# Patient Record
Sex: FEMALE | Race: BLACK OR AFRICAN AMERICAN | NOT HISPANIC OR LATINO | Employment: OTHER | RURAL
[De-identification: names, ages, dates, MRNs, and addresses within clinical notes are randomized per-mention and may not be internally consistent; named-entity substitution may affect disease eponyms.]

---

## 2020-04-24 ENCOUNTER — HISTORICAL (OUTPATIENT)
Dept: ADMINISTRATIVE | Facility: HOSPITAL | Age: 70
End: 2020-04-24

## 2020-04-24 LAB
ALBUMIN SERPL BCP-MCNC: 4 G/DL (ref 3.5–5)
ALBUMIN/GLOB SERPL: 1 {RATIO}
ALP SERPL-CCNC: 76 U/L (ref 55–142)
ALT SERPL W P-5'-P-CCNC: 18 U/L (ref 13–56)
AMYLASE SERPL-CCNC: 113 U/L (ref 25–115)
APTT PPP: 29.8 SECONDS (ref 25.2–37.3)
AST SERPL W P-5'-P-CCNC: 13 U/L (ref 15–37)
BASOPHILS # BLD AUTO: 0.03 X10E3/UL (ref 0–0.2)
BASOPHILS NFR BLD AUTO: 0.7 % (ref 0–1)
BILIRUB SERPL-MCNC: 0.6 MG/DL (ref 0–1.2)
BUN SERPL-MCNC: 19 MG/DL (ref 7–18)
BUN/CREAT SERPL: 14
CALCIUM SERPL-MCNC: 9.1 MG/DL (ref 8.5–10.1)
CHLORIDE SERPL-SCNC: 103 MMOL/L (ref 98–107)
CK MB SERPL-MCNC: <1 NG/ML (ref 1–3.6)
CK MB SERPL-MCNC: <1 NG/ML (ref 1–3.6)
CK SERPL-CCNC: 66 U/L (ref 26–192)
CK SERPL-CCNC: 82 U/L (ref 26–192)
CO2 SERPL-SCNC: 30 MMOL/L (ref 21–32)
CREAT SERPL-MCNC: 1.36 MG/DL (ref 0.55–1.02)
EOSINOPHIL # BLD AUTO: 0.05 X10E3/UL (ref 0–0.5)
EOSINOPHIL NFR BLD AUTO: 1.1 % (ref 1–4)
ERYTHROCYTE [DISTWIDTH] IN BLOOD BY AUTOMATED COUNT: 13.7 % (ref 11.5–14.5)
GLOBULIN SER-MCNC: 4 G/DL (ref 2–4)
GLUCOSE SERPL-MCNC: 89 MG/DL (ref 74–106)
HCT VFR BLD AUTO: 36.8 % (ref 38–47)
HGB BLD-MCNC: 11.4 G/DL (ref 12–16)
IMM GRANULOCYTES # BLD AUTO: 0.01 X10E3/UL (ref 0–0.04)
IMM GRANULOCYTES NFR BLD: 0.2 % (ref 0–0.4)
INR BLD: 0.91 (ref 0–3.3)
LIPASE SERPL-CCNC: 306 U/L (ref 73–393)
LYMPHOCYTES # BLD AUTO: 1.46 X10E3/UL (ref 1–4.8)
LYMPHOCYTES NFR BLD AUTO: 31.9 % (ref 27–41)
MCH RBC QN AUTO: 26.3 PG (ref 27–31)
MCHC RBC AUTO-ENTMCNC: 31 G/DL (ref 32–36)
MCV RBC AUTO: 84.8 FL (ref 80–96)
MONOCYTES # BLD AUTO: 0.52 X10E3/UL (ref 0–0.8)
MONOCYTES NFR BLD AUTO: 11.4 % (ref 2–6)
MPC BLD CALC-MCNC: 10.3 FL (ref 9.4–12.4)
MYOGLOBIN SERPL-MCNC: 44 NG/ML (ref 13–71)
NEUTROPHILS # BLD AUTO: 2.51 X10E3/UL (ref 1.8–7.7)
NEUTROPHILS NFR BLD AUTO: 54.7 % (ref 53–65)
NRBC # BLD AUTO: 0 X10E3/UL (ref 0–0)
NRBC, AUTO (.00): 0 /100 (ref 0–0)
PLATELET # BLD AUTO: 246 X10E3/UL (ref 150–400)
POTASSIUM SERPL-SCNC: 4 MMOL/L (ref 3.5–5.1)
PROT SERPL-MCNC: 8 G/DL (ref 6.4–8.2)
PROTHROMBIN TIME: 12.4 SECONDS (ref 11.7–14.7)
RBC # BLD AUTO: 4.34 X10E6/UL (ref 4.2–5.4)
SODIUM SERPL-SCNC: 139 MMOL/L (ref 136–145)
TROPONIN I SERPL-MCNC: <0.017 NG/ML (ref 0–0.06)
TROPONIN I SERPL-MCNC: <0.017 NG/ML (ref 0–0.06)
WBC # BLD AUTO: 4.58 X10E3/UL (ref 4.5–11)

## 2020-04-25 ENCOUNTER — HISTORICAL (OUTPATIENT)
Dept: ADMINISTRATIVE | Facility: HOSPITAL | Age: 70
End: 2020-04-25

## 2020-04-25 LAB
BASOPHILS # BLD AUTO: 0.03 X10E3/UL (ref 0–0.2)
BASOPHILS NFR BLD AUTO: 0.7 % (ref 0–1)
BUN SERPL-MCNC: 16 MG/DL (ref 7–18)
CALCIUM SERPL-MCNC: 8.6 MG/DL (ref 8.5–10.1)
CHLORIDE SERPL-SCNC: 104 MMOL/L (ref 98–107)
CK MB SERPL-MCNC: <1 NG/ML (ref 1–3.6)
CK SERPL-CCNC: 58 U/L (ref 26–192)
CO2 SERPL-SCNC: 26 MMOL/L (ref 21–32)
CREAT SERPL-MCNC: 1.08 MG/DL (ref 0.55–1.02)
EOSINOPHIL # BLD AUTO: 0.07 X10E3/UL (ref 0–0.5)
EOSINOPHIL NFR BLD AUTO: 1.6 % (ref 1–4)
ERYTHROCYTE [DISTWIDTH] IN BLOOD BY AUTOMATED COUNT: 13.8 % (ref 11.5–14.5)
GLUCOSE SERPL-MCNC: 84 MG/DL (ref 74–106)
HCT VFR BLD AUTO: 34.7 % (ref 38–47)
HGB BLD-MCNC: 10.8 G/DL (ref 12–16)
IMM GRANULOCYTES # BLD AUTO: 0.01 X10E3/UL (ref 0–0.04)
IMM GRANULOCYTES NFR BLD: 0.2 % (ref 0–0.4)
LYMPHOCYTES # BLD AUTO: 1.49 X10E3/UL (ref 1–4.8)
LYMPHOCYTES NFR BLD AUTO: 34.1 % (ref 27–41)
MCH RBC QN AUTO: 25.8 PG (ref 27–31)
MCHC RBC AUTO-ENTMCNC: 31.1 G/DL (ref 32–36)
MCV RBC AUTO: 82.8 FL (ref 80–96)
MONOCYTES # BLD AUTO: 0.53 X10E3/UL (ref 0–0.8)
MONOCYTES NFR BLD AUTO: 12.1 % (ref 2–6)
MPC BLD CALC-MCNC: 10.9 FL (ref 9.4–12.4)
NEUTROPHILS # BLD AUTO: 2.24 X10E3/UL (ref 1.8–7.7)
NEUTROPHILS NFR BLD AUTO: 51.3 % (ref 53–65)
NRBC # BLD AUTO: 0 X10E3/UL (ref 0–0)
NRBC, AUTO (.00): 0 /100 (ref 0–0)
PLATELET # BLD AUTO: 228 X10E3/UL (ref 150–400)
POTASSIUM SERPL-SCNC: 3.3 MMOL/L (ref 3.5–5.1)
RBC # BLD AUTO: 4.19 X10E6/UL (ref 4.2–5.4)
SODIUM SERPL-SCNC: 138 MMOL/L (ref 136–145)
TROPONIN I SERPL-MCNC: <0.017 NG/ML (ref 0–0.06)
WBC # BLD AUTO: 4.37 X10E3/UL (ref 4.5–11)

## 2020-04-26 ENCOUNTER — HISTORICAL (OUTPATIENT)
Dept: ADMINISTRATIVE | Facility: HOSPITAL | Age: 70
End: 2020-04-26

## 2020-04-26 LAB
BASOPHILS # BLD AUTO: 0.03 X10E3/UL (ref 0–0.2)
BASOPHILS NFR BLD AUTO: 0.6 % (ref 0–1)
BUN SERPL-MCNC: 22 MG/DL (ref 7–18)
CALCIUM SERPL-MCNC: 8.6 MG/DL (ref 8.5–10.1)
CHLORIDE SERPL-SCNC: 106 MMOL/L (ref 98–107)
CO2 SERPL-SCNC: 30 MMOL/L (ref 21–32)
CREAT SERPL-MCNC: 1.23 MG/DL (ref 0.55–1.02)
EOSINOPHIL # BLD AUTO: 0.08 X10E3/UL (ref 0–0.5)
EOSINOPHIL NFR BLD AUTO: 1.6 % (ref 1–4)
ERYTHROCYTE [DISTWIDTH] IN BLOOD BY AUTOMATED COUNT: 13.8 % (ref 11.5–14.5)
GLUCOSE SERPL-MCNC: 95 MG/DL (ref 74–106)
HCT VFR BLD AUTO: 36.1 % (ref 38–47)
HGB BLD-MCNC: 11.1 G/DL (ref 12–16)
IMM GRANULOCYTES # BLD AUTO: 0.01 X10E3/UL (ref 0–0.04)
IMM GRANULOCYTES NFR BLD: 0.2 % (ref 0–0.4)
LYMPHOCYTES # BLD AUTO: 0.98 X10E3/UL (ref 1–4.8)
LYMPHOCYTES NFR BLD AUTO: 19.2 % (ref 27–41)
MCH RBC QN AUTO: 26.1 PG (ref 27–31)
MCHC RBC AUTO-ENTMCNC: 30.7 G/DL (ref 32–36)
MCV RBC AUTO: 84.9 FL (ref 80–96)
MONOCYTES # BLD AUTO: 0.63 X10E3/UL (ref 0–0.8)
MONOCYTES NFR BLD AUTO: 12.3 % (ref 2–6)
MPC BLD CALC-MCNC: 10.7 FL (ref 9.4–12.4)
NEUTROPHILS # BLD AUTO: 3.38 X10E3/UL (ref 1.8–7.7)
NEUTROPHILS NFR BLD AUTO: 66.1 % (ref 53–65)
NRBC # BLD AUTO: 0 X10E3/UL (ref 0–0)
NRBC, AUTO (.00): 0 /100 (ref 0–0)
PLATELET # BLD AUTO: 221 X10E3/UL (ref 150–400)
POTASSIUM SERPL-SCNC: 4.2 MMOL/L (ref 3.5–5.1)
RBC # BLD AUTO: 4.25 X10E6/UL (ref 4.2–5.4)
SODIUM SERPL-SCNC: 141 MMOL/L (ref 136–145)
WBC # BLD AUTO: 5.11 X10E3/UL (ref 4.5–11)

## 2020-04-27 ENCOUNTER — HISTORICAL (OUTPATIENT)
Dept: ADMINISTRATIVE | Facility: HOSPITAL | Age: 70
End: 2020-04-27

## 2020-04-27 LAB
BASOPHILS # BLD AUTO: 0.03 X10E3/UL (ref 0–0.2)
BASOPHILS NFR BLD AUTO: 0.5 % (ref 0–1)
BUN SERPL-MCNC: 18 MG/DL (ref 7–18)
CALCIUM SERPL-MCNC: 8.5 MG/DL (ref 8.5–10.1)
CHLORIDE SERPL-SCNC: 103 MMOL/L (ref 98–107)
CO2 SERPL-SCNC: 28 MMOL/L (ref 21–32)
CREAT SERPL-MCNC: 1.08 MG/DL (ref 0.55–1.02)
EOSINOPHIL # BLD AUTO: 0.1 X10E3/UL (ref 0–0.5)
EOSINOPHIL NFR BLD AUTO: 1.7 % (ref 1–4)
ERYTHROCYTE [DISTWIDTH] IN BLOOD BY AUTOMATED COUNT: 13.6 % (ref 11.5–14.5)
GLUCOSE SERPL-MCNC: 94 MG/DL (ref 74–106)
HCT VFR BLD AUTO: 35.5 % (ref 38–47)
HGB BLD-MCNC: 11 G/DL (ref 12–16)
IMM GRANULOCYTES # BLD AUTO: 0.03 X10E3/UL (ref 0–0.04)
IMM GRANULOCYTES NFR BLD: 0.5 % (ref 0–0.4)
LYMPHOCYTES # BLD AUTO: 1.22 X10E3/UL (ref 1–4.8)
LYMPHOCYTES NFR BLD AUTO: 20.7 % (ref 27–41)
MCH RBC QN AUTO: 26 PG (ref 27–31)
MCHC RBC AUTO-ENTMCNC: 31 G/DL (ref 32–36)
MCV RBC AUTO: 83.9 FL (ref 80–96)
MONOCYTES # BLD AUTO: 0.66 X10E3/UL (ref 0–0.8)
MONOCYTES NFR BLD AUTO: 11.2 % (ref 2–6)
MPC BLD CALC-MCNC: 10.6 FL (ref 9.4–12.4)
NEUTROPHILS # BLD AUTO: 3.86 X10E3/UL (ref 1.8–7.7)
NEUTROPHILS NFR BLD AUTO: 65.4 % (ref 53–65)
NRBC # BLD AUTO: 0 X10E3/UL (ref 0–0)
NRBC, AUTO (.00): 0 /100 (ref 0–0)
PLATELET # BLD AUTO: 214 X10E3/UL (ref 150–400)
POTASSIUM SERPL-SCNC: 3.5 MMOL/L (ref 3.5–5.1)
RBC # BLD AUTO: 4.23 X10E6/UL (ref 4.2–5.4)
SODIUM SERPL-SCNC: 138 MMOL/L (ref 136–145)
WBC # BLD AUTO: 5.9 X10E3/UL (ref 4.5–11)

## 2020-04-28 ENCOUNTER — HISTORICAL (OUTPATIENT)
Dept: ADMINISTRATIVE | Facility: HOSPITAL | Age: 70
End: 2020-04-28

## 2020-04-28 LAB
BASOPHILS # BLD AUTO: 0.02 X10E3/UL (ref 0–0.2)
BASOPHILS NFR BLD AUTO: 0.4 % (ref 0–1)
BUN SERPL-MCNC: 20 MG/DL (ref 7–18)
CALCIUM SERPL-MCNC: 9.1 MG/DL (ref 8.5–10.1)
CHLORIDE SERPL-SCNC: 104 MMOL/L (ref 98–107)
CO2 SERPL-SCNC: 28 MMOL/L (ref 21–32)
CREAT SERPL-MCNC: 1.3 MG/DL (ref 0.5–1.02)
EOSINOPHIL # BLD AUTO: 0.14 X10E3/UL (ref 0–0.5)
EOSINOPHIL NFR BLD AUTO: 2.8 % (ref 1–4)
ERYTHROCYTE [DISTWIDTH] IN BLOOD BY AUTOMATED COUNT: 13.8 % (ref 11.5–14.5)
GLUCOSE SERPL-MCNC: 84 MG/DL (ref 74–106)
HCT VFR BLD AUTO: 36.4 % (ref 38–47)
HGB BLD-MCNC: 11.2 G/DL (ref 12–16)
IMM GRANULOCYTES # BLD AUTO: 0.02 X10E3/UL (ref 0–0.04)
IMM GRANULOCYTES NFR BLD: 0.4 % (ref 0–0.4)
LYMPHOCYTES # BLD AUTO: 1.54 X10E3/UL (ref 1–4.8)
LYMPHOCYTES NFR BLD AUTO: 30.9 % (ref 27–41)
MCH RBC QN AUTO: 26 PG (ref 27–31)
MCHC RBC AUTO-ENTMCNC: 30.8 G/DL (ref 32–36)
MCV RBC AUTO: 84.7 FL (ref 80–96)
MONOCYTES # BLD AUTO: 0.59 X10E3/UL (ref 0–0.8)
MONOCYTES NFR BLD AUTO: 11.8 % (ref 2–6)
MPC BLD CALC-MCNC: 10.9 FL (ref 9.4–12.4)
NEUTROPHILS # BLD AUTO: 2.67 X10E3/UL (ref 1.8–7.7)
NEUTROPHILS NFR BLD AUTO: 53.7 % (ref 53–65)
NRBC # BLD AUTO: 0 X10E3/UL (ref 0–0)
NRBC, AUTO (.00): 0 /100 (ref 0–0)
PLATELET # BLD AUTO: 224 X10E3/UL (ref 150–400)
POTASSIUM SERPL-SCNC: 3.8 MMOL/L (ref 3.5–5.1)
RBC # BLD AUTO: 4.3 X10E6/UL (ref 4.2–5.4)
SODIUM SERPL-SCNC: 138 MMOL/L (ref 136–145)
WBC # BLD AUTO: 4.98 X10E3/UL (ref 4.5–11)

## 2020-04-29 ENCOUNTER — HISTORICAL (OUTPATIENT)
Dept: ADMINISTRATIVE | Facility: HOSPITAL | Age: 70
End: 2020-04-29

## 2020-04-29 LAB
BASOPHILS # BLD AUTO: 0.03 X10E3/UL (ref 0–0.2)
BASOPHILS NFR BLD AUTO: 0.7 % (ref 0–1)
BUN SERPL-MCNC: 20 MG/DL (ref 7–18)
CALCIUM SERPL-MCNC: 8.8 MG/DL (ref 8.5–10.1)
CHLORIDE SERPL-SCNC: 101 MMOL/L (ref 98–107)
CO2 SERPL-SCNC: 30 MMOL/L (ref 21–32)
CREAT SERPL-MCNC: 1.2 MG/DL (ref 0.55–1.02)
EOSINOPHIL # BLD AUTO: 0.1 X10E3/UL (ref 0–0.5)
EOSINOPHIL NFR BLD AUTO: 2.3 % (ref 1–4)
ERYTHROCYTE [DISTWIDTH] IN BLOOD BY AUTOMATED COUNT: 13.6 % (ref 11.5–14.5)
GLUCOSE SERPL-MCNC: 91 MG/DL (ref 74–106)
HCT VFR BLD AUTO: 35.3 % (ref 38–47)
HGB BLD-MCNC: 11.1 G/DL (ref 12–16)
IMM GRANULOCYTES # BLD AUTO: 0.02 X10E3/UL (ref 0–0.04)
IMM GRANULOCYTES NFR BLD: 0.5 % (ref 0–0.4)
LYMPHOCYTES # BLD AUTO: 1.01 X10E3/UL (ref 1–4.8)
LYMPHOCYTES NFR BLD AUTO: 23 % (ref 27–41)
MCH RBC QN AUTO: 26.5 PG (ref 27–31)
MCHC RBC AUTO-ENTMCNC: 31.4 G/DL (ref 32–36)
MCV RBC AUTO: 84.2 FL (ref 80–96)
MONOCYTES # BLD AUTO: 0.5 X10E3/UL (ref 0–0.8)
MONOCYTES NFR BLD AUTO: 11.4 % (ref 2–6)
MPC BLD CALC-MCNC: 11 FL (ref 9.4–12.4)
NEUTROPHILS # BLD AUTO: 2.73 X10E3/UL (ref 1.8–7.7)
NEUTROPHILS NFR BLD AUTO: 62.1 % (ref 53–65)
NRBC # BLD AUTO: 0 X10E3/UL (ref 0–0)
NRBC, AUTO (.00): 0 /100 (ref 0–0)
PLATELET # BLD AUTO: 230 X10E3/UL (ref 150–400)
POTASSIUM SERPL-SCNC: 3.5 MMOL/L (ref 3.5–5.1)
RBC # BLD AUTO: 4.19 X10E6/UL (ref 4.2–5.4)
SODIUM SERPL-SCNC: 138 MMOL/L (ref 136–145)
WBC # BLD AUTO: 4.39 X10E3/UL (ref 4.5–11)

## 2020-04-30 ENCOUNTER — HISTORICAL (OUTPATIENT)
Dept: ADMINISTRATIVE | Facility: HOSPITAL | Age: 70
End: 2020-04-30

## 2020-04-30 LAB
BASOPHILS # BLD AUTO: 0.02 X10E3/UL (ref 0–0.2)
BASOPHILS NFR BLD AUTO: 0.4 % (ref 0–1)
BUN SERPL-MCNC: 21 MG/DL (ref 7–18)
CALCIUM SERPL-MCNC: 8.7 MG/DL (ref 8.5–10.1)
CHLORIDE SERPL-SCNC: 98 MMOL/L (ref 98–107)
CO2 SERPL-SCNC: 27 MMOL/L (ref 21–32)
CREAT SERPL-MCNC: 1.18 MG/DL (ref 0.55–1.02)
EOSINOPHIL # BLD AUTO: 0.06 X10E3/UL (ref 0–0.5)
EOSINOPHIL NFR BLD AUTO: 1.2 % (ref 1–4)
ERYTHROCYTE [DISTWIDTH] IN BLOOD BY AUTOMATED COUNT: 13.8 % (ref 11.5–14.5)
GLUCOSE SERPL-MCNC: 134 MG/DL (ref 74–106)
HCT VFR BLD AUTO: 37.7 % (ref 38–47)
HGB BLD-MCNC: 12 G/DL (ref 12–16)
IMM GRANULOCYTES # BLD AUTO: 0.02 X10E3/UL (ref 0–0.04)
IMM GRANULOCYTES NFR BLD: 0.4 % (ref 0–0.4)
LYMPHOCYTES # BLD AUTO: 0.83 X10E3/UL (ref 1–4.8)
LYMPHOCYTES NFR BLD AUTO: 16.7 % (ref 27–41)
MCH RBC QN AUTO: 26.4 PG (ref 27–31)
MCHC RBC AUTO-ENTMCNC: 31.8 G/DL (ref 32–36)
MCV RBC AUTO: 83 FL (ref 80–96)
MONOCYTES # BLD AUTO: 0.37 X10E3/UL (ref 0–0.8)
MONOCYTES NFR BLD AUTO: 7.4 % (ref 2–6)
MPC BLD CALC-MCNC: 10.9 FL (ref 9.4–12.4)
NEUTROPHILS # BLD AUTO: 3.68 X10E3/UL (ref 1.8–7.7)
NEUTROPHILS NFR BLD AUTO: 73.9 % (ref 53–65)
NRBC # BLD AUTO: 0 X10E3/UL (ref 0–0)
NRBC, AUTO (.00): 0 /100 (ref 0–0)
PLATELET # BLD AUTO: 223 X10E3/UL (ref 150–400)
POTASSIUM SERPL-SCNC: 3.5 MMOL/L (ref 3.5–5.1)
RBC # BLD AUTO: 4.54 X10E6/UL (ref 4.2–5.4)
SODIUM SERPL-SCNC: 132 MMOL/L (ref 136–145)
WBC # BLD AUTO: 4.98 X10E3/UL (ref 4.5–11)

## 2020-05-01 ENCOUNTER — HISTORICAL (OUTPATIENT)
Dept: ADMINISTRATIVE | Facility: HOSPITAL | Age: 70
End: 2020-05-01

## 2020-05-01 LAB
ALBUMIN SERPL BCP-MCNC: 4 G/DL (ref 3.5–5)
ALBUMIN/GLOB SERPL: 1 {RATIO}
ALP SERPL-CCNC: 73 U/L (ref 55–142)
ALT SERPL W P-5'-P-CCNC: 16 U/L (ref 13–56)
AMYLASE SERPL-CCNC: 100 U/L (ref 25–115)
AST SERPL W P-5'-P-CCNC: 17 U/L (ref 15–37)
BASOPHILS # BLD AUTO: 0.02 X10E3/UL (ref 0–0.2)
BASOPHILS NFR BLD AUTO: 0.3 % (ref 0–1)
BILIRUB SERPL-MCNC: 0.7 MG/DL (ref 0–1.2)
BILIRUB UR QL STRIP: NEGATIVE MG/DL
BUN SERPL-MCNC: 28 MG/DL (ref 7–18)
BUN/CREAT SERPL: 16
CALCIUM SERPL-MCNC: 9.1 MG/DL (ref 8.5–10.1)
CHLORIDE SERPL-SCNC: 94 MMOL/L (ref 98–107)
CK MB SERPL-MCNC: 1.2 NG/ML (ref 1–3.6)
CK MB SERPL-MCNC: 1.5 NG/ML (ref 1–3.6)
CK SERPL-CCNC: 80 U/L (ref 26–192)
CK SERPL-CCNC: 95 U/L (ref 26–192)
CLARITY UR: CLEAR
CO2 SERPL-SCNC: 27 MMOL/L (ref 21–32)
COLOR UR: ABNORMAL
CREAT SERPL-MCNC: 1.75 MG/DL (ref 0.55–1.02)
EOSINOPHIL # BLD AUTO: 0.07 X10E3/UL (ref 0–0.5)
EOSINOPHIL NFR BLD AUTO: 1.1 % (ref 1–4)
ERYTHROCYTE [DISTWIDTH] IN BLOOD BY AUTOMATED COUNT: 13.5 % (ref 11.5–14.5)
GLOBULIN SER-MCNC: 4.1 G/DL (ref 2–4)
GLUCOSE SERPL-MCNC: 99 MG/DL (ref 74–106)
GLUCOSE UR STRIP-MCNC: NEGATIVE MG/DL
HCT VFR BLD AUTO: 38.6 % (ref 38–47)
HGB BLD-MCNC: 12 G/DL (ref 12–16)
IMM GRANULOCYTES # BLD AUTO: 0.03 X10E3/UL (ref 0–0.04)
IMM GRANULOCYTES NFR BLD: 0.5 % (ref 0–0.4)
KETONES UR STRIP-SCNC: 15 MG/DL
LEUKOCYTE ESTERASE UR QL STRIP: NEGATIVE LEU/UL
LIPASE SERPL-CCNC: 297 U/L (ref 73–393)
LYMPHOCYTES # BLD AUTO: 1.35 X10E3/UL (ref 1–4.8)
LYMPHOCYTES NFR BLD AUTO: 21.2 % (ref 27–41)
MCH RBC QN AUTO: 26.1 PG (ref 27–31)
MCHC RBC AUTO-ENTMCNC: 31.1 G/DL (ref 32–36)
MCV RBC AUTO: 83.9 FL (ref 80–96)
MONOCYTES # BLD AUTO: 0.6 X10E3/UL (ref 0–0.8)
MONOCYTES NFR BLD AUTO: 9.4 % (ref 2–6)
MPC BLD CALC-MCNC: 10.7 FL (ref 9.4–12.4)
MYOGLOBIN SERPL-MCNC: 67 NG/ML (ref 13–71)
NEUTROPHILS # BLD AUTO: 4.29 X10E3/UL (ref 1.8–7.7)
NEUTROPHILS NFR BLD AUTO: 67.5 % (ref 53–65)
NITRITE UR QL STRIP: NEGATIVE
NRBC # BLD AUTO: 0 X10E3/UL (ref 0–0)
NRBC, AUTO (.00): 0 /100 (ref 0–0)
PH UR STRIP: 6 PH UNITS (ref 5–8)
PLATELET # BLD AUTO: 225 X10E3/UL (ref 150–400)
POTASSIUM SERPL-SCNC: 3.6 MMOL/L (ref 3.5–5.1)
PROT SERPL-MCNC: 8.1 G/DL (ref 6.4–8.2)
PROT UR QL STRIP: NEGATIVE MG/DL
RBC # BLD AUTO: 4.6 X10E6/UL (ref 4.2–5.4)
RBC # UR STRIP: NEGATIVE ERY/UL
SODIUM SERPL-SCNC: 131 MMOL/L (ref 136–145)
SP GR UR STRIP: 1.01 (ref 1–1.03)
TROPONIN I SERPL-MCNC: 0.03 NG/ML (ref 0–0.06)
TROPONIN I SERPL-MCNC: 0.03 NG/ML (ref 0–0.06)
UROBILINOGEN UR STRIP-ACNC: 0.2 EU/DL
WBC # BLD AUTO: 6.36 X10E3/UL (ref 4.5–11)

## 2020-05-02 ENCOUNTER — HISTORICAL (OUTPATIENT)
Dept: ADMINISTRATIVE | Facility: HOSPITAL | Age: 70
End: 2020-05-02

## 2020-05-02 LAB
BASOPHILS # BLD AUTO: 0.03 X10E3/UL (ref 0–0.2)
BASOPHILS NFR BLD AUTO: 0.6 % (ref 0–1)
BUN SERPL-MCNC: 30 MG/DL (ref 7–18)
CALCIUM SERPL-MCNC: 9.1 MG/DL (ref 8.5–10.1)
CHLORIDE SERPL-SCNC: 97 MMOL/L (ref 98–107)
CK MB SERPL-MCNC: 1.4 NG/ML (ref 1–3.6)
CK SERPL-CCNC: 80 U/L (ref 26–192)
CO2 SERPL-SCNC: 24 MMOL/L (ref 21–32)
CREAT SERPL-MCNC: 1.56 MG/DL (ref 0.55–1.02)
EOSINOPHIL # BLD AUTO: 0.1 X10E3/UL (ref 0–0.5)
EOSINOPHIL NFR BLD AUTO: 2.1 % (ref 1–4)
ERYTHROCYTE [DISTWIDTH] IN BLOOD BY AUTOMATED COUNT: 13.5 % (ref 11.5–14.5)
FERRITIN SERPL-MCNC: 248 NG/ML (ref 8–252)
GLUCOSE SERPL-MCNC: 80 MG/DL (ref 74–106)
HCT VFR BLD AUTO: 36.1 % (ref 38–47)
HGB BLD-MCNC: 11.4 G/DL (ref 12–16)
IMM GRANULOCYTES # BLD AUTO: 0.02 X10E3/UL (ref 0–0.04)
IMM GRANULOCYTES NFR BLD: 0.4 % (ref 0–0.4)
IRON SATN MFR SERPL: 16 % (ref 14–50)
IRON SERPL-MCNC: 31 UG/DL (ref 50–170)
LYMPHOCYTES # BLD AUTO: 0.98 X10E3/UL (ref 1–4.8)
LYMPHOCYTES NFR BLD AUTO: 20.9 % (ref 27–41)
MCH RBC QN AUTO: 26.3 PG (ref 27–31)
MCHC RBC AUTO-ENTMCNC: 31.6 G/DL (ref 32–36)
MCV RBC AUTO: 83.4 FL (ref 80–96)
MONOCYTES # BLD AUTO: 0.59 X10E3/UL (ref 0–0.8)
MONOCYTES NFR BLD AUTO: 12.6 % (ref 2–6)
MPC BLD CALC-MCNC: 11 FL (ref 9.4–12.4)
NEUTROPHILS # BLD AUTO: 2.96 X10E3/UL (ref 1.8–7.7)
NEUTROPHILS NFR BLD AUTO: 63.4 % (ref 53–65)
NRBC # BLD AUTO: 0 X10E3/UL (ref 0–0)
NRBC, AUTO (.00): 0 /100 (ref 0–0)
PLATELET # BLD AUTO: 187 X10E3/UL (ref 150–400)
POTASSIUM SERPL-SCNC: 3.7 MMOL/L (ref 3.5–5.1)
RBC # BLD AUTO: 4.33 X10E6/UL (ref 4.2–5.4)
SODIUM SERPL-SCNC: 134 MMOL/L (ref 136–145)
TIBC SERPL-MCNC: 199 UG/DL (ref 250–450)
TROPONIN I SERPL-MCNC: 0.04 NG/ML (ref 0–0.06)
WBC # BLD AUTO: 4.68 X10E3/UL (ref 4.5–11)

## 2020-05-03 ENCOUNTER — HISTORICAL (OUTPATIENT)
Dept: ADMINISTRATIVE | Facility: HOSPITAL | Age: 70
End: 2020-05-03

## 2020-05-03 LAB
BASOPHILS # BLD AUTO: 0.03 X10E3/UL (ref 0–0.2)
BASOPHILS NFR BLD AUTO: 0.7 % (ref 0–1)
BUN SERPL-MCNC: 32 MG/DL (ref 7–18)
CALCIUM SERPL-MCNC: 8.6 MG/DL (ref 8.5–10.1)
CHLORIDE SERPL-SCNC: 99 MMOL/L (ref 98–107)
CO2 SERPL-SCNC: 31 MMOL/L (ref 21–32)
CREAT SERPL-MCNC: 1.44 MG/DL (ref 0.55–1.02)
EOSINOPHIL # BLD AUTO: 0.14 X10E3/UL (ref 0–0.5)
EOSINOPHIL NFR BLD AUTO: 3.3 % (ref 1–4)
ERYTHROCYTE [DISTWIDTH] IN BLOOD BY AUTOMATED COUNT: 13.5 % (ref 11.5–14.5)
GLUCOSE SERPL-MCNC: 76 MG/DL (ref 74–106)
HCT VFR BLD AUTO: 33.4 % (ref 38–47)
HGB BLD-MCNC: 10.6 G/DL (ref 12–16)
IMM GRANULOCYTES # BLD AUTO: 0.02 X10E3/UL (ref 0–0.04)
IMM GRANULOCYTES NFR BLD: 0.5 % (ref 0–0.4)
LYMPHOCYTES # BLD AUTO: 1.12 X10E3/UL (ref 1–4.8)
LYMPHOCYTES NFR BLD AUTO: 26.6 % (ref 27–41)
MCH RBC QN AUTO: 26.4 PG (ref 27–31)
MCHC RBC AUTO-ENTMCNC: 31.7 G/DL (ref 32–36)
MCV RBC AUTO: 83.1 FL (ref 80–96)
MONOCYTES # BLD AUTO: 0.55 X10E3/UL (ref 0–0.8)
MONOCYTES NFR BLD AUTO: 13.1 % (ref 2–6)
MPC BLD CALC-MCNC: 10.8 FL (ref 9.4–12.4)
NEUTROPHILS # BLD AUTO: 2.35 X10E3/UL (ref 1.8–7.7)
NEUTROPHILS NFR BLD AUTO: 55.8 % (ref 53–65)
NRBC # BLD AUTO: 0 X10E3/UL (ref 0–0)
NRBC, AUTO (.00): 0 /100 (ref 0–0)
PLATELET # BLD AUTO: 191 X10E3/UL (ref 150–400)
POTASSIUM SERPL-SCNC: 3.9 MMOL/L (ref 3.5–5.1)
RBC # BLD AUTO: 4.02 X10E6/UL (ref 4.2–5.4)
SODIUM SERPL-SCNC: 135 MMOL/L (ref 136–145)
WBC # BLD AUTO: 4.21 X10E3/UL (ref 4.5–11)

## 2020-05-04 ENCOUNTER — HISTORICAL (OUTPATIENT)
Dept: ADMINISTRATIVE | Facility: HOSPITAL | Age: 70
End: 2020-05-04

## 2020-05-05 ENCOUNTER — HISTORICAL (OUTPATIENT)
Dept: ADMINISTRATIVE | Facility: HOSPITAL | Age: 70
End: 2020-05-05

## 2020-05-05 LAB
BASOPHILS # BLD AUTO: 0.03 X10E3/UL (ref 0–0.2)
BASOPHILS NFR BLD AUTO: 0.8 % (ref 0–1)
BUN SERPL-MCNC: 27 MG/DL (ref 7–18)
CALCIUM SERPL-MCNC: 8.6 MG/DL (ref 8.5–10.1)
CHLORIDE SERPL-SCNC: 100 MMOL/L (ref 98–107)
CO2 SERPL-SCNC: 31 MMOL/L (ref 21–32)
CREAT SERPL-MCNC: 1.25 MG/DL (ref 0.55–1.02)
EOSINOPHIL # BLD AUTO: 0.13 X10E3/UL (ref 0–0.5)
EOSINOPHIL NFR BLD AUTO: 3.7 % (ref 1–4)
ERYTHROCYTE [DISTWIDTH] IN BLOOD BY AUTOMATED COUNT: 13.2 % (ref 11.5–14.5)
GLUCOSE SERPL-MCNC: 88 MG/DL (ref 74–106)
HCT VFR BLD AUTO: 33.3 % (ref 38–47)
HGB BLD-MCNC: 10.3 G/DL (ref 12–16)
IMM GRANULOCYTES # BLD AUTO: 0.01 X10E3/UL (ref 0–0.04)
IMM GRANULOCYTES NFR BLD: 0.3 % (ref 0–0.4)
INSULIN SERPL-ACNC: NORMAL U[IU]/ML
LAB AP CLINICAL INFORMATION: NORMAL
LAB AP DIAGNOSIS - HISTORICAL: NORMAL
LAB AP GROSS PATHOLOGY - HISTORICAL: NORMAL
LAB AP SPECIMEN SUBMITTED - HISTORICAL: NORMAL
LYMPHOCYTES # BLD AUTO: 1.02 X10E3/UL (ref 1–4.8)
LYMPHOCYTES NFR BLD AUTO: 28.7 % (ref 27–41)
MCH RBC QN AUTO: 26.1 PG (ref 27–31)
MCHC RBC AUTO-ENTMCNC: 30.9 G/DL (ref 32–36)
MCV RBC AUTO: 84.5 FL (ref 80–96)
MONOCYTES # BLD AUTO: 0.51 X10E3/UL (ref 0–0.8)
MONOCYTES NFR BLD AUTO: 14.3 % (ref 2–6)
MPC BLD CALC-MCNC: 10.4 FL (ref 9.4–12.4)
NEUTROPHILS # BLD AUTO: 1.86 X10E3/UL (ref 1.8–7.7)
NEUTROPHILS NFR BLD AUTO: 52.2 % (ref 53–65)
NRBC # BLD AUTO: 0 X10E3/UL (ref 0–0)
NRBC, AUTO (.00): 0 /100 (ref 0–0)
PLATELET # BLD AUTO: 177 X10E3/UL (ref 150–400)
POTASSIUM SERPL-SCNC: 4 MMOL/L (ref 3.5–5.1)
RBC # BLD AUTO: 3.94 X10E6/UL (ref 4.2–5.4)
SODIUM SERPL-SCNC: 136 MMOL/L (ref 136–145)
WBC # BLD AUTO: 3.56 X10E3/UL (ref 4.5–11)

## 2020-05-06 ENCOUNTER — HISTORICAL (OUTPATIENT)
Dept: ADMINISTRATIVE | Facility: HOSPITAL | Age: 70
End: 2020-05-06

## 2020-05-06 LAB
BASOPHILS # BLD AUTO: 0.04 X10E3/UL (ref 0–0.2)
BASOPHILS NFR BLD AUTO: 1 % (ref 0–1)
BUN SERPL-MCNC: 24 MG/DL (ref 7–18)
CALCIUM SERPL-MCNC: 8.7 MG/DL (ref 8.5–10.1)
CHLORIDE SERPL-SCNC: 100 MMOL/L (ref 98–107)
CO2 SERPL-SCNC: 28 MMOL/L (ref 21–32)
CREAT SERPL-MCNC: 1.17 MG/DL (ref 0.55–1.02)
EOSINOPHIL # BLD AUTO: 0.13 X10E3/UL (ref 0–0.5)
EOSINOPHIL NFR BLD AUTO: 3.1 % (ref 1–4)
ERYTHROCYTE [DISTWIDTH] IN BLOOD BY AUTOMATED COUNT: 13.2 % (ref 11.5–14.5)
GLUCOSE SERPL-MCNC: 80 MG/DL (ref 74–106)
HCO3 UR-SCNC: 30 MMOL/L (ref 21–28)
HCT VFR BLD AUTO: 32 % (ref 38–47)
HGB BLD-MCNC: 10 G/DL (ref 12–16)
IMM GRANULOCYTES # BLD AUTO: 0.02 X10E3/UL (ref 0–0.04)
IMM GRANULOCYTES NFR BLD: 0.5 % (ref 0–0.4)
LYMPHOCYTES # BLD AUTO: 1.04 X10E3/UL (ref 1–4.8)
LYMPHOCYTES NFR BLD AUTO: 25 % (ref 27–41)
MCH RBC QN AUTO: 26.3 PG (ref 27–31)
MCHC RBC AUTO-ENTMCNC: 31.3 G/DL (ref 32–36)
MCV RBC AUTO: 84.2 FL (ref 80–96)
MONOCYTES # BLD AUTO: 0.53 X10E3/UL (ref 0–0.8)
MONOCYTES NFR BLD AUTO: 12.7 % (ref 2–6)
MPC BLD CALC-MCNC: 10.4 FL (ref 9.4–12.4)
NEUTROPHILS # BLD AUTO: 2.4 X10E3/UL (ref 1.8–7.7)
NEUTROPHILS NFR BLD AUTO: 57.7 % (ref 53–65)
NRBC # BLD AUTO: 0 X10E3/UL (ref 0–0)
NRBC, AUTO (.00): 0 /100 (ref 0–0)
PCO2 BLDA: 43 MM HG (ref 35–48)
PH SMN: 7.45 PH UNITS (ref 7.35–7.45)
PLATELET # BLD AUTO: 183 X10E3/UL (ref 150–400)
PO2 BLDA: 75 MM HG (ref 83–108)
POC A-ADO2: 21
POC BASE EXCESS ARTERIAL: 5.2 MMOL/L (ref -2–3)
POC O2 STATUS: 21
POC SATURATED O2: 96 % (ref 95–98)
POTASSIUM SERPL-SCNC: 3.9 MMOL/L (ref 3.5–5.1)
RBC # BLD AUTO: 3.8 X10E6/UL (ref 4.2–5.4)
SODIUM SERPL-SCNC: 135 MMOL/L (ref 136–145)
WBC # BLD AUTO: 4.16 X10E3/UL (ref 4.5–11)

## 2020-05-07 ENCOUNTER — HISTORICAL (OUTPATIENT)
Dept: ADMINISTRATIVE | Facility: HOSPITAL | Age: 70
End: 2020-05-07

## 2020-05-07 LAB
BASOPHILS # BLD AUTO: 0.03 X10E3/UL (ref 0–0.2)
BASOPHILS NFR BLD AUTO: 0.8 % (ref 0–1)
BUN SERPL-MCNC: 18 MG/DL (ref 7–18)
CALCIUM SERPL-MCNC: 8.8 MG/DL (ref 8.5–10.1)
CHLORIDE SERPL-SCNC: 101 MMOL/L (ref 98–107)
CO2 SERPL-SCNC: 30 MMOL/L (ref 21–32)
CREAT SERPL-MCNC: 1.12 MG/DL (ref 0.55–1.02)
EOSINOPHIL # BLD AUTO: 0.14 X10E3/UL (ref 0–0.5)
EOSINOPHIL NFR BLD AUTO: 3.8 % (ref 1–4)
ERYTHROCYTE [DISTWIDTH] IN BLOOD BY AUTOMATED COUNT: 13 % (ref 11.5–14.5)
GLUCOSE SERPL-MCNC: 84 MG/DL (ref 74–106)
HCT VFR BLD AUTO: 33.6 % (ref 38–47)
HGB BLD-MCNC: 10.4 G/DL (ref 12–16)
IMM GRANULOCYTES # BLD AUTO: 0.01 X10E3/UL (ref 0–0.04)
IMM GRANULOCYTES NFR BLD: 0.3 % (ref 0–0.4)
LYMPHOCYTES # BLD AUTO: 0.95 X10E3/UL (ref 1–4.8)
LYMPHOCYTES NFR BLD AUTO: 25.6 % (ref 27–41)
MCH RBC QN AUTO: 25.7 PG (ref 27–31)
MCHC RBC AUTO-ENTMCNC: 31 G/DL (ref 32–36)
MCV RBC AUTO: 83 FL (ref 80–96)
MONOCYTES # BLD AUTO: 0.48 X10E3/UL (ref 0–0.8)
MONOCYTES NFR BLD AUTO: 12.9 % (ref 2–6)
MPC BLD CALC-MCNC: 10.7 FL (ref 9.4–12.4)
NEUTROPHILS # BLD AUTO: 2.1 X10E3/UL (ref 1.8–7.7)
NEUTROPHILS NFR BLD AUTO: 56.6 % (ref 53–65)
NRBC # BLD AUTO: 0 X10E3/UL (ref 0–0)
NRBC, AUTO (.00): 0 /100 (ref 0–0)
PLATELET # BLD AUTO: 216 X10E3/UL (ref 150–400)
POTASSIUM SERPL-SCNC: 4.1 MMOL/L (ref 3.5–5.1)
RBC # BLD AUTO: 4.05 X10E6/UL (ref 4.2–5.4)
SODIUM SERPL-SCNC: 136 MMOL/L (ref 136–145)
WBC # BLD AUTO: 3.71 X10E3/UL (ref 4.5–11)

## 2020-08-06 ENCOUNTER — HISTORICAL (OUTPATIENT)
Dept: ADMINISTRATIVE | Facility: HOSPITAL | Age: 70
End: 2020-08-06

## 2020-08-06 LAB
BACTERIA #/AREA URNS HPF: ABNORMAL /HPF
BILIRUB UR QL STRIP: NEGATIVE MG/DL
CLARITY UR: CLEAR
CLARITY UR: CLEAR
COLOR UR: ABNORMAL
COLOR UR: ABNORMAL
GLUCOSE UR STRIP-MCNC: NEGATIVE MG/DL
KETONES UR STRIP-SCNC: NEGATIVE MG/DL
LEUKOCYTE ESTERASE UR QL STRIP: ABNORMAL LEU/UL
MUCOUS THREADS #/AREA URNS HPF: ABNORMAL /HPF
NITRITE UR QL STRIP: NEGATIVE
PH UR STRIP: 6 PH UNITS (ref 5–8)
PROT UR QL STRIP: NEGATIVE MG/DL
RBC # UR STRIP: NEGATIVE ERY/UL
RBC #/AREA URNS HPF: ABNORMAL /HPF (ref 0–3)
SP GR UR STRIP: 1.01 (ref 1–1.03)
SQUAMOUS #/AREA URNS LPF: ABNORMAL /LPF
UROBILINOGEN UR STRIP-ACNC: 0.2 EU/DL
WBC #/AREA URNS HPF: ABNORMAL /HPF (ref 0–5)

## 2020-08-07 ENCOUNTER — HISTORICAL (OUTPATIENT)
Dept: ADMINISTRATIVE | Facility: HOSPITAL | Age: 70
End: 2020-08-07

## 2020-09-22 ENCOUNTER — HISTORICAL (OUTPATIENT)
Dept: ADMINISTRATIVE | Facility: HOSPITAL | Age: 70
End: 2020-09-22

## 2020-09-22 LAB
AMPHET UR QL SCN: NEGATIVE NG/ML
ANION GAP SERPL CALCULATED.3IONS-SCNC: 15 MMOL/L
APTT PPP: 25.8 SECONDS (ref 25.2–37.3)
BARBITURATES UR QL SCN: NEGATIVE NG/ML
BASOPHILS # BLD AUTO: 0.03 X10E3/UL (ref 0–0.2)
BASOPHILS NFR BLD AUTO: 0.8 % (ref 0–1)
BENZODIAZ METAB UR QL SCN: NEGATIVE NG/ML
BILIRUB UR QL STRIP: NEGATIVE MG/DL
BUN SERPL-MCNC: 20 MG/DL (ref 7–18)
CALCIUM SERPL-MCNC: 9.6 MG/DL (ref 8.5–10.1)
CANNABINOIDS UR QL SCN: NEGATIVE NG/ML
CHLORIDE SERPL-SCNC: 101 MMOL/L (ref 98–107)
CK MB SERPL-MCNC: 3.2 NG/ML (ref 1–3.6)
CK SERPL-CCNC: 108 U/L (ref 26–192)
CLARITY UR: CLEAR
CO2 SERPL-SCNC: 26 MMOL/L (ref 21–32)
COCAINE UR QL SCN: NEGATIVE NG/ML
COLOR UR: ABNORMAL
CREAT SERPL-MCNC: 1.22 MG/DL (ref 0.55–1.02)
EOSINOPHIL # BLD AUTO: 0.03 X10E3/UL (ref 0–0.5)
EOSINOPHIL NFR BLD AUTO: 0.8 % (ref 1–4)
ERYTHROCYTE [DISTWIDTH] IN BLOOD BY AUTOMATED COUNT: 13.9 % (ref 11.5–14.5)
GLUCOSE SERPL-MCNC: 91 MG/DL (ref 74–106)
GLUCOSE UR STRIP-MCNC: NEGATIVE MG/DL
HCT VFR BLD AUTO: 41.8 % (ref 38–47)
HGB BLD-MCNC: 13.4 G/DL (ref 12–16)
IMM GRANULOCYTES # BLD AUTO: 0.01 X10E3/UL (ref 0–0.04)
IMM GRANULOCYTES NFR BLD: 0.3 % (ref 0–0.4)
INR BLD: 0.98 (ref 0–3.3)
KETONES UR STRIP-SCNC: 40 MG/DL
LEUKOCYTE ESTERASE UR QL STRIP: NEGATIVE LEU/UL
LYMPHOCYTES # BLD AUTO: 0.96 X10E3/UL (ref 1–4.8)
LYMPHOCYTES NFR BLD AUTO: 24.5 % (ref 27–41)
MAGNESIUM SERPL-MCNC: 1.8 MG/DL (ref 1.7–2.3)
MCH RBC QN AUTO: 26.4 PG (ref 27–31)
MCHC RBC AUTO-ENTMCNC: 32.1 G/DL (ref 32–36)
MCV RBC AUTO: 82.3 FL (ref 80–96)
MONOCYTES # BLD AUTO: 0.22 X10E3/UL (ref 0–0.8)
MONOCYTES NFR BLD AUTO: 5.6 % (ref 2–6)
MPC BLD CALC-MCNC: 10.3 FL (ref 9.4–12.4)
MYOGLOBIN SERPL-MCNC: 62 NG/ML (ref 13–71)
NEUTROPHILS # BLD AUTO: 2.67 X10E3/UL (ref 1.8–7.7)
NEUTROPHILS NFR BLD AUTO: 68 % (ref 53–65)
NITRITE UR QL STRIP: NEGATIVE
NRBC # BLD AUTO: 0 X10E3/UL (ref 0–0)
NRBC, AUTO (.00): 0 /100 (ref 0–0)
OPIATES UR QL SCN: NEGATIVE NG/ML
PCP UR QL SCN: NEGATIVE NG/ML
PH UR STRIP: 5.5 PH UNITS (ref 5–8)
PLATELET # BLD AUTO: 203 X10E3/UL (ref 150–400)
POTASSIUM SERPL-SCNC: 3.3 MMOL/L (ref 3.5–5.1)
PROT UR QL STRIP: NEGATIVE MG/DL
PROTHROMBIN TIME: 12.5 SECONDS (ref 11.7–14.7)
RBC # BLD AUTO: 5.08 X10E6/UL (ref 4.2–5.4)
RBC # UR STRIP: NEGATIVE ERY/UL
SODIUM SERPL-SCNC: 139 MMOL/L (ref 136–145)
SP GR UR STRIP: 1.01 (ref 1–1.03)
TROPONIN I SERPL-MCNC: 0.03 NG/ML (ref 0–0.06)
UROBILINOGEN UR STRIP-ACNC: 0.2 EU/DL
WBC # BLD AUTO: 3.92 X10E3/UL (ref 4.5–11)

## 2020-10-09 ENCOUNTER — HISTORICAL (OUTPATIENT)
Dept: ADMINISTRATIVE | Facility: HOSPITAL | Age: 70
End: 2020-10-09

## 2020-10-09 LAB
ALBUMIN SERPL BCP-MCNC: 3.8 G/DL (ref 3.5–5)
ALBUMIN/GLOB SERPL: 1.1 {RATIO}
ALP SERPL-CCNC: 54 U/L (ref 55–142)
ALT SERPL W P-5'-P-CCNC: 22 U/L (ref 13–56)
ANION GAP SERPL CALCULATED.3IONS-SCNC: 8.3 MMOL/L (ref 7–16)
AST SERPL W P-5'-P-CCNC: 16 U/L (ref 15–37)
BASOPHILS # BLD AUTO: 0.04 X10E3/UL (ref 0–0.2)
BASOPHILS NFR BLD AUTO: 1.1 % (ref 0–1)
BILIRUB SERPL-MCNC: 0.4 MG/DL (ref 0–1.2)
BUN SERPL-MCNC: 25 MG/DL (ref 7–18)
BUN/CREAT SERPL: 23
CALCIUM SERPL-MCNC: 8.8 MG/DL (ref 8.5–10.1)
CHLORIDE SERPL-SCNC: 107 MMOL/L (ref 98–107)
CHOLEST SERPL-MCNC: 163 MG/DL
CHOLEST/HDLC SERPL: 1.8 {RATIO}
CO2 SERPL-SCNC: 29 MMOL/L (ref 21–32)
CREAT SERPL-MCNC: 1.09 MG/DL (ref 0.5–1.02)
EOSINOPHIL # BLD AUTO: 0.1 X10E3/UL (ref 0–0.5)
EOSINOPHIL NFR BLD AUTO: 2.7 % (ref 1–4)
ERYTHROCYTE [DISTWIDTH] IN BLOOD BY AUTOMATED COUNT: 14.8 % (ref 11.5–14.5)
GLOBULIN SER-MCNC: 3.4 G/DL (ref 2–4)
GLUCOSE SERPL-MCNC: 85 MG/DL (ref 74–106)
HCT VFR BLD AUTO: 37.6 % (ref 38–47)
HDLC SERPL-MCNC: 90 MG/DL
HGB BLD-MCNC: 11.7 G/DL (ref 12–16)
IMM GRANULOCYTES # BLD AUTO: 0 X10E3/UL (ref 0–0.04)
IMM GRANULOCYTES NFR BLD: 0 % (ref 0–0.4)
LDLC SERPL CALC-MCNC: 67 MG/DL
LYMPHOCYTES # BLD AUTO: 1.34 X10E3/UL (ref 1–4.8)
LYMPHOCYTES NFR BLD AUTO: 35.5 % (ref 27–41)
MCH RBC QN AUTO: 25.8 PG (ref 27–31)
MCHC RBC AUTO-ENTMCNC: 31.1 G/DL (ref 32–36)
MCV RBC AUTO: 83 FL (ref 80–96)
MONOCYTES # BLD AUTO: 0.36 X10E3/UL (ref 0–0.8)
MONOCYTES NFR BLD AUTO: 9.5 % (ref 2–6)
MPC BLD CALC-MCNC: 10.6 FL (ref 9.4–12.4)
NEUTROPHILS # BLD AUTO: 1.93 X10E3/UL (ref 1.8–7.7)
NEUTROPHILS NFR BLD AUTO: 51.2 % (ref 53–65)
NRBC # BLD AUTO: 0 X10E3/UL (ref 0–0)
NRBC, AUTO (.00): 0 /100 (ref 0–0)
PLATELET # BLD AUTO: 267 X10E3/UL (ref 150–400)
POTASSIUM SERPL-SCNC: 4.3 MMOL/L (ref 3.5–5.1)
PROT SERPL-MCNC: 7.2 G/DL (ref 6.4–8.2)
RBC # BLD AUTO: 4.53 X10E6/UL (ref 4.2–5.4)
SODIUM SERPL-SCNC: 140 MMOL/L (ref 136–145)
TRIGL SERPL-MCNC: 31 MG/DL
WBC # BLD AUTO: 3.77 X10E3/UL (ref 4.5–11)

## 2020-10-19 ENCOUNTER — HISTORICAL (OUTPATIENT)
Dept: ADMINISTRATIVE | Facility: HOSPITAL | Age: 70
End: 2020-10-19

## 2020-10-19 LAB
BACTERIA #/AREA URNS HPF: ABNORMAL /HPF
BILIRUB UR QL STRIP: NEGATIVE MG/DL
CLARITY UR: ABNORMAL
COLOR UR: ABNORMAL
FERRITIN SERPL-MCNC: 185 NG/ML (ref 8–252)
GLUCOSE UR STRIP-MCNC: NEGATIVE MG/DL
IRON SATN MFR SERPL: 35 % (ref 14–50)
IRON SERPL-MCNC: 87 UG/DL (ref 50–170)
KETONES UR STRIP-SCNC: NEGATIVE MG/DL
LEUKOCYTE ESTERASE UR QL STRIP: ABNORMAL LEU/UL
NITRITE UR QL STRIP: NEGATIVE
PH UR STRIP: 6 PH UNITS (ref 5–8)
PROT UR QL STRIP: 30 MG/DL
RBC # UR STRIP: ABNORMAL ERY/UL
RBC #/AREA URNS HPF: ABNORMAL /HPF (ref 0–3)
SARS-COV+SARS-COV-2 AG RESP QL IA.RAPID: NEGATIVE
SP GR UR STRIP: 1.03 (ref 1–1.03)
SQUAMOUS #/AREA URNS LPF: ABNORMAL /LPF
TIBC SERPL-MCNC: 251 UG/DL (ref 250–450)
UROBILINOGEN UR STRIP-ACNC: 0.2 MG/DL
WBC #/AREA URNS HPF: ABNORMAL /HPF (ref 0–5)

## 2020-10-21 LAB
REPORT: NORMAL

## 2020-12-11 ENCOUNTER — HISTORICAL (OUTPATIENT)
Dept: ADMINISTRATIVE | Facility: HOSPITAL | Age: 70
End: 2020-12-11

## 2020-12-11 LAB
ALBUMIN SERPL BCP-MCNC: 3.9 G/DL (ref 3.5–5)
ALBUMIN/GLOB SERPL: 1 {RATIO}
ALP SERPL-CCNC: 63 U/L (ref 55–142)
ALT SERPL W P-5'-P-CCNC: 18 U/L (ref 13–56)
AMPHET UR QL SCN: NEGATIVE NG/ML
ANION GAP SERPL CALCULATED.3IONS-SCNC: 11 MMOL/L
APAP SERPL-MCNC: <2 UG/ML (ref 10–30)
AST SERPL W P-5'-P-CCNC: 15 U/L (ref 15–37)
BARBITURATES UR QL SCN: NEGATIVE NG/ML
BASOPHILS # BLD AUTO: 0.02 X10E3/UL (ref 0–0.2)
BASOPHILS NFR BLD AUTO: 0.4 % (ref 0–1)
BENZODIAZ METAB UR QL SCN: NEGATIVE NG/ML
BILIRUB SERPL-MCNC: 0.4 MG/DL (ref 0–1.2)
BILIRUB UR QL STRIP: ABNORMAL MG/DL
BUN SERPL-MCNC: 17 MG/DL (ref 7–18)
BUN/CREAT SERPL: 15.9
CALCIUM SERPL-MCNC: 9.6 MG/DL (ref 8.5–10.1)
CANNABINOIDS UR QL SCN: NEGATIVE NG/ML
CHLORIDE SERPL-SCNC: 105 MMOL/L (ref 98–107)
CLARITY UR: ABNORMAL
CO2 SERPL-SCNC: 29 MMOL/L (ref 21–32)
COCAINE UR QL SCN: NEGATIVE NG/ML
COLOR UR: YELLOW
CREAT SERPL-MCNC: 1.07 MG/DL (ref 0.55–1.02)
EOSINOPHIL # BLD AUTO: 0.06 X10E3/UL (ref 0–0.5)
EOSINOPHIL NFR BLD AUTO: 1.2 % (ref 1–4)
ERYTHROCYTE [DISTWIDTH] IN BLOOD BY AUTOMATED COUNT: 13.8 % (ref 11.5–14.5)
ETHANOL SERPL-MCNC: NORMAL MG/DL (ref 0–10)
GLOBULIN SER-MCNC: 3.9 G/DL (ref 2–4)
GLUCOSE SERPL-MCNC: 107 MG/DL (ref 74–106)
GLUCOSE UR STRIP-MCNC: NEGATIVE MG/DL
HCT VFR BLD AUTO: 39.5 % (ref 38–47)
HGB BLD-MCNC: 12.5 G/DL (ref 12–16)
IMM GRANULOCYTES # BLD AUTO: 0.01 X10E3/UL (ref 0–0.04)
IMM GRANULOCYTES NFR BLD: 0.2 % (ref 0–0.4)
KETONES UR STRIP-SCNC: ABNORMAL MG/DL
LEUKOCYTE ESTERASE UR QL STRIP: NEGATIVE LEU/UL
LYMPHOCYTES # BLD AUTO: 1.24 X10E3/UL (ref 1–4.8)
LYMPHOCYTES NFR BLD AUTO: 24.4 % (ref 27–41)
MCH RBC QN AUTO: 27.4 PG (ref 27–31)
MCHC RBC AUTO-ENTMCNC: 31.6 G/DL (ref 32–36)
MCV RBC AUTO: 86.6 FL (ref 80–96)
MONOCYTES # BLD AUTO: 0.39 X10E3/UL (ref 0–0.8)
MONOCYTES NFR BLD AUTO: 7.7 % (ref 2–6)
MPC BLD CALC-MCNC: 10.3 FL (ref 9.4–12.4)
NEUTROPHILS # BLD AUTO: 3.36 X10E3/UL (ref 1.8–7.7)
NEUTROPHILS NFR BLD AUTO: 66.1 % (ref 53–65)
NITRITE UR QL STRIP: NEGATIVE
NRBC # BLD AUTO: 0 X10E3/UL (ref 0–0)
NRBC, AUTO (.00): 0 /100 (ref 0–0)
OPIATES UR QL SCN: NEGATIVE NG/ML
PCP UR QL SCN: NEGATIVE NG/ML
PH UR STRIP: 6 PH UNITS (ref 5–8)
PLATELET # BLD AUTO: 252 X10E3/UL (ref 150–400)
POTASSIUM SERPL-SCNC: 3.8 MMOL/L (ref 3.5–5.1)
PROT SERPL-MCNC: 7.8 G/DL (ref 6.4–8.2)
PROT UR QL STRIP: ABNORMAL MG/DL
RBC # BLD AUTO: 4.56 X10E6/UL (ref 4.2–5.4)
RBC # UR STRIP: NEGATIVE ERY/UL
SALICYLATES SERPL-MCNC: 1.2 MG/DL (ref 3–30)
SODIUM SERPL-SCNC: 141 MMOL/L (ref 136–145)
SP GR UR STRIP: 1.02 (ref 1–1.03)
UROBILINOGEN UR STRIP-ACNC: 0.2 EU/DL
WBC # BLD AUTO: 5.08 X10E3/UL (ref 4.5–11)

## 2021-04-13 RX ORDER — METHOCARBAMOL 500 MG/1
250 TABLET, FILM COATED ORAL NIGHTLY
COMMUNITY
End: 2021-06-08

## 2021-04-13 RX ORDER — ISOSORBIDE MONONITRATE 30 MG/1
30 TABLET, EXTENDED RELEASE ORAL DAILY
COMMUNITY
End: 2021-05-27 | Stop reason: SDUPTHER

## 2021-04-13 RX ORDER — TIZANIDINE HYDROCHLORIDE 2 MG/1
2 CAPSULE, GELATIN COATED ORAL EVERY 6 HOURS PRN
COMMUNITY
End: 2021-05-27 | Stop reason: SDUPTHER

## 2021-04-13 RX ORDER — NITROGLYCERIN 0.3 MG/1
0.3 TABLET SUBLINGUAL EVERY 5 MIN PRN
COMMUNITY
End: 2021-05-27 | Stop reason: SDUPTHER

## 2021-04-13 RX ORDER — HYDRALAZINE HYDROCHLORIDE 50 MG/1
50 TABLET, FILM COATED ORAL 4 TIMES DAILY
COMMUNITY
End: 2021-05-27 | Stop reason: SDUPTHER

## 2021-04-13 RX ORDER — ESCITALOPRAM OXALATE 10 MG/1
10 TABLET ORAL NIGHTLY
COMMUNITY
End: 2021-05-27 | Stop reason: ALTCHOICE

## 2021-04-13 RX ORDER — ATORVASTATIN CALCIUM 40 MG/1
40 TABLET, FILM COATED ORAL DAILY
COMMUNITY
End: 2021-05-27 | Stop reason: SDUPTHER

## 2021-04-13 RX ORDER — FUROSEMIDE 20 MG/1
20 TABLET ORAL DAILY
COMMUNITY
End: 2021-05-11

## 2021-04-13 RX ORDER — AMLODIPINE BESYLATE 10 MG/1
10 TABLET ORAL DAILY
COMMUNITY
End: 2021-04-15

## 2021-04-13 RX ORDER — SERTRALINE HYDROCHLORIDE 50 MG/1
50 TABLET, FILM COATED ORAL DAILY
COMMUNITY
End: 2021-05-27 | Stop reason: SDUPTHER

## 2021-04-13 RX ORDER — OMEPRAZOLE 40 MG/1
40 CAPSULE, DELAYED RELEASE ORAL DAILY
COMMUNITY
End: 2021-05-27 | Stop reason: SDUPTHER

## 2021-05-12 ENCOUNTER — OFFICE VISIT (OUTPATIENT)
Dept: CARDIOLOGY | Facility: CLINIC | Age: 71
End: 2021-05-12
Payer: MEDICARE

## 2021-05-12 VITALS
HEIGHT: 64 IN | DIASTOLIC BLOOD PRESSURE: 90 MMHG | SYSTOLIC BLOOD PRESSURE: 160 MMHG | OXYGEN SATURATION: 99 % | HEART RATE: 68 BPM | WEIGHT: 223 LBS | BODY MASS INDEX: 38.07 KG/M2

## 2021-05-12 DIAGNOSIS — I44.1 SECOND DEGREE AV BLOCK: Primary | ICD-10-CM

## 2021-05-12 DIAGNOSIS — I50.22 CHRONIC SYSTOLIC HEART FAILURE: ICD-10-CM

## 2021-05-12 DIAGNOSIS — I49.5 SICK SINUS SYNDROME: Primary | ICD-10-CM

## 2021-05-12 DIAGNOSIS — I10 HYPERTENSION, UNSPECIFIED TYPE: ICD-10-CM

## 2021-05-12 DIAGNOSIS — E66.9 OBESITY, UNSPECIFIED CLASSIFICATION, UNSPECIFIED OBESITY TYPE, UNSPECIFIED WHETHER SERIOUS COMORBIDITY PRESENT: ICD-10-CM

## 2021-05-12 PROCEDURE — 93010 EKG 12-LEAD: ICD-10-PCS | Mod: S$PBB,,, | Performed by: INTERNAL MEDICINE

## 2021-05-12 PROCEDURE — 99213 OFFICE O/P EST LOW 20 MIN: CPT | Mod: S$PBB,,, | Performed by: INTERNAL MEDICINE

## 2021-05-12 PROCEDURE — 93005 ELECTROCARDIOGRAM TRACING: CPT | Mod: PBBFAC | Performed by: INTERNAL MEDICINE

## 2021-05-12 PROCEDURE — 93010 ELECTROCARDIOGRAM REPORT: CPT | Mod: S$PBB,,, | Performed by: INTERNAL MEDICINE

## 2021-05-12 PROCEDURE — 99213 PR OFFICE/OUTPT VISIT, EST, LEVL III, 20-29 MIN: ICD-10-PCS | Mod: S$PBB,,, | Performed by: INTERNAL MEDICINE

## 2021-05-12 PROCEDURE — 99214 OFFICE O/P EST MOD 30 MIN: CPT | Mod: PBBFAC | Performed by: INTERNAL MEDICINE

## 2021-05-27 ENCOUNTER — OFFICE VISIT (OUTPATIENT)
Dept: FAMILY MEDICINE | Facility: CLINIC | Age: 71
End: 2021-05-27
Payer: MEDICARE

## 2021-05-27 VITALS
HEIGHT: 64 IN | TEMPERATURE: 98 F | DIASTOLIC BLOOD PRESSURE: 82 MMHG | HEART RATE: 64 BPM | OXYGEN SATURATION: 98 % | RESPIRATION RATE: 18 BRPM | SYSTOLIC BLOOD PRESSURE: 130 MMHG | WEIGHT: 216 LBS | BODY MASS INDEX: 36.88 KG/M2

## 2021-05-27 DIAGNOSIS — F32.A DEPRESSION, UNSPECIFIED DEPRESSION TYPE: ICD-10-CM

## 2021-05-27 DIAGNOSIS — I10 HYPERTENSION, UNSPECIFIED TYPE: Primary | ICD-10-CM

## 2021-05-27 PROCEDURE — 99214 OFFICE O/P EST MOD 30 MIN: CPT | Mod: ,,, | Performed by: FAMILY MEDICINE

## 2021-05-27 PROCEDURE — 99214 PR OFFICE/OUTPT VISIT, EST, LEVL IV, 30-39 MIN: ICD-10-PCS | Mod: ,,, | Performed by: FAMILY MEDICINE

## 2021-05-27 RX ORDER — HYDRALAZINE HYDROCHLORIDE 50 MG/1
50 TABLET, FILM COATED ORAL EVERY 6 HOURS
Qty: 120 TABLET | Refills: 3 | Status: SHIPPED | OUTPATIENT
Start: 2021-05-27 | End: 2022-04-19

## 2021-05-27 RX ORDER — OMEPRAZOLE 40 MG/1
40 CAPSULE, DELAYED RELEASE ORAL DAILY
Qty: 30 CAPSULE | Refills: 3 | Status: SHIPPED | OUTPATIENT
Start: 2021-05-27 | End: 2022-01-28 | Stop reason: SDUPTHER

## 2021-05-27 RX ORDER — ESCITALOPRAM OXALATE 10 MG/1
10 TABLET ORAL NIGHTLY
Qty: 30 TABLET | Refills: 3 | Status: CANCELLED | OUTPATIENT
Start: 2021-05-27

## 2021-05-27 RX ORDER — OLANZAPINE 2.5 MG/1
2.5 TABLET ORAL 2 TIMES DAILY
COMMUNITY
End: 2021-05-27 | Stop reason: SDUPTHER

## 2021-05-27 RX ORDER — SERTRALINE HYDROCHLORIDE 50 MG/1
50 TABLET, FILM COATED ORAL DAILY
Qty: 30 TABLET | Refills: 3 | Status: SHIPPED | OUTPATIENT
Start: 2021-05-27 | End: 2021-09-30

## 2021-05-27 RX ORDER — AMLODIPINE BESYLATE 10 MG/1
10 TABLET ORAL DAILY
Qty: 30 TABLET | Refills: 3 | Status: SHIPPED | OUTPATIENT
Start: 2021-05-27 | End: 2021-06-14

## 2021-05-27 RX ORDER — NITROGLYCERIN 0.3 MG/1
0.3 TABLET SUBLINGUAL EVERY 5 MIN PRN
Qty: 100 TABLET | Refills: 1 | Status: SHIPPED | OUTPATIENT
Start: 2021-05-27 | End: 2021-05-28

## 2021-05-27 RX ORDER — ISOSORBIDE MONONITRATE 30 MG/1
30 TABLET, EXTENDED RELEASE ORAL DAILY
Qty: 30 TABLET | Refills: 3 | Status: SHIPPED | OUTPATIENT
Start: 2021-05-27 | End: 2021-06-08

## 2021-05-27 RX ORDER — ATORVASTATIN CALCIUM 40 MG/1
40 TABLET, FILM COATED ORAL DAILY
Qty: 30 TABLET | Refills: 3 | Status: SHIPPED | OUTPATIENT
Start: 2021-05-27 | End: 2021-07-13

## 2021-05-27 RX ORDER — TIZANIDINE HYDROCHLORIDE 2 MG/1
2 CAPSULE, GELATIN COATED ORAL EVERY 6 HOURS PRN
Qty: 30 CAPSULE | Refills: 1 | Status: SHIPPED | OUTPATIENT
Start: 2021-05-27 | End: 2022-04-19

## 2021-05-27 RX ORDER — MIRTAZAPINE 7.5 MG/1
7.5 TABLET, FILM COATED ORAL NIGHTLY
Qty: 90 TABLET | Refills: 1 | Status: SHIPPED | OUTPATIENT
Start: 2021-05-27 | End: 2022-01-28 | Stop reason: SDUPTHER

## 2021-05-27 RX ORDER — FUROSEMIDE 20 MG/1
20 TABLET ORAL DAILY
Qty: 30 TABLET | Refills: 3 | Status: SHIPPED | OUTPATIENT
Start: 2021-05-27 | End: 2021-08-10

## 2021-05-27 RX ORDER — OLANZAPINE 2.5 MG/1
2.5 TABLET ORAL 2 TIMES DAILY
Qty: 30 TABLET | Refills: 3 | Status: SHIPPED | OUTPATIENT
Start: 2021-05-27 | End: 2021-06-08

## 2021-05-28 RX ORDER — NITROGLYCERIN 0.4 MG/1
0.4 TABLET SUBLINGUAL EVERY 5 MIN PRN
COMMUNITY
End: 2023-03-28

## 2021-06-08 ENCOUNTER — OFFICE VISIT (OUTPATIENT)
Dept: FAMILY MEDICINE | Facility: CLINIC | Age: 71
End: 2021-06-08
Payer: MEDICARE

## 2021-06-08 VITALS
OXYGEN SATURATION: 98 % | SYSTOLIC BLOOD PRESSURE: 148 MMHG | RESPIRATION RATE: 18 BRPM | HEIGHT: 64 IN | WEIGHT: 213 LBS | HEART RATE: 68 BPM | DIASTOLIC BLOOD PRESSURE: 90 MMHG | BODY MASS INDEX: 36.37 KG/M2

## 2021-06-08 DIAGNOSIS — I10 HYPERTENSION, UNSPECIFIED TYPE: Primary | ICD-10-CM

## 2021-06-08 DIAGNOSIS — F32.A DEPRESSION, UNSPECIFIED DEPRESSION TYPE: ICD-10-CM

## 2021-06-08 PROCEDURE — 99212 PR OFFICE/OUTPT VISIT, EST, LEVL II, 10-19 MIN: ICD-10-PCS | Mod: ,,, | Performed by: FAMILY MEDICINE

## 2021-06-08 PROCEDURE — 99212 OFFICE O/P EST SF 10 MIN: CPT | Mod: ,,, | Performed by: FAMILY MEDICINE

## 2021-06-08 RX ORDER — OLANZAPINE 2.5 MG/1
2.5 TABLET ORAL 2 TIMES DAILY
Qty: 30 TABLET | Refills: 3 | Status: CANCELLED | OUTPATIENT
Start: 2021-06-08

## 2021-06-11 ENCOUNTER — HOSPITAL ENCOUNTER (OUTPATIENT)
Dept: CARDIOLOGY | Facility: HOSPITAL | Age: 71
Discharge: HOME | End: 2021-06-11
Attending: INTERNAL MEDICINE
Payer: MEDICARE

## 2021-06-11 DIAGNOSIS — I44.1 SECOND DEGREE AV BLOCK: ICD-10-CM

## 2021-06-11 PROCEDURE — 93280 PM DEVICE PROGR EVAL DUAL: CPT | Mod: 26,,, | Performed by: INTERNAL MEDICINE

## 2021-06-11 PROCEDURE — 93280 CARDIAC DEVICE CHECK - IN CLINIC & HOSPITAL: ICD-10-PCS | Mod: 26,,, | Performed by: INTERNAL MEDICINE

## 2021-06-11 PROCEDURE — 93280 PM DEVICE PROGR EVAL DUAL: CPT

## 2021-06-14 ENCOUNTER — OFFICE VISIT (OUTPATIENT)
Dept: CARDIOLOGY | Facility: CLINIC | Age: 71
End: 2021-06-14
Payer: MEDICARE

## 2021-06-14 VITALS
SYSTOLIC BLOOD PRESSURE: 152 MMHG | DIASTOLIC BLOOD PRESSURE: 78 MMHG | HEIGHT: 64 IN | WEIGHT: 212 LBS | HEART RATE: 63 BPM | OXYGEN SATURATION: 98 % | BODY MASS INDEX: 36.19 KG/M2

## 2021-06-14 DIAGNOSIS — I10 HYPERTENSION, UNSPECIFIED TYPE: ICD-10-CM

## 2021-06-14 DIAGNOSIS — I48.91 ATRIAL FIBRILLATION, UNSPECIFIED TYPE: Primary | ICD-10-CM

## 2021-06-14 DIAGNOSIS — E78.5 HYPERLIPIDEMIA, UNSPECIFIED HYPERLIPIDEMIA TYPE: ICD-10-CM

## 2021-06-14 PROCEDURE — 99214 OFFICE O/P EST MOD 30 MIN: CPT | Mod: S$PBB,,, | Performed by: INTERNAL MEDICINE

## 2021-06-14 PROCEDURE — 99214 PR OFFICE/OUTPT VISIT, EST, LEVL IV, 30-39 MIN: ICD-10-PCS | Mod: S$PBB,,, | Performed by: INTERNAL MEDICINE

## 2021-06-14 PROCEDURE — 99214 OFFICE O/P EST MOD 30 MIN: CPT | Mod: PBBFAC | Performed by: INTERNAL MEDICINE

## 2021-06-20 LAB
OHS CV DC PP MS1: 0.4 MS
OHS CV DC PP MS2: 0.4 MS
OHS CV DC PP V1: 1.5 V
OHS CV DC PP V2: 20 V

## 2021-07-02 PROBLEM — E78.5 HYPERLIPIDEMIA: Status: ACTIVE | Noted: 2021-07-02

## 2021-07-02 PROBLEM — I48.91 ATRIAL FIBRILLATION: Status: ACTIVE | Noted: 2021-07-02

## 2021-07-02 RX ORDER — METOPROLOL SUCCINATE 50 MG/1
50 TABLET, EXTENDED RELEASE ORAL DAILY
Qty: 30 TABLET | Refills: 11 | Status: SHIPPED | OUTPATIENT
Start: 2021-07-02 | End: 2021-09-23 | Stop reason: SDUPTHER

## 2021-08-13 RX ORDER — ATORVASTATIN CALCIUM 40 MG/1
TABLET, FILM COATED ORAL
Qty: 30 TABLET | Refills: 3 | Status: SHIPPED | OUTPATIENT
Start: 2021-08-13 | End: 2022-01-28 | Stop reason: SDUPTHER

## 2021-08-20 DIAGNOSIS — I10 HYPERTENSION, UNSPECIFIED TYPE: ICD-10-CM

## 2021-08-20 RX ORDER — AMLODIPINE BESYLATE 10 MG/1
10 TABLET ORAL DAILY
Qty: 90 TABLET | Refills: 0 | Status: SHIPPED | OUTPATIENT
Start: 2021-08-20 | End: 2021-09-13

## 2021-08-20 RX ORDER — AMLODIPINE BESYLATE 10 MG/1
10 TABLET ORAL DAILY PRN
COMMUNITY
Start: 2021-08-12 | End: 2021-08-20 | Stop reason: SDUPTHER

## 2021-09-16 DIAGNOSIS — Z95.0 PRESENCE OF CARDIAC PACEMAKER: Primary | ICD-10-CM

## 2021-09-22 ENCOUNTER — DOCUMENTATION ONLY (OUTPATIENT)
Dept: CARDIOLOGY | Facility: CLINIC | Age: 71
End: 2021-09-22

## 2021-09-23 DIAGNOSIS — I10 HYPERTENSION, UNSPECIFIED TYPE: ICD-10-CM

## 2021-09-23 RX ORDER — METOPROLOL SUCCINATE 50 MG/1
50 TABLET, EXTENDED RELEASE ORAL DAILY
Qty: 30 TABLET | Refills: 3 | Status: SHIPPED | OUTPATIENT
Start: 2021-09-23 | End: 2021-10-04 | Stop reason: SDUPTHER

## 2021-09-23 RX ORDER — AMLODIPINE BESYLATE 10 MG/1
10 TABLET ORAL DAILY PRN
Qty: 30 TABLET | Refills: 3 | Status: SHIPPED | OUTPATIENT
Start: 2021-09-23 | End: 2021-10-04 | Stop reason: SDUPTHER

## 2021-10-04 DIAGNOSIS — I10 HYPERTENSION, UNSPECIFIED TYPE: ICD-10-CM

## 2021-10-04 RX ORDER — AMLODIPINE BESYLATE 10 MG/1
10 TABLET ORAL DAILY PRN
Qty: 30 TABLET | Refills: 3 | Status: SHIPPED | OUTPATIENT
Start: 2021-10-04 | End: 2022-01-20

## 2021-10-04 RX ORDER — METOPROLOL SUCCINATE 50 MG/1
50 TABLET, EXTENDED RELEASE ORAL DAILY
Qty: 30 TABLET | Refills: 3 | Status: SHIPPED | OUTPATIENT
Start: 2021-10-04 | End: 2022-01-28 | Stop reason: SDUPTHER

## 2021-12-30 ENCOUNTER — HOSPITAL ENCOUNTER (OUTPATIENT)
Dept: CARDIOLOGY | Facility: HOSPITAL | Age: 71
Discharge: HOME | End: 2021-12-30
Attending: INTERNAL MEDICINE
Payer: MEDICARE

## 2021-12-30 DIAGNOSIS — Z95.0 PRESENCE OF CARDIAC PACEMAKER: Primary | ICD-10-CM

## 2021-12-30 DIAGNOSIS — Z95.0 PRESENCE OF CARDIAC PACEMAKER: ICD-10-CM

## 2021-12-30 LAB
BATTERY VOLTAGE (V): 3.02 V
OHS CV DC PP MS1: 0.4 MS
OHS CV DC PP MS2: 0.4 MS
OHS CV DC PP V1: 1.5 V
OHS CV DC PP V2: 2 V

## 2021-12-30 PROCEDURE — 93288 INTERROG EVL PM/LDLS PM IP: CPT | Mod: 26,,, | Performed by: INTERNAL MEDICINE

## 2021-12-30 PROCEDURE — 93288 INTERROG EVL PM/LDLS PM IP: CPT

## 2021-12-30 PROCEDURE — 93288 CARDIAC DEVICE CHECK - IN CLINIC & HOSPITAL: ICD-10-PCS | Mod: 26,,, | Performed by: INTERNAL MEDICINE

## 2022-01-28 ENCOUNTER — OFFICE VISIT (OUTPATIENT)
Dept: FAMILY MEDICINE | Facility: CLINIC | Age: 72
End: 2022-01-28
Payer: MEDICAID

## 2022-01-28 VITALS
SYSTOLIC BLOOD PRESSURE: 135 MMHG | OXYGEN SATURATION: 98 % | DIASTOLIC BLOOD PRESSURE: 82 MMHG | RESPIRATION RATE: 18 BRPM | HEART RATE: 78 BPM | BODY MASS INDEX: 43.19 KG/M2 | WEIGHT: 253 LBS | TEMPERATURE: 98 F | HEIGHT: 64 IN

## 2022-01-28 DIAGNOSIS — R77.1 ABNORMALITY OF GLOBULIN: ICD-10-CM

## 2022-01-28 DIAGNOSIS — F32.A DEPRESSION, UNSPECIFIED DEPRESSION TYPE: ICD-10-CM

## 2022-01-28 DIAGNOSIS — R61 DIAPHORESIS: ICD-10-CM

## 2022-01-28 DIAGNOSIS — Z12.39 ENCOUNTER FOR SCREENING FOR MALIGNANT NEOPLASM OF BREAST, UNSPECIFIED SCREENING MODALITY: ICD-10-CM

## 2022-01-28 DIAGNOSIS — G89.29 CHRONIC MIDLINE LOW BACK PAIN, UNSPECIFIED WHETHER SCIATICA PRESENT: ICD-10-CM

## 2022-01-28 DIAGNOSIS — M54.50 CHRONIC MIDLINE LOW BACK PAIN, UNSPECIFIED WHETHER SCIATICA PRESENT: ICD-10-CM

## 2022-01-28 DIAGNOSIS — G47.00 INSOMNIA, UNSPECIFIED TYPE: ICD-10-CM

## 2022-01-28 DIAGNOSIS — K21.9 GASTROESOPHAGEAL REFLUX DISEASE, UNSPECIFIED WHETHER ESOPHAGITIS PRESENT: ICD-10-CM

## 2022-01-28 DIAGNOSIS — E78.5 HYPERLIPIDEMIA, UNSPECIFIED HYPERLIPIDEMIA TYPE: ICD-10-CM

## 2022-01-28 DIAGNOSIS — Z79.899 HIGH RISK MEDICATION USE: ICD-10-CM

## 2022-01-28 DIAGNOSIS — Z12.11 COLON CANCER SCREENING: ICD-10-CM

## 2022-01-28 DIAGNOSIS — I10 HYPERTENSION, UNSPECIFIED TYPE: Primary | ICD-10-CM

## 2022-01-28 LAB
ALBUMIN SERPL BCP-MCNC: 3.8 G/DL (ref 3.5–5)
ALBUMIN/GLOB SERPL: 0.9 {RATIO}
ALP SERPL-CCNC: 107 U/L (ref 55–142)
ALT SERPL W P-5'-P-CCNC: 32 U/L (ref 13–56)
ANION GAP SERPL CALCULATED.3IONS-SCNC: 12 MMOL/L (ref 7–16)
AST SERPL W P-5'-P-CCNC: 21 U/L (ref 15–37)
BASOPHILS # BLD AUTO: 0.02 K/UL (ref 0–0.2)
BASOPHILS NFR BLD AUTO: 0.4 % (ref 0–1)
BILIRUB SERPL-MCNC: 0.4 MG/DL (ref 0–1.2)
BUN SERPL-MCNC: 28 MG/DL (ref 7–18)
BUN/CREAT SERPL: 17 (ref 6–20)
CALCIUM SERPL-MCNC: 9 MG/DL (ref 8.5–10.1)
CHLORIDE SERPL-SCNC: 108 MMOL/L (ref 98–107)
CO2 SERPL-SCNC: 24 MMOL/L (ref 21–32)
CREAT SERPL-MCNC: 1.66 MG/DL (ref 0.55–1.02)
DIFFERENTIAL METHOD BLD: ABNORMAL
EOSINOPHIL # BLD AUTO: 0.12 K/UL (ref 0–0.5)
EOSINOPHIL NFR BLD AUTO: 2.3 % (ref 1–4)
ERYTHROCYTE [DISTWIDTH] IN BLOOD BY AUTOMATED COUNT: 14.3 % (ref 11.5–14.5)
GLOBULIN SER-MCNC: 4.3 G/DL (ref 2–4)
GLUCOSE SERPL-MCNC: 95 MG/DL (ref 74–106)
HCT VFR BLD AUTO: 42.2 % (ref 38–47)
HGB BLD-MCNC: 12.8 G/DL (ref 12–16)
IMM GRANULOCYTES # BLD AUTO: 0.02 K/UL (ref 0–0.04)
IMM GRANULOCYTES NFR BLD: 0.4 % (ref 0–0.4)
LYMPHOCYTES # BLD AUTO: 1.3 K/UL (ref 1–4.8)
LYMPHOCYTES NFR BLD AUTO: 24.7 % (ref 27–41)
MCH RBC QN AUTO: 26.5 PG (ref 27–31)
MCHC RBC AUTO-ENTMCNC: 30.3 G/DL (ref 32–36)
MCV RBC AUTO: 87.4 FL (ref 80–96)
MONOCYTES # BLD AUTO: 0.4 K/UL (ref 0–0.8)
MONOCYTES NFR BLD AUTO: 7.6 % (ref 2–6)
MPC BLD CALC-MCNC: 10.6 FL (ref 9.4–12.4)
NEUTROPHILS # BLD AUTO: 3.41 K/UL (ref 1.8–7.7)
NEUTROPHILS NFR BLD AUTO: 64.6 % (ref 53–65)
NRBC # BLD AUTO: 0 X10E3/UL
NRBC, AUTO (.00): 0 %
PLATELET # BLD AUTO: 228 K/UL (ref 150–400)
POTASSIUM SERPL-SCNC: 4.5 MMOL/L (ref 3.5–5.1)
PROT SERPL-MCNC: 8.1 G/DL (ref 6.4–8.2)
RBC # BLD AUTO: 4.83 M/UL (ref 4.2–5.4)
SODIUM SERPL-SCNC: 139 MMOL/L (ref 136–145)
TSH SERPL DL<=0.005 MIU/L-ACNC: 4.67 UIU/ML (ref 0.36–3.74)
WBC # BLD AUTO: 5.27 K/UL (ref 4.5–11)

## 2022-01-28 PROCEDURE — 1159F PR MEDICATION LIST DOCUMENTED IN MEDICAL RECORD: ICD-10-PCS | Mod: ,,, | Performed by: FAMILY MEDICINE

## 2022-01-28 PROCEDURE — 1101F PT FALLS ASSESS-DOCD LE1/YR: CPT | Mod: ,,, | Performed by: FAMILY MEDICINE

## 2022-01-28 PROCEDURE — 1101F PR PT FALLS ASSESS DOC 0-1 FALLS W/OUT INJ PAST YR: ICD-10-PCS | Mod: ,,, | Performed by: FAMILY MEDICINE

## 2022-01-28 PROCEDURE — 99213 OFFICE O/P EST LOW 20 MIN: CPT | Mod: ,,, | Performed by: FAMILY MEDICINE

## 2022-01-28 PROCEDURE — 1160F PR REVIEW ALL MEDS BY PRESCRIBER/CLIN PHARMACIST DOCUMENTED: ICD-10-PCS | Mod: ,,, | Performed by: FAMILY MEDICINE

## 2022-01-28 PROCEDURE — 3079F DIAST BP 80-89 MM HG: CPT | Mod: ,,, | Performed by: FAMILY MEDICINE

## 2022-01-28 PROCEDURE — 85025 COMPLETE CBC W/AUTO DIFF WBC: CPT | Mod: ,,, | Performed by: CLINICAL MEDICAL LABORATORY

## 2022-01-28 PROCEDURE — 3008F PR BODY MASS INDEX (BMI) DOCUMENTED: ICD-10-PCS | Mod: ,,, | Performed by: FAMILY MEDICINE

## 2022-01-28 PROCEDURE — 1159F MED LIST DOCD IN RCRD: CPT | Mod: ,,, | Performed by: FAMILY MEDICINE

## 2022-01-28 PROCEDURE — 3079F PR MOST RECENT DIASTOLIC BLOOD PRESSURE 80-89 MM HG: ICD-10-PCS | Mod: ,,, | Performed by: FAMILY MEDICINE

## 2022-01-28 PROCEDURE — 99213 PR OFFICE/OUTPT VISIT, EST, LEVL III, 20-29 MIN: ICD-10-PCS | Mod: ,,, | Performed by: FAMILY MEDICINE

## 2022-01-28 PROCEDURE — 3288F PR FALLS RISK ASSESSMENT DOCUMENTED: ICD-10-PCS | Mod: ,,, | Performed by: FAMILY MEDICINE

## 2022-01-28 PROCEDURE — 3075F PR MOST RECENT SYSTOLIC BLOOD PRESS GE 130-139MM HG: ICD-10-PCS | Mod: ,,, | Performed by: FAMILY MEDICINE

## 2022-01-28 PROCEDURE — 84443 ASSAY THYROID STIM HORMONE: CPT | Mod: ,,, | Performed by: CLINICAL MEDICAL LABORATORY

## 2022-01-28 PROCEDURE — 3075F SYST BP GE 130 - 139MM HG: CPT | Mod: ,,, | Performed by: FAMILY MEDICINE

## 2022-01-28 PROCEDURE — 1126F AMNT PAIN NOTED NONE PRSNT: CPT | Mod: ,,, | Performed by: FAMILY MEDICINE

## 2022-01-28 PROCEDURE — 80053 COMPREHENSIVE METABOLIC PANEL: ICD-10-PCS | Mod: ,,, | Performed by: CLINICAL MEDICAL LABORATORY

## 2022-01-28 PROCEDURE — 1160F RVW MEDS BY RX/DR IN RCRD: CPT | Mod: ,,, | Performed by: FAMILY MEDICINE

## 2022-01-28 PROCEDURE — 3008F BODY MASS INDEX DOCD: CPT | Mod: ,,, | Performed by: FAMILY MEDICINE

## 2022-01-28 PROCEDURE — 85025 CBC WITH DIFFERENTIAL: ICD-10-PCS | Mod: ,,, | Performed by: CLINICAL MEDICAL LABORATORY

## 2022-01-28 PROCEDURE — 3288F FALL RISK ASSESSMENT DOCD: CPT | Mod: ,,, | Performed by: FAMILY MEDICINE

## 2022-01-28 PROCEDURE — 1126F PR PAIN SEVERITY QUANTIFIED, NO PAIN PRESENT: ICD-10-PCS | Mod: ,,, | Performed by: FAMILY MEDICINE

## 2022-01-28 PROCEDURE — 80053 COMPREHEN METABOLIC PANEL: CPT | Mod: ,,, | Performed by: CLINICAL MEDICAL LABORATORY

## 2022-01-28 PROCEDURE — 84443 TSH: ICD-10-PCS | Mod: ,,, | Performed by: CLINICAL MEDICAL LABORATORY

## 2022-01-28 RX ORDER — FUROSEMIDE 20 MG/1
20 TABLET ORAL DAILY
Qty: 90 TABLET | Refills: 2 | Status: SHIPPED | OUTPATIENT
Start: 2022-01-28 | End: 2022-04-28

## 2022-01-28 RX ORDER — ATORVASTATIN CALCIUM 40 MG/1
TABLET, FILM COATED ORAL
Qty: 90 TABLET | Refills: 1 | Status: SHIPPED | OUTPATIENT
Start: 2022-01-28 | End: 2022-09-06

## 2022-01-28 RX ORDER — OMEPRAZOLE 40 MG/1
40 CAPSULE, DELAYED RELEASE ORAL DAILY
Qty: 90 CAPSULE | Refills: 3 | Status: SHIPPED | OUTPATIENT
Start: 2022-01-28 | End: 2022-04-28

## 2022-01-28 RX ORDER — SERTRALINE HYDROCHLORIDE 50 MG/1
50 TABLET, FILM COATED ORAL DAILY
Qty: 30 TABLET | Refills: 3 | Status: SHIPPED | OUTPATIENT
Start: 2022-01-28 | End: 2022-04-28

## 2022-01-28 RX ORDER — METOPROLOL SUCCINATE 50 MG/1
50 TABLET, EXTENDED RELEASE ORAL DAILY
Qty: 90 TABLET | Refills: 3 | Status: SHIPPED | OUTPATIENT
Start: 2022-01-28 | End: 2022-09-06

## 2022-01-28 RX ORDER — MIRTAZAPINE 7.5 MG/1
7.5 TABLET, FILM COATED ORAL NIGHTLY
Qty: 90 TABLET | Refills: 1 | Status: SHIPPED | OUTPATIENT
Start: 2022-01-28 | End: 2022-04-28

## 2022-01-28 NOTE — PROGRESS NOTES
Renetta Stewart is a 71 y.o. female seen today for follow-up on her chronic depression, hyperlipidemia and hypertension.  Her lipids were excellent in June and she is not fasting today.  She denies any chest pain or shortness of breath but has had episodes of diaphoresis.  She recently had a negative cardiac workup to her cardiologist.  The symptoms are usually transient and related to exertion.  I have asked her and her family to check her blood sugar the next time she has an episode and consider following up with Cardiology.  We will check a TSH today as well.  Patient has not had a mammogram or colon cancer screening will be set up for today.  Patient also has chronic low back pain secondary to lumbar disc disease and is requesting a pain management evaluation.      Past Medical History:   Diagnosis Date    Anemia     Anxiety     Dementia     Depression     GERD (gastroesophageal reflux disease)     Hyperlipidemia     Hypertension      Family History   Problem Relation Age of Onset    Hypertension Father      Current Outpatient Medications on File Prior to Visit   Medication Sig Dispense Refill    amLODIPine (NORVASC) 10 MG tablet TAKE 1 TABLET BY MOUTH EVERY DAY FOR BLOOD PRESSURE 30 tablet 3    nitroGLYCERIN (NITROSTAT) 0.4 MG SL tablet Place 0.4 mg under the tongue every 5 (five) minutes as needed for Chest pain.      [DISCONTINUED] apixaban (ELIQUIS) 5 mg Tab Take 1 tablet (5 mg total) by mouth 2 (two) times daily. 60 tablet 3    [DISCONTINUED] atorvastatin (LIPITOR) 40 MG tablet TAKE 1 TABLET BY MOUTH IN THE EVENING ~~DO NOT EAT GRAPEFRUIT OR DRINK GRAPEFRUIT JUICE WHILE TAKING THIS MEDICATION~~ 30 tablet 3    [DISCONTINUED] furosemide (LASIX) 20 MG tablet TAKE 1 TABLET BY MOUTH DAILY 30 tablet 2    [DISCONTINUED] metoprolol succinate (TOPROL-XL) 50 MG 24 hr tablet Take 1 tablet (50 mg total) by mouth once daily. 30 tablet 3    [DISCONTINUED] mirtazapine (REMERON) 7.5 MG Tab Take 1 tablet (7.5 mg  total) by mouth every evening. 90 tablet 1    [DISCONTINUED] omeprazole (PRILOSEC) 40 MG capsule Take 1 capsule (40 mg total) by mouth once daily. 30 capsule 3    [DISCONTINUED] sertraline (ZOLOFT) 50 MG tablet TAKE 1 TABLET BY MOUTH EVERY DAY FOR DEPRESSION 30 tablet 3    hydrALAZINE (APRESOLINE) 50 MG tablet Take 1 tablet (50 mg total) by mouth every 6 (six) hours. (Patient not taking: Reported on 1/28/2022) 120 tablet 3    tiZANidine 2 mg Cap Take 1 capsule (2 mg total) by mouth every 6 (six) hours as needed (spasm). (Patient not taking: Reported on 1/28/2022) 30 capsule 1     No current facility-administered medications on file prior to visit.       There is no immunization history on file for this patient.    Review of Systems   Constitutional: Negative for fever, malaise/fatigue and weight loss.   Respiratory: Negative for shortness of breath.    Cardiovascular: Negative for chest pain and palpitations.   Gastrointestinal: Negative for nausea and vomiting.   Musculoskeletal: Positive for back pain and myalgias.   Psychiatric/Behavioral: Negative for depression.        Vitals:    01/28/22 1101   BP: 135/82   Pulse: 78   Resp: 18   Temp: 97.9 °F (36.6 °C)       Physical Exam  Vitals reviewed.   Constitutional:       Appearance: Normal appearance.   HENT:      Head: Normocephalic.   Eyes:      Extraocular Movements: Extraocular movements intact.      Conjunctiva/sclera: Conjunctivae normal.      Pupils: Pupils are equal, round, and reactive to light.   Neck:      Thyroid: No thyroid mass or thyromegaly.   Cardiovascular:      Rate and Rhythm: Normal rate and regular rhythm.      Heart sounds: Normal heart sounds. No murmur heard.  No gallop.    Pulmonary:      Effort: Pulmonary effort is normal. No respiratory distress.      Breath sounds: Normal breath sounds. No wheezing or rales.   Musculoskeletal:      Lumbar back: Spasms present. Decreased range of motion.   Skin:     General: Skin is warm and dry.       Coloration: Skin is not jaundiced or pale.   Neurological:      Mental Status: She is alert.   Psychiatric:         Mood and Affect: Mood normal.         Behavior: Behavior normal.         Thought Content: Thought content normal.         Judgment: Judgment normal.          Assessment and Plan  Hypertension, unspecified type  -     apixaban (ELIQUIS) 5 mg Tab; Take 1 tablet (5 mg total) by mouth 2 (two) times daily.  Dispense: 60 tablet; Refill: 3  -     furosemide (LASIX) 20 MG tablet; Take 1 tablet (20 mg total) by mouth once daily.  Dispense: 90 tablet; Refill: 2  -     metoprolol succinate (TOPROL-XL) 50 MG 24 hr tablet; Take 1 tablet (50 mg total) by mouth once daily.  Dispense: 90 tablet; Refill: 3    Insomnia, unspecified type  -     mirtazapine (REMERON) 7.5 MG Tab; Take 1 tablet (7.5 mg total) by mouth every evening.  Dispense: 90 tablet; Refill: 1    Gastroesophageal reflux disease, unspecified whether esophagitis present  -     omeprazole (PRILOSEC) 40 MG capsule; Take 1 capsule (40 mg total) by mouth once daily.  Dispense: 90 capsule; Refill: 3    Depression, unspecified depression type  -     sertraline (ZOLOFT) 50 MG tablet; Take 1 tablet (50 mg total) by mouth once daily.  Dispense: 30 tablet; Refill: 3    Hyperlipidemia, unspecified hyperlipidemia type  -     atorvastatin (LIPITOR) 40 MG tablet; TAKE 1 TABLET BY MOUTH IN THE EVENING ~~DO NOT EAT GRAPEFRUIT OR DRINK GRAPEFRUIT JUICE WHILE TAKING THIS MEDICATION~~  Dispense: 90 tablet; Refill: 1  -     Mammo Digital Screening Bilat; Future; Expected date: 01/28/2022    Encounter for screening for malignant neoplasm of breast, unspecified screening modality    Colon cancer screening  -     Ambulatory referral/consult to Gastroenterology; Future; Expected date: 02/04/2022    Chronic midline low back pain, unspecified whether sciatica present  -     Ambulatory referral/consult to Pain Clinic; Future; Expected date: 02/04/2022    Diaphoresis  -     TSH;  Future; Expected date: 01/28/2022            Return to clinic in 3 months or as needed once her blood work is in.  If her diaphoresis continues I recommend following up with Cardiology.    Health Maintenance Topics with due status: Not Due       Topic Last Completion Date    Colorectal Cancer Screening 06/18/2019    Lipid Panel 06/08/2021

## 2022-02-02 ENCOUNTER — TELEPHONE (OUTPATIENT)
Dept: FAMILY MEDICINE | Facility: CLINIC | Age: 72
End: 2022-02-02
Payer: MEDICARE

## 2022-02-02 DIAGNOSIS — R77.1 ABNORMALITY OF GLOBULIN: Primary | ICD-10-CM

## 2022-02-02 PROCEDURE — 84165 PROTEIN ELECTROPHORESIS, SERUM WITH REFLEX IFE: ICD-10-PCS | Mod: 26,,, | Performed by: PATHOLOGY

## 2022-02-02 PROCEDURE — 84165 PROTEIN ELECTROPHORESIS, SERUM WITH REFLEX IFE: ICD-10-PCS | Mod: ,,, | Performed by: CLINICAL MEDICAL LABORATORY

## 2022-02-02 PROCEDURE — 84165 PROTEIN E-PHORESIS SERUM: CPT | Mod: ,,, | Performed by: CLINICAL MEDICAL LABORATORY

## 2022-02-02 PROCEDURE — 84165 PROTEIN E-PHORESIS SERUM: CPT | Mod: 26,,, | Performed by: PATHOLOGY

## 2022-02-02 NOTE — TELEPHONE ENCOUNTER
----- Message from Eldon Govea MD sent at 1/31/2022  7:59 AM CST -----  Office visit for renal function and thyroid.  Please add SPEP.

## 2022-02-03 LAB
ALBUMIN PE, BLOOD: 4.97 G/DL (ref 3.5–5.2)
ALPHA1 GLOB SERPL ELPH-MCNC: 0.2 G/DL (ref 0.1–0.4)
ALPHA2 GLOB SERPL ELPH-MCNC: 0.8 G/DL (ref 0.4–1.3)
B-GLOBULIN SERPL ELPH-MCNC: 0.7 G/DL (ref 0.5–1.5)
GAMMA GLOB SERPL ELPH-MCNC: 1 G/DL (ref 0.5–1.8)
PATH INTERP BLD-IMP: NORMAL
PROT SERPL-MCNC: 7.7 G/DL (ref 6.4–8.2)

## 2022-02-15 ENCOUNTER — TELEPHONE (OUTPATIENT)
Dept: FAMILY MEDICINE | Facility: CLINIC | Age: 72
End: 2022-02-15
Payer: MEDICARE

## 2022-02-15 NOTE — TELEPHONE ENCOUNTER
----- Message from Eldon Govea MD sent at 2/15/2022  8:04 AM CST -----  Patient's lipids are to goal and her renal function has improved.

## 2022-03-08 NOTE — PROGRESS NOTES
Cardiology Clinic Note:    PCP: Eldon Govea MD      REFERRING PHYSICIAN:Eldon Govea MD      CHIEF COMPLAINT:     HISTORY OF PRESENT ILLNESS:  Renetta Stewart is a 72 y.o. female for evaluation of atrial fibrillation.     Patient states she is doing OK, sensation of heart racing and skipping has resolved.  Notes she is monitoring her blood pressure, reports is taking medications as directed.  She is able to perform all activities of daily living without provocation of chest pain, tightness or SOB.  Pt states she is walking daily and cleaning house.      Review of Systems   Constitutional: Negative for diaphoresis, malaise/fatigue, night sweats and weight gain.   HENT: Negative for congestion, ear pain, hearing loss, nosebleeds and sore throat.    Eyes: Negative for blurred vision, double vision, pain, photophobia and visual disturbance.   Cardiovascular: Negative for chest pain, claudication, dyspnea on exertion, irregular heartbeat, leg swelling, near-syncope, orthopnea, palpitations and syncope.   Respiratory: Negative for cough, shortness of breath, sleep disturbances due to breathing, snoring and wheezing.    Endocrine: Negative for cold intolerance, heat intolerance, polydipsia, polyphagia and polyuria.   Hematologic/Lymphatic: Negative for bleeding problem. Does not bruise/bleed easily.   Skin: Negative for dry skin, flushing, itching, rash and skin cancer.   Musculoskeletal: Negative for arthritis, back pain, falls, joint pain, muscle cramps, muscle weakness and myalgias.   Gastrointestinal: Negative for abdominal pain, change in bowel habit, constipation, diarrhea, dysphagia, heartburn, nausea and vomiting.   Genitourinary: Negative for bladder incontinence, dysuria, flank pain, frequency and nocturia.   Neurological: Negative for dizziness, focal weakness, headaches, light-headedness, loss of balance, numbness, paresthesias and seizures.   Psychiatric/Behavioral: Negative for depression, memory  loss and substance abuse. The patient is not nervous/anxious.    Allergic/Immunologic: Negative for environmental allergies.         PAST MEDICAL HISTORY:  Past Medical History:   Diagnosis Date    Anemia     Anxiety     Dementia     Depression     GERD (gastroesophageal reflux disease)     Hyperlipidemia     Hypertension        PAST SURGICAL HISTORY:  No past surgical history on file.    SOCIAL HISTORY:  Social History     Socioeconomic History    Marital status: Single   Tobacco Use    Smoking status: Never Smoker    Smokeless tobacco: Never Used   Substance and Sexual Activity    Alcohol use: Not Currently    Drug use: Never    Sexual activity: Not Currently       FAMILY HISTORY:  Family History   Problem Relation Age of Onset    Hypertension Father        ALLERGIES:  Review of patient's allergies indicates:  No Known Allergies    MEDICATIONS:    Current Outpatient Medications:     amLODIPine (NORVASC) 10 MG tablet, TAKE 1 TABLET BY MOUTH EVERY DAY FOR BLOOD PRESSURE, Disp: 30 tablet, Rfl: 3    apixaban (ELIQUIS) 5 mg Tab, Take 1 tablet (5 mg total) by mouth 2 (two) times daily., Disp: 60 tablet, Rfl: 3    atorvastatin (LIPITOR) 40 MG tablet, TAKE 1 TABLET BY MOUTH IN THE EVENING ~~DO NOT EAT GRAPEFRUIT OR DRINK GRAPEFRUIT JUICE WHILE TAKING THIS MEDICATION~~, Disp: 90 tablet, Rfl: 1    furosemide (LASIX) 20 MG tablet, Take 1 tablet (20 mg total) by mouth once daily., Disp: 90 tablet, Rfl: 2    hydrALAZINE (APRESOLINE) 50 MG tablet, Take 1 tablet (50 mg total) by mouth every 6 (six) hours. (Patient not taking: No sig reported), Disp: 120 tablet, Rfl: 3    metoprolol succinate (TOPROL-XL) 50 MG 24 hr tablet, Take 1 tablet (50 mg total) by mouth once daily., Disp: 90 tablet, Rfl: 3    mirtazapine (REMERON) 7.5 MG Tab, Take 1 tablet (7.5 mg total) by mouth every evening., Disp: 90 tablet, Rfl: 1    nitroGLYCERIN (NITROSTAT) 0.4 MG SL tablet, Place 0.4 mg under the tongue every 5 (five) minutes  "as needed for Chest pain., Disp: , Rfl:     omeprazole (PRILOSEC) 40 MG capsule, Take 1 capsule (40 mg total) by mouth once daily., Disp: 90 capsule, Rfl: 3    sertraline (ZOLOFT) 50 MG tablet, Take 1 tablet (50 mg total) by mouth once daily., Disp: 30 tablet, Rfl: 3    tiZANidine 2 mg Cap, Take 1 capsule (2 mg total) by mouth every 6 (six) hours as needed (spasm). (Patient not taking: No sig reported), Disp: 30 capsule, Rfl: 1    PHYSICAL EXAM:  BP (!) 148/72 (BP Location: Right arm)   Pulse 74   Ht 5' 4" (1.626 m)   Wt 117.9 kg (260 lb)   SpO2 99%   BMI 44.63 kg/m²   Wt Readings from Last 3 Encounters:   03/09/22 117.9 kg (260 lb)   01/28/22 114.8 kg (253 lb)   06/14/21 96.2 kg (212 lb)      Body mass index is 44.63 kg/m².    Physical Exam  Vitals and nursing note reviewed.   Constitutional:       Appearance: Normal appearance. She is obese.   HENT:      Head: Normocephalic and atraumatic.      Right Ear: External ear normal.      Left Ear: External ear normal.   Eyes:      General: No scleral icterus.        Right eye: No discharge.         Left eye: No discharge.      Extraocular Movements: Extraocular movements intact.      Conjunctiva/sclera: Conjunctivae normal.      Pupils: Pupils are equal, round, and reactive to light.   Cardiovascular:      Rate and Rhythm: Normal rate and regular rhythm.      Pulses: Normal pulses.      Heart sounds: Normal heart sounds. No murmur heard.    No friction rub. No gallop.   Pulmonary:      Effort: Pulmonary effort is normal.      Breath sounds: Normal breath sounds. No wheezing, rhonchi or rales.   Chest:      Chest wall: No tenderness.   Abdominal:      General: Abdomen is flat. Bowel sounds are normal. There is no distension.      Palpations: Abdomen is soft.      Tenderness: There is no abdominal tenderness. There is no guarding or rebound.   Musculoskeletal:         General: No swelling or tenderness. Normal range of motion.      Cervical back: Normal range of " motion and neck supple.   Skin:     General: Skin is warm and dry.      Findings: No erythema or rash.   Neurological:      General: No focal deficit present.      Mental Status: She is alert and oriented to person, place, and time.      Cranial Nerves: No cranial nerve deficit.      Motor: No weakness.      Gait: Gait normal.   Psychiatric:         Mood and Affect: Mood normal.         Behavior: Behavior normal.         Thought Content: Thought content normal.         Judgment: Judgment normal.         LABS REVIEWED:  Lab Results   Component Value Date    WBC 4.92 02/14/2022    RBC 4.91 02/14/2022    HGB 13.0 02/14/2022    HCT 42.6 02/14/2022    MCV 86.8 02/14/2022    MCH 26.5 (L) 02/14/2022    MCHC 30.5 (L) 02/14/2022    RDW 14.2 02/14/2022     02/14/2022    MPV 10.7 02/14/2022    NRBC 0.0 02/14/2022    INR 0.98 09/22/2020     Lab Results   Component Value Date     02/14/2022    K 4.3 02/14/2022     (H) 02/14/2022    CO2 31 02/14/2022    BUN 19 (H) 02/14/2022    MG 1.8 09/22/2020     Lab Results   Component Value Date     09/22/2020    AST 14 (L) 02/14/2022    ALT 23 02/14/2022     Lab Results   Component Value Date    GLU 94 02/14/2022     Lab Results   Component Value Date    CHOL 131 02/14/2022    HDL 78 (H) 02/14/2022    TRIG 78 02/14/2022    CHOLHDL 1.7 02/14/2022       CARDIAC STUDIES REVIEWED:  EKG  3/9/22 - NSR. LVH with QRS widening     OTHER IMAGING STUDIES REVIEWED:    ASSESSMENT/Plan:    1.   Paroxysmal atrial fibrillation.  In NSR, On Toprol XL 50 mg, qd, Eliquis 5 mg AM and PM,    2.   Abnormal EKG - will order lexiscan stress test

## 2022-03-09 ENCOUNTER — OFFICE VISIT (OUTPATIENT)
Dept: CARDIOLOGY | Facility: CLINIC | Age: 72
End: 2022-03-09
Payer: MEDICARE

## 2022-03-09 VITALS
DIASTOLIC BLOOD PRESSURE: 72 MMHG | OXYGEN SATURATION: 99 % | SYSTOLIC BLOOD PRESSURE: 148 MMHG | WEIGHT: 260 LBS | HEIGHT: 64 IN | BODY MASS INDEX: 44.39 KG/M2 | HEART RATE: 74 BPM

## 2022-03-09 DIAGNOSIS — I48.0 PAROXYSMAL ATRIAL FIBRILLATION: Primary | ICD-10-CM

## 2022-03-09 DIAGNOSIS — R94.31 ABNORMAL ELECTROCARDIOGRAM (ECG) (EKG): ICD-10-CM

## 2022-03-09 PROCEDURE — 93005 ELECTROCARDIOGRAM TRACING: CPT | Mod: PBBFAC | Performed by: INTERNAL MEDICINE

## 2022-03-09 PROCEDURE — 93010 ELECTROCARDIOGRAM REPORT: CPT | Mod: S$PBB,,, | Performed by: INTERNAL MEDICINE

## 2022-03-09 PROCEDURE — 1160F PR REVIEW ALL MEDS BY PRESCRIBER/CLIN PHARMACIST DOCUMENTED: ICD-10-PCS | Mod: CPTII,,, | Performed by: INTERNAL MEDICINE

## 2022-03-09 PROCEDURE — 1159F PR MEDICATION LIST DOCUMENTED IN MEDICAL RECORD: ICD-10-PCS | Mod: CPTII,,, | Performed by: INTERNAL MEDICINE

## 2022-03-09 PROCEDURE — 99214 PR OFFICE/OUTPT VISIT, EST, LEVL IV, 30-39 MIN: ICD-10-PCS | Mod: S$PBB,,, | Performed by: INTERNAL MEDICINE

## 2022-03-09 PROCEDURE — 3077F PR MOST RECENT SYSTOLIC BLOOD PRESSURE >= 140 MM HG: ICD-10-PCS | Mod: CPTII,,, | Performed by: INTERNAL MEDICINE

## 2022-03-09 PROCEDURE — 1160F RVW MEDS BY RX/DR IN RCRD: CPT | Mod: CPTII,,, | Performed by: INTERNAL MEDICINE

## 2022-03-09 PROCEDURE — 99214 OFFICE O/P EST MOD 30 MIN: CPT | Mod: S$PBB,,, | Performed by: INTERNAL MEDICINE

## 2022-03-09 PROCEDURE — 93010 EKG 12-LEAD: ICD-10-PCS | Mod: S$PBB,,, | Performed by: INTERNAL MEDICINE

## 2022-03-09 PROCEDURE — 1159F MED LIST DOCD IN RCRD: CPT | Mod: CPTII,,, | Performed by: INTERNAL MEDICINE

## 2022-03-09 PROCEDURE — 3077F SYST BP >= 140 MM HG: CPT | Mod: CPTII,,, | Performed by: INTERNAL MEDICINE

## 2022-03-09 PROCEDURE — 3078F DIAST BP <80 MM HG: CPT | Mod: CPTII,,, | Performed by: INTERNAL MEDICINE

## 2022-03-09 PROCEDURE — 3078F PR MOST RECENT DIASTOLIC BLOOD PRESSURE < 80 MM HG: ICD-10-PCS | Mod: CPTII,,, | Performed by: INTERNAL MEDICINE

## 2022-03-09 PROCEDURE — 3008F BODY MASS INDEX DOCD: CPT | Mod: CPTII,,, | Performed by: INTERNAL MEDICINE

## 2022-03-09 PROCEDURE — 99214 OFFICE O/P EST MOD 30 MIN: CPT | Mod: PBBFAC | Performed by: INTERNAL MEDICINE

## 2022-03-09 PROCEDURE — 3008F PR BODY MASS INDEX (BMI) DOCUMENTED: ICD-10-PCS | Mod: CPTII,,, | Performed by: INTERNAL MEDICINE

## 2022-03-17 PROBLEM — R94.31 ABNORMAL ELECTROCARDIOGRAM (ECG) (EKG): Status: ACTIVE | Noted: 2022-03-17

## 2022-03-18 ENCOUNTER — HOSPITAL ENCOUNTER (OUTPATIENT)
Dept: CARDIOLOGY | Facility: HOSPITAL | Age: 72
Discharge: HOME | End: 2022-03-18
Attending: INTERNAL MEDICINE
Payer: MEDICARE

## 2022-03-18 ENCOUNTER — HOSPITAL ENCOUNTER (OUTPATIENT)
Dept: RADIOLOGY | Facility: HOSPITAL | Age: 72
Discharge: HOME | End: 2022-03-18
Attending: INTERNAL MEDICINE
Payer: MEDICARE

## 2022-03-18 VITALS — WEIGHT: 260 LBS | BODY MASS INDEX: 44.39 KG/M2 | HEIGHT: 64 IN

## 2022-03-18 DIAGNOSIS — R94.31 ABNORMAL ELECTROCARDIOGRAM (ECG) (EKG): ICD-10-CM

## 2022-03-18 LAB
CV STRESS BASE HR: 60 BPM
DIASTOLIC BLOOD PRESSURE: 81 MMHG
OHS CV CPX 1 MINUTE RECOVERY HEART RATE: 74 BPM
OHS CV CPX 85 PERCENT MAX PREDICTED HEART RATE MALE: 121
OHS CV CPX ESTIMATED METS: 1
OHS CV CPX MAX PREDICTED HEART RATE: 143
OHS CV CPX PATIENT IS FEMALE: 1
OHS CV CPX PATIENT IS MALE: 0
OHS CV CPX PEAK DIASTOLIC BLOOD PRESSURE: 72 MMHG
OHS CV CPX PEAK HEAR RATE: 81 BPM
OHS CV CPX PEAK RATE PRESSURE PRODUCT: NORMAL
OHS CV CPX PEAK SYSTOLIC BLOOD PRESSURE: 162 MMHG
OHS CV CPX PERCENT MAX PREDICTED HEART RATE ACHIEVED: 57
OHS CV CPX RATE PRESSURE PRODUCT PRESENTING: 9000
STRESS ECHO POST EXERCISE DUR MIN: 1 MINUTES
STRESS ECHO POST EXERCISE DUR SEC: 36 SECONDS
SYSTOLIC BLOOD PRESSURE: 150 MMHG

## 2022-03-18 PROCEDURE — 93016 CV STRESS TEST SUPVJ ONLY: CPT | Mod: ,,, | Performed by: NURSE PRACTITIONER

## 2022-03-18 PROCEDURE — 78452 HT MUSCLE IMAGE SPECT MULT: CPT | Mod: 26,,, | Performed by: STUDENT IN AN ORGANIZED HEALTH CARE EDUCATION/TRAINING PROGRAM

## 2022-03-18 PROCEDURE — 78452 NM MYOCARDIAL PERFUSION SPECT MULTI PHARM: ICD-10-PCS | Mod: 26,,, | Performed by: STUDENT IN AN ORGANIZED HEALTH CARE EDUCATION/TRAINING PROGRAM

## 2022-03-18 PROCEDURE — 63600175 PHARM REV CODE 636 W HCPCS: Performed by: INTERNAL MEDICINE

## 2022-03-18 PROCEDURE — 93018 CV STRESS TEST I&R ONLY: CPT | Mod: ,,, | Performed by: INTERNAL MEDICINE

## 2022-03-18 PROCEDURE — 93017 CV STRESS TEST TRACING ONLY: CPT

## 2022-03-18 PROCEDURE — A9500 TC99M SESTAMIBI: HCPCS

## 2022-03-18 PROCEDURE — 93018 NUCLEAR STRESS TEST (CUPID ONLY): ICD-10-PCS | Mod: ,,, | Performed by: INTERNAL MEDICINE

## 2022-03-18 PROCEDURE — 93016 NUCLEAR STRESS TEST (CUPID ONLY): ICD-10-PCS | Mod: ,,, | Performed by: NURSE PRACTITIONER

## 2022-03-18 RX ORDER — REGADENOSON 0.08 MG/ML
0.4 INJECTION, SOLUTION INTRAVENOUS ONCE
Status: COMPLETED | OUTPATIENT
Start: 2022-03-18 | End: 2022-03-18

## 2022-03-18 RX ADMIN — REGADENOSON 0.4 MG: 0.08 INJECTION, SOLUTION INTRAVENOUS at 10:03

## 2022-04-11 ENCOUNTER — OFFICE VISIT (OUTPATIENT)
Dept: CARDIOLOGY | Facility: CLINIC | Age: 72
End: 2022-04-11
Payer: MEDICARE

## 2022-04-11 VITALS
OXYGEN SATURATION: 98 % | HEART RATE: 76 BPM | WEIGHT: 261 LBS | SYSTOLIC BLOOD PRESSURE: 162 MMHG | HEIGHT: 64 IN | DIASTOLIC BLOOD PRESSURE: 98 MMHG | BODY MASS INDEX: 44.56 KG/M2

## 2022-04-11 DIAGNOSIS — I48.91 ATRIAL FIBRILLATION, UNSPECIFIED TYPE: Primary | ICD-10-CM

## 2022-04-11 DIAGNOSIS — E66.01 MORBID OBESITY: ICD-10-CM

## 2022-04-11 DIAGNOSIS — I48.0 PAROXYSMAL ATRIAL FIBRILLATION: ICD-10-CM

## 2022-04-11 DIAGNOSIS — I10 ESSENTIAL HYPERTENSION: ICD-10-CM

## 2022-04-11 PROCEDURE — 93010 EKG 12-LEAD: ICD-10-PCS | Mod: S$PBB,,, | Performed by: INTERNAL MEDICINE

## 2022-04-11 PROCEDURE — 1159F MED LIST DOCD IN RCRD: CPT | Mod: CPTII,,, | Performed by: INTERNAL MEDICINE

## 2022-04-11 PROCEDURE — 3080F DIAST BP >= 90 MM HG: CPT | Mod: CPTII,,, | Performed by: INTERNAL MEDICINE

## 2022-04-11 PROCEDURE — 1159F PR MEDICATION LIST DOCUMENTED IN MEDICAL RECORD: ICD-10-PCS | Mod: CPTII,,, | Performed by: INTERNAL MEDICINE

## 2022-04-11 PROCEDURE — 99214 OFFICE O/P EST MOD 30 MIN: CPT | Mod: S$PBB,,, | Performed by: INTERNAL MEDICINE

## 2022-04-11 PROCEDURE — 99214 OFFICE O/P EST MOD 30 MIN: CPT | Mod: PBBFAC | Performed by: INTERNAL MEDICINE

## 2022-04-11 PROCEDURE — 3080F PR MOST RECENT DIASTOLIC BLOOD PRESSURE >= 90 MM HG: ICD-10-PCS | Mod: CPTII,,, | Performed by: INTERNAL MEDICINE

## 2022-04-11 PROCEDURE — 3077F SYST BP >= 140 MM HG: CPT | Mod: CPTII,,, | Performed by: INTERNAL MEDICINE

## 2022-04-11 PROCEDURE — 1160F RVW MEDS BY RX/DR IN RCRD: CPT | Mod: CPTII,,, | Performed by: INTERNAL MEDICINE

## 2022-04-11 PROCEDURE — 3077F PR MOST RECENT SYSTOLIC BLOOD PRESSURE >= 140 MM HG: ICD-10-PCS | Mod: CPTII,,, | Performed by: INTERNAL MEDICINE

## 2022-04-11 PROCEDURE — 93010 ELECTROCARDIOGRAM REPORT: CPT | Mod: S$PBB,,, | Performed by: INTERNAL MEDICINE

## 2022-04-11 PROCEDURE — 1160F PR REVIEW ALL MEDS BY PRESCRIBER/CLIN PHARMACIST DOCUMENTED: ICD-10-PCS | Mod: CPTII,,, | Performed by: INTERNAL MEDICINE

## 2022-04-11 PROCEDURE — 3008F PR BODY MASS INDEX (BMI) DOCUMENTED: ICD-10-PCS | Mod: CPTII,,, | Performed by: INTERNAL MEDICINE

## 2022-04-11 PROCEDURE — 93005 ELECTROCARDIOGRAM TRACING: CPT | Mod: PBBFAC | Performed by: INTERNAL MEDICINE

## 2022-04-11 PROCEDURE — 3008F BODY MASS INDEX DOCD: CPT | Mod: CPTII,,, | Performed by: INTERNAL MEDICINE

## 2022-04-11 PROCEDURE — 99214 PR OFFICE/OUTPT VISIT, EST, LEVL IV, 30-39 MIN: ICD-10-PCS | Mod: S$PBB,,, | Performed by: INTERNAL MEDICINE

## 2022-04-11 NOTE — PROGRESS NOTES
Cardiology Clinic Note:    PCP: Eldon Govea MD      REFERRING PHYSICIAN:Eldon Govea MD      CHIEF COMPLAINT:     HISTORY OF PRESENT ILLNESS:  Renetta Steawrt is a 72 y.o. female for evaluation of atrial fibrillation, results of Lexiscan.     Patient states she feels well.  Blood pressure elevated today. Daughter states she did not take the amlodipine today.  She is active cleaning house and yard without provocation of chest pain, pressure, tightness or shortness of breath          Review of Systems   Constitutional: Negative for diaphoresis, malaise/fatigue, night sweats and weight gain.   HENT: Negative for congestion, ear pain, hearing loss, nosebleeds and sore throat.    Eyes: Negative for blurred vision, double vision, pain, photophobia and visual disturbance.   Cardiovascular: Negative for chest pain, claudication, dyspnea on exertion, irregular heartbeat, leg swelling, near-syncope, orthopnea, palpitations and syncope.   Respiratory: Negative for cough, shortness of breath, sleep disturbances due to breathing, snoring and wheezing.    Endocrine: Negative for cold intolerance, heat intolerance, polydipsia, polyphagia and polyuria.   Hematologic/Lymphatic: Negative for bleeding problem. Does not bruise/bleed easily.   Skin: Negative for dry skin, flushing, itching, rash and skin cancer.   Musculoskeletal: Negative for arthritis, back pain, falls, joint pain, muscle cramps, muscle weakness and myalgias.   Gastrointestinal: Negative for abdominal pain, change in bowel habit, constipation, diarrhea, dysphagia, heartburn, nausea and vomiting.   Genitourinary: Negative for bladder incontinence, dysuria, flank pain, frequency and nocturia.   Neurological: Negative for dizziness, focal weakness, headaches, light-headedness, loss of balance, numbness, paresthesias and seizures.   Psychiatric/Behavioral: Negative for depression, memory loss and substance abuse. The patient is not nervous/anxious.     Allergic/Immunologic: Negative for environmental allergies.         PAST MEDICAL HISTORY:  Past Medical History:   Diagnosis Date    Anemia     Anxiety     Dementia     Depression     GERD (gastroesophageal reflux disease)     Hyperlipidemia     Hypertension        PAST SURGICAL HISTORY:  No past surgical history on file.    SOCIAL HISTORY:  Social History     Socioeconomic History    Marital status: Single   Tobacco Use    Smoking status: Never Smoker    Smokeless tobacco: Never Used   Substance and Sexual Activity    Alcohol use: Not Currently    Drug use: Never    Sexual activity: Not Currently       FAMILY HISTORY:  Family History   Problem Relation Age of Onset    Hypertension Father        ALLERGIES:  Review of patient's allergies indicates:  No Known Allergies    MEDICATIONS:    Current Outpatient Medications:     amLODIPine (NORVASC) 10 MG tablet, TAKE 1 TABLET BY MOUTH EVERY DAY FOR BLOOD PRESSURE, Disp: 30 tablet, Rfl: 3    apixaban (ELIQUIS) 5 mg Tab, Take 1 tablet (5 mg total) by mouth 2 (two) times daily., Disp: 60 tablet, Rfl: 3    atorvastatin (LIPITOR) 40 MG tablet, TAKE 1 TABLET BY MOUTH IN THE EVENING ~~DO NOT EAT GRAPEFRUIT OR DRINK GRAPEFRUIT JUICE WHILE TAKING THIS MEDICATION~~, Disp: 90 tablet, Rfl: 1    furosemide (LASIX) 20 MG tablet, Take 1 tablet (20 mg total) by mouth once daily., Disp: 90 tablet, Rfl: 2    mirtazapine (REMERON) 7.5 MG Tab, Take 1 tablet (7.5 mg total) by mouth every evening., Disp: 90 tablet, Rfl: 1    nitroGLYCERIN (NITROSTAT) 0.4 MG SL tablet, Place 0.4 mg under the tongue every 5 (five) minutes as needed for Chest pain., Disp: , Rfl:     omeprazole (PRILOSEC) 40 MG capsule, Take 1 capsule (40 mg total) by mouth once daily., Disp: 90 capsule, Rfl: 3    sertraline (ZOLOFT) 50 MG tablet, Take 1 tablet (50 mg total) by mouth once daily., Disp: 30 tablet, Rfl: 3    hydrALAZINE (APRESOLINE) 50 MG tablet, Take 1 tablet (50 mg total) by mouth every 6  "(six) hours., Disp: 120 tablet, Rfl: 3    metoprolol succinate (TOPROL-XL) 50 MG 24 hr tablet, Take 1 tablet (50 mg total) by mouth once daily., Disp: 90 tablet, Rfl: 3    tiZANidine 2 mg Cap, Take 1 capsule (2 mg total) by mouth every 6 (six) hours as needed (spasm)., Disp: 30 capsule, Rfl: 1    PHYSICAL EXAM:  BP (!) 162/98 (BP Location: Left arm, Patient Position: Sitting)   Pulse 76   Ht 5' 4" (1.626 m)   Wt 118.4 kg (261 lb)   SpO2 98%   BMI 44.80 kg/m²   Wt Readings from Last 3 Encounters:   04/11/22 118.4 kg (261 lb)   03/18/22 117.9 kg (260 lb)   03/09/22 117.9 kg (260 lb)      Body mass index is 44.8 kg/m².    Physical Exam  Vitals and nursing note reviewed.   Constitutional:       Appearance: Normal appearance. She is obese.   HENT:      Head: Normocephalic and atraumatic.      Right Ear: External ear normal.      Left Ear: External ear normal.   Eyes:      General: No scleral icterus.        Right eye: No discharge.         Left eye: No discharge.      Extraocular Movements: Extraocular movements intact.      Conjunctiva/sclera: Conjunctivae normal.      Pupils: Pupils are equal, round, and reactive to light.   Cardiovascular:      Rate and Rhythm: Normal rate and regular rhythm.      Pulses: Normal pulses.      Heart sounds: Normal heart sounds. No murmur heard.    No friction rub. No gallop.   Pulmonary:      Effort: Pulmonary effort is normal.      Breath sounds: Normal breath sounds. No wheezing, rhonchi or rales.   Chest:      Chest wall: No tenderness.   Abdominal:      General: Abdomen is flat. Bowel sounds are normal. There is no distension.      Palpations: Abdomen is soft.      Tenderness: There is no abdominal tenderness. There is no guarding or rebound.   Musculoskeletal:         General: No swelling or tenderness. Normal range of motion.      Cervical back: Normal range of motion and neck supple.   Skin:     General: Skin is warm and dry.      Findings: No erythema or rash. "   Neurological:      General: No focal deficit present.      Mental Status: She is alert and oriented to person, place, and time.      Cranial Nerves: No cranial nerve deficit.      Motor: No weakness.      Gait: Gait normal.   Psychiatric:         Mood and Affect: Mood normal.         Behavior: Behavior normal.         Thought Content: Thought content normal.         Judgment: Judgment normal.         LABS REVIEWED:  Lab Results   Component Value Date    WBC 4.92 02/14/2022    RBC 4.91 02/14/2022    HGB 13.0 02/14/2022    HCT 42.6 02/14/2022    MCV 86.8 02/14/2022    MCH 26.5 (L) 02/14/2022    MCHC 30.5 (L) 02/14/2022    RDW 14.2 02/14/2022     02/14/2022    MPV 10.7 02/14/2022    NRBC 0.0 02/14/2022    INR 0.98 09/22/2020     Lab Results   Component Value Date     02/14/2022    K 4.3 02/14/2022     (H) 02/14/2022    CO2 31 02/14/2022    BUN 19 (H) 02/14/2022    MG 1.8 09/22/2020     Lab Results   Component Value Date     09/22/2020    AST 14 (L) 02/14/2022    ALT 23 02/14/2022     Lab Results   Component Value Date    GLU 94 02/14/2022     Lab Results   Component Value Date    CHOL 131 02/14/2022    HDL 78 (H) 02/14/2022    TRIG 78 02/14/2022    CHOLHDL 1.7 02/14/2022       CARDIAC STUDIES REVIEWED:  EKG  3/9/22 - NSR. LVH with QRS widening   Lexiscan 3/18/22 -  negative for ischemia or infarct.    OTHER IMAGING STUDIES REVIEWED:    ASSESSMENT/Plan:    1.   Paroxysmal atrial fibrillation, palpitations have resolved,  In NSR, On Toprol XL 50 mg, qd, Eliquis 5 mg AM and PM,    2.   Hypertension - controlled, has white coat hypertension.  Advised to take all medication on regular basis.    3.   Morbid obesity - weight up 1 lbs, discussed lifestyle changes..     4.   Abnormal EKG, normal stress imaging, continue lifestyle modifications,

## 2022-04-19 PROBLEM — E66.01 MORBID OBESITY: Status: ACTIVE | Noted: 2022-04-19

## 2022-04-28 ENCOUNTER — OFFICE VISIT (OUTPATIENT)
Dept: FAMILY MEDICINE | Facility: CLINIC | Age: 72
End: 2022-04-28
Payer: MEDICARE

## 2022-04-28 VITALS
BODY MASS INDEX: 44.9 KG/M2 | HEIGHT: 64 IN | TEMPERATURE: 98 F | OXYGEN SATURATION: 99 % | SYSTOLIC BLOOD PRESSURE: 190 MMHG | HEART RATE: 71 BPM | DIASTOLIC BLOOD PRESSURE: 100 MMHG | WEIGHT: 263 LBS | RESPIRATION RATE: 20 BRPM

## 2022-04-28 DIAGNOSIS — I48.91 ATRIAL FIBRILLATION, UNSPECIFIED TYPE: ICD-10-CM

## 2022-04-28 DIAGNOSIS — E78.5 HYPERLIPIDEMIA, UNSPECIFIED HYPERLIPIDEMIA TYPE: ICD-10-CM

## 2022-04-28 DIAGNOSIS — I10 HYPERTENSION, UNSPECIFIED TYPE: Primary | ICD-10-CM

## 2022-04-28 PROCEDURE — 99212 OFFICE O/P EST SF 10 MIN: CPT | Mod: ,,, | Performed by: FAMILY MEDICINE

## 2022-04-28 PROCEDURE — 1159F PR MEDICATION LIST DOCUMENTED IN MEDICAL RECORD: ICD-10-PCS | Mod: ,,, | Performed by: FAMILY MEDICINE

## 2022-04-28 PROCEDURE — 1101F PT FALLS ASSESS-DOCD LE1/YR: CPT | Mod: ,,, | Performed by: FAMILY MEDICINE

## 2022-04-28 PROCEDURE — 1159F MED LIST DOCD IN RCRD: CPT | Mod: ,,, | Performed by: FAMILY MEDICINE

## 2022-04-28 PROCEDURE — 3288F PR FALLS RISK ASSESSMENT DOCUMENTED: ICD-10-PCS | Mod: ,,, | Performed by: FAMILY MEDICINE

## 2022-04-28 PROCEDURE — 3008F PR BODY MASS INDEX (BMI) DOCUMENTED: ICD-10-PCS | Mod: ,,, | Performed by: FAMILY MEDICINE

## 2022-04-28 PROCEDURE — 1160F RVW MEDS BY RX/DR IN RCRD: CPT | Mod: ,,, | Performed by: FAMILY MEDICINE

## 2022-04-28 PROCEDURE — 1126F PR PAIN SEVERITY QUANTIFIED, NO PAIN PRESENT: ICD-10-PCS | Mod: ,,, | Performed by: FAMILY MEDICINE

## 2022-04-28 PROCEDURE — 3080F PR MOST RECENT DIASTOLIC BLOOD PRESSURE >= 90 MM HG: ICD-10-PCS | Mod: ,,, | Performed by: FAMILY MEDICINE

## 2022-04-28 PROCEDURE — 3077F SYST BP >= 140 MM HG: CPT | Mod: ,,, | Performed by: FAMILY MEDICINE

## 2022-04-28 PROCEDURE — 1126F AMNT PAIN NOTED NONE PRSNT: CPT | Mod: ,,, | Performed by: FAMILY MEDICINE

## 2022-04-28 PROCEDURE — 3288F FALL RISK ASSESSMENT DOCD: CPT | Mod: ,,, | Performed by: FAMILY MEDICINE

## 2022-04-28 PROCEDURE — 1160F PR REVIEW ALL MEDS BY PRESCRIBER/CLIN PHARMACIST DOCUMENTED: ICD-10-PCS | Mod: ,,, | Performed by: FAMILY MEDICINE

## 2022-04-28 PROCEDURE — 3008F BODY MASS INDEX DOCD: CPT | Mod: ,,, | Performed by: FAMILY MEDICINE

## 2022-04-28 PROCEDURE — 3077F PR MOST RECENT SYSTOLIC BLOOD PRESSURE >= 140 MM HG: ICD-10-PCS | Mod: ,,, | Performed by: FAMILY MEDICINE

## 2022-04-28 PROCEDURE — 99212 PR OFFICE/OUTPT VISIT, EST, LEVL II, 10-19 MIN: ICD-10-PCS | Mod: ,,, | Performed by: FAMILY MEDICINE

## 2022-04-28 PROCEDURE — 3080F DIAST BP >= 90 MM HG: CPT | Mod: ,,, | Performed by: FAMILY MEDICINE

## 2022-04-28 PROCEDURE — 1101F PR PT FALLS ASSESS DOC 0-1 FALLS W/OUT INJ PAST YR: ICD-10-PCS | Mod: ,,, | Performed by: FAMILY MEDICINE

## 2022-04-28 NOTE — PROGRESS NOTES
Renetta Stewart is a 72 y.o. female seen today for follow-up on her hypertension and hyperlipidemia.  Patient also has a history of chronic AFib.  Patient is here after she has discontinued all of her medications.  We had a long discussion on her need to continue her blood pressure medication especially since her blood pressure is elevated today in the office.  She will also need to continue her cholesterol medication and her Eliquis due to her history of AFib.  She denies any reflux symptoms difficulty sleeping or depression and has discontinued her anti reflux medication her sleeping medication and her depression medication.  I told the patient I agree that it is good to keep her medications is minimal as possible but without her blood pressure medication she is at high risk for stroke or heart attack.     Past Medical History:   Diagnosis Date    Anemia     Anxiety     Dementia     Depression     GERD (gastroesophageal reflux disease)     Hyperlipidemia     Hypertension      Family History   Problem Relation Age of Onset    Hypertension Father      Current Outpatient Medications on File Prior to Visit   Medication Sig Dispense Refill    amLODIPine (NORVASC) 10 MG tablet TAKE 1 TABLET BY MOUTH EVERY DAY FOR BLOOD PRESSURE 30 tablet 3    apixaban (ELIQUIS) 5 mg Tab Take 1 tablet (5 mg total) by mouth 2 (two) times daily. 60 tablet 3    atorvastatin (LIPITOR) 40 MG tablet TAKE 1 TABLET BY MOUTH IN THE EVENING ~~DO NOT EAT GRAPEFRUIT OR DRINK GRAPEFRUIT JUICE WHILE TAKING THIS MEDICATION~~ 90 tablet 1    metoprolol succinate (TOPROL-XL) 50 MG 24 hr tablet Take 1 tablet (50 mg total) by mouth once daily. 90 tablet 3    nitroGLYCERIN (NITROSTAT) 0.4 MG SL tablet Place 0.4 mg under the tongue every 5 (five) minutes as needed for Chest pain.      [DISCONTINUED] furosemide (LASIX) 20 MG tablet Take 1 tablet (20 mg total) by mouth once daily. (Patient not taking: Reported on 4/28/2022) 90 tablet 2     [DISCONTINUED] mirtazapine (REMERON) 7.5 MG Tab Take 1 tablet (7.5 mg total) by mouth every evening. (Patient not taking: Reported on 4/28/2022) 90 tablet 1    [DISCONTINUED] omeprazole (PRILOSEC) 40 MG capsule Take 1 capsule (40 mg total) by mouth once daily. (Patient not taking: Reported on 4/28/2022) 90 capsule 3    [DISCONTINUED] sertraline (ZOLOFT) 50 MG tablet Take 1 tablet (50 mg total) by mouth once daily. (Patient not taking: Reported on 4/28/2022) 30 tablet 3     No current facility-administered medications on file prior to visit.       There is no immunization history on file for this patient.    Review of Systems   Constitutional: Negative for fever, malaise/fatigue and weight loss.   Respiratory: Negative for shortness of breath.    Cardiovascular: Negative for chest pain and palpitations.   Gastrointestinal: Negative for nausea and vomiting.   Psychiatric/Behavioral: Negative for depression.        Vitals:    04/28/22 1102   BP: (!) 190/100   Pulse:    Resp:    Temp:        Physical Exam  Vitals reviewed.   Constitutional:       Appearance: Normal appearance.   HENT:      Head: Normocephalic.   Eyes:      Extraocular Movements: Extraocular movements intact.      Conjunctiva/sclera: Conjunctivae normal.      Pupils: Pupils are equal, round, and reactive to light.   Neck:      Thyroid: No thyroid mass or thyromegaly.   Cardiovascular:      Rate and Rhythm: Normal rate and regular rhythm.      Heart sounds: Normal heart sounds. No murmur heard.    No gallop.   Pulmonary:      Effort: Pulmonary effort is normal. No respiratory distress.      Breath sounds: Normal breath sounds. No wheezing or rales.   Skin:     General: Skin is warm and dry.      Coloration: Skin is not jaundiced or pale.   Neurological:      Mental Status: She is alert.   Psychiatric:         Mood and Affect: Mood normal.         Behavior: Behavior normal.         Thought Content: Thought content normal.         Judgment: Judgment  normal.          Assessment and Plan  Hypertension, unspecified type    Hyperlipidemia, unspecified hyperlipidemia type    Atrial fibrillation, unspecified type            Return to clinic in 1 month.     Health Maintenance Topics with due status: Not Due       Topic Last Completion Date    Colorectal Cancer Screening 06/18/2019    Lipid Panel 02/14/2022

## 2022-05-10 ENCOUNTER — TELEPHONE (OUTPATIENT)
Dept: FAMILY MEDICINE | Facility: CLINIC | Age: 72
End: 2022-05-10
Payer: MEDICARE

## 2022-05-10 NOTE — TELEPHONE ENCOUNTER
[facility] nurse Lukasz called about pt bp. Stated that she had checked it and it was reading 204/130 She said pt had just taken her morning medication and she was going to recheck Bp before she left. Nurse called back about 20 mins later with her Bp reading of 200/100. Nurse was instructed to let pt know if she had any chest pains or sob to got to ER per Dr Govea.

## 2022-05-13 ENCOUNTER — TELEPHONE (OUTPATIENT)
Dept: FAMILY MEDICINE | Facility: CLINIC | Age: 72
End: 2022-05-13
Payer: MEDICARE

## 2022-05-17 ENCOUNTER — TELEPHONE (OUTPATIENT)
Dept: FAMILY MEDICINE | Facility: CLINIC | Age: 72
End: 2022-05-17
Payer: MEDICARE

## 2022-05-17 NOTE — TELEPHONE ENCOUNTER
H\H nurse contacted clinic in regards to pt's bp. Reports level of 200/120 in left arm and 200/100 in right arm at rest. Reports pt denies cp/ sob at this time. Reports the last 3 nursing visits bp has been elevated as recorded above. Reports pt verbalized she is taking medications as prescribed but nurse can not be certain due to no change in readings. Mica notified. Offered pt f\u office visit to discuss medications and blood pressure readings, advised ed visit if she begins to have cp/sob. Nurse verbalized understanding. Pt has f\u next week with Mica. Also notified nurse amlodipine will not work if pt is not taking the medication consistently as prescribed due to this is not a fast acting drug. Nurse verbalized understanding.

## 2022-05-20 ENCOUNTER — TELEPHONE (OUTPATIENT)
Dept: FAMILY MEDICINE | Facility: CLINIC | Age: 72
End: 2022-05-20
Payer: MEDICARE

## 2022-05-20 NOTE — TELEPHONE ENCOUNTER
"H\H nurse contacted clinic with reports of pt deferring follow up nurse visit for bp monitoring. Reports pt stated, "her blood pressure is okay and she feels fine." Mica notified. Pt to f\u in clinic next week.    "

## 2022-08-18 ENCOUNTER — HOSPITAL ENCOUNTER (EMERGENCY)
Facility: HOSPITAL | Age: 72
Discharge: PSYCHIATRIC OR SUBSTANCE USE FACILITY | End: 2022-08-18
Payer: MEDICARE

## 2022-08-18 VITALS
BODY MASS INDEX: 40.63 KG/M2 | RESPIRATION RATE: 16 BRPM | TEMPERATURE: 98 F | HEIGHT: 64 IN | WEIGHT: 238 LBS | OXYGEN SATURATION: 99 % | DIASTOLIC BLOOD PRESSURE: 72 MMHG | HEART RATE: 74 BPM | SYSTOLIC BLOOD PRESSURE: 161 MMHG

## 2022-08-18 DIAGNOSIS — R45.851 SUICIDAL IDEATION: Primary | ICD-10-CM

## 2022-08-18 LAB
AMPHET UR QL SCN: NEGATIVE
ANION GAP SERPL CALCULATED.3IONS-SCNC: 18 MMOL/L (ref 7–16)
APAP SERPL-MCNC: <2 ΜG/ML (ref 10–30)
BACTERIA #/AREA URNS HPF: ABNORMAL /HPF
BARBITURATES UR QL SCN: NEGATIVE
BASOPHILS # BLD AUTO: 0.01 K/UL (ref 0–0.2)
BASOPHILS NFR BLD AUTO: 0.2 % (ref 0–1)
BENZODIAZ METAB UR QL SCN: NEGATIVE
BILIRUB UR QL STRIP: NEGATIVE
BUN SERPL-MCNC: 21 MG/DL (ref 7–18)
BUN/CREAT SERPL: 15 (ref 6–20)
CALCIUM SERPL-MCNC: 9.3 MG/DL (ref 8.5–10.1)
CANNABINOIDS UR QL SCN: NEGATIVE
CHLORIDE SERPL-SCNC: 106 MMOL/L (ref 98–107)
CLARITY UR: CLEAR
CO2 SERPL-SCNC: 24 MMOL/L (ref 21–32)
COCAINE UR QL SCN: NEGATIVE
COLOR UR: ABNORMAL
CREAT SERPL-MCNC: 1.44 MG/DL (ref 0.55–1.02)
DIFFERENTIAL METHOD BLD: ABNORMAL
EGFR (NO RACE VARIABLE) (RUSH/TITUS): 39 ML/MIN/1.73M²
EOSINOPHIL # BLD AUTO: 0.06 K/UL (ref 0–0.5)
EOSINOPHIL NFR BLD AUTO: 1.3 % (ref 1–4)
ERYTHROCYTE [DISTWIDTH] IN BLOOD BY AUTOMATED COUNT: 14.1 % (ref 11.5–14.5)
ETHANOL, BLOOD (CATEGORY): NOT DETECTED
GLUCOSE SERPL-MCNC: 106 MG/DL (ref 74–106)
GLUCOSE UR STRIP-MCNC: 50 MG/DL
HCT VFR BLD AUTO: 40 % (ref 38–47)
HGB BLD-MCNC: 12.7 G/DL (ref 12–16)
IMM GRANULOCYTES # BLD AUTO: 0.02 K/UL (ref 0–0.04)
IMM GRANULOCYTES NFR BLD: 0.4 % (ref 0–0.4)
KETONES UR STRIP-SCNC: 10 MG/DL
LEUKOCYTE ESTERASE UR QL STRIP: NEGATIVE
LYMPHOCYTES # BLD AUTO: 1.12 K/UL (ref 1–4.8)
LYMPHOCYTES NFR BLD AUTO: 23.4 % (ref 27–41)
MCH RBC QN AUTO: 26.2 PG (ref 27–31)
MCHC RBC AUTO-ENTMCNC: 31.8 G/DL (ref 32–36)
MCV RBC AUTO: 82.5 FL (ref 80–96)
MONOCYTES # BLD AUTO: 0.39 K/UL (ref 0–0.8)
MONOCYTES NFR BLD AUTO: 8.1 % (ref 2–6)
MPC BLD CALC-MCNC: 10.9 FL (ref 9.4–12.4)
MUCOUS THREADS #/AREA URNS HPF: ABNORMAL /HPF
NEUTROPHILS # BLD AUTO: 3.19 K/UL (ref 1.8–7.7)
NEUTROPHILS NFR BLD AUTO: 66.6 % (ref 53–65)
NITRITE UR QL STRIP: NEGATIVE
NRBC # BLD AUTO: 0 X10E3/UL
NRBC, AUTO (.00): 0 %
OPIATES UR QL SCN: NEGATIVE
PCP UR QL SCN: NEGATIVE
PH UR STRIP: 5.5 PH UNITS
PLATELET # BLD AUTO: 240 K/UL (ref 150–400)
POTASSIUM SERPL-SCNC: 3.9 MMOL/L (ref 3.5–5.1)
PROT UR QL STRIP: 30
RBC # BLD AUTO: 4.85 M/UL (ref 4.2–5.4)
RBC # UR STRIP: NEGATIVE /UL
RBC #/AREA URNS HPF: ABNORMAL /HPF
SALICYLATES SERPL-MCNC: 0.2 MG/DL (ref 3–30)
SARS-COV-2 RDRP RESP QL NAA+PROBE: NEGATIVE
SODIUM SERPL-SCNC: 144 MMOL/L (ref 136–145)
SP GR UR STRIP: 1.02
SQUAMOUS #/AREA URNS LPF: ABNORMAL /LPF
UROBILINOGEN UR STRIP-ACNC: NORMAL MG/DL
WBC # BLD AUTO: 4.79 K/UL (ref 4.5–11)
WBC #/AREA URNS HPF: ABNORMAL /HPF

## 2022-08-18 PROCEDURE — 85025 COMPLETE CBC W/AUTO DIFF WBC: CPT | Performed by: NURSE PRACTITIONER

## 2022-08-18 PROCEDURE — 80307 DRUG TEST PRSMV CHEM ANLYZR: CPT | Performed by: NURSE PRACTITIONER

## 2022-08-18 PROCEDURE — 99283 EMERGENCY DEPT VISIT LOW MDM: CPT | Mod: ,,, | Performed by: NURSE PRACTITIONER

## 2022-08-18 PROCEDURE — 99283 PR EMERGENCY DEPT VISIT,LEVEL III: ICD-10-PCS | Mod: ,,, | Performed by: NURSE PRACTITIONER

## 2022-08-18 PROCEDURE — 80048 BASIC METABOLIC PNL TOTAL CA: CPT | Performed by: NURSE PRACTITIONER

## 2022-08-18 PROCEDURE — 87635 SARS-COV-2 COVID-19 AMP PRB: CPT | Performed by: NURSE PRACTITIONER

## 2022-08-18 PROCEDURE — 81001 URINALYSIS AUTO W/SCOPE: CPT | Performed by: NURSE PRACTITIONER

## 2022-08-18 PROCEDURE — 25000003 PHARM REV CODE 250: Performed by: NURSE PRACTITIONER

## 2022-08-18 PROCEDURE — 99285 EMERGENCY DEPT VISIT HI MDM: CPT

## 2022-08-18 PROCEDURE — 63600175 PHARM REV CODE 636 W HCPCS: Performed by: NURSE PRACTITIONER

## 2022-08-18 PROCEDURE — 96372 THER/PROPH/DIAG INJ SC/IM: CPT

## 2022-08-18 PROCEDURE — 36415 COLL VENOUS BLD VENIPUNCTURE: CPT | Performed by: NURSE PRACTITIONER

## 2022-08-18 PROCEDURE — 80143 DRUG ASSAY ACETAMINOPHEN: CPT | Performed by: NURSE PRACTITIONER

## 2022-08-18 PROCEDURE — 80179 DRUG ASSAY SALICYLATE: CPT | Performed by: NURSE PRACTITIONER

## 2022-08-18 PROCEDURE — 82077 ASSAY SPEC XCP UR&BREATH IA: CPT | Performed by: NURSE PRACTITIONER

## 2022-08-18 RX ORDER — HYDRALAZINE HYDROCHLORIDE 20 MG/ML
10 INJECTION INTRAMUSCULAR; INTRAVENOUS
Status: DISCONTINUED | OUTPATIENT
Start: 2022-08-18 | End: 2022-08-18

## 2022-08-18 RX ORDER — HYDRALAZINE HYDROCHLORIDE 20 MG/ML
10 INJECTION INTRAMUSCULAR; INTRAVENOUS ONCE
Status: COMPLETED | OUTPATIENT
Start: 2022-08-18 | End: 2022-08-18

## 2022-08-18 RX ORDER — ACETAMINOPHEN 500 MG
1000 TABLET ORAL
Status: COMPLETED | OUTPATIENT
Start: 2022-08-18 | End: 2022-08-18

## 2022-08-18 RX ORDER — HYDRALAZINE HYDROCHLORIDE 20 MG/ML
20 INJECTION INTRAMUSCULAR; INTRAVENOUS
Status: COMPLETED | OUTPATIENT
Start: 2022-08-18 | End: 2022-08-18

## 2022-08-18 RX ADMIN — HYDRALAZINE HYDROCHLORIDE 20 MG: 20 INJECTION INTRAMUSCULAR; INTRAVENOUS at 03:08

## 2022-08-18 RX ADMIN — HYDRALAZINE HYDROCHLORIDE 10 MG: 20 INJECTION INTRAMUSCULAR; INTRAVENOUS at 12:08

## 2022-08-18 RX ADMIN — ACETAMINOPHEN 1000 MG: 500 TABLET ORAL at 01:08

## 2022-08-18 NOTE — ED TRIAGE NOTES
PRESENTS TO ER WITH REPORT OF FOUND WALKING AROUND OUTSIDE. PATIENT REPORTS SHE IS HEARING VOICES TELLING HER TO GET UP AND GO. SHE DOES CLAIM TO HAVE SUICIDAL THOUGHTS WITHOUT A PLAN. FAMILY CALLED PRIMARY DR AND THEY TOLD HER TO COME TO ER TO BE EVALUATED. HAS BEEN ADMITTED TO [facility] IN THE PAST FOR SIMILAR SYMPTOMS. HAS A COUPLE DEPRESSION MEDICATIONS THAT FAMILY REPORTS SHE HASNT TAKEN. AGREES TO GET PSYCH PLACEMENT

## 2022-08-18 NOTE — ED PROVIDER NOTES
Encounter Date: 8/18/2022       History     Chief Complaint   Patient presents with    Suicidal     HPI  Review of patient's allergies indicates:  No Known Allergies  Past Medical History:   Diagnosis Date    Anemia     Anxiety     Dementia     Depression     GERD (gastroesophageal reflux disease)     Hyperlipidemia     Hypertension      No past surgical history on file.  Family History   Problem Relation Age of Onset    Hypertension Father      Social History     Tobacco Use    Smoking status: Never Smoker    Smokeless tobacco: Never Used   Substance Use Topics    Alcohol use: Not Currently    Drug use: Never     Review of Systems    Physical Exam     Initial Vitals [08/18/22 1009]   BP Pulse Resp Temp SpO2   (!) 216/98 78 18 98.4 °F (36.9 °C) 99 %      MAP       --         Physical Exam    Medical Screening Exam   See Full Note    ED Course   Procedures  Labs Reviewed   BASIC METABOLIC PANEL - Abnormal; Notable for the following components:       Result Value    Anion Gap 18 (*)     BUN 21 (*)     Creatinine 1.44 (*)     eGFR 39 (*)     All other components within normal limits   URINALYSIS - Abnormal; Notable for the following components:    Protein, UA 30  (*)     Glucose, UA 50  (*)     Ketones, UA 10  (*)     All other components within normal limits   ACETAMINOPHEN LEVEL - Abnormal; Notable for the following components:    Acetaminophen <2 (*)     All other components within normal limits   SALICYLATE LEVEL - Abnormal; Notable for the following components:    Salicylate 0.2 (*)     All other components within normal limits   CBC WITH DIFFERENTIAL - Abnormal; Notable for the following components:    MCH 26.2 (*)     MCHC 31.8 (*)     Neutrophils % 66.6 (*)     Lymphocytes % 23.4 (*)     Monocytes % 8.1 (*)     All other components within normal limits   URINALYSIS, MICROSCOPIC - Abnormal; Notable for the following components:    Bacteria, UA Few (*)     Squamous Epithelial Cells, UA Few (*)      Mucus, UA Few (*)     All other components within normal limits   DRUG SCREEN, URINE (BEAKER) - Normal    Narrative:     The results of screening tests should be considered presumptive. Confirmatory testing is available upon request.    Cutoff Points:  PCP:         25ng/mL  AMPH:        500ng/mL  JAHAIRA:        200ng/mL  CAIN:        200ng/mL  THC:         50ng/mL  ELEAZAR:         300ng/mL  OPI:         2000ng/mL   SARS-COV-2 RNA AMPLIFICATION, QUAL - Normal    Narrative:     Negative SARS-CoV results should not be used as the sole basis for treatment or patient management decisions; negative results should be considered in the context of a patient's recent exposures, history and the presene of clinical signs and symptoms consistent with COVID-19.  Negative results should be treated as presumptive and confirmed by molecular assay, if necessary for patient management.   CBC W/ AUTO DIFFERENTIAL    Narrative:     The following orders were created for panel order CBC auto differential.  Procedure                               Abnormality         Status                     ---------                               -----------         ------                     CBC with Differential[[id number]]        Abnormal            Final result                 Please view results for these tests on the individual orders.   ALCOHOL,MEDICAL (ETHANOL)   EXTRA TUBES    Narrative:     The following orders were created for panel order EXTRA TUBES.  Procedure                               Abnormality         Status                     ---------                               -----------         ------                     Light Blue Top Hold[[id number]]                              In process                 Light Green Top Hold[[id number]]                             In process                 Hanson Top Hold[[id number]]                                                                 Please view results for these tests on the individual orders.   LIGHT  BLUE TOP HOLD   LIGHT GREEN TOP HOLD          Imaging Results          X-Ray Lumbar Spine Ap And Lateral (Final result)  Result time 08/18/22 14:30:06    Final result by Swapnil Arango MD (08/18/22 14:30:06)                 Impression:      Mild degenerative changes.      Electronically signed by: Swapnil Arango  Date:    08/18/2022  Time:    14:30             Narrative:    EXAMINATION:  XR LUMBAR SPINE AP AND LATERAL    CLINICAL HISTORY:  low back pain;    TECHNIQUE:  AP, lateral and spot images were performed of the lumbar spine.    COMPARISON:  09/22/2020    FINDINGS:  The vertebral body heights are maintained.  Mild anterolisthesis L5 on S1 similar to prior.  Mild degenerative endplate and facet changes throughout most notably at L5-S1.                                 Medications   hydrALAZINE injection 10 mg (10 mg Intramuscular Given 8/18/22 1230)   acetaminophen tablet 1,000 mg (1,000 mg Oral Given 8/18/22 1338)   hydrALAZINE injection 20 mg (20 mg Intramuscular Given 8/18/22 1522)                       Clinical Impression:   Final diagnoses:  [R45.851] Suicidal ideation (Primary)          ED Disposition Condition    Transfer to Psych Facility         ED Prescriptions     None        Follow-up Information    None          SULEMA Reyes  08/18/22 4936

## 2022-08-18 NOTE — ED PROVIDER NOTES
"Encounter Date: 8/18/2022       History     Chief Complaint   Patient presents with    Suicidal     72 year old female presents to the emergency department with her daughter in law to be evaluated for suicidal thoughts and hearing voices. She began hearing voices telling her to get up and go last night. She left her home and walked about 3 miles until her family found her. She is tearful and said she cannot sleep and wants to kill herself. She does not have a plan. She had a similar situation about a year ago, was treated at [facility] and stared on zoloft. She has not taken her zoloft or blood pressure medication "in a while." Denies any recent fall, head injury, headache, chest pain, shortness of breath, fever, chills, cough, sore throat, vomiting, diarrhea, dysuria, known sick contacts. She has chronic low back pain and complains of low back pain. Denies any fall, injury, dysuria. Her daughter in law requests an xray.    The history is provided by the patient and a relative.   Mental Health Problem  The primary symptoms include depressed mood and suicidal ideas. The primary symptoms do not include aggression, agitation or homicidal ideas.   Additional symptoms of the illness include insomnia. Additional symptoms of the illness do not include agitation or headaches. She admits to suicidal ideas. She does not have a plan to attempt suicide. She contemplates harming herself. She has not already injured self. She does not contemplate injuring another person. She has not already  injured another person.     Review of patient's allergies indicates:  No Known Allergies  Past Medical History:   Diagnosis Date    Anemia     Anxiety     Dementia     Depression     GERD (gastroesophageal reflux disease)     Hyperlipidemia     Hypertension      No past surgical history on file.  Family History   Problem Relation Age of Onset    Hypertension Father      Social History     Tobacco Use    Smoking status: Never Smoker "    Smokeless tobacco: Never Used   Substance Use Topics    Alcohol use: Not Currently    Drug use: Never     Review of Systems   Neurological: Negative for headaches.   Psychiatric/Behavioral: Positive for suicidal ideas. Negative for agitation and homicidal ideas. The patient has insomnia.    All other systems reviewed and are negative.      Physical Exam     Initial Vitals [08/18/22 1009]   BP Pulse Resp Temp SpO2   (!) 216/98 78 18 98.4 °F (36.9 °C) 99 %      MAP       --         Physical Exam    Vitals reviewed.  Constitutional: She appears well-developed and well-nourished.   HENT:   Head: Normocephalic and atraumatic.   Eyes: EOM are normal. Pupils are equal, round, and reactive to light.   Neck: Neck supple.   Cardiovascular: Normal rate and regular rhythm.   Pulmonary/Chest: Breath sounds normal.   Abdominal: Abdomen is soft. Bowel sounds are normal. She exhibits no distension and no mass. There is no abdominal tenderness. There is no rebound and no guarding.   Musculoskeletal:         General: Normal range of motion.      Cervical back: Normal and neck supple.      Thoracic back: Normal.      Lumbar back: Normal.     Neurological: She is alert and oriented to person, place, and time. She has normal strength. She displays normal reflexes. No cranial nerve deficit or sensory deficit. GCS score is 15. GCS eye subscore is 4. GCS verbal subscore is 5. GCS motor subscore is 6.   Skin: Skin is warm and dry. Capillary refill takes less than 2 seconds.   Psychiatric:   tearful         Medical Screening Exam   See Full Note    ED Course   Procedures  Labs Reviewed   BASIC METABOLIC PANEL - Abnormal; Notable for the following components:       Result Value    Anion Gap 18 (*)     BUN 21 (*)     Creatinine 1.44 (*)     eGFR 39 (*)     All other components within normal limits   URINALYSIS - Abnormal; Notable for the following components:    Protein, UA 30  (*)     Glucose, UA 50  (*)     Ketones, UA 10  (*)     All  other components within normal limits   ACETAMINOPHEN LEVEL - Abnormal; Notable for the following components:    Acetaminophen <2 (*)     All other components within normal limits   SALICYLATE LEVEL - Abnormal; Notable for the following components:    Salicylate 0.2 (*)     All other components within normal limits   CBC WITH DIFFERENTIAL - Abnormal; Notable for the following components:    MCH 26.2 (*)     MCHC 31.8 (*)     Neutrophils % 66.6 (*)     Lymphocytes % 23.4 (*)     Monocytes % 8.1 (*)     All other components within normal limits   URINALYSIS, MICROSCOPIC - Abnormal; Notable for the following components:    Bacteria, UA Few (*)     Squamous Epithelial Cells, UA Few (*)     Mucus, UA Few (*)     All other components within normal limits   DRUG SCREEN, URINE (BEAKER) - Normal    Narrative:     The results of screening tests should be considered presumptive. Confirmatory testing is available upon request.    Cutoff Points:  PCP:         25ng/mL  AMPH:        500ng/mL  JAHAIRA:        200ng/mL  CAIN:        200ng/mL  THC:         50ng/mL  ELEAZAR:         300ng/mL  OPI:         2000ng/mL   SARS-COV-2 RNA AMPLIFICATION, QUAL - Normal    Narrative:     Negative SARS-CoV results should not be used as the sole basis for treatment or patient management decisions; negative results should be considered in the context of a patient's recent exposures, history and the presene of clinical signs and symptoms consistent with COVID-19.  Negative results should be treated as presumptive and confirmed by molecular assay, if necessary for patient management.   CBC W/ AUTO DIFFERENTIAL    Narrative:     The following orders were created for panel order CBC auto differential.  Procedure                               Abnormality         Status                     ---------                               -----------         ------                     CBC with Differential[[id number]]        Abnormal            Final result                  Please view results for these tests on the individual orders.   ALCOHOL,MEDICAL (ETHANOL)   EXTRA TUBES    Narrative:     The following orders were created for panel order EXTRA TUBES.  Procedure                               Abnormality         Status                     ---------                               -----------         ------                     Light Blue Top Hold[[id number]]                              In process                 Light Green Top Hold[[id number]]                             In process                 Hanson Top Hold[[id number]]                                                                 Please view results for these tests on the individual orders.   LIGHT BLUE TOP HOLD   LIGHT GREEN TOP HOLD          Imaging Results          X-Ray Lumbar Spine Ap And Lateral (Final result)  Result time 08/18/22 14:30:06    Final result by Swapnil Arango MD (08/18/22 14:30:06)                 Impression:      Mild degenerative changes.      Electronically signed by: Swapnil Arango  Date:    08/18/2022  Time:    14:30             Narrative:    EXAMINATION:  XR LUMBAR SPINE AP AND LATERAL    CLINICAL HISTORY:  low back pain;    TECHNIQUE:  AP, lateral and spot images were performed of the lumbar spine.    COMPARISON:  09/22/2020    FINDINGS:  The vertebral body heights are maintained.  Mild anterolisthesis L5 on S1 similar to prior.  Mild degenerative endplate and facet changes throughout most notably at L5-S1.                                 Medications   hydrALAZINE injection 10 mg (10 mg Intramuscular Given 8/18/22 1230)   acetaminophen tablet 1,000 mg (1,000 mg Oral Given 8/18/22 1338)   hydrALAZINE injection 20 mg (20 mg Intramuscular Given 8/18/22 1522)                       Clinical Impression:   Final diagnoses:  [R45.851] Suicidal ideation (Primary)          ED Disposition Condition    Transfer to [facility]         ED Prescriptions     None        Follow-up Information    None          Dilcia RODRIGUEZ  [place], [facility]  08/18/22 1213       Dilcia Morton, [facility]  08/19/22 0800

## 2022-08-19 ENCOUNTER — TELEPHONE (OUTPATIENT)
Dept: EMERGENCY MEDICINE | Facility: HOSPITAL | Age: 72
End: 2022-08-19
Payer: MEDICARE

## 2022-08-25 ENCOUNTER — HOSPITAL ENCOUNTER (EMERGENCY)
Facility: HOSPITAL | Age: 72
Discharge: HOME | End: 2022-08-25
Payer: MEDICARE

## 2022-08-25 ENCOUNTER — OFFICE VISIT (OUTPATIENT)
Dept: FAMILY MEDICINE | Facility: CLINIC | Age: 72
End: 2022-08-25
Payer: MEDICARE

## 2022-08-25 VITALS
TEMPERATURE: 98 F | OXYGEN SATURATION: 96 % | HEART RATE: 86 BPM | WEIGHT: 250 LBS | HEIGHT: 64 IN | SYSTOLIC BLOOD PRESSURE: 174 MMHG | RESPIRATION RATE: 23 BRPM | BODY MASS INDEX: 42.68 KG/M2 | DIASTOLIC BLOOD PRESSURE: 68 MMHG

## 2022-08-25 VITALS
TEMPERATURE: 97 F | SYSTOLIC BLOOD PRESSURE: 110 MMHG | HEART RATE: 59 BPM | OXYGEN SATURATION: 99 % | RESPIRATION RATE: 18 BRPM | BODY MASS INDEX: 42 KG/M2 | DIASTOLIC BLOOD PRESSURE: 62 MMHG | WEIGHT: 246 LBS | HEIGHT: 64 IN

## 2022-08-25 DIAGNOSIS — R56.9 SEIZURE-LIKE ACTIVITY: Primary | ICD-10-CM

## 2022-08-25 DIAGNOSIS — R55 SYNCOPE, UNSPECIFIED SYNCOPE TYPE: Primary | ICD-10-CM

## 2022-08-25 DIAGNOSIS — R41.82 ALTERED MENTAL STATUS: ICD-10-CM

## 2022-08-25 LAB
ANION GAP SERPL CALCULATED.3IONS-SCNC: 15 MMOL/L (ref 7–16)
BASOPHILS # BLD AUTO: 0.03 K/UL (ref 0–0.2)
BASOPHILS NFR BLD AUTO: 0.4 % (ref 0–1)
BILIRUB UR QL STRIP: NEGATIVE
BUN SERPL-MCNC: 22 MG/DL (ref 7–18)
BUN/CREAT SERPL: 15 (ref 6–20)
CALCIUM SERPL-MCNC: 9 MG/DL (ref 8.5–10.1)
CHLORIDE SERPL-SCNC: 107 MMOL/L (ref 98–107)
CLARITY UR: ABNORMAL
CO2 SERPL-SCNC: 24 MMOL/L (ref 21–32)
COLOR UR: ABNORMAL
CREAT SERPL-MCNC: 1.48 MG/DL (ref 0.55–1.02)
DIFFERENTIAL METHOD BLD: ABNORMAL
EGFR (NO RACE VARIABLE) (RUSH/TITUS): 37 ML/MIN/1.73M²
EOSINOPHIL # BLD AUTO: 0.03 K/UL (ref 0–0.5)
EOSINOPHIL NFR BLD AUTO: 0.4 % (ref 1–4)
ERYTHROCYTE [DISTWIDTH] IN BLOOD BY AUTOMATED COUNT: 14.6 % (ref 11.5–14.5)
GLUCOSE SERPL-MCNC: 107 MG/DL (ref 70–110)
GLUCOSE SERPL-MCNC: 131 MG/DL (ref 74–106)
GLUCOSE UR STRIP-MCNC: 50 MG/DL
HCT VFR BLD AUTO: 42 % (ref 38–47)
HGB BLD-MCNC: 12.8 G/DL (ref 12–16)
IMM GRANULOCYTES # BLD AUTO: 0.02 K/UL (ref 0–0.04)
IMM GRANULOCYTES NFR BLD: 0.3 % (ref 0–0.4)
KETONES UR STRIP-SCNC: NEGATIVE MG/DL
LEUKOCYTE ESTERASE UR QL STRIP: NEGATIVE
LYMPHOCYTES # BLD AUTO: 0.97 K/UL (ref 1–4.8)
LYMPHOCYTES NFR BLD AUTO: 13.2 % (ref 27–41)
MCH RBC QN AUTO: 26.4 PG (ref 27–31)
MCHC RBC AUTO-ENTMCNC: 30.5 G/DL (ref 32–36)
MCV RBC AUTO: 86.6 FL (ref 80–96)
MONOCYTES # BLD AUTO: 0.51 K/UL (ref 0–0.8)
MONOCYTES NFR BLD AUTO: 6.9 % (ref 2–6)
MPC BLD CALC-MCNC: 11 FL (ref 9.4–12.4)
NEUTROPHILS # BLD AUTO: 5.8 K/UL (ref 1.8–7.7)
NEUTROPHILS NFR BLD AUTO: 78.8 % (ref 53–65)
NITRITE UR QL STRIP: NEGATIVE
NRBC # BLD AUTO: 0 X10E3/UL
NRBC, AUTO (.00): 0 %
PH UR STRIP: 5.5 PH UNITS
PLATELET # BLD AUTO: 258 K/UL (ref 150–400)
POTASSIUM SERPL-SCNC: 3.9 MMOL/L (ref 3.5–5.1)
PROT UR QL STRIP: 20
RBC # BLD AUTO: 4.85 M/UL (ref 4.2–5.4)
RBC # UR STRIP: NEGATIVE /UL
SARS-COV+SARS-COV-2 AG RESP QL IA.RAPID: NEGATIVE
SODIUM SERPL-SCNC: 142 MMOL/L (ref 136–145)
SP GR UR STRIP: 1.02
UROBILINOGEN UR STRIP-ACNC: NORMAL MG/DL
WBC # BLD AUTO: 7.36 K/UL (ref 4.5–11)

## 2022-08-25 PROCEDURE — 4010F ACE/ARB THERAPY RXD/TAKEN: CPT | Mod: ,,, | Performed by: NURSE PRACTITIONER

## 2022-08-25 PROCEDURE — 3078F PR MOST RECENT DIASTOLIC BLOOD PRESSURE < 80 MM HG: ICD-10-PCS | Mod: ,,, | Performed by: NURSE PRACTITIONER

## 2022-08-25 PROCEDURE — 3008F BODY MASS INDEX DOCD: CPT | Mod: ,,, | Performed by: NURSE PRACTITIONER

## 2022-08-25 PROCEDURE — 87426 SARSCOV CORONAVIRUS AG IA: CPT | Performed by: NURSE PRACTITIONER

## 2022-08-25 PROCEDURE — 3288F PR FALLS RISK ASSESSMENT DOCUMENTED: ICD-10-PCS | Mod: ,,, | Performed by: NURSE PRACTITIONER

## 2022-08-25 PROCEDURE — 1125F PR PAIN SEVERITY QUANTIFIED, PAIN PRESENT: ICD-10-PCS | Mod: ,,, | Performed by: NURSE PRACTITIONER

## 2022-08-25 PROCEDURE — 1160F RVW MEDS BY RX/DR IN RCRD: CPT | Mod: ,,, | Performed by: NURSE PRACTITIONER

## 2022-08-25 PROCEDURE — 99212 PR OFFICE/OUTPT VISIT, EST, LEVL II, 10-19 MIN: ICD-10-PCS | Mod: ,,, | Performed by: NURSE PRACTITIONER

## 2022-08-25 PROCEDURE — 99212 OFFICE O/P EST SF 10 MIN: CPT | Mod: ,,, | Performed by: NURSE PRACTITIONER

## 2022-08-25 PROCEDURE — 99283 PR EMERGENCY DEPT VISIT,LEVEL III: ICD-10-PCS | Mod: ,,, | Performed by: NURSE PRACTITIONER

## 2022-08-25 PROCEDURE — 3074F SYST BP LT 130 MM HG: CPT | Mod: ,,, | Performed by: NURSE PRACTITIONER

## 2022-08-25 PROCEDURE — 3288F FALL RISK ASSESSMENT DOCD: CPT | Mod: ,,, | Performed by: NURSE PRACTITIONER

## 2022-08-25 PROCEDURE — 1101F PR PT FALLS ASSESS DOC 0-1 FALLS W/OUT INJ PAST YR: ICD-10-PCS | Mod: ,,, | Performed by: NURSE PRACTITIONER

## 2022-08-25 PROCEDURE — 3078F DIAST BP <80 MM HG: CPT | Mod: ,,, | Performed by: NURSE PRACTITIONER

## 2022-08-25 PROCEDURE — 81003 URINALYSIS AUTO W/O SCOPE: CPT | Performed by: NURSE PRACTITIONER

## 2022-08-25 PROCEDURE — 4010F PR ACE/ARB THEARPY RXD/TAKEN: ICD-10-PCS | Mod: ,,, | Performed by: NURSE PRACTITIONER

## 2022-08-25 PROCEDURE — 1101F PT FALLS ASSESS-DOCD LE1/YR: CPT | Mod: ,,, | Performed by: NURSE PRACTITIONER

## 2022-08-25 PROCEDURE — 3008F PR BODY MASS INDEX (BMI) DOCUMENTED: ICD-10-PCS | Mod: ,,, | Performed by: NURSE PRACTITIONER

## 2022-08-25 PROCEDURE — 99284 EMERGENCY DEPT VISIT MOD MDM: CPT | Mod: 25

## 2022-08-25 PROCEDURE — 99283 EMERGENCY DEPT VISIT LOW MDM: CPT | Mod: ,,, | Performed by: NURSE PRACTITIONER

## 2022-08-25 PROCEDURE — 3074F PR MOST RECENT SYSTOLIC BLOOD PRESSURE < 130 MM HG: ICD-10-PCS | Mod: ,,, | Performed by: NURSE PRACTITIONER

## 2022-08-25 PROCEDURE — 1159F PR MEDICATION LIST DOCUMENTED IN MEDICAL RECORD: ICD-10-PCS | Mod: ,,, | Performed by: NURSE PRACTITIONER

## 2022-08-25 PROCEDURE — 1160F PR REVIEW ALL MEDS BY PRESCRIBER/CLIN PHARMACIST DOCUMENTED: ICD-10-PCS | Mod: ,,, | Performed by: NURSE PRACTITIONER

## 2022-08-25 PROCEDURE — 1159F MED LIST DOCD IN RCRD: CPT | Mod: ,,, | Performed by: NURSE PRACTITIONER

## 2022-08-25 PROCEDURE — 1125F AMNT PAIN NOTED PAIN PRSNT: CPT | Mod: ,,, | Performed by: NURSE PRACTITIONER

## 2022-08-25 PROCEDURE — 36415 COLL VENOUS BLD VENIPUNCTURE: CPT | Performed by: NURSE PRACTITIONER

## 2022-08-25 PROCEDURE — 85025 COMPLETE CBC W/AUTO DIFF WBC: CPT | Performed by: NURSE PRACTITIONER

## 2022-08-25 PROCEDURE — 80048 BASIC METABOLIC PNL TOTAL CA: CPT | Performed by: NURSE PRACTITIONER

## 2022-08-25 RX ORDER — MIRTAZAPINE 15 MG/1
15 TABLET, FILM COATED ORAL NIGHTLY
COMMUNITY
Start: 2022-08-23 | End: 2022-09-23 | Stop reason: SDUPTHER

## 2022-08-25 RX ORDER — HYDRALAZINE HYDROCHLORIDE 100 MG/1
100 TABLET, FILM COATED ORAL 3 TIMES DAILY
COMMUNITY
Start: 2022-08-23 | End: 2022-09-23 | Stop reason: SDUPTHER

## 2022-08-25 RX ORDER — SERTRALINE HYDROCHLORIDE 50 MG/1
50 TABLET, FILM COATED ORAL DAILY
COMMUNITY
Start: 2022-08-24 | End: 2022-09-23 | Stop reason: SDUPTHER

## 2022-08-25 RX ORDER — LOSARTAN POTASSIUM 100 MG/1
100 TABLET ORAL DAILY
COMMUNITY
Start: 2022-08-23 | End: 2022-09-13 | Stop reason: SDUPTHER

## 2022-08-25 RX ORDER — LEVOTHYROXINE SODIUM 50 UG/1
50 TABLET ORAL DAILY
COMMUNITY
Start: 2022-08-23 | End: 2022-09-23 | Stop reason: SDUPTHER

## 2022-08-25 RX ORDER — MEMANTINE HYDROCHLORIDE 10 MG/1
10 TABLET ORAL NIGHTLY
COMMUNITY
Start: 2022-08-23 | End: 2022-09-23 | Stop reason: SDUPTHER

## 2022-08-25 RX ORDER — OMEPRAZOLE 40 MG/1
40 CAPSULE, DELAYED RELEASE ORAL DAILY
COMMUNITY
Start: 2022-08-24 | End: 2022-09-06

## 2022-08-25 NOTE — DISCHARGE INSTRUCTIONS
Drink plenty of fluids. We have consulted home health. Put your pills in a daily planner so you can be sure whether you have taken them or not. Follow up with your primary care provider in 2 days. We have also put in a referred to neurology. Return to the emergency department for any increase in symptoms or for any other new or worrisome symptoms.

## 2022-08-25 NOTE — PROGRESS NOTES
Subjective:       Patient ID: Renetta Stewart is a 72 y.o. female.    Chief Complaint: Follow-up (Pts family wants to discuss meds and possible referral to assisted living or NH)    Ms. Stewart presents to clinic with her son in follow up, I was called to the room due to the patient exhibiting seizure like activity - the nurse reports she began taking the patient's blood pressure and she had involuntary movements with stiffening, she became diaphoretic and did not respond verbally. When I came into the room, the patient was laid back onto the wall in a chair, diaphoretic, unresponsive, eyes rolling back into her head. Glucose level 107, oxygen sats 99%, pulse 59bpm. The patient was placed on 2L NC of oxygen and became responsive - she squeezed my hand on command, pulse was palpable, lungs essentially clear to auscultation, heart murmur auscultated. She became verbally responsive, and answered questions. She complained of low back pain, stated she had a similar episode last night where she became very hot, her son reports she stayed at her home alone last night. She denied chest pain, states that she felt hot, nauseated, dizzy, and short of breath prior to episode. [facility] arrived and patient was transported to ER for evaluation.    Review of Systems   Constitutional: Positive for diaphoresis.   Respiratory: Positive for shortness of breath.    Cardiovascular: Negative for chest pain.   Gastrointestinal: Positive for nausea.   Musculoskeletal: Positive for back pain.   Neurological: Positive for dizziness and syncope.         Objective:      Physical Exam  Vitals and nursing note reviewed.   Constitutional:       General: She is in acute distress.      Appearance: She is diaphoretic.   HENT:      Head: Normocephalic.   Cardiovascular:      Rate and Rhythm: Normal rate.      Heart sounds: Murmur heard.   Pulmonary:      Effort: Pulmonary effort is normal. No respiratory distress.      Breath sounds: Normal breath sounds.    Neurological:      Motor: Weakness present.         Assessment:       Problem List Items Addressed This Visit    None     Visit Diagnoses     Syncope, unspecified syncope type    -  Primary          Plan:        Pt. Transported to ER for evaluation of acute syncopal episode, possible seizure activity reported by the nurse.

## 2022-08-25 NOTE — PLAN OF CARE
SS received home health consult for patient at 1406. Patient discharged before SS could speak with patient (attempted at 1434). SS attempted to contact patient at all numbers listed and attempted to contact both of patient's emergency contacts that are listed. No answer, no voicemail set up so unable to leave message.

## 2022-08-25 NOTE — ED TRIAGE NOTES
Pt presents to the ed per EMS from the clinic in [place] for seizure like activity. Pt has no known hx of seizures

## 2022-08-25 NOTE — PLAN OF CARE
[facility] - Emergency Department  Discharge Final Note    Primary Care Provider: Eldon Govea MD    Expected Discharge Date:     Final Discharge Note (most recent)     Final Note - 08/25/22 1455        Final Note    Assessment Type Discharge Planning Brief Assessment     Anticipated Discharge Disposition Home or Self Care        Post-Acute Status    Discharge Delays None known at this time               1454 SS received call back from patient's daughter, Farnaz. SS discussed home health with patient's daughter. Farnaz states that the family has been speaking with the patient's PCP to begin looking for NH placement for patient. She states the patient will be living at home with her until they can get her into a NH. She states they do not want home health at this time. SS dicussed benefits of home health and discussed patient getting home health in the meantime while they work on getting patient into a facility. Farnaz denied HH referral at this time.

## 2022-08-25 NOTE — ED PROVIDER NOTES
Encounter Date: 8/25/2022       History     Chief Complaint   Patient presents with    Seizures     72 year old female presents to the emergency department to be evaluated by EMS from the clinic. She was admitted to [facility] for depression and suicidal ideations 7 days ago. Her son received a call to come get her yesterday because several patients in the facility tested positive for COVID, and they did not want her to be exposed. They went to her PCP's office this morning for a follow up appointment, she began to have convulsions, and was unresponsive for about a minute. Denies any history of seizures. Her son is also concerned that she may have COVID. Her son is unsure whether she has taken her medications today or not.     The history is provided by the patient.   Seizures   Pertinent negatives include no nausea, no vomiting and no diarrhea.     Review of patient's allergies indicates:  No Known Allergies  Past Medical History:   Diagnosis Date    Anemia     Anxiety     Dementia     Depression     GERD (gastroesophageal reflux disease)     Hyperlipidemia     Hypertension      History reviewed. No pertinent surgical history.  Family History   Problem Relation Age of Onset    Hypertension Father      Social History     Tobacco Use    Smoking status: Never Smoker    Smokeless tobacco: Never Used   Substance Use Topics    Alcohol use: Not Currently    Drug use: Never     Review of Systems   Gastrointestinal: Negative for constipation, diarrhea, nausea and vomiting.   Neurological: Positive for seizures.   All other systems reviewed and are negative.      Physical Exam     Initial Vitals [08/25/22 1147]   BP Pulse Resp Temp SpO2   (!) 192/71 80 18 98.4 °F (36.9 °C) 97 %      MAP       --         Physical Exam    Vitals reviewed.  Constitutional: She appears well-developed and well-nourished.   HENT:   Head: Normocephalic and atraumatic.   Eyes: Pupils are equal, round, and reactive to  light.   Neck: Neck supple.   Cardiovascular: Normal rate and regular rhythm.   Pulmonary/Chest: Breath sounds normal.   Abdominal: Abdomen is soft. Bowel sounds are normal. She exhibits no distension and no mass. There is no abdominal tenderness. There is no rebound and no guarding.   Musculoskeletal:         General: Normal range of motion.      Cervical back: Neck supple.     Neurological: She is alert. She has normal strength. No cranial nerve deficit or sensory deficit. GCS score is 15. GCS eye subscore is 4. GCS verbal subscore is 5. GCS motor subscore is 6.   Skin: Skin is warm and dry. Capillary refill takes less than 2 seconds.         Medical Screening Exam   See Full Note    ED Course   Procedures  Labs Reviewed   URINALYSIS - Abnormal; Notable for the following components:       Result Value    Protein, UA 20  (*)     Glucose, UA 50  (*)     All other components within normal limits   BASIC METABOLIC PANEL - Abnormal; Notable for the following components:    Glucose 131 (*)     BUN 22 (*)     Creatinine 1.48 (*)     eGFR 37 (*)     All other components within normal limits   CBC WITH DIFFERENTIAL - Abnormal; Notable for the following components:    MCH 26.4 (*)     MCHC 30.5 (*)     RDW 14.6 (*)     Neutrophils % 78.8 (*)     Lymphocytes % 13.2 (*)     Monocytes % 6.9 (*)     Eosinophils % 0.4 (*)     Lymphocytes, Absolute 0.97 (*)     All other components within normal limits   SARS ANTIGEN(WHIT) - Normal    Narrative:     Negative SARS-CoV results should not be used as the sole basis for treatment or patient management decisions; negative results should be considered in the context of a patient's recent exposures, history and the presene of clinical signs and symptoms consistent with COVID-19.  Negative results should be treated as presumptive and confirmed by molecular assay, if necessary for patient management.   CBC W/ AUTO DIFFERENTIAL    Narrative:     The following orders were created for panel  order CBC auto differential.  Procedure                               Abnormality         Status                     ---------                               -----------         ------                     CBC with Differential[[id number]]        Abnormal            Final result                 Please view results for these tests on the individual orders.          Imaging Results          X-Ray Chest 1 View (Final result)  Result time 08/25/22 13:14:09    Final result by Micky Davidson DO (08/25/22 13:14:09)                 Impression:      Continued cardiomegaly without jarrett pulmonary edema or focal consolidating pneumonia.  Question trace left pleural fluid.    Point of Service: [facility]      Electronically signed by: Micky Davidson  Date:    08/25/2022  Time:    13:14             Narrative:    EXAMINATION:  XR CHEST 1 VIEW    CLINICAL HISTORY:  Altered mental status, unspecified    COMPARISON:  Chest x-ray May 1, 2020    TECHNIQUE:  Frontal view/views of the chest.    FINDINGS:  Continued cardiomegaly.  Cardiac conduction device again demonstrated.  No jarrett pulmonary edema or focal consolidating pneumonia.  Question trace left pleural fluid. Visualized osseous and surrounding soft tissue structures appear grossly unchanged.                               CT Head Without Contrast (Final result)  Result time 08/25/22 12:35:52    Final result by Swapnil Arango MD (08/25/22 12:35:52)                 Impression:      No acute intracranial abnormality.      Electronically signed by: Swapnil Arango  Date:    08/25/2022  Time:    12:35             Narrative:    EXAMINATION:  CT HEAD WITHOUT CONTRAST    CLINICAL HISTORY:  altered mental status, new onset seizure;    TECHNIQUE:  Low dose axial images were obtained through the head.  Coronal and sagittal reformations were also performed. Contrast was not administered.    COMPARISON:  06/16/2019    FINDINGS:  No acute intracranial hemorrhage.  No mass effect or  midline shift.  Chronic microvascular ischemic changes similar to prior.  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  No large vessel acute infarct detected.  Calvarium intact.  Vascular calcifications.                                 Medications - No data to display                    Clinical Impression:   Final diagnoses:  [R41.82] Altered mental status  [R56.9] Seizure-like activity (Primary)          ED Disposition Condition    Discharge Stable        ED Prescriptions     None        Follow-up Information    None          SULEMA Lazar  08/25/22 0689

## 2022-09-06 ENCOUNTER — OFFICE VISIT (OUTPATIENT)
Dept: FAMILY MEDICINE | Facility: CLINIC | Age: 72
End: 2022-09-06
Payer: MEDICARE

## 2022-09-06 VITALS
HEIGHT: 64 IN | BODY MASS INDEX: 42.91 KG/M2 | DIASTOLIC BLOOD PRESSURE: 70 MMHG | HEART RATE: 78 BPM | RESPIRATION RATE: 18 BRPM | SYSTOLIC BLOOD PRESSURE: 154 MMHG | OXYGEN SATURATION: 98 %

## 2022-09-06 DIAGNOSIS — N28.9 RENAL INSUFFICIENCY: ICD-10-CM

## 2022-09-06 DIAGNOSIS — F03.90 DEMENTIA WITHOUT BEHAVIORAL DISTURBANCE, UNSPECIFIED DEMENTIA TYPE: ICD-10-CM

## 2022-09-06 DIAGNOSIS — E03.9 HYPOTHYROIDISM, UNSPECIFIED TYPE: Primary | ICD-10-CM

## 2022-09-06 LAB
T4 FREE SERPL-MCNC: 1.45 NG/DL (ref 0.76–1.46)
TSH SERPL DL<=0.005 MIU/L-ACNC: 2.9 UIU/ML (ref 0.36–3.74)

## 2022-09-06 PROCEDURE — 1101F PT FALLS ASSESS-DOCD LE1/YR: CPT | Mod: ,,, | Performed by: FAMILY MEDICINE

## 2022-09-06 PROCEDURE — 1160F RVW MEDS BY RX/DR IN RCRD: CPT | Mod: ,,, | Performed by: FAMILY MEDICINE

## 2022-09-06 PROCEDURE — 1126F AMNT PAIN NOTED NONE PRSNT: CPT | Mod: ,,, | Performed by: FAMILY MEDICINE

## 2022-09-06 PROCEDURE — 84439 ASSAY OF FREE THYROXINE: CPT | Mod: ,,, | Performed by: CLINICAL MEDICAL LABORATORY

## 2022-09-06 PROCEDURE — 3008F PR BODY MASS INDEX (BMI) DOCUMENTED: ICD-10-PCS | Mod: ,,, | Performed by: FAMILY MEDICINE

## 2022-09-06 PROCEDURE — 1160F PR REVIEW ALL MEDS BY PRESCRIBER/CLIN PHARMACIST DOCUMENTED: ICD-10-PCS | Mod: ,,, | Performed by: FAMILY MEDICINE

## 2022-09-06 PROCEDURE — 4010F ACE/ARB THERAPY RXD/TAKEN: CPT | Mod: ,,, | Performed by: FAMILY MEDICINE

## 2022-09-06 PROCEDURE — 3077F SYST BP >= 140 MM HG: CPT | Mod: ,,, | Performed by: FAMILY MEDICINE

## 2022-09-06 PROCEDURE — 3078F DIAST BP <80 MM HG: CPT | Mod: ,,, | Performed by: FAMILY MEDICINE

## 2022-09-06 PROCEDURE — 1159F MED LIST DOCD IN RCRD: CPT | Mod: ,,, | Performed by: FAMILY MEDICINE

## 2022-09-06 PROCEDURE — 84439 T4, FREE: ICD-10-PCS | Mod: ,,, | Performed by: CLINICAL MEDICAL LABORATORY

## 2022-09-06 PROCEDURE — 1101F PR PT FALLS ASSESS DOC 0-1 FALLS W/OUT INJ PAST YR: ICD-10-PCS | Mod: ,,, | Performed by: FAMILY MEDICINE

## 2022-09-06 PROCEDURE — 3078F PR MOST RECENT DIASTOLIC BLOOD PRESSURE < 80 MM HG: ICD-10-PCS | Mod: ,,, | Performed by: FAMILY MEDICINE

## 2022-09-06 PROCEDURE — 1159F PR MEDICATION LIST DOCUMENTED IN MEDICAL RECORD: ICD-10-PCS | Mod: ,,, | Performed by: FAMILY MEDICINE

## 2022-09-06 PROCEDURE — 3008F BODY MASS INDEX DOCD: CPT | Mod: ,,, | Performed by: FAMILY MEDICINE

## 2022-09-06 PROCEDURE — 3288F PR FALLS RISK ASSESSMENT DOCUMENTED: ICD-10-PCS | Mod: ,,, | Performed by: FAMILY MEDICINE

## 2022-09-06 PROCEDURE — 84443 ASSAY THYROID STIM HORMONE: CPT | Mod: ,,, | Performed by: CLINICAL MEDICAL LABORATORY

## 2022-09-06 PROCEDURE — 3288F FALL RISK ASSESSMENT DOCD: CPT | Mod: ,,, | Performed by: FAMILY MEDICINE

## 2022-09-06 PROCEDURE — 99213 PR OFFICE/OUTPT VISIT, EST, LEVL III, 20-29 MIN: ICD-10-PCS | Mod: ,,, | Performed by: FAMILY MEDICINE

## 2022-09-06 PROCEDURE — 4010F PR ACE/ARB THEARPY RXD/TAKEN: ICD-10-PCS | Mod: ,,, | Performed by: FAMILY MEDICINE

## 2022-09-06 PROCEDURE — 99213 OFFICE O/P EST LOW 20 MIN: CPT | Mod: ,,, | Performed by: FAMILY MEDICINE

## 2022-09-06 PROCEDURE — 3077F PR MOST RECENT SYSTOLIC BLOOD PRESSURE >= 140 MM HG: ICD-10-PCS | Mod: ,,, | Performed by: FAMILY MEDICINE

## 2022-09-06 PROCEDURE — 84443 TSH: ICD-10-PCS | Mod: ,,, | Performed by: CLINICAL MEDICAL LABORATORY

## 2022-09-06 PROCEDURE — 1126F PR PAIN SEVERITY QUANTIFIED, NO PAIN PRESENT: ICD-10-PCS | Mod: ,,, | Performed by: FAMILY MEDICINE

## 2022-09-06 RX ORDER — FUROSEMIDE 20 MG/1
20 TABLET ORAL DAILY
COMMUNITY
End: 2022-09-23 | Stop reason: SDUPTHER

## 2022-09-06 NOTE — PROGRESS NOTES
Renetta Stewart is a 72 y.o. female seen today for discharge follow-up from her [facility] facility where patient was treated for suicidal ideation.  Patient is currently stable with no suicidal ideation but does need follow-up for her renal insufficiency and new onset dementia.  She does have a history of hypothyroidism and on do not see a recent TSH level.  She also had a recent seizure like episode but her ER evaluation was negative.  Patient complains of severe low back pain and on x-rays did have a spondylolisthesis at S1.  She also has diffuse degenerative disc changes.  She will see pain management in 2 days and also will see her psychiatrist within the next week.  We discussed due to her renal insufficiency avoiding all NSAIDs and instead using Tylenol.    Past Medical History:   Diagnosis Date    Anemia     Anxiety     Dementia     Depression     GERD (gastroesophageal reflux disease)     Hyperlipidemia     Hypertension      Family History   Problem Relation Age of Onset    Hypertension Father      Current Outpatient Medications on File Prior to Visit   Medication Sig Dispense Refill    amLODIPine (NORVASC) 10 MG tablet TAKE 1 TABLET BY MOUTH EVERY DAY FOR BLOOD PRESSURE 30 tablet 3    furosemide (LASIX) 20 MG tablet Take 20 mg by mouth once daily.      hydrALAZINE (APRESOLINE) 100 MG tablet Take 100 mg by mouth 3 (three) times daily.      levothyroxine (SYNTHROID) 50 MCG tablet Take 50 mcg by mouth once daily.      losartan (COZAAR) 100 MG tablet Take 100 mg by mouth once daily.      memantine (NAMENDA) 10 MG Tab Take 10 mg by mouth every evening.      mirtazapine (REMERON) 15 MG tablet Take 15 mg by mouth every evening.      nitroGLYCERIN (NITROSTAT) 0.4 MG SL tablet Place 0.4 mg under the tongue every 5 (five) minutes as needed for Chest pain.      sertraline (ZOLOFT) 50 MG tablet Take 50 mg by mouth once daily.      apixaban (ELIQUIS) 5 mg Tab Take 1 tablet (5 mg total) by mouth 2 (two) times  daily. (Patient not taking: Reported on 9/6/2022) 60 tablet 3    atorvastatin (LIPITOR) 40 MG tablet TAKE 1 TABLET BY MOUTH IN THE EVENING ~~DO NOT EAT GRAPEFRUIT OR DRINK GRAPEFRUIT JUICE WHILE TAKING THIS MEDICATION~~ (Patient not taking: Reported on 9/6/2022) 90 tablet 1    metoprolol succinate (TOPROL-XL) 50 MG 24 hr tablet Take 1 tablet (50 mg total) by mouth once daily. (Patient not taking: Reported on 9/6/2022) 90 tablet 3    omeprazole (PRILOSEC) 40 MG capsule Take 40 mg by mouth once daily.       No current facility-administered medications on file prior to visit.       There is no immunization history on file for this patient.    Review of Systems   Constitutional:  Positive for malaise/fatigue. Negative for fever and weight loss.   Respiratory:  Negative for shortness of breath.    Cardiovascular:  Negative for chest pain and palpitations.   Gastrointestinal:  Negative for nausea and vomiting.   Psychiatric/Behavioral:  Positive for depression and memory loss. Negative for suicidal ideas.       Vitals:    09/06/22 1335   BP: (!) 154/70   Pulse: 78   Resp: 18       Physical Exam  Vitals reviewed.   Constitutional:       Appearance: Normal appearance.   HENT:      Head: Normocephalic.   Eyes:      Extraocular Movements: Extraocular movements intact.      Conjunctiva/sclera: Conjunctivae normal.      Pupils: Pupils are equal, round, and reactive to light.   Neck:      Thyroid: No thyroid mass or thyromegaly.   Cardiovascular:      Rate and Rhythm: Normal rate and regular rhythm.      Heart sounds: Normal heart sounds. No murmur heard.    No gallop.   Pulmonary:      Effort: Pulmonary effort is normal. No respiratory distress.      Breath sounds: Normal breath sounds. No wheezing or rales.   Skin:     General: Skin is warm and dry.      Coloration: Skin is not jaundiced or pale.   Neurological:      Mental Status: She is alert.      Cranial Nerves: No cranial nerve deficit or dysarthria.   Psychiatric:          Mood and Affect: Mood normal.         Behavior: Behavior normal.         Thought Content: Thought content normal.         Judgment: Judgment normal.        Assessment and Plan  Hypothyroidism, unspecified type  -     T4, Free; Future; Expected date: 09/06/2022  -     TSH; Future; Expected date: 09/06/2022    Dementia without behavioral disturbance, unspecified dementia type  -     Ambulatory referral/consult to Neurology; Future; Expected date: 09/13/2022    Renal insufficiency  -     Ambulatory referral/consult to Nephrology; Future; Expected date: 09/13/2022          Return to clinic in 1 month for follow-up.  This patient will be referred to home health for a PT OT social work and nursing evaluation.    Health Maintenance Topics with due status: Not Due       Topic Last Completion Date    Colorectal Cancer Screening 06/18/2019    Lipid Panel 02/14/2022

## 2022-09-07 ENCOUNTER — TELEPHONE (OUTPATIENT)
Dept: FAMILY MEDICINE | Facility: CLINIC | Age: 72
End: 2022-09-07
Payer: MEDICARE

## 2022-09-13 RX ORDER — LOSARTAN POTASSIUM 100 MG/1
100 TABLET ORAL DAILY
Qty: 90 TABLET | Refills: 1 | Status: SHIPPED | OUTPATIENT
Start: 2022-09-13 | End: 2023-01-24 | Stop reason: SDUPTHER

## 2022-09-23 RX ORDER — ACETAMINOPHEN AND CODEINE PHOSPHATE 300; 30 MG/1; MG/1
TABLET ORAL
COMMUNITY
Start: 2022-09-08 | End: 2023-03-28

## 2022-09-23 RX ORDER — AMLODIPINE BESYLATE 10 MG/1
10 TABLET ORAL DAILY
Qty: 90 TABLET | Refills: 1 | Status: SHIPPED | OUTPATIENT
Start: 2022-09-23 | End: 2022-10-13 | Stop reason: ALTCHOICE

## 2022-09-23 RX ORDER — HYDRALAZINE HYDROCHLORIDE 100 MG/1
100 TABLET, FILM COATED ORAL 3 TIMES DAILY
Qty: 270 TABLET | Refills: 1 | Status: SHIPPED | OUTPATIENT
Start: 2022-09-23 | End: 2023-01-24 | Stop reason: SDUPTHER

## 2022-09-23 RX ORDER — MIRTAZAPINE 15 MG/1
15 TABLET, FILM COATED ORAL NIGHTLY
Qty: 90 TABLET | Refills: 1 | Status: SHIPPED | OUTPATIENT
Start: 2022-09-23 | End: 2023-01-24 | Stop reason: SDUPTHER

## 2022-09-23 RX ORDER — FUROSEMIDE 20 MG/1
20 TABLET ORAL DAILY
Qty: 90 TABLET | Refills: 1 | Status: SHIPPED | OUTPATIENT
Start: 2022-09-23 | End: 2023-01-24 | Stop reason: SDUPTHER

## 2022-09-23 RX ORDER — LEVOTHYROXINE SODIUM 50 UG/1
50 TABLET ORAL DAILY
Qty: 90 TABLET | Refills: 1 | Status: SHIPPED | OUTPATIENT
Start: 2022-09-23 | End: 2023-01-24 | Stop reason: SDUPTHER

## 2022-09-23 RX ORDER — SERTRALINE HYDROCHLORIDE 50 MG/1
50 TABLET, FILM COATED ORAL DAILY
Qty: 90 TABLET | Refills: 1 | Status: SHIPPED | OUTPATIENT
Start: 2022-09-23 | End: 2023-01-24 | Stop reason: SDUPTHER

## 2022-09-23 RX ORDER — MEMANTINE HYDROCHLORIDE 10 MG/1
10 TABLET ORAL NIGHTLY
Qty: 90 TABLET | Refills: 1 | Status: SHIPPED | OUTPATIENT
Start: 2022-09-23 | End: 2023-01-24 | Stop reason: SDUPTHER

## 2022-10-13 ENCOUNTER — OFFICE VISIT (OUTPATIENT)
Dept: CARDIOLOGY | Facility: CLINIC | Age: 72
End: 2022-10-13
Payer: MEDICARE

## 2022-10-13 VITALS
WEIGHT: 242 LBS | HEART RATE: 99 BPM | OXYGEN SATURATION: 98 % | SYSTOLIC BLOOD PRESSURE: 146 MMHG | HEIGHT: 64 IN | DIASTOLIC BLOOD PRESSURE: 62 MMHG | BODY MASS INDEX: 41.32 KG/M2

## 2022-10-13 DIAGNOSIS — E66.01 MORBID OBESITY: ICD-10-CM

## 2022-10-13 DIAGNOSIS — I10 ESSENTIAL HYPERTENSION: ICD-10-CM

## 2022-10-13 DIAGNOSIS — R60.0 LOWER EXTREMITY EDEMA: ICD-10-CM

## 2022-10-13 DIAGNOSIS — R94.31 ABNORMAL ELECTROCARDIOGRAM (ECG) (EKG): ICD-10-CM

## 2022-10-13 DIAGNOSIS — I48.91 ATRIAL FIBRILLATION, UNSPECIFIED TYPE: Primary | ICD-10-CM

## 2022-10-13 PROCEDURE — 4010F ACE/ARB THERAPY RXD/TAKEN: CPT | Mod: CPTII,,, | Performed by: INTERNAL MEDICINE

## 2022-10-13 PROCEDURE — 3077F PR MOST RECENT SYSTOLIC BLOOD PRESSURE >= 140 MM HG: ICD-10-PCS | Mod: CPTII,,, | Performed by: INTERNAL MEDICINE

## 2022-10-13 PROCEDURE — 1160F RVW MEDS BY RX/DR IN RCRD: CPT | Mod: CPTII,,, | Performed by: INTERNAL MEDICINE

## 2022-10-13 PROCEDURE — 93010 EKG 12-LEAD: ICD-10-PCS | Mod: S$PBB,,, | Performed by: INTERNAL MEDICINE

## 2022-10-13 PROCEDURE — 1159F MED LIST DOCD IN RCRD: CPT | Mod: CPTII,,, | Performed by: INTERNAL MEDICINE

## 2022-10-13 PROCEDURE — 3008F BODY MASS INDEX DOCD: CPT | Mod: CPTII,,, | Performed by: INTERNAL MEDICINE

## 2022-10-13 PROCEDURE — 1160F PR REVIEW ALL MEDS BY PRESCRIBER/CLIN PHARMACIST DOCUMENTED: ICD-10-PCS | Mod: CPTII,,, | Performed by: INTERNAL MEDICINE

## 2022-10-13 PROCEDURE — 3077F SYST BP >= 140 MM HG: CPT | Mod: CPTII,,, | Performed by: INTERNAL MEDICINE

## 2022-10-13 PROCEDURE — 99214 PR OFFICE/OUTPT VISIT, EST, LEVL IV, 30-39 MIN: ICD-10-PCS | Mod: S$PBB,,, | Performed by: INTERNAL MEDICINE

## 2022-10-13 PROCEDURE — 1159F PR MEDICATION LIST DOCUMENTED IN MEDICAL RECORD: ICD-10-PCS | Mod: CPTII,,, | Performed by: INTERNAL MEDICINE

## 2022-10-13 PROCEDURE — 3078F PR MOST RECENT DIASTOLIC BLOOD PRESSURE < 80 MM HG: ICD-10-PCS | Mod: CPTII,,, | Performed by: INTERNAL MEDICINE

## 2022-10-13 PROCEDURE — 93010 ELECTROCARDIOGRAM REPORT: CPT | Mod: S$PBB,,, | Performed by: INTERNAL MEDICINE

## 2022-10-13 PROCEDURE — 99214 OFFICE O/P EST MOD 30 MIN: CPT | Mod: S$PBB,,, | Performed by: INTERNAL MEDICINE

## 2022-10-13 PROCEDURE — 3078F DIAST BP <80 MM HG: CPT | Mod: CPTII,,, | Performed by: INTERNAL MEDICINE

## 2022-10-13 PROCEDURE — 3008F PR BODY MASS INDEX (BMI) DOCUMENTED: ICD-10-PCS | Mod: CPTII,,, | Performed by: INTERNAL MEDICINE

## 2022-10-13 PROCEDURE — 99214 OFFICE O/P EST MOD 30 MIN: CPT | Mod: PBBFAC | Performed by: INTERNAL MEDICINE

## 2022-10-13 PROCEDURE — 93005 ELECTROCARDIOGRAM TRACING: CPT | Mod: PBBFAC | Performed by: INTERNAL MEDICINE

## 2022-10-13 PROCEDURE — 4010F PR ACE/ARB THEARPY RXD/TAKEN: ICD-10-PCS | Mod: CPTII,,, | Performed by: INTERNAL MEDICINE

## 2022-10-13 RX ORDER — DILTIAZEM HYDROCHLORIDE 240 MG/1
240 CAPSULE, COATED, EXTENDED RELEASE ORAL DAILY
Qty: 30 CAPSULE | Refills: 11 | Status: SHIPPED | OUTPATIENT
Start: 2022-10-13 | End: 2023-02-16 | Stop reason: SDUPTHER

## 2022-10-13 NOTE — PROGRESS NOTES
Cardiology Clinic Note:    PCP: Eldon Govea MD      REFERRING PHYSICIAN:Eldon Govea MD      CHIEF COMPLAINT: Lower extremity edema    HISTORY OF PRESENT ILLNESS:  Renetta Stewart is a 72 y.o. female for evaluation of lower extremity edema.     Patient states lower extremity edema has improved.  Blood pressure better controlled. She is active cleaning house and yard without provocation of chest pain, pressure, tightness or shortness of breath.          Review of Systems   Constitutional: Negative for diaphoresis, malaise/fatigue, night sweats and weight gain.   HENT:  Negative for congestion, ear pain, hearing loss, nosebleeds and sore throat.    Eyes:  Negative for blurred vision, double vision, pain, photophobia and visual disturbance.   Cardiovascular:  Positive for leg swelling. Negative for chest pain, claudication, dyspnea on exertion, irregular heartbeat, near-syncope, orthopnea, palpitations and syncope.   Respiratory:  Negative for cough, shortness of breath, sleep disturbances due to breathing, snoring and wheezing.    Endocrine: Negative for cold intolerance, heat intolerance, polydipsia, polyphagia and polyuria.   Hematologic/Lymphatic: Negative for bleeding problem. Does not bruise/bleed easily.   Skin:  Negative for dry skin, flushing, itching, rash and skin cancer.   Musculoskeletal:  Negative for arthritis, back pain, falls, joint pain, muscle cramps, muscle weakness and myalgias.   Gastrointestinal:  Negative for abdominal pain, change in bowel habit, constipation, diarrhea, dysphagia, heartburn, nausea and vomiting.   Genitourinary:  Negative for bladder incontinence, dysuria, flank pain, frequency and nocturia.   Neurological:  Negative for dizziness, focal weakness, headaches, light-headedness, loss of balance, numbness, paresthesias and seizures.   Psychiatric/Behavioral:  Negative for depression, memory loss and substance abuse. The patient is not nervous/anxious.     Allergic/Immunologic: Negative for environmental allergies.         PAST MEDICAL HISTORY:  Past Medical History:   Diagnosis Date    Anemia     Anxiety     Dementia     Depression     GERD (gastroesophageal reflux disease)     Hyperlipidemia     Hypertension        PAST SURGICAL HISTORY:  No past surgical history on file.    SOCIAL HISTORY:  Social History     Socioeconomic History    Marital status: Single   Tobacco Use    Smoking status: Never    Smokeless tobacco: Never   Substance and Sexual Activity    Alcohol use: Not Currently    Drug use: Never    Sexual activity: Not Currently       FAMILY HISTORY:  Family History   Problem Relation Age of Onset    Hypertension Father        ALLERGIES:  Review of patient's allergies indicates:  No Known Allergies    MEDICATIONS:    Current Outpatient Medications:     acetaminophen-codeine 300-30mg (TYLENOL #3) 300-30 mg Tab, TAKE 1-2 TABLETS BY MOUTH EVERY 12 HOURS AS NEEDED CAUSES DROWSINESS-AVOID ALCOHOL!!, Disp: , Rfl:     amLODIPine (NORVASC) 10 MG tablet, Take 1 tablet (10 mg total) by mouth once daily., Disp: 90 tablet, Rfl: 1    furosemide (LASIX) 20 MG tablet, Take 1 tablet (20 mg total) by mouth once daily., Disp: 90 tablet, Rfl: 1    hydrALAZINE (APRESOLINE) 100 MG tablet, Take 1 tablet (100 mg total) by mouth 3 (three) times daily., Disp: 270 tablet, Rfl: 1    levothyroxine (SYNTHROID) 50 MCG tablet, Take 1 tablet (50 mcg total) by mouth once daily., Disp: 90 tablet, Rfl: 1    losartan (COZAAR) 100 MG tablet, Take 1 tablet (100 mg total) by mouth once daily., Disp: 90 tablet, Rfl: 1    memantine (NAMENDA) 10 MG Tab, Take 1 tablet (10 mg total) by mouth every evening., Disp: 90 tablet, Rfl: 1    mirtazapine (REMERON) 15 MG tablet, Take 1 tablet (15 mg total) by mouth every evening., Disp: 90 tablet, Rfl: 1    nitroGLYCERIN (NITROSTAT) 0.4 MG SL tablet, Place 0.4 mg under the tongue every 5 (five) minutes as needed for Chest pain., Disp: , Rfl:     sertraline  (ZOLOFT) 50 MG tablet, Take 1 tablet (50 mg total) by mouth once daily., Disp: 90 tablet, Rfl: 1    PHYSICAL EXAM:  There were no vitals taken for this visit.  Wt Readings from Last 3 Encounters:   08/25/22 113.4 kg (250 lb)   08/25/22 111.6 kg (246 lb)   08/18/22 108 kg (238 lb)      There is no height or weight on file to calculate BMI.    Physical Exam  Vitals and nursing note reviewed.   Constitutional:       Appearance: Normal appearance. She is obese.   HENT:      Head: Normocephalic and atraumatic.      Right Ear: External ear normal.      Left Ear: External ear normal.   Eyes:      General: No scleral icterus.        Right eye: No discharge.         Left eye: No discharge.      Extraocular Movements: Extraocular movements intact.      Conjunctiva/sclera: Conjunctivae normal.      Pupils: Pupils are equal, round, and reactive to light.   Cardiovascular:      Rate and Rhythm: Normal rate and regular rhythm.      Pulses: Normal pulses.      Heart sounds: Normal heart sounds. No murmur heard.    No friction rub. No gallop.   Pulmonary:      Effort: Pulmonary effort is normal.      Breath sounds: Normal breath sounds. No wheezing, rhonchi or rales.   Chest:      Chest wall: No tenderness.   Abdominal:      General: Abdomen is flat. Bowel sounds are normal. There is no distension.      Palpations: Abdomen is soft.      Tenderness: There is no abdominal tenderness. There is no guarding or rebound.   Musculoskeletal:         General: No swelling or tenderness. Normal range of motion.      Cervical back: Normal range of motion and neck supple.      Right lower leg: Edema present.      Left lower leg: Edema present.   Skin:     General: Skin is warm and dry.      Findings: No erythema or rash.   Neurological:      General: No focal deficit present.      Mental Status: She is alert and oriented to person, place, and time.      Cranial Nerves: No cranial nerve deficit.      Motor: No weakness.      Gait: Gait normal.    Psychiatric:         Mood and Affect: Mood normal.         Behavior: Behavior normal.         Thought Content: Thought content normal.         Judgment: Judgment normal.       LABS REVIEWED:  Lab Results   Component Value Date    WBC 7.36 08/25/2022    RBC 4.85 08/25/2022    HGB 12.8 08/25/2022    HCT 42.0 08/25/2022    MCV 86.6 08/25/2022    MCH 26.4 (L) 08/25/2022    MCHC 30.5 (L) 08/25/2022    RDW 14.6 (H) 08/25/2022     08/25/2022    MPV 11.0 08/25/2022    NRBC 0.0 08/25/2022    INR 0.98 09/22/2020     Lab Results   Component Value Date     08/25/2022    K 3.9 08/25/2022     08/25/2022    CO2 24 08/25/2022    BUN 22 (H) 08/25/2022    MG 1.8 09/22/2020     Lab Results   Component Value Date     09/22/2020    AST 14 (L) 02/14/2022    ALT 23 02/14/2022     Lab Results   Component Value Date     (H) 08/25/2022     Lab Results   Component Value Date    CHOL 131 02/14/2022    HDL 78 (H) 02/14/2022    TRIG 78 02/14/2022    CHOLHDL 1.7 02/14/2022       CARDIAC STUDIES REVIEWED:  EKG  3/9/22 - NSR. LVH with QRS widening   Lexiscan 3/18/22 -  negative for ischemia or infarct.    OTHER IMAGING STUDIES REVIEWED:    ASSESSMENT/Plan:    1.   Paroxysmal atrial fibrillation,  maintaining NSR, On Toprol XL 50 mg, qd, Eliquis 5 mg AM and PM,for primary stroke risk reduction    2.   Hypertension - controlled,                       3.   Morbid obesity - weight down 6  lbs, discussed lifestyle changes..                            4.   Abnormal EKG, normal stress imaging, continue lifestyle modifications,   5.   Lower extremity edema, suspect due to amlodipine, will DC, start diltiazem 240 cd q d, monitor and record blood pressures and heart rate bid and record, reeval in 1 month, sooner if symptoms change.

## 2022-11-06 PROBLEM — R60.0 LOWER EXTREMITY EDEMA: Status: ACTIVE | Noted: 2022-11-06

## 2022-11-09 DIAGNOSIS — Z71.89 COMPLEX CARE COORDINATION: ICD-10-CM

## 2022-12-08 DIAGNOSIS — Z95.0 PRESENCE OF CARDIAC PACEMAKER: Primary | ICD-10-CM

## 2023-01-01 ENCOUNTER — HOSPITAL ENCOUNTER (EMERGENCY)
Facility: HOSPITAL | Age: 73
Discharge: HOME | End: 2023-01-01
Payer: MEDICARE

## 2023-01-01 VITALS
HEART RATE: 77 BPM | TEMPERATURE: 98 F | WEIGHT: 221 LBS | OXYGEN SATURATION: 99 % | SYSTOLIC BLOOD PRESSURE: 164 MMHG | DIASTOLIC BLOOD PRESSURE: 94 MMHG | RESPIRATION RATE: 16 BRPM | BODY MASS INDEX: 37.73 KG/M2 | HEIGHT: 64 IN

## 2023-01-01 DIAGNOSIS — K64.4 EXTERNAL HEMORRHOID: Primary | ICD-10-CM

## 2023-01-01 PROCEDURE — 99282 EMERGENCY DEPT VISIT SF MDM: CPT

## 2023-01-01 PROCEDURE — 99283 EMERGENCY DEPT VISIT LOW MDM: CPT | Mod: ,,, | Performed by: NURSE PRACTITIONER

## 2023-01-01 PROCEDURE — 99283 PR EMERGENCY DEPT VISIT,LEVEL III: ICD-10-PCS | Mod: ,,, | Performed by: NURSE PRACTITIONER

## 2023-01-01 RX ORDER — HYDROCORTISONE 1 %
CREAM (GRAM) TOPICAL
Qty: 30 G | Refills: 0 | Status: SHIPPED | OUTPATIENT
Start: 2023-01-01 | End: 2023-01-01 | Stop reason: SDUPTHER

## 2023-01-01 RX ORDER — POLYETHYLENE GLYCOL 3350 17 G/17G
17 POWDER, FOR SOLUTION ORAL DAILY
Qty: 7 PACKET | Refills: 0 | Status: SHIPPED | OUTPATIENT
Start: 2023-01-01 | End: 2023-01-08

## 2023-01-01 RX ORDER — POLYETHYLENE GLYCOL 3350 17 G/17G
17 POWDER, FOR SOLUTION ORAL DAILY
Qty: 7 PACKET | Refills: 0 | Status: SHIPPED | OUTPATIENT
Start: 2023-01-01 | End: 2023-01-01 | Stop reason: SDUPTHER

## 2023-01-01 RX ORDER — DOCUSATE SODIUM 100 MG/1
100 CAPSULE, LIQUID FILLED ORAL 2 TIMES DAILY
Qty: 60 CAPSULE | Refills: 0 | Status: SHIPPED | OUTPATIENT
Start: 2023-01-01 | End: 2023-01-01 | Stop reason: SDUPTHER

## 2023-01-01 RX ORDER — HYDROCORTISONE 1 %
CREAM (GRAM) TOPICAL
Qty: 30 G | Refills: 0 | Status: SHIPPED | OUTPATIENT
Start: 2023-01-01 | End: 2023-03-08

## 2023-01-01 RX ORDER — DOCUSATE SODIUM 100 MG/1
100 CAPSULE, LIQUID FILLED ORAL 2 TIMES DAILY
Qty: 60 CAPSULE | Refills: 0 | Status: SHIPPED | OUTPATIENT
Start: 2023-01-01 | End: 2023-01-31

## 2023-01-01 NOTE — ED TRIAGE NOTES
States that she has rectal pain that started at the end of last month. Thinks she has a hemorrhoid.

## 2023-01-01 NOTE — ED PROVIDER NOTES
"Encounter Date: 1/1/2023       History     Chief Complaint   Patient presents with    Rectal Pain     72-year-old female presents to the emergency department to be evaluated because she is concerned that she may have hemorrhoids.  She said she felt something, "hanging out back there" about a month ago, and she came to the emergency department to be evaluated today because it has not gone away.  She said that she strains to have a bowel movement frequently.  Denies any abdominal pain, nausea, vomiting, fever, chills.    The history is provided by the patient.   Constipation   The current episode started several weeks ago. Pertinent negatives include no anorexia, no fever, no abdominal pain, no diarrhea, no hematemesis, no hemorrhoids, no nausea, no rectal pain, no vomiting, no hematuria, no vaginal bleeding, no vaginal discharge, no chest pain, no headaches, no coughing, no difficulty breathing and no rash.   Review of patient's allergies indicates:  No Known Allergies  Past Medical History:   Diagnosis Date    Anemia     Anxiety     Dementia     Depression     GERD (gastroesophageal reflux disease)     Hyperlipidemia     Hypertension      No past surgical history on file.  Family History   Problem Relation Age of Onset    Hypertension Father      Social History     Tobacco Use    Smoking status: Never    Smokeless tobacco: Never   Substance Use Topics    Alcohol use: Not Currently    Drug use: Never     Review of Systems   Constitutional:  Negative for fever.   Respiratory:  Negative for cough.    Cardiovascular:  Negative for chest pain.   Gastrointestinal:  Positive for constipation. Negative for abdominal pain, anorexia, diarrhea, hematemesis, hemorrhoids, nausea, rectal pain and vomiting.   Genitourinary:  Negative for hematuria, vaginal bleeding and vaginal discharge.   Skin:  Negative for rash.   Neurological:  Negative for headaches.   All other systems reviewed and are negative.    Physical Exam     Initial " Vitals [01/01/23 1121]   BP Pulse Resp Temp SpO2   (!) 164/94 77 16 98 °F (36.7 °C) 99 %      MAP       --         Physical Exam    Vitals reviewed.  Constitutional: She appears well-developed and well-nourished.   Neck: Neck supple.   Cardiovascular:  Normal rate and regular rhythm.           Pulmonary/Chest: Breath sounds normal.   Abdominal: Abdomen is soft. Bowel sounds are normal. She exhibits no distension and no mass. There is no abdominal tenderness. There is no rebound and no guarding.   Genitourinary: Rectum:      External hemorrhoid present.      Genitourinary Comments: Skin tag to rectal area     Musculoskeletal:         General: Normal range of motion.      Cervical back: Neck supple.     Neurological: She is alert and oriented to person, place, and time. She has normal strength. GCS score is 15. GCS eye subscore is 4. GCS verbal subscore is 5. GCS motor subscore is 6.   Skin: Skin is warm and dry. Capillary refill takes less than 2 seconds.   Psychiatric: She has a normal mood and affect.       Medical Screening Exam   See Full Note    ED Course   Procedures  Labs Reviewed - No data to display       Imaging Results    None          Medications - No data to display  Medical Decision Making:   ED Management:  Patient presents to the emergency department to be evaluated because she is concerned that she may have hemorrhoids.  She has chronic constipation.  Denies any abdominal pain, nausea, vomiting.  Will treat with preparation H, Colace and MiraLax.  Patient voiced understanding and agreed with plan of care.  We will have her return for any increase in symptoms or for any other new or worrisome symptoms.                 Clinical Impression:   Final diagnoses:  [K64.4] External hemorrhoid (Primary)        ED Disposition Condition    Discharge Stable          ED Prescriptions       Medication Sig Dispense Start Date End Date Auth. Provider    docusate sodium (COLACE) 100 MG capsule Take 1 capsule (100 mg  total) by mouth 2 (two) times daily. 60 capsule 1/1/2023 1/31/2023 SULEMA Lazar    hydrocortisone 1 % cream Apply to affected area 2 times daily 30 g 1/1/2023 -- SULEMA Lazar    polyethylene glycol (GLYCOLAX) 17 gram PwPk Take 17 g by mouth once daily. for 7 days 7 packet 1/1/2023 1/8/2023 SULEMA Lazar          Follow-up Information    None          SULEMA Lazar  01/01/23 1216

## 2023-01-05 ENCOUNTER — HOSPITAL ENCOUNTER (EMERGENCY)
Facility: HOSPITAL | Age: 73
Discharge: HOME | End: 2023-01-06
Attending: EMERGENCY MEDICINE
Payer: MEDICARE

## 2023-01-05 DIAGNOSIS — M54.50 ACUTE LEFT-SIDED LOW BACK PAIN, UNSPECIFIED WHETHER SCIATICA PRESENT: Primary | ICD-10-CM

## 2023-01-05 DIAGNOSIS — Z95.0 PRESENCE OF CARDIAC PACEMAKER: Primary | ICD-10-CM

## 2023-01-05 LAB
BASOPHILS # BLD AUTO: 0.03 K/UL (ref 0–0.2)
BASOPHILS NFR BLD AUTO: 0.5 % (ref 0–1)
DIFFERENTIAL METHOD BLD: ABNORMAL
EOSINOPHIL # BLD AUTO: 0.24 K/UL (ref 0–0.5)
EOSINOPHIL NFR BLD AUTO: 3.8 % (ref 1–4)
ERYTHROCYTE [DISTWIDTH] IN BLOOD BY AUTOMATED COUNT: 14.5 % (ref 11.5–14.5)
HCT VFR BLD AUTO: 43.1 % (ref 38–47)
HGB BLD-MCNC: 13.6 G/DL (ref 12–16)
IMM GRANULOCYTES # BLD AUTO: 0.01 K/UL (ref 0–0.04)
IMM GRANULOCYTES NFR BLD: 0.2 % (ref 0–0.4)
LYMPHOCYTES # BLD AUTO: 2.13 K/UL (ref 1–4.8)
LYMPHOCYTES NFR BLD AUTO: 33.6 % (ref 27–41)
MCH RBC QN AUTO: 26 PG (ref 27–31)
MCHC RBC AUTO-ENTMCNC: 31.6 G/DL (ref 32–36)
MCV RBC AUTO: 82.4 FL (ref 80–96)
MONOCYTES # BLD AUTO: 0.4 K/UL (ref 0–0.8)
MONOCYTES NFR BLD AUTO: 6.3 % (ref 2–6)
MPC BLD CALC-MCNC: 10.2 FL (ref 9.4–12.4)
NEUTROPHILS # BLD AUTO: 3.52 K/UL (ref 1.8–7.7)
NEUTROPHILS NFR BLD AUTO: 55.6 % (ref 53–65)
NRBC # BLD AUTO: 0 X10E3/UL
NRBC, AUTO (.00): 0 %
PLATELET # BLD AUTO: 259 K/UL (ref 150–400)
RBC # BLD AUTO: 5.23 M/UL (ref 4.2–5.4)
WBC # BLD AUTO: 6.33 K/UL (ref 4.5–11)

## 2023-01-05 PROCEDURE — 36415 COLL VENOUS BLD VENIPUNCTURE: CPT | Performed by: EMERGENCY MEDICINE

## 2023-01-05 PROCEDURE — 85025 COMPLETE CBC W/AUTO DIFF WBC: CPT | Performed by: EMERGENCY MEDICINE

## 2023-01-05 PROCEDURE — 99284 PR EMERGENCY DEPT VISIT,LEVEL IV: ICD-10-PCS | Mod: ,,, | Performed by: EMERGENCY MEDICINE

## 2023-01-05 PROCEDURE — 82077 ASSAY SPEC XCP UR&BREATH IA: CPT | Performed by: EMERGENCY MEDICINE

## 2023-01-05 PROCEDURE — 99284 EMERGENCY DEPT VISIT MOD MDM: CPT | Mod: ,,, | Performed by: EMERGENCY MEDICINE

## 2023-01-05 PROCEDURE — 99284 EMERGENCY DEPT VISIT MOD MDM: CPT | Mod: 25

## 2023-01-05 PROCEDURE — 80053 COMPREHEN METABOLIC PANEL: CPT | Performed by: EMERGENCY MEDICINE

## 2023-01-06 VITALS
RESPIRATION RATE: 18 BRPM | DIASTOLIC BLOOD PRESSURE: 85 MMHG | TEMPERATURE: 98 F | OXYGEN SATURATION: 95 % | HEART RATE: 92 BPM | SYSTOLIC BLOOD PRESSURE: 178 MMHG | WEIGHT: 222 LBS | BODY MASS INDEX: 38.11 KG/M2

## 2023-01-06 LAB
ALBUMIN SERPL BCP-MCNC: 3.7 G/DL (ref 3.5–5)
ALBUMIN/GLOB SERPL: 0.9 {RATIO}
ALP SERPL-CCNC: 73 U/L (ref 55–142)
ALT SERPL W P-5'-P-CCNC: 12 U/L (ref 13–56)
AMPHET UR QL SCN: NEGATIVE
ANION GAP SERPL CALCULATED.3IONS-SCNC: 15 MMOL/L (ref 7–16)
AST SERPL W P-5'-P-CCNC: 15 U/L (ref 15–37)
BARBITURATES UR QL SCN: NEGATIVE
BENZODIAZ METAB UR QL SCN: NEGATIVE
BILIRUB SERPL-MCNC: 0.5 MG/DL (ref ?–1.2)
BILIRUB UR QL STRIP: NEGATIVE
BUN SERPL-MCNC: 31 MG/DL (ref 7–18)
BUN/CREAT SERPL: 22 (ref 6–20)
CALCIUM SERPL-MCNC: 9.4 MG/DL (ref 8.5–10.1)
CANNABINOIDS UR QL SCN: NEGATIVE
CHLORIDE SERPL-SCNC: 106 MMOL/L (ref 98–107)
CLARITY UR: CLEAR
CO2 SERPL-SCNC: 25 MMOL/L (ref 21–32)
COCAINE UR QL SCN: NEGATIVE
COLOR UR: COLORLESS
CREAT SERPL-MCNC: 1.42 MG/DL (ref 0.55–1.02)
EGFR (NO RACE VARIABLE) (RUSH/TITUS): 39 ML/MIN/1.73M²
ETHANOL, BLOOD (CATEGORY): NOT DETECTED
GLOBULIN SER-MCNC: 4 G/DL (ref 2–4)
GLUCOSE SERPL-MCNC: 102 MG/DL (ref 74–106)
GLUCOSE UR STRIP-MCNC: NORMAL MG/DL
KETONES UR STRIP-SCNC: NORMAL MG/DL
LEUKOCYTE ESTERASE UR QL STRIP: NEGATIVE
NITRITE UR QL STRIP: NEGATIVE
OPIATES UR QL SCN: NEGATIVE
PCP UR QL SCN: NEGATIVE
PH UR STRIP: 6 PH UNITS
POTASSIUM SERPL-SCNC: 3.6 MMOL/L (ref 3.5–5.1)
PROT SERPL-MCNC: 7.7 G/DL (ref 6.4–8.2)
PROT UR QL STRIP: NEGATIVE
RBC # UR STRIP: NEGATIVE /UL
SODIUM SERPL-SCNC: 142 MMOL/L (ref 136–145)
SP GR UR STRIP: 1.01
UROBILINOGEN UR STRIP-ACNC: NORMAL MG/DL

## 2023-01-06 PROCEDURE — 25000003 PHARM REV CODE 250: Performed by: EMERGENCY MEDICINE

## 2023-01-06 PROCEDURE — 81003 URINALYSIS AUTO W/O SCOPE: CPT | Mod: 59 | Performed by: EMERGENCY MEDICINE

## 2023-01-06 PROCEDURE — 96374 THER/PROPH/DIAG INJ IV PUSH: CPT

## 2023-01-06 PROCEDURE — 96375 TX/PRO/DX INJ NEW DRUG ADDON: CPT

## 2023-01-06 PROCEDURE — 63600175 PHARM REV CODE 636 W HCPCS: Performed by: EMERGENCY MEDICINE

## 2023-01-06 PROCEDURE — 80307 DRUG TEST PRSMV CHEM ANLYZR: CPT | Performed by: EMERGENCY MEDICINE

## 2023-01-06 RX ORDER — MORPHINE SULFATE 4 MG/ML
4 INJECTION, SOLUTION INTRAMUSCULAR; INTRAVENOUS
Status: COMPLETED | OUTPATIENT
Start: 2023-01-06 | End: 2023-01-06

## 2023-01-06 RX ORDER — ONDANSETRON 2 MG/ML
4 INJECTION INTRAMUSCULAR; INTRAVENOUS
Status: COMPLETED | OUTPATIENT
Start: 2023-01-06 | End: 2023-01-06

## 2023-01-06 RX ORDER — METHOCARBAMOL 750 MG/1
1500 TABLET, FILM COATED ORAL 3 TIMES DAILY
Qty: 30 TABLET | Refills: 0 | Status: SHIPPED | OUTPATIENT
Start: 2023-01-06 | End: 2023-01-16

## 2023-01-06 RX ADMIN — SODIUM CHLORIDE 1000 ML: 9 INJECTION, SOLUTION INTRAVENOUS at 12:01

## 2023-01-06 RX ADMIN — MORPHINE SULFATE 4 MG: 4 INJECTION, SOLUTION INTRAMUSCULAR; INTRAVENOUS at 12:01

## 2023-01-06 RX ADMIN — ONDANSETRON HYDROCHLORIDE 4 MG: 2 SOLUTION INTRAMUSCULAR; INTRAVENOUS at 12:01

## 2023-01-06 NOTE — ED PROVIDER NOTES
Encounter Date: 1/5/2023    SCRIBE #1 NOTE: I, Romina Melissa, am scribing for, and in the presence of,  Alexei Galindo MD. I have scribed the entire note.     History     Chief Complaint   Patient presents with    Weakness     Ems called for abd pain - smell of weed was bad in house - pt forgot once she was in truck on why she called - now c/o back pain     This is a 73 y/o black female, who presents to the ED via EMS with complaints of back pain which started 2 weeks ago. She states the pain is to the lower back and radiates down the left leg. She denies any recent falls or traumas. She states she has not taken any medications for the pain. She has no other complaints/pain in the ED at this time.  She has a known hx of GERD, HTN, Hyperlipdiemia, and dementia.     The history is provided by the EMS personnel and the patient. No  was used.   Review of patient's allergies indicates:  No Known Allergies  Past Medical History:   Diagnosis Date    Anemia     Anxiety     Dementia     Depression     GERD (gastroesophageal reflux disease)     Hyperlipidemia     Hypertension      History reviewed. No pertinent surgical history.  Family History   Problem Relation Age of Onset    Hypertension Father      Social History     Tobacco Use    Smoking status: Never    Smokeless tobacco: Never   Substance Use Topics    Alcohol use: Not Currently    Drug use: Never     Review of Systems   Musculoskeletal:  Positive for back pain (Lower back pain.).   All other systems reviewed and are negative.    Physical Exam     Initial Vitals [01/05/23 2317]   BP Pulse Resp Temp SpO2   138/67 91 14 98.3 °F (36.8 °C) 98 %      MAP       --         Physical Exam    Nursing note and vitals reviewed.  Constitutional: She appears well-developed and well-nourished.   HENT:   Head: Normocephalic and atraumatic.   Eyes: Conjunctivae and EOM are normal. Pupils are equal, round, and reactive to light.   Neck: Neck supple.   Normal  range of motion.  Cardiovascular:  Normal rate, regular rhythm, normal heart sounds and intact distal pulses.           Pulmonary/Chest: Breath sounds normal.   Abdominal: Abdomen is soft. Bowel sounds are normal.   Musculoskeletal:         General: Normal range of motion.      Cervical back: Normal range of motion and neck supple.     Neurological: She is alert and oriented to person, place, and time. She has normal strength.   Skin: Skin is warm and dry. Capillary refill takes less than 2 seconds.   Psychiatric: She has a normal mood and affect. Thought content normal.       ED Course   Procedures  Labs Reviewed   COMPREHENSIVE METABOLIC PANEL - Abnormal; Notable for the following components:       Result Value    BUN 31 (*)     Creatinine 1.42 (*)     BUN/Creatinine Ratio 22 (*)     ALT 12 (*)     eGFR 39 (*)     All other components within normal limits   CBC WITH DIFFERENTIAL - Abnormal; Notable for the following components:    MCH 26.0 (*)     MCHC 31.6 (*)     Monocytes % 6.3 (*)     All other components within normal limits   DRUG SCREEN, URINE (BEAKER) - Normal    Narrative:     The results of screening tests should be considered presumptive. Confirmatory testing is available upon request.    Cutoff Points:  PCP:         25ng/mL  AMPH:        500ng/mL  JAHAIRA:        200ng/mL  CAIN:        200ng/mL  THC:         50ng/mL  ELEAZAR:         300ng/mL  OPI:         2000ng/mL   CBC W/ AUTO DIFFERENTIAL    Narrative:     The following orders were created for panel order CBC auto differential.  Procedure                               Abnormality         Status                     ---------                               -----------         ------                     CBC with Differential[[id number]]        Abnormal            Final result                 Please view results for these tests on the individual orders.   URINALYSIS, REFLEX TO URINE CULTURE   ALCOHOL,MEDICAL (ETHANOL)   EXTRA TUBES    Narrative:     The  following orders were created for panel order EXTRA TUBES.  Procedure                               Abnormality         Status                     ---------                               -----------         ------                     Light Blue Top Hold[[id number]]                              In process                   Please view results for these tests on the individual orders.   LIGHT BLUE TOP HOLD          Imaging Results              X-Ray Lumbar Spine Ap And Lateral (In process)                      Medications   sodium chloride 0.9% bolus 1,000 mL 1,000 mL (1,000 mLs Intravenous New Bag 1/6/23 0038)   morphine injection 4 mg (4 mg Intravenous Given 1/6/23 0037)   ondansetron injection 4 mg (4 mg Intravenous Given 1/6/23 0037)     Medical Decision Making:   Initial Assessment:   Left lower back pain x 2 weeks.  Pain worse with certain movements and better with certain positions.  Pain moderate.    Differential Diagnosis:   Lumbar strain vs radiculopathy vs other  Clinical Tests:   Lab Tests: Ordered and Reviewed  The following lab test(s) were unremarkable: CBC and CMP  Radiological Study: Ordered and Reviewed  ED Management:  Treat musculoskeletal back pain conservatively with muscle relaxer.  Patient to follow up or return to the emergency department if symptoms persist or worsen or otherwise as needed.            Attending Attestation:           Physician Attestation for Scribe:  Physician Attestation Statement for Scribe #1: I, Alexei Galindo MD, reviewed documentation, as scribed by Romina Melissa in my presence, and it is both accurate and complete.                        Clinical Impression:   Final diagnoses:  [M54.50] Acute left-sided low back pain, unspecified whether sciatica present (Primary)        ED Disposition Condition    Discharge Stable          ED Prescriptions       Medication Sig Dispense Start Date End Date Auth. Provider    methocarbamoL (ROBAXIN) 750 MG Tab Take 2 tablets (1,500  mg total) by mouth 3 (three) times daily. for 10 days 30 tablet 1/6/2023 1/16/2023 Alexei Galindo MD          Follow-up Information       Follow up With Specialties Details Why Contact Info    Eldon Govea MD Family Medicine Schedule an appointment as soon as possible for a visit   [address] [facility]  [facility] [place] [address]  [phone]               Alexei Galindo MD  01/06/23 0107

## 2023-01-06 NOTE — DISCHARGE INSTRUCTIONS
Take robaxin as directed.  Follow up with Dr. Govea.  Return to the emergency department as needed.

## 2023-01-12 ENCOUNTER — HOSPITAL ENCOUNTER (EMERGENCY)
Facility: HOSPITAL | Age: 73
Discharge: HOME | End: 2023-01-12
Payer: MEDICARE

## 2023-01-12 ENCOUNTER — OFFICE VISIT (OUTPATIENT)
Dept: FAMILY MEDICINE | Facility: CLINIC | Age: 73
End: 2023-01-12
Payer: MEDICARE

## 2023-01-12 VITALS
SYSTOLIC BLOOD PRESSURE: 116 MMHG | HEART RATE: 64 BPM | BODY MASS INDEX: 37.9 KG/M2 | WEIGHT: 222 LBS | DIASTOLIC BLOOD PRESSURE: 79 MMHG | OXYGEN SATURATION: 99 % | HEIGHT: 64 IN

## 2023-01-12 VITALS
TEMPERATURE: 98 F | BODY MASS INDEX: 37.9 KG/M2 | HEIGHT: 64 IN | WEIGHT: 222 LBS | HEART RATE: 68 BPM | SYSTOLIC BLOOD PRESSURE: 116 MMHG | OXYGEN SATURATION: 98 % | DIASTOLIC BLOOD PRESSURE: 59 MMHG | RESPIRATION RATE: 10 BRPM

## 2023-01-12 DIAGNOSIS — R56.9 CONVULSIONS, UNSPECIFIED CONVULSION TYPE: Primary | ICD-10-CM

## 2023-01-12 DIAGNOSIS — Z91.148 NON COMPLIANCE W MEDICATION REGIMEN: Primary | ICD-10-CM

## 2023-01-12 DIAGNOSIS — Z86.79 HISTORY OF HEART FAILURE: ICD-10-CM

## 2023-01-12 DIAGNOSIS — Z87.898 HISTORY OF SEIZURES: ICD-10-CM

## 2023-01-12 DIAGNOSIS — R55 SYNCOPE: ICD-10-CM

## 2023-01-12 DIAGNOSIS — N28.9 RENAL INSUFFICIENCY: ICD-10-CM

## 2023-01-12 LAB
AMORPH PHOS CRY #/AREA URNS LPF: ABNORMAL /LPF
AMPHET UR QL SCN: NEGATIVE
ANION GAP SERPL CALCULATED.3IONS-SCNC: 20 MMOL/L (ref 7–16)
ANISOCYTOSIS BLD QL SMEAR: ABNORMAL
BACTERIA #/AREA URNS HPF: ABNORMAL /HPF
BARBITURATES UR QL SCN: NEGATIVE
BASOPHILS # BLD AUTO: 0.03 K/UL (ref 0–0.2)
BASOPHILS NFR BLD AUTO: 0.8 % (ref 0–1)
BENZODIAZ METAB UR QL SCN: NEGATIVE
BILIRUB UR QL STRIP: NEGATIVE
BUN SERPL-MCNC: 24 MG/DL (ref 7–18)
BUN/CREAT SERPL: 15 (ref 6–20)
CALCIUM SERPL-MCNC: 8.9 MG/DL (ref 8.5–10.1)
CANNABINOIDS UR QL SCN: NEGATIVE
CHLORIDE SERPL-SCNC: 97 MMOL/L (ref 98–107)
CLARITY UR: ABNORMAL
CO2 SERPL-SCNC: 23 MMOL/L (ref 21–32)
COCAINE UR QL SCN: NEGATIVE
COLOR UR: YELLOW
CREAT SERPL-MCNC: 1.63 MG/DL (ref 0.55–1.02)
CRENATED CELLS: ABNORMAL
DIFFERENTIAL METHOD BLD: ABNORMAL
EGFR (NO RACE VARIABLE) (RUSH/TITUS): 33 ML/MIN/1.73M²
EOSINOPHIL # BLD AUTO: 0.01 K/UL (ref 0–0.5)
EOSINOPHIL NFR BLD AUTO: 0.3 % (ref 1–4)
ERYTHROCYTE [DISTWIDTH] IN BLOOD BY AUTOMATED COUNT: 14.9 % (ref 11.5–14.5)
GLUCOSE SERPL-MCNC: 117 MG/DL (ref 74–106)
GLUCOSE UR STRIP-MCNC: NORMAL MG/DL
HCT VFR BLD AUTO: 41 % (ref 38–47)
HGB BLD-MCNC: 12.8 G/DL (ref 12–16)
HYALINE CASTS #/AREA URNS LPF: ABNORMAL /LPF
IMM GRANULOCYTES # BLD AUTO: 0.02 K/UL (ref 0–0.04)
IMM GRANULOCYTES NFR BLD: 0.5 % (ref 0–0.4)
KETONES UR STRIP-SCNC: ABNORMAL MG/DL
LEUKOCYTE ESTERASE UR QL STRIP: NEGATIVE
LYMPHOCYTES # BLD AUTO: 0.52 K/UL (ref 1–4.8)
LYMPHOCYTES NFR BLD AUTO: 13.5 % (ref 27–41)
LYMPHOCYTES NFR BLD MANUAL: 6 % (ref 27–41)
MAGNESIUM SERPL-MCNC: 1.8 MG/DL (ref 1.7–2.3)
MCH RBC QN AUTO: 25.5 PG (ref 27–31)
MCHC RBC AUTO-ENTMCNC: 31.2 G/DL (ref 32–36)
MCV RBC AUTO: 81.7 FL (ref 80–96)
MONOCYTES # BLD AUTO: 0.61 K/UL (ref 0–0.8)
MONOCYTES NFR BLD AUTO: 15.9 % (ref 2–6)
MONOCYTES NFR BLD MANUAL: 16 % (ref 2–6)
MPC BLD CALC-MCNC: 10.3 FL (ref 9.4–12.4)
NEUTROPHILS # BLD AUTO: 2.65 K/UL (ref 1.8–7.7)
NEUTROPHILS NFR BLD AUTO: 69 % (ref 53–65)
NEUTS BAND NFR BLD MANUAL: 2 % (ref 1–5)
NEUTS SEG NFR BLD MANUAL: 76 % (ref 50–62)
NITRITE UR QL STRIP: NEGATIVE
NRBC # BLD AUTO: 0 X10E3/UL
NRBC, AUTO (.00): 0 %
OPIATES UR QL SCN: NEGATIVE
PCP UR QL SCN: NEGATIVE
PH UR STRIP: 5 PH UNITS
PLATELET # BLD AUTO: 237 K/UL (ref 150–400)
PLATELET MORPHOLOGY: ABNORMAL
POTASSIUM SERPL-SCNC: 3.8 MMOL/L (ref 3.5–5.1)
PROT UR QL STRIP: 30
RBC # BLD AUTO: 5.02 M/UL (ref 4.2–5.4)
RBC # UR STRIP: NEGATIVE /UL
RBC #/AREA URNS HPF: ABNORMAL /HPF
SODIUM SERPL-SCNC: 136 MMOL/L (ref 136–145)
SP GR UR STRIP: 1.02
SQUAMOUS #/AREA URNS LPF: ABNORMAL /LPF
TROPONIN I SERPL HS-MCNC: 75.9 PG/ML
TROPONIN I SERPL HS-MCNC: 81.8 PG/ML
UROBILINOGEN UR STRIP-ACNC: NORMAL MG/DL
WBC # BLD AUTO: 3.84 K/UL (ref 4.5–11)
WBC #/AREA URNS HPF: ABNORMAL /HPF
YEAST #/AREA URNS HPF: ABNORMAL /HPF

## 2023-01-12 PROCEDURE — 93010 ELECTROCARDIOGRAM REPORT: CPT | Mod: ,,, | Performed by: INTERNAL MEDICINE

## 2023-01-12 PROCEDURE — 80307 DRUG TEST PRSMV CHEM ANLYZR: CPT | Performed by: NURSE PRACTITIONER

## 2023-01-12 PROCEDURE — 84484 ASSAY OF TROPONIN QUANT: CPT | Performed by: NURSE PRACTITIONER

## 2023-01-12 PROCEDURE — 99212 PR OFFICE/OUTPT VISIT, EST, LEVL II, 10-19 MIN: ICD-10-PCS | Mod: ,,, | Performed by: NURSE PRACTITIONER

## 2023-01-12 PROCEDURE — 93010 EKG 12-LEAD: ICD-10-PCS | Mod: ,,, | Performed by: INTERNAL MEDICINE

## 2023-01-12 PROCEDURE — 3078F DIAST BP <80 MM HG: CPT | Mod: ,,, | Performed by: NURSE PRACTITIONER

## 2023-01-12 PROCEDURE — 83735 ASSAY OF MAGNESIUM: CPT | Performed by: NURSE PRACTITIONER

## 2023-01-12 PROCEDURE — 36415 COLL VENOUS BLD VENIPUNCTURE: CPT | Performed by: NURSE PRACTITIONER

## 2023-01-12 PROCEDURE — 1159F MED LIST DOCD IN RCRD: CPT | Mod: ,,, | Performed by: NURSE PRACTITIONER

## 2023-01-12 PROCEDURE — 99285 EMERGENCY DEPT VISIT HI MDM: CPT | Mod: 25

## 2023-01-12 PROCEDURE — 99284 EMERGENCY DEPT VISIT MOD MDM: CPT | Mod: ,,, | Performed by: NURSE PRACTITIONER

## 2023-01-12 PROCEDURE — 3074F SYST BP LT 130 MM HG: CPT | Mod: ,,, | Performed by: NURSE PRACTITIONER

## 2023-01-12 PROCEDURE — 3008F PR BODY MASS INDEX (BMI) DOCUMENTED: ICD-10-PCS | Mod: ,,, | Performed by: NURSE PRACTITIONER

## 2023-01-12 PROCEDURE — 99284 PR EMERGENCY DEPT VISIT,LEVEL IV: ICD-10-PCS | Mod: ,,, | Performed by: NURSE PRACTITIONER

## 2023-01-12 PROCEDURE — 1160F RVW MEDS BY RX/DR IN RCRD: CPT | Mod: ,,, | Performed by: NURSE PRACTITIONER

## 2023-01-12 PROCEDURE — 1160F PR REVIEW ALL MEDS BY PRESCRIBER/CLIN PHARMACIST DOCUMENTED: ICD-10-PCS | Mod: ,,, | Performed by: NURSE PRACTITIONER

## 2023-01-12 PROCEDURE — 80048 BASIC METABOLIC PNL TOTAL CA: CPT | Performed by: NURSE PRACTITIONER

## 2023-01-12 PROCEDURE — 99212 OFFICE O/P EST SF 10 MIN: CPT | Mod: ,,, | Performed by: NURSE PRACTITIONER

## 2023-01-12 PROCEDURE — 3008F BODY MASS INDEX DOCD: CPT | Mod: ,,, | Performed by: NURSE PRACTITIONER

## 2023-01-12 PROCEDURE — 93005 ELECTROCARDIOGRAM TRACING: CPT

## 2023-01-12 PROCEDURE — 3074F PR MOST RECENT SYSTOLIC BLOOD PRESSURE < 130 MM HG: ICD-10-PCS | Mod: ,,, | Performed by: NURSE PRACTITIONER

## 2023-01-12 PROCEDURE — 1159F PR MEDICATION LIST DOCUMENTED IN MEDICAL RECORD: ICD-10-PCS | Mod: ,,, | Performed by: NURSE PRACTITIONER

## 2023-01-12 PROCEDURE — 84484 ASSAY OF TROPONIN QUANT: CPT | Mod: 91 | Performed by: NURSE PRACTITIONER

## 2023-01-12 PROCEDURE — 3078F PR MOST RECENT DIASTOLIC BLOOD PRESSURE < 80 MM HG: ICD-10-PCS | Mod: ,,, | Performed by: NURSE PRACTITIONER

## 2023-01-12 PROCEDURE — 85025 COMPLETE CBC W/AUTO DIFF WBC: CPT | Performed by: NURSE PRACTITIONER

## 2023-01-12 PROCEDURE — 81003 URINALYSIS AUTO W/O SCOPE: CPT | Performed by: NURSE PRACTITIONER

## 2023-01-12 NOTE — PROGRESS NOTES
Subjective:       Patient ID: Renetta Stewart is a 72 y.o. female.    Chief Complaint: Annual Exam (Check-up. Accompanied with son, sone states he would like to get his mother some help to take care of her.)    Ms. Stewart presents to clinic with her son, he is wanting assistance in getting her admitted to a nursing home. He states the family is unable to care for her at home any longer. She has refused home health in the past. I was called into the room after the patient had what was thought to be a seizure, she was sitting in a chair leaning against the wall, VS stable, nonrebreather oxygen mask, incontinence of urine, EMS had been called for transport. The patient was drowsy but opened eyes when I called her name, nursing staff in room with patient for safety.    Review of Systems   Unable to perform ROS  Genitourinary:  Positive for bladder incontinence.       Objective:      Physical Exam  Constitutional:       Appearance: She is overweight. She is ill-appearing and diaphoretic.      Interventions: Face mask in place.   HENT:      Head: Normocephalic.   Cardiovascular:      Rate and Rhythm: Normal rate.   Pulmonary:      Breath sounds: Normal breath sounds.   Neurological:      Mental Status: She is lethargic.      Motor: Weakness present.   Psychiatric:         Behavior: Behavior is uncooperative.       Assessment:       Problem List Items Addressed This Visit    None  Visit Diagnoses       Convulsions, unspecified convulsion type    -  Primary    Renal insufficiency                Plan:         EMS arrived and pt. Transported to ER for further care, will need  to assist with placement.    1335: Was checking in pt approximately 1335, pt in exam rm. Pt began shaking and having seizure-like activity. Dr haji in rm. Pt placed on non-rebreather O2 10L, pt head turned to side. Vitals obtained. 116/79-64-99% respirations even and unlabored. Emergency dispatch called. SULEMA Mendoza at bedside. Pt  drowsy but arousable. Belkis Cortez RN remains with pt for monitoring.   1340: pt switched to 2L nasal cannula. O2 99%. Pt remains drowsy but arousable. Respirations even and unlabored. Family member at pt's side. RN remains in room for monitoring.   1352: EMS personnel arrived. Pt assisted to stretcher per EMS. Report given to EMT.  1358: pt transported to [facility] per ambulance. Family member updated. Questions answered. Family member to follow ambulance to ER.

## 2023-01-12 NOTE — ED PROVIDER NOTES
Encounter Date: 1/12/2023       History     Chief Complaint   Patient presents with    Seizures     72-year-old female presents to the emergency department by EMS after she had a seizure at [facility] in [place] during a scheduled routine follow-up with Dr. Govea today.  Her son reports that she has a history of seizures, refuses to take any medications, refuses to be evaluated by Neurology.  Home Health has been consulted to assist her with her medications, but she refused to let home health into her home.     The history is provided by the patient.   Seizures   This is a chronic problem. Pertinent negatives include no sleepiness, no confusion, no headaches, no speech difficulty, no visual disturbance, no neck stiffness, no sore throat, no chest pain, no cough, no nausea, no vomiting, no diarrhea and no muscle weakness. Characteristics include bladder incontinence, rhythmic jerking and loss of consciousness. Characteristics do not include bowel incontinence or bit tongue. Possible causes do not include medication or dosage change.   Review of patient's allergies indicates:  No Known Allergies  Past Medical History:   Diagnosis Date    Anemia     Anxiety     Dementia     Depression     GERD (gastroesophageal reflux disease)     Hyperlipidemia     Hypertension      No past surgical history on file.  Family History   Problem Relation Age of Onset    Hypertension Father      Social History     Tobacco Use    Smoking status: Never    Smokeless tobacco: Never   Substance Use Topics    Alcohol use: Not Currently    Drug use: Never     Review of Systems   Constitutional:  Negative for chills and fever.   HENT:  Negative for sore throat.    Eyes:  Negative for visual disturbance.   Respiratory:  Negative for cough.    Cardiovascular:  Negative for chest pain.   Gastrointestinal:  Negative for bowel incontinence, diarrhea, nausea and vomiting.   Genitourinary:  Positive for bladder incontinence.    Neurological:  Positive for seizures and loss of consciousness. Negative for dizziness, tremors, facial asymmetry, speech difficulty, weakness, light-headedness, numbness and headaches.   Psychiatric/Behavioral:  Negative for confusion.    All other systems reviewed and are negative.    Physical Exam     Initial Vitals [01/12/23 1444]   BP Pulse Resp Temp SpO2   111/62 67 15 98 °F (36.7 °C) 95 %      MAP       --         Physical Exam    Vitals reviewed.  Constitutional: She appears well-developed and well-nourished.   HENT:   Head: Normocephalic and atraumatic.   Eyes: EOM are normal. Pupils are equal, round, and reactive to light.   Neck: Neck supple.   Cardiovascular:  Normal rate and regular rhythm.           Pulmonary/Chest: Breath sounds normal.   Abdominal: Abdomen is soft. Bowel sounds are normal. She exhibits no distension and no mass. There is no abdominal tenderness. There is no rebound and no guarding.   Musculoskeletal:         General: Normal range of motion.      Cervical back: Normal and neck supple.      Thoracic back: Normal.      Lumbar back: Normal.     Neurological: She is alert and oriented to person, place, and time. She has normal strength. No cranial nerve deficit or sensory deficit. GCS score is 15. GCS eye subscore is 4. GCS verbal subscore is 5. GCS motor subscore is 6.   Skin: Skin is warm and dry. Capillary refill takes less than 2 seconds.   Psychiatric: She has a normal mood and affect.       Medical Screening Exam   See Full Note    ED Course   Procedures  Labs Reviewed   BASIC METABOLIC PANEL - Abnormal; Notable for the following components:       Result Value    Chloride 97 (*)     Anion Gap 20 (*)     Glucose 117 (*)     BUN 24 (*)     Creatinine 1.63 (*)     eGFR 33 (*)     All other components within normal limits   TROPONIN I - Abnormal; Notable for the following components:    Troponin I High Sensitivity 81.8 (*)     All other components within normal limits   URINALYSIS -  Abnormal; Notable for the following components:    Protein, UA 30 (*)     All other components within normal limits   CBC WITH DIFFERENTIAL - Abnormal; Notable for the following components:    WBC 3.84 (*)     MCH 25.5 (*)     MCHC 31.2 (*)     RDW 14.9 (*)     Neutrophils % 69.0 (*)     Lymphocytes % 13.5 (*)     Monocytes % 15.9 (*)     Eosinophils % 0.3 (*)     Immature Granulocytes % 0.5 (*)     Lymphocytes, Absolute 0.52 (*)     All other components within normal limits   URINALYSIS, MICROSCOPIC - Abnormal; Notable for the following components:    Hyaline Casts, UA 0-2 (*)     Amorphous Crystals, UA Few (*)     All other components within normal limits   MANUAL DIFFERENTIAL - Abnormal; Notable for the following components:    Segmented Neutrophils, Man % 76 (*)     Lymphocytes, Man % 6 (*)     Monocytes, Man % 16 (*)     Platelet Morphology Few Large Platelets (*)     All other components within normal limits   TROPONIN I - Abnormal; Notable for the following components:    Troponin I High Sensitivity 75.9 (*)     All other components within normal limits   MAGNESIUM - Normal   DRUG SCREEN, URINE (BEAKER) - Normal    Narrative:     The results of screening tests should be considered presumptive. Confirmatory testing is available upon request.    Cutoff Points:  PCP:         25ng/mL  AMPH:        500ng/mL  JAHAIRA:        200ng/mL  CAIN:        200ng/mL  THC:         50ng/mL  ELEAZAR:         300ng/mL  OPI:         2000ng/mL   CBC W/ AUTO DIFFERENTIAL    Narrative:     The following orders were created for panel order CBC Auto Differential.  Procedure                               Abnormality         Status                     ---------                               -----------         ------                     CBC with Differential[[id number]]        Abnormal            Final result               Manual Differential[[id number]]          Abnormal            Final result                 Please view results for these  tests on the individual orders.        ECG Results              EKG 12-lead (In process)  Result time 01/12/23 16:24:47      In process by Interface, Lab In [facility] (01/12/23 16:24:47)                   Narrative:    Test Reason : R55,    Vent. Rate : 080 BPM     Atrial Rate : 000 BPM     P-R Int : 144 ms          QRS Dur : 132 ms      QT Int : 384 ms       P-R-T Axes : 074 -40 078 degrees     QTc Int : 419 ms    Sinus rhythm  Left axis deviation  IV conduction defect  Poor R wave progression - cannot rule out septal infarct  Left ventricular hypertrophy  Lateral T wave abnormality  may be due to the hypertrophy and/or ischemia  Abnormal ECG      Referred By: [email or web address]   SELF           Confirmed By:                                   Imaging Results              X-Ray Chest 1 View (Final result)  Result time 01/12/23 15:16:45      Final result by Swapnil Arango MD (01/12/23 15:16:45)                   Impression:      No acute findings      Electronically signed by: Swapnil Arango  Date:    01/12/2023  Time:    15:16               Narrative:    EXAMINATION:  XR CHEST 1 VIEW    CLINICAL HISTORY:  Syncope and collapse    TECHNIQUE:  Single frontal view of the chest was performed.    COMPARISON:  08/25/2022    FINDINGS:  Left chest wall cardiac device and cardiomegaly are similar.  The lungs are clear.  No pneumothorax or large pleural effusion.                                       Medications - No data to display  Medical Decision Making:   Differential Diagnosis:   Seizure  Cardiac event    Clinical Tests:   Lab Tests: Ordered  Radiological Study: Ordered  Medical Tests: Ordered and Reviewed  ED Management:  Troponin 81.9 - will trend repeat in 2 hours.   2nd troponin 75.9  Creatinine 1.63    Assumed care of patient from SULEMA Argueta at 1600.  Awaiting lab results.                   Clinical Impression:   Final diagnoses:  [R55] Syncope  [Z91.14] Non compliance w medication regimen (Primary)  [Z87.898] History of  seizures  [Z86.79] History of heart failure        ED Disposition Condition    Discharge Stable          ED Prescriptions    None       Follow-up Information       Follow up With Specialties Details Why Contact Info    Eldon Govea MD Family Medicine Schedule an appointment as soon as possible for a visit   [address]  [facility] [address]  [phone]               SULEMA Reyes  01/13/23 0030       SULEMA Reyes  01/13/23 0030

## 2023-01-13 NOTE — ED NOTES
Received report from PROMISE English.  Patient lying in bed and awake.  Family at bedside and concerned about patient status.

## 2023-01-13 NOTE — DISCHARGE INSTRUCTIONS
Take medications as previously prescribed.   Follow up with Dr Govea in 24 hours for recheck.   Return to ER with new or worsening symptoms.

## 2023-01-19 ENCOUNTER — HOSPITAL ENCOUNTER (OUTPATIENT)
Dept: CARDIOLOGY | Facility: HOSPITAL | Age: 73
Discharge: HOME | End: 2023-01-19
Attending: INTERNAL MEDICINE
Payer: MEDICARE

## 2023-01-19 DIAGNOSIS — Z95.0 PRESENCE OF CARDIAC PACEMAKER: ICD-10-CM

## 2023-01-19 DIAGNOSIS — Z95.0 PRESENCE OF CARDIAC PACEMAKER: Primary | ICD-10-CM

## 2023-01-19 PROCEDURE — 93280 PM DEVICE PROGR EVAL DUAL: CPT

## 2023-01-19 PROCEDURE — 93280 PM DEVICE PROGR EVAL DUAL: CPT | Mod: 26,,, | Performed by: INTERNAL MEDICINE

## 2023-01-19 PROCEDURE — 93280 CARDIAC DEVICE CHECK - IN CLINIC & HOSPITAL: ICD-10-PCS | Mod: 26,,, | Performed by: INTERNAL MEDICINE

## 2023-01-24 DIAGNOSIS — R56.9 SEIZURE: Primary | ICD-10-CM

## 2023-01-24 LAB
BATTERY VOLTAGE (V): 3.01 V
ERI (V): 2.63 V
OHS CV DC PP MS1: 0.4 MS
OHS CV DC PP MS2: 0.4 MS
OHS CV DC PP V1: 1.5 V
OHS CV DC PP V2: 2 V

## 2023-01-24 RX ORDER — LOSARTAN POTASSIUM 100 MG/1
100 TABLET ORAL DAILY
Qty: 90 TABLET | Refills: 1 | Status: SHIPPED | OUTPATIENT
Start: 2023-01-24 | End: 2023-02-23

## 2023-01-24 RX ORDER — SERTRALINE HYDROCHLORIDE 50 MG/1
50 TABLET, FILM COATED ORAL DAILY
Qty: 90 TABLET | Refills: 1 | Status: SHIPPED | OUTPATIENT
Start: 2023-01-24 | End: 2023-02-23

## 2023-01-24 RX ORDER — LEVOTHYROXINE SODIUM 50 UG/1
50 TABLET ORAL DAILY
Qty: 90 TABLET | Refills: 1 | Status: SHIPPED | OUTPATIENT
Start: 2023-01-24 | End: 2023-02-16 | Stop reason: SDUPTHER

## 2023-01-24 RX ORDER — FUROSEMIDE 20 MG/1
20 TABLET ORAL DAILY
Qty: 90 TABLET | Refills: 1 | Status: SHIPPED | OUTPATIENT
Start: 2023-01-24 | End: 2023-02-16 | Stop reason: SDUPTHER

## 2023-01-24 RX ORDER — MEMANTINE HYDROCHLORIDE 10 MG/1
10 TABLET ORAL NIGHTLY
Qty: 90 TABLET | Refills: 1 | Status: SHIPPED | OUTPATIENT
Start: 2023-01-24 | End: 2023-02-23

## 2023-01-24 RX ORDER — MIRTAZAPINE 15 MG/1
15 TABLET, FILM COATED ORAL NIGHTLY
Qty: 90 TABLET | Refills: 1 | Status: SHIPPED | OUTPATIENT
Start: 2023-01-24 | End: 2023-02-16 | Stop reason: SDUPTHER

## 2023-01-24 RX ORDER — HYDRALAZINE HYDROCHLORIDE 100 MG/1
100 TABLET, FILM COATED ORAL 3 TIMES DAILY
Qty: 270 TABLET | Refills: 1 | Status: SHIPPED | OUTPATIENT
Start: 2023-01-24 | End: 2023-02-16

## 2023-01-24 NOTE — TELEPHONE ENCOUNTER
Pt daughter requests refill on all med be sent to [facility] on [address]. Daughter also requested a neuro consult referral due to pt continuing to have seizures. Appt to see  on 01/31/2023 @ 9:45am. Pt daughter notified and voiced understanding.

## 2023-01-24 NOTE — TELEPHONE ENCOUNTER
----- Message from Brianna Juarez MA sent at 1/20/2023 10:42 AM CST -----  Please give daughter a call back at  concerning pt.

## 2023-01-31 ENCOUNTER — OFFICE VISIT (OUTPATIENT)
Dept: NEUROLOGY | Facility: CLINIC | Age: 73
End: 2023-01-31
Payer: MEDICARE

## 2023-01-31 VITALS
HEART RATE: 78 BPM | DIASTOLIC BLOOD PRESSURE: 98 MMHG | WEIGHT: 233 LBS | HEIGHT: 61 IN | OXYGEN SATURATION: 100 % | BODY MASS INDEX: 43.99 KG/M2 | SYSTOLIC BLOOD PRESSURE: 162 MMHG

## 2023-01-31 DIAGNOSIS — R56.9 SEIZURE: Primary | ICD-10-CM

## 2023-01-31 PROCEDURE — 3288F FALL RISK ASSESSMENT DOCD: CPT | Mod: CPTII,,, | Performed by: PSYCHIATRY & NEUROLOGY

## 2023-01-31 PROCEDURE — 1159F MED LIST DOCD IN RCRD: CPT | Mod: CPTII,,, | Performed by: PSYCHIATRY & NEUROLOGY

## 2023-01-31 PROCEDURE — 3077F SYST BP >= 140 MM HG: CPT | Mod: CPTII,,, | Performed by: PSYCHIATRY & NEUROLOGY

## 2023-01-31 PROCEDURE — 99215 OFFICE O/P EST HI 40 MIN: CPT | Mod: PBBFAC | Performed by: PSYCHIATRY & NEUROLOGY

## 2023-01-31 PROCEDURE — 3080F PR MOST RECENT DIASTOLIC BLOOD PRESSURE >= 90 MM HG: ICD-10-PCS | Mod: CPTII,,, | Performed by: PSYCHIATRY & NEUROLOGY

## 2023-01-31 PROCEDURE — 3008F PR BODY MASS INDEX (BMI) DOCUMENTED: ICD-10-PCS | Mod: CPTII,,, | Performed by: PSYCHIATRY & NEUROLOGY

## 2023-01-31 PROCEDURE — 3288F PR FALLS RISK ASSESSMENT DOCUMENTED: ICD-10-PCS | Mod: CPTII,,, | Performed by: PSYCHIATRY & NEUROLOGY

## 2023-01-31 PROCEDURE — 3008F BODY MASS INDEX DOCD: CPT | Mod: CPTII,,, | Performed by: PSYCHIATRY & NEUROLOGY

## 2023-01-31 PROCEDURE — 1159F PR MEDICATION LIST DOCUMENTED IN MEDICAL RECORD: ICD-10-PCS | Mod: CPTII,,, | Performed by: PSYCHIATRY & NEUROLOGY

## 2023-01-31 PROCEDURE — 3077F PR MOST RECENT SYSTOLIC BLOOD PRESSURE >= 140 MM HG: ICD-10-PCS | Mod: CPTII,,, | Performed by: PSYCHIATRY & NEUROLOGY

## 2023-01-31 PROCEDURE — 4010F ACE/ARB THERAPY RXD/TAKEN: CPT | Mod: CPTII,,, | Performed by: PSYCHIATRY & NEUROLOGY

## 2023-01-31 PROCEDURE — 1125F AMNT PAIN NOTED PAIN PRSNT: CPT | Mod: CPTII,,, | Performed by: PSYCHIATRY & NEUROLOGY

## 2023-01-31 PROCEDURE — 99204 PR OFFICE/OUTPT VISIT, NEW, LEVL IV, 45-59 MIN: ICD-10-PCS | Mod: S$PBB,,, | Performed by: PSYCHIATRY & NEUROLOGY

## 2023-01-31 PROCEDURE — 1101F PR PT FALLS ASSESS DOC 0-1 FALLS W/OUT INJ PAST YR: ICD-10-PCS | Mod: CPTII,,, | Performed by: PSYCHIATRY & NEUROLOGY

## 2023-01-31 PROCEDURE — 3080F DIAST BP >= 90 MM HG: CPT | Mod: CPTII,,, | Performed by: PSYCHIATRY & NEUROLOGY

## 2023-01-31 PROCEDURE — 4010F PR ACE/ARB THEARPY RXD/TAKEN: ICD-10-PCS | Mod: CPTII,,, | Performed by: PSYCHIATRY & NEUROLOGY

## 2023-01-31 PROCEDURE — 1125F PR PAIN SEVERITY QUANTIFIED, PAIN PRESENT: ICD-10-PCS | Mod: CPTII,,, | Performed by: PSYCHIATRY & NEUROLOGY

## 2023-01-31 PROCEDURE — 99204 OFFICE O/P NEW MOD 45 MIN: CPT | Mod: S$PBB,,, | Performed by: PSYCHIATRY & NEUROLOGY

## 2023-01-31 PROCEDURE — 1101F PT FALLS ASSESS-DOCD LE1/YR: CPT | Mod: CPTII,,, | Performed by: PSYCHIATRY & NEUROLOGY

## 2023-01-31 RX ORDER — LEVETIRACETAM 500 MG/1
500 TABLET ORAL 2 TIMES DAILY
Qty: 180 TABLET | Refills: 3 | Status: SHIPPED | OUTPATIENT
Start: 2023-01-31 | End: 2023-02-16 | Stop reason: SDUPTHER
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
## 2023-01-31 NOTE — PATIENT INSTRUCTIONS
Will begin Keppra 500 mg twice daily   Extended EEG   Excellent support from son Bryce- krystle he gain POA legally

## 2023-02-09 ENCOUNTER — PROCEDURE VISIT (OUTPATIENT)
Dept: NEUROLOGY | Facility: HOSPITAL | Age: 73
End: 2023-02-09
Attending: PSYCHIATRY & NEUROLOGY
Payer: MEDICARE

## 2023-02-09 DIAGNOSIS — R56.9 SEIZURE: ICD-10-CM

## 2023-02-09 PROCEDURE — 95813 EEG EXTND MNTR 61-119 MIN: CPT | Mod: 26,,, | Performed by: PSYCHIATRY & NEUROLOGY

## 2023-02-09 PROCEDURE — 95813 PR EEG, EXTENDED, 61-119 MINS: ICD-10-PCS | Mod: 26,,, | Performed by: PSYCHIATRY & NEUROLOGY

## 2023-02-09 PROCEDURE — 95813 EEG EXTND MNTR 61-119 MIN: CPT

## 2023-02-09 RX ORDER — OMEPRAZOLE 20 MG/1
20 CAPSULE, DELAYED RELEASE ORAL DAILY
COMMUNITY
End: 2023-02-09 | Stop reason: SDUPTHER

## 2023-02-09 RX ORDER — OMEPRAZOLE 20 MG/1
20 CAPSULE, DELAYED RELEASE ORAL DAILY
Qty: 90 CAPSULE | Refills: 1 | Status: SHIPPED | OUTPATIENT
Start: 2023-02-09 | End: 2023-02-16 | Stop reason: SDUPTHER

## 2023-02-16 ENCOUNTER — TELEPHONE (OUTPATIENT)
Dept: NEUROLOGY | Facility: CLINIC | Age: 73
End: 2023-02-16
Payer: MEDICARE

## 2023-02-16 ENCOUNTER — OFFICE VISIT (OUTPATIENT)
Dept: FAMILY MEDICINE | Facility: CLINIC | Age: 73
End: 2023-02-16
Payer: MEDICARE

## 2023-02-16 VITALS
OXYGEN SATURATION: 98 % | WEIGHT: 201 LBS | SYSTOLIC BLOOD PRESSURE: 146 MMHG | TEMPERATURE: 99 F | BODY MASS INDEX: 37.95 KG/M2 | DIASTOLIC BLOOD PRESSURE: 87 MMHG | HEIGHT: 61 IN | RESPIRATION RATE: 16 BRPM | HEART RATE: 99 BPM

## 2023-02-16 DIAGNOSIS — F32.5 MAJOR DEPRESSIVE DISORDER IN REMISSION, UNSPECIFIED WHETHER RECURRENT: ICD-10-CM

## 2023-02-16 DIAGNOSIS — E03.9 ACQUIRED HYPOTHYROIDISM: ICD-10-CM

## 2023-02-16 DIAGNOSIS — I48.0 PAROXYSMAL ATRIAL FIBRILLATION: ICD-10-CM

## 2023-02-16 DIAGNOSIS — I10 ESSENTIAL HYPERTENSION: ICD-10-CM

## 2023-02-16 DIAGNOSIS — K21.9 GASTROESOPHAGEAL REFLUX DISEASE WITHOUT ESOPHAGITIS: ICD-10-CM

## 2023-02-16 DIAGNOSIS — R60.0 LOWER EXTREMITY EDEMA: ICD-10-CM

## 2023-02-16 DIAGNOSIS — K29.00 OTHER ACUTE GASTRITIS WITHOUT HEMORRHAGE: Primary | ICD-10-CM

## 2023-02-16 DIAGNOSIS — F51.01 PRIMARY INSOMNIA: ICD-10-CM

## 2023-02-16 LAB
ANION GAP SERPL CALCULATED.3IONS-SCNC: 15 MMOL/L (ref 7–16)
BUN SERPL-MCNC: 24 MG/DL (ref 7–18)
BUN/CREAT SERPL: 15 (ref 6–20)
CALCIUM SERPL-MCNC: 10.1 MG/DL (ref 8.5–10.1)
CHLORIDE SERPL-SCNC: 105 MMOL/L (ref 98–107)
CO2 SERPL-SCNC: 24 MMOL/L (ref 21–32)
CREAT SERPL-MCNC: 1.58 MG/DL (ref 0.55–1.02)
EGFR (NO RACE VARIABLE) (RUSH/TITUS): 34 ML/MIN/1.73M²
GLUCOSE SERPL-MCNC: 90 MG/DL (ref 74–106)
NT-PROBNP SERPL-MCNC: 1129 PG/ML (ref 1–125)
POTASSIUM SERPL-SCNC: 4.1 MMOL/L (ref 3.5–5.1)
SODIUM SERPL-SCNC: 140 MMOL/L (ref 136–145)

## 2023-02-16 PROCEDURE — 99213 PR OFFICE/OUTPT VISIT, EST, LEVL III, 20-29 MIN: ICD-10-PCS | Mod: ,,, | Performed by: NURSE PRACTITIONER

## 2023-02-16 PROCEDURE — 3008F BODY MASS INDEX DOCD: CPT | Mod: ,,, | Performed by: NURSE PRACTITIONER

## 2023-02-16 PROCEDURE — 4010F ACE/ARB THERAPY RXD/TAKEN: CPT | Mod: ,,, | Performed by: NURSE PRACTITIONER

## 2023-02-16 PROCEDURE — 99213 OFFICE O/P EST LOW 20 MIN: CPT | Mod: ,,, | Performed by: NURSE PRACTITIONER

## 2023-02-16 PROCEDURE — 1101F PT FALLS ASSESS-DOCD LE1/YR: CPT | Mod: ,,, | Performed by: NURSE PRACTITIONER

## 2023-02-16 PROCEDURE — 1160F RVW MEDS BY RX/DR IN RCRD: CPT | Mod: ,,, | Performed by: NURSE PRACTITIONER

## 2023-02-16 PROCEDURE — 3288F PR FALLS RISK ASSESSMENT DOCUMENTED: ICD-10-PCS | Mod: ,,, | Performed by: NURSE PRACTITIONER

## 2023-02-16 PROCEDURE — 1159F MED LIST DOCD IN RCRD: CPT | Mod: ,,, | Performed by: NURSE PRACTITIONER

## 2023-02-16 PROCEDURE — 36415 PR COLLECTION VENOUS BLOOD,VENIPUNCTURE: ICD-10-PCS | Mod: ,,, | Performed by: CLINICAL MEDICAL LABORATORY

## 2023-02-16 PROCEDURE — 1101F PR PT FALLS ASSESS DOC 0-1 FALLS W/OUT INJ PAST YR: ICD-10-PCS | Mod: ,,, | Performed by: NURSE PRACTITIONER

## 2023-02-16 PROCEDURE — 3079F DIAST BP 80-89 MM HG: CPT | Mod: ,,, | Performed by: NURSE PRACTITIONER

## 2023-02-16 PROCEDURE — 83880 NT-PRO NATRIURETIC PEPTIDE: ICD-10-PCS | Mod: ,,, | Performed by: CLINICAL MEDICAL LABORATORY

## 2023-02-16 PROCEDURE — 1160F PR REVIEW ALL MEDS BY PRESCRIBER/CLIN PHARMACIST DOCUMENTED: ICD-10-PCS | Mod: ,,, | Performed by: NURSE PRACTITIONER

## 2023-02-16 PROCEDURE — 80048 BASIC METABOLIC PANEL: ICD-10-PCS | Mod: ,,, | Performed by: CLINICAL MEDICAL LABORATORY

## 2023-02-16 PROCEDURE — 1159F PR MEDICATION LIST DOCUMENTED IN MEDICAL RECORD: ICD-10-PCS | Mod: ,,, | Performed by: NURSE PRACTITIONER

## 2023-02-16 PROCEDURE — 3079F PR MOST RECENT DIASTOLIC BLOOD PRESSURE 80-89 MM HG: ICD-10-PCS | Mod: ,,, | Performed by: NURSE PRACTITIONER

## 2023-02-16 PROCEDURE — 3008F PR BODY MASS INDEX (BMI) DOCUMENTED: ICD-10-PCS | Mod: ,,, | Performed by: NURSE PRACTITIONER

## 2023-02-16 PROCEDURE — 3288F FALL RISK ASSESSMENT DOCD: CPT | Mod: ,,, | Performed by: NURSE PRACTITIONER

## 2023-02-16 PROCEDURE — 3077F PR MOST RECENT SYSTOLIC BLOOD PRESSURE >= 140 MM HG: ICD-10-PCS | Mod: ,,, | Performed by: NURSE PRACTITIONER

## 2023-02-16 PROCEDURE — 83880 ASSAY OF NATRIURETIC PEPTIDE: CPT | Mod: ,,, | Performed by: CLINICAL MEDICAL LABORATORY

## 2023-02-16 PROCEDURE — 3077F SYST BP >= 140 MM HG: CPT | Mod: ,,, | Performed by: NURSE PRACTITIONER

## 2023-02-16 PROCEDURE — 36415 COLL VENOUS BLD VENIPUNCTURE: CPT | Mod: ,,, | Performed by: CLINICAL MEDICAL LABORATORY

## 2023-02-16 PROCEDURE — 1125F AMNT PAIN NOTED PAIN PRSNT: CPT | Mod: ,,, | Performed by: NURSE PRACTITIONER

## 2023-02-16 PROCEDURE — 4010F PR ACE/ARB THEARPY RXD/TAKEN: ICD-10-PCS | Mod: ,,, | Performed by: NURSE PRACTITIONER

## 2023-02-16 PROCEDURE — 80048 BASIC METABOLIC PNL TOTAL CA: CPT | Mod: ,,, | Performed by: CLINICAL MEDICAL LABORATORY

## 2023-02-16 PROCEDURE — 1125F PR PAIN SEVERITY QUANTIFIED, PAIN PRESENT: ICD-10-PCS | Mod: ,,, | Performed by: NURSE PRACTITIONER

## 2023-02-16 RX ORDER — DILTIAZEM HYDROCHLORIDE 240 MG/1
240 CAPSULE, COATED, EXTENDED RELEASE ORAL DAILY
Qty: 30 CAPSULE | Refills: 1 | Status: SHIPPED | OUTPATIENT
Start: 2023-02-16 | End: 2023-03-28

## 2023-02-16 RX ORDER — LEVETIRACETAM 500 MG/1
500 TABLET ORAL 2 TIMES DAILY
Qty: 60 TABLET | Refills: 1 | Status: SHIPPED | OUTPATIENT
Start: 2023-02-16 | End: 2023-03-28

## 2023-02-16 RX ORDER — OMEPRAZOLE 20 MG/1
20 CAPSULE, DELAYED RELEASE ORAL 2 TIMES DAILY
Qty: 60 CAPSULE | Refills: 1 | Status: SHIPPED | OUTPATIENT
Start: 2023-02-16 | End: 2023-02-23

## 2023-02-16 RX ORDER — FUROSEMIDE 20 MG/1
20 TABLET ORAL DAILY
Qty: 90 TABLET | Refills: 1 | Status: SHIPPED | OUTPATIENT
Start: 2023-02-16 | End: 2023-03-28

## 2023-02-16 RX ORDER — LEVOTHYROXINE SODIUM 50 UG/1
50 TABLET ORAL DAILY
Qty: 90 TABLET | Refills: 1 | Status: SHIPPED | OUTPATIENT
Start: 2023-02-16 | End: 2023-06-13

## 2023-02-16 RX ORDER — MIRTAZAPINE 15 MG/1
15 TABLET, FILM COATED ORAL NIGHTLY
Qty: 30 TABLET | Refills: 1 | Status: SHIPPED | OUTPATIENT
Start: 2023-02-16 | End: 2023-02-27 | Stop reason: SDUPTHER

## 2023-02-16 NOTE — ASSESSMENT & PLAN NOTE
Restart Cardizem CD 240mg QD, she has been off of her medications, tachycardic, reports dyspnea on exertion.

## 2023-02-16 NOTE — PROGRESS NOTES
SULEMA Sun        PATIENT NAME: Renetta Stewart  : 1950  DATE: 23  MRN: [id number]      Billing Provider: SULEMA Sun  Level of Service:   Patient PCP Information       Provider PCP Type    Eldon Govea MD General            Reason for Visit / Chief Complaint: Anorexia (Loss of appetite, Refuses to eat. Losing weight. Daughter reports pt is not taking her meds right now, pt says they make her stomach hurt. ) and Nasal Drainage (Pt wakes up at night , feels like shes choking and cant spit it up. Pt is not sleeping well.)         History of Present Illness / Problem Focused Workflow     Renetta Stewart presents to the clinic with Anorexia (Loss of appetite, Refuses to eat. Losing weight. Daughter reports pt is not taking her meds right now, pt says they make her stomach hurt. ) and Nasal Drainage (Pt wakes up at night , feels like shes choking and cant spit it up. Pt is not sleeping well.)     Ms. Stewart presents to clinic with her daughter, at her last visit she was transported to ER for convulsions with syncope (appeared to be seizure like activity) - since that time she had neurology evaluation with EEG. The daughter reports she is not taking any medications including keppra since that time. She states she has lost weight and in the last week she has had decreased appetite due to stomach burning when she eats or takes medications. She denies constipation, diarrhea, nausea or vomiting, no long taking omeprazole, daughter states she his out of multiple medications. Repeat BMP and BNP today, she reports dyspnea on exertion and often feels weak with exertion.    Cscope 2019 severe diverticulosis with hemorrhoids, repeat 10 years.    Abdominal Pain  This is a new problem. The current episode started in the past 7 days. The onset quality is gradual. The problem occurs 2 to 4 times per day. The problem has been unchanged. The pain is located in the generalized abdominal region and  epigastric region. The pain is at a severity of 0/10. The patient is experiencing no pain. The quality of the pain is burning and cramping. The abdominal pain radiates to the epigastric region. Associated symptoms include anorexia and weight loss. Pertinent negatives include no arthralgias, belching, constipation, diarrhea, dysuria, fever, flatus, frequency, headaches, hematochezia, hematuria, melena, myalgias, nausea or vomiting. The pain is aggravated by eating. The pain is relieved by Nothing. She has tried antacids for the symptoms. The treatment provided mild relief.     Review of Systems     Review of Systems   Constitutional:  Positive for activity change, appetite change, unexpected weight change and weight loss. Negative for fever.   Respiratory:  Positive for cough (coughing up phlegm but not getting any up) and shortness of breath (on exertion).    Cardiovascular:  Negative for chest pain, palpitations and leg swelling.   Gastrointestinal:  Positive for abdominal pain and anorexia. Negative for constipation, diarrhea, flatus, hematochezia, melena, nausea, vomiting and reflux.   Genitourinary:  Negative for dysuria, frequency and hematuria.   Musculoskeletal:  Positive for back pain. Negative for arthralgias and myalgias.   Neurological:  Positive for weakness. Negative for dizziness, light-headedness and headaches.   Psychiatric/Behavioral:  Negative for dysphoric mood. The patient is not nervous/anxious.       Medical / Social / Family History     Past Medical History:   Diagnosis Date    Anemia     Anxiety     Dementia     Depression     GERD (gastroesophageal reflux disease)     Hyperlipidemia     Hypertension        History reviewed. No pertinent surgical history.    Social History  Ms. Renetta Stewart   reports that she has never smoked. She has never used smokeless tobacco. She reports that she does not currently use alcohol. She reports that she does not use drugs.    Family History  Ms.'s Renetta GUNN  Pat  family history includes Hypertension in her father.    Medications and Allergies     Medications  No outpatient medications have been marked as taking for the 2/16/23 encounter (Office Visit) with SULEMA Sun.       Allergies  Review of patient's allergies indicates:  No Known Allergies      Vitals:    02/16/23 1322   BP: (!) 146/87   Pulse: 99   Resp:    Temp:      Physical Exam  Vitals and nursing note reviewed.   Constitutional:       Appearance: Normal appearance. She is obese.   HENT:      Head: Normocephalic.      Right Ear: Tympanic membrane normal.      Left Ear: Tympanic membrane normal.      Nose: Nose normal. No congestion.      Right Sinus: No maxillary sinus tenderness or frontal sinus tenderness.      Left Sinus: No maxillary sinus tenderness or frontal sinus tenderness.      Mouth/Throat:      Mouth: Mucous membranes are moist.      Pharynx: No oropharyngeal exudate or posterior oropharyngeal erythema.   Cardiovascular:      Rate and Rhythm: Regular rhythm. Tachycardia present.      Pulses: Normal pulses.      Heart sounds: Normal heart sounds.   Pulmonary:      Effort: Pulmonary effort is normal.      Breath sounds: Normal breath sounds.   Abdominal:      General: Bowel sounds are normal.      Palpations: Abdomen is soft.   Musculoskeletal:         General: Normal range of motion.      Cervical back: Normal range of motion.      Right lower leg: No edema.      Left lower leg: No edema.   Skin:     General: Skin is warm and dry.   Neurological:      General: No focal deficit present.      Mental Status: She is alert and oriented to person, place, and time.   Psychiatric:         Mood and Affect: Mood normal.         Behavior: Behavior normal.          Lab Results   Component Value Date    WBC 3.84 (L) 01/12/2023    HGB 12.8 01/12/2023    HCT 41.0 01/12/2023    MCV 81.7 01/12/2023     01/12/2023          Sodium   Date Value Ref Range Status   01/12/2023 136 136 - 145 mmol/L  Final     Potassium   Date Value Ref Range Status   01/12/2023 3.8 3.5 - 5.1 mmol/L Final     Chloride   Date Value Ref Range Status   01/12/2023 97 (L) 98 - 107 mmol/L Final     CO2   Date Value Ref Range Status   01/12/2023 23 21 - 32 mmol/L Final     Glucose   Date Value Ref Range Status   01/12/2023 117 (H) 74 - 106 mg/dL Final     BUN   Date Value Ref Range Status   01/12/2023 24 (H) 7 - 18 mg/dL Final     Creatinine   Date Value Ref Range Status   01/12/2023 1.63 (H) 0.55 - 1.02 mg/dL Final     Calcium   Date Value Ref Range Status   01/12/2023 8.9 8.5 - 10.1 mg/dL Final     Total Protein   Date Value Ref Range Status   01/05/2023 7.7 6.4 - 8.2 g/dL Final     Albumin   Date Value Ref Range Status   01/05/2023 3.7 3.5 - 5.0 g/dL Final     Bilirubin, Total   Date Value Ref Range Status   01/05/2023 0.5 >0.0 - 1.2 mg/dL Final     Alk Phos   Date Value Ref Range Status   01/05/2023 73 55 - 142 U/L Final     AST   Date Value Ref Range Status   01/05/2023 15 15 - 37 U/L Final     ALT   Date Value Ref Range Status   01/05/2023 12 (L) 13 - 56 U/L Final     Anion Gap   Date Value Ref Range Status   01/12/2023 20 (H) 7 - 16 mmol/L Final     eGFR   Date Value Ref Range Status   01/12/2023 33 (L) >=60 mL/min/1.73m² Final        Lab Results   Component Value Date    CHOL 131 02/14/2022    CHOL 133 06/08/2021    CHOL 163 10/09/2020     Lab Results   Component Value Date    HDL 78 (H) 02/14/2022    HDL 74 (H) 06/08/2021    HDL 90 10/09/2020     Lab Results   Component Value Date    LDLCALC 37 02/14/2022    LDLCALC 47 06/08/2021    LDLCALC 67 10/09/2020     Lab Results   Component Value Date    TRIG 78 02/14/2022    TRIG 61 06/08/2021    TRIG 31 10/09/2020     Lab Results   Component Value Date    CHOLHDL 1.7 02/14/2022    CHOLHDL 1.8 06/08/2021    CHOLHDL 1.8 10/09/2020        No results found for: LABA1C, HGBA1C     Lab Results   Component Value Date    TSH 2.900 09/06/2022    FREET4 1.45 09/06/2022            Health  Maintenance Due   Topic Date Due    Hepatitis C Screening  Never done    Mammogram  Never done    DEXA Scan  Never done       Problem List Items Addressed This Visit          Psychiatric    Depression    Current Assessment & Plan     She reports mood is stable, not currently taking zoloft, hold for now.            Cardiac/Vascular    Essential hypertension    Current Assessment & Plan     Hold medication for now, she is not taking and it is 146/87 today.          Atrial fibrillation    Current Assessment & Plan     Restart Cardizem CD 240mg QD, she has been off of her medications, tachycardic, reports dyspnea on exertion.         Relevant Orders    Basic Metabolic Panel    NT-Pro Natriuretic Peptide       Endocrine    Acquired hypothyroidism    Current Assessment & Plan     Restart levothyroxine              GI    Gastroesophageal reflux disease without esophagitis    Current Assessment & Plan     Restart omeprazole 20mg BID, may take zantac or tums as it will take 3 days to start working.            Other    Lower extremity edema    Current Assessment & Plan     Restart lasix 20mg QD, BMP and BNP today.         Primary insomnia    Current Assessment & Plan     Restart remeron at bedtime.          Other Visit Diagnoses       Other acute gastritis without hemorrhage    -  Primary              Health Maintenance Topics with due status: Not Due       Topic Last Completion Date    Colorectal Cancer Screening 06/18/2019    Lipid Panel 02/14/2022       Future Appointments   Date Time Provider Department Center   5/4/2023  9:45 AM Aamir Mejia MD [facility]   7/20/2023  9:00 AM [facility]US [facility] CV DEVICE CHECK [facility]        There are no Patient Instructions on file for this visit.  No follow-ups on file.       Date of encounter: 2/16/23

## 2023-02-21 ENCOUNTER — TELEPHONE (OUTPATIENT)
Dept: FAMILY MEDICINE | Facility: CLINIC | Age: 73
End: 2023-02-21
Payer: MEDICARE

## 2023-02-21 NOTE — TELEPHONE ENCOUNTER
----- Message from SULEMA Sun sent at 2/20/2023  8:39 AM CST -----  BNP elevated. Pt. Instructed to restart medications she was not taking - this included diuretic, keep appt. To discuss renal function and BNP.

## 2023-02-23 ENCOUNTER — OFFICE VISIT (OUTPATIENT)
Dept: FAMILY MEDICINE | Facility: CLINIC | Age: 73
End: 2023-02-23
Payer: MEDICARE

## 2023-02-23 ENCOUNTER — APPOINTMENT (OUTPATIENT)
Dept: RADIOLOGY | Facility: CLINIC | Age: 73
End: 2023-02-23
Attending: NURSE PRACTITIONER
Payer: MEDICARE

## 2023-02-23 VITALS
SYSTOLIC BLOOD PRESSURE: 124 MMHG | OXYGEN SATURATION: 97 % | HEIGHT: 61 IN | RESPIRATION RATE: 19 BRPM | DIASTOLIC BLOOD PRESSURE: 86 MMHG | WEIGHT: 196 LBS | HEART RATE: 100 BPM | BODY MASS INDEX: 37 KG/M2 | TEMPERATURE: 98 F

## 2023-02-23 DIAGNOSIS — R10.84 GENERALIZED ABDOMINAL PAIN: Primary | ICD-10-CM

## 2023-02-23 DIAGNOSIS — R63.4 WEIGHT LOSS: ICD-10-CM

## 2023-02-23 PROCEDURE — 4010F ACE/ARB THERAPY RXD/TAKEN: CPT | Mod: ,,, | Performed by: NURSE PRACTITIONER

## 2023-02-23 PROCEDURE — 1160F PR REVIEW ALL MEDS BY PRESCRIBER/CLIN PHARMACIST DOCUMENTED: ICD-10-PCS | Mod: ,,, | Performed by: NURSE PRACTITIONER

## 2023-02-23 PROCEDURE — 74018 XR ABDOMEN AP 1 VIEW: ICD-10-PCS | Mod: 26,,, | Performed by: RADIOLOGY

## 2023-02-23 PROCEDURE — 1160F RVW MEDS BY RX/DR IN RCRD: CPT | Mod: ,,, | Performed by: NURSE PRACTITIONER

## 2023-02-23 PROCEDURE — 3288F PR FALLS RISK ASSESSMENT DOCUMENTED: ICD-10-PCS | Mod: ,,, | Performed by: NURSE PRACTITIONER

## 2023-02-23 PROCEDURE — 3008F PR BODY MASS INDEX (BMI) DOCUMENTED: ICD-10-PCS | Mod: ,,, | Performed by: NURSE PRACTITIONER

## 2023-02-23 PROCEDURE — 74018 RADEX ABDOMEN 1 VIEW: CPT | Mod: 26,,, | Performed by: RADIOLOGY

## 2023-02-23 PROCEDURE — 99212 OFFICE O/P EST SF 10 MIN: CPT | Mod: ,,, | Performed by: NURSE PRACTITIONER

## 2023-02-23 PROCEDURE — 3288F FALL RISK ASSESSMENT DOCD: CPT | Mod: ,,, | Performed by: NURSE PRACTITIONER

## 2023-02-23 PROCEDURE — 4010F PR ACE/ARB THEARPY RXD/TAKEN: ICD-10-PCS | Mod: ,,, | Performed by: NURSE PRACTITIONER

## 2023-02-23 PROCEDURE — 74018 RADEX ABDOMEN 1 VIEW: CPT | Mod: TC,RHCUB | Performed by: NURSE PRACTITIONER

## 2023-02-23 PROCEDURE — 3074F PR MOST RECENT SYSTOLIC BLOOD PRESSURE < 130 MM HG: ICD-10-PCS | Mod: ,,, | Performed by: NURSE PRACTITIONER

## 2023-02-23 PROCEDURE — 1101F PT FALLS ASSESS-DOCD LE1/YR: CPT | Mod: ,,, | Performed by: NURSE PRACTITIONER

## 2023-02-23 PROCEDURE — 1159F PR MEDICATION LIST DOCUMENTED IN MEDICAL RECORD: ICD-10-PCS | Mod: ,,, | Performed by: NURSE PRACTITIONER

## 2023-02-23 PROCEDURE — 1101F PR PT FALLS ASSESS DOC 0-1 FALLS W/OUT INJ PAST YR: ICD-10-PCS | Mod: ,,, | Performed by: NURSE PRACTITIONER

## 2023-02-23 PROCEDURE — 99212 PR OFFICE/OUTPT VISIT, EST, LEVL II, 10-19 MIN: ICD-10-PCS | Mod: ,,, | Performed by: NURSE PRACTITIONER

## 2023-02-23 PROCEDURE — 1159F MED LIST DOCD IN RCRD: CPT | Mod: ,,, | Performed by: NURSE PRACTITIONER

## 2023-02-23 PROCEDURE — 3008F BODY MASS INDEX DOCD: CPT | Mod: ,,, | Performed by: NURSE PRACTITIONER

## 2023-02-23 PROCEDURE — 3079F PR MOST RECENT DIASTOLIC BLOOD PRESSURE 80-89 MM HG: ICD-10-PCS | Mod: ,,, | Performed by: NURSE PRACTITIONER

## 2023-02-23 PROCEDURE — 3074F SYST BP LT 130 MM HG: CPT | Mod: ,,, | Performed by: NURSE PRACTITIONER

## 2023-02-23 PROCEDURE — 3079F DIAST BP 80-89 MM HG: CPT | Mod: ,,, | Performed by: NURSE PRACTITIONER

## 2023-02-23 PROCEDURE — 1125F AMNT PAIN NOTED PAIN PRSNT: CPT | Mod: ,,, | Performed by: NURSE PRACTITIONER

## 2023-02-23 PROCEDURE — 1125F PR PAIN SEVERITY QUANTIFIED, PAIN PRESENT: ICD-10-PCS | Mod: ,,, | Performed by: NURSE PRACTITIONER

## 2023-02-23 RX ORDER — TRAMADOL HYDROCHLORIDE 50 MG/1
50 TABLET ORAL 2 TIMES DAILY PRN
COMMUNITY
End: 2023-06-13

## 2023-02-23 RX ORDER — PANTOPRAZOLE SODIUM 40 MG/1
40 TABLET, DELAYED RELEASE ORAL DAILY
Qty: 90 TABLET | Refills: 1 | Status: SHIPPED | OUTPATIENT
Start: 2023-02-23 | End: 2023-06-13 | Stop reason: ALTCHOICE

## 2023-02-23 RX ORDER — HYDRALAZINE HYDROCHLORIDE 100 MG/1
100 TABLET, FILM COATED ORAL 3 TIMES DAILY
COMMUNITY
End: 2023-02-23

## 2023-02-23 NOTE — PROGRESS NOTES
SULEMA Sun        PATIENT NAME: Renetta Stewart  : 1950  DATE: 23  MRN: [id number]      Billing Provider: SULEMA Sun  Level of Service: RI OFFICE/OUTPT VISIT, EST, LEVL II, 10-19 MIN  Patient PCP Information       Provider PCP Type    Eldon Govea MD General            Reason for Visit / Chief Complaint: Follow-up (1 week follow up. Reports is nauseated. Stomach is still in pain. Has no appetite and has not been drinking according to her daughter. Pt hardly gets any sleep at night also. Has not been taking her meds because she has not been able to eat.)         History of Present Illness / Problem Focused Workflow     Renetta Stewart presents to the clinic with Follow-up (1 week follow up. Reports is nauseated. Stomach is still in pain. Has no appetite and has not been drinking according to her daughter. Pt hardly gets any sleep at night also. Has not been taking her meds because she has not been able to eat.)     Ms. Stewart presents to clinic, has not restarted any meds due to complaints of abdominal pain that radiates from her back. KUB today reveals abundant stool, states omeprazole has not helped her stomach pain, worse with eating. She has continued to lose weight, she is living with her daughter and she is present today, states pt. Is not really eating or drinking at all. Pt. Denies nausea, vomiting or diarrhea, states she has minimal stool with bowel movements. Seeing pain management for low back pain. Pt. Is a poor historian, essentially off all of her medications due to abdominal pain.      Review of Systems     Review of Systems   Constitutional:  Positive for appetite change and unexpected weight change.   Respiratory: Negative.     Cardiovascular: Negative.    Gastrointestinal:  Positive for abdominal pain. Negative for blood in stool, constipation, diarrhea, nausea and vomiting.   Genitourinary:  Negative for dysuria, frequency, hematuria and pelvic pain.    Musculoskeletal:  Positive for back pain.   Neurological: Negative.       Medical / Social / Family History     Past Medical History:   Diagnosis Date    Anemia     Anxiety     Dementia     Depression     GERD (gastroesophageal reflux disease)     Hyperlipidemia     Hypertension        No past surgical history on file.    Social History  Ms. Renetta Stewart   reports that she has never smoked. She has never used smokeless tobacco. She reports that she does not currently use alcohol. She reports that she does not use drugs.    Family History  Ms.'s Renetta Stewart  family history includes Hypertension in her father.    Medications and Allergies     Medications  Outpatient Medications Marked as Taking for the 2/23/23 encounter (Office Visit) with SULEMA Sun   Medication Sig Dispense Refill    acetaminophen-codeine 300-30mg (TYLENOL #3) 300-30 mg Tab TAKE 1-2 TABLETS BY MOUTH EVERY 12 HOURS AS NEEDED CAUSES DROWSINESS-AVOID ALCOHOL!!      diltiaZEM (CARDIZEM CD) 240 MG 24 hr capsule Take 1 capsule (240 mg total) by mouth once daily. 30 capsule 1    furosemide (LASIX) 20 MG tablet Take 1 tablet (20 mg total) by mouth once daily. 90 tablet 1    levETIRAcetam (KEPPRA) 500 MG Tab Take 1 tablet (500 mg total) by mouth 2 (two) times daily. 60 tablet 1    levothyroxine (SYNTHROID) 50 MCG tablet Take 1 tablet (50 mcg total) by mouth once daily. 90 tablet 1    mirtazapine (REMERON) 15 MG tablet Take 1 tablet (15 mg total) by mouth every evening. 30 tablet 1    nitroGLYCERIN (NITROSTAT) 0.4 MG SL tablet Place 0.4 mg under the tongue every 5 (five) minutes as needed for Chest pain.      traMADoL (ULTRAM) 50 mg tablet Take 50 mg by mouth 2 (two) times daily as needed.      [DISCONTINUED] hydrALAZINE (APRESOLINE) 100 MG tablet Take 100 mg by mouth 3 (three) times daily.      [DISCONTINUED] omeprazole (PRILOSEC) 20 MG capsule Take 1 capsule (20 mg total) by mouth 2 (two) times a day. 60 capsule 1       Allergies  Review of  patient's allergies indicates:  No Known Allergies      Vitals:    02/23/23 1448   BP: 124/86   Pulse: 100   Resp: 19   Temp: 98.3 °F (36.8 °C)     Physical Exam  Vitals reviewed.   Constitutional:       General: She is not in acute distress.     Appearance: She is obese. She is not ill-appearing.   HENT:      Head: Normocephalic.   Cardiovascular:      Rate and Rhythm: Tachycardia present.      Heart sounds: Normal heart sounds.   Pulmonary:      Effort: Pulmonary effort is normal.      Breath sounds: Normal breath sounds.   Abdominal:      General: Bowel sounds are normal.      Palpations: Abdomen is soft.      Tenderness: There is no abdominal tenderness. There is no right CVA tenderness or left CVA tenderness.   Skin:     General: Skin is warm and dry.   Neurological:      Mental Status: She is alert. Mental status is at baseline.   Psychiatric:         Behavior: Behavior normal.          Lab Results   Component Value Date    WBC 3.84 (L) 01/12/2023    HGB 12.8 01/12/2023    HCT 41.0 01/12/2023    MCV 81.7 01/12/2023     01/12/2023          Sodium   Date Value Ref Range Status   02/16/2023 140 136 - 145 mmol/L Final     Potassium   Date Value Ref Range Status   02/16/2023 4.1 3.5 - 5.1 mmol/L Final     Chloride   Date Value Ref Range Status   02/16/2023 105 98 - 107 mmol/L Final     CO2   Date Value Ref Range Status   02/16/2023 24 21 - 32 mmol/L Final     Glucose   Date Value Ref Range Status   02/16/2023 90 74 - 106 mg/dL Final     BUN   Date Value Ref Range Status   02/16/2023 24 (H) 7 - 18 mg/dL Final     Creatinine   Date Value Ref Range Status   02/16/2023 1.58 (H) 0.55 - 1.02 mg/dL Final     Calcium   Date Value Ref Range Status   02/16/2023 10.1 8.5 - 10.1 mg/dL Final     Total Protein   Date Value Ref Range Status   01/05/2023 7.7 6.4 - 8.2 g/dL Final     Albumin   Date Value Ref Range Status   01/05/2023 3.7 3.5 - 5.0 g/dL Final     Bilirubin, Total   Date Value Ref Range Status   01/05/2023 0.5  >0.0 - 1.2 mg/dL Final     Alk Phos   Date Value Ref Range Status   01/05/2023 73 55 - 142 U/L Final     AST   Date Value Ref Range Status   01/05/2023 15 15 - 37 U/L Final     ALT   Date Value Ref Range Status   01/05/2023 12 (L) 13 - 56 U/L Final     Anion Gap   Date Value Ref Range Status   02/16/2023 15 7 - 16 mmol/L Final     eGFR   Date Value Ref Range Status   02/16/2023 34 (L) >=60 mL/min/1.73m² Final        Lab Results   Component Value Date    CHOL 131 02/14/2022    CHOL 133 06/08/2021    CHOL 163 10/09/2020     Lab Results   Component Value Date    HDL 78 (H) 02/14/2022    HDL 74 (H) 06/08/2021    HDL 90 10/09/2020     Lab Results   Component Value Date    LDLCALC 37 02/14/2022    LDLCALC 47 06/08/2021    LDLCALC 67 10/09/2020     Lab Results   Component Value Date    TRIG 78 02/14/2022    TRIG 61 06/08/2021    TRIG 31 10/09/2020     Lab Results   Component Value Date    CHOLHDL 1.7 02/14/2022    CHOLHDL 1.8 06/08/2021    CHOLHDL 1.8 10/09/2020        No results found for: LABA1C, HGBA1C     Lab Results   Component Value Date    TSH 2.900 09/06/2022    FREET4 1.45 09/06/2022        No results found for: PSA, PSATOTAL, PSAFREE, PSAFREEPCT       Health Maintenance Due   Topic Date Due    Hepatitis C Screening  Never done    Mammogram  Never done    DEXA Scan  Never done       Problem List Items Addressed This Visit    None  Visit Diagnoses       Generalized abdominal pain    -  Primary    Relevant Orders    X-Ray Abdomen AP 1 View    Ambulatory referral/consult to Gastroenterology    Weight loss        Relevant Orders    Ambulatory referral/consult to Gastroenterology        KUB with abundant stool, start miralax daily. Gallbladder US and GI consult due to persistent weight loss. Stop omeprazole and start protonix.  Schedule appointment in one week.      Health Maintenance Topics with due status: Not Due       Topic Last Completion Date    Colorectal Cancer Screening 06/18/2019    Lipid Panel 02/14/2022        Future Appointments   Date Time Provider Department Center   5/4/2023  9:45 AM Aamir Mejia MD OB [facility]   7/20/2023  9:00 AM RUS J[facility] CV DEVICE CHECK [facility]        There are no Patient Instructions on file for this visit.  No follow-ups on file.       Date of encounter: 2/23/23

## 2023-02-24 ENCOUNTER — TELEPHONE (OUTPATIENT)
Dept: FAMILY MEDICINE | Facility: CLINIC | Age: 73
End: 2023-02-24
Payer: MEDICARE

## 2023-02-24 NOTE — TELEPHONE ENCOUNTER
----- Message from SULEMA Sun sent at 2/24/2023  8:20 AM CST -----  Abundant stool on xray, no other acute changes. Recommend miralax daily with increased water, this could be contributing to her abdominal discomfort.

## 2023-02-24 NOTE — LETTER
February 24, 2023    Renetta Stewart  [address]  [place] [address]             [facility]  [address] [place] [address]  Phone: [phone]  Fax: [phone] Dear Ms. Stewart:    Your recent xray of your abdomen showed only abundant stool which could be the cause of the discomfort in your abdomen. Mica recommends over the counter miralax mixed with water daily.     If you have any questions or concerns, please don't hesitate to call.    Sincerely,      Debbie Alegria lpn

## 2023-02-27 DIAGNOSIS — K59.04 CHRONIC IDIOPATHIC CONSTIPATION: Primary | ICD-10-CM

## 2023-02-27 RX ORDER — POLYETHYLENE GLYCOL 3350 17 G/17G
17 POWDER, FOR SOLUTION ORAL 2 TIMES DAILY
Qty: 1020 G | Refills: 2 | Status: SHIPPED | OUTPATIENT
Start: 2023-02-27 | End: 2023-03-28

## 2023-02-27 RX ORDER — MIRTAZAPINE 15 MG/1
15 TABLET, FILM COATED ORAL NIGHTLY
Qty: 30 TABLET | Refills: 1 | Status: SHIPPED | OUTPATIENT
Start: 2023-02-27 | End: 2023-06-13

## 2023-02-27 RX ORDER — POLYETHYLENE GLYCOL 3350 17 G/17G
17 POWDER, FOR SOLUTION ORAL 2 TIMES DAILY
COMMUNITY
Start: 2023-02-27 | End: 2023-02-27 | Stop reason: SDUPTHER

## 2023-03-02 ENCOUNTER — TELEPHONE (OUTPATIENT)
Dept: GASTROENTEROLOGY | Facility: CLINIC | Age: 73
End: 2023-03-02
Payer: MEDICARE

## 2023-03-02 ENCOUNTER — HOSPITAL ENCOUNTER (EMERGENCY)
Facility: HOSPITAL | Age: 73
Discharge: HOME | End: 2023-03-02
Attending: FAMILY MEDICINE
Payer: MEDICARE

## 2023-03-02 VITALS
BODY MASS INDEX: 36.82 KG/M2 | RESPIRATION RATE: 14 BRPM | OXYGEN SATURATION: 100 % | TEMPERATURE: 98 F | SYSTOLIC BLOOD PRESSURE: 118 MMHG | WEIGHT: 195 LBS | HEART RATE: 62 BPM | DIASTOLIC BLOOD PRESSURE: 86 MMHG | HEIGHT: 61 IN

## 2023-03-02 DIAGNOSIS — K22.2 ESOPHAGEAL STRICTURE: ICD-10-CM

## 2023-03-02 DIAGNOSIS — R10.10 PAIN OF UPPER ABDOMEN: Primary | ICD-10-CM

## 2023-03-02 LAB
ALBUMIN SERPL BCP-MCNC: 3.6 G/DL (ref 3.5–5)
ALBUMIN/GLOB SERPL: 1 {RATIO}
ALP SERPL-CCNC: 55 U/L (ref 55–142)
ALT SERPL W P-5'-P-CCNC: 18 U/L (ref 13–56)
AMYLASE SERPL-CCNC: 68 U/L (ref 25–115)
ANION GAP SERPL CALCULATED.3IONS-SCNC: 14 MMOL/L (ref 7–16)
AST SERPL W P-5'-P-CCNC: 12 U/L (ref 15–37)
BASOPHILS # BLD AUTO: 0.03 K/UL (ref 0–0.2)
BASOPHILS NFR BLD AUTO: 0.8 % (ref 0–1)
BILIRUB SERPL-MCNC: 0.8 MG/DL (ref ?–1.2)
BUN SERPL-MCNC: 20 MG/DL (ref 7–18)
BUN/CREAT SERPL: 11 (ref 6–20)
CALCIUM SERPL-MCNC: 8.8 MG/DL (ref 8.5–10.1)
CHLORIDE SERPL-SCNC: 102 MMOL/L (ref 98–107)
CO2 SERPL-SCNC: 28 MMOL/L (ref 21–32)
CREAT SERPL-MCNC: 1.8 MG/DL (ref 0.55–1.02)
DIFFERENTIAL METHOD BLD: ABNORMAL
EGFR (NO RACE VARIABLE) (RUSH/TITUS): 29 ML/MIN/1.73M²
EOSINOPHIL # BLD AUTO: 0.11 K/UL (ref 0–0.5)
EOSINOPHIL NFR BLD AUTO: 3 % (ref 1–4)
ERYTHROCYTE [DISTWIDTH] IN BLOOD BY AUTOMATED COUNT: 15.4 % (ref 11.5–14.5)
GLOBULIN SER-MCNC: 3.6 G/DL (ref 2–4)
GLUCOSE SERPL-MCNC: 86 MG/DL (ref 74–106)
HCT VFR BLD AUTO: 39.2 % (ref 38–47)
HGB BLD-MCNC: 12.2 G/DL (ref 12–16)
IMM GRANULOCYTES # BLD AUTO: 0.01 K/UL (ref 0–0.04)
IMM GRANULOCYTES NFR BLD: 0.3 % (ref 0–0.4)
LIPASE SERPL-CCNC: 189 U/L (ref 73–393)
LYMPHOCYTES # BLD AUTO: 1.24 K/UL (ref 1–4.8)
LYMPHOCYTES NFR BLD AUTO: 33.9 % (ref 27–41)
MCH RBC QN AUTO: 25.4 PG (ref 27–31)
MCHC RBC AUTO-ENTMCNC: 31.1 G/DL (ref 32–36)
MCV RBC AUTO: 81.7 FL (ref 80–96)
MONOCYTES # BLD AUTO: 0.45 K/UL (ref 0–0.8)
MONOCYTES NFR BLD AUTO: 12.3 % (ref 2–6)
MPC BLD CALC-MCNC: 11.8 FL (ref 9.4–12.4)
NEUTROPHILS # BLD AUTO: 1.82 K/UL (ref 1.8–7.7)
NEUTROPHILS NFR BLD AUTO: 49.7 % (ref 53–65)
NRBC # BLD AUTO: 0 X10E3/UL
NRBC, AUTO (.00): 0 %
PLATELET # BLD AUTO: 202 K/UL (ref 150–400)
POTASSIUM SERPL-SCNC: 3.5 MMOL/L (ref 3.5–5.1)
PROT SERPL-MCNC: 7.2 G/DL (ref 6.4–8.2)
RBC # BLD AUTO: 4.8 M/UL (ref 4.2–5.4)
SODIUM SERPL-SCNC: 140 MMOL/L (ref 136–145)
WBC # BLD AUTO: 3.66 K/UL (ref 4.5–11)

## 2023-03-02 PROCEDURE — 96374 THER/PROPH/DIAG INJ IV PUSH: CPT

## 2023-03-02 PROCEDURE — 83690 ASSAY OF LIPASE: CPT | Performed by: FAMILY MEDICINE

## 2023-03-02 PROCEDURE — 82150 ASSAY OF AMYLASE: CPT | Performed by: FAMILY MEDICINE

## 2023-03-02 PROCEDURE — 85025 COMPLETE CBC W/AUTO DIFF WBC: CPT | Performed by: FAMILY MEDICINE

## 2023-03-02 PROCEDURE — 63600175 PHARM REV CODE 636 W HCPCS: Performed by: FAMILY MEDICINE

## 2023-03-02 PROCEDURE — 25000003 PHARM REV CODE 250: Performed by: FAMILY MEDICINE

## 2023-03-02 PROCEDURE — 96372 THER/PROPH/DIAG INJ SC/IM: CPT | Performed by: FAMILY MEDICINE

## 2023-03-02 PROCEDURE — 80053 COMPREHEN METABOLIC PANEL: CPT | Performed by: FAMILY MEDICINE

## 2023-03-02 PROCEDURE — 99284 EMERGENCY DEPT VISIT MOD MDM: CPT | Mod: ,,, | Performed by: FAMILY MEDICINE

## 2023-03-02 PROCEDURE — 96361 HYDRATE IV INFUSION ADD-ON: CPT

## 2023-03-02 PROCEDURE — 99285 EMERGENCY DEPT VISIT HI MDM: CPT | Mod: 25

## 2023-03-02 PROCEDURE — 99284 PR EMERGENCY DEPT VISIT,LEVEL IV: ICD-10-PCS | Mod: ,,, | Performed by: FAMILY MEDICINE

## 2023-03-02 RX ORDER — DICYCLOMINE HYDROCHLORIDE 10 MG/ML
20 INJECTION INTRAMUSCULAR
Status: COMPLETED | OUTPATIENT
Start: 2023-03-02 | End: 2023-03-02

## 2023-03-02 RX ORDER — DICYCLOMINE HYDROCHLORIDE 20 MG/1
20 TABLET ORAL 2 TIMES DAILY
Qty: 20 TABLET | Refills: 0 | Status: SHIPPED | OUTPATIENT
Start: 2023-03-02 | End: 2023-03-28

## 2023-03-02 RX ORDER — ONDANSETRON 4 MG/1
4 TABLET, FILM COATED ORAL EVERY 6 HOURS
Qty: 12 TABLET | Refills: 0 | Status: SHIPPED | OUTPATIENT
Start: 2023-03-02 | End: 2023-06-13

## 2023-03-02 RX ORDER — ONDANSETRON 2 MG/ML
4 INJECTION INTRAMUSCULAR; INTRAVENOUS
Status: COMPLETED | OUTPATIENT
Start: 2023-03-02 | End: 2023-03-02

## 2023-03-02 RX ORDER — TRAZODONE HYDROCHLORIDE 50 MG/1
50 TABLET ORAL NIGHTLY
Qty: 30 TABLET | Refills: 0 | Status: SHIPPED | OUTPATIENT
Start: 2023-03-02 | End: 2023-03-15 | Stop reason: ALTCHOICE

## 2023-03-02 RX ADMIN — DICYCLOMINE HYDROCHLORIDE 20 MG: 20 INJECTION, SOLUTION INTRAMUSCULAR at 08:03

## 2023-03-02 RX ADMIN — SODIUM CHLORIDE 1000 ML: 9 INJECTION, SOLUTION INTRAVENOUS at 08:03

## 2023-03-02 RX ADMIN — ONDANSETRON HYDROCHLORIDE 4 MG: 2 SOLUTION INTRAMUSCULAR; INTRAVENOUS at 08:03

## 2023-03-02 NOTE — TELEPHONE ENCOUNTER
Spoke with Sharon in the ER and gave her new appointment for patient due to Dr. Khan calling and seeing if patient can be seen sooner for abd pain and weight loss. Verbalized understanding.

## 2023-03-02 NOTE — DISCHARGE INSTRUCTIONS
Was able to make a appointment quicker with GI.  She is to go on March 7th at 3:00 p.m. to see Debo in the clinic for her re-evaluation

## 2023-03-02 NOTE — ED PROVIDER NOTES
Encounter Date: 3/2/2023       History     Chief Complaint   Patient presents with    Abdominal Pain     Patient reports abdominal pain weight loss.  Difficulty swallowing.  Long history of esophageal problems with the scope in the past and esophageal dilation.  No chest pain.  Heart rate 104 history of atrial fib.  Has had insomnia for 1 month secondary to abdominal pain and nausea.  Sees Dr. Govea in [place] and has had recent x-rays.  She had been on a laxative just recently to help her have a bowel movement.      Review of patient's allergies indicates:  No Known Allergies  Past Medical History:   Diagnosis Date    Anemia     Anxiety     Dementia     Depression     GERD (gastroesophageal reflux disease)     Hyperlipidemia     Hypertension      No past surgical history on file.  Family History   Problem Relation Age of Onset    Hypertension Father      Social History     Tobacco Use    Smoking status: Never    Smokeless tobacco: Never   Substance Use Topics    Alcohol use: Not Currently    Drug use: Never     Review of Systems   Constitutional: Negative.  Negative for fever.   HENT: Negative.  Negative for sore throat.    Eyes: Negative.    Respiratory: Negative.  Negative for shortness of breath.    Cardiovascular: Negative.  Negative for chest pain.   Gastrointestinal:  Positive for abdominal pain. Negative for nausea.   Endocrine: Negative.    Genitourinary: Negative.  Negative for dysuria.   Musculoskeletal: Negative.  Negative for back pain.   Skin: Negative.  Negative for rash.   Allergic/Immunologic: Negative.    Neurological: Negative.  Negative for weakness.   Hematological: Negative.  Does not bruise/bleed easily.   Psychiatric/Behavioral: Negative.          Insomnia secondary to pain abdominal pain   All other systems reviewed and are negative.    Physical Exam     Initial Vitals [03/02/23 0724]   BP Pulse Resp Temp SpO2   118/86 104 20 98.3 °F (36.8 °C) 99 %      MAP       --         Physical  Exam    Constitutional: She appears well-developed and well-nourished.   HENT:   Head: Normocephalic and atraumatic.   Right Ear: External ear normal.   Left Ear: External ear normal.   Nose: Nose normal.   Mouth/Throat: Oropharynx is clear and moist.   Eyes: Conjunctivae and EOM are normal. Pupils are equal, round, and reactive to light.   Neck: Neck supple.   Normal range of motion.  Cardiovascular:  Normal rate, regular rhythm, normal heart sounds and intact distal pulses.           Pulmonary/Chest: Breath sounds normal.   Abdominal: Abdomen is soft. Bowel sounds are normal.   Genitourinary:    Vagina and uterus normal.     Musculoskeletal:         General: Normal range of motion.      Cervical back: Normal range of motion and neck supple.     Neurological: She is alert and oriented to person, place, and time. She has normal strength and normal reflexes.   Skin: Skin is warm. Capillary refill takes less than 2 seconds.   Psychiatric: She has a normal mood and affect. Her behavior is normal. Judgment and thought content normal.       Medical Screening Exam   See Full Note    ED Course   Procedures  Labs Reviewed   COMPREHENSIVE METABOLIC PANEL - Abnormal; Notable for the following components:       Result Value    BUN 20 (*)     Creatinine 1.80 (*)     AST 12 (*)     eGFR 29 (*)     All other components within normal limits   CBC WITH DIFFERENTIAL - Abnormal; Notable for the following components:    WBC 3.66 (*)     MCH 25.4 (*)     MCHC 31.1 (*)     RDW 15.4 (*)     Neutrophils % 49.7 (*)     Monocytes % 12.3 (*)     All other components within normal limits   AMYLASE - Normal   LIPASE - Normal   CBC W/ AUTO DIFFERENTIAL    Narrative:     The following orders were created for panel order CBC Auto Differential.  Procedure                               Abnormality         Status                     ---------                               -----------         ------                     CBC with  Differential[[id number]]        Abnormal            Final result                 Please view results for these tests on the individual orders.          Imaging Results              CT Abdomen Pelvis  Without Contrast (Final result)  Result time 03/02/23 08:56:35      Final result by Fuad Hernandez DO (03/02/23 08:56:35)                   Impression:      1. No evidence to suggest acute pathology within the abdomen or pelvis.      Electronically signed by: Fuad Hernandez  Date:    03/02/2023  Time:    08:56               Narrative:    EXAMINATION:  CT ABDOMEN PELVIS WITHOUT CONTRAST    CLINICAL HISTORY:  Abdominal pain, acute (Ped 0-18y);    COMPARISON:  2020    TECHNIQUE:  CT ABDOMEN PELVIS WITHOUT CONTRAST    FINDINGS:  Lower lobes: Clear.    Cardiac: No effusion.    Abdomen:    Hepatobiliary/gallbladder: Normal for noncontrast technique.  Gallbladder is normal.  No ductal obstruction.    Spleen: Normal for noncontrast technique.    Pancreas: Normal for noncontrast technique.    Adrenal/Genitourinary system: Bilateral simple cyst, similar.  Mildly complex cyst located within the inferior pole left kidney, similar.  Cortical calcification located within the left kidney.    Bowel and Mesentery: There is no evidence for bowel obstruction.  Small hiatal hernia.  Normal appendix.    Peritoneum: Calcification located within the left lower peritoneum, unchanged.    Retroperitoneum: No enlarged lymph nodes.    Vasculature: Calcified vascular disease.    Reproductive: Calcified fibroid within the uterus.  Calcification within the left ovary.    Lymph nodes: No enlarged lymph nodes.    Abdominal wall: Normal.    Osseous structures: Normal.                                       Medications   sodium chloride 0.9% bolus 1,000 mL 1,000 mL (0 mLs Intravenous Stopped 3/2/23 0937)   ondansetron injection 4 mg (4 mg Intravenous Given 3/2/23 0837)   dicyclomine injection 20 mg (20 mg Intramuscular Given 3/2/23 0837)     Medical  Decision Making:   Initial Assessment:   Patient in with a pain insomnia and abdominal pain.  Decreased p.o. intake weight loss.  Differential Diagnosis:   Abdominal pain  Number 1 esophageal stricture  2. Gastritis  3. History of constipation  4. Hypertension  5. Atrial fib   ED Management:  Will order CT and labs  Labs look good amylase and lipase normal.  No sign of infection.  Normal saline given with Bentyl and Zofran.                 Clinical Impression:   Final diagnoses:  [R10.10] Pain of upper abdomen (Primary)  [K22.2] Esophageal stricture        ED Disposition Condition    Discharge Stable          ED Prescriptions       Medication Sig Dispense Start Date End Date Auth. Provider    dicyclomine (BENTYL) 20 mg tablet Take 1 tablet (20 mg total) by mouth 2 (two) times daily. 20 tablet 3/2/2023 4/1/2023 Jae Khan,     ondansetron (ZOFRAN) 4 MG tablet Take 1 tablet (4 mg total) by mouth every 6 (six) hours. 12 tablet 3/2/2023 -- Jae Khan DO    traZODone (DESYREL) 50 MG tablet Take 1 tablet (50 mg total) by mouth every evening. for 20 days 30 tablet 3/2/2023 3/22/2023 Jae Khan DO          Follow-up Information    None          Jae Khan DO  03/07/23 0913

## 2023-03-02 NOTE — ED TRIAGE NOTES
PATIENT PRESENTS TO ER WITH COMPLAINT OF ABD PAIN AND NAUSEA X 1 MONTH. ALSO REPORTS THAT SHE HAS INSOMNIA X 1 MONTH

## 2023-03-03 ENCOUNTER — TELEPHONE (OUTPATIENT)
Dept: EMERGENCY MEDICINE | Facility: HOSPITAL | Age: 73
End: 2023-03-03
Payer: MEDICARE

## 2023-03-06 ENCOUNTER — OFFICE VISIT (OUTPATIENT)
Dept: GASTROENTEROLOGY | Facility: CLINIC | Age: 73
End: 2023-03-06
Payer: MEDICARE

## 2023-03-06 VITALS
HEIGHT: 61 IN | OXYGEN SATURATION: 100 % | SYSTOLIC BLOOD PRESSURE: 150 MMHG | HEART RATE: 95 BPM | DIASTOLIC BLOOD PRESSURE: 91 MMHG | WEIGHT: 194.81 LBS | BODY MASS INDEX: 36.78 KG/M2

## 2023-03-06 DIAGNOSIS — K59.00 CONSTIPATION, UNSPECIFIED CONSTIPATION TYPE: ICD-10-CM

## 2023-03-06 DIAGNOSIS — K21.9 GASTROESOPHAGEAL REFLUX DISEASE WITHOUT ESOPHAGITIS: Primary | ICD-10-CM

## 2023-03-06 DIAGNOSIS — R63.4 WEIGHT LOSS: ICD-10-CM

## 2023-03-06 DIAGNOSIS — R13.10 DYSPHAGIA, UNSPECIFIED TYPE: ICD-10-CM

## 2023-03-06 DIAGNOSIS — N28.1 COMPLEX RENAL CYST: ICD-10-CM

## 2023-03-06 PROCEDURE — 3008F PR BODY MASS INDEX (BMI) DOCUMENTED: ICD-10-PCS | Mod: CPTII,,, | Performed by: NURSE PRACTITIONER

## 2023-03-06 PROCEDURE — 1159F MED LIST DOCD IN RCRD: CPT | Mod: CPTII,,, | Performed by: NURSE PRACTITIONER

## 2023-03-06 PROCEDURE — 3008F BODY MASS INDEX DOCD: CPT | Mod: CPTII,,, | Performed by: NURSE PRACTITIONER

## 2023-03-06 PROCEDURE — 3077F SYST BP >= 140 MM HG: CPT | Mod: CPTII,,, | Performed by: NURSE PRACTITIONER

## 2023-03-06 PROCEDURE — 3288F FALL RISK ASSESSMENT DOCD: CPT | Mod: CPTII,,, | Performed by: NURSE PRACTITIONER

## 2023-03-06 PROCEDURE — 99215 OFFICE O/P EST HI 40 MIN: CPT | Mod: PBBFAC | Performed by: NURSE PRACTITIONER

## 2023-03-06 PROCEDURE — 3288F PR FALLS RISK ASSESSMENT DOCUMENTED: ICD-10-PCS | Mod: CPTII,,, | Performed by: NURSE PRACTITIONER

## 2023-03-06 PROCEDURE — 1101F PT FALLS ASSESS-DOCD LE1/YR: CPT | Mod: CPTII,,, | Performed by: NURSE PRACTITIONER

## 2023-03-06 PROCEDURE — 4010F PR ACE/ARB THEARPY RXD/TAKEN: ICD-10-PCS | Mod: CPTII,,, | Performed by: NURSE PRACTITIONER

## 2023-03-06 PROCEDURE — 1101F PR PT FALLS ASSESS DOC 0-1 FALLS W/OUT INJ PAST YR: ICD-10-PCS | Mod: CPTII,,, | Performed by: NURSE PRACTITIONER

## 2023-03-06 PROCEDURE — 1159F PR MEDICATION LIST DOCUMENTED IN MEDICAL RECORD: ICD-10-PCS | Mod: CPTII,,, | Performed by: NURSE PRACTITIONER

## 2023-03-06 PROCEDURE — 4010F ACE/ARB THERAPY RXD/TAKEN: CPT | Mod: CPTII,,, | Performed by: NURSE PRACTITIONER

## 2023-03-06 PROCEDURE — 3077F PR MOST RECENT SYSTOLIC BLOOD PRESSURE >= 140 MM HG: ICD-10-PCS | Mod: CPTII,,, | Performed by: NURSE PRACTITIONER

## 2023-03-06 PROCEDURE — 99204 OFFICE O/P NEW MOD 45 MIN: CPT | Mod: S$PBB,,, | Performed by: NURSE PRACTITIONER

## 2023-03-06 PROCEDURE — 99204 PR OFFICE/OUTPT VISIT, NEW, LEVL IV, 45-59 MIN: ICD-10-PCS | Mod: S$PBB,,, | Performed by: NURSE PRACTITIONER

## 2023-03-06 PROCEDURE — 3080F PR MOST RECENT DIASTOLIC BLOOD PRESSURE >= 90 MM HG: ICD-10-PCS | Mod: CPTII,,, | Performed by: NURSE PRACTITIONER

## 2023-03-06 PROCEDURE — 3080F DIAST BP >= 90 MM HG: CPT | Mod: CPTII,,, | Performed by: NURSE PRACTITIONER

## 2023-03-06 NOTE — PROGRESS NOTES
Renetta Stewart is a 73 y.o. female here for No chief complaint on file.        PCP: Eldon Govea  Referring Provider: No referring provider defined for this encounter.     HPI:  Presents for report of inability to eat. States that she is having epigastric pain and burning. Eating increases the abdominal pain. No nausea or vomiting. Endorses dysphagia. Is only eating soft foods such as applesauce and drinking Ensure.She was evaluated in the Er by Dr. Khan. CT abdomen and pelvis reviewed. Mildly complex left renal cyst. No other abnormalities. Labs reviewed. No anemia and liver enzymes are okay. Chest xray in January does not show acute processes. She has had an increased weight loss since August. Weights reviewed. In August weight 250 lbs. Today she weights 194 lbs. 56 lb weight loss in 7 months. Denies hematochezia and melena. Endorses constipation. Is not taking daily Miralax powder. Last colonoscopy per Dr. Jimenez 6/18/19, with recommendation to repeat in 10 years.        ROS:  Review of Systems   Constitutional:  Positive for appetite change and unexpected weight change (56 lbs in 7 months). Negative for fatigue and fever.   HENT:  Positive for trouble swallowing.    Respiratory:  Negative for shortness of breath and wheezing.    Cardiovascular:  Negative for chest pain.   Gastrointestinal:  Positive for abdominal pain, constipation and reflux. Negative for blood in stool, change in bowel habit, diarrhea, nausea, vomiting and change in bowel habit.   Musculoskeletal:  Negative for gait problem.   Integumentary:  Negative for pallor.   Neurological:  Negative for dizziness and light-headedness.   Hematological:  Does not bruise/bleed easily.   Psychiatric/Behavioral:  The patient is not nervous/anxious.         PMHX:  has a past medical history of Anemia, Anxiety, Dementia, Depression, GERD (gastroesophageal reflux disease), Hyperlipidemia, and Hypertension.    PSHX:  has no past surgical history on  "file.    PFHX: family history includes Hypertension in her father.    PSlHX:  reports that she has never smoked. She has never used smokeless tobacco. She reports that she does not currently use alcohol. She reports that she does not use drugs.        Review of patient's allergies indicates:  No Known Allergies    Medication List with Changes/Refills   Current Medications    ACETAMINOPHEN-CODEINE 300-30MG (TYLENOL #3) 300-30 MG TAB    TAKE 1-2 TABLETS BY MOUTH EVERY 12 HOURS AS NEEDED CAUSES DROWSINESS-AVOID ALCOHOL!!    DICYCLOMINE (BENTYL) 20 MG TABLET    Take 1 tablet (20 mg total) by mouth 2 (two) times daily.    DILTIAZEM (CARDIZEM CD) 240 MG 24 HR CAPSULE    Take 1 capsule (240 mg total) by mouth once daily.    FUROSEMIDE (LASIX) 20 MG TABLET    Take 1 tablet (20 mg total) by mouth once daily.    HYDROCORTISONE 1 % CREAM    Apply to affected area 2 times daily    LEVETIRACETAM (KEPPRA) 500 MG TAB    Take 1 tablet (500 mg total) by mouth 2 (two) times daily.    LEVOTHYROXINE (SYNTHROID) 50 MCG TABLET    Take 1 tablet (50 mcg total) by mouth once daily.    MIRTAZAPINE (REMERON) 15 MG TABLET    Take 1 tablet (15 mg total) by mouth every evening.    NITROGLYCERIN (NITROSTAT) 0.4 MG SL TABLET    Place 0.4 mg under the tongue every 5 (five) minutes as needed for Chest pain.    ONDANSETRON (ZOFRAN) 4 MG TABLET    Take 1 tablet (4 mg total) by mouth every 6 (six) hours.    PANTOPRAZOLE (PROTONIX) 40 MG TABLET    Take 1 tablet (40 mg total) by mouth once daily.    POLYETHYLENE GLYCOL (GLYCOLAX) 17 GRAM/DOSE POWDER    Take 17 g by mouth 2 (two) times daily.    TRAMADOL (ULTRAM) 50 MG TABLET    Take 50 mg by mouth 2 (two) times daily as needed.    TRAZODONE (DESYREL) 50 MG TABLET    Take 1 tablet (50 mg total) by mouth every evening. for 20 days        Objective Findings:  Vital Signs:  BP (!) 150/91   Pulse 95   Ht 5' 1" (1.549 m)   Wt 88.4 kg (194 lb 12.8 oz)   SpO2 100%   BMI 36.81 kg/m²  Body mass index is 36.81 " kg/m².    Physical Exam:  Physical Exam  Vitals and nursing note reviewed.   Constitutional:       General: She is not in acute distress.     Appearance: Normal appearance.   HENT:      Mouth/Throat:      Mouth: Mucous membranes are moist.   Cardiovascular:      Rate and Rhythm: Normal rate.   Pulmonary:      Effort: Pulmonary effort is normal.      Breath sounds: No wheezing, rhonchi or rales.   Abdominal:      General: Bowel sounds are normal. There is no distension.      Palpations: Abdomen is soft. There is no mass.      Tenderness: There is abdominal tenderness in the epigastric area.   Skin:     General: Skin is warm and dry.      Coloration: Skin is not jaundiced or pale.   Neurological:      Mental Status: She is alert and oriented to person, place, and time.   Psychiatric:         Mood and Affect: Mood normal.        Labs:  Lab Results   Component Value Date    WBC 3.66 (L) 03/02/2023    HGB 12.2 03/02/2023    HCT 39.2 03/02/2023    MCV 81.7 03/02/2023    RDW 15.4 (H) 03/02/2023     03/02/2023    LYMPH 33.9 03/02/2023    LYMPH 1.24 03/02/2023    MONO 12.3 (H) 03/02/2023    EOS 0.11 03/02/2023    BASO 0.03 03/02/2023     Lab Results   Component Value Date     03/02/2023    K 3.5 03/02/2023     03/02/2023    CO2 28 03/02/2023    GLU 86 03/02/2023    BUN 20 (H) 03/02/2023    CREATININE 1.80 (H) 03/02/2023    CALCIUM 8.8 03/02/2023    PROT 7.2 03/02/2023    ALBUMIN 3.6 03/02/2023    BILITOT 0.8 03/02/2023    ALKPHOS 55 03/02/2023    AST 12 (L) 03/02/2023    ALT 18 03/02/2023         Imaging: X-Ray Abdomen AP 1 View    Result Date: 2/23/2023  EXAMINATION: XR ABDOMEN AP 1 VIEW CLINICAL HISTORY: Generalized abdominal pain COMPARISON: 05/01/2020 and 12/11/2020 FINDINGS: Abdomen, two views: Abundant stool seen throughout the colon and in the rectosigmoid region.  No small bowel dilatation is present. Vascular calcifications are seen in the abdomen and pelvis.  There are degenerative changes in the  lower lumbar spine.  Lung bases are clear.     Abundant stool but nonobstructive bowel gas pattern. [facility] of [facility] Electronically signed by: Deb Clement Date:    02/23/2023 Time:    16:42    CT Abdomen Pelvis  Without Contrast    Result Date: 3/2/2023  EXAMINATION: CT ABDOMEN PELVIS WITHOUT CONTRAST CLINICAL HISTORY: Abdominal pain, acute (Ped 0-18y); COMPARISON: 2020 TECHNIQUE: CT ABDOMEN PELVIS WITHOUT CONTRAST FINDINGS: Lower lobes: Clear. Cardiac: No effusion. Abdomen: Hepatobiliary/gallbladder: Normal for noncontrast technique.  Gallbladder is normal.  No ductal obstruction. Spleen: Normal for noncontrast technique. Pancreas: Normal for noncontrast technique. Adrenal/Genitourinary system: Bilateral simple cyst, similar.  Mildly complex cyst located within the inferior pole left kidney, similar.  Cortical calcification located within the left kidney. Bowel and Mesentery: There is no evidence for bowel obstruction.  Small hiatal hernia.  Normal appendix. Peritoneum: Calcification located within the left lower peritoneum, unchanged. Retroperitoneum: No enlarged lymph nodes. Vasculature: Calcified vascular disease. Reproductive: Calcified fibroid within the uterus.  Calcification within the left ovary. Lymph nodes: No enlarged lymph nodes. Abdominal wall: Normal. Osseous structures: Normal.     1. No evidence to suggest acute pathology within the abdomen or pelvis. Electronically signed by: Fuad Hernandez Date:    03/02/2023 Time:    08:56        Assessment:  Renetta Stewart is a 73 y.o. female here with:  1. Gastroesophageal reflux disease without esophagitis    2. Dysphagia, unspecified type    3. Weight loss    4. Constipation, unspecified constipation type    5. Complex renal cyst          Recommendations:  1. Schedule EGD/dilation, add on tomorrow  2. Continue Protonix  3. May stop Bentyl  4. Consider CT chest  5. Miralax powder 17 grams in 8 ounces of liquid  6. Consider  colonoscopy  7. Refer to urology for mildly complex left renal cyst    Follow up in about 4 weeks (around 4/3/2023).      Order summary:  Orders Placed This Encounter    Ambulatory referral/consult to Urology    EGD w Dilation       Thank you for allowing me to participate in the care of Renetta Stewart.      Eboni Salter, JOSÉ MIGUELC

## 2023-03-06 NOTE — PATIENT INSTRUCTIONS
Miralax powder 17 grams in 8 ounces  Avoid spicy, greasy foods  Avoid caffeine, citric acid, chocolate, peppermint, and carbonated drinks  Do not lay down within 3 hours of eating  Continue Pantoprazole 40 mg daily  Increase fluid to 64 ounces daily  Avoid antiinflammatory medications such as motrin, advil, aleve, ibuprofen

## 2023-03-08 ENCOUNTER — OFFICE VISIT (OUTPATIENT)
Dept: FAMILY MEDICINE | Facility: CLINIC | Age: 73
End: 2023-03-08
Payer: MEDICARE

## 2023-03-08 VITALS
OXYGEN SATURATION: 100 % | RESPIRATION RATE: 19 BRPM | HEIGHT: 61 IN | WEIGHT: 191 LBS | HEART RATE: 86 BPM | TEMPERATURE: 98 F | DIASTOLIC BLOOD PRESSURE: 92 MMHG | BODY MASS INDEX: 36.06 KG/M2 | SYSTOLIC BLOOD PRESSURE: 152 MMHG

## 2023-03-08 DIAGNOSIS — R63.4 WEIGHT LOSS: ICD-10-CM

## 2023-03-08 DIAGNOSIS — N28.1 COMPLEX RENAL CYST: ICD-10-CM

## 2023-03-08 DIAGNOSIS — R10.84 GENERALIZED ABDOMINAL PAIN: ICD-10-CM

## 2023-03-08 DIAGNOSIS — R13.10 DYSPHAGIA, UNSPECIFIED TYPE: ICD-10-CM

## 2023-03-08 DIAGNOSIS — F51.01 PRIMARY INSOMNIA: ICD-10-CM

## 2023-03-08 DIAGNOSIS — K21.9 GASTROESOPHAGEAL REFLUX DISEASE WITHOUT ESOPHAGITIS: Primary | ICD-10-CM

## 2023-03-08 PROBLEM — R94.31 ABNORMAL ELECTROCARDIOGRAM (ECG) (EKG): Status: RESOLVED | Noted: 2022-03-17 | Resolved: 2023-03-08

## 2023-03-08 PROBLEM — R60.0 LOWER EXTREMITY EDEMA: Status: RESOLVED | Noted: 2022-11-06 | Resolved: 2023-03-08

## 2023-03-08 PROCEDURE — 1101F PR PT FALLS ASSESS DOC 0-1 FALLS W/OUT INJ PAST YR: ICD-10-PCS | Mod: ,,, | Performed by: NURSE PRACTITIONER

## 2023-03-08 PROCEDURE — 3288F PR FALLS RISK ASSESSMENT DOCUMENTED: ICD-10-PCS | Mod: ,,, | Performed by: NURSE PRACTITIONER

## 2023-03-08 PROCEDURE — 3008F PR BODY MASS INDEX (BMI) DOCUMENTED: ICD-10-PCS | Mod: ,,, | Performed by: NURSE PRACTITIONER

## 2023-03-08 PROCEDURE — 1125F PR PAIN SEVERITY QUANTIFIED, PAIN PRESENT: ICD-10-PCS | Mod: ,,, | Performed by: NURSE PRACTITIONER

## 2023-03-08 PROCEDURE — 1160F RVW MEDS BY RX/DR IN RCRD: CPT | Mod: ,,, | Performed by: NURSE PRACTITIONER

## 2023-03-08 PROCEDURE — 1160F PR REVIEW ALL MEDS BY PRESCRIBER/CLIN PHARMACIST DOCUMENTED: ICD-10-PCS | Mod: ,,, | Performed by: NURSE PRACTITIONER

## 2023-03-08 PROCEDURE — 3288F FALL RISK ASSESSMENT DOCD: CPT | Mod: ,,, | Performed by: NURSE PRACTITIONER

## 2023-03-08 PROCEDURE — 1125F AMNT PAIN NOTED PAIN PRSNT: CPT | Mod: ,,, | Performed by: NURSE PRACTITIONER

## 2023-03-08 PROCEDURE — 4010F ACE/ARB THERAPY RXD/TAKEN: CPT | Mod: ,,, | Performed by: NURSE PRACTITIONER

## 2023-03-08 PROCEDURE — 3077F SYST BP >= 140 MM HG: CPT | Mod: ,,, | Performed by: NURSE PRACTITIONER

## 2023-03-08 PROCEDURE — 99212 OFFICE O/P EST SF 10 MIN: CPT | Mod: ,,, | Performed by: NURSE PRACTITIONER

## 2023-03-08 PROCEDURE — 3080F DIAST BP >= 90 MM HG: CPT | Mod: ,,, | Performed by: NURSE PRACTITIONER

## 2023-03-08 PROCEDURE — 3077F PR MOST RECENT SYSTOLIC BLOOD PRESSURE >= 140 MM HG: ICD-10-PCS | Mod: ,,, | Performed by: NURSE PRACTITIONER

## 2023-03-08 PROCEDURE — 4010F PR ACE/ARB THEARPY RXD/TAKEN: ICD-10-PCS | Mod: ,,, | Performed by: NURSE PRACTITIONER

## 2023-03-08 PROCEDURE — 1159F MED LIST DOCD IN RCRD: CPT | Mod: ,,, | Performed by: NURSE PRACTITIONER

## 2023-03-08 PROCEDURE — 1101F PT FALLS ASSESS-DOCD LE1/YR: CPT | Mod: ,,, | Performed by: NURSE PRACTITIONER

## 2023-03-08 PROCEDURE — 3080F PR MOST RECENT DIASTOLIC BLOOD PRESSURE >= 90 MM HG: ICD-10-PCS | Mod: ,,, | Performed by: NURSE PRACTITIONER

## 2023-03-08 PROCEDURE — 1159F PR MEDICATION LIST DOCUMENTED IN MEDICAL RECORD: ICD-10-PCS | Mod: ,,, | Performed by: NURSE PRACTITIONER

## 2023-03-08 PROCEDURE — 3008F BODY MASS INDEX DOCD: CPT | Mod: ,,, | Performed by: NURSE PRACTITIONER

## 2023-03-08 PROCEDURE — 99212 PR OFFICE/OUTPT VISIT, EST, LEVL II, 10-19 MIN: ICD-10-PCS | Mod: ,,, | Performed by: NURSE PRACTITIONER

## 2023-03-08 NOTE — PROGRESS NOTES
SULEMA Sun        PATIENT NAME: Renetta Stewart  : 1950  DATE: 3/8/23  MRN: [id number]      Billing Provider: SULEMA Sun  Level of Service: [place] OFFICE/OUTPT VISIT, EST, LEVL II, 10-19 MIN  Patient PCP Information       Provider PCP Type    Eldon Govea MD General            Reason for Visit / Chief Complaint: Follow-up (1 week follow up- reports is still losing weight, pain is still same level, and is still not drinking or eating)         History of Present Illness / Problem Focused Workflow     Renetta Stewart presents to the clinic with Follow-up (1 week follow up- reports is still losing weight, pain is still same level, and is still not drinking or eating)     Ms. Stewart presents in follow up for weight loss and abdominal pain, she went to ER recently for work up and had GI appointment, records reviewed. She denies any improvement in symptoms, has stopped most of her medications due to abdominal burning and inability to eat. She is accompanied by her daughter, they state they are trying to get Cscope and EGD approved by her insurance.       Review of Systems     Review of Systems   Constitutional:  Positive for appetite change and unexpected weight change.   Respiratory: Negative.     Cardiovascular: Negative.    Gastrointestinal:  Positive for abdominal pain and constipation. Negative for nausea, vomiting and reflux.   Genitourinary: Negative.    Musculoskeletal:  Positive for back pain.   Neurological: Negative.    Psychiatric/Behavioral: Negative.        Medical / Social / Family History     Past Medical History:   Diagnosis Date    Anemia     Anxiety     Dementia     Depression     GERD (gastroesophageal reflux disease)     Hyperlipidemia     Hypertension        History reviewed. No pertinent surgical history.    Social History  Ms. Renetta Stewart   reports that she has never smoked. She has never used smokeless tobacco. She reports that she does not currently use alcohol. She  reports that she does not use drugs.    Family History  Ms.'s Renetta Stewart  family history includes Hypertension in her father.    Medications and Allergies     Medications  Outpatient Medications Marked as Taking for the 3/8/23 encounter (Office Visit) with SULEMA Sun   Medication Sig Dispense Refill    acetaminophen-codeine 300-30mg (TYLENOL #3) 300-30 mg Tab TAKE 1-2 TABLETS BY MOUTH EVERY 12 HOURS AS NEEDED CAUSES DROWSINESS-AVOID ALCOHOL!!      dicyclomine (BENTYL) 20 mg tablet Take 1 tablet (20 mg total) by mouth 2 (two) times daily. 20 tablet 0    diltiaZEM (CARDIZEM CD) 240 MG 24 hr capsule Take 1 capsule (240 mg total) by mouth once daily. 30 capsule 1    furosemide (LASIX) 20 MG tablet Take 1 tablet (20 mg total) by mouth once daily. 90 tablet 1    levETIRAcetam (KEPPRA) 500 MG Tab Take 1 tablet (500 mg total) by mouth 2 (two) times daily. 60 tablet 1    levothyroxine (SYNTHROID) 50 MCG tablet Take 1 tablet (50 mcg total) by mouth once daily. 90 tablet 1    mirtazapine (REMERON) 15 MG tablet Take 1 tablet (15 mg total) by mouth every evening. 30 tablet 1    nitroGLYCERIN (NITROSTAT) 0.4 MG SL tablet Place 0.4 mg under the tongue every 5 (five) minutes as needed for Chest pain.      ondansetron (ZOFRAN) 4 MG tablet Take 1 tablet (4 mg total) by mouth every 6 (six) hours. 12 tablet 0    pantoprazole (PROTONIX) 40 MG tablet Take 1 tablet (40 mg total) by mouth once daily. 90 tablet 1    polyethylene glycol (GLYCOLAX) 17 gram/dose powder Take 17 g by mouth 2 (two) times daily. 1020 g 2    traMADoL (ULTRAM) 50 mg tablet Take 50 mg by mouth 2 (two) times daily as needed.      traZODone (DESYREL) 50 MG tablet Take 1 tablet (50 mg total) by mouth every evening. for 20 days 30 tablet 0       Allergies  Review of patient's allergies indicates:  No Known Allergies      Vitals:    03/08/23 1038   BP: (!) 152/92   Pulse:    Resp:    Temp:      Physical Exam  Vitals reviewed.   Constitutional:        Appearance: Normal appearance.   HENT:      Head: Normocephalic.   Cardiovascular:      Rate and Rhythm: Normal rate and regular rhythm.      Pulses: Normal pulses.      Heart sounds: Normal heart sounds.   Pulmonary:      Effort: Pulmonary effort is normal.      Breath sounds: Normal breath sounds.   Abdominal:      General: There is no distension.      Palpations: Abdomen is soft.      Tenderness: There is no abdominal tenderness.   Musculoskeletal:         General: Normal range of motion.   Skin:     General: Skin is warm and dry.   Neurological:      Mental Status: She is alert and oriented to person, place, and time.   Psychiatric:         Behavior: Behavior normal.          Lab Results   Component Value Date    WBC 3.66 (L) 03/02/2023    HGB 12.2 03/02/2023    HCT 39.2 03/02/2023    MCV 81.7 03/02/2023     03/02/2023          Sodium   Date Value Ref Range Status   03/02/2023 140 136 - 145 mmol/L Final     Potassium   Date Value Ref Range Status   03/02/2023 3.5 3.5 - 5.1 mmol/L Final     Chloride   Date Value Ref Range Status   03/02/2023 102 98 - 107 mmol/L Final     CO2   Date Value Ref Range Status   03/02/2023 28 21 - 32 mmol/L Final     Glucose   Date Value Ref Range Status   03/02/2023 86 74 - 106 mg/dL Final     BUN   Date Value Ref Range Status   03/02/2023 20 (H) 7 - 18 mg/dL Final     Creatinine   Date Value Ref Range Status   03/02/2023 1.80 (H) 0.55 - 1.02 mg/dL Final     Calcium   Date Value Ref Range Status   03/02/2023 8.8 8.5 - 10.1 mg/dL Final     Total Protein   Date Value Ref Range Status   03/02/2023 7.2 6.4 - 8.2 g/dL Final     Albumin   Date Value Ref Range Status   03/02/2023 3.6 3.5 - 5.0 g/dL Final     Bilirubin, Total   Date Value Ref Range Status   03/02/2023 0.8 >0.0 - 1.2 mg/dL Final     Alk Phos   Date Value Ref Range Status   03/02/2023 55 55 - 142 U/L Final     AST   Date Value Ref Range Status   03/02/2023 12 (L) 15 - 37 U/L Final     ALT   Date Value Ref Range Status    03/02/2023 18 13 - 56 U/L Final     Anion Gap   Date Value Ref Range Status   03/02/2023 14 7 - 16 mmol/L Final     eGFR   Date Value Ref Range Status   03/02/2023 29 (L) >=60 mL/min/1.73m² Final        Lab Results   Component Value Date    CHOL 131 02/14/2022    CHOL 133 06/08/2021    CHOL 163 10/09/2020     Lab Results   Component Value Date    HDL 78 (H) 02/14/2022    HDL 74 (H) 06/08/2021    HDL 90 10/09/2020     Lab Results   Component Value Date    LDLCALC 37 02/14/2022    LDLCALC 47 06/08/2021    LDLCALC 67 10/09/2020     Lab Results   Component Value Date    TRIG 78 02/14/2022    TRIG 61 06/08/2021    TRIG 31 10/09/2020     Lab Results   Component Value Date    CHOLHDL 1.7 02/14/2022    CHOLHDL 1.8 06/08/2021    CHOLHDL 1.8 10/09/2020        No results found for: LABA1C, HGBA1C     Lab Results   Component Value Date    TSH 2.900 09/06/2022    FREET4 1.45 09/06/2022        No results found for: PSA, PSATOTAL, PSAFREE, PSAFREEPCT       Health Maintenance Due   Topic Date Due    Mammogram  Never done    DEXA Scan  Never done       Problem List Items Addressed This Visit          Renal/    Complex renal cyst       Endocrine    Weight loss       GI    Gastroesophageal reflux disease without esophagitis - Primary    Dysphagia       Other    Primary insomnia     Other Visit Diagnoses       Generalized abdominal pain              Schedule 3 month follow up at this clinic, sooner if needed. Cancel abdominal US, she has had full CT scan and has multiple specialist appointments.Recommended maalox prior to eating, states carafate was given in hospital and helped temporarily.      Health Maintenance Topics with due status: Not Due       Topic Last Completion Date    Colorectal Cancer Screening 06/18/2019    Lipid Panel 02/14/2022       Future Appointments   Date Time Provider Department Center   3/11/2023  9:00 AM [facility] GI ROOM 02 [facility]   4/13/2023  2:15 PM SULEMA Rivera [facility]  ASC   5/4/2023  9:45 AM Aamir Mejia MD [facility] NEURO [place] [facility]   5/8/2023  1:30 PM Eldon Govea MD [facility] FAMMED [place] [facility]   7/20/2023  9:00 AM [facility] CV DEVICE CHECK [facility] CRDDIA [place] [facility]        There are no Patient Instructions on file for this visit.  No follow-ups on file.       Date of encounter: 3/8/23

## 2023-03-11 ENCOUNTER — ANESTHESIA EVENT (OUTPATIENT)
Dept: GASTROENTEROLOGY | Facility: HOSPITAL | Age: 73
End: 2023-03-11
Payer: MEDICARE

## 2023-03-11 ENCOUNTER — ANESTHESIA (OUTPATIENT)
Dept: GASTROENTEROLOGY | Facility: HOSPITAL | Age: 73
End: 2023-03-11
Payer: MEDICARE

## 2023-03-11 ENCOUNTER — HOSPITAL ENCOUNTER (OUTPATIENT)
Dept: GASTROENTEROLOGY | Facility: HOSPITAL | Age: 73
Discharge: HOME | End: 2023-03-11
Attending: NURSE PRACTITIONER
Payer: MEDICARE

## 2023-03-11 VITALS
HEART RATE: 61 BPM | DIASTOLIC BLOOD PRESSURE: 80 MMHG | TEMPERATURE: 98 F | SYSTOLIC BLOOD PRESSURE: 177 MMHG | OXYGEN SATURATION: 98 % | RESPIRATION RATE: 13 BRPM

## 2023-03-11 DIAGNOSIS — R13.19 ESOPHAGEAL DYSPHAGIA: Primary | ICD-10-CM

## 2023-03-11 DIAGNOSIS — R13.10 DYSPHAGIA, UNSPECIFIED TYPE: ICD-10-CM

## 2023-03-11 DIAGNOSIS — K21.9 GASTROESOPHAGEAL REFLUX DISEASE WITHOUT ESOPHAGITIS: ICD-10-CM

## 2023-03-11 DIAGNOSIS — K44.9 HH (HIATUS HERNIA): ICD-10-CM

## 2023-03-11 PROCEDURE — 43239 PR EGD, FLEX, W/BIOPSY, SGL/MULTI: ICD-10-PCS | Mod: ,,, | Performed by: INTERNAL MEDICINE

## 2023-03-11 PROCEDURE — D9220A PRA ANESTHESIA: ICD-10-PCS | Mod: CRNA,,, | Performed by: NURSE ANESTHETIST, CERTIFIED REGISTERED

## 2023-03-11 PROCEDURE — 88305 SURGICAL PATHOLOGY: ICD-10-PCS | Mod: 26,,, | Performed by: PATHOLOGY

## 2023-03-11 PROCEDURE — 25000003 PHARM REV CODE 250: Performed by: NURSE ANESTHETIST, CERTIFIED REGISTERED

## 2023-03-11 PROCEDURE — 88342 SURGICAL PATHOLOGY: ICD-10-PCS | Mod: 26,,, | Performed by: PATHOLOGY

## 2023-03-11 PROCEDURE — 88305 TISSUE EXAM BY PATHOLOGIST: CPT | Mod: TC,SUR | Performed by: INTERNAL MEDICINE

## 2023-03-11 PROCEDURE — 37000008 HC ANESTHESIA 1ST 15 MINUTES

## 2023-03-11 PROCEDURE — 43239 EGD BIOPSY SINGLE/MULTIPLE: CPT | Mod: ,,, | Performed by: INTERNAL MEDICINE

## 2023-03-11 PROCEDURE — 43450 DILATE ESOPHAGUS 1/MULT PASS: CPT | Mod: 51,,, | Performed by: INTERNAL MEDICINE

## 2023-03-11 PROCEDURE — 88342 IMHCHEM/IMCYTCHM 1ST ANTB: CPT | Mod: 26,,, | Performed by: PATHOLOGY

## 2023-03-11 PROCEDURE — 27201423 OPTIME MED/SURG SUP & DEVICES STERILE SUPPLY

## 2023-03-11 PROCEDURE — 43450 PR DILATE ESOPHAGUS: ICD-10-PCS | Mod: 51,,, | Performed by: INTERNAL MEDICINE

## 2023-03-11 PROCEDURE — D9220A PRA ANESTHESIA: ICD-10-PCS | Mod: ANES,,, | Performed by: ANESTHESIOLOGY

## 2023-03-11 PROCEDURE — D9220A PRA ANESTHESIA: Mod: CRNA,,, | Performed by: NURSE ANESTHETIST, CERTIFIED REGISTERED

## 2023-03-11 PROCEDURE — 43239 EGD BIOPSY SINGLE/MULTIPLE: CPT | Performed by: INTERNAL MEDICINE

## 2023-03-11 PROCEDURE — 63600175 PHARM REV CODE 636 W HCPCS: Performed by: NURSE ANESTHETIST, CERTIFIED REGISTERED

## 2023-03-11 PROCEDURE — 43450 DILATE ESOPHAGUS 1/MULT PASS: CPT | Mod: 59 | Performed by: INTERNAL MEDICINE

## 2023-03-11 PROCEDURE — D9220A PRA ANESTHESIA: Mod: ANES,,, | Performed by: ANESTHESIOLOGY

## 2023-03-11 PROCEDURE — 88305 TISSUE EXAM BY PATHOLOGIST: CPT | Mod: 26,,, | Performed by: PATHOLOGY

## 2023-03-11 RX ORDER — SODIUM CHLORIDE 0.9 % (FLUSH) 0.9 %
10 SYRINGE (ML) INJECTION
Status: DISCONTINUED | OUTPATIENT
Start: 2023-03-11 | End: 2023-03-12 | Stop reason: HOSPADM

## 2023-03-11 RX ORDER — LIDOCAINE HYDROCHLORIDE 20 MG/ML
INJECTION, SOLUTION EPIDURAL; INFILTRATION; INTRACAUDAL; PERINEURAL
Status: DISCONTINUED | OUTPATIENT
Start: 2023-03-11 | End: 2023-03-11

## 2023-03-11 RX ORDER — PROPOFOL 10 MG/ML
VIAL (ML) INTRAVENOUS
Status: DISCONTINUED | OUTPATIENT
Start: 2023-03-11 | End: 2023-03-11

## 2023-03-11 RX ADMIN — SODIUM CHLORIDE: 9 INJECTION, SOLUTION INTRAVENOUS at 10:03

## 2023-03-11 RX ADMIN — LIDOCAINE HYDROCHLORIDE 100 MG: 20 INJECTION, SOLUTION INTRAVENOUS at 10:03

## 2023-03-11 RX ADMIN — PROPOFOL 50 MG: 10 INJECTION, EMULSION INTRAVENOUS at 10:03

## 2023-03-11 RX ADMIN — PROPOFOL 100 MG: 10 INJECTION, EMULSION INTRAVENOUS at 10:03

## 2023-03-11 NOTE — TRANSFER OF CARE
Anesthesia Transfer of Care Note    Patient: Renetta Stewart    Procedure(s) Performed: * No procedures listed *    Patient location: GI    Anesthesia Type: general    Transport from OR: Transported from OR on room air with adequate spontaneous ventilation. Continuous ECG monitoring in transport. Continuous SpO2 monitoring in transport    Post pain: adequate analgesia    Post assessment: no apparent anesthetic complications    Post vital signs: stable    Level of consciousness: responds to stimulation    Nausea/Vomiting: no nausea/vomiting    Complications: none    Transfer of care protocol was followed      Last vitals:   Visit Vitals  /77   Pulse 90   Temp 36.7 °C (98 °F) (Skin)   Resp 12   SpO2 (!) 94%   Breastfeeding No

## 2023-03-11 NOTE — ANESTHESIA PREPROCEDURE EVALUATION
03/11/2023  Renetta Stewart is a 73 y.o., female.      Pre-op Assessment    I have reviewed the Patient Summary Reports.       I have reviewed the Medications.     Review of Systems         Anesthesia Plan  Type of Anesthesia, risks & benefits discussed:    Anesthesia Type: Gen Natural Airway  Intra-op Monitoring Plan: Standard ASA Monitors  Post Op Pain Control Plan: IV/PO Opioids PRN  Induction:  IV  Informed Consent: Informed consent signed with the Patient and all parties understand the risks and agree with anesthesia plan.  All questions answered.   ASA Score: 2    Ready For Surgery From Anesthesia Perspective.     .  KNDA    Hct 39  Cr 1.8    Medical History   Anemia Anxiety   Depression Hypertension   GERD (gastroesophageal reflux disease) Hyperlipidemia   Dementia    Renal insufficiency    Airway exam deferred (COVID precautions)

## 2023-03-11 NOTE — H&P
[facility]  Gastroenterology  H&P    Patient Name: Renetta Stewart  MRN: [id number]  Admission Date: 3/11/2023  Code Status: No Order    Attending Provider: SULEMA Rivera   Primary Care Physician: Eldon Govea MD  Principal Problem:<principal problem not specified>    Subjective:     History of Present Illness: Pt has weight loss and esophageal dysphagia; for egd with dilation.    Past Medical History:   Diagnosis Date    Anemia     Anxiety     Dementia     Depression     GERD (gastroesophageal reflux disease)     Hyperlipidemia     Hypertension        History reviewed. No pertinent surgical history.    Review of patient's allergies indicates:  No Known Allergies  Family History       Problem Relation (Age of Onset)    Hypertension Father          Tobacco Use    Smoking status: Never    Smokeless tobacco: Never   Substance and Sexual Activity    Alcohol use: Not Currently    Drug use: Never    Sexual activity: Not Currently     Review of Systems   Constitutional:  Positive for unexpected weight change.   HENT:  Positive for trouble swallowing.    Respiratory: Negative.     Cardiovascular: Negative.    Gastrointestinal: Negative.    Objective:     Vital Signs (Most Recent):    Vital Signs (24h Range):           There is no height or weight on file to calculate BMI.    No intake or output data in the 24 hours ending 03/11/23 1019    Lines/Drains/Airways       None                   Physical Exam  Vitals reviewed.   Constitutional:       General: She is not in acute distress.     Appearance: Normal appearance. She is well-developed. She is not ill-appearing.   HENT:      Head: Normocephalic and atraumatic.      Nose: Nose normal.   Eyes:      Pupils: Pupils are equal, round, and reactive to light.   Cardiovascular:      Rate and Rhythm: Normal rate and regular rhythm.   Pulmonary:      Effort: Pulmonary effort is normal.      Breath sounds: Normal breath sounds. No wheezing.   Abdominal:       General: Abdomen is flat. Bowel sounds are normal. There is no distension.      Palpations: Abdomen is soft.      Tenderness: There is no abdominal tenderness. There is no guarding.   Skin:     General: Skin is warm and dry.      Coloration: Skin is not jaundiced.   Neurological:      Mental Status: She is alert.   Psychiatric:         Attention and Perception: Attention normal.         Mood and Affect: Affect normal.         Speech: Speech normal.         Behavior: Behavior is cooperative.      Comments: Pt was calm while speaking.       Significant Labs:  CBC: No results for input(s): WBC, HGB, HCT, PLT in the last 48 hours.  CMP: No results for input(s): GLU, CALCIUM, ALBUMIN, PROT, NA, K, CO2, CL, BUN, CREATININE, ALKPHOS, ALT, AST, BILITOT in the last 48 hours.    Significant Imaging:  Imaging results within the past 24 hours have been reviewed.    Assessment/Plan:     There are no hospital problems to display for this patient.        Imp: esophageal dysphagia; weight loss  Plan: egd with dilation    Efraín Giron MD  Gastroenterology  [facility]

## 2023-03-11 NOTE — DISCHARGE INSTRUCTIONS
Result Text  Procedure Date  3/11/23     Impression  Overall Impression: Mucosa of the esophagus, stomach and duodenum was grossly normal. Z-line is at 35cm overlying a 5cm hiatus hernia. The distal esophagus and stomach were biopsied due to dysphagia.     Recommendation  Await pathology results is recommended. Avoid nsaids. PPI or H2RA daily; repeat dilation of the esophagus prn  Disp: DC to home in stable condition. Resume diet. No driving x 24 hours. F/U with PCP as scheduled.  Dx: esophageal dysphagia, hiatus hernia     Indication  Gastroesophageal reflux disease without esophagitis, Dysphagia, unspecified type

## 2023-03-11 NOTE — ANESTHESIA POSTPROCEDURE EVALUATION
Anesthesia Post Evaluation    Patient: Renetta Stewart    Procedure(s) Performed: * No procedures listed *    Final Anesthesia Type: general      Patient location during evaluation: GI PACU  Post-procedure vital signs: reviewed and stable  Pain management: adequate  Airway patency: patent    PONV status at discharge: No PONV  Anesthetic complications: no      Cardiovascular status: hemodynamically stable  Respiratory status: unassisted  Hydration status: euvolemic  Follow-up not needed.          Vitals Value Taken Time   /80 03/11/23 1105   Temp 36.7 °C (98 °F) 03/11/23 1036   Pulse 61 03/11/23 1105   Resp 13 03/11/23 1105   SpO2 98 % 03/11/23 1105         Event Time   Out of Recovery 11:19:53         Pain/Yoav Score: Yoav Score: 10 (3/11/2023 11:01 AM)

## 2023-03-14 ENCOUNTER — TELEPHONE (OUTPATIENT)
Dept: GASTROENTEROLOGY | Facility: CLINIC | Age: 73
End: 2023-03-14
Payer: MEDICARE

## 2023-03-14 LAB
ESTROGEN SERPL-MCNC: NORMAL PG/ML
INSULIN SERPL-ACNC: NORMAL U[IU]/ML
LAB AP GROSS DESCRIPTION: NORMAL
LAB AP LABORATORY NOTES: NORMAL
T3RU NFR SERPL: NORMAL %

## 2023-03-14 NOTE — TELEPHONE ENCOUNTER
Returned call to patients daughter. States the patient is still not eating and drinking and acting like she doesn't know what's going on. Informed daugther that after speaking with Eboni Salter NP and Dr. Giron, that her CT abd was normal and her EGD did not show a stricture or ulcer, or any cause for her behavior. Dr. Giron is not entirely sure it is GI related. Recommends follow up with patients PCP. Daughter verbalized understanding.          ----- Message from Karina Johnson sent at 3/14/2023 12:48 PM CDT -----  Pts daughter Jacque called .  [phone]

## 2023-03-15 ENCOUNTER — OFFICE VISIT (OUTPATIENT)
Dept: FAMILY MEDICINE | Facility: CLINIC | Age: 73
End: 2023-03-15
Payer: MEDICARE

## 2023-03-15 ENCOUNTER — TELEPHONE (OUTPATIENT)
Dept: GASTROENTEROLOGY | Facility: CLINIC | Age: 73
End: 2023-03-15
Payer: MEDICARE

## 2023-03-15 VITALS
BODY MASS INDEX: 35.43 KG/M2 | HEIGHT: 61 IN | OXYGEN SATURATION: 99 % | WEIGHT: 187.63 LBS | HEART RATE: 116 BPM | RESPIRATION RATE: 19 BRPM | DIASTOLIC BLOOD PRESSURE: 88 MMHG | SYSTOLIC BLOOD PRESSURE: 130 MMHG

## 2023-03-15 DIAGNOSIS — R63.0 ANOREXIA: Primary | ICD-10-CM

## 2023-03-15 DIAGNOSIS — E03.9 HYPOTHYROIDISM, UNSPECIFIED TYPE: ICD-10-CM

## 2023-03-15 DIAGNOSIS — G40.909 SEIZURE DISORDER: ICD-10-CM

## 2023-03-15 DIAGNOSIS — R63.4 WEIGHT LOSS: ICD-10-CM

## 2023-03-15 LAB
AMYLASE SERPL-CCNC: 64 U/L (ref 25–115)
ANION GAP SERPL CALCULATED.3IONS-SCNC: 16 MMOL/L (ref 7–16)
BUN SERPL-MCNC: 26 MG/DL (ref 7–18)
BUN/CREAT SERPL: 14 (ref 6–20)
CALCIUM SERPL-MCNC: 9.6 MG/DL (ref 8.5–10.1)
CHLORIDE SERPL-SCNC: 106 MMOL/L (ref 98–107)
CO2 SERPL-SCNC: 23 MMOL/L (ref 21–32)
CREAT SERPL-MCNC: 1.84 MG/DL (ref 0.55–1.02)
EGFR (NO RACE VARIABLE) (RUSH/TITUS): 29 ML/MIN/1.73M²
GLUCOSE SERPL-MCNC: 116 MG/DL (ref 74–106)
LIPASE SERPL-CCNC: 200 U/L (ref 73–393)
POTASSIUM SERPL-SCNC: 3.6 MMOL/L (ref 3.5–5.1)
SODIUM SERPL-SCNC: 141 MMOL/L (ref 136–145)
T4 FREE SERPL-MCNC: 1.44 NG/DL (ref 0.76–1.46)
TSH SERPL DL<=0.005 MIU/L-ACNC: 3.27 UIU/ML (ref 0.36–3.74)

## 2023-03-15 PROCEDURE — 80048 BASIC METABOLIC PNL TOTAL CA: CPT | Mod: ,,, | Performed by: CLINICAL MEDICAL LABORATORY

## 2023-03-15 PROCEDURE — 84439 T4, FREE: ICD-10-PCS | Mod: ,,, | Performed by: CLINICAL MEDICAL LABORATORY

## 2023-03-15 PROCEDURE — 83690 ASSAY OF LIPASE: CPT | Mod: ,,, | Performed by: CLINICAL MEDICAL LABORATORY

## 2023-03-15 PROCEDURE — 1101F PT FALLS ASSESS-DOCD LE1/YR: CPT | Mod: ,,, | Performed by: FAMILY MEDICINE

## 2023-03-15 PROCEDURE — 3079F DIAST BP 80-89 MM HG: CPT | Mod: ,,, | Performed by: FAMILY MEDICINE

## 2023-03-15 PROCEDURE — 3288F PR FALLS RISK ASSESSMENT DOCUMENTED: ICD-10-PCS | Mod: ,,, | Performed by: FAMILY MEDICINE

## 2023-03-15 PROCEDURE — 82150 AMYLASE: ICD-10-PCS | Mod: ,,, | Performed by: CLINICAL MEDICAL LABORATORY

## 2023-03-15 PROCEDURE — 84439 ASSAY OF FREE THYROXINE: CPT | Mod: ,,, | Performed by: CLINICAL MEDICAL LABORATORY

## 2023-03-15 PROCEDURE — 1160F RVW MEDS BY RX/DR IN RCRD: CPT | Mod: ,,, | Performed by: FAMILY MEDICINE

## 2023-03-15 PROCEDURE — 1125F PR PAIN SEVERITY QUANTIFIED, PAIN PRESENT: ICD-10-PCS | Mod: ,,, | Performed by: FAMILY MEDICINE

## 2023-03-15 PROCEDURE — 4010F ACE/ARB THERAPY RXD/TAKEN: CPT | Mod: ,,, | Performed by: FAMILY MEDICINE

## 2023-03-15 PROCEDURE — 3008F PR BODY MASS INDEX (BMI) DOCUMENTED: ICD-10-PCS | Mod: ,,, | Performed by: FAMILY MEDICINE

## 2023-03-15 PROCEDURE — 3075F PR MOST RECENT SYSTOLIC BLOOD PRESS GE 130-139MM HG: ICD-10-PCS | Mod: ,,, | Performed by: FAMILY MEDICINE

## 2023-03-15 PROCEDURE — 1125F AMNT PAIN NOTED PAIN PRSNT: CPT | Mod: ,,, | Performed by: FAMILY MEDICINE

## 2023-03-15 PROCEDURE — 1101F PR PT FALLS ASSESS DOC 0-1 FALLS W/OUT INJ PAST YR: ICD-10-PCS | Mod: ,,, | Performed by: FAMILY MEDICINE

## 2023-03-15 PROCEDURE — 99213 OFFICE O/P EST LOW 20 MIN: CPT | Mod: ,,, | Performed by: FAMILY MEDICINE

## 2023-03-15 PROCEDURE — 3079F PR MOST RECENT DIASTOLIC BLOOD PRESSURE 80-89 MM HG: ICD-10-PCS | Mod: ,,, | Performed by: FAMILY MEDICINE

## 2023-03-15 PROCEDURE — 4010F PR ACE/ARB THEARPY RXD/TAKEN: ICD-10-PCS | Mod: ,,, | Performed by: FAMILY MEDICINE

## 2023-03-15 PROCEDURE — 1159F MED LIST DOCD IN RCRD: CPT | Mod: ,,, | Performed by: FAMILY MEDICINE

## 2023-03-15 PROCEDURE — 99213 PR OFFICE/OUTPT VISIT, EST, LEVL III, 20-29 MIN: ICD-10-PCS | Mod: ,,, | Performed by: FAMILY MEDICINE

## 2023-03-15 PROCEDURE — 84443 ASSAY THYROID STIM HORMONE: CPT | Mod: ,,, | Performed by: CLINICAL MEDICAL LABORATORY

## 2023-03-15 PROCEDURE — 82150 ASSAY OF AMYLASE: CPT | Mod: ,,, | Performed by: CLINICAL MEDICAL LABORATORY

## 2023-03-15 PROCEDURE — 80177 LEVETIRACETAM  (KEPPRA) LEVEL: ICD-10-PCS | Mod: ,,, | Performed by: CLINICAL MEDICAL LABORATORY

## 2023-03-15 PROCEDURE — 1160F PR REVIEW ALL MEDS BY PRESCRIBER/CLIN PHARMACIST DOCUMENTED: ICD-10-PCS | Mod: ,,, | Performed by: FAMILY MEDICINE

## 2023-03-15 PROCEDURE — 3008F BODY MASS INDEX DOCD: CPT | Mod: ,,, | Performed by: FAMILY MEDICINE

## 2023-03-15 PROCEDURE — 80177 DRUG SCRN QUAN LEVETIRACETAM: CPT | Mod: ,,, | Performed by: CLINICAL MEDICAL LABORATORY

## 2023-03-15 PROCEDURE — 84443 TSH: ICD-10-PCS | Mod: ,,, | Performed by: CLINICAL MEDICAL LABORATORY

## 2023-03-15 PROCEDURE — 3288F FALL RISK ASSESSMENT DOCD: CPT | Mod: ,,, | Performed by: FAMILY MEDICINE

## 2023-03-15 PROCEDURE — 80048 BASIC METABOLIC PANEL: ICD-10-PCS | Mod: ,,, | Performed by: CLINICAL MEDICAL LABORATORY

## 2023-03-15 PROCEDURE — 1159F PR MEDICATION LIST DOCUMENTED IN MEDICAL RECORD: ICD-10-PCS | Mod: ,,, | Performed by: FAMILY MEDICINE

## 2023-03-15 PROCEDURE — 83690 LIPASE: ICD-10-PCS | Mod: ,,, | Performed by: CLINICAL MEDICAL LABORATORY

## 2023-03-15 PROCEDURE — 3075F SYST BP GE 130 - 139MM HG: CPT | Mod: ,,, | Performed by: FAMILY MEDICINE

## 2023-03-15 RX ORDER — MEMANTINE HYDROCHLORIDE 10 MG/1
5 TABLET ORAL DAILY
COMMUNITY
End: 2023-06-13

## 2023-03-15 RX ORDER — HYDRALAZINE HYDROCHLORIDE 100 MG/1
100 TABLET, FILM COATED ORAL
COMMUNITY
End: 2023-03-28

## 2023-03-15 NOTE — PROGRESS NOTES
Renetta Stewart is a 73 y.o. female seen today for follow-up.  Patient is here with her daughters and they are very concerned that the patient is not eating and drinking currently.  Patient reports she hurts all over and has difficulty swallowing.  She is status post a recent upper endoscopy with negative findings.  Still, she did have a mild esophageal stretching.  Patient's CT of the abdomen and pelvis only showed some renal cysts.  Patient's family reports she is no longer taking any of her medications.  Patient's family reports that she often wanders off at night and has sundowning.    Past Medical History:   Diagnosis Date    Anemia     Anxiety     Dementia     Depression     GERD (gastroesophageal reflux disease)     Hyperlipidemia     Hypertension      Family History   Problem Relation Age of Onset    Hypertension Father      Current Outpatient Medications on File Prior to Visit   Medication Sig Dispense Refill    hydrALAZINE (APRESOLINE) 100 MG tablet Take 100 mg by mouth three times daily with food.      pantoprazole (PROTONIX) 40 MG tablet Take 1 tablet (40 mg total) by mouth once daily. 90 tablet 1    acetaminophen-codeine 300-30mg (TYLENOL #3) 300-30 mg Tab TAKE 1-2 TABLETS BY MOUTH EVERY 12 HOURS AS NEEDED CAUSES DROWSINESS-AVOID ALCOHOL!!      dicyclomine (BENTYL) 20 mg tablet Take 1 tablet (20 mg total) by mouth 2 (two) times daily. (Patient not taking: Reported on 3/15/2023) 20 tablet 0    diltiaZEM (CARDIZEM CD) 240 MG 24 hr capsule Take 1 capsule (240 mg total) by mouth once daily. (Patient not taking: Reported on 3/15/2023) 30 capsule 1    furosemide (LASIX) 20 MG tablet Take 1 tablet (20 mg total) by mouth once daily. (Patient not taking: Reported on 3/15/2023) 90 tablet 1    levETIRAcetam (KEPPRA) 500 MG Tab Take 1 tablet (500 mg total) by mouth 2 (two) times daily. (Patient not taking: Reported on 3/15/2023) 60 tablet 1    levothyroxine (SYNTHROID) 50 MCG tablet Take 1 tablet (50 mcg total) by  mouth once daily. (Patient not taking: Reported on 3/15/2023) 90 tablet 1    memantine (NAMENDA) 10 MG Tab Take 5 mg by mouth once daily.      mirtazapine (REMERON) 15 MG tablet Take 1 tablet (15 mg total) by mouth every evening. (Patient not taking: Reported on 3/15/2023) 30 tablet 1    nitroGLYCERIN (NITROSTAT) 0.4 MG SL tablet Place 0.4 mg under the tongue every 5 (five) minutes as needed for Chest pain.      ondansetron (ZOFRAN) 4 MG tablet Take 1 tablet (4 mg total) by mouth every 6 (six) hours. (Patient not taking: Reported on 3/15/2023) 12 tablet 0    polyethylene glycol (GLYCOLAX) 17 gram/dose powder Take 17 g by mouth 2 (two) times daily. (Patient not taking: Reported on 3/15/2023) 1020 g 2    traMADoL (ULTRAM) 50 mg tablet Take 50 mg by mouth 2 (two) times daily as needed.      [DISCONTINUED] traZODone (DESYREL) 50 MG tablet Take 1 tablet (50 mg total) by mouth every evening. for 20 days (Patient not taking: Reported on 3/15/2023) 30 tablet 0     No current facility-administered medications on file prior to visit.       There is no immunization history on file for this patient.    Review of Systems   Constitutional:  Positive for malaise/fatigue and weight loss. Negative for fever.   Respiratory:  Negative for shortness of breath.    Cardiovascular:  Negative for chest pain and palpitations.   Gastrointestinal:  Positive for heartburn. Negative for nausea and vomiting.   Musculoskeletal:  Positive for myalgias.   Neurological:  Positive for weakness.   Psychiatric/Behavioral:  Positive for memory loss. Negative for depression. The patient is nervous/anxious and has insomnia.       Vitals:    03/15/23 1327   BP: 130/88   Pulse:    Resp:        Physical Exam  Vitals reviewed.   Constitutional:       Appearance: Normal appearance.   HENT:      Head: Normocephalic.   Eyes:      Extraocular Movements: Extraocular movements intact.      Conjunctiva/sclera: Conjunctivae normal.      Pupils: Pupils are equal,  round, and reactive to light.   Neck:      Thyroid: No thyroid mass or thyromegaly.   Cardiovascular:      Rate and Rhythm: Normal rate and regular rhythm.      Heart sounds: Murmur heard.   Systolic murmur is present with a grade of 3/6.     No gallop.   Pulmonary:      Effort: Pulmonary effort is normal. No respiratory distress.      Breath sounds: Normal breath sounds. No wheezing or rales.   Abdominal:      General: Bowel sounds are normal.      Palpations: Abdomen is soft. There is no hepatomegaly or splenomegaly.      Tenderness: There is no abdominal tenderness.   Skin:     General: Skin is warm and dry.      Coloration: Skin is not jaundiced or pale.   Neurological:      Mental Status: She is alert.   Psychiatric:         Mood and Affect: Mood normal.         Behavior: Behavior normal.         Thought Content: Thought content normal.         Judgment: Judgment normal.        Assessment and Plan  Anorexia  -     Amylase; Future; Expected date: 03/15/2023  -     Lipase; Future; Expected date: 03/15/2023    Hypothyroidism, unspecified type  -     T4, Free; Future; Expected date: 03/15/2023  -     TSH; Future; Expected date: 03/15/2023    Weight loss  -     Basic Metabolic Panel; Future; Expected date: 03/15/2023    Seizure disorder  -     Levetiracetam Level; Future; Expected date: 03/15/2023            Return to clinic in 2 weeks.  Patient will be set up for follow-up with Cardiology for what may be a new heart murmur.  Patient will restart her Remeron 15 mg at night her Namenda her thyroid medication and her Protonix.  I went over the proper use of the Protonix and thyroid medication with her daughter.  Patient will be admitted to home health for speech therapy to help with the swallowing problem as well as nursing and possibly PT and OT if needed.    Health Maintenance Topics with due status: Not Due       Topic Last Completion Date    Colorectal Cancer Screening 06/18/2019    Lipid Panel 02/14/2022

## 2023-03-15 NOTE — TELEPHONE ENCOUNTER
Called patient to discuss results and spoke with daughter and verbalized understanding.        ----- Message from Efraín Giron MD sent at 3/14/2023  5:06 PM CDT -----  EGD bx shows gastritis and normal esophagus bx.

## 2023-03-16 LAB — LEVETIRACETAM SERPL-MCNC: <2 ΜG/ML (ref 12–46)

## 2023-03-17 ENCOUNTER — TELEPHONE (OUTPATIENT)
Dept: FAMILY MEDICINE | Facility: CLINIC | Age: 73
End: 2023-03-17
Payer: MEDICARE

## 2023-03-17 DIAGNOSIS — N28.9 RENAL INSUFFICIENCY: Primary | ICD-10-CM

## 2023-03-17 NOTE — TELEPHONE ENCOUNTER
Ms Jacque notified and verbalized understanding, letter to be cancelled, agreed to nephrology eval.

## 2023-03-17 NOTE — LETTER
March 17, 2023    Renetta Stewart  [address]  [place] [address]             [facility]  [address] [place] [address]  Phone: [phone]  Fax: [phone] Dear Ms. Pat:    Your recent lab results showed elevated kidney function. Dr Govea recommends nephrology evaluation, which has been placed to be scheduled.     If you have any questions or concerns, please don't hesitate to call.    Sincerely,      Debbie Alegria lpn

## 2023-03-17 NOTE — TELEPHONE ENCOUNTER
Pt attempted, no answer, no voicemail set up at this time, will place nephrology eval for scheduling and send a letter.

## 2023-03-17 NOTE — TELEPHONE ENCOUNTER
----- Message from Eldon Govea MD sent at 3/17/2023  8:00 AM CDT -----  Her labs look okay except for her renal function and I recommend an evaluation with nephrology.

## 2023-03-28 ENCOUNTER — OFFICE VISIT (OUTPATIENT)
Dept: FAMILY MEDICINE | Facility: CLINIC | Age: 73
End: 2023-03-28
Payer: MEDICARE

## 2023-03-28 VITALS
HEART RATE: 108 BPM | OXYGEN SATURATION: 95 % | DIASTOLIC BLOOD PRESSURE: 90 MMHG | HEIGHT: 64 IN | SYSTOLIC BLOOD PRESSURE: 144 MMHG | BODY MASS INDEX: 30.39 KG/M2 | TEMPERATURE: 97 F | RESPIRATION RATE: 19 BRPM | WEIGHT: 178 LBS

## 2023-03-28 DIAGNOSIS — R63.0 ANOREXIA: ICD-10-CM

## 2023-03-28 DIAGNOSIS — N28.9 RENAL INSUFFICIENCY: Primary | ICD-10-CM

## 2023-03-28 DIAGNOSIS — R63.4 WEIGHT LOSS: ICD-10-CM

## 2023-03-28 PROCEDURE — 3008F PR BODY MASS INDEX (BMI) DOCUMENTED: ICD-10-PCS | Mod: ,,, | Performed by: FAMILY MEDICINE

## 2023-03-28 PROCEDURE — 3077F PR MOST RECENT SYSTOLIC BLOOD PRESSURE >= 140 MM HG: ICD-10-PCS | Mod: ,,, | Performed by: FAMILY MEDICINE

## 2023-03-28 PROCEDURE — 4010F ACE/ARB THERAPY RXD/TAKEN: CPT | Mod: ,,, | Performed by: FAMILY MEDICINE

## 2023-03-28 PROCEDURE — 1159F MED LIST DOCD IN RCRD: CPT | Mod: ,,, | Performed by: FAMILY MEDICINE

## 2023-03-28 PROCEDURE — 1160F RVW MEDS BY RX/DR IN RCRD: CPT | Mod: ,,, | Performed by: FAMILY MEDICINE

## 2023-03-28 PROCEDURE — 1159F PR MEDICATION LIST DOCUMENTED IN MEDICAL RECORD: ICD-10-PCS | Mod: ,,, | Performed by: FAMILY MEDICINE

## 2023-03-28 PROCEDURE — 3080F DIAST BP >= 90 MM HG: CPT | Mod: ,,, | Performed by: FAMILY MEDICINE

## 2023-03-28 PROCEDURE — 3008F BODY MASS INDEX DOCD: CPT | Mod: ,,, | Performed by: FAMILY MEDICINE

## 2023-03-28 PROCEDURE — 1160F PR REVIEW ALL MEDS BY PRESCRIBER/CLIN PHARMACIST DOCUMENTED: ICD-10-PCS | Mod: ,,, | Performed by: FAMILY MEDICINE

## 2023-03-28 PROCEDURE — 4010F PR ACE/ARB THEARPY RXD/TAKEN: ICD-10-PCS | Mod: ,,, | Performed by: FAMILY MEDICINE

## 2023-03-28 PROCEDURE — 3080F PR MOST RECENT DIASTOLIC BLOOD PRESSURE >= 90 MM HG: ICD-10-PCS | Mod: ,,, | Performed by: FAMILY MEDICINE

## 2023-03-28 PROCEDURE — 99212 OFFICE O/P EST SF 10 MIN: CPT | Mod: ,,, | Performed by: FAMILY MEDICINE

## 2023-03-28 PROCEDURE — 3077F SYST BP >= 140 MM HG: CPT | Mod: ,,, | Performed by: FAMILY MEDICINE

## 2023-03-28 PROCEDURE — 3288F PR FALLS RISK ASSESSMENT DOCUMENTED: ICD-10-PCS | Mod: ,,, | Performed by: FAMILY MEDICINE

## 2023-03-28 PROCEDURE — 3288F FALL RISK ASSESSMENT DOCD: CPT | Mod: ,,, | Performed by: FAMILY MEDICINE

## 2023-03-28 PROCEDURE — 99212 PR OFFICE/OUTPT VISIT, EST, LEVL II, 10-19 MIN: ICD-10-PCS | Mod: ,,, | Performed by: FAMILY MEDICINE

## 2023-03-28 PROCEDURE — 1101F PT FALLS ASSESS-DOCD LE1/YR: CPT | Mod: ,,, | Performed by: FAMILY MEDICINE

## 2023-03-28 PROCEDURE — 1101F PR PT FALLS ASSESS DOC 0-1 FALLS W/OUT INJ PAST YR: ICD-10-PCS | Mod: ,,, | Performed by: FAMILY MEDICINE

## 2023-03-28 NOTE — PROGRESS NOTES
Renetta Stewart is a 73 y.o. female seen today for symptoms of persistent anorexia refusing to eat and drink as well as episodes of wandering.  When asked patient reports only let her low back pain is worsening.  Already the patient is attending pain management and is scheduled for injections to the affected area.  Patient's family reports that she often chokes when trying to swallow.  Patient has already had a an upper GI and is status post a dilation already with no improvement.  Patient's family reports the patient has not eaten a good deal of solid food for the last 2 weeks.  On her lab work, patient's GFR is down to 29.  Her BUN suggest dehydration.  The rest of her lab work was basically within normal limits.  Currently this is unclear if this is a GI issue neurology issue or if this is related to renal failure.  Initially we had considered admitting the patient to the hospital but thankfully both Nephrology and Neurology have agreed to see the patient outpatient tomorrow.    Past Medical History:   Diagnosis Date    Anemia     Anxiety     Dementia     Depression     GERD (gastroesophageal reflux disease)     Hyperlipidemia     Hypertension      Family History   Problem Relation Age of Onset    Hypertension Father      Current Outpatient Medications on File Prior to Visit   Medication Sig Dispense Refill    levothyroxine (SYNTHROID) 50 MCG tablet Take 1 tablet (50 mcg total) by mouth once daily. 90 tablet 1    memantine (NAMENDA) 10 MG Tab Take 5 mg by mouth once daily.      mirtazapine (REMERON) 15 MG tablet Take 1 tablet (15 mg total) by mouth every evening. 30 tablet 1    ondansetron (ZOFRAN) 4 MG tablet Take 1 tablet (4 mg total) by mouth every 6 (six) hours. 12 tablet 0    pantoprazole (PROTONIX) 40 MG tablet Take 1 tablet (40 mg total) by mouth once daily. 90 tablet 1    traMADoL (ULTRAM) 50 mg tablet Take 50 mg by mouth 2 (two) times daily as needed.      acetaminophen-codeine 300-30mg  (TYLENOL #3) 300-30 mg Tab TAKE 1-2 TABLETS BY MOUTH EVERY 12 HOURS AS NEEDED CAUSES DROWSINESS-AVOID ALCOHOL!!      dicyclomine (BENTYL) 20 mg tablet Take 1 tablet (20 mg total) by mouth 2 (two) times daily. (Patient not taking: Reported on 3/15/2023) 20 tablet 0    diltiaZEM (CARDIZEM CD) 240 MG 24 hr capsule Take 1 capsule (240 mg total) by mouth once daily. (Patient not taking: Reported on 3/15/2023) 30 capsule 1    furosemide (LASIX) 20 MG tablet Take 1 tablet (20 mg total) by mouth once daily. (Patient not taking: Reported on 3/15/2023) 90 tablet 1    hydrALAZINE (APRESOLINE) 100 MG tablet Take 100 mg by mouth three times daily with food.      levETIRAcetam (KEPPRA) 500 MG Tab Take 1 tablet (500 mg total) by mouth 2 (two) times daily. (Patient not taking: Reported on 3/15/2023) 60 tablet 1    nitroGLYCERIN (NITROSTAT) 0.4 MG SL tablet Place 0.4 mg under the tongue every 5 (five) minutes as needed for Chest pain.      polyethylene glycol (GLYCOLAX) 17 gram/dose powder Take 17 g by mouth 2 (two) times daily. (Patient not taking: Reported on 3/15/2023) 1020 g 2     No current facility-administered medications on file prior to visit.       There is no immunization history on file for this patient.    Review of Systems   Constitutional:  Positive for malaise/fatigue. Negative for fever and weight loss.   Respiratory:  Negative for cough and shortness of breath.    Cardiovascular:  Negative for chest pain and palpitations.   Gastrointestinal:  Positive for abdominal pain. Negative for nausea and vomiting.   Psychiatric/Behavioral:  Positive for memory loss. Negative for depression.       Vitals:    03/28/23 1536   BP: (!) 144/90   Pulse: 108   Resp: 19   Temp: 97.3 °F (36.3 °C)       Physical Exam  Vitals reviewed.   Constitutional:       Appearance: Normal appearance.      Comments: When I enter the room patient is slumped over the exam table with very poor eye contact with mumbling speech complaining only  of her back hurting.   HENT:      Head: Normocephalic.   Eyes:      Extraocular Movements: Extraocular movements intact.      Conjunctiva/sclera: Conjunctivae normal.      Pupils: Pupils are equal, round, and reactive to light.   Neck:      Thyroid: No thyroid mass or thyromegaly.   Cardiovascular:      Rate and Rhythm: Normal rate and regular rhythm.      Heart sounds: Normal heart sounds. No murmur heard.    No gallop.   Pulmonary:      Effort: Pulmonary effort is normal. No respiratory distress.      Breath sounds: Normal breath sounds. No wheezing or rales.   Skin:     General: Skin is warm and dry.      Coloration: Skin is not jaundiced or pale.   Neurological:      General: No focal deficit present.      Mental Status: She is alert and oriented to person, place, and time. Mental status is at baseline.      Cranial Nerves: No cranial nerve deficit.        Assessment and Plan  Renal insufficiency    Anorexia    Weight loss            Return to clinic in 3-4 days.  Patient is still may require inpatient evaluation and treatment.  She will be evaluated by Neurology and Nephrology tomorrow.    Health Maintenance Topics with due status: Not Due       Topic Last Completion Date    Colorectal Cancer Screening 06/18/2019    Lipid Panel 02/14/2022

## 2023-03-29 ENCOUNTER — OFFICE VISIT (OUTPATIENT)
Dept: NEPHROLOGY | Facility: CLINIC | Age: 73
End: 2023-03-29
Payer: MEDICARE

## 2023-03-29 ENCOUNTER — OFFICE VISIT (OUTPATIENT)
Dept: NEUROLOGY | Facility: CLINIC | Age: 73
End: 2023-03-29
Payer: MEDICARE

## 2023-03-29 VITALS
DIASTOLIC BLOOD PRESSURE: 80 MMHG | SYSTOLIC BLOOD PRESSURE: 114 MMHG | OXYGEN SATURATION: 96 % | TEMPERATURE: 99 F | RESPIRATION RATE: 18 BRPM | BODY MASS INDEX: 30.32 KG/M2 | HEIGHT: 64 IN | HEART RATE: 116 BPM | WEIGHT: 177.63 LBS

## 2023-03-29 VITALS
OXYGEN SATURATION: 98 % | BODY MASS INDEX: 30.56 KG/M2 | HEART RATE: 98 BPM | HEIGHT: 64 IN | SYSTOLIC BLOOD PRESSURE: 120 MMHG | WEIGHT: 179 LBS | DIASTOLIC BLOOD PRESSURE: 78 MMHG

## 2023-03-29 DIAGNOSIS — R62.7 FAILURE TO THRIVE IN ADULT: ICD-10-CM

## 2023-03-29 DIAGNOSIS — F33.3 SEVERE EPISODE OF RECURRENT MAJOR DEPRESSIVE DISORDER, WITH PSYCHOTIC FEATURES: Primary | ICD-10-CM

## 2023-03-29 DIAGNOSIS — I12.9 HYPERTENSIVE NEPHROSCLEROSIS, STAGE 1 THROUGH STAGE 4 OR UNSPECIFIED CHRONIC KIDNEY DISEASE: ICD-10-CM

## 2023-03-29 DIAGNOSIS — G40.909 SEIZURE DISORDER: ICD-10-CM

## 2023-03-29 DIAGNOSIS — N28.9 RENAL INSUFFICIENCY: ICD-10-CM

## 2023-03-29 DIAGNOSIS — I10 ESSENTIAL HYPERTENSION: ICD-10-CM

## 2023-03-29 DIAGNOSIS — N18.32 STAGE 3B CHRONIC KIDNEY DISEASE: Primary | ICD-10-CM

## 2023-03-29 PROCEDURE — 1101F PT FALLS ASSESS-DOCD LE1/YR: CPT | Mod: CPTII,,, | Performed by: PSYCHIATRY & NEUROLOGY

## 2023-03-29 PROCEDURE — 99204 PR OFFICE/OUTPT VISIT, NEW, LEVL IV, 45-59 MIN: ICD-10-PCS | Mod: S$PBB,,, | Performed by: INTERNAL MEDICINE

## 2023-03-29 PROCEDURE — 99214 OFFICE O/P EST MOD 30 MIN: CPT | Mod: S$PBB,,, | Performed by: PSYCHIATRY & NEUROLOGY

## 2023-03-29 PROCEDURE — 4010F ACE/ARB THERAPY RXD/TAKEN: CPT | Mod: CPTII,,, | Performed by: PSYCHIATRY & NEUROLOGY

## 2023-03-29 PROCEDURE — 3074F PR MOST RECENT SYSTOLIC BLOOD PRESSURE < 130 MM HG: ICD-10-PCS | Mod: CPTII,,, | Performed by: PSYCHIATRY & NEUROLOGY

## 2023-03-29 PROCEDURE — 3066F PR DOCUMENTATION OF TREATMENT FOR NEPHROPATHY: ICD-10-PCS | Mod: CPTII,,, | Performed by: INTERNAL MEDICINE

## 2023-03-29 PROCEDURE — 1159F MED LIST DOCD IN RCRD: CPT | Mod: CPTII,,, | Performed by: INTERNAL MEDICINE

## 2023-03-29 PROCEDURE — 99204 OFFICE O/P NEW MOD 45 MIN: CPT | Mod: S$PBB,,, | Performed by: INTERNAL MEDICINE

## 2023-03-29 PROCEDURE — 3079F PR MOST RECENT DIASTOLIC BLOOD PRESSURE 80-89 MM HG: ICD-10-PCS | Mod: CPTII,,, | Performed by: INTERNAL MEDICINE

## 2023-03-29 PROCEDURE — 4010F PR ACE/ARB THEARPY RXD/TAKEN: ICD-10-PCS | Mod: CPTII,,, | Performed by: PSYCHIATRY & NEUROLOGY

## 2023-03-29 PROCEDURE — 3008F PR BODY MASS INDEX (BMI) DOCUMENTED: ICD-10-PCS | Mod: CPTII,,, | Performed by: INTERNAL MEDICINE

## 2023-03-29 PROCEDURE — 3008F BODY MASS INDEX DOCD: CPT | Mod: CPTII,,, | Performed by: INTERNAL MEDICINE

## 2023-03-29 PROCEDURE — 3079F DIAST BP 80-89 MM HG: CPT | Mod: CPTII,,, | Performed by: INTERNAL MEDICINE

## 2023-03-29 PROCEDURE — 3288F PR FALLS RISK ASSESSMENT DOCUMENTED: ICD-10-PCS | Mod: CPTII,,, | Performed by: INTERNAL MEDICINE

## 2023-03-29 PROCEDURE — 3078F DIAST BP <80 MM HG: CPT | Mod: CPTII,,, | Performed by: PSYCHIATRY & NEUROLOGY

## 2023-03-29 PROCEDURE — 3288F PR FALLS RISK ASSESSMENT DOCUMENTED: ICD-10-PCS | Mod: CPTII,,, | Performed by: PSYCHIATRY & NEUROLOGY

## 2023-03-29 PROCEDURE — 3078F PR MOST RECENT DIASTOLIC BLOOD PRESSURE < 80 MM HG: ICD-10-PCS | Mod: CPTII,,, | Performed by: PSYCHIATRY & NEUROLOGY

## 2023-03-29 PROCEDURE — 1159F MED LIST DOCD IN RCRD: CPT | Mod: CPTII,,, | Performed by: PSYCHIATRY & NEUROLOGY

## 2023-03-29 PROCEDURE — 1101F PT FALLS ASSESS-DOCD LE1/YR: CPT | Mod: CPTII,,, | Performed by: INTERNAL MEDICINE

## 2023-03-29 PROCEDURE — 3074F SYST BP LT 130 MM HG: CPT | Mod: CPTII,,, | Performed by: INTERNAL MEDICINE

## 2023-03-29 PROCEDURE — 4010F PR ACE/ARB THEARPY RXD/TAKEN: ICD-10-PCS | Mod: CPTII,,, | Performed by: INTERNAL MEDICINE

## 2023-03-29 PROCEDURE — 1159F PR MEDICATION LIST DOCUMENTED IN MEDICAL RECORD: ICD-10-PCS | Mod: CPTII,,, | Performed by: INTERNAL MEDICINE

## 2023-03-29 PROCEDURE — 99214 OFFICE O/P EST MOD 30 MIN: CPT | Mod: PBBFAC,27 | Performed by: PSYCHIATRY & NEUROLOGY

## 2023-03-29 PROCEDURE — 3074F SYST BP LT 130 MM HG: CPT | Mod: CPTII,,, | Performed by: PSYCHIATRY & NEUROLOGY

## 2023-03-29 PROCEDURE — 1101F PR PT FALLS ASSESS DOC 0-1 FALLS W/OUT INJ PAST YR: ICD-10-PCS | Mod: CPTII,,, | Performed by: INTERNAL MEDICINE

## 2023-03-29 PROCEDURE — 1125F PR PAIN SEVERITY QUANTIFIED, PAIN PRESENT: ICD-10-PCS | Mod: CPTII,,, | Performed by: INTERNAL MEDICINE

## 2023-03-29 PROCEDURE — 1101F PR PT FALLS ASSESS DOC 0-1 FALLS W/OUT INJ PAST YR: ICD-10-PCS | Mod: CPTII,,, | Performed by: PSYCHIATRY & NEUROLOGY

## 2023-03-29 PROCEDURE — 3288F FALL RISK ASSESSMENT DOCD: CPT | Mod: CPTII,,, | Performed by: INTERNAL MEDICINE

## 2023-03-29 PROCEDURE — 99214 PR OFFICE/OUTPT VISIT, EST, LEVL IV, 30-39 MIN: ICD-10-PCS | Mod: S$PBB,,, | Performed by: PSYCHIATRY & NEUROLOGY

## 2023-03-29 PROCEDURE — 1159F PR MEDICATION LIST DOCUMENTED IN MEDICAL RECORD: ICD-10-PCS | Mod: CPTII,,, | Performed by: PSYCHIATRY & NEUROLOGY

## 2023-03-29 PROCEDURE — 3008F BODY MASS INDEX DOCD: CPT | Mod: CPTII,,, | Performed by: PSYCHIATRY & NEUROLOGY

## 2023-03-29 PROCEDURE — 3066F NEPHROPATHY DOC TX: CPT | Mod: CPTII,,, | Performed by: INTERNAL MEDICINE

## 2023-03-29 PROCEDURE — 4010F ACE/ARB THERAPY RXD/TAKEN: CPT | Mod: CPTII,,, | Performed by: INTERNAL MEDICINE

## 2023-03-29 PROCEDURE — 3288F FALL RISK ASSESSMENT DOCD: CPT | Mod: CPTII,,, | Performed by: PSYCHIATRY & NEUROLOGY

## 2023-03-29 PROCEDURE — 3008F PR BODY MASS INDEX (BMI) DOCUMENTED: ICD-10-PCS | Mod: CPTII,,, | Performed by: PSYCHIATRY & NEUROLOGY

## 2023-03-29 PROCEDURE — 1125F AMNT PAIN NOTED PAIN PRSNT: CPT | Mod: CPTII,,, | Performed by: INTERNAL MEDICINE

## 2023-03-29 PROCEDURE — 3074F PR MOST RECENT SYSTOLIC BLOOD PRESSURE < 130 MM HG: ICD-10-PCS | Mod: CPTII,,, | Performed by: INTERNAL MEDICINE

## 2023-03-29 PROCEDURE — 99215 OFFICE O/P EST HI 40 MIN: CPT | Mod: PBBFAC | Performed by: INTERNAL MEDICINE

## 2023-03-29 RX ORDER — QUETIAPINE FUMARATE 25 MG/1
25 TABLET, FILM COATED ORAL NIGHTLY
Qty: 90 TABLET | Refills: 3 | Status: SHIPPED | OUTPATIENT
Start: 2023-03-29 | End: 2023-06-13

## 2023-03-29 NOTE — PROGRESS NOTES
Subjective:       Patient ID: Renetta Stewart is a 73 y.o. female     Chief Complaint:    Chief Complaint   Patient presents with    follow up        Allergies:  Patient has no known allergies.    Current Medications:    Outpatient Encounter Medications as of 3/29/2023   Medication Sig Dispense Refill    levothyroxine (SYNTHROID) 50 MCG tablet Take 1 tablet (50 mcg total) by mouth once daily. 90 tablet 1    memantine (NAMENDA) 10 MG Tab Take 5 mg by mouth once daily.      mirtazapine (REMERON) 15 MG tablet Take 1 tablet (15 mg total) by mouth every evening. 30 tablet 1    ondansetron (ZOFRAN) 4 MG tablet Take 1 tablet (4 mg total) by mouth every 6 (six) hours. 12 tablet 0    pantoprazole (PROTONIX) 40 MG tablet Take 1 tablet (40 mg total) by mouth once daily. 90 tablet 1    traMADoL (ULTRAM) 50 mg tablet Take 50 mg by mouth 2 (two) times daily as needed.      [DISCONTINUED] acetaminophen-codeine 300-30mg (TYLENOL #3) 300-30 mg Tab TAKE 1-2 TABLETS BY MOUTH EVERY 12 HOURS AS NEEDED CAUSES DROWSINESS-AVOID ALCOHOL!!      [DISCONTINUED] dicyclomine (BENTYL) 20 mg tablet Take 1 tablet (20 mg total) by mouth 2 (two) times daily. (Patient not taking: Reported on 3/15/2023) 20 tablet 0    [DISCONTINUED] diltiaZEM (CARDIZEM CD) 240 MG 24 hr capsule Take 1 capsule (240 mg total) by mouth once daily. (Patient not taking: Reported on 3/15/2023) 30 capsule 1    [DISCONTINUED] furosemide (LASIX) 20 MG tablet Take 1 tablet (20 mg total) by mouth once daily. (Patient not taking: Reported on 3/15/2023) 90 tablet 1    [DISCONTINUED] hydrALAZINE (APRESOLINE) 100 MG tablet Take 100 mg by mouth three times daily with food.      [DISCONTINUED] levETIRAcetam (KEPPRA) 500 MG Tab Take 1 tablet (500 mg total) by mouth 2 (two) times daily. (Patient not taking: Reported on 3/15/2023) 60 tablet 1    [DISCONTINUED] nitroGLYCERIN (NITROSTAT) 0.4 MG SL tablet Place 0.4 mg under the tongue every 5 (five) minutes as needed for Chest pain.       "[DISCONTINUED] polyethylene glycol (GLYCOLAX) 17 gram/dose powder Take 17 g by mouth 2 (two) times daily. (Patient not taking: Reported on 3/15/2023) 1020 g 2     No facility-administered encounter medications on file as of 3/29/2023.       History of Present Illness  74 yo BF w/ prev seizures, depression but chronic noncompliance w/ meds and recent incr agitation, not eating, meds noncompliance, wandering house- her daughter and severely autistic grandson escort her and struggling with caring for her-pt is neglecting hygiene and very "scattered" some loose thought patterns and associations.   Pt has not taken meds in few months and lost lots of weight b/c of not eating, not washing   May need NF placement and home health eval   Two weeks ago went outside at night almost naked only Tshirt on - yesterday per daughter the patient stated she "could not see" ?        Past Medical History:   Diagnosis Date    Anemia     Anxiety     Dementia     Depression     GERD (gastroesophageal reflux disease)     Hyperlipidemia     Hypertension        History reviewed. No pertinent surgical history.    Social History  Ms. Stewart  reports that she has never smoked. She has never been exposed to tobacco smoke. She has never used smokeless tobacco. She reports that she does not currently use alcohol. She reports that she does not use drugs.    Family History  Ms.'s Stewart family history includes Hypertension in her father.    Review of Systems  Review of Systems   Psychiatric/Behavioral:  Positive for depression and memory loss. The patient is nervous/anxious and has insomnia.    All other systems reviewed and are negative.   Objective:   /78 (BP Location: Left arm, Patient Position: Sitting, BP Method: Large (Manual))   Pulse 98   Ht 5' 4" (1.626 m)   Wt 81.2 kg (179 lb)   SpO2 98%   BMI 30.73 kg/m²    NEUROLOGICAL EXAMINATION:     MENTAL STATUS   Oriented to person.   Oriented to place. Oriented to area.   Disoriented to " year, month and date.   Attention: decreased. Concentration: decreased.   Speech: slurred   Level of consciousness: drowsy  Knowledge: poor.        Major Depression and poss mild psychosis vs conversion ?     CRANIAL NERVES   Cranial nerves II through XII intact.     MOTOR EXAM     Strength   Strength 5/5 throughout.     GAIT AND COORDINATION     Gait  Gait: normal     Physical Exam  Vitals reviewed.   Constitutional:       Appearance: She is normal weight.   Neurological:      Mental Status: Mental status is at baseline. She is disoriented.      Cranial Nerves: Cranial nerves 2-12 are intact.      Motor: Motor strength is normal.      Gait: Gait is intact.   Psychiatric:         Speech: Speech is slurred.        Assessment:     Seizure disorder    Severe episode of recurrent major depressive disorder, with psychotic features         Primary Diagnosis and ICD10  Seizure disorder [G40.909]    Plan:     Patient Instructions   Pt needs Psych assessment- major depressive d/o  Poss NF placement - needs homehealth eval  Trial Seroquel 25 mg bedtime   Must STOP taking daytiem naps - ruins normal sleep patterns  Stressed meds compliance  F/u 3 motnhs    There are no discontinued medications.    Requested Prescriptions      No prescriptions requested or ordered in this encounter

## 2023-03-29 NOTE — PROGRESS NOTES
[facility] History and Physical  Patient Name: Renetta Stewart  MRN: [id number]  Age: 73 y.o.  : 1950    Date: 3/29/2023 2:07 PM    Chief Complaint: Establish Care    HPI :   Ms Stewart presents to Nephrology clinic to establish care. She is followed by Dr. Eldon Govea MD as her primary care provider. She is accompanied by her daughter.     HTN: unsure of when she was diagnosed. She was previously on BP medicines. Not anymore. BP is controlled in clinic today.     Dementia: Prescribed memantine, remeron that she is currently refusing to take. She has been having several issues recently with loss of appetite. Poor oral hydration. She was referred to see Neurolgoy today. Dr. Mejia recommended Psych eval for MDD. Prescribed seroquel . Follows with PCP.     Nephrology history: No FH of kidney disease, no nephrolithiasis, or recurrent UTIs. No OTC medications including NSAIDs. Tylenol PRN.     ROS limited due to patient being poor historian. Her daughter expresses concern over her loss of appetite, poor intake. She has a water bottle that she hasn't drank.    Past Medical History:  has a past medical history of Anemia, Anxiety, Dementia, Depression, GERD (gastroesophageal reflux disease), Hyperlipidemia, and Hypertension.     Past Surgical History:   has no past surgical history on file.     Family History:  family history includes Hypertension in her father. No family history of kidney disease.     Social History:   reports that she has never smoked. She has never been exposed to tobacco smoke. She has never used smokeless tobacco. She reports that she does not currently use alcohol. She reports that she does not use drugs. She lives in [place] with her daughter.     Allergies: has No Known Allergies.     Medications: Reviewed including OTC medications, herbal supplements, and NSAIDS.     Old records have been reviewed.      Review of Systems:  ROS: A 10  point ROS was completed and found to be negative except for that mentioned above.          Physical Exam:  Vitals:    03/29/23 1401   BP: 114/80   Pulse: (!) 116   Resp: 18   Temp: 98.6 °F (37 °C)       Constitutional: sitting in chair, in NAD  Eyes: EOMI, white sclera  ENMT: moist mucus membranes, nares patent  Cardiovascular: normal rate, S1/S2 noted  Respiratory: symmetrical chest expansion, CTA-B  Gastrointestinal: +BS, soft, NT/ND  Musculoskeletal: normal, no joint erythema/effusions  Skin: no rash, no purpura, warm extremities  Neurological: Alert and Oriented x 4, afocal    Labs:   Lab Results   Component Value Date     03/15/2023    K 3.6 03/15/2023    CREATININE 1.84 (H) 03/15/2023    ALT 18 03/02/2023    AST 12 (L) 03/02/2023    LDLCALC 37 02/14/2022    CHOL 131 02/14/2022    HDL 78 (H) 02/14/2022    TRIG 78 02/14/2022    TSH 3.270 03/15/2023    WBC 3.66 (L) 03/02/2023    HGB 12.2 03/02/2023    HCT 39.2 03/02/2023     03/02/2023        Otherwise Reviewed    Assessment/Plan:       ANY on CKD vs progression of CKD. CKD in setting of hypertensive nephrosclerosis, aging. Poor oral hydration, FTT could be worsening her most recent renal function. Baseline sCr TBD. She appears to demonstrate s/s of failure to thrive. I agree with Neurology that evaluation for MDD could be of benefit if she was able to take an antidepressant/appetite stimulant. I will also check her today for UTI. She is not overtly uremic so I do not suspect her symptoms to be related to CKD. Counseled to avoid nephrotoxic agents such as NSAIDs.     Proteinuria: urine Prot:Creat ratio is pending. Patient is not on RAAS blockade    HTN: well controlled without meds today     RTC 6 months with CBC, RFP, UA, urine for Prot:creat ratio, PTH, Vit D      Alisha S. Parker, DO [facility]   03/29/2023

## 2023-03-29 NOTE — PATIENT INSTRUCTIONS
Pt needs Psych assessment- major depressive d/o  Poss NF placement - needs homehealth eval  Trial Seroquel 25 mg bedtime   Must STOP taking daytiem naps - ruins normal sleep patterns  Stressed meds compliance  F/u 3 motnhs

## 2023-03-30 ENCOUNTER — HOSPITAL ENCOUNTER (OUTPATIENT)
Facility: HOSPITAL | Age: 73
LOS: 1 days | Discharge: HOME | End: 2023-04-06
Attending: HOSPITALIST | Admitting: HOSPITALIST
Payer: MEDICARE

## 2023-03-30 DIAGNOSIS — K44.9 HH (HIATUS HERNIA): ICD-10-CM

## 2023-03-30 DIAGNOSIS — N28.9 RENAL INSUFFICIENCY: ICD-10-CM

## 2023-03-30 DIAGNOSIS — E78.5 HYPERLIPIDEMIA, UNSPECIFIED HYPERLIPIDEMIA TYPE: ICD-10-CM

## 2023-03-30 DIAGNOSIS — R41.82 ALTERED MENTAL STATUS: ICD-10-CM

## 2023-03-30 DIAGNOSIS — R13.10 DYSPHAGIA: ICD-10-CM

## 2023-03-30 DIAGNOSIS — F51.01 PRIMARY INSOMNIA: ICD-10-CM

## 2023-03-30 DIAGNOSIS — N18.32 STAGE 3B CHRONIC KIDNEY DISEASE: ICD-10-CM

## 2023-03-30 DIAGNOSIS — N28.1 COMPLEX RENAL CYST: ICD-10-CM

## 2023-03-30 DIAGNOSIS — K21.9 GASTROESOPHAGEAL REFLUX DISEASE WITHOUT ESOPHAGITIS: ICD-10-CM

## 2023-03-30 DIAGNOSIS — E03.9 ACQUIRED HYPOTHYROIDISM: ICD-10-CM

## 2023-03-30 DIAGNOSIS — I12.9 HYPERTENSIVE NEPHROSCLEROSIS, STAGE 1 THROUGH STAGE 4 OR UNSPECIFIED CHRONIC KIDNEY DISEASE: ICD-10-CM

## 2023-03-30 DIAGNOSIS — E86.0 DEHYDRATION: ICD-10-CM

## 2023-03-30 DIAGNOSIS — R10.9 ABDOMINAL PAIN, UNSPECIFIED ABDOMINAL LOCATION: ICD-10-CM

## 2023-03-30 DIAGNOSIS — E66.01 MORBID OBESITY: ICD-10-CM

## 2023-03-30 DIAGNOSIS — I10 ESSENTIAL HYPERTENSION: ICD-10-CM

## 2023-03-30 DIAGNOSIS — I50.9 CHF (CONGESTIVE HEART FAILURE): ICD-10-CM

## 2023-03-30 DIAGNOSIS — F32.5 MAJOR DEPRESSIVE DISORDER IN REMISSION, UNSPECIFIED WHETHER RECURRENT: ICD-10-CM

## 2023-03-30 DIAGNOSIS — K59.00 CONSTIPATION, UNSPECIFIED CONSTIPATION TYPE: ICD-10-CM

## 2023-03-30 DIAGNOSIS — R41.89 COGNITIVE IMPAIRMENT: ICD-10-CM

## 2023-03-30 DIAGNOSIS — I48.91 ATRIAL FIBRILLATION, UNSPECIFIED TYPE: ICD-10-CM

## 2023-03-30 DIAGNOSIS — R62.7 FAILURE TO THRIVE IN ADULT: Primary | ICD-10-CM

## 2023-03-30 DIAGNOSIS — R63.4 WEIGHT LOSS: ICD-10-CM

## 2023-03-30 LAB
ALBUMIN SERPL BCP-MCNC: 3.7 G/DL (ref 3.5–5)
ALBUMIN/GLOB SERPL: 1.1 {RATIO}
ALP SERPL-CCNC: 43 U/L (ref 55–142)
ALT SERPL W P-5'-P-CCNC: 15 U/L (ref 13–56)
ANION GAP SERPL CALCULATED.3IONS-SCNC: 16 MMOL/L (ref 7–16)
AST SERPL W P-5'-P-CCNC: 16 U/L (ref 15–37)
BACTERIA #/AREA URNS HPF: ABNORMAL /HPF
BASOPHILS # BLD AUTO: 0.03 K/UL (ref 0–0.2)
BASOPHILS NFR BLD AUTO: 0.6 % (ref 0–1)
BILIRUB SERPL-MCNC: 0.8 MG/DL (ref ?–1.2)
BILIRUB UR QL STRIP: 1
BUN SERPL-MCNC: 37 MG/DL (ref 7–18)
BUN/CREAT SERPL: 20 (ref 6–20)
CALCIUM SERPL-MCNC: 9.7 MG/DL (ref 8.5–10.1)
CAOX CRY #/AREA URNS LPF: ABNORMAL /LPF
CHLORIDE SERPL-SCNC: 112 MMOL/L (ref 98–107)
CLARITY UR: ABNORMAL
CO2 SERPL-SCNC: 25 MMOL/L (ref 21–32)
COLOR UR: YELLOW
CREAT SERPL-MCNC: 1.89 MG/DL (ref 0.55–1.02)
DIFFERENTIAL METHOD BLD: ABNORMAL
EGFR (NO RACE VARIABLE) (RUSH/TITUS): 28 ML/MIN/1.73M²
EOSINOPHIL # BLD AUTO: 0.06 K/UL (ref 0–0.5)
EOSINOPHIL NFR BLD AUTO: 1.2 % (ref 1–4)
ERYTHROCYTE [DISTWIDTH] IN BLOOD BY AUTOMATED COUNT: 16.6 % (ref 11.5–14.5)
FINE GRAN CASTS #/AREA URNS LPF: ABNORMAL /LPF
FLUAV AG UPPER RESP QL IA.RAPID: NEGATIVE
FLUBV AG UPPER RESP QL IA.RAPID: NEGATIVE
GLOBULIN SER-MCNC: 3.4 G/DL (ref 2–4)
GLUCOSE SERPL-MCNC: 76 MG/DL (ref 70–105)
GLUCOSE SERPL-MCNC: 87 MG/DL (ref 74–106)
GLUCOSE UR STRIP-MCNC: NORMAL MG/DL
HCO3 UR-SCNC: 26 MMOL/L (ref 21–28)
HCT VFR BLD AUTO: 40 % (ref 38–47)
HCT VFR BLD CALC: 40 % (ref 35–51)
HGB BLD-MCNC: 11.9 G/DL (ref 12–16)
HYALINE CASTS #/AREA URNS LPF: ABNORMAL /LPF
IMM GRANULOCYTES # BLD AUTO: 0.02 K/UL (ref 0–0.04)
IMM GRANULOCYTES NFR BLD: 0.4 % (ref 0–0.4)
KETONES UR STRIP-SCNC: 20 MG/DL
LDH SERPL L TO P-CCNC: 1.8 MMOL/L (ref 0.3–1.2)
LEUKOCYTE ESTERASE UR QL STRIP: NEGATIVE
LYMPHOCYTES # BLD AUTO: 1.05 K/UL (ref 1–4.8)
LYMPHOCYTES NFR BLD AUTO: 20.8 % (ref 27–41)
MAGNESIUM SERPL-MCNC: 2.3 MG/DL (ref 1.7–2.3)
MCH RBC QN AUTO: 25.7 PG (ref 27–31)
MCHC RBC AUTO-ENTMCNC: 29.8 G/DL (ref 32–36)
MCV RBC AUTO: 86.4 FL (ref 80–96)
MIXED CELL CASTS #/AREA UR COMP ASSIST: ABNORMAL /LPF
MONOCYTES # BLD AUTO: 0.44 K/UL (ref 0–0.8)
MONOCYTES NFR BLD AUTO: 8.7 % (ref 2–6)
MPC BLD CALC-MCNC: 11.8 FL (ref 9.4–12.4)
MUCOUS THREADS #/AREA URNS HPF: ABNORMAL /HPF
NEUTROPHILS # BLD AUTO: 3.45 K/UL (ref 1.8–7.7)
NEUTROPHILS NFR BLD AUTO: 68.3 % (ref 53–65)
NITRITE UR QL STRIP: NEGATIVE
NRBC # BLD AUTO: 0 X10E3/UL
NRBC, AUTO (.00): 0 %
PCO2 BLDA: 43 MMHG (ref 35–48)
PH SMN: 7.39 [PH] (ref 7.35–7.45)
PH UR STRIP: 5.5 PH UNITS
PLATELET # BLD AUTO: 217 K/UL (ref 150–400)
PO2 BLDA: 22 MMHG (ref 83–108)
POC BASE EXCESS: 0.8 MMOL/L (ref -2–3)
POC CO2: 27.3 MMOL/L
POC IONIZED CALCIUM: 1.15 MMOL/L (ref 1.15–1.35)
POC SATURATED O2: 37 % (ref 95–98)
POCT GLUCOSE: 77 MG/DL (ref 60–95)
POTASSIUM BLD-SCNC: 3.7 MMOL/L (ref 3.4–4.5)
POTASSIUM SERPL-SCNC: 4.3 MMOL/L (ref 3.5–5.1)
PROT SERPL-MCNC: 7.1 G/DL (ref 6.4–8.2)
PROT UR QL STRIP: 100
RBC # BLD AUTO: 4.63 M/UL (ref 4.2–5.4)
RBC # UR STRIP: NEGATIVE /UL
RBC #/AREA URNS HPF: ABNORMAL /HPF
SARS-COV+SARS-COV-2 AG RESP QL IA.RAPID: NEGATIVE
SODIUM BLD-SCNC: 145 MMOL/L (ref 136–145)
SODIUM SERPL-SCNC: 149 MMOL/L (ref 136–145)
SP GR UR STRIP: 1.03
SQUAMOUS #/AREA URNS LPF: ABNORMAL /LPF
UROBILINOGEN UR STRIP-ACNC: 4 MG/DL
WBC # BLD AUTO: 5.05 K/UL (ref 4.5–11)
WBC #/AREA URNS HPF: ABNORMAL /HPF
YEAST #/AREA URNS HPF: ABNORMAL /HPF

## 2023-03-30 PROCEDURE — 84295 ASSAY OF SERUM SODIUM: CPT

## 2023-03-30 PROCEDURE — 84132 ASSAY OF SERUM POTASSIUM: CPT | Mod: 59

## 2023-03-30 PROCEDURE — 87086 URINE CULTURE/COLONY COUNT: CPT | Performed by: NURSE PRACTITIONER

## 2023-03-30 PROCEDURE — G0378 HOSPITAL OBSERVATION PER HR: HCPCS

## 2023-03-30 PROCEDURE — 83735 ASSAY OF MAGNESIUM: CPT | Performed by: NURSE PRACTITIONER

## 2023-03-30 PROCEDURE — 81001 URINALYSIS AUTO W/SCOPE: CPT | Performed by: NURSE PRACTITIONER

## 2023-03-30 PROCEDURE — 99285 EMERGENCY DEPT VISIT HI MDM: CPT | Mod: ,,, | Performed by: NURSE PRACTITIONER

## 2023-03-30 PROCEDURE — 82962 GLUCOSE BLOOD TEST: CPT

## 2023-03-30 PROCEDURE — 99223 1ST HOSP IP/OBS HIGH 75: CPT | Mod: $0,,, | Performed by: HOSPITALIST

## 2023-03-30 PROCEDURE — 99285 EMERGENCY DEPT VISIT HI MDM: CPT | Mod: 25

## 2023-03-30 PROCEDURE — 99285 PR EMERGENCY DEPT VISIT,LEVEL V: ICD-10-PCS | Mod: ,,, | Performed by: NURSE PRACTITIONER

## 2023-03-30 PROCEDURE — 80053 COMPREHEN METABOLIC PANEL: CPT | Performed by: NURSE PRACTITIONER

## 2023-03-30 PROCEDURE — 25000003 PHARM REV CODE 250: Performed by: NURSE PRACTITIONER

## 2023-03-30 PROCEDURE — 85025 COMPLETE CBC W/AUTO DIFF WBC: CPT | Performed by: NURSE PRACTITIONER

## 2023-03-30 PROCEDURE — 99223 PR INITIAL HOSPITAL CARE,LEVL III: ICD-10-PCS | Mod: $0,,, | Performed by: HOSPITALIST

## 2023-03-30 PROCEDURE — 83605 ASSAY OF LACTIC ACID: CPT

## 2023-03-30 PROCEDURE — 87428 SARSCOV & INF VIR A&B AG IA: CPT | Performed by: NURSE PRACTITIONER

## 2023-03-30 PROCEDURE — 85014 HEMATOCRIT: CPT | Mod: 59

## 2023-03-30 PROCEDURE — 82803 BLOOD GASES ANY COMBINATION: CPT

## 2023-03-30 PROCEDURE — 82330 ASSAY OF CALCIUM: CPT

## 2023-03-30 PROCEDURE — 82947 ASSAY GLUCOSE BLOOD QUANT: CPT | Mod: 59

## 2023-03-30 RX ORDER — SODIUM,POTASSIUM PHOSPHATES 280-250MG
2 POWDER IN PACKET (EA) ORAL
Status: DISCONTINUED | OUTPATIENT
Start: 2023-03-31 | End: 2023-04-06 | Stop reason: HOSPADM

## 2023-03-30 RX ORDER — LANOLIN ALCOHOL/MO/W.PET/CERES
800 CREAM (GRAM) TOPICAL
Status: DISCONTINUED | OUTPATIENT
Start: 2023-03-31 | End: 2023-04-06 | Stop reason: HOSPADM

## 2023-03-30 RX ORDER — HEPARIN SODIUM 5000 [USP'U]/ML
5000 INJECTION, SOLUTION INTRAVENOUS; SUBCUTANEOUS EVERY 12 HOURS
Status: DISCONTINUED | OUTPATIENT
Start: 2023-03-31 | End: 2023-04-06 | Stop reason: HOSPADM

## 2023-03-30 RX ORDER — ACETAMINOPHEN 325 MG/1
650 TABLET ORAL EVERY 4 HOURS PRN
Status: DISCONTINUED | OUTPATIENT
Start: 2023-03-31 | End: 2023-04-06 | Stop reason: HOSPADM

## 2023-03-30 RX ORDER — SODIUM CHLORIDE 0.9 % (FLUSH) 0.9 %
10 SYRINGE (ML) INJECTION
Status: DISCONTINUED | OUTPATIENT
Start: 2023-03-31 | End: 2023-04-06 | Stop reason: HOSPADM

## 2023-03-30 RX ORDER — HYDRALAZINE HYDROCHLORIDE 20 MG/ML
20 INJECTION INTRAMUSCULAR; INTRAVENOUS EVERY 8 HOURS PRN
Status: DISCONTINUED | OUTPATIENT
Start: 2023-03-31 | End: 2023-04-06 | Stop reason: HOSPADM

## 2023-03-30 RX ORDER — MAG HYDROX/ALUMINUM HYD/SIMETH 200-200-20
30 SUSPENSION, ORAL (FINAL DOSE FORM) ORAL 4 TIMES DAILY PRN
Status: DISCONTINUED | OUTPATIENT
Start: 2023-03-31 | End: 2023-04-06 | Stop reason: HOSPADM

## 2023-03-30 RX ORDER — DIPHENHYDRAMINE HYDROCHLORIDE 50 MG/ML
25 INJECTION INTRAMUSCULAR; INTRAVENOUS NIGHTLY PRN
Status: DISCONTINUED | OUTPATIENT
Start: 2023-03-31 | End: 2023-04-06 | Stop reason: HOSPADM

## 2023-03-30 RX ORDER — NALOXONE HCL 0.4 MG/ML
0.02 VIAL (ML) INJECTION
Status: DISCONTINUED | OUTPATIENT
Start: 2023-03-31 | End: 2023-04-06 | Stop reason: HOSPADM

## 2023-03-30 RX ORDER — GLUCAGON 1 MG
1 KIT INJECTION
Status: DISCONTINUED | OUTPATIENT
Start: 2023-03-31 | End: 2023-04-06 | Stop reason: HOSPADM

## 2023-03-30 RX ORDER — ONDANSETRON 2 MG/ML
4 INJECTION INTRAMUSCULAR; INTRAVENOUS EVERY 8 HOURS PRN
Status: DISCONTINUED | OUTPATIENT
Start: 2023-03-31 | End: 2023-04-06 | Stop reason: HOSPADM

## 2023-03-30 RX ORDER — TALC
6 POWDER (GRAM) TOPICAL NIGHTLY PRN
Status: DISCONTINUED | OUTPATIENT
Start: 2023-03-31 | End: 2023-03-30

## 2023-03-30 RX ORDER — ACETAMINOPHEN 500 MG
1000 TABLET ORAL EVERY 8 HOURS PRN
Status: DISCONTINUED | OUTPATIENT
Start: 2023-03-31 | End: 2023-04-06 | Stop reason: HOSPADM

## 2023-03-30 RX ORDER — POLYETHYLENE GLYCOL 3350 17 G/17G
17 POWDER, FOR SOLUTION ORAL DAILY PRN
Status: DISCONTINUED | OUTPATIENT
Start: 2023-03-31 | End: 2023-04-06 | Stop reason: HOSPADM

## 2023-03-30 RX ORDER — ACETAMINOPHEN 325 MG/1
650 TABLET ORAL EVERY 8 HOURS PRN
Status: DISCONTINUED | OUTPATIENT
Start: 2023-03-31 | End: 2023-04-06 | Stop reason: HOSPADM

## 2023-03-30 RX ORDER — DIPHENHYDRAMINE HYDROCHLORIDE 50 MG/ML
25 INJECTION INTRAMUSCULAR; INTRAVENOUS NIGHTLY
Status: DISCONTINUED | OUTPATIENT
Start: 2023-03-30 | End: 2023-04-06 | Stop reason: HOSPADM

## 2023-03-30 RX ADMIN — SODIUM CHLORIDE 1000 ML: 9 INJECTION, SOLUTION INTRAVENOUS at 05:03

## 2023-03-30 RX ADMIN — SODIUM CHLORIDE 1000 ML: 9 INJECTION, SOLUTION INTRAVENOUS at 03:03

## 2023-03-30 NOTE — ED PROVIDER NOTES
Encounter Date: 3/30/2023       History     Chief Complaint   Patient presents with    Dementia     73 year old female presents to ED for dementia. Daughter states for approximately 2 weeks, patient has been more confused, refusing to eat/drink or take medications, combative, and wandering off. Daughter states patient left home at approximately 11 am and she found her on the curb next to a busy street prior to arrival. Patient with history of dementia, anxiety/depression, GERD, HLD, HTN. Daughter states she has been seen by her PCP over the course of 2 weeks with workup for possible NH placement.     The history is provided by a relative and medical records.   Review of patient's allergies indicates:  No Known Allergies  Past Medical History:   Diagnosis Date    Anemia     Anxiety     Dementia     Depression     GERD (gastroesophageal reflux disease)     Hyperlipidemia     Hypertension      No past surgical history on file.  Family History   Problem Relation Age of Onset    Hypertension Father      Social History     Tobacco Use    Smoking status: Never     Passive exposure: Never    Smokeless tobacco: Never   Substance Use Topics    Alcohol use: Not Currently    Drug use: Never     Review of Systems   Constitutional:  Positive for activity change and appetite change. Negative for fever.   HENT:  Negative for sinus pressure and sinus pain.    Psychiatric/Behavioral:  Positive for agitation, behavioral problems and confusion.    All other systems reviewed and are negative.    Physical Exam     Initial Vitals [03/30/23 1409]   BP Pulse Resp Temp SpO2   123/85 89 18 98.4 °F (36.9 °C) 99 %      MAP       --         Physical Exam    Nursing note and vitals reviewed.  Constitutional: She appears well-developed and well-nourished.   HENT:   Head: Normocephalic and atraumatic.   Eyes: EOM are normal. Pupils are equal, round, and reactive to light.   Neck: Neck supple.   Normal range of motion.  Cardiovascular:  Normal rate  and regular rhythm.           Pulmonary/Chest: She has no wheezes. She has no rhonchi.   Abdominal: Abdomen is soft. She exhibits no distension. There is no abdominal tenderness.   Musculoskeletal:         General: No tenderness or edema.      Cervical back: Normal range of motion and neck supple.     Neurological: She is alert and oriented to person, place, and time. No cranial nerve deficit or sensory deficit.   Skin: Skin is warm and dry. Capillary refill takes less than 2 seconds.   Psychiatric: Her affect is labile. She is withdrawn.       Medical Screening Exam   See Full Note    ED Course   Procedures  Labs Reviewed   URINALYSIS, REFLEX TO URINE CULTURE - Abnormal; Notable for the following components:       Result Value    Protein,  (*)     Ketones, UA 20 (*)     Urobilinogen, UA 4 (*)     Bilirubin, UA 1 (*)     All other components within normal limits   COMPREHENSIVE METABOLIC PANEL - Abnormal; Notable for the following components:    Sodium 149 (*)     Chloride 112 (*)     BUN 37 (*)     Creatinine 1.89 (*)     Alk Phos 43 (*)     eGFR 28 (*)     All other components within normal limits   CBC WITH DIFFERENTIAL - Abnormal; Notable for the following components:    Hemoglobin 11.9 (*)     MCH 25.7 (*)     MCHC 29.8 (*)     RDW 16.6 (*)     Neutrophils % 68.3 (*)     Lymphocytes % 20.8 (*)     Monocytes % 8.7 (*)     All other components within normal limits   URINALYSIS, MICROSCOPIC - Abnormal; Notable for the following components:    Bacteria, UA Loaded (*)     Squamous Epithelial Cells, UA Few (*)     Mucus, UA Rare (*)     Hyaline Casts, UA 2-5 (*)     Fine Granular Casts, UA 2-5 (*)     Cellular Casts 0-2 (*)     Calcium Oxalate Crystals, UA Moderate (*)     All other components within normal limits   MAGNESIUM - Normal   SARS-COV2 (COVID) W/ FLU ANTIGEN - Normal    Narrative:     Negative SARS-CoV results should not be used as the sole basis for treatment or patient management decisions;  negative results should be considered in the context of a patient's recent exposures, history and the presene of clinical signs and symptoms consistent with COVID-19.  Negative results should be treated as presumptive and confirmed by molecular assay, if necessary for patient management.   CULTURE, URINE   CBC W/ AUTO DIFFERENTIAL    Narrative:     The following orders were created for panel order CBC auto differential.  Procedure                               Abnormality         Status                     ---------                               -----------         ------                     CBC with Differential[[id number]]        Abnormal            Final result                 Please view results for these tests on the individual orders.   EXTRA TUBES    Narrative:     The following orders were created for panel order EXTRA TUBES.  Procedure                               Abnormality         Status                     ---------                               -----------         ------                     Light Blue Top Hold[[id number]]                              In process                 Light Green Top Hold[[id number]]                             In process                 Lavender Top Hold[[id number]]                                In process                   Please view results for these tests on the individual orders.   LIGHT BLUE TOP HOLD   LIGHT GREEN TOP HOLD   LAVENDER TOP HOLD          Imaging Results              CT Head Without Contrast (Final result)  Result time 03/30/23 15:36:43      Final result by Swapnil Arango MD (03/30/23 15:36:43)                   Impression:      Chronic microvascular ischemia and chronic areas of small infarct similar to prior.  No acute intracranial abnormality identified.      Electronically signed by: Swapnil Arango  Date:    03/30/2023  Time:    15:36               Narrative:    EXAMINATION:  CT HEAD WITHOUT CONTRAST    CLINICAL HISTORY:  Mental status change, unknown  cause;    TECHNIQUE:  CT of the head performed without the use of intravenous contrast.  The CT examination was performed using one or more of the following dose reduction techniques: Automated exposure control, adjustment of the mA and kV according to patient's size, use of acute or iterative reconstruction techniques.    COMPARISON:  08/25/2022    FINDINGS:  Again seen are areas of chronic infarct of the basal ganglia, thalami, and cerebellum similar to prior.  No acute large vessel infarct identified.  Chronic microvascular ischemic changes.  No acute hemorrhage.  Vascular calcifications.  No midline shift.  No herniation.  Calvarium intact                                       X-Ray Chest AP Portable (Final result)  Result time 03/30/23 14:41:45      Final result by Fuad Hernandez DO (03/30/23 14:41:45)                   Impression:      No acute pulmonary disease      Electronically signed by: Fuad Hernandez  Date:    03/30/2023  Time:    14:41               Narrative:    EXAMINATION:  XR CHEST AP PORTABLE    CLINICAL HISTORY:  Altered mental status, unspecified    TECHNIQUE:  XR CHEST AP PORTABLE    COMPARISON:  1/12/23    FINDINGS:  No lines or tubes.    Lungs are clear.    Normal pleura.    Cardiac silhouette is similar to comparison exam.    No obvious acute bone findings.                                       Medications   sodium chloride 0.9% bolus 1,000 mL 1,000 mL (1,000 mLs Intravenous New Bag 3/30/23 1520)   sodium chloride 0.9% bolus 1,000 mL 1,000 mL (1,000 mLs Intravenous New Bag 3/30/23 1712)     Medical Decision Making:   Initial Assessment:   Dementia  Differential Diagnosis:   Failure to thrive  Dementia  UTI    Clinical Tests:   Lab Tests: Ordered and Reviewed  Radiological Study: Ordered and Reviewed  ED Management:  MDM    Patient presents for emergent evaluation of acute dementia that poses a threat to life and/or bodily function.    In the ED patient found to have acute failure to  thrive, dehydration.    I ordered labs and personally reviewed them.  Labs significant for BUN/Creat 1.89/37, sodium 149, chloride 112, bacteria loaded  I ordered X-rays and personally reviewed them and reviewed the radiologist interpretation.  Xray significant for no acute pulmonary processes.    I ordered CT scan and personally reviewed it and reviewed the radiologist interpretation.  CT significant for no acute intracranial processes.      Discharge MDM  I discussed the patient presentation labs, ekg, X-rays, CT findings with the consultant for [facility] medicine Dr. Santos  Patient was managed in the ED with IV NS.    The response to treatment was good.    Patient was discharged in stable condition.  Pending [facility] admission                    Clinical Impression:   Final diagnoses:  [R41.82] Altered mental status  [R62.7] Failure to thrive in adult (Primary)  [E86.0] Dehydration  [R10.9] Abdominal pain, unspecified abdominal location        ED Disposition Condition    Admit Stable                SULEMA Platt  03/30/23 1917       SULEMA Platt  03/30/23 1930

## 2023-03-30 NOTE — ED TRIAGE NOTES
PRESENTS TO ER WITH COMPLAINT OF AMS, HAS A HX OF DEMENTIA AND DAUGHTER REPORTED SHE WANDERED OFF TODAY AROUND 11AM AND WAS JUST FOUND ON THE SIDE OF ROAD.

## 2023-03-31 LAB
ANION GAP SERPL CALCULATED.3IONS-SCNC: 16 MMOL/L (ref 7–16)
BASOPHILS # BLD AUTO: 0.03 K/UL (ref 0–0.2)
BASOPHILS NFR BLD AUTO: 0.6 % (ref 0–1)
BUN SERPL-MCNC: 31 MG/DL (ref 7–18)
BUN/CREAT SERPL: 23 (ref 6–20)
CALCIUM SERPL-MCNC: 9.3 MG/DL (ref 8.5–10.1)
CHLORIDE SERPL-SCNC: 116 MMOL/L (ref 98–107)
CO2 SERPL-SCNC: 22 MMOL/L (ref 21–32)
CREAT SERPL-MCNC: 1.35 MG/DL (ref 0.55–1.02)
DIFFERENTIAL METHOD BLD: ABNORMAL
EGFR (NO RACE VARIABLE) (RUSH/TITUS): 42 ML/MIN/1.73M²
EOSINOPHIL # BLD AUTO: 0.05 K/UL (ref 0–0.5)
EOSINOPHIL NFR BLD AUTO: 1 % (ref 1–4)
ERYTHROCYTE [DISTWIDTH] IN BLOOD BY AUTOMATED COUNT: 16.5 % (ref 11.5–14.5)
GLUCOSE SERPL-MCNC: 74 MG/DL (ref 74–106)
GLUCOSE SERPL-MCNC: 84 MG/DL (ref 70–105)
HCT VFR BLD AUTO: 40.5 % (ref 38–47)
HGB BLD-MCNC: 12.2 G/DL (ref 12–16)
IMM GRANULOCYTES # BLD AUTO: 0.01 K/UL (ref 0–0.04)
IMM GRANULOCYTES NFR BLD: 0.2 % (ref 0–0.4)
LYMPHOCYTES # BLD AUTO: 0.96 K/UL (ref 1–4.8)
LYMPHOCYTES NFR BLD AUTO: 18.6 % (ref 27–41)
MAGNESIUM SERPL-MCNC: 2.1 MG/DL (ref 1.7–2.3)
MCH RBC QN AUTO: 25.6 PG (ref 27–31)
MCHC RBC AUTO-ENTMCNC: 30.1 G/DL (ref 32–36)
MCV RBC AUTO: 84.9 FL (ref 80–96)
MONOCYTES # BLD AUTO: 0.5 K/UL (ref 0–0.8)
MONOCYTES NFR BLD AUTO: 9.7 % (ref 2–6)
MPC BLD CALC-MCNC: 11.9 FL (ref 9.4–12.4)
NEUTROPHILS # BLD AUTO: 3.62 K/UL (ref 1.8–7.7)
NEUTROPHILS NFR BLD AUTO: 69.9 % (ref 53–65)
NRBC # BLD AUTO: 0 X10E3/UL
NRBC, AUTO (.00): 0 %
PLATELET # BLD AUTO: 201 K/UL (ref 150–400)
POTASSIUM SERPL-SCNC: 3.8 MMOL/L (ref 3.5–5.1)
RBC # BLD AUTO: 4.77 M/UL (ref 4.2–5.4)
SODIUM SERPL-SCNC: 150 MMOL/L (ref 136–145)
WBC # BLD AUTO: 5.17 K/UL (ref 4.5–11)

## 2023-03-31 PROCEDURE — 97165 OT EVAL LOW COMPLEX 30 MIN: CPT

## 2023-03-31 PROCEDURE — 63600175 PHARM REV CODE 636 W HCPCS: Performed by: HOSPITALIST

## 2023-03-31 PROCEDURE — 97161 PT EVAL LOW COMPLEX 20 MIN: CPT

## 2023-03-31 PROCEDURE — 99233 PR SUBSEQUENT HOSPITAL CARE,LEVL III: ICD-10-PCS | Mod: ,,, | Performed by: HOSPITALIST

## 2023-03-31 PROCEDURE — 85025 COMPLETE CBC W/AUTO DIFF WBC: CPT | Performed by: HOSPITALIST

## 2023-03-31 PROCEDURE — 25000003 PHARM REV CODE 250: Performed by: HOSPITALIST

## 2023-03-31 PROCEDURE — 82962 GLUCOSE BLOOD TEST: CPT

## 2023-03-31 PROCEDURE — 96374 THER/PROPH/DIAG INJ IV PUSH: CPT

## 2023-03-31 PROCEDURE — 96372 THER/PROPH/DIAG INJ SC/IM: CPT | Performed by: HOSPITALIST

## 2023-03-31 PROCEDURE — 92611 MOTION FLUOROSCOPY/SWALLOW: CPT

## 2023-03-31 PROCEDURE — 80048 BASIC METABOLIC PNL TOTAL CA: CPT | Performed by: HOSPITALIST

## 2023-03-31 PROCEDURE — 96375 TX/PRO/DX INJ NEW DRUG ADDON: CPT | Mod: 59

## 2023-03-31 PROCEDURE — G0378 HOSPITAL OBSERVATION PER HR: HCPCS

## 2023-03-31 PROCEDURE — 99233 SBSQ HOSP IP/OBS HIGH 50: CPT | Mod: ,,, | Performed by: HOSPITALIST

## 2023-03-31 PROCEDURE — 83735 ASSAY OF MAGNESIUM: CPT | Performed by: HOSPITALIST

## 2023-03-31 RX ADMIN — HYDRALAZINE HYDROCHLORIDE 20 MG: 20 INJECTION, SOLUTION INTRAMUSCULAR; INTRAVENOUS at 03:03

## 2023-03-31 RX ADMIN — ACETAMINOPHEN 650 MG: 325 TABLET ORAL at 05:03

## 2023-03-31 RX ADMIN — DIPHENHYDRAMINE HYDROCHLORIDE 25 MG: 50 INJECTION, SOLUTION INTRAMUSCULAR; INTRAVENOUS at 08:03

## 2023-03-31 RX ADMIN — HEPARIN SODIUM 5000 UNITS: 5000 INJECTION, SOLUTION INTRAVENOUS; SUBCUTANEOUS at 09:03

## 2023-03-31 RX ADMIN — HEPARIN SODIUM 5000 UNITS: 5000 INJECTION, SOLUTION INTRAVENOUS; SUBCUTANEOUS at 08:03

## 2023-03-31 NOTE — PT/OT/SLP EVAL
Modified Barium Swallow    Patient Name:  Renetta Stewart   MRN:  [id number]      Recommendations:     Recommendations:                General Recommendations:   No recommendations could be made based on amount of po trials given during MODIFIED BARIUM SWALLOW  study secondary to patient's non-compliance  Diet recommendations:   ,     Aspiration Precautions: Standard aspiration precautions   General Precautions: Standard,      Referral     Reason for Referral  Patient was referred for a Modified Barium Swallow Study to assess the efficiency of his/her swallow function, rule out aspiration and make recommendations regarding safe dietary consistencies, effective compensatory strategies, and safe eating environment.     Diagnosis: Failure to thrive in adult     Fluoro Time: 1 minute, 23 seconds     History:     Past Medical History:   Diagnosis Date    Anemia     Anxiety     Dementia     Depression     GERD (gastroesophageal reflux disease)     Hyperlipidemia     Hypertension      No past surgical history on file.    Objective:     Current Respiratory Status:  room air    Alert: yes    Cooperative: no    Follows Directions: inconsistent    Visualization  Patient was seen in the lateral view    Oral Peripheral Examination  Oral Musculature: WFL  Structure Abnormalities: none noted  Dentition: present and adequate  Voice Prior to PO Intake: clear  Oral Musculature Comments: Oral motor skills appear to be WFL    Consistencies Assessed  Thin Patient was given 1 trial of thin liquid via a spoon. Patient swallowed the trial without difficulty. No aspiration noted. SLP attempted to given patient a second trial via a spoon. Patient held the liquid in her mouth for 5-6 minutes. Patient could not be coaxed into swallowing the trial. Patient finally spit the liquid into a cup. No further trials were given secondary to patient's refusal to participate in test. A determination of patient's safest diet could not be made secondary to not  enough consistencies or trials given.    Oral Preparation/Oral Phase  Patient accepted the first 2 trials without difficulty.    Pharyngeal Phase   No aspiration noted with the 1 trial observed.    Cervical Esophageal Phase  No difficulties noted with the 1 trial that was swallowed.    Assessment:     Impressions    No determinations could be made.    Prognosis:  Patient is non-compliant.    Barriers:  Cognitive status    Recommendations:  No recommendations could be made based on lack of trials accepted by patient.     Plan  Patient was non-compliant with MODIFIED BARIUM SWALLOW  study.    Education  Results were discussed with patient.    If you have any questions or recommendations regarding this study, please do not hesitate to call me at [phone].    HERBERT Devlin, WAQAS-SLP  03/31/2023

## 2023-03-31 NOTE — ASSESSMENT & PLAN NOTE
Nutrition consulted. Most recent weight and BMI monitored-                         Measurements:  Wt Readings from Last 1 Encounters:   03/30/23 83.5 kg (184 lb)   Body mass index is 31.58 kg/m².    Recommendations:      Patient has been screened and assessed by RD. RD will follow patient.

## 2023-03-31 NOTE — ASSESSMENT & PLAN NOTE
He has not been taking any of her home medications she is in normal sinus rhythm at this time.  Will resume home medications with anticoagulation once patient able swallow and after she has had an MRI.

## 2023-03-31 NOTE — PROGRESS NOTES
"[facility] - [address] [place] [address]  Adult Nutrition  First Assessment Note         Reason for Assessment  Reason For Assessment: identified at risk by screening criteria MST score of 3  Nutrition Risk Screen: difficulty chewing/swallowing      Assessment and Plan  Per H&P: Pt  is a 74 yo BF w/ prev seizures, depression but chronic noncompliance w/ meds and recent incr agitation, not eating, meds noncompliance, wandering house- her daughter and severely autistic grandson escort her and struggling with caring for her-pt is neglecting hygiene and very "scattered" some loose thought patterns and associations.   Pt has not taken meds in few months and lost lots of weight b/c of not eating, not washing   May need NF placement and home health eval   Two weeks ago went outside at night almost naked only Tshirt on - yesterday per daughter the patient stated she "could not see" ?     Per MD notes:   "Patient has had failure to thrive and anorexia for several months but she is now becoming quite weak and unable to care for herself.  Her family is also unable to care for her given her severe weakness and substantial weight loss.  In speaking with the patient, she states that she has a burning sensation when she eats food therefore she does not want to be.  She has had an EGD as well as a CT scan his been no definitive cause for her abdominal pain."    RD assessment of pt due to MST score of 3. Pt with reported not eating for several months. Weigh history reviewed:   8/18: 238  8/25: 250   1/1: 221  1/5: 222  3/2: 195  3/21: 185  3/30: 184  Pt with weight loss of 54 lbs/22% body weight x 8 months, severe weight loss. Loss of 38 lbs/17% body weight x 4 months. Loss of 11/5.6% body weight x 4 weeks. All considered severe weight loss. Pt also noted not eating well. Pt meeting severe protein calorie malnutrition criteria ongoing.     Current diet order of full liquid diet. Note pt reported burning sensation with " eating, possible some sort of thrush occurring/would benefit from magic mouthwash but that would be MD decision. Nephrology mentioned may benefit from appetite stimulating medication if she would take it, which RD agrees and would encourage as well. Continue current diet, consider advancement per SLP or if pt desires more advanced diet. Will add Ensure Max Protein TID with meals to supplement kcal/PRO. Encourage po intakes to meet nutrient needs. Follow weights/intakes.     Additionally, if pt does not intake oral nutrition, may need to consider alternate means of nutrition support such as enteral or parenteral nutrition to aid in meeting needs, as anorexia and malnutrition can negatively impact her ability to experience hunger cues.     Last BM (it has been weeks) per pt. Pt may benefit from bowel regimen as severe constipation could negatively impact appetite/intakes. Conversely, if such little oral intakes in multiple weeks, pt may not have been consuming adequate nutrition in order to have functional BM. Monitor intakes, labs, weights, po intakes, updates in pt condition. RD following.     Malnutrition  Is Patient Malnourished: Yes Malnutrition Assessment  Malnutrition Type: acute illness or injury  Energy Intake: severe energy intake          Weight Loss (Malnutrition): greater than 5% in 1 month  Energy Intake (Malnutrition): less than 75% for greater than or equal to 3 months                    Severe Weight Loss (Malnutrition): greater than 7.5% in 3 months  Skin Integrity  Matthew Risk Assessment  Sensory Perception: 4-->no impairment  Moisture: 3-->occasionally moist  Activity: 3-->walks occasionally  Mobility: 3-->slightly limited  Nutrition: 1-->very poor  Friction and Shear: 3-->no apparent problem  Matthew Score: 17    Nutrition Diagnosis  Malnutrition (Severe)   related to Inadequate Caloric intake as evidenced by pt without adequate oral intakes for multiple weeks per family, weight loss of >5% x 1  month    Nutrition Diagnosis Status: Chronic/ continues    Nutrition Risk  Level of Risk/Frequency of Follow-up: high    Recent Labs   Lab 03/30/23  2333 03/31/23  0854   GLU  --  74   POCGLU 76  --        Nutrition Prescription / Recommendations  Recommendation/Intervention: Current diet order of full liquid diet. Note pt reported burning sensation with eating, possible some sort of thrush occurring/would benefit from magic mouthwash but that would be MD decision. Nephrology mentioned may benefit from appetite stimulating medication if she would take it, which RD agrees and would encourage as well. Continue current diet, consider advancement per SLP or if pt desires more advanced diet. Will add Ensure Max Protein TID with meals to supplement kcal/PRO. Encourage po intakes to meet nutrient needs. Follow weights/intakes.  Goals: po intakes %, weight stability, at least some oral intake of supplements  Nutrition Goal Status: new  Current Diet Order: Full Liquid Diet  Chewing or Swallowing Difficulty?: No Chewing or swallowing difficulty  Recommended Diet: Full liquid (blenderized)or per MD/SLP  Recommended Oral Supplement: Ensure Max Protein [150 kcals, 30g Protein, 6g Carbs(2g Fiber, 1g Sugar), 1.5g Fat] three times daily  Is Nutrition Support Recommended: No  Is Education Recommended: No  Monitor and Evaluation  % current Intake: P.O. intake of 0 - 10%  % intake to meet estimated needs: 75 - 100 %  Food and Nutrient Intake: energy intake  Food and Nutrient Adminstration: diet order  Anthropometric Measurements: weight, weight change  Biochemical Data, Medical Tests and Procedures: gastrointestinal profile, electrolyte and renal panel, glucose/endocrine profile, inflammatory profile, lipid profile  Nutrition-Focused Physical Findings: overall appearance     Current Medical Diagnosis and Past Medical History  Diagnosis: other (see comments) (failure to thrive in adult)  Past Medical History:   Diagnosis Date     "Anemia     Anxiety     Dementia     Depression     GERD (gastroesophageal reflux disease)     Hyperlipidemia     Hypertension      Nutrition/Diet History     Lab/Procedures/Meds  Recent Labs   Lab 03/30/23  1524 03/31/23  0854   * 150*   K 4.3 3.8   BUN 37* 31*   CREATININE 1.89* 1.35*   CALCIUM 9.7 9.3   ALBUMIN 3.7  --    * 116*   ALT 15  --    AST 16  --      Last A1c: No results found for: HGBA1C  Lab Results   Component Value Date    RBC 4.77 03/31/2023    HGB 12.2 03/31/2023    HCT 40.5 03/31/2023    MCV 84.9 03/31/2023    MCH 25.6 (L) 03/31/2023    MCHC 30.1 (L) 03/31/2023    TIBC 251 10/19/2020     Pertinent Labs Reviewed: reviewed  Pertinent Labs Comments: Na/Cl high-likely related to poor oral intakes/dehydration; BUN/Creat high, GFR low- ANY per MD likely r/t dehydration as well and poor oral intakes   Pertinent Medications Reviewed: reviewed  Pertinent Medications Comments: diphenhydramine, heparin, NaCl 0.9% bolus   Anthropometrics  Temp: 98.1 °F (36.7 °C)  Height: 5' 4" (162.6 cm)  Height (inches): 64 in  Weight Method: Bed Scale  Weight: 83.5 kg (184 lb)  Weight (lb): 184 lb  Ideal Body Weight (IBW), Female: 120 lb  % Ideal Body Weight, Female (lb): 153.33 %  BMI (Calculated): 31.6     Estimated/Assessed Needs  Weight Used For Calorie Calculations: 61.8 kg (136 lb 3.9 oz) (adjusted body weight)   Energy Need Method: Kcal/kg Energy Calorie Requirements (kcal): 9690-3894 kcal(25-30 kcal/kgAdjBW)  Weight Used For Protein Calculations: 83.5 kg (184 lb 1.4 oz)  Protein Requirements: 66-83 g PRO (0.8-1.0 g PRO/kg)       RDA Method (mL): 1545     Nutrition by Nursing  Diet/Nutrition Received: NPO           Last Bowel Movement:  (pt stated its been weeks)              Nutrition Follow-Up  RD Follow-up?: Yes      "

## 2023-03-31 NOTE — PLAN OF CARE
Problem: Adult Inpatient Plan of Care  Goal: Plan of Care Review  Outcome: Ongoing, Progressing  Goal: Patient-Specific Goal (Individualized)  Outcome: Ongoing, Progressing  Goal: Absence of Hospital-Acquired Illness or Injury  Outcome: Ongoing, Progressing  Goal: Optimal Comfort and Wellbeing  Outcome: Ongoing, Progressing  Goal: Readiness for Transition of Care  Outcome: Ongoing, Progressing     Problem: Skin Injury Risk Increased  Goal: Skin Health and Integrity  Outcome: Ongoing, Progressing     Problem: Fall Injury Risk  Goal: Absence of Fall and Fall-Related Injury  Outcome: Ongoing, Progressing     Problem: Fatigue  Goal: Improved Activity Tolerance  Outcome: Ongoing, Progressing

## 2023-03-31 NOTE — ASSESSMENT & PLAN NOTE
Reported history of depression but patient unable taking medications as she states she has difficulty swallowing.  Will have to follow up in further assess this with psychiatrist.

## 2023-03-31 NOTE — PT/OT/SLP EVAL
Occupational Therapy   Evaluation    Name: Renetta Stewart  MRN: [id number]  Admitting Diagnosis: Failure to thrive in adult  Recent Surgery: * No surgery found *      Recommendations:     Discharge Recommendations: nursing facility, skilled, home with home health  Discharge Equipment Recommendations:   (to be determined)  Barriers to discharge:       Assessment:     Renetta Stewart is a 73 y.o. female with a medical diagnosis of Failure to thrive in adult.  Pt c/o of not being able to swallow. Pt spits out her own secretions and popsicle that she tries to eat. She presents with alert, decreased interest in moving, but agreeable with encouragement. Performance deficits affecting function: weakness, impaired endurance, impaired functional mobility, impaired self care skills, decreased safety awareness.      Rehab Prognosis: Fair; patient would benefit from acute skilled OT services to address these deficits and reach maximum level of function.       Plan:     Patient to be seen 5 x/week to address the above listed problems via self-care/home management, therapeutic activities, therapeutic exercises  Plan of Care Expires: 04/14/23  Plan of Care Reviewed with: patient    Subjective     Chief Complaint: weakness from not being able to eat  Patient/Family Comments/goals: Pt would like to be able to swallow easily    Occupational Profile:  Living Environment: Pt lives with her daughter in a single level home  Previous level of function: Pt ambulated  with a single point cane sometimes  Roles and Routines: Pt assists with her self-care  Equipment Used at Home: cane, straight  Assistance upon Discharge: PT will have staff assistance at [facility] or home health care and assistance from her daughter at home    Pain/Comfort:  Pain Rating 1: 0/10  Pain Rating Post-Intervention 1: 0/10    Patients cultural, spiritual, Mormonism conflicts given the current situation: no    Objective:     Communicated with: PROMISE Moreland prior to  session.  Patient found supine with peripheral IV, bed alarm, blood pressure cuff upon OT entry to room.    General Precautions: Standard, fall, pacemaker  Orthopedic Precautions: N/A  Braces: N/A  Respiratory Status: Room air    Occupational Performance:    Bed Mobility:    Patient completed Scooting/Bridging with contact guard assistance  Patient completed Supine to Sit with contact guard assistance  Patient completed Sit to Supine with contact guard assistance    Functional Mobility/Transfers:  Patient completed Sit <> Stand Transfer with contact guard assistance  with  hand-held assist   Functional Mobility: Pt side stepped to [facility] with CGA with HHA    Activities of Daily Living:  Feeding:  independence but unable to swallow, so she spat out the popsicle  Grooming: supervision and setup  Lower Body Dressing: contact guard assistance for socks    Cognitive/Visual Perceptual:  Cognitive/Psychosocial Skills:     -       Oriented to: Person   -       Follows Commands/attention:Follows one-step commands  -       Communication: clear/fluent  -       Safety awareness/insight to disability: intact   -       Mood/Affect/Coping skills/emotional control: Blunted, Guarded, and Withdrawn    Physical Exam:  Balance:    -       sitting balance is good, standing balance is fair (+)  Sensation:    -       Intact  Motor Planning:    -       intact  Dominant hand:    -       Right  Upper Extremity Range of Motion:     -       Right Upper Extremity: WFL  -       Left Upper Extremity: WFL  Upper Extremity Strength:    -       Right Upper Extremity: WFL  -       Left Upper Extremity: WFL   Strength:    -       Right Upper Extremity: WFL  -       Left Upper Extremity: WFL  Fine Motor Coordination:    -       Intact  Gross motor coordination:   WFL    AMPAC 6 Click ADL:  AMPAC Total Score: 16    Treatment & Education:  Pt educated on OT role/POC.   Importance of OOB activity with staff assistance.  Importance of sitting up in the  chair throughout the day as tolerated, especially for meals   Safety during functional t/f and mobility   Importance of assisting with self-care activities   All questions/concerns answered within OT scope of practice      Patient left HOB elevated with all lines intact, call button in reach, bed alarm on, and RN notified    GOALS:   Multidisciplinary Problems       Occupational Therapy Goals          Problem: Occupational Therapy    Goal Priority Disciplines Outcome Interventions   Occupational Therapy Goal     OT, PT/OT Ongoing, Progressing    Description: STG:  Pt will perform grooming with setup  Pt will bathe with min(A)  Pt will perform UE dressing with CGA  Pt will perform LE dressing with min(A)  Pt will sit EOB x 7 min with SBA  during dynamic activity  Pt will transfer bed/chair/bsc with CGA with RW if needed  Pt will perform standing task x 2 min with CGA with RW if needed  Pt will tolerate 20 minutes of tx without fatigue      LT.Restore to max I with self care and mobility.                          History:     Past Medical History:   Diagnosis Date    Anemia     Anxiety     Dementia     Depression     GERD (gastroesophageal reflux disease)     Hyperlipidemia     Hypertension        No past surgical history on file.    Time Tracking:     OT Date of Treatment: 23  OT Start Time: 1105  OT Stop Time: 1118  OT Total Time (min): 13 min    Billable Minutes:Evaluation low complexity    3/31/2023

## 2023-03-31 NOTE — PLAN OF CARE
Problem: Adult Inpatient Plan of Care  Goal: Plan of Care Review  Outcome: Ongoing, Not Progressing  Goal: Patient-Specific Goal (Individualized)  Outcome: Ongoing, Not Progressing  Goal: Absence of Hospital-Acquired Illness or Injury  Outcome: Ongoing, Not Progressing  Goal: Optimal Comfort and Wellbeing  Outcome: Ongoing, Not Progressing  Goal: Readiness for Transition of Care  Outcome: Ongoing, Not Progressing     Problem: Skin Injury Risk Increased  Goal: Skin Health and Integrity  Outcome: Ongoing, Not Progressing

## 2023-03-31 NOTE — PLAN OF CARE
[facility] - [facility]  Initial Discharge Assessment       Primary Care Provider: Eldon Govea MD    Admission Diagnosis: Dehydration [E86.0]  Altered mental status [R41.82]  Failure to thrive in adult [R62.7]  Dysphagia [R13.10]  Abdominal pain, unspecified abdominal location [R10.9]    Admission Date: 3/30/2023  Expected Discharge Date:     Discharge Barriers Identified: None    Payor: [facility] MCARE / Plan: [facility] DUAL COMPLETE HMO SNP / Product Type: Medicare Advantage /     Extended Emergency Contact Information  Primary Emergency Contact: SARAH IWTT  Home Phone: [phone]  Mobile Phone: [phone]  Relation: Daughter   needed? No    Discharge Plan A: Skilled Nursing Facility  Discharge Plan B: Home with family, Home Health      [facility] - [place], [place] - [address]  [address]  [facility] [address]  Phone: [phone] Fax: [phone]    [id number] - [facility] [place] - [address] [facility] [address]  [address]  [facility] [place] [address]  Phone: [phone] Fax: [phone]    [id number] - [facility] - [address]  [address]  [facility] [address]  Phone: [phone] Fax: [phone]      Initial Assessment (most recent)       Adult Discharge Assessment - 03/31/23 1055          Discharge Assessment    Assessment Type Discharge Planning Assessment     Source of Information family     People in Home child(carleen), adult     Prior to hospitilization cognitive status: Unable to Assess     Current cognitive status: Alert/Oriented     Home Layout Able to live on 1st floor     Equipment Currently Used at Home none     Readmission within 30 days? No     Patient currently being followed by outpatient case management? No     Do you currently have service(s) that help you manage your care at home? No     Do you take prescription medications? Yes     Do you have prescription coverage?  Yes     Coverage [facility] Medicare     Do you have any problems affording any of your prescribed medications? No     Is the patient taking medications as prescribed? yes     Who is going to help you get home at discharge? daughter     How do you get to doctors appointments? family or friend will provide     Are you on dialysis? No     Do you take coumadin? No     Discharge Plan A Skilled Nursing Facility     Discharge Plan B Home with family;Home Health     DME Needed Upon Discharge  --   unsure    Discharge Plan discussed with: Adult children     Discharge Barriers Identified None        Physical Activity    On average, how many days per week do you engage in moderate to strenuous exercise (like a brisk walk)? 0 days     On average, how many minutes do you engage in exercise at this level? 0 min        Financial Resource Strain    How hard is it for you to pay for the very basics like food, housing, medical care, and heating? Not hard at all        Housing Stability    In the last 12 months, was there a time when you were not able to pay the mortgage or rent on time? No     In the last 12 months, how many places have you lived? 2     In the last 12 months, was there a time when you did not have a steady place to sleep or slept in a shelter (including now)? No        Transportation Needs    In the past 12 months, has lack of transportation kept you from medical appointments or from getting medications? No     In the past 12 months, has lack of transportation kept you from meetings, work, or from getting things needed for daily living? No        Food Insecurity    Within the past 12 months, you worried that your food would run out before you got the money to buy more. Never true     Within the past 12 months, the food you bought just didn't last and you didn't have money to get more. Never true        Stress    Do you feel stress - tense, restless, nervous, or anxious, or unable to sleep at night because your mind is  troubled all the time - these days? Not at all        Social Connections    In a typical week, how many times do you talk on the phone with family, friends, or neighbors? More than three times a week     How often do you get together with friends or relatives? More than three times a week     How often do you attend Shinto or Sabianist services? Never     Do you belong to any clubs or organizations such as Shinto groups, unions, fraternal or athletic groups, or school groups? No     How often do you attend meetings of the clubs or organizations you belong to? Never        Alcohol Use    Q1: How often do you have a drink containing alcohol? Never     Q2: How many drinks containing alcohol do you have on a typical day when you are drinking? Patient does not drink     Q3: How often do you have six or more drinks on one occasion? Never                          SS attempted to talk to pt but she would not answer questions. SS spoke with pt's daughter. Pt has been living with daughter recently due to decline. SS will follow for discharge needs.

## 2023-03-31 NOTE — SUBJECTIVE & OBJECTIVE
Past Medical History:   Diagnosis Date    Anemia     Anxiety     Dementia     Depression     GERD (gastroesophageal reflux disease)     Hyperlipidemia     Hypertension        No past surgical history on file.    Review of patient's allergies indicates:  No Known Allergies    No current facility-administered medications on file prior to encounter.     Current Outpatient Medications on File Prior to Encounter   Medication Sig    levothyroxine (SYNTHROID) 50 MCG tablet Take 1 tablet (50 mcg total) by mouth once daily. (Patient not taking: Reported on 3/29/2023)    memantine (NAMENDA) 10 MG Tab Take 5 mg by mouth once daily.    mirtazapine (REMERON) 15 MG tablet Take 1 tablet (15 mg total) by mouth every evening. (Patient not taking: Reported on 3/29/2023)    ondansetron (ZOFRAN) 4 MG tablet Take 1 tablet (4 mg total) by mouth every 6 (six) hours. (Patient not taking: Reported on 3/29/2023)    pantoprazole (PROTONIX) 40 MG tablet Take 1 tablet (40 mg total) by mouth once daily. (Patient not taking: Reported on 3/29/2023)    QUEtiapine (SEROQUEL) 25 MG Tab Take 1 tablet (25 mg total) by mouth every evening. (Patient not taking: Reported on 3/29/2023)    traMADoL (ULTRAM) 50 mg tablet Take 50 mg by mouth 2 (two) times daily as needed.     Family History       Problem Relation (Age of Onset)    Hypertension Father          Tobacco Use    Smoking status: Never     Passive exposure: Never    Smokeless tobacco: Never   Substance and Sexual Activity    Alcohol use: Not Currently    Drug use: Never    Sexual activity: Not Currently     Review of Systems   Constitutional:  Positive for appetite change, fatigue and unexpected weight change. Negative for activity change, chills and fever.   HENT:  Negative for congestion and sore throat.    Respiratory:  Negative for chest tightness.    Gastrointestinal:  Positive for abdominal pain and nausea. Negative for abdominal distention and diarrhea.   Endocrine: Negative for cold  intolerance and heat intolerance.   Genitourinary:  Negative for dysuria.   Musculoskeletal:  Negative for arthralgias and back pain.   Skin:  Negative for color change and rash.   Neurological:  Positive for weakness. Negative for dizziness, tremors and headaches.   Psychiatric/Behavioral:  Negative for agitation. The patient is not nervous/anxious.    Objective:     Vital Signs (Most Recent):  Temp: 97.8 °F (36.6 °C) (03/30/23 2145)  Pulse: 85 (03/30/23 2145)  Resp: 18 (03/30/23 2145)  BP: (!) 188/81 (03/30/23 2145)  SpO2: 99 % (03/30/23 2145)   Vital Signs (24h Range):  Temp:  [97.8 °F (36.6 °C)-98.4 °F (36.9 °C)] 97.8 °F (36.6 °C)  Pulse:  [66-89] 85  Resp:  [18] 18  SpO2:  [64 %-100 %] 99 %  BP: (123-188)/(67-97) 188/81     Weight: 83.5 kg (184 lb)  Body mass index is 31.58 kg/m².    Physical Exam  Constitutional:       Appearance: Normal appearance.   HENT:      Head: Normocephalic and atraumatic.      Nose: Nose normal.      Mouth/Throat:      Mouth: Mucous membranes are moist.      Pharynx: Oropharynx is clear.   Eyes:      Extraocular Movements: Extraocular movements intact.      Conjunctiva/sclera: Conjunctivae normal.      Pupils: Pupils are equal, round, and reactive to light.   Cardiovascular:      Rate and Rhythm: Normal rate and regular rhythm.      Pulses: Normal pulses.      Heart sounds: Normal heart sounds.   Pulmonary:      Effort: Pulmonary effort is normal.      Breath sounds: Normal breath sounds.   Abdominal:      General: Abdomen is flat. Bowel sounds are normal.      Palpations: Abdomen is soft.   Musculoskeletal:         General: Normal range of motion.      Cervical back: Normal range of motion and neck supple.   Skin:     General: Skin is warm and dry.   Neurological:      General: No focal deficit present.      Mental Status: She is alert. She is disoriented.      Motor: Weakness present.   Psychiatric:         Mood and Affect: Mood normal.         Behavior: Behavior normal.          CRANIAL NERVES     CN III, IV, VI   Pupils are equal, round, and reactive to light.     Significant Labs: All pertinent labs within the past 24 hours have been reviewed.    Significant Imaging: I have reviewed all pertinent imaging results/findings within the past 24 hours.

## 2023-03-31 NOTE — HPI
Ms. Stewart in 73-year-old woman history of depression/anxiety, hypothyroidism, atrial fibrillation, pacemaker, GERD, obesity, chronic kidney disease, hyperlipidemia who presents with failure to thrive for several months.  Patient has had failure to thrive and anorexia for several months but she is now becoming quite weak and unable to care for herself.  Her family is also unable to care for her given her severe weakness and substantial weight loss.  In speaking with the patient, she states that she has a burning sensation when she eats food therefore she does not want to be.  She has had an EGD as well as a CT scan his been no definitive cause for her abdominal pain.  Her family is considering that this may be due to psychiatric illness although she has not has severe psychiatric illness during the remainder of her life.  Before she can be committed to a psychiatric facility she will need cardiac clearance.  Given her severe failure to thrive weight loss, she is being admitted subsequent follow-up with Psychiatry.  She has been seen by Nephrology as well as Neurology and both agree that patient likely needs to be evaluated by Psychiatry.    ED course:   Initial Vitals [03/30/23 1409]   BP Pulse Resp Temp SpO2   123/85 89 18 98.4 °F (36.9 °C) 99     Medications   sodium chloride 0.9% bolus 1,000 mL 1,000 mL (1,000 mLs Intravenous New Bag 3/30/23 1520)   sodium chloride 0.9% bolus 1,000 mL 1,000 mL (1,000 mLs Intravenous New Bag 3/30/23 1712)

## 2023-03-31 NOTE — H&P
[facility] Hospital Medicine  History & Physical    Patient Name: Renetta Stewart  MRN: [id number]  Patient Class: OP- Observation  Admission Date: 3/30/2023  Attending Physician: Min Santos MD   Primary Care Provider: Eldon Govea MD         Patient information was obtained from patient, past medical records and ER records.     Subjective:     Principal Problem:Failure to thrive in adult    Chief Complaint:   Chief Complaint   Patient presents with    Dementia    Failure To Thrive        HPI:   Ms. Stewart in 73-year-old woman history of depression/anxiety, hypothyroidism, atrial fibrillation, pacemaker, GERD, obesity, chronic kidney disease, hyperlipidemia who presents with failure to thrive for several months.  Patient has had failure to thrive and anorexia for several months but she is now becoming quite weak and unable to care for herself.  Her family is also unable to care for her given her severe weakness and substantial weight loss.  In speaking with the patient, she states that she has a burning sensation when she eats food therefore she does not want to be.  She has had an EGD as well as a CT scan his been no definitive cause for her abdominal pain.  Her family is considering that this may be due to psychiatric illness although she has not has severe psychiatric illness during the remainder of her life.  Before she can be committed to a psychiatric facility she will need cardiac clearance.  Given her severe failure to thrive weight loss, she is being admitted subsequent follow-up with Psychiatry.  She has been seen by Nephrology as well as Neurology and both agree that patient likely needs to be evaluated by Psychiatry.    ED course:   Initial Vitals [03/30/23 1409]   BP Pulse Resp Temp SpO2   123/85 89 18 98.4 °F (36.9 °C) 99     Medications   sodium chloride 0.9% bolus 1,000 mL 1,000 mL (1,000 mLs Intravenous New Bag 3/30/23 1520)   sodium chloride  0.9% bolus 1,000 mL 1,000 mL (1,000 mLs Intravenous New Bag 3/30/23 2306)       Past Medical History:   Diagnosis Date    Anemia     Anxiety     Dementia     Depression     GERD (gastroesophageal reflux disease)     Hyperlipidemia     Hypertension        No past surgical history on file.    Review of patient's allergies indicates:  No Known Allergies    No current facility-administered medications on file prior to encounter.     Current Outpatient Medications on File Prior to Encounter   Medication Sig    levothyroxine (SYNTHROID) 50 MCG tablet Take 1 tablet (50 mcg total) by mouth once daily. (Patient not taking: Reported on 3/29/2023)    memantine (NAMENDA) 10 MG Tab Take 5 mg by mouth once daily.    mirtazapine (REMERON) 15 MG tablet Take 1 tablet (15 mg total) by mouth every evening. (Patient not taking: Reported on 3/29/2023)    ondansetron (ZOFRAN) 4 MG tablet Take 1 tablet (4 mg total) by mouth every 6 (six) hours. (Patient not taking: Reported on 3/29/2023)    pantoprazole (PROTONIX) 40 MG tablet Take 1 tablet (40 mg total) by mouth once daily. (Patient not taking: Reported on 3/29/2023)    QUEtiapine (SEROQUEL) 25 MG Tab Take 1 tablet (25 mg total) by mouth every evening. (Patient not taking: Reported on 3/29/2023)    traMADoL (ULTRAM) 50 mg tablet Take 50 mg by mouth 2 (two) times daily as needed.     Family History       Problem Relation (Age of Onset)    Hypertension Father          Tobacco Use    Smoking status: Never     Passive exposure: Never    Smokeless tobacco: Never   Substance and Sexual Activity    Alcohol use: Not Currently    Drug use: Never    Sexual activity: Not Currently     Review of Systems   Constitutional:  Positive for appetite change, fatigue and unexpected weight change. Negative for activity change, chills and fever.   HENT:  Negative for congestion and sore throat.    Respiratory:  Negative for chest tightness.    Gastrointestinal:  Positive for abdominal pain  and nausea. Negative for abdominal distention and diarrhea.   Endocrine: Negative for cold intolerance and heat intolerance.   Genitourinary:  Negative for dysuria.   Musculoskeletal:  Negative for arthralgias and back pain.   Skin:  Negative for color change and rash.   Neurological:  Positive for weakness. Negative for dizziness, tremors and headaches.   Psychiatric/Behavioral:  Negative for agitation. The patient is not nervous/anxious.    Objective:     Vital Signs (Most Recent):  Temp: 97.8 °F (36.6 °C) (03/30/23 2145)  Pulse: 85 (03/30/23 2145)  Resp: 18 (03/30/23 2145)  BP: (!) 188/81 (03/30/23 2145)  SpO2: 99 % (03/30/23 2145)   Vital Signs (24h Range):  Temp:  [97.8 °F (36.6 °C)-98.4 °F (36.9 °C)] 97.8 °F (36.6 °C)  Pulse:  [66-89] 85  Resp:  [18] 18  SpO2:  [64 %-100 %] 99 %  BP: (123-188)/(67-97) 188/81     Weight: 83.5 kg (184 lb)  Body mass index is 31.58 kg/m².    Physical Exam  Constitutional:       Appearance: Normal appearance.   HENT:      Head: Normocephalic and atraumatic.      Nose: Nose normal.      Mouth/Throat:      Mouth: Mucous membranes are moist.      Pharynx: Oropharynx is clear.   Eyes:      Extraocular Movements: Extraocular movements intact.      Conjunctiva/sclera: Conjunctivae normal.      Pupils: Pupils are equal, round, and reactive to light.   Cardiovascular:      Rate and Rhythm: Normal rate and regular rhythm.      Pulses: Normal pulses.      Heart sounds: Normal heart sounds.   Pulmonary:      Effort: Pulmonary effort is normal.      Breath sounds: Normal breath sounds.   Abdominal:      General: Abdomen is flat. Bowel sounds are normal.      Palpations: Abdomen is soft.   Musculoskeletal:         General: Normal range of motion.      Cervical back: Normal range of motion and neck supple.   Skin:     General: Skin is warm and dry.   Neurological:      General: No focal deficit present.      Mental Status: She is alert. She is disoriented.      Motor: Weakness present.    Psychiatric:         Mood and Affect: Mood normal.         Behavior: Behavior normal.         CRANIAL NERVES     CN III, IV, VI   Pupils are equal, round, and reactive to light.     Significant Labs: All pertinent labs within the past 24 hours have been reviewed.    Significant Imaging: I have reviewed all pertinent imaging results/findings within the past 24 hours.    Assessment/Plan:     * Failure to thrive in adult  Patient has been seen by Neurology recommendation to be evaluated by Psychiatry.    Will continue to further evaluate for cause of failure to thrive and is medically cleared will consider transfer to psychiatric facility and are tele psych evaluation.  Given patient's acute change in mental status a solid so exclude a stroke.  MRI has been ordered.    Weight loss  Nutrition consulted. Most recent weight and BMI monitored-                         Measurements:  Wt Readings from Last 1 Encounters:   03/30/23 83.5 kg (184 lb)   Body mass index is 31.58 kg/m².    Recommendations:      Patient has been screened and assessed by RD. RD will follow patient.      Gastroesophageal reflux disease without esophagitis        Acquired hypothyroidism  TSH has been checked multiple times in the last year and has been normal.      Primary insomnia  Will give p.r.n. IV medications the patient man least have sleep      Atrial fibrillation  He has not been taking any of her home medications she is in normal sinus rhythm at this time.  Will resume home medications with anticoagulation once patient able swallow and after she has had an MRI.        Hyperlipidemia  Will defer/on medication at this time until patient is better able to swallow.      Essential hypertension  Treat with IV medication including hydralazine and clonidine p.r.n..      Depression  Reported history of depression but patient unable taking medications as she states she has difficulty swallowing.  Will have to follow up in further assess this with  psychiatrist.          VTE Risk Mitigation (From admission, onward)         Ordered     heparin (porcine) injection 5,000 Units  Every 12 hours         03/30/23 2319     IP VTE HIGH RISK PATIENT  Once         03/30/23 2319     Place sequential compression device  Until discontinued         03/30/23 2319                   On 03/30/2023, patient should be placed in hospital observation services under my care.        Min Santos MD  Department of Hospital Medicine  [facility] - [facility]

## 2023-03-31 NOTE — PLAN OF CARE
Problem: Physical Therapy  Goal: Physical Therapy Goal  Description: Short Term Goals to be met by: 23    Patient will increase functional independence with mobility by performin. Supine to sit with independently  2. Sit to stand transfer with independently using Rolling walker  3. Bed to chair transfer with independently using Rolling walker  4. Gait  x 100 feet with supervision using Rolling walker  5. Lower extremity exercise program x30 reps per handout, with assistance as needed  6. Pt to negotiate 4 steps with rail with supervision    Long Term Goals to be met by: 23    Pt will regain full independent functional mobility with lowest level of assistive device to return to home situation and prior activities of daily living.   Outcome: Ongoing, Progressing     PT eval completed.

## 2023-03-31 NOTE — PT/OT/SLP EVAL
Physical Therapy Evaluation    Patient Name:  Renetta Stewart   MRN:  [id number]    Recommendations:     Discharge Recommendations: nursing facility, skilled, home with home health   Discharge Equipment Recommendations:  (to be determined)   Barriers to discharge:  ongoing medical treatment    Assessment:     Renetta Stewart is a 73 y.o. female admitted with a medical diagnosis of Failure to thrive in adult.  She presents with the following impairments/functional limitations: weakness, impaired endurance, impaired functional mobility, gait instability, decreased safety awareness. Per daughter, pt has not had anything substantial to eat or drink x 3wks. Pt states food burns when she swallows it and refuses to intake any. Pt demo fairly good mobility considering her lack of nutritional support. PT to follow pt while admitted to hospital to prevent decline from current ability and to assist with return to [facility] which was ambulatory without AD.     Rehab Prognosis: Good and Fair; patient would benefit from acute skilled PT services to address these deficits and reach maximum level of function.    Recent Surgery: * No surgery found *      Plan:     During this hospitalization, patient to be seen 5 x/week to address the identified rehab impairments via gait training, therapeutic activities, therapeutic exercises and progress toward the following goals:    Plan of Care Expires:  04/30/23    Subjective     Chief Complaint: FTT  Patient/Family Comments/goals: agreeable to eval  Pain/Comfort:  Pain Rating 1: 0/10    Patients cultural, spiritual, Sikh conflicts given the current situation:      Living Environment:  [place] with daughter with 4 steps with rail down to her bedroom in a split-level home.  Prior to admission, patients level of function was indep with intermittent cane use.  Equipment used at home: cane, straight.  DME owned (not currently used): none.  Upon discharge, patient will have assistance from facility  staff.    Objective:     Communicated with RN prior to session.  Patient found left sidelying with peripheral IV, blood pressure cuff, bed alarm  upon PT entry to room.    General Precautions: Standard, fall, pacemaker  Orthopedic Precautions:N/A   Braces: N/A  Respiratory Status: Room air    Exams:  Cognitive Exam:  Patient is oriented to Person and Situation  Sensation:    -       Intact  RLE ROM: WFL  RLE Strength: WFL except during formal testing  LLE ROM: WFL  LLE Strength: WFL except during formal testing    Functional Mobility:  Bed Mobility:     Rolling Right: contact guard assistance  Scooting: contact guard assistance  Supine to Sit: minimum assistance  Sit to Supine: minimum assistance  Transfers:     Sit to Stand:  contact guard assistance and minimum assistance with hand-held assist  Toilet Transfer: contact guard assistance with  grab bars  using  Step Transfer  Gait: 60ft with RW, CGA with cues for upright posture and to maintain close proximity to RW  Balance: Fair+ in stance      AM-PAC 6 CLICK MOBILITY  Total Score:19       Treatment & Education:  Pt educated on PT role/POC.   Importance of OOB activity with staff assistance.  Importance of sitting up in the chair throughout the day as tolerated, especially for meals   Safety during functional t/f and mobility with use of LRAD  White board updated   Multiple self-care tasks/functional mobility completed- assistance level noted above   All questions/concerns answered within PT scope of practice     Patient left with bed in chair position with all lines intact, call button in reach, and daughter and grandson present with RN notified.    GOALS:   Multidisciplinary Problems       Physical Therapy Goals          Problem: Physical Therapy    Goal Priority Disciplines Outcome Goal Variances Interventions   Physical Therapy Goal     PT, PT/OT Ongoing, Progressing     Description: Short Term Goals to be met by: 4/14/23    Patient will increase functional  independence with mobility by performin. Supine to sit with independently  2. Sit to stand transfer with independently using Rolling walker  3. Bed to chair transfer with independently using Rolling walker  4. Gait  x 100 feet with supervision using Rolling walker  5. Lower extremity exercise program x30 reps per handout, with assistance as needed  6. Pt to negotiate 4 steps with rail with supervision    Long Term Goals to be met by: 23    Pt will regain full independent functional mobility with lowest level of assistive device to return to home situation and prior activities of daily living.                        History:     Past Medical History:   Diagnosis Date    Anemia     Anxiety     Dementia     Depression     GERD (gastroesophageal reflux disease)     Hyperlipidemia     Hypertension        No past surgical history on file.    Time Tracking:     PT Received On: 23  PT Start Time: 1545     PT Stop Time: 1603  PT Total Time (min): 18 min     Billable Minutes: Evaluation low complexity eval      2023

## 2023-03-31 NOTE — PLAN OF CARE
Problem: Occupational Therapy  Goal: Occupational Therapy Goal  Description: STG:  Pt will perform grooming with setup  Pt will bathe with min(A)  Pt will perform UE dressing with CGA  Pt will perform LE dressing with min(A)  Pt will sit EOB x 7 min with SBA  during dynamic activity  Pt will transfer bed/chair/bsc with CGA with RW if needed  Pt will perform standing task x 2 min with CGA with RW if needed  Pt will tolerate 20 minutes of tx without fatigue      LT.Restore to max I with self care and mobility.     Outcome: Ongoing, Progressing

## 2023-04-01 LAB
ANION GAP SERPL CALCULATED.3IONS-SCNC: 18 MMOL/L (ref 7–16)
AORTIC VALVE CUSP SEPERATION: 1.93 CM
AV MEAN GRADIENT: 10 MMHG
AV PEAK GRADIENT: 21 MMHG
BASOPHILS # BLD AUTO: 0.02 K/UL (ref 0–0.2)
BASOPHILS NFR BLD AUTO: 0.5 % (ref 0–1)
BSA FOR ECHO PROCEDURE: 1.94 M2
BUN SERPL-MCNC: 30 MG/DL (ref 7–18)
BUN/CREAT SERPL: 20 (ref 6–20)
CALCIUM SERPL-MCNC: 9.3 MG/DL (ref 8.5–10.1)
CHLORIDE SERPL-SCNC: 115 MMOL/L (ref 98–107)
CO2 SERPL-SCNC: 23 MMOL/L (ref 21–32)
CREAT SERPL-MCNC: 1.53 MG/DL (ref 0.55–1.02)
CV ECHO LV RWT: 0.66 CM
DIFFERENTIAL METHOD BLD: ABNORMAL
DOP CALC AO PEAK VEL: 2.3 M/S
DOP CALC AO VTI: 42.16 CM
E WAVE DECELERATION TIME: 288 MSEC
ECHO LV POSTERIOR WALL: 1.18 CM (ref 0.6–1.1)
EGFR (NO RACE VARIABLE) (RUSH/TITUS): 36 ML/MIN/1.73M²
EJECTION FRACTION: 70 %
EOSINOPHIL # BLD AUTO: 0.07 K/UL (ref 0–0.5)
EOSINOPHIL NFR BLD AUTO: 1.8 % (ref 1–4)
ERYTHROCYTE [DISTWIDTH] IN BLOOD BY AUTOMATED COUNT: 16.5 % (ref 11.5–14.5)
FRACTIONAL SHORTENING: 44 % (ref 28–44)
GLUCOSE SERPL-MCNC: 79 MG/DL (ref 74–106)
HCT VFR BLD AUTO: 36.5 % (ref 38–47)
HGB BLD-MCNC: 11.2 G/DL (ref 12–16)
IMM GRANULOCYTES # BLD AUTO: 0.02 K/UL (ref 0–0.04)
IMM GRANULOCYTES NFR BLD: 0.5 % (ref 0–0.4)
INTERVENTRICULAR SEPTUM: 1.62 CM (ref 0.6–1.1)
LEFT ATRIUM SIZE: 3.6 CM
LEFT INTERNAL DIMENSION IN SYSTOLE: 2.01 CM (ref 2.1–4)
LEFT VENTRICLE DIASTOLIC VOLUME INDEX: 28.8 ML/M2
LEFT VENTRICLE DIASTOLIC VOLUME: 54.43 ML
LEFT VENTRICLE MASS INDEX: 95 G/M2
LEFT VENTRICLE SYSTOLIC VOLUME INDEX: 6.8 ML/M2
LEFT VENTRICLE SYSTOLIC VOLUME: 12.89 ML
LEFT VENTRICULAR INTERNAL DIMENSION IN DIASTOLE: 3.6 CM (ref 3.5–6)
LEFT VENTRICULAR MASS: 179.89 G
LYMPHOCYTES # BLD AUTO: 1.05 K/UL (ref 1–4.8)
LYMPHOCYTES NFR BLD AUTO: 26.8 % (ref 27–41)
MAGNESIUM SERPL-MCNC: 1.9 MG/DL (ref 1.7–2.3)
MCH RBC QN AUTO: 25.8 PG (ref 27–31)
MCHC RBC AUTO-ENTMCNC: 30.7 G/DL (ref 32–36)
MCV RBC AUTO: 84.1 FL (ref 80–96)
MONOCYTES # BLD AUTO: 0.43 K/UL (ref 0–0.8)
MONOCYTES NFR BLD AUTO: 11 % (ref 2–6)
MPC BLD CALC-MCNC: 12.7 FL (ref 9.4–12.4)
MV PEAK E VEL: 0.67 M/S
NEUTROPHILS # BLD AUTO: 2.33 K/UL (ref 1.8–7.7)
NEUTROPHILS NFR BLD AUTO: 59.4 % (ref 53–65)
NRBC # BLD AUTO: 0 X10E3/UL
NRBC, AUTO (.00): 0 %
PISA TR MAX VEL: 3.17 M/S
PLATELET # BLD AUTO: 200 K/UL (ref 150–400)
POTASSIUM SERPL-SCNC: 3.3 MMOL/L (ref 3.5–5.1)
RA PRESSURE: 3 MMHG
RBC # BLD AUTO: 4.34 M/UL (ref 4.2–5.4)
SODIUM SERPL-SCNC: 153 MMOL/L (ref 136–145)
TR MAX PG: 40 MMHG
TV REST PULMONARY ARTERY PRESSURE: 43 MMHG
UA COMPLETE W REFLEX CULTURE PNL UR: NORMAL
WBC # BLD AUTO: 3.92 K/UL (ref 4.5–11)

## 2023-04-01 PROCEDURE — 96376 TX/PRO/DX INJ SAME DRUG ADON: CPT

## 2023-04-01 PROCEDURE — 85025 COMPLETE CBC W/AUTO DIFF WBC: CPT | Performed by: HOSPITALIST

## 2023-04-01 PROCEDURE — 99233 PR SUBSEQUENT HOSPITAL CARE,LEVL III: ICD-10-PCS | Mod: ,,, | Performed by: HOSPITALIST

## 2023-04-01 PROCEDURE — 99233 SBSQ HOSP IP/OBS HIGH 50: CPT | Mod: ,,, | Performed by: HOSPITALIST

## 2023-04-01 PROCEDURE — 83735 ASSAY OF MAGNESIUM: CPT | Performed by: HOSPITALIST

## 2023-04-01 PROCEDURE — 80048 BASIC METABOLIC PNL TOTAL CA: CPT | Performed by: HOSPITALIST

## 2023-04-01 PROCEDURE — 63600175 PHARM REV CODE 636 W HCPCS: Performed by: HOSPITALIST

## 2023-04-01 PROCEDURE — 96372 THER/PROPH/DIAG INJ SC/IM: CPT | Performed by: HOSPITALIST

## 2023-04-01 PROCEDURE — G0378 HOSPITAL OBSERVATION PER HR: HCPCS

## 2023-04-01 RX ADMIN — DIPHENHYDRAMINE HYDROCHLORIDE 25 MG: 50 INJECTION, SOLUTION INTRAMUSCULAR; INTRAVENOUS at 08:04

## 2023-04-01 RX ADMIN — HEPARIN SODIUM 5000 UNITS: 5000 INJECTION, SOLUTION INTRAVENOUS; SUBCUTANEOUS at 08:04

## 2023-04-01 RX ADMIN — HEPARIN SODIUM 5000 UNITS: 5000 INJECTION, SOLUTION INTRAVENOUS; SUBCUTANEOUS at 09:04

## 2023-04-01 NOTE — PLAN OF CARE
Problem: Skin Injury Risk Increased  Goal: Skin Health and Integrity  Outcome: Ongoing, Progressing     Problem: Fall Injury Risk  Goal: Absence of Fall and Fall-Related Injury  Outcome: Ongoing, Progressing     Problem: Fatigue  Goal: Improved Activity Tolerance  Outcome: Ongoing, Not Progressing

## 2023-04-01 NOTE — HOSPITAL COURSE
3/31: Continue with cardiac workup as patient may need to be placed a psych facility and/or have a psych evaluation.    4/1: Will consult GI for dysphagia and patient has already had an EGD and has had a colonoscopy in the past.  She had a barium swallow this hospitalization and this can be evaluated.    4/2: CT abd/pelvis with oral and IV contrast given report of abdominal pain and refusal to eat solid foods as a result.  40 pound weight loss over last 6 months, per family.      4/3: After extensive workup there is no Medical anatomical reason that patient is unable to swallow.  There may be psychological or psychiatric component to her illness.  Case was discussed with GI.  Plan to discharge home with family or to psych unit.  Anticipate discharge tomorrow.    4/4: Plan to discharge to psych facility.     4/5: Patient was accepted at a facility but family was not agreeable due to the distance.    Plan to discharge patient home in the morning.  Family may consider a local facility in the future.  However, [facility] is not an option due to patient's insurance.  [facility] is not an option as they no longer have a geripsych unit.    Overall, patient is improved.  She is A&O x 3 today.  She intermittently refuses to eat solid foods.      4/6:  Patient accepted to psych facility in [facility] and patient and her family are agreeable to go to this facility.  Plan for discharge today.

## 2023-04-01 NOTE — PROGRESS NOTES
[facility] Hospital Medicine  Progress Note    Patient Name: Renetta Stewart  MRN: [id number]  Patient Class: OP- Observation   Admission Date: 3/30/2023  Length of Stay: 0 days  Attending Physician: Min Santos MD  Primary Care Provider: Eldon Govea MD        Subjective:     Principal Problem:Failure to thrive in adult    HPI:  Ms. Stewart in 73-year-old woman history of depression/anxiety, hypothyroidism, atrial fibrillation, pacemaker, GERD, obesity, chronic kidney disease, hyperlipidemia who presents with failure to thrive for several months.  Patient has had failure to thrive and anorexia for several months but she is now becoming quite weak and unable to care for herself.  Her family is also unable to care for her given her severe weakness and substantial weight loss.  In speaking with the patient, she states that she has a burning sensation when she eats food therefore she does not want to be.  She has had an EGD as well as a CT scan his been no definitive cause for her abdominal pain.  Her family is considering that this may be due to psychiatric illness although she has not has severe psychiatric illness during the remainder of her life.  Before she can be committed to a psychiatric facility she will need cardiac clearance.  Given her severe failure to thrive weight loss, she is being admitted subsequent follow-up with Psychiatry.  She has been seen by Nephrology as well as Neurology and both agree that patient likely needs to be evaluated by Psychiatry.    ED course:   Initial Vitals [03/30/23 1409]   BP Pulse Resp Temp SpO2   123/85 89 18 98.4 °F (36.9 °C) 99     Medications   sodium chloride 0.9% bolus 1,000 mL 1,000 mL (1,000 mLs Intravenous New Bag 3/30/23 1520)   sodium chloride 0.9% bolus 1,000 mL 1,000 mL (1,000 mLs Intravenous New Bag 3/30/23 1712)       Overview/Hospital Course:  3/31: Continue with cardiac workup as patient may need to be placed  a psych facility and/or have a psych evaluation.      Interval History:     Review of Systems   Constitutional:  Positive for appetite change, fatigue and unexpected weight change. Negative for activity change, chills and fever.   HENT:  Negative for congestion and sore throat.    Respiratory:  Negative for chest tightness.    Gastrointestinal:  Positive for abdominal pain and nausea. Negative for abdominal distention and diarrhea.   Endocrine: Negative for cold intolerance and heat intolerance.   Genitourinary:  Negative for dysuria.   Musculoskeletal:  Negative for arthralgias and back pain.   Skin:  Negative for color change and rash.   Neurological:  Positive for weakness. Negative for dizziness, tremors and headaches.   Psychiatric/Behavioral:  Negative for agitation. The patient is not nervous/anxious.    Objective:     Vital Signs (Most Recent):  Temp: 97.7 °F (36.5 °C) (03/31/23 1600)  Pulse: 97 (03/31/23 1600)  Resp: 18 (03/31/23 1600)  BP: (!) 140/88 (03/31/23 1600)  SpO2: 99 % (03/31/23 1600)   Vital Signs (24h Range):  Temp:  [97.7 °F (36.5 °C)-98.1 °F (36.7 °C)] 97.7 °F (36.5 °C)  Pulse:  [] 97  Resp:  [18] 18  SpO2:  [95 %-100 %] 99 %  BP: (140-192)/(70-88) 140/88     Weight: 83.5 kg (184 lb)  Body mass index is 31.58 kg/m².  No intake or output data in the 24 hours ending 03/31/23 2012   Physical Exam  Constitutional:       Appearance: Normal appearance.   HENT:      Head: Normocephalic and atraumatic.      Nose: Nose normal.      Mouth/Throat:      Mouth: Mucous membranes are moist.      Pharynx: Oropharynx is clear.   Eyes:      Extraocular Movements: Extraocular movements intact.      Conjunctiva/sclera: Conjunctivae normal.      Pupils: Pupils are equal, round, and reactive to light.   Cardiovascular:      Rate and Rhythm: Normal rate and regular rhythm.      Pulses: Normal pulses.      Heart sounds: Normal heart sounds.   Pulmonary:      Effort: Pulmonary effort is normal.      Breath  sounds: Normal breath sounds.   Abdominal:      General: Abdomen is flat. Bowel sounds are normal.      Palpations: Abdomen is soft.   Musculoskeletal:         General: Normal range of motion.      Cervical back: Normal range of motion and neck supple.   Skin:     General: Skin is warm and dry.   Neurological:      General: No focal deficit present.      Mental Status: She is alert. She is disoriented.      Motor: Weakness present.   Psychiatric:         Mood and Affect: Mood normal.         Behavior: Behavior normal.       Significant Labs: All pertinent labs within the past 24 hours have been reviewed.    Significant Imaging: I have reviewed all pertinent imaging results/findings within the past 24 hours.      Assessment/Plan:      * Failure to thrive in adult  Patient has been seen by Neurology recommendation to be evaluated by Psychiatry.    Will continue to further evaluate for cause of failure to thrive and is medically cleared will consider transfer to psychiatric facility and are tele psych evaluation.  Given patient's acute change in mental status a solid so exclude a stroke.  MRI has been ordered.    Weight loss  Nutrition consulted. Most recent weight and BMI monitored-                         Measurements:  Wt Readings from Last 1 Encounters:   03/30/23 83.5 kg (184 lb)   Body mass index is 31.58 kg/m².    Recommendations:      Patient has been screened and assessed by RD. RD will follow patient.      Gastroesophageal reflux disease without esophagitis        Acquired hypothyroidism  TSH has been checked multiple times in the last year and has been normal.      Primary insomnia  Will give p.r.n. IV medications the patient man least have sleep      Atrial fibrillation  He has not been taking any of her home medications she is in normal sinus rhythm at this time.  Will resume home medications with anticoagulation once patient able swallow and after she has had an MRI.        Hyperlipidemia  Will defer/on  medication at this time until patient is better able to swallow.      Essential hypertension  Treat with IV medication including hydralazine and clonidine p.r.n..      Depression  Reported history of depression but patient unable taking medications as she states she has difficulty swallowing.  Will have to follow up in further assess this with psychiatrist.          VTE Risk Mitigation (From admission, onward)         Ordered     heparin (porcine) injection 5,000 Units  Every 12 hours         03/30/23 2319     IP VTE HIGH RISK PATIENT  Once         03/30/23 2319     Place sequential compression device  Until discontinued         03/30/23 2319                Discharge Planning   GERALDO:      Code Status: Full Code   Is the patient medically ready for discharge?:     Reason for patient still in hospital (select all that apply): Treatment  Discharge Plan A: Skilled Nursing Facility                  Min Santos MD  Department of Hospital Medicine   [facility]

## 2023-04-01 NOTE — SUBJECTIVE & OBJECTIVE
Interval History:     Review of Systems   Constitutional:  Positive for appetite change, fatigue and unexpected weight change. Negative for activity change, chills and fever.   HENT:  Negative for congestion and sore throat.    Respiratory:  Negative for chest tightness.    Gastrointestinal:  Positive for abdominal pain and nausea. Negative for abdominal distention and diarrhea.   Endocrine: Negative for cold intolerance and heat intolerance.   Genitourinary:  Negative for dysuria.   Musculoskeletal:  Negative for arthralgias and back pain.   Skin:  Negative for color change and rash.   Neurological:  Positive for weakness. Negative for dizziness, tremors and headaches.   Psychiatric/Behavioral:  Negative for agitation. The patient is not nervous/anxious.    Objective:     Vital Signs (Most Recent):  Temp: 97.7 °F (36.5 °C) (03/31/23 1600)  Pulse: 97 (03/31/23 1600)  Resp: 18 (03/31/23 1600)  BP: (!) 140/88 (03/31/23 1600)  SpO2: 99 % (03/31/23 1600)   Vital Signs (24h Range):  Temp:  [97.7 °F (36.5 °C)-98.1 °F (36.7 °C)] 97.7 °F (36.5 °C)  Pulse:  [] 97  Resp:  [18] 18  SpO2:  [95 %-100 %] 99 %  BP: (140-192)/(70-88) 140/88     Weight: 83.5 kg (184 lb)  Body mass index is 31.58 kg/m².  No intake or output data in the 24 hours ending 03/31/23 2012   Physical Exam  Constitutional:       Appearance: Normal appearance.   HENT:      Head: Normocephalic and atraumatic.      Nose: Nose normal.      Mouth/Throat:      Mouth: Mucous membranes are moist.      Pharynx: Oropharynx is clear.   Eyes:      Extraocular Movements: Extraocular movements intact.      Conjunctiva/sclera: Conjunctivae normal.      Pupils: Pupils are equal, round, and reactive to light.   Cardiovascular:      Rate and Rhythm: Normal rate and regular rhythm.      Pulses: Normal pulses.      Heart sounds: Normal heart sounds.   Pulmonary:      Effort: Pulmonary effort is normal.      Breath sounds: Normal breath sounds.   Abdominal:      General:  Abdomen is flat. Bowel sounds are normal.      Palpations: Abdomen is soft.   Musculoskeletal:         General: Normal range of motion.      Cervical back: Normal range of motion and neck supple.   Skin:     General: Skin is warm and dry.   Neurological:      General: No focal deficit present.      Mental Status: She is alert. She is disoriented.      Motor: Weakness present.   Psychiatric:         Mood and Affect: Mood normal.         Behavior: Behavior normal.       Significant Labs: All pertinent labs within the past 24 hours have been reviewed.    Significant Imaging: I have reviewed all pertinent imaging results/findings within the past 24 hours.

## 2023-04-02 PROBLEM — R41.89 COGNITIVE IMPAIRMENT: Status: ACTIVE | Noted: 2023-04-02

## 2023-04-02 LAB
ANION GAP SERPL CALCULATED.3IONS-SCNC: 12 MMOL/L (ref 7–16)
BASOPHILS # BLD AUTO: 0.03 K/UL (ref 0–0.2)
BASOPHILS NFR BLD AUTO: 0.8 % (ref 0–1)
BUN SERPL-MCNC: 27 MG/DL (ref 7–18)
BUN/CREAT SERPL: 20 (ref 6–20)
CALCIUM SERPL-MCNC: 9.2 MG/DL (ref 8.5–10.1)
CHLORIDE SERPL-SCNC: 115 MMOL/L (ref 98–107)
CO2 SERPL-SCNC: 27 MMOL/L (ref 21–32)
CREAT SERPL-MCNC: 1.32 MG/DL (ref 0.55–1.02)
DIFFERENTIAL METHOD BLD: ABNORMAL
EGFR (NO RACE VARIABLE) (RUSH/TITUS): 43 ML/MIN/1.73M²
EOSINOPHIL # BLD AUTO: 0.11 K/UL (ref 0–0.5)
EOSINOPHIL NFR BLD AUTO: 2.9 % (ref 1–4)
ERYTHROCYTE [DISTWIDTH] IN BLOOD BY AUTOMATED COUNT: 16.4 % (ref 11.5–14.5)
GLUCOSE SERPL-MCNC: 95 MG/DL (ref 74–106)
HCT VFR BLD AUTO: 37.7 % (ref 38–47)
HGB BLD-MCNC: 11.3 G/DL (ref 12–16)
IMM GRANULOCYTES # BLD AUTO: 0.01 K/UL (ref 0–0.04)
IMM GRANULOCYTES NFR BLD: 0.3 % (ref 0–0.4)
LYMPHOCYTES # BLD AUTO: 1.3 K/UL (ref 1–4.8)
LYMPHOCYTES NFR BLD AUTO: 34.5 % (ref 27–41)
MAGNESIUM SERPL-MCNC: 1.9 MG/DL (ref 1.7–2.3)
MCH RBC QN AUTO: 25.5 PG (ref 27–31)
MCHC RBC AUTO-ENTMCNC: 30 G/DL (ref 32–36)
MCV RBC AUTO: 84.9 FL (ref 80–96)
MONOCYTES # BLD AUTO: 0.44 K/UL (ref 0–0.8)
MONOCYTES NFR BLD AUTO: 11.7 % (ref 2–6)
MPC BLD CALC-MCNC: 12.1 FL (ref 9.4–12.4)
NEUTROPHILS # BLD AUTO: 1.88 K/UL (ref 1.8–7.7)
NEUTROPHILS NFR BLD AUTO: 49.8 % (ref 53–65)
NRBC # BLD AUTO: 0 X10E3/UL
NRBC, AUTO (.00): 0 %
PLATELET # BLD AUTO: 180 K/UL (ref 150–400)
POTASSIUM SERPL-SCNC: 3.8 MMOL/L (ref 3.5–5.1)
RBC # BLD AUTO: 4.44 M/UL (ref 4.2–5.4)
SODIUM SERPL-SCNC: 150 MMOL/L (ref 136–145)
WBC # BLD AUTO: 3.77 K/UL (ref 4.5–11)

## 2023-04-02 PROCEDURE — 99222 PR INITIAL HOSPITAL CARE,LEVL II: ICD-10-PCS | Mod: ,,, | Performed by: INTERNAL MEDICINE

## 2023-04-02 PROCEDURE — 85025 COMPLETE CBC W/AUTO DIFF WBC: CPT | Performed by: HOSPITALIST

## 2023-04-02 PROCEDURE — 96372 THER/PROPH/DIAG INJ SC/IM: CPT | Mod: 59 | Performed by: HOSPITALIST

## 2023-04-02 PROCEDURE — 83735 ASSAY OF MAGNESIUM: CPT | Performed by: HOSPITALIST

## 2023-04-02 PROCEDURE — 99233 SBSQ HOSP IP/OBS HIGH 50: CPT | Mod: ,,, | Performed by: HOSPITALIST

## 2023-04-02 PROCEDURE — 25000003 PHARM REV CODE 250: Performed by: HOSPITALIST

## 2023-04-02 PROCEDURE — 63600175 PHARM REV CODE 636 W HCPCS: Performed by: HOSPITALIST

## 2023-04-02 PROCEDURE — 80048 BASIC METABOLIC PNL TOTAL CA: CPT | Performed by: HOSPITALIST

## 2023-04-02 PROCEDURE — G0378 HOSPITAL OBSERVATION PER HR: HCPCS

## 2023-04-02 PROCEDURE — 99233 PR SUBSEQUENT HOSPITAL CARE,LEVL III: ICD-10-PCS | Mod: ,,, | Performed by: HOSPITALIST

## 2023-04-02 PROCEDURE — 25500020 PHARM REV CODE 255: Performed by: HOSPITALIST

## 2023-04-02 PROCEDURE — 96376 TX/PRO/DX INJ SAME DRUG ADON: CPT

## 2023-04-02 PROCEDURE — 99222 1ST HOSP IP/OBS MODERATE 55: CPT | Mod: ,,, | Performed by: INTERNAL MEDICINE

## 2023-04-02 RX ORDER — HYDROCORTISONE 1 %
CREAM (GRAM) TOPICAL 2 TIMES DAILY
Status: DISCONTINUED | OUTPATIENT
Start: 2023-04-02 | End: 2023-04-06 | Stop reason: HOSPADM

## 2023-04-02 RX ADMIN — HEPARIN SODIUM 5000 UNITS: 5000 INJECTION, SOLUTION INTRAVENOUS; SUBCUTANEOUS at 08:04

## 2023-04-02 RX ADMIN — DIPHENHYDRAMINE HYDROCHLORIDE 25 MG: 50 INJECTION, SOLUTION INTRAMUSCULAR; INTRAVENOUS at 08:04

## 2023-04-02 RX ADMIN — HYDROCORTISONE: 1 CREAM TOPICAL at 09:04

## 2023-04-02 RX ADMIN — IOPAMIDOL 100 ML: 755 INJECTION, SOLUTION INTRAVENOUS at 06:04

## 2023-04-02 NOTE — ASSESSMENT & PLAN NOTE
He has not been taking any of her home medications she is in normal sinus rhythm at this time.  Will resume home medications with anticoagulation once patient able swallow and after she has had an MRI.  Increased risk of stroke given history of A fib and off anticoagulation.

## 2023-04-02 NOTE — SUBJECTIVE & OBJECTIVE
Interval History:    Review of Systems   Constitutional:  Positive for appetite change, fatigue and unexpected weight change. Negative for activity change, chills and fever.   HENT:  Negative for congestion and sore throat.    Respiratory:  Negative for chest tightness.    Gastrointestinal:  Positive for abdominal pain and nausea. Negative for abdominal distention and diarrhea.   Endocrine: Negative for cold intolerance and heat intolerance.   Genitourinary:  Negative for dysuria.   Musculoskeletal:  Negative for arthralgias and back pain.   Skin:  Negative for color change and rash.   Neurological:  Positive for weakness. Negative for dizziness, tremors and headaches.   Psychiatric/Behavioral:  Negative for agitation. The patient is not nervous/anxious.    Objective:     Vital Signs (Most Recent):  Temp: 97.6 °F (36.4 °C) (04/02/23 0707)  Pulse: 66 (04/02/23 0707)  Resp: 18 (04/02/23 0707)  BP: (!) 182/92 (04/02/23 0707)  SpO2: 100 % (04/02/23 0707)   Vital Signs (24h Range):  Temp:  [97.6 °F (36.4 °C)-98.3 °F (36.8 °C)] 97.6 °F (36.4 °C)  Pulse:  [65-79] 66  Resp:  [18-19] 18  SpO2:  [96 %-100 %] 100 %  BP: (172-184)/(70-92) 182/92     Weight: 82.1 kg (181 lb)  Body mass index is 31.07 kg/m².  No intake or output data in the 24 hours ending 04/02/23 0850   Physical Exam  Constitutional:       Appearance: Normal appearance.   HENT:      Head: Normocephalic and atraumatic.      Nose: Nose normal.      Mouth/Throat:      Mouth: Mucous membranes are moist.      Pharynx: Oropharynx is clear.   Eyes:      Extraocular Movements: Extraocular movements intact.      Conjunctiva/sclera: Conjunctivae normal.      Pupils: Pupils are equal, round, and reactive to light.   Cardiovascular:      Rate and Rhythm: Normal rate and regular rhythm.      Pulses: Normal pulses.      Heart sounds: Normal heart sounds.   Pulmonary:      Effort: Pulmonary effort is normal.      Breath sounds: Normal breath sounds.   Abdominal:      General:  Abdomen is flat. Bowel sounds are normal.      Palpations: Abdomen is soft.   Musculoskeletal:         General: Normal range of motion.      Cervical back: Normal range of motion and neck supple.   Skin:     General: Skin is warm and dry.   Neurological:      General: No focal deficit present.      Mental Status: She is alert. She is disoriented.      Motor: Weakness present.   Psychiatric:         Mood and Affect: Mood normal.         Behavior: Behavior normal.       Significant Labs: All pertinent labs within the past 24 hours have been reviewed.    Significant Imaging: I have reviewed all pertinent imaging results/findings within the past 24 hours.

## 2023-04-02 NOTE — ASSESSMENT & PLAN NOTE
Patient with reported failure to thrive/wt loss describes esophageal dysphagia sx's with regurgitation. She had recent EGD with only finding of hiatal hernia and also noncontrasted CT abd/pelvis. Recc barium esophagram if she agrees to consent to that (she refused modified barium swallow earlier this admission).She does appear to have psychiatric issues as well which may be a factor with her recent decline.

## 2023-04-02 NOTE — ASSESSMENT & PLAN NOTE
Patient knows her name but does not know the year nor her location.   This has been a chronic issues and she has been wondering out of the house. She has been evaluated by neurology but they suspect psychiatric illness rather than chronic dementia.   Development of psychiatric illness at her age seems unlikely.  Neurodevelopmental disease appears more likely.

## 2023-04-02 NOTE — SUBJECTIVE & OBJECTIVE
Interval History:     Review of Systems   Constitutional:  Positive for appetite change, fatigue and unexpected weight change. Negative for activity change, chills and fever.   HENT:  Negative for congestion and sore throat.    Respiratory:  Negative for chest tightness.    Gastrointestinal:  Positive for abdominal pain and nausea. Negative for abdominal distention and diarrhea.   Endocrine: Negative for cold intolerance and heat intolerance.   Genitourinary:  Negative for dysuria.   Musculoskeletal:  Negative for arthralgias and back pain.   Skin:  Negative for color change and rash.   Neurological:  Positive for weakness. Negative for dizziness, tremors and headaches.   Psychiatric/Behavioral:  Negative for agitation. The patient is not nervous/anxious.    Objective:     Vital Signs (Most Recent):  Temp: 97.7 °F (36.5 °C) (04/02/23 1600)  Pulse: 101 (04/02/23 1600)  Resp: 19 (04/02/23 1600)  BP: (!) 146/89 (04/02/23 1600)  SpO2: 98 % (04/02/23 1600)   Vital Signs (24h Range):  Temp:  [97.6 °F (36.4 °C)-98.3 °F (36.8 °C)] 97.7 °F (36.5 °C)  Pulse:  [] 101  Resp:  [18-19] 19  SpO2:  [96 %-100 %] 98 %  BP: (146-182)/(70-92) 146/89     Weight: 82.1 kg (181 lb)  Body mass index is 31.07 kg/m².  No intake or output data in the 24 hours ending 04/02/23 1745   Physical Exam  Constitutional:       Appearance: Normal appearance.   HENT:      Head: Normocephalic and atraumatic.      Nose: Nose normal.      Mouth/Throat:      Mouth: Mucous membranes are moist.      Pharynx: Oropharynx is clear.   Eyes:      Extraocular Movements: Extraocular movements intact.      Conjunctiva/sclera: Conjunctivae normal.      Pupils: Pupils are equal, round, and reactive to light.   Cardiovascular:      Rate and Rhythm: Normal rate and regular rhythm.      Pulses: Normal pulses.      Heart sounds: Normal heart sounds.   Pulmonary:      Effort: Pulmonary effort is normal.      Breath sounds: Normal breath sounds.   Abdominal:       General: Abdomen is flat. Bowel sounds are normal.      Palpations: Abdomen is soft.   Musculoskeletal:         General: Normal range of motion.      Cervical back: Normal range of motion and neck supple.   Skin:     General: Skin is warm and dry.   Neurological:      General: No focal deficit present.      Mental Status: She is alert. She is disoriented.      Motor: Weakness present.   Psychiatric:         Mood and Affect: Mood normal.         Behavior: Behavior normal.       Significant Labs: All pertinent labs within the past 24 hours have been reviewed.    Significant Imaging: I have reviewed all pertinent imaging results/findings within the past 24 hours.

## 2023-04-02 NOTE — HPI
72 yo female with multiple medical comorbidities as listed is admitted with reported failure to thrive and 40 # wt loss over several months. History is difficult to obtain her as emashe is not fully cooperative. She describes difficulty swallowing solid foods and liquids with typically regurgitation shortly after swallowing. She also describes postprandial burning in mid lower abdomen and low back for several minutes. No gross GI bleeding reported. BM 's have been regular. She had EGD by Dr. Giron < 1 month ago with 5 cm hiatal hernia found. He did dilate her esophagus then. Also she had CT abd/pelvis without contrast. States she had colonoscopy by Dr. Jimenez in 2019from .

## 2023-04-02 NOTE — PROGRESS NOTES
[facility] Medicine  Progress Note    Patient Name: Renetta Stewart  MRN: [id number]  Patient Class: OP- Observation   Admission Date: 3/30/2023  Length of Stay: 0 days  Attending Physician: Min Santos MD  Primary Care Provider: Eldon Govea MD        Subjective:     Principal Problem:Failure to thrive in adult        HPI:  Ms. Stewart in 73-year-old woman history of depression/anxiety, hypothyroidism, atrial fibrillation, pacemaker, GERD, obesity, chronic kidney disease, hyperlipidemia who presents with failure to thrive for several months.  Patient has had failure to thrive and anorexia for several months but she is now becoming quite weak and unable to care for herself.  Her family is also unable to care for her given her severe weakness and substantial weight loss.  In speaking with the patient, she states that she has a burning sensation when she eats food therefore she does not want to be.  She has had an EGD as well as a CT scan his been no definitive cause for her abdominal pain.  Her family is considering that this may be due to psychiatric illness although she has not has severe psychiatric illness during the remainder of her life.  Before she can be committed to a psychiatric facility she will need cardiac clearance.  Given her severe failure to thrive weight loss, she is being admitted subsequent follow-up with Psychiatry.  She has been seen by Nephrology as well as Neurology and both agree that patient likely needs to be evaluated by Psychiatry.    ED course:   Initial Vitals [03/30/23 1409]   BP Pulse Resp Temp SpO2   123/85 89 18 98.4 °F (36.9 °C) 99     Medications   sodium chloride 0.9% bolus 1,000 mL 1,000 mL (1,000 mLs Intravenous New Bag 3/30/23 1520)   sodium chloride 0.9% bolus 1,000 mL 1,000 mL (1,000 mLs Intravenous New Bag 3/30/23 1712)       Overview/Hospital Course:  3/31: Continue with cardiac workup as patient may need to be  placed a psych facility and/or have a psych evaluation.    4/1: Will consult GI for dysphagia and patient has already had an EGD and has had a colonoscopy in the past.  She had a barium swallow this hospitalization and this can be evaluated.      Interval History:     Review of Systems   Constitutional:  Positive for appetite change, fatigue and unexpected weight change. Negative for activity change, chills and fever.   HENT:  Negative for congestion and sore throat.    Respiratory:  Negative for chest tightness.    Gastrointestinal:  Positive for abdominal pain and nausea. Negative for abdominal distention and diarrhea.   Endocrine: Negative for cold intolerance and heat intolerance.   Genitourinary:  Negative for dysuria.   Musculoskeletal:  Negative for arthralgias and back pain.   Skin:  Negative for color change and rash.   Neurological:  Positive for weakness. Negative for dizziness, tremors and headaches.   Psychiatric/Behavioral:  Negative for agitation. The patient is not nervous/anxious.    Objective:     Vital Signs (Most Recent):  Temp: 98 °F (36.7 °C) (04/01/23 2000)  Pulse: 71 (04/01/23 2000)  Resp: 18 (04/01/23 2000)  BP: (!) 172/76 (04/01/23 2000)  SpO2: 99 % (04/01/23 2000)   Vital Signs (24h Range):  Temp:  [97.6 °F (36.4 °C)-98.8 °F (37.1 °C)] 98 °F (36.7 °C)  Pulse:  [69-79] 71  Resp:  [17-19] 18  SpO2:  [97 %-99 %] 99 %  BP: (158-196)/(71-88) 172/76     Weight: 81.4 kg (179 lb 7.3 oz)  Body mass index is 30.8 kg/m².  No intake or output data in the 24 hours ending 04/01/23 2034   Physical Exam  Constitutional:       Appearance: Normal appearance.   HENT:      Head: Normocephalic and atraumatic.      Nose: Nose normal.      Mouth/Throat:      Mouth: Mucous membranes are moist.      Pharynx: Oropharynx is clear.   Eyes:      Extraocular Movements: Extraocular movements intact.      Conjunctiva/sclera: Conjunctivae normal.      Pupils: Pupils are equal, round, and reactive to light.    Cardiovascular:      Rate and Rhythm: Normal rate and regular rhythm.      Pulses: Normal pulses.      Heart sounds: Normal heart sounds.   Pulmonary:      Effort: Pulmonary effort is normal.      Breath sounds: Normal breath sounds.   Abdominal:      General: Abdomen is flat. Bowel sounds are normal.      Palpations: Abdomen is soft.   Musculoskeletal:         General: Normal range of motion.      Cervical back: Normal range of motion and neck supple.   Skin:     General: Skin is warm and dry.   Neurological:      General: No focal deficit present.      Mental Status: She is alert. She is disoriented.      Motor: Weakness present.   Psychiatric:         Mood and Affect: Mood normal.         Behavior: Behavior normal.       Significant Labs: All pertinent labs within the past 24 hours have been reviewed.    Significant Imaging: I have reviewed all pertinent imaging results/findings within the past 24 hours.      Assessment/Plan:      * Failure to thrive in adult  Patient has been seen by Neurology recommendation to be evaluated by Psychiatry.    Will continue to further evaluate for cause of failure to thrive and is medically cleared will consider transfer to psychiatric facility and are tele psych evaluation.  Given patient's acute change in mental status a solid so exclude a stroke.  MRI has been ordered.    Weight loss  Nutrition consulted. Most recent weight and BMI monitored-                         Measurements:  Wt Readings from Last 1 Encounters:   03/30/23 83.5 kg (184 lb)   Body mass index is 31.58 kg/m².    Recommendations:      Patient has been screened and assessed by RD. RD will follow patient.      Gastroesophageal reflux disease without esophagitis        Acquired hypothyroidism  TSH has been checked multiple times in the last year and has been normal.      Primary insomnia  Will give p.r.n. IV medications the patient man least have sleep      Atrial fibrillation  He has not been taking any of her  home medications she is in normal sinus rhythm at this time.  Will resume home medications with anticoagulation once patient able swallow and after she has had an MRI.        Hyperlipidemia  Will defer/on medication at this time until patient is better able to swallow.      Essential hypertension  Treat with IV medication including hydralazine and clonidine p.r.n..      Depression  Reported history of depression but patient unable taking medications as she states she has difficulty swallowing.  Will have to follow up in further assess this with psychiatrist.          VTE Risk Mitigation (From admission, onward)         Ordered     heparin (porcine) injection 5,000 Units  Every 12 hours         03/30/23 2319     IP VTE HIGH RISK PATIENT  Once         03/30/23 2319     Place sequential compression device  Until discontinued         03/30/23 2319                Discharge Planning   GERALDO:      Code Status: Full Code   Is the patient medically ready for discharge?:     Reason for patient still in hospital (select all that apply): Treatment  Discharge Plan A: Skilled Nursing Facility                  Min Santos MD  Department of Hospital Medicine   [facility]

## 2023-04-02 NOTE — ASSESSMENT & PLAN NOTE
Per family, patient has not eaten solid food in several weeks.   She has not been drinking either.  However, she is drinking cranberry juice in the hospital.      She has had EGD in March 2023 and was unremarkable. She had colonoscopy in 2019 with hemorrhoids and diverticulosis.    Unclear if there is a medical reason for her symptoms versus psychiatry.  She roams out of her family's house stating that her stomach hurting.  She informs her family that walking helps her stomach pain.      CT Ab/Pelvis with oral and IV contrast ordered.   Consider GI consult for further evaluation.

## 2023-04-02 NOTE — SUBJECTIVE & OBJECTIVE
Interval History:     Review of Systems   Constitutional:  Positive for appetite change, fatigue and unexpected weight change. Negative for activity change, chills and fever.   HENT:  Negative for congestion and sore throat.    Respiratory:  Negative for chest tightness.    Gastrointestinal:  Positive for abdominal pain and nausea. Negative for abdominal distention and diarrhea.   Endocrine: Negative for cold intolerance and heat intolerance.   Genitourinary:  Negative for dysuria.   Musculoskeletal:  Negative for arthralgias and back pain.   Skin:  Negative for color change and rash.   Neurological:  Positive for weakness. Negative for dizziness, tremors and headaches.   Psychiatric/Behavioral:  Negative for agitation. The patient is not nervous/anxious.    Objective:     Vital Signs (Most Recent):  Temp: 98 °F (36.7 °C) (04/01/23 2000)  Pulse: 71 (04/01/23 2000)  Resp: 18 (04/01/23 2000)  BP: (!) 172/76 (04/01/23 2000)  SpO2: 99 % (04/01/23 2000)   Vital Signs (24h Range):  Temp:  [97.6 °F (36.4 °C)-98.8 °F (37.1 °C)] 98 °F (36.7 °C)  Pulse:  [69-79] 71  Resp:  [17-19] 18  SpO2:  [97 %-99 %] 99 %  BP: (158-196)/(71-88) 172/76     Weight: 81.4 kg (179 lb 7.3 oz)  Body mass index is 30.8 kg/m².  No intake or output data in the 24 hours ending 04/01/23 2034   Physical Exam  Constitutional:       Appearance: Normal appearance.   HENT:      Head: Normocephalic and atraumatic.      Nose: Nose normal.      Mouth/Throat:      Mouth: Mucous membranes are moist.      Pharynx: Oropharynx is clear.   Eyes:      Extraocular Movements: Extraocular movements intact.      Conjunctiva/sclera: Conjunctivae normal.      Pupils: Pupils are equal, round, and reactive to light.   Cardiovascular:      Rate and Rhythm: Normal rate and regular rhythm.      Pulses: Normal pulses.      Heart sounds: Normal heart sounds.   Pulmonary:      Effort: Pulmonary effort is normal.      Breath sounds: Normal breath sounds.   Abdominal:      General:  Abdomen is flat. Bowel sounds are normal.      Palpations: Abdomen is soft.   Musculoskeletal:         General: Normal range of motion.      Cervical back: Normal range of motion and neck supple.   Skin:     General: Skin is warm and dry.   Neurological:      General: No focal deficit present.      Mental Status: She is alert. She is disoriented.      Motor: Weakness present.   Psychiatric:         Mood and Affect: Mood normal.         Behavior: Behavior normal.       Significant Labs: All pertinent labs within the past 24 hours have been reviewed.    Significant Imaging: I have reviewed all pertinent imaging results/findings within the past 24 hours.

## 2023-04-02 NOTE — SUBJECTIVE & OBJECTIVE
Past Medical History:   Diagnosis Date    Anemia     Anxiety     Dementia     Depression     GERD (gastroesophageal reflux disease)     Hyperlipidemia     Hypertension        No past surgical history on file.    Review of patient's allergies indicates:  No Known Allergies  Family History       Problem Relation (Age of Onset)    Hypertension Father          Tobacco Use    Smoking status: Never     Passive exposure: Never    Smokeless tobacco: Never   Substance and Sexual Activity    Alcohol use: Not Currently    Drug use: Never    Sexual activity: Not Currently     Review of Systems   Constitutional:  Positive for unexpected weight change. Negative for chills and fever.   HENT:  Positive for sore throat and trouble swallowing.    Respiratory:  Negative for cough and shortness of breath.    Cardiovascular:  Negative for chest pain.   Gastrointestinal:  Positive for abdominal pain. Negative for nausea and vomiting.   Neurological:  Negative for syncope and weakness.   Psychiatric/Behavioral:  Negative for confusion. The patient is nervous/anxious.    Objective:     Vital Signs (Most Recent):  Temp: 97.6 °F (36.4 °C) (04/02/23 1100)  Pulse: 70 (04/02/23 1100)  Resp: 18 (04/02/23 1100)  BP: (!) 168/74 (04/02/23 1100)  SpO2: 97 % (04/02/23 1100)   Vital Signs (24h Range):  Temp:  [97.6 °F (36.4 °C)-98.3 °F (36.8 °C)] 97.6 °F (36.4 °C)  Pulse:  [65-71] 70  Resp:  [18-19] 18  SpO2:  [96 %-100 %] 97 %  BP: (168-184)/(70-92) 168/74     Weight: 82.1 kg (181 lb) (04/02/23 0400)  Body mass index is 31.07 kg/m².    No intake or output data in the 24 hours ending 04/02/23 1454    Lines/Drains/Airways       Peripheral Intravenous Line  Duration                  Peripheral IV - Single Lumen 03/30/23 1515 22 G Posterior;Right Hand 2 days                    Physical Exam  Vitals and nursing note reviewed.   Constitutional:       General: She is not in acute distress.     Comments: Avoids eye contact.    HENT:      Head: Normocephalic  and atraumatic.      Mouth/Throat:      Pharynx: Oropharynx is clear.   Eyes:      General: No scleral icterus.  Cardiovascular:      Rate and Rhythm: Normal rate and regular rhythm.      Pulses: Normal pulses.      Heart sounds: Normal heart sounds.   Pulmonary:      Effort: Pulmonary effort is normal. No respiratory distress.      Breath sounds: Normal breath sounds.   Abdominal:      General: Bowel sounds are normal. There is no distension.      Palpations: Abdomen is soft.      Tenderness: There is no abdominal tenderness.   Neurological:      General: No focal deficit present.      Mental Status: She is alert and oriented to person, place, and time.      Cranial Nerves: No cranial nerve deficit.      Sensory: No sensory deficit.      Motor: No weakness.       Significant Labs:  CBC:   Recent Labs   Lab 04/01/23  0543 04/02/23 0226   WBC 3.92* 3.77*   HGB 11.2* 11.3*   HCT 36.5* 37.7*    180     BMP:   Recent Labs   Lab 04/02/23 0226   GLU 95   *   K 3.8   *   CO2 27   BUN 27*   CREATININE 1.32*   CALCIUM 9.2   MG 1.9     CMP:   Recent Labs   Lab 04/02/23 0226   GLU 95   CALCIUM 9.2   *   K 3.8   CO2 27   *   BUN 27*   CREATININE 1.32*     Recent Lab Results         04/02/23 0226        Anion Gap 12       Baso # 0.03       Basophil % 0.8       BUN 27       BUN/CREAT RATIO 20       Calcium 9.2       Chloride 115       CO2 27       Creatinine 1.32       Differential Type Auto       eGFR 43       Eos # 0.11       Eosinophil % 2.9       Glucose 95       Hematocrit 37.7       Hemoglobin 11.3       Immature Grans (Abs) 0.01       Immature Granulocytes 0.3       Lymph # 1.30       Lymph % 34.5       Magnesium 1.9       MCH 25.5       MCHC 30.0       MCV 84.9       Mono # 0.44       Mono % 11.7       MPV 12.1       Neutrophils, Abs 1.88       Neutrophils Relative 49.8       nRBC 0.0       NUCLEATED RBC ABSOLUTE 0.00       Platelets 180       Potassium 3.8       RBC 4.44       RDW  16.4       Sodium 150       WBC 3.77               Significant Imaging:  Imaging results within the past 24 hours have been reviewed.

## 2023-04-02 NOTE — ASSESSMENT & PLAN NOTE
Patient has been seen by Neurology recommendation to be evaluated by Psychiatry.    Will continue to further evaluate for cause of failure to thrive and is medically cleared will consider transfer to psychiatric facility and are tele psych evaluation.  Given patient's acute change in mental status a solid so exclude a stroke.  MRI has been ordered.

## 2023-04-02 NOTE — CONSULTS
[facility]  Gastroenterology  Consult Note    Patient Name: Renetta Stewart  MRN: [id number]  Admission Date: 3/30/2023  Hospital Length of Stay: 0 days  Code Status: Full Code   Attending Provider: Min Santos MD   Consulting Provider: LORELEI Farrell MD  Primary Care Physician: Eldon Govea MD  Principal Problem:Failure to thrive in adult    Consults  Subjective:     HPI:  72 yo female with multiple medical comorbidities as listed is admitted with reported failure to thrive and 40 # wt loss over several months. History is difficult to obtain her as emashe is not fully cooperative. She describes difficulty swallowing solid foods and liquids with typically regurgitation shortly after swallowing. She also describes postprandial burning in mid lower abdomen and low back for several minutes. No gross GI bleeding reported. BM 's have been regular. She had EGD by Dr. Giron < 1 month ago with 5 cm hiatal hernia found. He did dilate her esophagus then. Also she had CT abd/pelvis without contrast. States she had colonoscopy by Dr. Jimenez in 2019from .      Past Medical History:   Diagnosis Date    Anemia     Anxiety     Dementia     Depression     GERD (gastroesophageal reflux disease)     Hyperlipidemia     Hypertension        No past surgical history on file.    Review of patient's allergies indicates:  No Known Allergies  Family History       Problem Relation (Age of Onset)    Hypertension Father          Tobacco Use    Smoking status: Never     Passive exposure: Never    Smokeless tobacco: Never   Substance and Sexual Activity    Alcohol use: Not Currently    Drug use: Never    Sexual activity: Not Currently     Review of Systems   Constitutional:  Positive for unexpected weight change. Negative for chills and fever.   HENT:  Positive for sore throat and trouble swallowing.    Respiratory:  Negative for cough and shortness of breath.    Cardiovascular:   Negative for chest pain.   Gastrointestinal:  Positive for abdominal pain. Negative for nausea and vomiting.   Neurological:  Negative for syncope and weakness.   Psychiatric/Behavioral:  Negative for confusion. The patient is nervous/anxious.    Objective:     Vital Signs (Most Recent):  Temp: 97.6 °F (36.4 °C) (04/02/23 1100)  Pulse: 70 (04/02/23 1100)  Resp: 18 (04/02/23 1100)  BP: (!) 168/74 (04/02/23 1100)  SpO2: 97 % (04/02/23 1100)   Vital Signs (24h Range):  Temp:  [97.6 °F (36.4 °C)-98.3 °F (36.8 °C)] 97.6 °F (36.4 °C)  Pulse:  [65-71] 70  Resp:  [18-19] 18  SpO2:  [96 %-100 %] 97 %  BP: (168-184)/(70-92) 168/74     Weight: 82.1 kg (181 lb) (04/02/23 0400)  Body mass index is 31.07 kg/m².    No intake or output data in the 24 hours ending 04/02/23 1454    Lines/Drains/Airways       Peripheral Intravenous Line  Duration                  Peripheral IV - Single Lumen 03/30/23 1515 22 G Posterior;Right Hand 2 days                    Physical Exam  Vitals and nursing note reviewed.   Constitutional:       General: She is not in acute distress.     Comments: Avoids eye contact.    HENT:      Head: Normocephalic and atraumatic.      Mouth/Throat:      Pharynx: Oropharynx is clear.   Eyes:      General: No scleral icterus.  Cardiovascular:      Rate and Rhythm: Normal rate and regular rhythm.      Pulses: Normal pulses.      Heart sounds: Normal heart sounds.   Pulmonary:      Effort: Pulmonary effort is normal. No respiratory distress.      Breath sounds: Normal breath sounds.   Abdominal:      General: Bowel sounds are normal. There is no distension.      Palpations: Abdomen is soft.      Tenderness: There is no abdominal tenderness.   Neurological:      General: No focal deficit present.      Mental Status: She is alert and oriented to person, place, and time.      Cranial Nerves: No cranial nerve deficit.      Sensory: No sensory deficit.      Motor: No weakness.       Significant Labs:  CBC:   Recent Labs    Lab 04/01/23  0543 04/02/23 0226   WBC 3.92* 3.77*   HGB 11.2* 11.3*   HCT 36.5* 37.7*    180     BMP:   Recent Labs   Lab 04/02/23 0226   GLU 95   *   K 3.8   *   CO2 27   BUN 27*   CREATININE 1.32*   CALCIUM 9.2   MG 1.9     CMP:   Recent Labs   Lab 04/02/23 0226   GLU 95   CALCIUM 9.2   *   K 3.8   CO2 27   *   BUN 27*   CREATININE 1.32*     Recent Lab Results         04/02/23 0226        Anion Gap 12       Baso # 0.03       Basophil % 0.8       BUN 27       BUN/CREAT RATIO 20       Calcium 9.2       Chloride 115       CO2 27       Creatinine 1.32       Differential Type Auto       eGFR 43       Eos # 0.11       Eosinophil % 2.9       Glucose 95       Hematocrit 37.7       Hemoglobin 11.3       Immature Grans (Abs) 0.01       Immature Granulocytes 0.3       Lymph # 1.30       Lymph % 34.5       Magnesium 1.9       MCH 25.5       MCHC 30.0       MCV 84.9       Mono # 0.44       Mono % 11.7       MPV 12.1       Neutrophils, Abs 1.88       Neutrophils Relative 49.8       nRBC 0.0       NUCLEATED RBC ABSOLUTE 0.00       Platelets 180       Potassium 3.8       RBC 4.44       RDW 16.4       Sodium 150       WBC 3.77               Significant Imaging:  Imaging results within the past 24 hours have been reviewed.    Assessment/Plan:     GI  Dysphagia  Patient with reported failure to thrive/wt loss describes esophageal dysphagia sx's with regurgitation. She had recent EGD with only finding of hiatal hernia and also noncontrasted CT abd/pelvis. Recc barium esophagram if she agrees to consent to that (she refused modified barium swallow earlier this admission).She does appear to have psychiatric issues as well which may be a factor with her recent decline.      Thank you for your consult. Dr. Giron will assume  care tomorrow    W Slick Farrell MD  Gastroenterology  [facility]

## 2023-04-02 NOTE — PROGRESS NOTES
[facility] Hospital Medicine  Progress Note    Patient Name: Renetta Stewart  MRN: [id number]  Patient Class: OP- Observation   Admission Date: 3/30/2023  Length of Stay: 0 days  Attending Physician: Min Santos MD  Primary Care Provider: Eldon Govea MD        Subjective:     Principal Problem:Failure to thrive in adult    HPI:  Ms. Stewart in 73-year-old woman history of depression/anxiety, hypothyroidism, atrial fibrillation, pacemaker, GERD, obesity, chronic kidney disease, hyperlipidemia who presents with failure to thrive for several months.  Patient has had failure to thrive and anorexia for several months but she is now becoming quite weak and unable to care for herself.  Her family is also unable to care for her given her severe weakness and substantial weight loss.  In speaking with the patient, she states that she has a burning sensation when she eats food therefore she does not want to be.  She has had an EGD as well as a CT scan his been no definitive cause for her abdominal pain.  Her family is considering that this may be due to psychiatric illness although she has not has severe psychiatric illness during the remainder of her life.  Before she can be committed to a psychiatric facility she will need cardiac clearance.  Given her severe failure to thrive weight loss, she is being admitted subsequent follow-up with Psychiatry.  She has been seen by Nephrology as well as Neurology and both agree that patient likely needs to be evaluated by Psychiatry.    ED course:   Initial Vitals [03/30/23 1409]   BP Pulse Resp Temp SpO2   123/85 89 18 98.4 °F (36.9 °C) 99     Medications   sodium chloride 0.9% bolus 1,000 mL 1,000 mL (1,000 mLs Intravenous New Bag 3/30/23 1520)   sodium chloride 0.9% bolus 1,000 mL 1,000 mL (1,000 mLs Intravenous New Bag 3/30/23 1712)       Overview/Hospital Course:  3/31: Continue with cardiac workup as patient may need to be placed  a psych facility and/or have a psych evaluation.    4/1: Will consult GI for dysphagia and patient has already had an EGD and has had a colonoscopy in the past.  She had a barium swallow this hospitalization and this can be evaluated.    4/2: CT abd/pelvis with oral and IV contrast given report of abdominal pain and refusal to eat solid foods as a result.  40 pound weight loss over last 6 months, per family.        Interval History:    Review of Systems   Constitutional:  Positive for appetite change, fatigue and unexpected weight change. Negative for activity change, chills and fever.   HENT:  Negative for congestion and sore throat.    Respiratory:  Negative for chest tightness.    Gastrointestinal:  Positive for abdominal pain and nausea. Negative for abdominal distention and diarrhea.   Endocrine: Negative for cold intolerance and heat intolerance.   Genitourinary:  Negative for dysuria.   Musculoskeletal:  Negative for arthralgias and back pain.   Skin:  Negative for color change and rash.   Neurological:  Positive for weakness. Negative for dizziness, tremors and headaches.   Psychiatric/Behavioral:  Negative for agitation. The patient is not nervous/anxious.    Objective:     Vital Signs (Most Recent):  Temp: 97.6 °F (36.4 °C) (04/02/23 0707)  Pulse: 66 (04/02/23 0707)  Resp: 18 (04/02/23 0707)  BP: (!) 182/92 (04/02/23 0707)  SpO2: 100 % (04/02/23 0707)   Vital Signs (24h Range):  Temp:  [97.6 °F (36.4 °C)-98.3 °F (36.8 °C)] 97.6 °F (36.4 °C)  Pulse:  [65-79] 66  Resp:  [18-19] 18  SpO2:  [96 %-100 %] 100 %  BP: (172-184)/(70-92) 182/92     Weight: 82.1 kg (181 lb)  Body mass index is 31.07 kg/m².  No intake or output data in the 24 hours ending 04/02/23 0850   Physical Exam  Constitutional:       Appearance: Normal appearance.   HENT:      Head: Normocephalic and atraumatic.      Nose: Nose normal.      Mouth/Throat:      Mouth: Mucous membranes are moist.      Pharynx: Oropharynx is clear.   Eyes:       Extraocular Movements: Extraocular movements intact.      Conjunctiva/sclera: Conjunctivae normal.      Pupils: Pupils are equal, round, and reactive to light.   Cardiovascular:      Rate and Rhythm: Normal rate and regular rhythm.      Pulses: Normal pulses.      Heart sounds: Normal heart sounds.   Pulmonary:      Effort: Pulmonary effort is normal.      Breath sounds: Normal breath sounds.   Abdominal:      General: Abdomen is flat. Bowel sounds are normal.      Palpations: Abdomen is soft.   Musculoskeletal:         General: Normal range of motion.      Cervical back: Normal range of motion and neck supple.   Skin:     General: Skin is warm and dry.   Neurological:      General: No focal deficit present.      Mental Status: She is alert. She is disoriented.      Motor: Weakness present.   Psychiatric:         Mood and Affect: Mood normal.         Behavior: Behavior normal.       Significant Labs: All pertinent labs within the past 24 hours have been reviewed.    Significant Imaging: I have reviewed all pertinent imaging results/findings within the past 24 hours.      Assessment/Plan:      * Failure to thrive in adult  Patient has been seen by Neurology recommendation to be evaluated by Psychiatry.    Will continue to further evaluate for cause of failure to thrive and is medically cleared will consider transfer to psychiatric facility and are tele psych evaluation.  Given patient's acute change in mental status a solid so exclude a stroke.  MRI has been ordered.    Dysphagia  Per family, patient has not eaten solid food in several weeks.   She has not been drinking either.  However, she is drinking cranberry juice in the hospital.      She has had EGD in March 2023 and was unremarkable. She had colonoscopy in 2019 with hemorrhoids and diverticulosis.    Unclear if there is a medical reason for her symptoms versus psychiatry.  She roams out of her family's house stating that her stomach hurting.  She informs her  family that walking helps her stomach pain.      CT Ab/Pelvis with oral and IV contrast ordered.   Consider GI consult for further evaluation.        Cognitive impairment  Patient knows her name but does not know the year nor her location.   This has been a chronic issues and she has been wondering out of the house. She has been evaluated by neurology but they suspect psychiatric illness rather than chronic dementia.   Development of psychiatric illness at her age seems unlikely.  Neurodevelopmental disease appears more likely.          Weight loss  Nutrition consulted. Most recent weight and BMI monitored-                         Measurements:  Wt Readings from Last 1 Encounters:   03/30/23 83.5 kg (184 lb)   Body mass index is 31.58 kg/m².    Recommendations:      Patient has been screened and assessed by RD. RD will follow patient.      Gastroesophageal reflux disease without esophagitis        Acquired hypothyroidism  TSH has been checked multiple times in the last year and has been normal.      Primary insomnia  Will give p.r.n. IV medications the patient man least have sleep      Atrial fibrillation  He has not been taking any of her home medications she is in normal sinus rhythm at this time.  Will resume home medications with anticoagulation once patient able swallow and after she has had an MRI.  Increased risk of stroke given history of A fib and off anticoagulation.          Hyperlipidemia  Will defer/on medication at this time until patient is better able to swallow.      Essential hypertension  Treat with IV medication including hydralazine and clonidine p.r.n..      Depression  Reported history of depression but patient unable taking medications as she states she has difficulty swallowing.  Will have to follow up in further assess this with psychiatrist.          VTE Risk Mitigation (From admission, onward)         Ordered     heparin (porcine) injection 5,000 Units  Every 12 hours         03/30/23  2319     IP VTE HIGH RISK PATIENT  Once         03/30/23 2319     Place sequential compression device  Until discontinued         03/30/23 2319                Discharge Planning   GERALDO:      Code Status: Full Code   Is the patient medically ready for discharge?:     Reason for patient still in hospital (select all that apply): Treatment  Discharge Plan A: Skilled Nursing Facility                  Min Santos MD  Department of Hospital Medicine   [facility]

## 2023-04-03 PROCEDURE — 99233 SBSQ HOSP IP/OBS HIGH 50: CPT | Mod: ,,, | Performed by: HOSPITALIST

## 2023-04-03 PROCEDURE — G0378 HOSPITAL OBSERVATION PER HR: HCPCS

## 2023-04-03 PROCEDURE — 97110 THERAPEUTIC EXERCISES: CPT

## 2023-04-03 PROCEDURE — 25500020 PHARM REV CODE 255: Performed by: HOSPITALIST

## 2023-04-03 PROCEDURE — 63600175 PHARM REV CODE 636 W HCPCS: Performed by: HOSPITALIST

## 2023-04-03 PROCEDURE — 99233 PR SUBSEQUENT HOSPITAL CARE,LEVL III: ICD-10-PCS | Mod: ,,, | Performed by: HOSPITALIST

## 2023-04-03 PROCEDURE — 25000003 PHARM REV CODE 250: Performed by: HOSPITALIST

## 2023-04-03 PROCEDURE — 97535 SELF CARE MNGMENT TRAINING: CPT

## 2023-04-03 PROCEDURE — A9698 NON-RAD CONTRAST MATERIALNOC: HCPCS | Performed by: HOSPITALIST

## 2023-04-03 PROCEDURE — 97116 GAIT TRAINING THERAPY: CPT

## 2023-04-03 PROCEDURE — 96376 TX/PRO/DX INJ SAME DRUG ADON: CPT

## 2023-04-03 PROCEDURE — 96372 THER/PROPH/DIAG INJ SC/IM: CPT | Mod: 59 | Performed by: HOSPITALIST

## 2023-04-03 RX ADMIN — HEPARIN SODIUM 5000 UNITS: 5000 INJECTION, SOLUTION INTRAVENOUS; SUBCUTANEOUS at 09:04

## 2023-04-03 RX ADMIN — HEPARIN SODIUM 5000 UNITS: 5000 INJECTION, SOLUTION INTRAVENOUS; SUBCUTANEOUS at 08:04

## 2023-04-03 RX ADMIN — HYDROCORTISONE: 1 CREAM TOPICAL at 09:04

## 2023-04-03 RX ADMIN — DIPHENHYDRAMINE HYDROCHLORIDE 25 MG: 50 INJECTION, SOLUTION INTRAMUSCULAR; INTRAVENOUS at 08:04

## 2023-04-03 RX ADMIN — Medication 176 G: at 10:04

## 2023-04-03 RX ADMIN — HYDROCORTISONE: 1 CREAM TOPICAL at 08:04

## 2023-04-03 RX ADMIN — ACETAMINOPHEN 1000 MG: 500 TABLET ORAL at 05:04

## 2023-04-03 NOTE — PLAN OF CARE
Chart reviewed. Pt having a CT of abdomen pelvis due to not eating and 40 pound weight loss. SS following.

## 2023-04-03 NOTE — PT/OT/SLP PROGRESS
Occupational Therapy   Treatment    Name: Renetta Stewart  MRN: [id number]  Admitting Diagnosis:  Failure to thrive in adult       Recommendations:     Discharge Recommendations: nursing facility, skilled, rehabilitation facility  Discharge Equipment Recommendations:   (to be determined)  Barriers to discharge:  None    Assessment:     Renetta Stewart is a 73 y.o. female with a medical diagnosis of Failure to thrive in adult.  She presents with no complaint, but required encouragement to participate with skilled tx.. Performance deficits affecting function are weakness, impaired endurance, impaired self care skills, impaired functional mobility, gait instability, impaired cardiopulmonary response to activity.     Rehab Prognosis:  Good; patient would benefit from acute skilled OT services to address these deficits and reach maximum level of function.       Plan:     Patient to be seen 5 x/week to address the above listed problems via self-care/home management, therapeutic activities, therapeutic exercises  Plan of Care Expires: 04/14/23  Plan of Care Reviewed with: patient    Subjective     Chief Complaint: Failure to Thrive in adult  Patient/Family Comments/goals: Recommend swingbed   Pain/Comfort:  Pain Rating 1: 0/10  Pain Rating Post-Intervention 1: 0/10    Objective:     Communicated with: SMILEY Rosenberg prior to session.  Patient found HOB elevated with bed alarm, peripheral IV, telemetry upon OT entry to room.    General Precautions: Standard, fall    Orthopedic Precautions:N/A  Braces: N/A  Respiratory Status: Nasal cannula, flow 4 L/min     Occupational Performance:     Bed Mobility:    Patient completed Supine to Sit with minimum assistance     Functional Mobility/Transfers:  Patient completed Sit <> Stand Transfer with contact guard assistance and minimum assistance  with  rolling walker and gait belt   Patient completed Bed <> Chair Transfer using Step Transfer technique with contact guard assistance with  rolling walker  Functional Mobility: CGA with RW    Activities of Daily Living:  Grooming: Pt stood at the sink and brushed teeth with SBA. (I) with washing face and hands .        [facility]      Treatment & Education:  Pt performed UE strengthening exercises. 1 lb hand wt x 2 sets of 15 reps (B) shoulder flexion/extension and adduction/abduction. 2 lb hand wt x 2 sets of 15 reps (B) elbow and wrist flexion/extension. Red theraband x 2 sets of 15 reps (B) elbow flexion and extension.    Pt participated well with tx. Pt required encouragement initially, but tolerated tx well.    Patient left up in chair with all lines intact, call button in reach, and nurse notified    GOALS:   Multidisciplinary Problems       Occupational Therapy Goals          Problem: Occupational Therapy    Goal Priority Disciplines Outcome Interventions   Occupational Therapy Goal     OT, PT/OT Ongoing, Progressing    Description: STG:  Pt will perform grooming with setup  Pt will bathe with min(A)  Pt will perform UE dressing with CGA  Pt will perform LE dressing with min(A)  Pt will sit EOB x 7 min with SBA  during dynamic activity  Pt will transfer bed/chair/bsc with CGA with RW if needed  Pt will perform standing task x 2 min with CGA with RW if needed  Pt will tolerate 20 minutes of tx without fatigue      LT.Restore to max I with self care and mobility.                          Time Tracking:     OT Date of Treatment: 23  OT Start Time: 1505  OT Stop Time: 1532  OT Total Time (min): 27 min    Billable Minutes:Self Care/Home Management 10  Therapeutic Exercise 15    OT/YOLANDA: OT          4/3/2023

## 2023-04-03 NOTE — PT/OT/SLP PROGRESS
"Physical Therapy Treatment    Patient Name:  Renetta Stewart   MRN:  [id number]    Recommendations:     Discharge Recommendations: nursing facility, skilled, home with home health  Discharge Equipment Recommendations:  (to be determined)  Barriers to discharge:  ongoing treatment    Assessment:     Renetta Stewart is a 73 y.o. female admitted with a medical diagnosis of Failure to thrive in adult.  She presents with the following impairments/functional limitations: weakness, impaired endurance, impaired functional mobility, gait instability, decreased safety awareness. Pt rq max encouragement to participate in OOB act. Pt still not eating or drinking well, observed sipping water and tea and spitting it out into a basin.     Rehab Prognosis: Good and Fair; patient would benefit from acute skilled PT services to address these deficits and reach maximum level of function.    Recent Surgery: * No surgery found *      Plan:     During this hospitalization, patient to be seen 5 x/week to address the identified rehab impairments via gait training, therapeutic activities, therapeutic exercises and progress toward the following goals:    Plan of Care Expires:  04/30/23    Subjective     Chief Complaint: FTT  Patient/Family Comments/goals: "I don't want to get up"  Pain/Comfort:         Objective:     Communicated with SUJATA Rosenberg LPN prior to session.  Patient found HOB elevated with peripheral IV, blood pressure cuff, bed alarm upon PT entry to room.     General Precautions: Standard, fall, pacemaker  Orthopedic Precautions: N/A  Braces: N/A  Respiratory Status: Room air     Functional Mobility:  Bed Mobility:     Supine to Sit: minimum assistance  Transfers:     Sit to Stand:  contact guard assistance and minimum assistance with rolling walker  Gait: 120ft with RW, CGA with slow steady cheryl, cues to maintain close proximity to RW  Balance: Fair+ in stance       AM-PAC 6 CLICK MOBILITY          Treatment & Education:  Bilateral " lower extremity exercise x 15 reps: ankle pumps, heel slides, hip abduction/adduction, straight leg raises, and max cues to remain on task and for full participation  with verbal cues for sequencing and safety     Pt tolerated static standing for approx 5 mins while completing ADLs, unsupported at time with Fair+ balance  Patient left up in chair with all lines intact, call button in reach, and nursing notified..    GOALS:   Multidisciplinary Problems       Physical Therapy Goals          Problem: Physical Therapy    Goal Priority Disciplines Outcome Goal Variances Interventions   Physical Therapy Goal     PT, PT/OT Ongoing, Progressing     Description: Short Term Goals to be met by: 23    Patient will increase functional independence with mobility by performin. Supine to sit with independently  2. Sit to stand transfer with independently using Rolling walker  3. Bed to chair transfer with independently using Rolling walker  4. Gait  x 100 feet with supervision using Rolling walker  5. Lower extremity exercise program x30 reps per handout, with assistance as needed  6. Pt to negotiate 4 steps with rail with supervision    Long Term Goals to be met by: 23    Pt will regain full independent functional mobility with lowest level of assistive device to return to home situation and prior activities of daily living.                        Time Tracking:     PT Received On: 23  PT Start Time: 1519     PT Stop Time: 1544  PT Total Time (min): 25 min     Billable Minutes: Gait Training 10 and Therapeutic Exercise 15    Treatment Type: Evaluation  PT/PTA: PT           2023

## 2023-04-04 PROCEDURE — 96376 TX/PRO/DX INJ SAME DRUG ADON: CPT

## 2023-04-04 PROCEDURE — 97110 THERAPEUTIC EXERCISES: CPT

## 2023-04-04 PROCEDURE — 63600175 PHARM REV CODE 636 W HCPCS: Performed by: HOSPITALIST

## 2023-04-04 PROCEDURE — 96372 THER/PROPH/DIAG INJ SC/IM: CPT | Performed by: HOSPITALIST

## 2023-04-04 PROCEDURE — G0378 HOSPITAL OBSERVATION PER HR: HCPCS

## 2023-04-04 PROCEDURE — 99233 SBSQ HOSP IP/OBS HIGH 50: CPT | Mod: ,,, | Performed by: HOSPITALIST

## 2023-04-04 PROCEDURE — 99233 PR SUBSEQUENT HOSPITAL CARE,LEVL III: ICD-10-PCS | Mod: ,,, | Performed by: HOSPITALIST

## 2023-04-04 RX ADMIN — DIPHENHYDRAMINE HYDROCHLORIDE 25 MG: 50 INJECTION, SOLUTION INTRAMUSCULAR; INTRAVENOUS at 09:04

## 2023-04-04 RX ADMIN — HYDROCORTISONE: 1 CREAM TOPICAL at 08:04

## 2023-04-04 RX ADMIN — HEPARIN SODIUM 5000 UNITS: 5000 INJECTION, SOLUTION INTRAVENOUS; SUBCUTANEOUS at 08:04

## 2023-04-04 RX ADMIN — HYDROCORTISONE: 1 CREAM TOPICAL at 09:04

## 2023-04-04 RX ADMIN — HEPARIN SODIUM 5000 UNITS: 5000 INJECTION, SOLUTION INTRAVENOUS; SUBCUTANEOUS at 09:04

## 2023-04-04 NOTE — PROGRESS NOTES
"[facility]  Adult Nutrition  First Assessment Note         Reason for Assessment  Reason For Assessment: RD follow-up MST score of 3  Nutrition Risk Screen: no indicators present      Assessment and Plan    Patient seen for follow up. She continues on a full liquid diet with Ensure Max Protein all meals.    Per ST evaluation:   General Recommendations:   No recommendations could be made based on amount of po trials given during MODIFIED BARIUM SWALLOW  study secondary to patient's non-compliance    Per H&P: Pt  is a 74 yo BF w/ prev seizures, depression but chronic noncompliance w/ meds and recent incr agitation, not eating, meds noncompliance, wandering house- her daughter and severely autistic grandson escort her and struggling with caring for her-pt is neglecting hygiene and very "scattered" some loose thought patterns and associations.   Pt has not taken meds in few months and lost lots of weight b/c of not eating, not washing   May need NF placement and home health eval   Two weeks ago went outside at night almost naked only Tshirt on - yesterday per daughter the patient stated she "could not see" ?     Per MD notes:   "Patient has had failure to thrive and anorexia for several months but she is now becoming quite weak and unable to care for herself.  Her family is also unable to care for her given her severe weakness and substantial weight loss.  In speaking with the patient, she states that she has a burning sensation when she eats food therefore she does not want to be.  She has had an EGD as well as a CT scan his been no definitive cause for her abdominal pain."    RD assessment of pt due to MST score of 3. Pt with reported not eating for several months. Weigh history reviewed:   8/18: 238  8/25: 250   1/1: 221  1/5: 222  3/2: 195  3/21: 185  3/30: 184  Pt with weight loss of 54 lbs/22% body weight x 8 months, severe weight loss. Loss of 38 lbs/17% body weight x 4 " months. Loss of 11/5.6% body weight x 4 weeks. All considered severe weight loss. Pt also noted not eating well. Pt meeting severe protein calorie malnutrition criteria ongoing.     Current diet order of full liquid diet.  Continue current diet, consider advancement per SLP or if pt desires more advanced diet. Will add Ensure Max Protein TID with meals to supplement kcal/PRO. Encourage po intakes to meet nutrient needs. Follow weights/intakes.     Additionally, if pt does not intake oral nutrition, may need to consider alternate means of nutrition support such as enteral or parenteral nutrition to aid in meeting needs, as anorexia and malnutrition can negatively impact her ability to experience hunger cues.     Last BM 4/2 per flowsheets. Monitor intakes, labs, weights, po intakes, updates in pt condition. RD following.     Malnutrition  Is Patient Malnourished: Yes Malnutrition Assessment  Malnutrition Type: acute illness or injury  Energy Intake: severe energy intake          Weight Loss (Malnutrition): greater than 5% in 1 month  Energy Intake (Malnutrition): less than 75% for greater than or equal to 3 months                    Severe Weight Loss (Malnutrition): greater than 7.5% in 3 months  Skin Integrity  Matthew Risk Assessment  Sensory Perception: 2-->very limited  Moisture: 3-->occasionally moist  Activity: 3-->walks occasionally  Mobility: 3-->slightly limited  Nutrition: 2-->probably inadequate  Friction and Shear: 3-->no apparent problem  Matthew Score: 16    Nutrition Diagnosis  Malnutrition (Severe)   related to Inadequate Caloric intake as evidenced by pt without adequate oral intakes for multiple weeks per family, weight loss of >5% x 1 month    Nutrition Diagnosis Status: Chronic/ continues    Nutrition Risk  Level of Risk/Frequency of Follow-up: moderate - high    Recent Labs   Lab 04/02/23  0226   GLU 95         Nutrition Prescription / Recommendations  Recommendation/Intervention: Continue with  full liquid diet + Ensure Max Protein. Patient was seen by ST.  Goals: po intake % + supplements, weight stabilization, acceptance of supplements  Nutrition Goal Status: progressing towards goal  Current Diet Order: Full Liquid diet  Oral Nutrition Supplement: Ensure Max Protein with meals  Chewing or Swallowing Difficulty?: No Chewing or swallowing difficulty  Recommended Diet: Full liquid (blenderized)or per MD/SLP  Recommended Oral Supplement: Ensure Max Protein [150 kcals, 30g Protein, 6g Carbs(2g Fiber, 1g Sugar), 1.5g Fat] three times daily  Is Nutrition Support Recommended: No  Is Education Recommended: No  Monitor and Evaluation  % current Intake: P.O. intake of 0 - 10%  % intake to meet estimated needs: 75 - 100 %  Food and Nutrient Intake: food and beverage intake  Food and Nutrient Adminstration: diet order  Anthropometric Measurements: weight, weight change, body mass index  Biochemical Data, Medical Tests and Procedures: electrolyte and renal panel, lipid profile, gastrointestinal profile, glucose/endocrine profile, inflammatory profile  Nutrition-Focused Physical Findings: overall appearance, extremities, muscles and bones, head and eyes, skin     Current Medical Diagnosis and Past Medical History  Diagnosis: other (see comments) (failure to thrive in adult)  Past Medical History:   Diagnosis Date    Anemia     Anxiety     Dementia     Depression     GERD (gastroesophageal reflux disease)     Hyperlipidemia     Hypertension      Nutrition/Diet History  Spiritual, Cultural Beliefs, Yarsanism Practices, Values that Affect Care: no  Food Allergies: NKFA  Lab/Procedures/Meds  Recent Labs   Lab 04/02/23  0226   *   K 3.8   BUN 27*   CREATININE 1.32*   CALCIUM 9.2   *     NA and Cl elevated- encourage fluids, BUN, crea elevated -CKD 3,   Last A1c: No results found for: HGBA1C  Lab Results   Component Value Date    RBC 4.44 04/02/2023    HGB 11.3 (L) 04/02/2023    HCT 37.7 (L) 04/02/2023     "MCV 84.9 04/02/2023    MCH 25.5 (L) 04/02/2023    MCHC 30.0 (L) 04/02/2023    TIBC 251 10/19/2020     Pertinent Labs Reviewed: reviewed  Pertinent Labs Comments: Na/Cl high-likely related to poor oral intakes/dehydration; BUN/Creat high, GFR low- ANY per MD likely r/t dehydration as well and poor oral intakes   Pertinent Medications Reviewed: reviewed  Pertinent Medications Comments: diphenhydramine, heparin, NaCl 0.9% bolus   Anthropometrics  Temp: 98.5 °F (36.9 °C)  Height: 5' 4" (162.6 cm)  Height (inches): 64 in  Weight Method: Bed Scale  Weight: 81.8 kg (180 lb 5.4 oz)  Weight (lb): 180.34 lb  Ideal Body Weight (IBW), Female: 120 lb  % Ideal Body Weight, Female (lb): 153.33 %  BMI (Calculated): 30.9  BMI Grade: 30 - 34.9- obesity - grade I     Estimated/Assessed Needs  Weight Used For Calorie Calculations: 61.8 kg (136 lb 3.9 oz) (adjusted body weight)   Energy Need Method: Kcal/kg Energy Calorie Requirements (kcal): 2576-9757 kcal(25-30 kcal/kgAdjBW)  Weight Used For Protein Calculations: 83.5 kg (184 lb 1.4 oz)  Protein Requirements: 66-83 g PRO (0.8-1.0 g PRO/kg)       RDA Method (mL): 1545     Nutrition by Nursing  Diet/Nutrition Received: full liquid  Intake (%): 0%  Diet/Feeding Assistance: tray set-up     Last Bowel Movement: 04/02/23              Nutrition Follow-Up  RD Follow-up?: Yes      "

## 2023-04-04 NOTE — PROGRESS NOTES
[facility] Medicine  Progress Note    Patient Name: Renetta Stewart  MRN: [id number]  Patient Class: OP- Observation   Admission Date: 3/30/2023  Length of Stay: 0 days  Attending Physician: Min Santos MD  Primary Care Provider: Eldon Govea MD        Subjective:     Principal Problem:Failure to thrive in adult    HPI:  Ms. Stewart in 73-year-old woman history of depression/anxiety, hypothyroidism, atrial fibrillation, pacemaker, GERD, obesity, chronic kidney disease, hyperlipidemia who presents with failure to thrive for several months.  Patient has had failure to thrive and anorexia for several months but she is now becoming quite weak and unable to care for herself.  Her family is also unable to care for her given her severe weakness and substantial weight loss.  In speaking with the patient, she states that she has a burning sensation when she eats food therefore she does not want to be.  She has had an EGD as well as a CT scan his been no definitive cause for her abdominal pain.  Her family is considering that this may be due to psychiatric illness although she has not has severe psychiatric illness during the remainder of her life.  Before she can be committed to a psychiatric facility she will need cardiac clearance.  Given her severe failure to thrive weight loss, she is being admitted subsequent follow-up with Psychiatry.  She has been seen by Nephrology as well as Neurology and both agree that patient likely needs to be evaluated by Psychiatry.    ED course:   Initial Vitals [03/30/23 1409]   BP Pulse Resp Temp SpO2   123/85 89 18 98.4 °F (36.9 °C) 99     Medications   sodium chloride 0.9% bolus 1,000 mL 1,000 mL (1,000 mLs Intravenous New Bag 3/30/23 1520)   sodium chloride 0.9% bolus 1,000 mL 1,000 mL (1,000 mLs Intravenous New Bag 3/30/23 1712)       Overview/Hospital Course:  3/31: Continue with cardiac workup as patient may need to be placed  a psych facility and/or have a psych evaluation.    4/1: Will consult GI for dysphagia and patient has already had an EGD and has had a colonoscopy in the past.  She had a barium swallow this hospitalization and this can be evaluated.    4/2: CT abd/pelvis with oral and IV contrast given report of abdominal pain and refusal to eat solid foods as a result.  40 pound weight loss over last 6 months, per family.      4/3: After extensive workup there is no Medical anatomical reason that patient is unable to swallow.  There may be psychological or psychiatric component to her illness.  Case was discussed with GI.  Plan to discharge home with family or to psych unit.  Anticipate discharge tomorrow.      Interval History:     Review of Systems   Constitutional:  Positive for appetite change, fatigue and unexpected weight change. Negative for activity change, chills and fever.   HENT:  Negative for congestion and sore throat.    Respiratory:  Negative for chest tightness.    Gastrointestinal:  Positive for abdominal pain and nausea. Negative for abdominal distention and diarrhea.   Endocrine: Negative for cold intolerance and heat intolerance.   Genitourinary:  Negative for dysuria.   Musculoskeletal:  Negative for arthralgias and back pain.   Skin:  Negative for color change and rash.   Neurological:  Positive for weakness. Negative for dizziness, tremors and headaches.   Psychiatric/Behavioral:  Negative for agitation. The patient is not nervous/anxious.    Objective:     Vital Signs (Most Recent):  Temp: 97.7 °F (36.5 °C) (04/02/23 1600)  Pulse: 101 (04/02/23 1600)  Resp: 19 (04/02/23 1600)  BP: (!) 146/89 (04/02/23 1600)  SpO2: 98 % (04/02/23 1600)   Vital Signs (24h Range):  Temp:  [97.6 °F (36.4 °C)-98.3 °F (36.8 °C)] 97.7 °F (36.5 °C)  Pulse:  [] 101  Resp:  [18-19] 19  SpO2:  [96 %-100 %] 98 %  BP: (146-182)/(70-92) 146/89     Weight: 82.1 kg (181 lb)  Body mass index is 31.07 kg/m².  No intake or output data  in the 24 hours ending 04/02/23 6755   Physical Exam  Constitutional:       Appearance: Normal appearance.   HENT:      Head: Normocephalic and atraumatic.      Nose: Nose normal.      Mouth/Throat:      Mouth: Mucous membranes are moist.      Pharynx: Oropharynx is clear.   Eyes:      Extraocular Movements: Extraocular movements intact.      Conjunctiva/sclera: Conjunctivae normal.      Pupils: Pupils are equal, round, and reactive to light.   Cardiovascular:      Rate and Rhythm: Normal rate and regular rhythm.      Pulses: Normal pulses.      Heart sounds: Normal heart sounds.   Pulmonary:      Effort: Pulmonary effort is normal.      Breath sounds: Normal breath sounds.   Abdominal:      General: Abdomen is flat. Bowel sounds are normal.      Palpations: Abdomen is soft.   Musculoskeletal:         General: Normal range of motion.      Cervical back: Normal range of motion and neck supple.   Skin:     General: Skin is warm and dry.   Neurological:      General: No focal deficit present.      Mental Status: She is alert. She is disoriented.      Motor: Weakness present.   Psychiatric:         Mood and Affect: Mood normal.         Behavior: Behavior normal.       Significant Labs: All pertinent labs within the past 24 hours have been reviewed.    Significant Imaging: I have reviewed all pertinent imaging results/findings within the past 24 hours.      Assessment/Plan:      * Failure to thrive in adult  Patient has been seen by Neurology recommendation to be evaluated by Psychiatry.    Will continue to further evaluate for cause of failure to thrive and is medically cleared will consider transfer to psychiatric facility and are tele psych evaluation.  Given patient's acute change in mental status a solid so exclude a stroke.  MRI has been ordered.    Dysphagia  Per family, patient has not eaten solid food in several weeks.   She has not been drinking either.  However, she is drinking cranberry juice in the hospital.       She has had EGD in March 2023 and was unremarkable. She had colonoscopy in 2019 with hemorrhoids and diverticulosis.    Unclear if there is a medical reason for her symptoms versus psychiatry.  She roams out of her family's house stating that her stomach hurting.  She informs her family that walking helps her stomach pain.      CT Ab/Pelvis with oral and IV contrast ordered.   Consider GI consult for further evaluation.        Cognitive impairment  Patient knows her name but does not know the year nor her location.   This has been a chronic issues and she has been wondering out of the house. She has been evaluated by neurology but they suspect psychiatric illness rather than chronic dementia.   Development of psychiatric illness at her age seems unlikely.  Neurodevelopmental disease appears more likely.          Weight loss  Nutrition consulted. Most recent weight and BMI monitored-                         Measurements:  Wt Readings from Last 1 Encounters:   03/30/23 83.5 kg (184 lb)   Body mass index is 31.58 kg/m².    Recommendations:      Patient has been screened and assessed by RD. RD will follow patient.      Gastroesophageal reflux disease without esophagitis        Acquired hypothyroidism  TSH has been checked multiple times in the last year and has been normal.      Primary insomnia  Will give p.r.n. IV medications the patient man least have sleep      Atrial fibrillation  He has not been taking any of her home medications she is in normal sinus rhythm at this time.  Will resume home medications with anticoagulation once patient able swallow and after she has had an MRI.  Increased risk of stroke given history of A fib and off anticoagulation.          Hyperlipidemia  Will defer/on medication at this time until patient is better able to swallow.      Essential hypertension  Treat with IV medication including hydralazine and clonidine p.r.n..      Depression  Reported history of depression but patient  unable taking medications as she states she has difficulty swallowing.  Will have to follow up in further assess this with psychiatrist.          VTE Risk Mitigation (From admission, onward)         Ordered     heparin (porcine) injection 5,000 Units  Every 12 hours         03/30/23 2319     IP VTE HIGH RISK PATIENT  Once         03/30/23 2319     Place sequential compression device  Until discontinued         03/30/23 2319                Discharge Planning   GERALDO:      Code Status: Full Code   Is the patient medically ready for discharge?:     Reason for patient still in hospital (select all that apply): Treatment  Discharge Plan A: Skilled Nursing Facility                  Min Santos MD  Department of Hospital Medicine   [facility]

## 2023-04-04 NOTE — PT/OT/SLP PROGRESS
Occupational Therapy   Treatment    Name: Renetta Stewart  MRN: [id number]  Admitting Diagnosis:  Failure to thrive in adult       Recommendations:     Discharge Recommendations: rehabilitation facility, nursing facility, skilled  Discharge Equipment Recommendations:   (to be determined)  Barriers to discharge:  None    Assessment:     Renetta Stewart is a 73 y.o. female with a medical diagnosis of Failure to thrive in adult.  She presents with no complaints.Pt required encouragement to participate with skilled tx. Performance deficits affecting function are weakness, impaired endurance, impaired functional mobility.     Rehab Prognosis:  Good; patient would benefit from acute skilled OT services to address these deficits and reach maximum level of function.       Plan:     Patient to be seen 5 x/week to address the above listed problems via self-care/home management, therapeutic activities, therapeutic exercises  Plan of Care Expires: 04/14/23  Plan of Care Reviewed with: patient    Subjective     Chief Complaint: Failure to Thrive in Adult  Patient/Family Comments/goals: Recommend Swingbed.  Pain/Comfort:  Pain Rating 1: 0/10  Pain Rating Post-Intervention 1: 0/10    Objective:     Communicated with: SMILEY Rosenberg prior to session.  Patient found HOB elevated with peripheral IV, telemetry upon OT entry to room.    General Precautions: Standard, fall    Orthopedic Precautions:N/A  Braces: N/A  Respiratory Status: Room air     Occupational Performance:     Bed Mobility:    Patient completed Supine to Sit with contact guard assistance  Patient completed Sit to Supine with contact guard assistance     Functional Mobility/Transfers:  Patient completed Sit <> Stand Transfer with contact guard assistance  with  rolling walker   Functional Mobility: CGA to side step to HOB    Activities of Daily Living:        AMPAC 6 Click ADL:      Treatment & Education:  Pt performed UE strengthening exercises. AAROM with 2 lb hand wt x 2  sets of 15 reps (B) shoulder flexion/extension, adduction/abduction and (B) elbow and wrist flexion/extension. Red theraband x 2 sets of 15 reps (B) elbow flexion and extension. Hand exerciser x 3 bands x 25 reps (B).    Pt participated well with exercises with encouragement. Pt declined to t/f to chair. Plan is to continue tx addressing goals.    Patient left HOB elevated with all lines intact, call button in reach, and nurse notified    GOALS:   Multidisciplinary Problems       Occupational Therapy Goals          Problem: Occupational Therapy    Goal Priority Disciplines Outcome Interventions   Occupational Therapy Goal     OT, PT/OT Ongoing, Progressing    Description: STG:  Pt will perform grooming with setup  Pt will bathe with min(A)  Pt will perform UE dressing with CGA  Pt will perform LE dressing with min(A)  Pt will sit EOB x 7 min with SBA  during dynamic activity  Pt will transfer bed/chair/bsc with CGA with RW if needed  Pt will perform standing task x 2 min with CGA with RW if needed  Pt will tolerate 20 minutes of tx without fatigue      LT.Restore to max I with self care and mobility.                          Time Tracking:     OT Date of Treatment: 23  OT Start Time: 1450  OT Stop Time: 1515  OT Total Time (min): 25 min    Billable Minutes:Therapeutic Exercise 20    OT/YOLANDA: OT          2023

## 2023-04-04 NOTE — PLAN OF CARE
SS was consulted on pt for psych placement. SS notified Dr Santos that would need to be placed through the [facility]

## 2023-04-04 NOTE — PT/OT/SLP PROGRESS
Physical Therapy Treatment    Patient Name:  Renetta Stewart   MRN:  [id number]    Recommendations:     Discharge Recommendations: nursing facility, skilled, home with home health  Discharge Equipment Recommendations:  (to be determined)  Barriers to discharge:  ongoing medical care    Assessment:     Renetta Stewart is a 73 y.o. female admitted with a medical diagnosis of Failure to thrive in adult.  She presents with the following impairments/functional limitations: weakness, impaired endurance, impaired functional mobility, gait instability, decreased safety awareness. Upon entry to room, pt is holding liquids in her mouth, req cueing to spit. Pt reluctant to participate with PT but eventually complied. Pt most limited by poor decision making and cognitive functioning.    Rehab Prognosis: Fair; patient would benefit from acute skilled PT services to address these deficits and reach maximum level of function.    Recent Surgery: * No surgery found *      Plan:     During this hospitalization, patient to be seen 5 x/week to address the identified rehab impairments via gait training, therapeutic activities, therapeutic exercises and progress toward the following goals:    Plan of Care Expires:  04/30/23    Subjective     Chief Complaint: FTT  Patient/Family Comments/goals: n/a  Pain/Comfort: 0/10         Objective:     Communicated with OTR and SUJATA Rosenberg LPN prior to session.  Patient found left sidelying with peripheral IV, blood pressure cuff, bed alarm upon PT entry to room.     General Precautions: Standard, fall, pacemaker  Orthopedic Precautions: N/A  Braces: N/A  Respiratory Status: Room air     Functional Mobility:  Bed Mobility:     Supine to Sit: minimum assistance  Sit to Supine: stand by assistance  Transfers:     Sit to Stand:  contact guard assistance with rolling walker      AM-PAC 6 CLICK MOBILITY          Treatment & Education:  Bilateral lower extremity exercise x 15-20 reps: ankle pumps, heel slides, hip  abduction/adduction, Long arc quads, and Marching with Stand by assist and verbal cues for sequencing and safety     Patient left left sidelying with all lines intact, call button in reach, and OTR present..    GOALS:   Multidisciplinary Problems       Physical Therapy Goals          Problem: Physical Therapy    Goal Priority Disciplines Outcome Goal Variances Interventions   Physical Therapy Goal     PT, PT/OT Ongoing, Progressing     Description: Short Term Goals to be met by: 23    Patient will increase functional independence with mobility by performin. Supine to sit with independently  2. Sit to stand transfer with independently using Rolling walker  3. Bed to chair transfer with independently using Rolling walker  4. Gait  x 100 feet with supervision using Rolling walker  5. Lower extremity exercise program x30 reps per handout, with assistance as needed  6. Pt to negotiate 4 steps with rail with supervision    Long Term Goals to be met by: 23    Pt will regain full independent functional mobility with lowest level of assistive device to return to home situation and prior activities of daily living.                        Time Tracking:     PT Received On: 23  PT Start Time: 1448     PT Stop Time: 1500  PT Total Time (min): 12 min     Billable Minutes: Therapeutic Exercise 10    Treatment Type: Evaluation  PT/PTA: PT           2023

## 2023-04-05 PROCEDURE — 96372 THER/PROPH/DIAG INJ SC/IM: CPT | Performed by: HOSPITALIST

## 2023-04-05 PROCEDURE — 63600175 PHARM REV CODE 636 W HCPCS: Performed by: HOSPITALIST

## 2023-04-05 PROCEDURE — G0378 HOSPITAL OBSERVATION PER HR: HCPCS

## 2023-04-05 PROCEDURE — 25000003 PHARM REV CODE 250: Performed by: HOSPITALIST

## 2023-04-05 PROCEDURE — 97116 GAIT TRAINING THERAPY: CPT

## 2023-04-05 PROCEDURE — 11000001 HC ACUTE MED/SURG PRIVATE ROOM

## 2023-04-05 RX ADMIN — HEPARIN SODIUM 5000 UNITS: 5000 INJECTION, SOLUTION INTRAVENOUS; SUBCUTANEOUS at 09:04

## 2023-04-05 RX ADMIN — HYDROCORTISONE: 1 CREAM TOPICAL at 09:04

## 2023-04-05 RX ADMIN — ACETAMINOPHEN 1000 MG: 500 TABLET ORAL at 05:04

## 2023-04-05 NOTE — ASSESSMENT & PLAN NOTE
He has not been taking any of her home medications she is in normal sinus rhythm at this time.  Will resume home medications with anticoagulation once patient able swallow and after she has had an MRI.  Increased risk of stroke given history of A fib and off anticoagulation.      Discuss risks and benefits of starting eliquis.

## 2023-04-05 NOTE — PT/OT/SLP PROGRESS
Physical Therapy Treatment    Patient Name:  Renetta Stewart   MRN:  [id number]    Recommendations:     Discharge Recommendations: nursing facility, skilled, home with home health  Discharge Equipment Recommendations:  (to be determined)  Barriers to discharge:  ongoing medical care    Assessment:     Renetta Stewart is a 73 y.o. female admitted with a medical diagnosis of Failure to thrive in adult.  She presents with the following impairments/functional limitations: weakness, impaired endurance, impaired functional mobility, gait instability, decreased safety awareness.     Rehab Prognosis: Good; patient would benefit from acute skilled PT services to address these deficits and reach maximum level of function.    Recent Surgery: * No surgery found *      Plan:     During this hospitalization, patient to be seen 5 x/week to address the identified rehab impairments via gait training, therapeutic activities, therapeutic exercises and progress toward the following goals:    Plan of Care Expires:  04/30/23    Subjective     Chief Complaint: FTT  Patient/Family Comments/goals: agreeable to PT  Pain/Comfort: 0/10         Objective:     Communicated with SUJATA Rosenberg RN prior to session.  Patient found HOB elevated with peripheral IV, blood pressure cuff, bed alarm upon PT entry to room.     General Precautions: Standard, fall, pacemaker  Orthopedic Precautions: N/A  Braces: N/A  Respiratory Status: Room air     Functional Mobility:  Bed Mobility:     Supine to Sit: stand by assistance  Transfers:     Sit to Stand:  contact guard assistance and cues for hand placement with rolling walker  Gait: 180ft RW SBA, normal cheryl, good posture  Balance: Good in sitting      AM-PAC 6 CLICK MOBILITY          Treatment & Education:  Mobility as stated above.     Patient left up in chair with all lines intact, call button in reach, and OTR present..    GOALS:   Multidisciplinary Problems       Physical Therapy Goals          Problem: Physical  Therapy    Goal Priority Disciplines Outcome Goal Variances Interventions   Physical Therapy Goal     PT, PT/OT Ongoing, Progressing     Description: Short Term Goals to be met by: 23    Patient will increase functional independence with mobility by performin. Supine to sit with independently  2. Sit to stand transfer with independently using Rolling walker  3. Bed to chair transfer with independently using Rolling walker  4. Gait  x 100 feet with supervision using Rolling walker  5. Lower extremity exercise program x30 reps per handout, with assistance as needed  6. Pt to negotiate 4 steps with rail with supervision    Long Term Goals to be met by: 23    Pt will regain full independent functional mobility with lowest level of assistive device to return to home situation and prior activities of daily living.                        Time Tracking:     PT Received On: 23  PT Start Time: 1407     PT Stop Time: 1420  PT Total Time (min): 13 min     Billable Minutes: Gait Training 10    Treatment Type: Evaluation  PT/PTA: PT           2023

## 2023-04-05 NOTE — PROGRESS NOTES
[facility] Hospital Medicine  Progress Note    Patient Name: Renetta Stewart  MRN: [id number]  Patient Class: IP- Inpatient   Admission Date: 3/30/2023  Length of Stay: 0 days  Attending Physician: Min Santos MD  Primary Care Provider: Eldon Govea MD        Subjective:     Principal Problem:Failure to thrive in adult    HPI:  Ms. Stewart in 73-year-old woman history of depression/anxiety, hypothyroidism, atrial fibrillation, pacemaker, GERD, obesity, chronic kidney disease, hyperlipidemia who presents with failure to thrive for several months.  Patient has had failure to thrive and anorexia for several months but she is now becoming quite weak and unable to care for herself.  Her family is also unable to care for her given her severe weakness and substantial weight loss.  In speaking with the patient, she states that she has a burning sensation when she eats food therefore she does not want to be.  She has had an EGD as well as a CT scan his been no definitive cause for her abdominal pain.  Her family is considering that this may be due to psychiatric illness although she has not has severe psychiatric illness during the remainder of her life.  Before she can be committed to a psychiatric facility she will need cardiac clearance.  Given her severe failure to thrive weight loss, she is being admitted subsequent follow-up with Psychiatry.  She has been seen by Nephrology as well as Neurology and both agree that patient likely needs to be evaluated by Psychiatry.    ED course:   Initial Vitals [03/30/23 1409]   BP Pulse Resp Temp SpO2   123/85 89 18 98.4 °F (36.9 °C) 99     Medications   sodium chloride 0.9% bolus 1,000 mL 1,000 mL (1,000 mLs Intravenous New Bag 3/30/23 1520)   sodium chloride 0.9% bolus 1,000 mL 1,000 mL (1,000 mLs Intravenous New Bag 3/30/23 1712)       Overview/Hospital Course:  3/31: Continue with cardiac workup as patient may need to be placed a  psych facility and/or have a psych evaluation.    4/1: Will consult GI for dysphagia and patient has already had an EGD and has had a colonoscopy in the past.  She had a barium swallow this hospitalization and this can be evaluated.    4/2: CT abd/pelvis with oral and IV contrast given report of abdominal pain and refusal to eat solid foods as a result.  40 pound weight loss over last 6 months, per family.      4/3: After extensive workup there is no Medical anatomical reason that patient is unable to swallow.  There may be psychological or psychiatric component to her illness.  Case was discussed with GI.  Plan to discharge home with family or to psych unit.  Anticipate discharge tomorrow.    4/4: Plan to discharge to psych facility.         Interval History:     Review of Systems   Constitutional:  Positive for appetite change, fatigue and unexpected weight change. Negative for activity change, chills and fever.   HENT:  Negative for congestion and sore throat.    Respiratory:  Negative for chest tightness.    Gastrointestinal:  Positive for abdominal pain and nausea. Negative for abdominal distention and diarrhea.   Endocrine: Negative for cold intolerance and heat intolerance.   Genitourinary:  Negative for dysuria.   Musculoskeletal:  Negative for arthralgias and back pain.   Skin:  Negative for color change and rash.   Neurological:  Positive for weakness. Negative for dizziness, tremors and headaches.   Psychiatric/Behavioral:  Negative for agitation. The patient is not nervous/anxious.    Objective:     Vital Signs (Most Recent):  Temp: 98 °F (36.7 °C) (04/05/23 0700)  Pulse: 72 (04/05/23 0700)  Resp: 18 (04/05/23 0700)  BP: (!) 153/76 (04/05/23 0700)  SpO2: 97 % (04/05/23 0700)   Vital Signs (24h Range):  Temp:  [97.5 °F (36.4 °C)-98 °F (36.7 °C)] 98 °F (36.7 °C)  Pulse:  [72-85] 72  Resp:  [18] 18  SpO2:  [97 %-100 %] 97 %  BP: (127-155)/(70-83) 153/76     Weight: 81.8 kg (180 lb 5.4 oz)  Body mass index  is 30.95 kg/m².  No intake or output data in the 24 hours ending 04/05/23 0758   Physical Exam  Constitutional:       Appearance: Normal appearance.   HENT:      Head: Normocephalic and atraumatic.      Nose: Nose normal.      Mouth/Throat:      Mouth: Mucous membranes are moist.      Pharynx: Oropharynx is clear.   Eyes:      Extraocular Movements: Extraocular movements intact.      Conjunctiva/sclera: Conjunctivae normal.      Pupils: Pupils are equal, round, and reactive to light.   Cardiovascular:      Rate and Rhythm: Normal rate and regular rhythm.      Pulses: Normal pulses.      Heart sounds: Normal heart sounds.   Pulmonary:      Effort: Pulmonary effort is normal.      Breath sounds: Normal breath sounds.   Abdominal:      General: Abdomen is flat. Bowel sounds are normal.      Palpations: Abdomen is soft.   Musculoskeletal:         General: Normal range of motion.      Cervical back: Normal range of motion and neck supple.   Skin:     General: Skin is warm and dry.   Neurological:      General: No focal deficit present.      Mental Status: She is alert. She is disoriented.      Motor: Weakness present.   Psychiatric:         Mood and Affect: Mood normal.         Behavior: Behavior normal.       Significant Labs: All pertinent labs within the past 24 hours have been reviewed.    Significant Imaging: I have reviewed all pertinent imaging results/findings within the past 24 hours.      Assessment/Plan:      * Failure to thrive in adult  Patient has been seen by Neurology recommendation to be evaluated by Psychiatry.    Will continue to further evaluate for cause of failure to thrive and is medically cleared will consider transfer to psychiatric facility and are tele psych evaluation.  Given patient's acute change in mental status a solid so exclude a stroke.  MRI has been ordered.    Dysphagia  Per family, patient has not eaten solid food in several weeks.   She has not been drinking either.  However, she is  drinking cranberry juice in the hospital.      She has had EGD in March 2023 and was unremarkable. She had colonoscopy in 2019 with hemorrhoids and diverticulosis.    Unclear if there is a medical reason for her symptoms versus psychiatry.  She roams out of her family's house stating that her stomach hurting.  She informs her family that walking helps her stomach pain.      CT Ab/Pelvis with oral and IV contrast ordered.   Consider GI consult for further evaluation.      40 pound weight loss associated with dysphagia.  If this does not improve, it could be terminal.        Cognitive impairment  Patient knows her name but does not know the year nor her location.   This has been a chronic issues and she has been wondering out of the house. She has been evaluated by neurology but they suspect psychiatric illness rather than chronic dementia.   Development of psychiatric illness at her age seems unlikely.  Neurodevelopmental disease appears more likely.          Weight loss  Nutrition consulted. Most recent weight and BMI monitored-     Malnutrition Type and Energy Intake  Malnutrition Type: acute illness or injury  Energy Intake: severe energy intake    Malnutrition (Moderate to Severe)  Weight Loss (Malnutrition): greater than 5% in 1 month  Energy Intake (Malnutrition): less than 75% for greater than or equal to 3 months         Malnutrition Final Summary  Severe Weight Loss (Malnutrition): greater than 7.5% in 3 months    Measurements:  Wt Readings from Last 1 Encounters:   04/05/23 81.8 kg (180 lb 5.4 oz)   Body mass index is 30.95 kg/m².    Recommendations: Recommendation/Intervention: Continue with full liquid diet + Ensure Max Protein. Patient was seen by .  Goals: po intake % + supplements, weight stabilization, acceptance of supplements    Patient has been screened and assessed by RD. RD will follow patient.      Gastroesophageal reflux disease without esophagitis        Acquired hypothyroidism  TSH  has been checked multiple times in the last year and has been normal.      Primary insomnia  Will give p.r.n. IV medications the patient man least have sleep      Atrial fibrillation  He has not been taking any of her home medications she is in normal sinus rhythm at this time.  Will resume home medications with anticoagulation once patient able swallow and after she has had an MRI.  Increased risk of stroke given history of A fib and off anticoagulation.          Hyperlipidemia  Will defer/on medication at this time until patient is better able to swallow.      Essential hypertension  Treat with IV medication including hydralazine and clonidine p.r.n..      Depression  Reported history of depression but patient unable taking medications as she states she has difficulty swallowing.  Will have to follow up in further assess this with psychiatrist.          VTE Risk Mitigation (From admission, onward)         Ordered     heparin (porcine) injection 5,000 Units  Every 12 hours         03/30/23 2319     IP VTE HIGH RISK PATIENT  Once         03/30/23 2319     Place sequential compression device  Until discontinued         03/30/23 2319                Discharge Planning   GERALDO: 4/4/2023     Code Status: Full Code   Is the patient medically ready for discharge?:     Reason for patient still in hospital (select all that apply): Treatment  Discharge Plan A: Skilled Nursing Facility                  Min Santos MD  Department of Hospital Medicine   [facility] - [facility]

## 2023-04-05 NOTE — ASSESSMENT & PLAN NOTE
Nutrition consulted. Most recent weight and BMI monitored-     Malnutrition Type and Energy Intake  Malnutrition Type: acute illness or injury  Energy Intake: severe energy intake    Malnutrition (Moderate to Severe)  Weight Loss (Malnutrition): greater than 5% in 1 month  Energy Intake (Malnutrition): less than 75% for greater than or equal to 3 months         Malnutrition Final Summary  Severe Weight Loss (Malnutrition): greater than 7.5% in 3 months    Measurements:  Wt Readings from Last 1 Encounters:   04/05/23 81.8 kg (180 lb 5.4 oz)   Body mass index is 30.95 kg/m².    Recommendations: Recommendation/Intervention: Continue with full liquid diet + Ensure Max Protein. Patient was seen by ST.  Goals: po intake % + supplements, weight stabilization, acceptance of supplements    Patient has been screened and assessed by RD. RD will follow patient.

## 2023-04-05 NOTE — ASSESSMENT & PLAN NOTE
Patient has been seen by Neurology recommendation to be evaluated by Psychiatry.    Will continue to further evaluate for cause of failure to thrive and is medically cleared will consider transfer to psychiatric facility and are tele psych evaluation.  Given patient's acute change in mental status a solid so exclude a stroke.     Patient moves all extremities.  She is A&O x 2.  Suspect psychiatric etiology.   Patient may follow-up with neurology and psychiatry as an outpatient.

## 2023-04-05 NOTE — ASSESSMENT & PLAN NOTE
TSH has been checked multiple times in the last year and has been normal.  Continue home meds.

## 2023-04-05 NOTE — ASSESSMENT & PLAN NOTE
Per family, patient has not eaten solid food in several weeks.   She has not been drinking either.  However, she is drinking cranberry juice in the hospital.      She has had EGD in March 2023 and was unremarkable. She had colonoscopy in 2019 with hemorrhoids and diverticulosis.    Unclear if there is a medical reason for her symptoms versus psychiatry.  She roams out of her family's house stating that her stomach hurting.  She informs her family that walking helps her stomach pain.      CT Ab/Pelvis with oral and IV contrast ordered.   Consider GI consult for further evaluation.      40 pound weight loss associated with dysphagia.  If this does not improve, it could be terminal.

## 2023-04-05 NOTE — PROGRESS NOTES
[facility] Medicine  Progress Note    Patient Name: Renetta Stewart  MRN: [id number]  Patient Class: IP- Inpatient   Admission Date: 3/30/2023  Length of Stay: 0 days  Attending Physician: Min Santos MD  Primary Care Provider: Eldon Govea MD        Subjective:     Principal Problem:Failure to thrive in adult    HPI:  Ms. Stewart in 73-year-old woman history of depression/anxiety, hypothyroidism, atrial fibrillation, pacemaker, GERD, obesity, chronic kidney disease, hyperlipidemia who presents with failure to thrive for several months.  Patient has had failure to thrive and anorexia for several months but she is now becoming quite weak and unable to care for herself.  Her family is also unable to care for her given her severe weakness and substantial weight loss.  In speaking with the patient, she states that she has a burning sensation when she eats food therefore she does not want to be.  She has had an EGD as well as a CT scan his been no definitive cause for her abdominal pain.  Her family is considering that this may be due to psychiatric illness although she has not has severe psychiatric illness during the remainder of her life.  Before she can be committed to a psychiatric facility she will need cardiac clearance.  Given her severe failure to thrive weight loss, she is being admitted subsequent follow-up with Psychiatry.  She has been seen by Nephrology as well as Neurology and both agree that patient likely needs to be evaluated by Psychiatry.    ED course:   Initial Vitals [03/30/23 1409]   BP Pulse Resp Temp SpO2   123/85 89 18 98.4 °F (36.9 °C) 99     Medications   sodium chloride 0.9% bolus 1,000 mL 1,000 mL (1,000 mLs Intravenous New Bag 3/30/23 1520)   sodium chloride 0.9% bolus 1,000 mL 1,000 mL (1,000 mLs Intravenous New Bag 3/30/23 1712)       Overview/Hospital Course:  3/31: Continue with cardiac workup as patient may need to be placed a  psych facility and/or have a psych evaluation.    4/1: Will consult GI for dysphagia and patient has already had an EGD and has had a colonoscopy in the past.  She had a barium swallow this hospitalization and this can be evaluated.    4/2: CT abd/pelvis with oral and IV contrast given report of abdominal pain and refusal to eat solid foods as a result.  40 pound weight loss over last 6 months, per family.      4/3: After extensive workup there is no Medical anatomical reason that patient is unable to swallow.  There may be psychological or psychiatric component to her illness.  Case was discussed with GI.  Plan to discharge home with family or to psych unit.  Anticipate discharge tomorrow.    4/4: Plan to discharge to psych facility.     4/5: Patient was accepted at a facility but family was not agreeable due to the distance.    Plan to discharge patient home in the morning.  Family may consider a local facility in the future.  However, [facility] is not an option due to patient's insurance.  [facility] is not an option as they no longer have a geripsych unit.    Overall, patient is improved.  She is A&O x 3 today.  She intermittently refuses to eat solid foods.          Interval History:     Review of Systems   Constitutional:  Positive for appetite change, fatigue and unexpected weight change. Negative for activity change, chills and fever.   HENT:  Negative for congestion and sore throat.    Respiratory:  Negative for chest tightness.    Gastrointestinal:  Positive for abdominal pain and nausea. Negative for abdominal distention and diarrhea.   Endocrine: Negative for cold intolerance and heat intolerance.   Genitourinary:  Negative for dysuria.   Musculoskeletal:  Negative for arthralgias and back pain.   Skin:  Negative for color change and rash.   Neurological:  Positive for weakness. Negative for dizziness, tremors and headaches.   Psychiatric/Behavioral:  Negative for agitation. The patient is not  nervous/anxious.    Objective:     Vital Signs (Most Recent):  Temp: 97.3 °F (36.3 °C) (04/05/23 1317)  Pulse: 82 (04/05/23 1317)  Resp: 14 (04/05/23 1317)  BP: (!) 158/86 (04/05/23 1317)  SpO2: 98 % (04/05/23 1317)   Vital Signs (24h Range):  Temp:  [97.3 °F (36.3 °C)-98 °F (36.7 °C)] 97.3 °F (36.3 °C)  Pulse:  [65-85] 82  Resp:  [14-18] 14  SpO2:  [97 %-100 %] 98 %  BP: (127-165)/() 158/86     Weight: 81.8 kg (180 lb 5.4 oz)  Body mass index is 30.95 kg/m².    Intake/Output Summary (Last 24 hours) at 4/5/2023 1731  Last data filed at 4/5/2023 1100  Gross per 24 hour   Intake --   Output 1 ml   Net -1 ml      Physical Exam  Constitutional:       Appearance: Normal appearance.   HENT:      Head: Normocephalic and atraumatic.      Nose: Nose normal.      Mouth/Throat:      Mouth: Mucous membranes are moist.      Pharynx: Oropharynx is clear.   Eyes:      Extraocular Movements: Extraocular movements intact.      Conjunctiva/sclera: Conjunctivae normal.      Pupils: Pupils are equal, round, and reactive to light.   Cardiovascular:      Rate and Rhythm: Normal rate and regular rhythm.      Pulses: Normal pulses.      Heart sounds: Normal heart sounds.   Pulmonary:      Effort: Pulmonary effort is normal.      Breath sounds: Normal breath sounds.   Abdominal:      General: Abdomen is flat. Bowel sounds are normal.      Palpations: Abdomen is soft.   Musculoskeletal:         General: Normal range of motion.      Cervical back: Normal range of motion and neck supple.   Skin:     General: Skin is warm and dry.   Neurological:      General: No focal deficit present.      Mental Status: She is alert. She is disoriented.      Motor: Weakness present.   Psychiatric:         Mood and Affect: Mood normal.         Behavior: Behavior normal.       Significant Labs: All pertinent labs within the past 24 hours have been reviewed.    Significant Imaging: I have reviewed all pertinent imaging results/findings within the past 24  hours.      Assessment/Plan:      * Failure to thrive in adult  Patient has been seen by Neurology recommendation to be evaluated by Psychiatry.    Will continue to further evaluate for cause of failure to thrive and is medically cleared will consider transfer to psychiatric facility and are tele psych evaluation.  Given patient's acute change in mental status a solid so exclude a stroke.     Patient moves all extremities.  She is A&O x 2.  Suspect psychiatric etiology.   Patient may follow-up with neurology and psychiatry as an outpatient.      Dysphagia  Per family, patient has not eaten solid food in several weeks.   She has not been drinking either.  However, she is drinking cranberry juice in the hospital.      She has had EGD in March 2023 and was unremarkable. She had colonoscopy in 2019 with hemorrhoids and diverticulosis.    Unclear if there is a medical reason for her symptoms versus psychiatry.  She roams out of her family's house stating that her stomach hurting.  She informs her family that walking helps her stomach pain.      CT Ab/Pelvis with oral and IV contrast ordered.   Consider GI consult for further evaluation.      40 pound weight loss associated with dysphagia.  If this does not improve, it could be terminal.        Cognitive impairment  Patient knows her name but does not know the year nor her location.   This has been a chronic issues and she has been wondering out of the house. She has been evaluated by neurology but they suspect psychiatric illness rather than chronic dementia.   Development of psychiatric illness at her age seems unlikely.  Neurodevelopmental disease appears more likely.          Weight loss  Nutrition consulted. Most recent weight and BMI monitored-     Malnutrition Type and Energy Intake  Malnutrition Type: acute illness or injury  Energy Intake: severe energy intake    Malnutrition (Moderate to Severe)  Weight Loss (Malnutrition): greater than 5% in 1 month  Energy  Intake (Malnutrition): less than 75% for greater than or equal to 3 months         Malnutrition Final Summary  Severe Weight Loss (Malnutrition): greater than 7.5% in 3 months    Measurements:  Wt Readings from Last 1 Encounters:   04/05/23 81.8 kg (180 lb 5.4 oz)   Body mass index is 30.95 kg/m².    Recommendations: Recommendation/Intervention: Continue with full liquid diet + Ensure Max Protein. Patient was seen by ST.  Goals: po intake % + supplements, weight stabilization, acceptance of supplements    Patient has been screened and assessed by RD. RD will follow patient.      Gastroesophageal reflux disease without esophagitis        Acquired hypothyroidism  TSH has been checked multiple times in the last year and has been normal.  Continue home meds.        Primary insomnia  Will give p.r.n. IV medications the patient man least have sleep      Atrial fibrillation  He has not been taking any of her home medications she is in normal sinus rhythm at this time.  Will resume home medications with anticoagulation once patient able swallow and after she has had an MRI.  Increased risk of stroke given history of A fib and off anticoagulation.      Discuss risks and benefits of starting eliquis.          Hyperlipidemia  Will defer/on medication at this time until patient is better able to swallow.      Essential hypertension  Treat with IV medication including hydralazine and clonidine p.r.n..      Depression  Reported history of depression but patient unable taking medications as she states she has difficulty swallowing.  Will have to follow up in further assess this with psychiatrist.          VTE Risk Mitigation (From admission, onward)         Ordered     heparin (porcine) injection 5,000 Units  Every 12 hours         03/30/23 2319     IP VTE HIGH RISK PATIENT  Once         03/30/23 2319     Place sequential compression device  Until discontinued         03/30/23 2319                Discharge Planning   GERALDO:  4/4/2023     Code Status: Full Code   Is the patient medically ready for discharge?:     Reason for patient still in hospital (select all that apply): Treatment  Discharge Plan A: Skilled Nursing Facility                  Min Santos MD  Department of Hospital Medicine   [facility]

## 2023-04-05 NOTE — NURSING
Had to remove pts IV due to burning and painful. Attempted to start another IV, pt refused. Educated the importance of having one, still refused. MD notified, no new orders.

## 2023-04-05 NOTE — SUBJECTIVE & OBJECTIVE
Interval History:     Review of Systems   Constitutional:  Positive for appetite change, fatigue and unexpected weight change. Negative for activity change, chills and fever.   HENT:  Negative for congestion and sore throat.    Respiratory:  Negative for chest tightness.    Gastrointestinal:  Positive for abdominal pain and nausea. Negative for abdominal distention and diarrhea.   Endocrine: Negative for cold intolerance and heat intolerance.   Genitourinary:  Negative for dysuria.   Musculoskeletal:  Negative for arthralgias and back pain.   Skin:  Negative for color change and rash.   Neurological:  Positive for weakness. Negative for dizziness, tremors and headaches.   Psychiatric/Behavioral:  Negative for agitation. The patient is not nervous/anxious.    Objective:     Vital Signs (Most Recent):  Temp: 97.3 °F (36.3 °C) (04/05/23 1317)  Pulse: 82 (04/05/23 1317)  Resp: 14 (04/05/23 1317)  BP: (!) 158/86 (04/05/23 1317)  SpO2: 98 % (04/05/23 1317)   Vital Signs (24h Range):  Temp:  [97.3 °F (36.3 °C)-98 °F (36.7 °C)] 97.3 °F (36.3 °C)  Pulse:  [65-85] 82  Resp:  [14-18] 14  SpO2:  [97 %-100 %] 98 %  BP: (127-165)/() 158/86     Weight: 81.8 kg (180 lb 5.4 oz)  Body mass index is 30.95 kg/m².    Intake/Output Summary (Last 24 hours) at 4/5/2023 1731  Last data filed at 4/5/2023 1100  Gross per 24 hour   Intake --   Output 1 ml   Net -1 ml      Physical Exam  Constitutional:       Appearance: Normal appearance.   HENT:      Head: Normocephalic and atraumatic.      Nose: Nose normal.      Mouth/Throat:      Mouth: Mucous membranes are moist.      Pharynx: Oropharynx is clear.   Eyes:      Extraocular Movements: Extraocular movements intact.      Conjunctiva/sclera: Conjunctivae normal.      Pupils: Pupils are equal, round, and reactive to light.   Cardiovascular:      Rate and Rhythm: Normal rate and regular rhythm.      Pulses: Normal pulses.      Heart sounds: Normal heart sounds.   Pulmonary:      Effort:  Pulmonary effort is normal.      Breath sounds: Normal breath sounds.   Abdominal:      General: Abdomen is flat. Bowel sounds are normal.      Palpations: Abdomen is soft.   Musculoskeletal:         General: Normal range of motion.      Cervical back: Normal range of motion and neck supple.   Skin:     General: Skin is warm and dry.   Neurological:      General: No focal deficit present.      Mental Status: She is alert. She is disoriented.      Motor: Weakness present.   Psychiatric:         Mood and Affect: Mood normal.         Behavior: Behavior normal.       Significant Labs: All pertinent labs within the past 24 hours have been reviewed.    Significant Imaging: I have reviewed all pertinent imaging results/findings within the past 24 hours.

## 2023-04-05 NOTE — PLAN OF CARE
Pt is current with [facility], if patient goes home then a psych nurse is an option for pt through her home health company.

## 2023-04-05 NOTE — SUBJECTIVE & OBJECTIVE
Interval History:     Review of Systems   Constitutional:  Positive for appetite change, fatigue and unexpected weight change. Negative for activity change, chills and fever.   HENT:  Negative for congestion and sore throat.    Respiratory:  Negative for chest tightness.    Gastrointestinal:  Positive for abdominal pain and nausea. Negative for abdominal distention and diarrhea.   Endocrine: Negative for cold intolerance and heat intolerance.   Genitourinary:  Negative for dysuria.   Musculoskeletal:  Negative for arthralgias and back pain.   Skin:  Negative for color change and rash.   Neurological:  Positive for weakness. Negative for dizziness, tremors and headaches.   Psychiatric/Behavioral:  Negative for agitation. The patient is not nervous/anxious.    Objective:     Vital Signs (Most Recent):  Temp: 98 °F (36.7 °C) (04/05/23 0700)  Pulse: 72 (04/05/23 0700)  Resp: 18 (04/05/23 0700)  BP: (!) 153/76 (04/05/23 0700)  SpO2: 97 % (04/05/23 0700)   Vital Signs (24h Range):  Temp:  [97.5 °F (36.4 °C)-98 °F (36.7 °C)] 98 °F (36.7 °C)  Pulse:  [72-85] 72  Resp:  [18] 18  SpO2:  [97 %-100 %] 97 %  BP: (127-155)/(70-83) 153/76     Weight: 81.8 kg (180 lb 5.4 oz)  Body mass index is 30.95 kg/m².  No intake or output data in the 24 hours ending 04/05/23 0758   Physical Exam  Constitutional:       Appearance: Normal appearance.   HENT:      Head: Normocephalic and atraumatic.      Nose: Nose normal.      Mouth/Throat:      Mouth: Mucous membranes are moist.      Pharynx: Oropharynx is clear.   Eyes:      Extraocular Movements: Extraocular movements intact.      Conjunctiva/sclera: Conjunctivae normal.      Pupils: Pupils are equal, round, and reactive to light.   Cardiovascular:      Rate and Rhythm: Normal rate and regular rhythm.      Pulses: Normal pulses.      Heart sounds: Normal heart sounds.   Pulmonary:      Effort: Pulmonary effort is normal.      Breath sounds: Normal breath sounds.   Abdominal:      General:  Abdomen is flat. Bowel sounds are normal.      Palpations: Abdomen is soft.   Musculoskeletal:         General: Normal range of motion.      Cervical back: Normal range of motion and neck supple.   Skin:     General: Skin is warm and dry.   Neurological:      General: No focal deficit present.      Mental Status: She is alert. She is disoriented.      Motor: Weakness present.   Psychiatric:         Mood and Affect: Mood normal.         Behavior: Behavior normal.       Significant Labs: All pertinent labs within the past 24 hours have been reviewed.    Significant Imaging: I have reviewed all pertinent imaging results/findings within the past 24 hours.

## 2023-04-06 VITALS
RESPIRATION RATE: 18 BRPM | WEIGHT: 180.31 LBS | HEART RATE: 67 BPM | HEIGHT: 64 IN | DIASTOLIC BLOOD PRESSURE: 90 MMHG | OXYGEN SATURATION: 99 % | SYSTOLIC BLOOD PRESSURE: 160 MMHG | BODY MASS INDEX: 30.78 KG/M2 | TEMPERATURE: 98 F

## 2023-04-06 PROCEDURE — 99499 NO LOS: ICD-10-PCS | Mod: ,,, | Performed by: HOSPITALIST

## 2023-04-06 PROCEDURE — 63600175 PHARM REV CODE 636 W HCPCS: Performed by: HOSPITALIST

## 2023-04-06 PROCEDURE — G0378 HOSPITAL OBSERVATION PER HR: HCPCS

## 2023-04-06 PROCEDURE — 99499 UNLISTED E&M SERVICE: CPT | Mod: ,,, | Performed by: HOSPITALIST

## 2023-04-06 PROCEDURE — 99239 HOSP IP/OBS DSCHRG MGMT >30: CPT | Mod: ,,, | Performed by: HOSPITALIST

## 2023-04-06 PROCEDURE — 99239 PR HOSPITAL DISCHARGE DAY,>30 MIN: ICD-10-PCS | Mod: ,,, | Performed by: HOSPITALIST

## 2023-04-06 PROCEDURE — 96372 THER/PROPH/DIAG INJ SC/IM: CPT | Performed by: HOSPITALIST

## 2023-04-06 RX ADMIN — HEPARIN SODIUM 5000 UNITS: 5000 INJECTION, SOLUTION INTRAVENOUS; SUBCUTANEOUS at 09:04

## 2023-04-06 NOTE — DISCHARGE SUMMARY
[facility] Hospital Medicine  Discharge Summary      Patient Name: Renetta Stewart  MRN: [id number]  SARAY: [id number]  Patient Class: OP- Observation  Admission Date: 3/30/2023  Hospital Length of Stay: 1 days  Discharge Date and Time:  04/06/2023 12:12 PM  Attending Physician: Min Santos MD   Discharging Provider: Min Santos MD  Primary Care Provider: Eldon Govea MD    Primary Care Team: Networked reference to record PCT     HPI:   Ms. Stewart in 73-year-old woman history of depression/anxiety, hypothyroidism, atrial fibrillation, pacemaker, GERD, obesity, chronic kidney disease, hyperlipidemia who presents with failure to thrive for several months.  Patient has had failure to thrive and anorexia for several months but she is now becoming quite weak and unable to care for herself.  Her family is also unable to care for her given her severe weakness and substantial weight loss.  In speaking with the patient, she states that she has a burning sensation when she eats food therefore she does not want to be.  She has had an EGD as well as a CT scan his been no definitive cause for her abdominal pain.  Her family is considering that this may be due to psychiatric illness although she has not has severe psychiatric illness during the remainder of her life.  Before she can be committed to a psychiatric facility she will need cardiac clearance.  Given her severe failure to thrive weight loss, she is being admitted subsequent follow-up with Psychiatry.  She has been seen by Nephrology as well as Neurology and both agree that patient likely needs to be evaluated by Psychiatry.    ED course:   Initial Vitals [03/30/23 1409]   BP Pulse Resp Temp SpO2   123/85 89 18 98.4 °F (36.9 °C) 99     Medications   sodium chloride 0.9% bolus 1,000 mL 1,000 mL (1,000 mLs Intravenous New Bag 3/30/23 1520)   sodium chloride 0.9% bolus 1,000 mL 1,000 mL (1,000 mLs Intravenous New Bag  3/30/23 1712)       * No surgery found *      Hospital Course:   3/31: Continue with cardiac workup as patient may need to be placed a psych facility and/or have a psych evaluation.    4/1: Will consult GI for dysphagia and patient has already had an EGD and has had a colonoscopy in the past.  She had a barium swallow this hospitalization and this can be evaluated.    4/2: CT abd/pelvis with oral and IV contrast given report of abdominal pain and refusal to eat solid foods as a result.  40 pound weight loss over last 6 months, per family.      4/3: After extensive workup there is no Medical anatomical reason that patient is unable to swallow.  There may be psychological or psychiatric component to her illness.  Case was discussed with GI.  Plan to discharge home with family or to psych unit.  Anticipate discharge tomorrow.    4/4: Plan to discharge to psych facility.     4/5: Patient was accepted at a facility but family was not agreeable due to the distance.    Plan to discharge patient home in the morning.  Family may consider a local facility in the future.  However, [facility] is not an option due to patient's insurance.  [facility] is not an option as they no longer have a geripsych unit.    Overall, patient is improved.  She is A&O x 3 today.  She intermittently refuses to eat solid foods.      4/6:  Patient accepted to psych facility in [facility] and patient and her family are agreeable to go to this facility.  Plan for discharge today.       Goals of Care Treatment Preferences:  Code Status: Full Code      Consults:   Consults (From admission, onward)        Status Ordering Provider     Inpatient consult to Social Work  Once        Provider:  (Not yet assigned)    Completed MERCEDES MARKS     Inpatient consult to Gastroenterology  Once        Provider:  SUJATA Farrell MD    Acknowledged MERCEDES MARKS          Neuro  Cognitive impairment  Patient knows her name but does not  know the year nor her location.   This has been a chronic issues and she has been wondering out of the house. She has been evaluated by neurology but they suspect psychiatric illness rather than chronic dementia.   Development of psychiatric illness at her age seems unlikely.  Neurodevelopmental disease appears more likely.          Psychiatric  Depression  Reported history of depression but patient unable taking medications as she states she has difficulty swallowing.  Will have to follow up in further assess this with psychiatrist.        Cardiac/Vascular  Atrial fibrillation  He has not been taking any of her home medications she is in normal sinus rhythm at this time.  Will resume home medications with anticoagulation once patient able swallow and after she has had an MRI.  Increased risk of stroke given history of A fib and off anticoagulation.      Discuss risks and benefits of starting eliquis.          Hyperlipidemia  Will defer/on medication at this time until patient is better able to swallow.      Essential hypertension  Treat with IV medication including hydralazine and clonidine p.r.n..      Renal/  Hypertensive nephrosclerosis        Complex renal cyst        Endocrine  Weight loss  Nutrition consulted. Most recent weight and BMI monitored-     Malnutrition Type and Energy Intake  Malnutrition Type: acute illness or injury  Energy Intake: severe energy intake    Malnutrition (Moderate to Severe)  Weight Loss (Malnutrition): greater than 5% in 1 month  Energy Intake (Malnutrition): less than 75% for greater than or equal to 3 months         Malnutrition Final Summary  Severe Weight Loss (Malnutrition): greater than 7.5% in 3 months    Measurements:  Wt Readings from Last 1 Encounters:   04/05/23 81.8 kg (180 lb 5.4 oz)   Body mass index is 30.95 kg/m².    Recommendations: Recommendation/Intervention: Continue with full liquid diet + Ensure Max Protein. Patient was seen by .  Goals: po intake %  + supplements, weight stabilization, acceptance of supplements    Patient has been screened and assessed by RD. RD will follow patient.      Acquired hypothyroidism  TSH has been checked multiple times in the last year and has been normal.  Continue home meds.        GI  Gastroesophageal reflux disease without esophagitis        Dysphagia  Per family, patient has not eaten solid food in several weeks.   She has not been drinking either.  However, she is drinking cranberry juice in the hospital.      She has had EGD in March 2023 and was unremarkable. She had colonoscopy in 2019 with hemorrhoids and diverticulosis.    Unclear if there is a medical reason for her symptoms versus psychiatry.  She roams out of her family's house stating that her stomach hurting.  She informs her family that walking helps her stomach pain.      CT Ab/Pelvis with oral and IV contrast ordered.   Consider GI consult for further evaluation.      40 pound weight loss associated with dysphagia.  If this does not improve, it could be terminal.      This been no anatomic reason found for dysphagia.  She does have a hiatal hernia but per gastroenterologist this should not cause her to not be able to eat solid food.      Other  * Failure to thrive in adult  Patient has been seen by Neurology recommendation to be evaluated by Psychiatry.    Will continue to further evaluate for cause of failure to thrive and is medically cleared will consider transfer to psychiatric facility and are tele psych evaluation.  Given patient's acute change in mental status a solid so exclude a stroke.     Patient moves all extremities.  She is A&O x 2.  Suspect psychiatric etiology.   Patient may follow-up with neurology and psychiatry as an outpatient.     4/6:  Discharge to [facility] facility today.  Patient wonders outside of the home and she often refuses to eat solid food but there is no anatomic rash now for this per GI.  She has had multiple evaluations by GI  team.      Primary insomnia  Will give p.r.n. IV medications the patient man least have sleep        Final Active Diagnoses:    Diagnosis Date Noted POA    PRINCIPAL PROBLEM:  Failure to thrive in adult [R62.7] 03/29/2023 Yes    Dysphagia [R13.10] 03/06/2023 Yes    Cognitive impairment [R41.89] 04/02/2023 Yes    Hypertensive nephrosclerosis [I12.9] 03/29/2023 Yes    Weight loss [R63.4] 03/06/2023 Yes    Complex renal cyst [N28.1] 03/06/2023 Not Applicable    Acquired hypothyroidism [E03.9] 02/16/2023 Yes    Gastroesophageal reflux disease without esophagitis [K21.9] 02/16/2023 Yes    Primary insomnia [F51.01] 02/16/2023 Yes    Atrial fibrillation [I48.91] 07/02/2021 Yes    Hyperlipidemia [E78.5] 07/02/2021 Yes    Depression [F32.A] 05/27/2021 Yes    Essential hypertension [I10] 05/27/2021 Yes      Problems Resolved During this Admission:       Discharged Condition: fair    Disposition: Home or Self Care    Follow Up:    Patient Instructions:      Activity as tolerated       Significant Diagnostic Studies: Labs: BMP: No results for input(s): GLU, NA, K, CL, CO2, BUN, CREATININE, CALCIUM, MG in the last 48 hours. and CBC No results for input(s): WBC, HGB, HCT, PLT in the last 48 hours.    Pending Diagnostic Studies:     Procedure Component Value Units Date/Time    EXTRA TUBES [[id number]] Collected: 03/30/23 1526    Order Status: Sent Lab Status: In process Updated: 03/30/23 1526    Specimen: Blood, Venous     Narrative:      The following orders were created for panel order EXTRA TUBES.  Procedure                               Abnormality         Status                     ---------                               -----------         ------                     Light Blue Top Hold[[id number]]                              In process                 Light Green Top Hold[[id number]]                             In process                 Lavender Top Hold[[id number]]                                In process                    Please view results for these tests on the individual orders.         Medications:  Reconciled Home Medications:      Medication List      START taking these medications    levothyroxine 50 MCG tablet  Commonly known as: SYNTHROID  Take 1 tablet (50 mcg total) by mouth once daily.     mirtazapine 15 MG tablet  Commonly known as: REMERON  Take 1 tablet (15 mg total) by mouth every evening.     ondansetron 4 MG tablet  Commonly known as: ZOFRAN  Take 1 tablet (4 mg total) by mouth every 6 (six) hours.     pantoprazole 40 MG tablet  Commonly known as: PROTONIX  Take 1 tablet (40 mg total) by mouth once daily.     QUEtiapine 25 MG Tab  Commonly known as: SEROQUEL  Take 1 tablet (25 mg total) by mouth every evening.        CONTINUE taking these medications    memantine 10 MG Tab  Commonly known as: NAMENDA  Take 5 mg by mouth once daily.     traMADoL 50 mg tablet  Commonly known as: ULTRAM  Take 50 mg by mouth 2 (two) times daily as needed.            Indwelling Lines/Drains at time of discharge:   Lines/Drains/Airways     None                 Time spent on the discharge of patient: 46 minutes         Min Santos MD  Department of Hospital Medicine  [facility]

## 2023-04-06 NOTE — ASSESSMENT & PLAN NOTE
Per family, patient has not eaten solid food in several weeks.   She has not been drinking either.  However, she is drinking cranberry juice in the hospital.      She has had EGD in March 2023 and was unremarkable. She had colonoscopy in 2019 with hemorrhoids and diverticulosis.    Unclear if there is a medical reason for her symptoms versus psychiatry.  She roams out of her family's house stating that her stomach hurting.  She informs her family that walking helps her stomach pain.      CT Ab/Pelvis with oral and IV contrast ordered.   Consider GI consult for further evaluation.      40 pound weight loss associated with dysphagia.  If this does not improve, it could be terminal.      This been no anatomic reason found for dysphagia.  She does have a hiatal hernia but per gastroenterologist this should not cause her to not be able to eat solid food.

## 2023-04-06 NOTE — PLAN OF CARE
[facility] - [facility] Telemetry  Discharge Final Note    Primary Care Provider: Eldon Govea MD    Expected Discharge Date: 4/6/2023    Final Discharge Note (most recent)       Final Note - 04/06/23 1142          Final Note    Assessment Type Final Discharge Note     Anticipated Discharge Disposition [facility]iatric Hospital        Post-Acute Status    Post-Acute Authorization Placement     Post-Acute Placement Status Set-up Complete/Auth obtained     Discharge Delays None known at this time                     Important Message from Medicare  Important Message from Medicare regarding Discharge Appeal Rights: Explained to patient/caregiver, Signed/date by patient/caregiver     Date IMM was signed: 04/06/23  Time IMM was signed: 1020    [facility]. Family aware.

## 2023-04-06 NOTE — PLAN OF CARE
[facility] form explained and signed by patient.  Patient is accepted to [facility] for today. Address is [address], [place].  Dr Santos spoke with patients daughter and she agreed for transfer. Packet taken to the nurses station.

## 2023-04-06 NOTE — NURSING
1920-Transfer center called requesting family narrow down a location for Ms. Stewart to potentially be transferred to due to them declining for her to go [facility].     1925- Spoke to Jacque Stewart (Daughter) and she reported that she did not want her mother being transferred outside the city of [place].     1930- Spoke to Gino at the transfer center again and informed her of what the family reported. Transfer center reported that the only facility available in [place] is [facility] and she would not be accepted due to her history of dementia. The transfer center requested to be contacted by the provider to be informed of the next step that should be taken for this patient.     1942- Dr. Santos notified of the situation and provided the name and number to the transfer center.     The number is [phone] and press option 3; spoke with Gino.

## 2023-04-06 NOTE — ASSESSMENT & PLAN NOTE
Patient has been seen by Neurology recommendation to be evaluated by Psychiatry.    Will continue to further evaluate for cause of failure to thrive and is medically cleared will consider transfer to psychiatric facility and are tele psych evaluation.  Given patient's acute change in mental status a solid so exclude a stroke.     Patient moves all extremities.  She is A&O x 2.  Suspect psychiatric etiology.   Patient may follow-up with neurology and psychiatry as an outpatient.     4/6:  Discharge to [facility] facility today.  Patient wonders outside of the home and she often refuses to eat solid food but there is no anatomic rash now for this per GI.  She has had multiple evaluations by GI team.

## 2023-04-06 NOTE — PLAN OF CARE
Notified by [facility] that patient has been denied by [facility]. Patient will dc home with home health. Tried to call patients daughter. No answer. Per Dr Santos patient will dc home today. Faxed updates to [facility]. Notified [facility].    Spoke with nurse Robin about dc and above note of unable to reach patients daughter.

## 2023-04-06 NOTE — PT/OT/SLP PROGRESS
Occupational Therapy   Treatment    Name: Renetta Stewart  MRN: [id number]  Admitting Diagnosis:  Failure to thrive in adult       Recommendations:     Discharge Recommendations: rehabilitation facility, nursing facility, skilled, psychiatric facility  Discharge Equipment Recommendations:   (to be determined)  Barriers to discharge:  None    Assessment:     Renetta Stewart is a 73 y.o. female with a medical diagnosis of Failure to thrive in adult.  She presents with the following performance deficits affecting function are weakness, impaired functional mobility, impaired self care skills, impaired endurance.     Rehab Prognosis:  Good and Fair; patient would benefit from acute skilled OT services to address these deficits and reach maximum level of function.       Plan:     Patient to be seen 5 x/week to address the above listed problems via self-care/home management, therapeutic activities, therapeutic exercises  Plan of Care Expires: 04/14/23  Plan of Care Reviewed with: patient    Subjective     Chief Complaint: failure to thrive- pt states she can't make food or liquids go down  Patient/Family Comments/goals: to be able to eat and drink easily  Pain/Comfort:  Pain Rating 1: 0/10  Pain Rating Post-Intervention 1: 0/10    Objective:     Communicated with: SMILEY Rosenberg prior to session.  Patient found HOB elevated with peripheral IV, telemetry upon OT entry to room.    General Precautions: Standard, fall    Orthopedic Precautions:N/A  Braces: N/A  Respiratory Status: Room air     Occupational Performance:     Bed Mobility:    Patient completed Supine to Sit with stand by assistance     Functional Mobility/Transfers:  Patient completed Sit <> Stand Transfer with contact guard assistance  with  rolling walker and verbal cues   Patient completed Bed <> Chair Transfer using Step Transfer technique with stand by assistance with rolling walker  Functional Mobility: Pt ambulated to elevators and back with RW and  SBA    Activities of Daily Living:  NT      [facility] 6 Click ADL:      Treatment & Education:  Pt performed (B) UE ex up in chair for 2 x 10 reps of each  Shoulder flexion with 1# db  Elbow flexion with 2# db  Punches with 1# db  Horizontal abd/adduction with 1# db    Patient left up in chair with all lines intact and LPN Noni Rosenberg notified    GOALS:   Multidisciplinary Problems       Occupational Therapy Goals          Problem: Occupational Therapy    Goal Priority Disciplines Outcome Interventions   Occupational Therapy Goal     OT, PT/OT Ongoing, Progressing    Description: STG:  Pt will perform grooming with setup  Pt will bathe with min(A)  Pt will perform UE dressing with CGA  Pt will perform LE dressing with min(A)  Pt will sit EOB x 7 min with SBA  during dynamic activity  Pt will transfer bed/chair/bsc with CGA with RW if needed  Pt will perform standing task x 2 min with CGA with RW if needed  Pt will tolerate 20 minutes of tx without fatigue      LT.Restore to max I with self care and mobility.                          Time Tracking:     OT Date of Treatment: 23  OT Start Time: 1407  OT Stop Time: 1425  OT Total Time (min): 18 min    Billable Minutes:Therapeutic Exercise 18 min    OT/OYLANDA: OT          2023

## 2023-04-06 NOTE — PT/OT/SLP PROGRESS
Physical Therapy      Patient Name:  Renetta Stewart   MRN:  [id number]    Patient not seen today secondary to Patient unwilling to participate. Patient declined any and all offers of PT treatment today. Patient will d/c to [facility] later today.

## 2023-04-13 ENCOUNTER — TELEPHONE (OUTPATIENT)
Dept: GASTROENTEROLOGY | Facility: CLINIC | Age: 73
End: 2023-04-13
Payer: MEDICARE

## 2023-04-13 ENCOUNTER — OFFICE VISIT (OUTPATIENT)
Dept: GASTROENTEROLOGY | Facility: CLINIC | Age: 73
End: 2023-04-13
Payer: MEDICARE

## 2023-04-13 VITALS
OXYGEN SATURATION: 97 % | DIASTOLIC BLOOD PRESSURE: 76 MMHG | WEIGHT: 177.81 LBS | HEIGHT: 64 IN | SYSTOLIC BLOOD PRESSURE: 118 MMHG | BODY MASS INDEX: 30.35 KG/M2 | HEART RATE: 115 BPM

## 2023-04-13 DIAGNOSIS — K21.9 GASTROESOPHAGEAL REFLUX DISEASE, UNSPECIFIED WHETHER ESOPHAGITIS PRESENT: Primary | ICD-10-CM

## 2023-04-13 PROCEDURE — 4010F ACE/ARB THERAPY RXD/TAKEN: CPT | Mod: CPTII,,, | Performed by: NURSE PRACTITIONER

## 2023-04-13 PROCEDURE — 3008F PR BODY MASS INDEX (BMI) DOCUMENTED: ICD-10-PCS | Mod: CPTII,,, | Performed by: NURSE PRACTITIONER

## 2023-04-13 PROCEDURE — 3008F BODY MASS INDEX DOCD: CPT | Mod: CPTII,,, | Performed by: NURSE PRACTITIONER

## 2023-04-13 PROCEDURE — 3066F NEPHROPATHY DOC TX: CPT | Mod: CPTII,,, | Performed by: NURSE PRACTITIONER

## 2023-04-13 PROCEDURE — 3066F PR DOCUMENTATION OF TREATMENT FOR NEPHROPATHY: ICD-10-PCS | Mod: CPTII,,, | Performed by: NURSE PRACTITIONER

## 2023-04-13 PROCEDURE — 99214 PR OFFICE/OUTPT VISIT, EST, LEVL IV, 30-39 MIN: ICD-10-PCS | Mod: S$PBB,,, | Performed by: NURSE PRACTITIONER

## 2023-04-13 PROCEDURE — 1159F PR MEDICATION LIST DOCUMENTED IN MEDICAL RECORD: ICD-10-PCS | Mod: CPTII,,, | Performed by: NURSE PRACTITIONER

## 2023-04-13 PROCEDURE — 1111F DSCHRG MED/CURRENT MED MERGE: CPT | Mod: CPTII,,, | Performed by: NURSE PRACTITIONER

## 2023-04-13 PROCEDURE — 1111F PR DISCHARGE MEDS RECONCILED W/ CURRENT OUTPATIENT MED LIST: ICD-10-PCS | Mod: CPTII,,, | Performed by: NURSE PRACTITIONER

## 2023-04-13 PROCEDURE — 4010F PR ACE/ARB THEARPY RXD/TAKEN: ICD-10-PCS | Mod: CPTII,,, | Performed by: NURSE PRACTITIONER

## 2023-04-13 PROCEDURE — 1159F MED LIST DOCD IN RCRD: CPT | Mod: CPTII,,, | Performed by: NURSE PRACTITIONER

## 2023-04-13 PROCEDURE — 99214 OFFICE O/P EST MOD 30 MIN: CPT | Mod: PBBFAC | Performed by: NURSE PRACTITIONER

## 2023-04-13 PROCEDURE — 3074F PR MOST RECENT SYSTOLIC BLOOD PRESSURE < 130 MM HG: ICD-10-PCS | Mod: CPTII,,, | Performed by: NURSE PRACTITIONER

## 2023-04-13 PROCEDURE — 3074F SYST BP LT 130 MM HG: CPT | Mod: CPTII,,, | Performed by: NURSE PRACTITIONER

## 2023-04-13 PROCEDURE — 3078F DIAST BP <80 MM HG: CPT | Mod: CPTII,,, | Performed by: NURSE PRACTITIONER

## 2023-04-13 PROCEDURE — 3078F PR MOST RECENT DIASTOLIC BLOOD PRESSURE < 80 MM HG: ICD-10-PCS | Mod: CPTII,,, | Performed by: NURSE PRACTITIONER

## 2023-04-13 PROCEDURE — 99214 OFFICE O/P EST MOD 30 MIN: CPT | Mod: S$PBB,,, | Performed by: NURSE PRACTITIONER

## 2023-04-13 NOTE — TELEPHONE ENCOUNTER
Attempted to call both numbers listed on chart to confirm patients office appointment for today at 2:15pm. Daughter's number did not have voicemail to leave a message and the other home number states is unreachable.

## 2023-04-13 NOTE — PROGRESS NOTES
"    Renetta Stewart is a 73 y.o. female here for Follow-up        PCP: Eldon Govea  Referring Provider: No referring provider defined for this encounter.     HPI:  Presents for follow-up after hospitalization.  Patient was discharged from the hospital on 04/06.  She was admitted with cognitive impairment and failure to thrive.  Her daughter states that she was unable to get a bed at the [facility] unit in [place].  Daughter reports that the patient lays in bed a lot during the day and is refusing to take her medication. Eats very little at a time.  Is drinking nutritional supplements. CT abdomen and pelvis on 4/2 did not show any acute process. She did have an EGD/dilation on 03/11/2023, 5 cm hernia, negative for H pylori.  Esophagram was also completed on 04/03 that did show a 6 cm sliding hernia, reflux, and mild dysmotility.  The patient states today that she can "feel" her food going down and does endorse some dysphagia.  She is scheduled to see primary care in follow-up.  I did discuss with the patient and the daughter that she does need to take her medication as directed and also that they should consider further [facility] evaluation if possible.    Follow-up  Pertinent negatives include no abdominal pain, change in bowel habit, chest pain, fatigue, fever, nausea or vomiting.       ROS:  Review of Systems   Constitutional:  Positive for appetite change. Negative for fatigue, fever and unexpected weight change.   HENT:  Positive for trouble swallowing.    Cardiovascular:  Negative for chest pain.   Gastrointestinal:  Positive for reflux. Negative for abdominal pain, blood in stool, change in bowel habit, constipation, diarrhea, nausea, vomiting and change in bowel habit.   Musculoskeletal:  Negative for gait problem.   Integumentary:  Negative for pallor.   Neurological:  Negative for light-headedness.   Psychiatric/Behavioral:  The patient is not nervous/anxious.         PMHX:  has a past medical " "history of Anemia, Anxiety, Dementia, Depression, GERD (gastroesophageal reflux disease), Hyperlipidemia, and Hypertension.    PSHX:  has no past surgical history on file.    PFHX: family history includes Hypertension in her father.    PSlHX:  reports that she has never smoked. She has never been exposed to tobacco smoke. She has never used smokeless tobacco. She reports that she does not currently use alcohol. She reports that she does not use drugs.        Review of patient's allergies indicates:  No Known Allergies    Medication List with Changes/Refills   Current Medications    LEVOTHYROXINE (SYNTHROID) 50 MCG TABLET    Take 1 tablet (50 mcg total) by mouth once daily.    MEMANTINE (NAMENDA) 10 MG TAB    Take 5 mg by mouth once daily.    MIRTAZAPINE (REMERON) 15 MG TABLET    Take 1 tablet (15 mg total) by mouth every evening.    ONDANSETRON (ZOFRAN) 4 MG TABLET    Take 1 tablet (4 mg total) by mouth every 6 (six) hours.    PANTOPRAZOLE (PROTONIX) 40 MG TABLET    Take 1 tablet (40 mg total) by mouth once daily.    QUETIAPINE (SEROQUEL) 25 MG TAB    Take 1 tablet (25 mg total) by mouth every evening.    TRAMADOL (ULTRAM) 50 MG TABLET    Take 50 mg by mouth 2 (two) times daily as needed.        Objective Findings:  Vital Signs:  /76   Pulse (!) 115   Ht 5' 4" (1.626 m)   Wt 80.6 kg (177 lb 12.8 oz)   SpO2 97%   BMI 30.52 kg/m²  Body mass index is 30.52 kg/m².    Physical Exam:  Physical Exam  Vitals and nursing note reviewed.   Constitutional:       General: She is not in acute distress.     Appearance: Normal appearance.   HENT:      Mouth/Throat:      Mouth: Mucous membranes are moist.   Cardiovascular:      Rate and Rhythm: Tachycardia present.   Pulmonary:      Effort: Pulmonary effort is normal.      Breath sounds: No wheezing, rhonchi or rales.   Abdominal:      General: Bowel sounds are normal. There is no distension.      Palpations: Abdomen is soft. There is no mass.      Tenderness: There is no " abdominal tenderness. There is no guarding.   Skin:     General: Skin is warm and dry.      Coloration: Skin is not jaundiced or pale.   Neurological:      Mental Status: She is alert and oriented to person, place, and time.   Psychiatric:         Mood and Affect: Mood normal.        Labs:  Lab Results   Component Value Date    WBC 3.77 (L) 04/02/2023    HGB 11.3 (L) 04/02/2023    HCT 37.7 (L) 04/02/2023    MCV 84.9 04/02/2023    RDW 16.4 (H) 04/02/2023     04/02/2023    LYMPH 34.5 04/02/2023    LYMPH 1.30 04/02/2023    MONO 11.7 (H) 04/02/2023    EOS 0.11 04/02/2023    BASO 0.03 04/02/2023     Lab Results   Component Value Date     (H) 04/02/2023    K 3.8 04/02/2023     (H) 04/02/2023    CO2 27 04/02/2023    GLU 95 04/02/2023    BUN 27 (H) 04/02/2023    CREATININE 1.32 (H) 04/02/2023    CALCIUM 9.2 04/02/2023    PROT 7.1 03/30/2023    ALBUMIN 3.7 03/30/2023    BILITOT 0.8 03/30/2023    ALKPHOS 43 (L) 03/30/2023    AST 16 03/30/2023    ALT 15 03/30/2023         Imaging: CT Head Without Contrast    Result Date: 3/30/2023  EXAMINATION: CT HEAD WITHOUT CONTRAST CLINICAL HISTORY: Mental status change, unknown cause; TECHNIQUE: CT of the head performed without the use of intravenous contrast.  The CT examination was performed using one or more of the following dose reduction techniques: Automated exposure control, adjustment of the mA and kV according to patient's size, use of acute or iterative reconstruction techniques. COMPARISON: 08/25/2022 FINDINGS: Again seen are areas of chronic infarct of the basal ganglia, thalami, and cerebellum similar to prior.  No acute large vessel infarct identified.  Chronic microvascular ischemic changes.  No acute hemorrhage.  Vascular calcifications.  No midline shift.  No herniation.  Calvarium intact     Chronic microvascular ischemia and chronic areas of small infarct similar to prior.  No acute intracranial abnormality identified. Electronically signed by: Swapnil  Ray Date:    03/30/2023 Time:    15:36    CT Abdomen Pelvis With Contrast    Result Date: 4/2/2023  EXAMINATION: CT ABDOMEN PELVIS WITH CONTRAST CLINICAL HISTORY: Nausea/vomiting;Epigastric pain; TECHNIQUE: Axial CT imaging of the abdomen and pelvis is performed with intravenous contrast. Contrast dose is 100 cc Isovue 370. CT dose reduction technique used - Dose Rite and tube current modulation. COMPARISON: 2 March 2023 FINDINGS: Cardiac and lung bases are within normal limits.  Moderate size hiatal hernia present similar to previous. CT abdomen: The liver, spleen, pancreas and adrenal glands are normal in size and enhancement.  No evidence of focal lesion is demonstrated in these solid organs. Renal cyst present similar to previous study.  Otherwise kidneys are normal in size and enhancement.  No evidence of hydronephrosis or nephrolithiasis is seen. The bowel caliber is normal and no wall thickening or adjacent inflammatory change is seen.  No evidence of free fluid or free air is present.  Appendix appears normal. CT pelvis: The pelvic bowel appears within normal limits.  Bladder shows no evidence of abnormality.  Uterine calcifications present similar to previous.  Otherwise the pelvic organs show no evidence of abnormality.     No acute process. Electronically signed by: Te Elias Date:    04/02/2023 Time:    18:54    Fl Modified Barium Swallow Speech    Result Date: 3/31/2023  See Physician's Notes for results. IMPRESSION: See Physician's Notes for results This procedure was auto-finalized  See Physicians Notes for your results.    FL Esophagram Complete    Result Date: 4/3/2023  EXAMINATION: Double-contrast esophagram CLINICAL HISTORY: Dysphagia.  Regurgitation. COMPARISON: No previous similar TECHNIQUE: The patient ingested barium and effervescent crystals under intermittent fluoroscopy.  Thirty-one images are archived.  Sterling MURPHY performed the exam under the supervision of this radiologist.  FINDINGS: Barium passes through the distensible esophagus without mass lesion or gross mucosal irregularity.  Some occasional tertiary esophageal contractions are noted.  There is a 6 cm sliding hiatal hernia.  Mild gastroesophageal reflux to the midthoracic esophagus level occurred during the exam. Left subclavian pacemaker device is noted.     6 cm sliding-type hiatal hernia Mild gastroesophageal reflux Mild esophageal dysmotility Electronically signed by: Aamir Farrell Date:    04/03/2023 Time:    12:32    X-Ray Chest AP Portable    Result Date: 3/30/2023  EXAMINATION: XR CHEST AP PORTABLE CLINICAL HISTORY: Altered mental status, unspecified TECHNIQUE: XR CHEST AP PORTABLE COMPARISON: 1/12/23 FINDINGS: No lines or tubes. Lungs are clear. Normal pleura. Cardiac silhouette is similar to comparison exam. No obvious acute bone findings.     No acute pulmonary disease Electronically signed by: Fuad Hernandez Date:    03/30/2023 Time:    14:41    Echo    Result Date: 4/1/2023  · The left ventricle is normal in size with severe concentric hypertrophy and hyperdynamic systolic function. · The estimated ejection fraction is 70%. · Mild tricuspid regurgitation. · Left ventricular mild outflow tract obstruction is present. · Normal right ventricular size with normal right ventricular systolic function. · Normal central venous pressure (3 mmHg). · The estimated PA systolic pressure is 43 mmHg. · ECHO is suggestive of hypertensive heart disease vs. hypertrophic cardiomyopathy          Assessment:  Renetta Stewart is a 73 y.o. female here with:  1. Gastroesophageal reflux disease, unspecified whether esophagitis present          Recommendations:  1. Encouraged patient to take medication as directed  2. Nutritional supplements such as Boost or Ensure  3.Keep appointment with primary care, consider further [facility] evaluation  4. Increase water intake to 64 ounces daily  5. Room temperature liquids when eating  6. Advised  patient and daughter that if she does not take in liquids or food that she will have to go to ER for IV hydration  Approximately 30 minutes was spent with the patient and daughter and in review of inpatient record    Follow up in about 3 months (around 7/13/2023).      Order summary:       Thank you for allowing me to participate in the care of Renetta Stewart.      BRYANT Lei

## 2023-04-14 ENCOUNTER — OFFICE VISIT (OUTPATIENT)
Dept: FAMILY MEDICINE | Facility: CLINIC | Age: 73
End: 2023-04-14
Payer: MEDICARE

## 2023-04-14 ENCOUNTER — HOSPITAL ENCOUNTER (OUTPATIENT)
Dept: CARDIOLOGY | Facility: HOSPITAL | Age: 73
Discharge: HOME | End: 2023-04-14
Attending: INTERNAL MEDICINE
Payer: MEDICARE

## 2023-04-14 VITALS
HEIGHT: 64 IN | RESPIRATION RATE: 18 BRPM | SYSTOLIC BLOOD PRESSURE: 136 MMHG | BODY MASS INDEX: 30.05 KG/M2 | HEART RATE: 87 BPM | DIASTOLIC BLOOD PRESSURE: 88 MMHG | WEIGHT: 176 LBS | TEMPERATURE: 98 F | OXYGEN SATURATION: 97 %

## 2023-04-14 DIAGNOSIS — Z95.0 PRESENCE OF CARDIAC PACEMAKER: ICD-10-CM

## 2023-04-14 DIAGNOSIS — Z95.0 PRESENCE OF CARDIAC PACEMAKER: Primary | ICD-10-CM

## 2023-04-14 DIAGNOSIS — R62.7 FAILURE TO THRIVE IN ADULT: ICD-10-CM

## 2023-04-14 DIAGNOSIS — R41.89 COGNITIVE IMPAIRMENT: ICD-10-CM

## 2023-04-14 DIAGNOSIS — Z09 HOSPITAL DISCHARGE FOLLOW-UP: Primary | ICD-10-CM

## 2023-04-14 PROCEDURE — 3079F PR MOST RECENT DIASTOLIC BLOOD PRESSURE 80-89 MM HG: ICD-10-PCS | Mod: ,,, | Performed by: NURSE PRACTITIONER

## 2023-04-14 PROCEDURE — 4010F ACE/ARB THERAPY RXD/TAKEN: CPT | Mod: ,,, | Performed by: NURSE PRACTITIONER

## 2023-04-14 PROCEDURE — 1160F RVW MEDS BY RX/DR IN RCRD: CPT | Mod: ,,, | Performed by: NURSE PRACTITIONER

## 2023-04-14 PROCEDURE — 3066F PR DOCUMENTATION OF TREATMENT FOR NEPHROPATHY: ICD-10-PCS | Mod: ,,, | Performed by: NURSE PRACTITIONER

## 2023-04-14 PROCEDURE — 1101F PR PT FALLS ASSESS DOC 0-1 FALLS W/OUT INJ PAST YR: ICD-10-PCS | Mod: ,,, | Performed by: NURSE PRACTITIONER

## 2023-04-14 PROCEDURE — 1160F PR REVIEW ALL MEDS BY PRESCRIBER/CLIN PHARMACIST DOCUMENTED: ICD-10-PCS | Mod: ,,, | Performed by: NURSE PRACTITIONER

## 2023-04-14 PROCEDURE — 1126F PR PAIN SEVERITY QUANTIFIED, NO PAIN PRESENT: ICD-10-PCS | Mod: ,,, | Performed by: NURSE PRACTITIONER

## 2023-04-14 PROCEDURE — 3288F FALL RISK ASSESSMENT DOCD: CPT | Mod: ,,, | Performed by: NURSE PRACTITIONER

## 2023-04-14 PROCEDURE — 3066F NEPHROPATHY DOC TX: CPT | Mod: ,,, | Performed by: NURSE PRACTITIONER

## 2023-04-14 PROCEDURE — 1159F MED LIST DOCD IN RCRD: CPT | Mod: ,,, | Performed by: NURSE PRACTITIONER

## 2023-04-14 PROCEDURE — 4010F PR ACE/ARB THEARPY RXD/TAKEN: ICD-10-PCS | Mod: ,,, | Performed by: NURSE PRACTITIONER

## 2023-04-14 PROCEDURE — 3075F PR MOST RECENT SYSTOLIC BLOOD PRESS GE 130-139MM HG: ICD-10-PCS | Mod: ,,, | Performed by: NURSE PRACTITIONER

## 2023-04-14 PROCEDURE — 93294 CARDIAC DEVICE CHECK - REMOTE: ICD-10-PCS | Mod: ,,, | Performed by: INTERNAL MEDICINE

## 2023-04-14 PROCEDURE — 1159F PR MEDICATION LIST DOCUMENTED IN MEDICAL RECORD: ICD-10-PCS | Mod: ,,, | Performed by: NURSE PRACTITIONER

## 2023-04-14 PROCEDURE — 3008F PR BODY MASS INDEX (BMI) DOCUMENTED: ICD-10-PCS | Mod: ,,, | Performed by: NURSE PRACTITIONER

## 2023-04-14 PROCEDURE — 93294 REM INTERROG EVL PM/LDLS PM: CPT | Mod: ,,, | Performed by: INTERNAL MEDICINE

## 2023-04-14 PROCEDURE — 1111F DSCHRG MED/CURRENT MED MERGE: CPT | Mod: ,,, | Performed by: NURSE PRACTITIONER

## 2023-04-14 PROCEDURE — 3008F BODY MASS INDEX DOCD: CPT | Mod: ,,, | Performed by: NURSE PRACTITIONER

## 2023-04-14 PROCEDURE — 3075F SYST BP GE 130 - 139MM HG: CPT | Mod: ,,, | Performed by: NURSE PRACTITIONER

## 2023-04-14 PROCEDURE — 3288F PR FALLS RISK ASSESSMENT DOCUMENTED: ICD-10-PCS | Mod: ,,, | Performed by: NURSE PRACTITIONER

## 2023-04-14 PROCEDURE — 1111F PR DISCHARGE MEDS RECONCILED W/ CURRENT OUTPATIENT MED LIST: ICD-10-PCS | Mod: ,,, | Performed by: NURSE PRACTITIONER

## 2023-04-14 PROCEDURE — 99212 OFFICE O/P EST SF 10 MIN: CPT | Mod: ,,, | Performed by: NURSE PRACTITIONER

## 2023-04-14 PROCEDURE — 3079F DIAST BP 80-89 MM HG: CPT | Mod: ,,, | Performed by: NURSE PRACTITIONER

## 2023-04-14 PROCEDURE — 1101F PT FALLS ASSESS-DOCD LE1/YR: CPT | Mod: ,,, | Performed by: NURSE PRACTITIONER

## 2023-04-14 PROCEDURE — 1126F AMNT PAIN NOTED NONE PRSNT: CPT | Mod: ,,, | Performed by: NURSE PRACTITIONER

## 2023-04-14 PROCEDURE — 93296 REM INTERROG EVL PM/IDS: CPT

## 2023-04-14 PROCEDURE — 99212 PR OFFICE/OUTPT VISIT, EST, LEVL II, 10-19 MIN: ICD-10-PCS | Mod: ,,, | Performed by: NURSE PRACTITIONER

## 2023-04-14 NOTE — PROGRESS NOTES
SULEMA Sun        PATIENT NAME: Renetta Stewart  : 1950  DATE: 23  MRN: [id number]      Billing Provider: SULEMA Sun  Level of Service:   Patient PCP Information       Provider PCP Type    Eldon Govea MD General            Reason for Visit / Chief Complaint: Hospital Follow Up (Was seen in hospital 3/30/23. Pt is still not eating/drinking. Daughter (caretaker) would like help to have pt admitted to an assisted living facility//Care gaps: pt defers mammo and dexa at this time)         History of Present Illness / Problem Focused Workflow     Renetta Stewart presents to the clinic with Hospital Follow Up (Was seen in hospital 3/30/23. Pt is still not eating/drinking. Daughter (caretaker) would like help to have pt admitted to an assisted living facility//Care gaps: pt defers mammo and dexa at this time)     Ms. Stewart presents to clinic in hospital follow up, the daughter is trying to find long term care placement. Ms. Stewart's daughter reports that she is not eating and drinking at home, she is not taking any medications, and is not sleeping at night. The patient refuses to take her medications, she states she feels burning inside her stomach and chest. Hospital records reviewed and discharge medications reconciled but the patient will not take meds.      Review of Systems     Review of Systems   Constitutional: Negative.    Respiratory: Negative.     Gastrointestinal:  Positive for abdominal pain.   Genitourinary: Negative.    Musculoskeletal: Negative.    Neurological: Negative.    Psychiatric/Behavioral:  Positive for behavioral problems, confusion, dysphoric mood and sleep disturbance. Negative for agitation, self-injury and suicidal ideas. The patient is not nervous/anxious.       Medical / Social / Family History     Past Medical History:   Diagnosis Date    Anemia     Anxiety     Dementia     Depression     GERD (gastroesophageal reflux disease)     Hyperlipidemia      Hypertension        History reviewed. No pertinent surgical history.    Social History  Ms. Renetta Stewart   reports that she has never smoked. She has never been exposed to tobacco smoke. She has never used smokeless tobacco. She reports that she does not currently use alcohol. She reports that she does not use drugs.    Family History  Ms.'ryan Stewart  family history includes Hypertension in her father.    Medications and Allergies     Medications  Outpatient Medications Marked as Taking for the 4/14/23 encounter (Office Visit) with SULEMA Sun   Medication Sig Dispense Refill    levothyroxine (SYNTHROID) 50 MCG tablet Take 1 tablet (50 mcg total) by mouth once daily. 90 tablet 1    memantine (NAMENDA) 10 MG Tab Take 5 mg by mouth once daily.      mirtazapine (REMERON) 15 MG tablet Take 1 tablet (15 mg total) by mouth every evening. 30 tablet 1    ondansetron (ZOFRAN) 4 MG tablet Take 1 tablet (4 mg total) by mouth every 6 (six) hours. 12 tablet 0    pantoprazole (PROTONIX) 40 MG tablet Take 1 tablet (40 mg total) by mouth once daily. 90 tablet 1    QUEtiapine (SEROQUEL) 25 MG Tab Take 1 tablet (25 mg total) by mouth every evening. 90 tablet 3    traMADoL (ULTRAM) 50 mg tablet Take 50 mg by mouth 2 (two) times daily as needed.         Allergies  Review of patient's allergies indicates:  No Known Allergies      Vitals:    04/14/23 1519   BP: 136/88   Pulse: 87   Resp: 18   Temp: 98.3 °F (36.8 °C)     Physical Exam  Vitals and nursing note reviewed.   Constitutional:       General: She is not in acute distress.     Appearance: Normal appearance. She is not ill-appearing.   HENT:      Head: Normocephalic.   Cardiovascular:      Rate and Rhythm: Normal rate and regular rhythm.      Heart sounds: Normal heart sounds.   Pulmonary:      Effort: Pulmonary effort is normal.      Breath sounds: Normal breath sounds.   Abdominal:      General: Bowel sounds are normal.      Palpations: Abdomen is soft.   Skin:      General: Skin is warm and dry.   Neurological:      Mental Status: She is alert. Mental status is at baseline.      Comments: Oriented to self   Psychiatric:         Behavior: Behavior normal.          Lab Results   Component Value Date    WBC 3.77 (L) 04/02/2023    HGB 11.3 (L) 04/02/2023    HCT 37.7 (L) 04/02/2023    MCV 84.9 04/02/2023     04/02/2023          Sodium   Date Value Ref Range Status   04/02/2023 150 (H) 136 - 145 mmol/L Final     Potassium   Date Value Ref Range Status   04/02/2023 3.8 3.5 - 5.1 mmol/L Final     Chloride   Date Value Ref Range Status   04/02/2023 115 (H) 98 - 107 mmol/L Final     CO2   Date Value Ref Range Status   04/02/2023 27 21 - 32 mmol/L Final     Glucose   Date Value Ref Range Status   04/02/2023 95 74 - 106 mg/dL Final     BUN   Date Value Ref Range Status   04/02/2023 27 (H) 7 - 18 mg/dL Final     Creatinine   Date Value Ref Range Status   04/02/2023 1.32 (H) 0.55 - 1.02 mg/dL Final     Calcium   Date Value Ref Range Status   04/02/2023 9.2 8.5 - 10.1 mg/dL Final     Total Protein   Date Value Ref Range Status   03/30/2023 7.1 6.4 - 8.2 g/dL Final     Albumin   Date Value Ref Range Status   03/30/2023 3.7 3.5 - 5.0 g/dL Final     Bilirubin, Total   Date Value Ref Range Status   03/30/2023 0.8 >0.0 - 1.2 mg/dL Final     Alk Phos   Date Value Ref Range Status   03/30/2023 43 (L) 55 - 142 U/L Final     AST   Date Value Ref Range Status   03/30/2023 16 15 - 37 U/L Final     ALT   Date Value Ref Range Status   03/30/2023 15 13 - 56 U/L Final     Anion Gap   Date Value Ref Range Status   04/02/2023 12 7 - 16 mmol/L Final     eGFR   Date Value Ref Range Status   04/02/2023 43 (L) >=60 mL/min/1.73m² Final        Lab Results   Component Value Date    CHOL 131 02/14/2022    CHOL 133 06/08/2021    CHOL 163 10/09/2020     Lab Results   Component Value Date    HDL 78 (H) 02/14/2022    HDL 74 (H) 06/08/2021    HDL 90 10/09/2020     Lab Results   Component Value Date    LDLCALC 37  02/14/2022    LDLCALC 47 06/08/2021    LDLCALC 67 10/09/2020     Lab Results   Component Value Date    TRIG 78 02/14/2022    TRIG 61 06/08/2021    TRIG 31 10/09/2020     Lab Results   Component Value Date    CHOLHDL 1.7 02/14/2022    CHOLHDL 1.8 06/08/2021    CHOLHDL 1.8 10/09/2020        No results found for: LABA1C, HGBA1C     Lab Results   Component Value Date    TSH 3.270 03/15/2023    FREET4 1.44 03/15/2023        No results found for: PSA, PSATOTAL, PSAFREE, PSAFREEPCT       Health Maintenance Due   Topic Date Due    Hepatitis C Screening  Never done    Mammogram  Never done    DEXA Scan  Never done       Problem List Items Addressed This Visit          Neuro    Cognitive impairment       Other    Failure to thrive in adult     Other Visit Diagnoses       Hospital discharge follow-up    -  Primary          The patient's daughter reports the patient will not take medications, will not eat or drink. We contacted [facility] and were told the patient has to be court appointed.   We also contacted [facility] unit, will forward this note and they will contact the patient's daughter. The daughter has contacted some local nursing homes as well.  Home Health is scheduled to see her tomorrow which may be helpful for  and psych nurse.      Health Maintenance Topics with due status: Not Due       Topic Last Completion Date    Colorectal Cancer Screening 06/18/2019    Lipid Panel 02/14/2022       Future Appointments   Date Time Provider Department Center   5/8/2023  1:30 PM Eldon Govea MD [facility]   7/13/2023  1:15 PM SULEMA Rivera RAS [facility]   10/2/2023  1:20 PM Loretta Chatman DO OB NEPH [facility]   10/17/2023  1:00 PM AWV NURSE, [facility] FAMILY [facility]IC[facility]        There are no Patient Instructions on file for this visit.  No follow-ups on file.       Date of encounter: 4/14/23

## 2023-05-03 ENCOUNTER — EXTERNAL HOME HEALTH (OUTPATIENT)
Dept: HOME HEALTH SERVICES | Facility: HOSPITAL | Age: 73
End: 2023-05-03

## 2023-05-11 RX ORDER — TRAZODONE HYDROCHLORIDE 50 MG/1
TABLET ORAL
Qty: 30 TABLET | Refills: 0 | Status: SHIPPED | OUTPATIENT
Start: 2023-05-11 | End: 2023-06-13

## 2023-05-19 ENCOUNTER — HOSPITAL ENCOUNTER (EMERGENCY)
Facility: HOSPITAL | Age: 73
Discharge: HOME | End: 2023-05-20
Payer: MEDICARE

## 2023-05-19 DIAGNOSIS — R56.9 SEIZURE-LIKE ACTIVITY: Primary | ICD-10-CM

## 2023-05-19 DIAGNOSIS — E87.6 HYPOKALEMIA: ICD-10-CM

## 2023-05-19 DIAGNOSIS — R55 SYNCOPE: ICD-10-CM

## 2023-05-19 DIAGNOSIS — E83.42 HYPOMAGNESEMIA: ICD-10-CM

## 2023-05-19 LAB
ALBUMIN SERPL BCP-MCNC: 2.8 G/DL (ref 3.5–5)
ALBUMIN/GLOB SERPL: 0.8 {RATIO}
ALP SERPL-CCNC: 38 U/L (ref 55–142)
ALT SERPL W P-5'-P-CCNC: 18 U/L (ref 13–56)
ANION GAP SERPL CALCULATED.3IONS-SCNC: 14 MMOL/L (ref 7–16)
ANISOCYTOSIS BLD QL SMEAR: ABNORMAL
AST SERPL W P-5'-P-CCNC: 12 U/L (ref 15–37)
BASOPHILS # BLD AUTO: 0.02 K/UL (ref 0–0.2)
BASOPHILS NFR BLD AUTO: 0.5 % (ref 0–1)
BILIRUB SERPL-MCNC: 0.8 MG/DL (ref ?–1.2)
BILIRUB UR QL STRIP: 0.5
BUN SERPL-MCNC: 16 MG/DL (ref 7–18)
BUN/CREAT SERPL: 15 (ref 6–20)
CALCIUM SERPL-MCNC: 8.4 MG/DL (ref 8.5–10.1)
CHLORIDE SERPL-SCNC: 101 MMOL/L (ref 98–107)
CLARITY UR: ABNORMAL
CO2 SERPL-SCNC: 24 MMOL/L (ref 21–32)
COLOR UR: YELLOW
CREAT SERPL-MCNC: 1.06 MG/DL (ref 0.55–1.02)
CRENATED CELLS: ABNORMAL
DIFFERENTIAL METHOD BLD: ABNORMAL
EGFR (NO RACE VARIABLE) (RUSH/TITUS): 56 ML/MIN/1.73M2
EOSINOPHIL # BLD AUTO: 0.02 K/UL (ref 0–0.5)
EOSINOPHIL NFR BLD AUTO: 0.5 % (ref 1–4)
ERYTHROCYTE [DISTWIDTH] IN BLOOD BY AUTOMATED COUNT: 15.5 % (ref 11.5–14.5)
GLOBULIN SER-MCNC: 3.3 G/DL (ref 2–4)
GLUCOSE SERPL-MCNC: 126 MG/DL (ref 74–106)
GLUCOSE UR STRIP-MCNC: 30 MG/DL
HCT VFR BLD AUTO: 33.3 % (ref 38–47)
HGB BLD-MCNC: 10.4 G/DL (ref 12–16)
HOLD SPECIMEN: NORMAL
IMM GRANULOCYTES # BLD AUTO: 0.04 K/UL (ref 0–0.04)
IMM GRANULOCYTES NFR BLD: 0.9 % (ref 0–0.4)
KETONES UR STRIP-SCNC: 60 MG/DL
LEUKOCYTE ESTERASE UR QL STRIP: NEGATIVE
LYMPHOCYTES # BLD AUTO: 0.49 K/UL (ref 1–4.8)
LYMPHOCYTES NFR BLD AUTO: 11.4 % (ref 27–41)
LYMPHOCYTES NFR BLD MANUAL: 10 % (ref 27–41)
MAGNESIUM SERPL-MCNC: 1.3 MG/DL (ref 1.7–2.3)
MCH RBC QN AUTO: 26.9 PG (ref 27–31)
MCHC RBC AUTO-ENTMCNC: 31.2 G/DL (ref 32–36)
MCV RBC AUTO: 86.3 FL (ref 80–96)
MONOCYTES # BLD AUTO: 0.31 K/UL (ref 0–0.8)
MONOCYTES NFR BLD AUTO: 7.2 % (ref 2–6)
MONOCYTES NFR BLD MANUAL: 8 % (ref 2–6)
MPC BLD CALC-MCNC: 11.6 FL (ref 9.4–12.4)
NEUTROPHILS # BLD AUTO: 3.4 K/UL (ref 1.8–7.7)
NEUTROPHILS NFR BLD AUTO: 79.5 % (ref 53–65)
NEUTS SEG NFR BLD MANUAL: 82 % (ref 50–62)
NITRITE UR QL STRIP: NEGATIVE
NRBC # BLD AUTO: 0 X10E3/UL
NRBC, AUTO (.00): 0 %
PH UR STRIP: 6 PH UNITS
PLATELET # BLD AUTO: 230 K/UL (ref 150–400)
PLATELET MORPHOLOGY: NORMAL
POTASSIUM SERPL-SCNC: 2.8 MMOL/L (ref 3.5–5.1)
PROT SERPL-MCNC: 6.1 G/DL (ref 6.4–8.2)
PROT UR QL STRIP: 70
RBC # BLD AUTO: 3.86 M/UL (ref 4.2–5.4)
RBC # UR STRIP: NEGATIVE /UL
SODIUM SERPL-SCNC: 136 MMOL/L (ref 136–145)
SP GR UR STRIP: 1.02
UROBILINOGEN UR STRIP-ACNC: 3 MG/DL
WBC # BLD AUTO: 4.28 K/UL (ref 4.5–11)

## 2023-05-19 PROCEDURE — 81003 URINALYSIS AUTO W/O SCOPE: CPT | Performed by: NURSE PRACTITIONER

## 2023-05-19 PROCEDURE — 96368 THER/DIAG CONCURRENT INF: CPT

## 2023-05-19 PROCEDURE — 25000003 PHARM REV CODE 250: Performed by: NURSE PRACTITIONER

## 2023-05-19 PROCEDURE — 99285 EMERGENCY DEPT VISIT HI MDM: CPT | Mod: ,,, | Performed by: NURSE PRACTITIONER

## 2023-05-19 PROCEDURE — 99285 EMERGENCY DEPT VISIT HI MDM: CPT | Mod: 25

## 2023-05-19 PROCEDURE — 63600175 PHARM REV CODE 636 W HCPCS: Performed by: NURSE PRACTITIONER

## 2023-05-19 PROCEDURE — 83735 ASSAY OF MAGNESIUM: CPT | Performed by: NURSE PRACTITIONER

## 2023-05-19 PROCEDURE — 99285 PR EMERGENCY DEPT VISIT,LEVEL V: ICD-10-PCS | Mod: ,,, | Performed by: NURSE PRACTITIONER

## 2023-05-19 PROCEDURE — 96375 TX/PRO/DX INJ NEW DRUG ADDON: CPT

## 2023-05-19 PROCEDURE — 96365 THER/PROPH/DIAG IV INF INIT: CPT

## 2023-05-19 PROCEDURE — 80053 COMPREHEN METABOLIC PANEL: CPT | Performed by: NURSE PRACTITIONER

## 2023-05-19 PROCEDURE — 85025 COMPLETE CBC W/AUTO DIFF WBC: CPT | Performed by: NURSE PRACTITIONER

## 2023-05-19 RX ORDER — SODIUM CHLORIDE 9 MG/ML
1000 INJECTION, SOLUTION INTRAVENOUS
Status: COMPLETED | OUTPATIENT
Start: 2023-05-19 | End: 2023-05-20

## 2023-05-19 RX ORDER — LEVETIRACETAM 500 MG/5ML
1000 INJECTION, SOLUTION, CONCENTRATE INTRAVENOUS
Status: COMPLETED | OUTPATIENT
Start: 2023-05-19 | End: 2023-05-19

## 2023-05-19 RX ORDER — POTASSIUM CHLORIDE 750 MG/1
10 TABLET, EXTENDED RELEASE ORAL DAILY
Qty: 7 TABLET | Refills: 0 | Status: SHIPPED | OUTPATIENT
Start: 2023-05-19 | End: 2023-06-13

## 2023-05-19 RX ORDER — CALCIUM CARBONATE 300MG(750)
1 TABLET,CHEWABLE ORAL DAILY
Qty: 7 TABLET | Refills: 0 | Status: SHIPPED | OUTPATIENT
Start: 2023-05-19 | End: 2023-05-26

## 2023-05-19 RX ORDER — LEVETIRACETAM 500 MG/1
500 TABLET ORAL 2 TIMES DAILY
Qty: 60 TABLET | Refills: 11 | Status: SHIPPED | OUTPATIENT
Start: 2023-05-19 | End: 2024-05-18

## 2023-05-19 RX ORDER — POTASSIUM CHLORIDE 7.45 MG/ML
10 INJECTION INTRAVENOUS
Status: COMPLETED | OUTPATIENT
Start: 2023-05-19 | End: 2023-05-19

## 2023-05-19 RX ORDER — MAGNESIUM SULFATE HEPTAHYDRATE 40 MG/ML
2 INJECTION, SOLUTION INTRAVENOUS
Status: COMPLETED | OUTPATIENT
Start: 2023-05-19 | End: 2023-05-19

## 2023-05-19 RX ADMIN — POTASSIUM CHLORIDE 10 MEQ: 7.46 INJECTION, SOLUTION INTRAVENOUS at 10:05

## 2023-05-19 RX ADMIN — SODIUM CHLORIDE 1000 ML: 9 INJECTION, SOLUTION INTRAVENOUS at 10:05

## 2023-05-19 RX ADMIN — LEVETIRACETAM 1000 MG: 500 INJECTION, SOLUTION, CONCENTRATE INTRAVENOUS at 11:05

## 2023-05-19 RX ADMIN — MAGNESIUM SULFATE HEPTAHYDRATE 2 G: 40 INJECTION, SOLUTION INTRAVENOUS at 10:05

## 2023-05-20 VITALS
TEMPERATURE: 99 F | DIASTOLIC BLOOD PRESSURE: 65 MMHG | SYSTOLIC BLOOD PRESSURE: 139 MMHG | OXYGEN SATURATION: 100 % | RESPIRATION RATE: 13 BRPM | WEIGHT: 180 LBS | BODY MASS INDEX: 30.9 KG/M2 | HEART RATE: 78 BPM

## 2023-05-20 NOTE — ED PROVIDER NOTES
Encounter Date: 5/19/2023       History     Chief Complaint   Patient presents with    post ictal     73-year-old female presents to ED status post seizure like activity.  EMS was called after patient was found in vehicle by a family member, family around stating patient was out of it and had peed on herself.  Daughter at bedside; states they were inside of the store and patient states she did not feel well.  Daughter took patient to car and went back inside.  She states when she came back inside the car, patient was out of and had urinated on herself.  Unwitnessed event; daughter expresses activity has occurred in the past.  Patient was previously on Keppra that was discontinued after normal EEG.  Daughter states around 5:00 p.m., patient took Seroquel and Protonix.  Denies known fever, chills, chest pain, shortness of breath.  Daughter expresses patient continued to have variable intake of food or fluids.  Review of records notes patient previously hospitalized for failure to thrive with recommendations for geripsych.    The history is provided by the patient, medical records and the EMS personnel.   Review of patient's allergies indicates:  No Known Allergies  Past Medical History:   Diagnosis Date    Anemia     Anxiety     Dementia     Depression     GERD (gastroesophageal reflux disease)     Hyperlipidemia     Hypertension      No past surgical history on file.  Family History   Problem Relation Age of Onset    Hypertension Father      Social History     Tobacco Use    Smoking status: Never     Passive exposure: Never    Smokeless tobacco: Never   Substance Use Topics    Alcohol use: Not Currently    Drug use: Never     Review of Systems   Constitutional:  Positive for activity change. Negative for chills and fatigue.   Respiratory:  Negative for cough and shortness of breath.    Cardiovascular:  Negative for chest pain and palpitations.   Gastrointestinal:  Negative for nausea and vomiting.   Genitourinary:   Negative for decreased urine volume, difficulty urinating and urgency.   Musculoskeletal:  Negative for arthralgias and gait problem.   Skin:  Negative for color change and wound.   Neurological:  Positive for seizures. Negative for dizziness and weakness.   Psychiatric/Behavioral:  Negative for agitation and confusion.    All other systems reviewed and are negative.    Physical Exam     Initial Vitals   BP Pulse Resp Temp SpO2   05/19/23 2007 05/19/23 2002 05/19/23 2007 05/19/23 2247 05/19/23 2002   (!) 172/75 61 13 98.5 °F (36.9 °C) 97 %      MAP       --                Physical Exam    Nursing note and vitals reviewed.  Constitutional: She appears well-developed and well-nourished.   HENT:   Head: Normocephalic and atraumatic.   Eyes: EOM are normal. Pupils are equal, round, and reactive to light.   Neck: Neck supple.   Normal range of motion.  Cardiovascular:  Normal rate and regular rhythm.           No murmur heard.  Pulmonary/Chest: She has no wheezes. She has no rhonchi.   Abdominal: Abdomen is soft. She exhibits no distension. There is no abdominal tenderness.   Musculoskeletal:         General: No tenderness or edema.      Cervical back: Normal range of motion and neck supple.     Lymphadenopathy:     She has no cervical adenopathy.   Neurological: She is alert and oriented to person, place, and time. No cranial nerve deficit or sensory deficit.   Skin: Skin is warm and dry. Capillary refill takes less than 2 seconds.   Psychiatric: She has a normal mood and affect. Thought content normal.       Medical Screening Exam   See Full Note    ED Course   Procedures  Labs Reviewed   COMPREHENSIVE METABOLIC PANEL - Abnormal; Notable for the following components:       Result Value    Potassium 2.8 (*)     Glucose 126 (*)     Creatinine 1.06 (*)     Calcium 8.4 (*)     Total Protein 6.1 (*)     Albumin 2.8 (*)     Alk Phos 38 (*)     AST 12 (*)     eGFR 56 (*)     All other components within normal limits    MAGNESIUM - Abnormal; Notable for the following components:    Magnesium 1.3 (*)     All other components within normal limits   URINALYSIS, REFLEX TO URINE CULTURE - Abnormal; Notable for the following components:    Protein, UA 70 (*)     Glucose, UA 30 (*)     Ketones, UA 60 (*)     Urobilinogen, UA 3 (*)     Bilirubin, UA 0.5 (*)     All other components within normal limits   CBC WITH DIFFERENTIAL - Abnormal; Notable for the following components:    WBC 4.28 (*)     RBC 3.86 (*)     Hemoglobin 10.4 (*)     Hematocrit 33.3 (*)     MCH 26.9 (*)     MCHC 31.2 (*)     RDW 15.5 (*)     Neutrophils % 79.5 (*)     Lymphocytes % 11.4 (*)     Monocytes % 7.2 (*)     Eosinophils % 0.5 (*)     Immature Granulocytes % 0.9 (*)     Lymphocytes, Absolute 0.49 (*)     All other components within normal limits   MANUAL DIFFERENTIAL - Abnormal; Notable for the following components:    Segmented Neutrophils, Man % 82 (*)     Lymphocytes, Man % 10 (*)     Monocytes, Man % 8 (*)     All other components within normal limits   CBC W/ AUTO DIFFERENTIAL    Narrative:     The following orders were created for panel order CBC auto differential.  Procedure                               Abnormality         Status                     ---------                               -----------         ------                     CBC with Differential[[id number]]        Abnormal            Final result               Manual Differential[[id number]]          Abnormal            Final result                 Please view results for these tests on the individual orders.   GREY TOP HOLD   EXTRA TUBES    Narrative:     The following orders were created for panel order EXTRA TUBES.  Procedure                               Abnormality         Status                     ---------                               -----------         ------                     Light Blue Top Hold[[id number]]                              In process                 Light Green Top  Hold[[id number]]                             In process                 Gold Top Hold[[id number]]                                    In process                 Hanson Top Hold[[id number]]                                    Final result                 Please view results for these tests on the individual orders.   LIGHT BLUE TOP HOLD   LIGHT GREEN TOP HOLD   GOLD TOP HOLD          Imaging Results              X-Ray Chest AP Portable (Final result)  Result time 05/19/23 20:46:48      Final result by Swapnil Arango MD (05/19/23 20:46:48)                   Impression:      No acute findings      Electronically signed by: Swapnil Arango  Date:    05/19/2023  Time:    20:46               Narrative:    EXAMINATION:  XR CHEST AP PORTABLE    CLINICAL HISTORY:  Syncope and collapse    TECHNIQUE:  Single frontal view of the chest was performed.    COMPARISON:  03/30/2023    FINDINGS:  Left chest wall cardiac device and cardiomegaly are again seen.  The lungs appear clear and similar to prior.  No pneumothorax or large pleural effusion.                                       CT Head Without Contrast (Final result)  Result time 05/19/23 20:46:17      Final result by Swapnil Arango MD (05/19/23 20:46:17)                   Impression:      No acute intracranial abnormality.      Electronically signed by: Swapnil Arango  Date:    05/19/2023  Time:    20:46               Narrative:    EXAMINATION:  CT HEAD WITHOUT CONTRAST    CLINICAL HISTORY:  Seizure, new-onset, no history of trauma;    TECHNIQUE:  CT of the head performed without the use of intravenous contrast.  The CT examination was performed using one or more of the following dose reduction techniques: Automated exposure control, adjustment of the mA and kV according to patient's size, use of acute or iterative reconstruction techniques.    COMPARISON:  03/30/2023    FINDINGS:  No acute intracranial hemorrhage.  No mass effect or midline shift.  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  No acute large vessel infarct.  No midline shift.  No herniation.  Calvarium intact.  Mastoid air cells clear.  Left maxillary sinus large mucous retention cyst again seen.                                       Medications   magnesium sulfate 2g in water 50mL IVPB (premix) (0 g Intravenous Stopped 5/19/23 2350)   potassium chloride 10 mEq in 100 mL IVPB (0 mEq Intravenous Stopped 5/19/23 2350)   0.9%  NaCl infusion (0 mLs Intravenous Stopped 5/20/23 0002)   levETIRAcetam injection 1,000 mg (1,000 mg Intravenous Given 5/19/23 2351)     Medical Decision Making:   Initial Assessment:   Postictal  Differential Diagnosis:   Seizure  CVA/TIA  Clinical Tests:   Lab Tests: Ordered and Reviewed  Radiological Study: Ordered and Reviewed  ED Management:  MDM    Patient presents for emergent evaluation of acute postictal state that poses a threat to life and/or bodily function.    In the ED patient found to have acute seizure-like activity, syncope, hypokalemia, hypomagnesemia.    I ordered labs and personally reviewed them.  Labs significant for magnesium 1.3, potassium 2.8, glucose 126, creatinine 1.06  I ordered X-rays and personally reviewed them and reviewed the radiologist interpretation.  Xray significant for no acute cardiopulmonary processes.    I ordered CT scan and personally reviewed it and reviewed the radiologist interpretation.  CT significant for no acute intracranial process.      Discharge MDM  I discussed the patient presentation labs, ekg, X-rays, CT findings with Dr. Garcia.    Patient was managed in the ED with IV normal saline, potassium, magnesium, Keppra.    The response to treatment was good.    Patient was discharged in stable condition.  Detailed return precautions discussed.                         Clinical Impression:   Final diagnoses:  [R55] Syncope  [R56.9] Seizure-like activity (Primary)  [E87.6] Hypokalemia  [E83.42] Hypomagnesemia         ED Disposition Condition    Discharge Stable          ED Prescriptions       Medication Sig Dispense Start Date End Date Auth. Provider    potassium chloride (KLOR-CON) 10 MEQ TbSR Take 1 tablet (10 mEq total) by mouth once daily. 7 tablet 5/19/2023 -- SULEMA Platt    magnesium oxide 400 mg magnesium Tab Take 1 tablet by mouth once daily. for 7 days 7 tablet 5/19/2023 5/26/2023 SULEMA Platt    levETIRAcetam (KEPPRA) 500 MG Tab Take 1 tablet (500 mg total) by mouth 2 (two) times daily. 60 tablet 5/19/2023 5/18/2024 SULEMA Platt          Follow-up Information    None          SULEMA Platt  05/20/23 0003

## 2023-05-23 ENCOUNTER — TELEPHONE (OUTPATIENT)
Dept: FAMILY MEDICINE | Facility: CLINIC | Age: 73
End: 2023-05-23
Payer: MEDICARE

## 2023-05-23 NOTE — TELEPHONE ENCOUNTER
----- Message from Eldon Govea MD sent at 5/23/2023  7:53 AM CDT -----  Renal impairment is improving.

## 2023-05-30 ENCOUNTER — OFFICE VISIT (OUTPATIENT)
Dept: NEUROLOGY | Facility: CLINIC | Age: 73
End: 2023-05-30
Payer: MEDICARE

## 2023-05-30 VITALS
RESPIRATION RATE: 18 BRPM | OXYGEN SATURATION: 98 % | HEART RATE: 93 BPM | WEIGHT: 180 LBS | HEIGHT: 64 IN | DIASTOLIC BLOOD PRESSURE: 94 MMHG | BODY MASS INDEX: 30.73 KG/M2 | SYSTOLIC BLOOD PRESSURE: 148 MMHG

## 2023-05-30 DIAGNOSIS — F03.90 DEMENTIA, UNSPECIFIED DEMENTIA SEVERITY, UNSPECIFIED DEMENTIA TYPE, UNSPECIFIED WHETHER BEHAVIORAL, PSYCHOTIC, OR MOOD DISTURBANCE OR ANXIETY: Primary | ICD-10-CM

## 2023-05-30 DIAGNOSIS — R56.9 SEIZURE-LIKE ACTIVITY: ICD-10-CM

## 2023-05-30 PROCEDURE — 1160F RVW MEDS BY RX/DR IN RCRD: CPT | Mod: CPTII,,, | Performed by: PSYCHIATRY & NEUROLOGY

## 2023-05-30 PROCEDURE — 1125F PR PAIN SEVERITY QUANTIFIED, PAIN PRESENT: ICD-10-PCS | Mod: CPTII,,, | Performed by: PSYCHIATRY & NEUROLOGY

## 2023-05-30 PROCEDURE — 3077F SYST BP >= 140 MM HG: CPT | Mod: CPTII,,, | Performed by: PSYCHIATRY & NEUROLOGY

## 2023-05-30 PROCEDURE — 1125F AMNT PAIN NOTED PAIN PRSNT: CPT | Mod: CPTII,,, | Performed by: PSYCHIATRY & NEUROLOGY

## 2023-05-30 PROCEDURE — 3288F PR FALLS RISK ASSESSMENT DOCUMENTED: ICD-10-PCS | Mod: CPTII,,, | Performed by: PSYCHIATRY & NEUROLOGY

## 2023-05-30 PROCEDURE — 3008F BODY MASS INDEX DOCD: CPT | Mod: CPTII,,, | Performed by: PSYCHIATRY & NEUROLOGY

## 2023-05-30 PROCEDURE — 3066F NEPHROPATHY DOC TX: CPT | Mod: CPTII,,, | Performed by: PSYCHIATRY & NEUROLOGY

## 2023-05-30 PROCEDURE — 3288F FALL RISK ASSESSMENT DOCD: CPT | Mod: CPTII,,, | Performed by: PSYCHIATRY & NEUROLOGY

## 2023-05-30 PROCEDURE — 99215 OFFICE O/P EST HI 40 MIN: CPT | Mod: PBBFAC | Performed by: PSYCHIATRY & NEUROLOGY

## 2023-05-30 PROCEDURE — 3080F PR MOST RECENT DIASTOLIC BLOOD PRESSURE >= 90 MM HG: ICD-10-PCS | Mod: CPTII,,, | Performed by: PSYCHIATRY & NEUROLOGY

## 2023-05-30 PROCEDURE — 3077F PR MOST RECENT SYSTOLIC BLOOD PRESSURE >= 140 MM HG: ICD-10-PCS | Mod: CPTII,,, | Performed by: PSYCHIATRY & NEUROLOGY

## 2023-05-30 PROCEDURE — 1159F MED LIST DOCD IN RCRD: CPT | Mod: CPTII,,, | Performed by: PSYCHIATRY & NEUROLOGY

## 2023-05-30 PROCEDURE — 4010F ACE/ARB THERAPY RXD/TAKEN: CPT | Mod: CPTII,,, | Performed by: PSYCHIATRY & NEUROLOGY

## 2023-05-30 PROCEDURE — 1101F PR PT FALLS ASSESS DOC 0-1 FALLS W/OUT INJ PAST YR: ICD-10-PCS | Mod: CPTII,,, | Performed by: PSYCHIATRY & NEUROLOGY

## 2023-05-30 PROCEDURE — 99214 OFFICE O/P EST MOD 30 MIN: CPT | Mod: S$PBB,,, | Performed by: PSYCHIATRY & NEUROLOGY

## 2023-05-30 PROCEDURE — 1159F PR MEDICATION LIST DOCUMENTED IN MEDICAL RECORD: ICD-10-PCS | Mod: CPTII,,, | Performed by: PSYCHIATRY & NEUROLOGY

## 2023-05-30 PROCEDURE — 1101F PT FALLS ASSESS-DOCD LE1/YR: CPT | Mod: CPTII,,, | Performed by: PSYCHIATRY & NEUROLOGY

## 2023-05-30 PROCEDURE — 1160F PR REVIEW ALL MEDS BY PRESCRIBER/CLIN PHARMACIST DOCUMENTED: ICD-10-PCS | Mod: CPTII,,, | Performed by: PSYCHIATRY & NEUROLOGY

## 2023-05-30 PROCEDURE — 3008F PR BODY MASS INDEX (BMI) DOCUMENTED: ICD-10-PCS | Mod: CPTII,,, | Performed by: PSYCHIATRY & NEUROLOGY

## 2023-05-30 PROCEDURE — 3080F DIAST BP >= 90 MM HG: CPT | Mod: CPTII,,, | Performed by: PSYCHIATRY & NEUROLOGY

## 2023-05-30 PROCEDURE — 99214 PR OFFICE/OUTPT VISIT, EST, LEVL IV, 30-39 MIN: ICD-10-PCS | Mod: S$PBB,,, | Performed by: PSYCHIATRY & NEUROLOGY

## 2023-05-30 PROCEDURE — 4010F PR ACE/ARB THEARPY RXD/TAKEN: ICD-10-PCS | Mod: CPTII,,, | Performed by: PSYCHIATRY & NEUROLOGY

## 2023-05-30 PROCEDURE — 3066F PR DOCUMENTATION OF TREATMENT FOR NEPHROPATHY: ICD-10-PCS | Mod: CPTII,,, | Performed by: PSYCHIATRY & NEUROLOGY

## 2023-05-30 NOTE — PATIENT INSTRUCTIONS
Cont Keppra 500 mg twice daily  Cont Namenda   Cont Seroquel   Refrain from daytime napping b/c interferes w/ proper sleep   Good family support   F/u 6 months

## 2023-05-30 NOTE — PROGRESS NOTES
"    Subjective:       Patient ID: Renetta Stewart is a 73 y.o. female     Chief Complaint:    Chief Complaint   Patient presents with    Follow-up     Pt has seizure yesterday, no fall occurred, pts daughter states that she refuses to take her meds other than seizures meds        Allergies:  Patient has no known allergies.    Current Medications:    Outpatient Encounter Medications as of 2023   Medication Sig Dispense Refill    levETIRAcetam (KEPPRA) 500 MG Tab Take 1 tablet (500 mg total) by mouth 2 (two) times daily. 60 tablet 11    levothyroxine (SYNTHROID) 50 MCG tablet Take 1 tablet (50 mcg total) by mouth once daily. 90 tablet 1    memantine (NAMENDA) 10 MG Tab Take 5 mg by mouth once daily.      mirtazapine (REMERON) 15 MG tablet Take 1 tablet (15 mg total) by mouth every evening. 30 tablet 1    ondansetron (ZOFRAN) 4 MG tablet Take 1 tablet (4 mg total) by mouth every 6 (six) hours. 12 tablet 0    pantoprazole (PROTONIX) 40 MG tablet Take 1 tablet (40 mg total) by mouth once daily. 90 tablet 1    potassium chloride (KLOR-CON) 10 MEQ TbSR Take 1 tablet (10 mEq total) by mouth once daily. 7 tablet 0    QUEtiapine (SEROQUEL) 25 MG Tab Take 1 tablet (25 mg total) by mouth every evening. 90 tablet 3    traMADoL (ULTRAM) 50 mg tablet Take 50 mg by mouth 2 (two) times daily as needed.      traZODone (DESYREL) 50 MG tablet TAKE 1 TABLET(50 MG) BY MOUTH EVERY EVENING FOR 20 DAYS 30 tablet 0    [] magnesium oxide 400 mg magnesium Tab Take 1 tablet by mouth once daily. for 7 days 7 tablet 0     No facility-administered encounter medications on file as of 2023.       History of Present Illness  72 yo BF w/ cognitive impairment poss dementia - support from daughter and requiring prompting from ADL's   Seroquel 25 mg qhs has not helped and she is still "sundowning"       Past Medical History:   Diagnosis Date    Anemia     Anxiety     Dementia     Depression     GERD (gastroesophageal reflux disease)     " "Hyperlipidemia     Hypertension        History reviewed. No pertinent surgical history.    Social History  Ms. Stewart  reports that she has never smoked. She has never been exposed to tobacco smoke. She has never used smokeless tobacco. She reports that she does not currently use alcohol. She reports that she does not use drugs.    Family History  Ms.'s Stewart family history includes Hypertension in her father.    Review of Systems  Review of Systems   Neurological:  Positive for seizures.   Psychiatric/Behavioral:  Positive for memory loss.    All other systems reviewed and are negative.   Objective:   BP (!) 148/94 (BP Location: Left arm, Patient Position: Sitting, BP Method: Large (Manual))   Pulse 93   Resp 18   Ht 5' 4" (1.626 m)   Wt 81.6 kg (180 lb)   SpO2 98%   BMI 30.90 kg/m²    NEUROLOGICAL EXAMINATION:     MENTAL STATUS   Oriented to person.   Disoriented to place. Oriented to country and city.   Disoriented to year and date. Oriented to month.   Registration: recalls 1 of 3 objects.   Attention: decreased. Concentration: decreased.   Speech: slurred        Dementia      CRANIAL NERVES   Cranial nerves II through XII intact.     MOTOR EXAM     Strength   Strength 5/5 throughout.     GAIT AND COORDINATION        Uses cane for ambulation       Physical Exam  Vitals reviewed.   Neurological:      General: No focal deficit present.      Mental Status: She is alert. Mental status is at baseline.      Cranial Nerves: Cranial nerves 2-12 are intact.      Motor: Motor strength is normal.   Psychiatric:         Speech: Speech is slurred.        Assessment:     Seizure-like activity  -     Ambulatory referral/consult to Neurology         Primary Diagnosis and ICD10  No primary diagnosis found.    Plan:     There are no Patient Instructions on file for this visit.    There are no discontinued medications.    Requested Prescriptions      No prescriptions requested or ordered in this encounter       "

## 2023-06-09 DIAGNOSIS — Z71.89 COMPLEX CARE COORDINATION: ICD-10-CM

## 2023-06-13 ENCOUNTER — OFFICE VISIT (OUTPATIENT)
Dept: FAMILY MEDICINE | Facility: CLINIC | Age: 73
End: 2023-06-13
Payer: MEDICARE

## 2023-06-13 VITALS
DIASTOLIC BLOOD PRESSURE: 92 MMHG | RESPIRATION RATE: 18 BRPM | OXYGEN SATURATION: 97 % | SYSTOLIC BLOOD PRESSURE: 142 MMHG | HEART RATE: 87 BPM | TEMPERATURE: 98 F | WEIGHT: 161.38 LBS | HEIGHT: 64 IN | BODY MASS INDEX: 27.55 KG/M2

## 2023-06-13 DIAGNOSIS — K21.9 GASTROESOPHAGEAL REFLUX DISEASE, UNSPECIFIED WHETHER ESOPHAGITIS PRESENT: Primary | ICD-10-CM

## 2023-06-13 PROCEDURE — 1159F PR MEDICATION LIST DOCUMENTED IN MEDICAL RECORD: ICD-10-PCS | Mod: ,,, | Performed by: FAMILY MEDICINE

## 2023-06-13 PROCEDURE — 1125F PR PAIN SEVERITY QUANTIFIED, PAIN PRESENT: ICD-10-PCS | Mod: ,,, | Performed by: FAMILY MEDICINE

## 2023-06-13 PROCEDURE — 4010F ACE/ARB THERAPY RXD/TAKEN: CPT | Mod: ,,, | Performed by: FAMILY MEDICINE

## 2023-06-13 PROCEDURE — 4010F PR ACE/ARB THEARPY RXD/TAKEN: ICD-10-PCS | Mod: ,,, | Performed by: FAMILY MEDICINE

## 2023-06-13 PROCEDURE — 3077F PR MOST RECENT SYSTOLIC BLOOD PRESSURE >= 140 MM HG: ICD-10-PCS | Mod: ,,, | Performed by: FAMILY MEDICINE

## 2023-06-13 PROCEDURE — 1125F AMNT PAIN NOTED PAIN PRSNT: CPT | Mod: ,,, | Performed by: FAMILY MEDICINE

## 2023-06-13 PROCEDURE — 3008F PR BODY MASS INDEX (BMI) DOCUMENTED: ICD-10-PCS | Mod: ,,, | Performed by: FAMILY MEDICINE

## 2023-06-13 PROCEDURE — 1101F PR PT FALLS ASSESS DOC 0-1 FALLS W/OUT INJ PAST YR: ICD-10-PCS | Mod: ,,, | Performed by: FAMILY MEDICINE

## 2023-06-13 PROCEDURE — 3288F FALL RISK ASSESSMENT DOCD: CPT | Mod: ,,, | Performed by: FAMILY MEDICINE

## 2023-06-13 PROCEDURE — 99213 PR OFFICE/OUTPT VISIT, EST, LEVL III, 20-29 MIN: ICD-10-PCS | Mod: ,,, | Performed by: FAMILY MEDICINE

## 2023-06-13 PROCEDURE — 1160F PR REVIEW ALL MEDS BY PRESCRIBER/CLIN PHARMACIST DOCUMENTED: ICD-10-PCS | Mod: ,,, | Performed by: FAMILY MEDICINE

## 2023-06-13 PROCEDURE — 3066F PR DOCUMENTATION OF TREATMENT FOR NEPHROPATHY: ICD-10-PCS | Mod: ,,, | Performed by: FAMILY MEDICINE

## 2023-06-13 PROCEDURE — 99213 OFFICE O/P EST LOW 20 MIN: CPT | Mod: ,,, | Performed by: FAMILY MEDICINE

## 2023-06-13 PROCEDURE — 1160F RVW MEDS BY RX/DR IN RCRD: CPT | Mod: ,,, | Performed by: FAMILY MEDICINE

## 2023-06-13 PROCEDURE — 3066F NEPHROPATHY DOC TX: CPT | Mod: ,,, | Performed by: FAMILY MEDICINE

## 2023-06-13 PROCEDURE — 3080F PR MOST RECENT DIASTOLIC BLOOD PRESSURE >= 90 MM HG: ICD-10-PCS | Mod: ,,, | Performed by: FAMILY MEDICINE

## 2023-06-13 PROCEDURE — 3288F PR FALLS RISK ASSESSMENT DOCUMENTED: ICD-10-PCS | Mod: ,,, | Performed by: FAMILY MEDICINE

## 2023-06-13 PROCEDURE — 3080F DIAST BP >= 90 MM HG: CPT | Mod: ,,, | Performed by: FAMILY MEDICINE

## 2023-06-13 PROCEDURE — 3077F SYST BP >= 140 MM HG: CPT | Mod: ,,, | Performed by: FAMILY MEDICINE

## 2023-06-13 PROCEDURE — 3008F BODY MASS INDEX DOCD: CPT | Mod: ,,, | Performed by: FAMILY MEDICINE

## 2023-06-13 PROCEDURE — 1101F PT FALLS ASSESS-DOCD LE1/YR: CPT | Mod: ,,, | Performed by: FAMILY MEDICINE

## 2023-06-13 PROCEDURE — 1159F MED LIST DOCD IN RCRD: CPT | Mod: ,,, | Performed by: FAMILY MEDICINE

## 2023-06-13 RX ORDER — ESOMEPRAZOLE MAGNESIUM 40 MG/1
40 CAPSULE, DELAYED RELEASE ORAL
Qty: 90 CAPSULE | Refills: 1 | Status: SHIPPED | OUTPATIENT
Start: 2023-06-13 | End: 2024-02-13 | Stop reason: SDUPTHER

## 2023-06-13 RX ORDER — HYDROCODONE BITARTRATE AND ACETAMINOPHEN 5; 325 MG/1; MG/1
1 TABLET ORAL EVERY 6 HOURS PRN
COMMUNITY

## 2023-06-13 NOTE — PROGRESS NOTES
Renetta Stewart is a 73 y.o. female seen today for symptoms of excessive salivation and spitting.  The symptoms of present for well over 3 months and the patient's daughter was asking for scopolamine patch which I feel would be harmful with this patient with dementia.  She is currently on Protonix for reflux and we discussed avoiding laying down after meals as well as changing her medication to Nexium.  Hopefully this will help the symptoms although certain antiseizure medications can cause this symptom as well.  Patient continues to lose weight but her p.o. intake is poor and she is complaining of breakthrough symptoms of reflux.      Past Medical History:   Diagnosis Date    Anemia     Anxiety     Dementia     Depression     GERD (gastroesophageal reflux disease)     Hyperlipidemia     Hypertension      Family History   Problem Relation Age of Onset    Hypertension Father      Current Outpatient Medications on File Prior to Visit   Medication Sig Dispense Refill    HYDROcodone-acetaminophen (NORCO) 5-325 mg per tablet Take 1 tablet by mouth every 6 (six) hours as needed for Pain.      levETIRAcetam (KEPPRA) 500 MG Tab Take 1 tablet (500 mg total) by mouth 2 (two) times daily. 60 tablet 11    [DISCONTINUED] pantoprazole (PROTONIX) 40 MG tablet Take 1 tablet (40 mg total) by mouth once daily. 90 tablet 1    [DISCONTINUED] levothyroxine (SYNTHROID) 50 MCG tablet Take 1 tablet (50 mcg total) by mouth once daily. (Patient not taking: Reported on 6/13/2023) 90 tablet 1    [DISCONTINUED] memantine (NAMENDA) 10 MG Tab Take 5 mg by mouth once daily.      [DISCONTINUED] mirtazapine (REMERON) 15 MG tablet Take 1 tablet (15 mg total) by mouth every evening. (Patient not taking: Reported on 6/13/2023) 30 tablet 1    [DISCONTINUED] ondansetron (ZOFRAN) 4 MG tablet Take 1 tablet (4 mg total) by mouth every 6 (six) hours. (Patient not taking: Reported on 6/13/2023) 12 tablet 0    [DISCONTINUED] potassium chloride (KLOR-CON) 10 MEQ  TbSR Take 1 tablet (10 mEq total) by mouth once daily. (Patient not taking: Reported on 6/13/2023) 7 tablet 0    [DISCONTINUED] QUEtiapine (SEROQUEL) 25 MG Tab Take 1 tablet (25 mg total) by mouth every evening. (Patient not taking: Reported on 6/13/2023) 90 tablet 3    [DISCONTINUED] traMADoL (ULTRAM) 50 mg tablet Take 50 mg by mouth 2 (two) times daily as needed.      [DISCONTINUED] traZODone (DESYREL) 50 MG tablet TAKE 1 TABLET(50 MG) BY MOUTH EVERY EVENING FOR 20 DAYS (Patient not taking: Reported on 6/13/2023) 30 tablet 0     No current facility-administered medications on file prior to visit.       There is no immunization history on file for this patient.    Review of Systems   Constitutional:  Positive for malaise/fatigue and weight loss. Negative for fever.   Respiratory:  Negative for shortness of breath.    Cardiovascular:  Negative for chest pain and palpitations.   Gastrointestinal:  Negative for blood in stool.   Musculoskeletal:  Positive for back pain and myalgias.   Psychiatric/Behavioral:  Positive for memory loss. Negative for depression. The patient is nervous/anxious.       Vitals:    06/13/23 1327   BP: (!) 142/92   Pulse: 87   Resp: 18   Temp: 97.6 °F (36.4 °C)       Physical Exam  Vitals reviewed.   Constitutional:       Appearance: Normal appearance.   HENT:      Head: Normocephalic.   Eyes:      Extraocular Movements: Extraocular movements intact.      Conjunctiva/sclera: Conjunctivae normal.      Pupils: Pupils are equal, round, and reactive to light.   Neck:      Thyroid: No thyroid mass or thyromegaly.   Cardiovascular:      Rate and Rhythm: Normal rate and regular rhythm.      Heart sounds: Normal heart sounds. No murmur heard.    No gallop.   Pulmonary:      Effort: Pulmonary effort is normal. No respiratory distress.      Breath sounds: Normal breath sounds. No wheezing or rales.   Skin:     General: Skin is warm and dry.      Coloration: Skin is not jaundiced or pale.   Neurological:       Mental Status: She is alert.   Psychiatric:         Mood and Affect: Mood normal.         Behavior: Behavior normal.        Assessment and Plan  Gastroesophageal reflux disease, unspecified whether esophagitis present  -     esomeprazole (NEXIUM) 40 MG capsule; Take 1 capsule (40 mg total) by mouth before breakfast.  Dispense: 90 capsule; Refill: 1            Return to clinic in 1 month for follow-up on her blood pressure and reflux and hypersalivation.  This patient may need follow-up with neurology for these symptoms if they do not improve.    Health Maintenance Topics with due status: Not Due       Topic Last Completion Date    Colorectal Cancer Screening 06/18/2019    Lipid Panel 02/14/2022

## 2023-06-20 ENCOUNTER — DOCUMENT SCAN (OUTPATIENT)
Dept: HOME HEALTH SERVICES | Facility: HOSPITAL | Age: 73
End: 2023-06-20
Payer: MEDICARE

## 2023-07-10 ENCOUNTER — TELEPHONE (OUTPATIENT)
Dept: FAMILY MEDICINE | Facility: CLINIC | Age: 73
End: 2023-07-10
Payer: MEDICARE

## 2023-07-10 NOTE — TELEPHONE ENCOUNTER
----- Message from Jacey Lee sent at 7/6/2023  1:24 PM CDT -----  Regarding: pt call back  Pts daughter in law, Catrachita, called and is needing someone to call her back concerning Renetta. They are trying to get her in a swing bed at [facility]. Can someone call Catrachita back at [phone].

## 2023-07-17 ENCOUNTER — HOSPITAL ENCOUNTER (OUTPATIENT)
Dept: CARDIOLOGY | Facility: HOSPITAL | Age: 73
Discharge: HOME | End: 2023-07-17
Attending: INTERNAL MEDICINE
Payer: MEDICARE

## 2023-07-17 DIAGNOSIS — Z95.0 PRESENCE OF CARDIAC PACEMAKER: Primary | ICD-10-CM

## 2023-07-17 DIAGNOSIS — Z95.0 PRESENCE OF CARDIAC PACEMAKER: ICD-10-CM

## 2023-07-17 PROCEDURE — 93294 CARDIAC DEVICE CHECK - REMOTE: ICD-10-PCS | Mod: GW,,, | Performed by: INTERNAL MEDICINE

## 2023-07-17 PROCEDURE — 93294 REM INTERROG EVL PM/LDLS PM: CPT | Mod: GW,,, | Performed by: INTERNAL MEDICINE

## 2023-07-17 PROCEDURE — 93296 REM INTERROG EVL PM/IDS: CPT

## 2023-09-07 NOTE — ED NOTES
Anesthesia Unable to initiate IV access on patient after attempting 3 times myself, 4 times from EMT, and 2 attempts from another ER RN. Provider aware of situation. Asked provider if I should ask the residents to come down and attempt an EJ. Provider approved. Contacted residents on cellphone and told they would call the department back shortly.   OB OB

## 2023-10-16 ENCOUNTER — HOSPITAL ENCOUNTER (OUTPATIENT)
Dept: CARDIOLOGY | Facility: HOSPITAL | Age: 73
Discharge: HOME | End: 2023-10-16
Attending: INTERNAL MEDICINE
Payer: MEDICARE

## 2023-10-16 DIAGNOSIS — Z95.0 PRESENCE OF CARDIAC PACEMAKER: Primary | ICD-10-CM

## 2023-10-16 DIAGNOSIS — Z95.0 PRESENCE OF CARDIAC PACEMAKER: ICD-10-CM

## 2023-10-16 PROCEDURE — 93296 REM INTERROG EVL PM/IDS: CPT

## 2023-10-16 PROCEDURE — 93294 REM INTERROG EVL PM/LDLS PM: CPT | Mod: ,,, | Performed by: INTERNAL MEDICINE

## 2023-10-16 PROCEDURE — 93294 CARDIAC DEVICE CHECK - REMOTE: ICD-10-PCS | Mod: ,,, | Performed by: INTERNAL MEDICINE

## 2024-01-01 ENCOUNTER — TELEPHONE (OUTPATIENT)
Dept: CARDIOLOGY | Facility: CLINIC | Age: 74
End: 2024-01-01

## 2024-01-09 DIAGNOSIS — Z71.89 COMPLEX CARE COORDINATION: ICD-10-CM

## 2024-01-17 ENCOUNTER — HOSPITAL ENCOUNTER (OUTPATIENT)
Dept: CARDIOLOGY | Facility: HOSPITAL | Age: 74
Discharge: HOME | End: 2024-01-17
Attending: INTERNAL MEDICINE
Payer: MEDICARE

## 2024-01-17 DIAGNOSIS — Z95.0 PRESENCE OF CARDIAC PACEMAKER: Primary | ICD-10-CM

## 2024-01-17 DIAGNOSIS — Z95.0 PRESENCE OF CARDIAC PACEMAKER: ICD-10-CM

## 2024-01-17 PROCEDURE — 93294 REM INTERROG EVL PM/LDLS PM: CPT | Mod: ,,, | Performed by: INTERNAL MEDICINE

## 2024-01-17 PROCEDURE — 93296 REM INTERROG EVL PM/IDS: CPT

## 2024-01-19 LAB
OHS CV DC PP MS1: 0.4 MS
OHS CV DC PP MS2: 0.4 MS
OHS CV DC PP V1: 1.5 V
OHS CV DC PP V2: 2 V

## 2024-02-13 DIAGNOSIS — K21.9 GASTROESOPHAGEAL REFLUX DISEASE, UNSPECIFIED WHETHER ESOPHAGITIS PRESENT: ICD-10-CM

## 2024-02-13 RX ORDER — ESOMEPRAZOLE MAGNESIUM 40 MG/1
40 CAPSULE, DELAYED RELEASE ORAL
Qty: 90 CAPSULE | Refills: 1 | Status: SHIPPED | OUTPATIENT
Start: 2024-02-13 | End: 2024-05-03 | Stop reason: SDUPTHER

## 2024-02-13 NOTE — TELEPHONE ENCOUNTER
----- Message from Renetta James sent at 2/13/2024  9:50 AM CST -----  Regarding: Refill  Patient needs:     esomeprazole (NEXIUM) 40 MG capsule    Pharmacy:    [id number] - [place], [place] - [address] AT [facility] [address]    Phone #:    [phone]

## 2024-02-13 NOTE — TELEPHONE ENCOUNTER
Pt has not been seen since 6/13/23. She was suppose to follow up in one month for blood pressure and reflux. Will call pt and let her know. Wanted to verify that this is what I need to move forward. Please advise. I have attached a 30 day supply.

## 2024-03-11 ENCOUNTER — OFFICE VISIT (OUTPATIENT)
Dept: FAMILY MEDICINE | Facility: CLINIC | Age: 74
End: 2024-03-11
Payer: MEDICARE

## 2024-03-11 VITALS
WEIGHT: 228 LBS | DIASTOLIC BLOOD PRESSURE: 102 MMHG | BODY MASS INDEX: 38.93 KG/M2 | SYSTOLIC BLOOD PRESSURE: 150 MMHG | OXYGEN SATURATION: 97 % | RESPIRATION RATE: 18 BRPM | HEART RATE: 87 BPM | HEIGHT: 64 IN | TEMPERATURE: 99 F

## 2024-03-11 DIAGNOSIS — G40.909 SEIZURE DISORDER: ICD-10-CM

## 2024-03-11 DIAGNOSIS — F32.A DEPRESSION, UNSPECIFIED DEPRESSION TYPE: ICD-10-CM

## 2024-03-11 DIAGNOSIS — I10 ESSENTIAL HYPERTENSION: ICD-10-CM

## 2024-03-11 DIAGNOSIS — E03.9 HYPOTHYROIDISM, UNSPECIFIED TYPE: ICD-10-CM

## 2024-03-11 DIAGNOSIS — F51.01 PRIMARY INSOMNIA: Primary | ICD-10-CM

## 2024-03-11 LAB
BASOPHILS # BLD AUTO: 0.02 K/UL (ref 0–0.2)
BASOPHILS NFR BLD AUTO: 0.4 % (ref 0–1)
DIFFERENTIAL METHOD BLD: ABNORMAL
EOSINOPHIL # BLD AUTO: 0.1 K/UL (ref 0–0.5)
EOSINOPHIL NFR BLD AUTO: 2 % (ref 1–4)
ERYTHROCYTE [DISTWIDTH] IN BLOOD BY AUTOMATED COUNT: 14.3 % (ref 11.5–14.5)
HCT VFR BLD AUTO: 41.8 % (ref 38–47)
HGB BLD-MCNC: 12.6 G/DL (ref 12–16)
IMM GRANULOCYTES # BLD AUTO: 0.02 K/UL (ref 0–0.04)
IMM GRANULOCYTES NFR BLD: 0.4 % (ref 0–0.4)
LYMPHOCYTES # BLD AUTO: 1.39 K/UL (ref 1–4.8)
LYMPHOCYTES NFR BLD AUTO: 28.4 % (ref 27–41)
MCH RBC QN AUTO: 26.1 PG (ref 27–31)
MCHC RBC AUTO-ENTMCNC: 30.1 G/DL (ref 32–36)
MCV RBC AUTO: 86.7 FL (ref 80–96)
MONOCYTES # BLD AUTO: 0.35 K/UL (ref 0–0.8)
MONOCYTES NFR BLD AUTO: 7.2 % (ref 2–6)
MPC BLD CALC-MCNC: 10.6 FL (ref 9.4–12.4)
NEUTROPHILS # BLD AUTO: 3.01 K/UL (ref 1.8–7.7)
NEUTROPHILS NFR BLD AUTO: 61.6 % (ref 53–65)
NRBC # BLD AUTO: 0 X10E3/UL
NRBC, AUTO (.00): 0 %
PLATELET # BLD AUTO: 249 K/UL (ref 150–400)
RBC # BLD AUTO: 4.82 M/UL (ref 4.2–5.4)
WBC # BLD AUTO: 4.89 K/UL (ref 4.5–11)

## 2024-03-11 PROCEDURE — 3079F DIAST BP 80-89 MM HG: CPT | Mod: ,,, | Performed by: FAMILY MEDICINE

## 2024-03-11 PROCEDURE — 1160F RVW MEDS BY RX/DR IN RCRD: CPT | Mod: ,,, | Performed by: FAMILY MEDICINE

## 2024-03-11 PROCEDURE — 3077F SYST BP >= 140 MM HG: CPT | Mod: ,,, | Performed by: FAMILY MEDICINE

## 2024-03-11 PROCEDURE — 3288F FALL RISK ASSESSMENT DOCD: CPT | Mod: ,,, | Performed by: FAMILY MEDICINE

## 2024-03-11 PROCEDURE — 85025 COMPLETE CBC W/AUTO DIFF WBC: CPT | Mod: ,,, | Performed by: CLINICAL MEDICAL LABORATORY

## 2024-03-11 PROCEDURE — 3008F BODY MASS INDEX DOCD: CPT | Mod: ,,, | Performed by: FAMILY MEDICINE

## 2024-03-11 PROCEDURE — 80177 DRUG SCRN QUAN LEVETIRACETAM: CPT | Mod: ,,, | Performed by: CLINICAL MEDICAL LABORATORY

## 2024-03-11 PROCEDURE — 1159F MED LIST DOCD IN RCRD: CPT | Mod: ,,, | Performed by: FAMILY MEDICINE

## 2024-03-11 PROCEDURE — 80053 COMPREHEN METABOLIC PANEL: CPT | Mod: ,,, | Performed by: CLINICAL MEDICAL LABORATORY

## 2024-03-11 PROCEDURE — 1126F AMNT PAIN NOTED NONE PRSNT: CPT | Mod: ,,, | Performed by: FAMILY MEDICINE

## 2024-03-11 PROCEDURE — 99214 OFFICE O/P EST MOD 30 MIN: CPT | Mod: ,,, | Performed by: FAMILY MEDICINE

## 2024-03-11 PROCEDURE — 84443 ASSAY THYROID STIM HORMONE: CPT | Mod: ,,, | Performed by: CLINICAL MEDICAL LABORATORY

## 2024-03-11 PROCEDURE — 1101F PT FALLS ASSESS-DOCD LE1/YR: CPT | Mod: ,,, | Performed by: FAMILY MEDICINE

## 2024-03-11 PROCEDURE — 84439 ASSAY OF FREE THYROXINE: CPT | Mod: ,,, | Performed by: CLINICAL MEDICAL LABORATORY

## 2024-03-11 RX ORDER — LEVOTHYROXINE SODIUM 50 UG/1
50 TABLET ORAL
Qty: 90 TABLET | Refills: 1 | Status: SHIPPED | OUTPATIENT
Start: 2024-03-11

## 2024-03-11 RX ORDER — MIRTAZAPINE 15 MG/1
15 TABLET, FILM COATED ORAL NIGHTLY
Qty: 90 TABLET | Refills: 1 | Status: SHIPPED | OUTPATIENT
Start: 2024-03-11

## 2024-03-11 RX ORDER — SERTRALINE HYDROCHLORIDE 50 MG/1
50 TABLET, FILM COATED ORAL NIGHTLY
Qty: 90 TABLET | Refills: 1 | Status: SHIPPED | OUTPATIENT
Start: 2024-03-11

## 2024-03-11 NOTE — PROGRESS NOTES
Renetta Stewart is a 74 y.o. female seen today for follow-up on her depression hyperlipidemia and hypertension.  Patient's blood pressures are mildly elevated but she has not seen cardiology in quite some time.  Patient also has questions about continuing her Keppra and will need follow-up with her neurologist.  Overall she is doing much better with no chest pain shortness for breath and with the help of family is meeting her ADLs.  Patient has had no recent seizure activity.  Past Medical History:   Diagnosis Date    Anemia     Anxiety     Dementia     Depression     GERD (gastroesophageal reflux disease)     Hyperlipidemia     Hypertension      Family History   Problem Relation Age of Onset    Hypertension Father      Current Outpatient Medications on File Prior to Visit   Medication Sig Dispense Refill    esomeprazole (NEXIUM) 40 MG capsule Take 1 capsule (40 mg total) by mouth before breakfast. 90 capsule 1    hydrALAZINE (APRESOLINE) 100 MG tablet Take 100 mg by mouth 3 (three) times daily.      levETIRAcetam (KEPPRA) 500 MG Tab Take 1 tablet (500 mg total) by mouth 2 (two) times daily. 60 tablet 11    [DISCONTINUED] levothyroxine (SYNTHROID) 50 MCG tablet Take 50 mcg by mouth.      [DISCONTINUED] mirtazapine (REMERON) 15 MG tablet Take 15 mg by mouth every evening.      [DISCONTINUED] sertraline (ZOLOFT) 50 MG tablet Take 50 mg by mouth.      ciprofloxacin HCl (CILOXAN) 0.3 % ophthalmic solution Place 2 drops into both eyes every 4 (four) hours.      dicyclomine (BENTYL) 20 mg tablet Take 20 mg by mouth 2 (two) times daily.      diltiaZEM (CARDIZEM CD) 240 MG 24 hr capsule Take 240 mg by mouth.      furosemide (LASIX) 20 MG tablet Take 20 mg by mouth.      HYDROcodone-acetaminophen (NORCO) 5-325 mg per tablet Take 1 tablet by mouth every 6 (six) hours as needed for Pain.      LUMIGAN 0.01 % Drop Place 1 drop into both eyes every evening.      megestroL (MEGACE) 400 mg/10 mL (40 mg/mL) Susp Take 400 mg by mouth 2  (two) times daily.      memantine (NAMENDA) 10 MG Tab Take 10 mg by mouth 2 (two) times daily.      methylPREDNISolone (MEDROL) 4 MG Tab Take by mouth.      QUEtiapine (SEROQUEL) 25 MG Tab Take 25 mg by mouth every evening.       No current facility-administered medications on file prior to visit.       There is no immunization history on file for this patient.    Review of Systems   Constitutional:  Negative for fever, malaise/fatigue and weight loss.   Respiratory:  Negative for shortness of breath.    Cardiovascular:  Negative for chest pain and palpitations.   Gastrointestinal:  Negative for nausea and vomiting.   Neurological:  Negative for tremors and seizures.   Psychiatric/Behavioral:  Negative for depression. The patient has insomnia.         Vitals:    03/11/24 1532   BP: (!) 150/102   Pulse:    Resp:    Temp:        Physical Exam  Vitals reviewed.   Constitutional:       Appearance: Normal appearance.   HENT:      Head: Normocephalic.   Eyes:      Extraocular Movements: Extraocular movements intact.      Conjunctiva/sclera: Conjunctivae normal.      Pupils: Pupils are equal, round, and reactive to light.   Neck:      Thyroid: No thyroid mass or thyromegaly.   Cardiovascular:      Rate and Rhythm: Normal rate and regular rhythm.      Heart sounds: Murmur heard.      Systolic murmur is present with a grade of 2/6.      No gallop.   Pulmonary:      Effort: Pulmonary effort is normal. No respiratory distress.      Breath sounds: Normal breath sounds. No wheezing or rales.   Skin:     General: Skin is warm and dry.      Coloration: Skin is not jaundiced or pale.   Neurological:      Mental Status: She is alert. Mental status is at baseline.   Psychiatric:         Mood and Affect: Mood normal.         Behavior: Behavior normal.         Thought Content: Thought content normal.         Judgment: Judgment normal.          Assessment and Plan  1. Primary insomnia  -     mirtazapine (REMERON) 15 MG tablet; Take 1  tablet (15 mg total) by mouth every evening.  Dispense: 90 tablet; Refill: 1    2. Seizure disorder  -     Levetiracetam Level; Future; Expected date: 03/11/2024    3. Essential hypertension  -     CBC Auto Differential; Future; Expected date: 03/11/2024  -     Comprehensive Metabolic Panel; Future; Expected date: 03/11/2024    4. Hypothyroidism, unspecified type  -     levothyroxine (SYNTHROID) 50 MCG tablet; Take 1 tablet (50 mcg total) by mouth before breakfast.  Dispense: 90 tablet; Refill: 1  -     TSH; Future; Expected date: 03/11/2024  -     T4, Free; Future; Expected date: 03/11/2024    5. Depression, unspecified depression type  -     sertraline (ZOLOFT) 50 MG tablet; Take 1 tablet (50 mg total) by mouth every evening.  Dispense: 90 tablet; Refill: 1             Return to clinic in 3 months or as needed once her blood work is in patient be set up for follow-up with Cardiology and Neurology.    Health Maintenance Topics with due status: Not Due       Topic Last Completion Date    Colorectal Cancer Screening 06/18/2019    Lipid Panel 02/14/2022

## 2024-03-12 LAB
ALBUMIN SERPL BCP-MCNC: 3.8 G/DL (ref 3.5–5)
ALBUMIN/GLOB SERPL: 1 {RATIO}
ALP SERPL-CCNC: 96 U/L (ref 55–142)
ALT SERPL W P-5'-P-CCNC: 22 U/L (ref 13–56)
ANION GAP SERPL CALCULATED.3IONS-SCNC: 9 MMOL/L (ref 7–16)
AST SERPL W P-5'-P-CCNC: 19 U/L (ref 15–37)
BILIRUB SERPL-MCNC: 0.2 MG/DL (ref ?–1.2)
BUN SERPL-MCNC: 24 MG/DL (ref 7–18)
BUN/CREAT SERPL: 21 (ref 6–20)
CALCIUM SERPL-MCNC: 9.1 MG/DL (ref 8.5–10.1)
CHLORIDE SERPL-SCNC: 108 MMOL/L (ref 98–107)
CO2 SERPL-SCNC: 31 MMOL/L (ref 21–32)
CREAT SERPL-MCNC: 1.17 MG/DL (ref 0.55–1.02)
EGFR (NO RACE VARIABLE) (RUSH/TITUS): 49 ML/MIN/1.73M2
GLOBULIN SER-MCNC: 3.8 G/DL (ref 2–4)
GLUCOSE SERPL-MCNC: 103 MG/DL (ref 74–106)
LEVETIRACETAM SERPL-MCNC: 41.3 ΜG/ML (ref 12–46)
POTASSIUM SERPL-SCNC: 4 MMOL/L (ref 3.5–5.1)
PROT SERPL-MCNC: 7.6 G/DL (ref 6.4–8.2)
SODIUM SERPL-SCNC: 144 MMOL/L (ref 136–145)
T4 FREE SERPL-MCNC: 1.1 NG/DL (ref 0.76–1.46)
TSH SERPL DL<=0.005 MIU/L-ACNC: 2.26 UIU/ML (ref 0.36–3.74)

## 2024-03-13 ENCOUNTER — TELEPHONE (OUTPATIENT)
Dept: FAMILY MEDICINE | Facility: CLINIC | Age: 74
End: 2024-03-13
Payer: MEDICARE

## 2024-03-13 NOTE — TELEPHONE ENCOUNTER
----- Message from Eldon Govea MD sent at 3/12/2024  4:44 PM CDT -----  Office visit for declining renal function.

## 2024-03-14 NOTE — TELEPHONE ENCOUNTER
Patient attempted, again, no answer, daughter's phone number is the same as patients number, no voicemail to leave message.

## 2024-03-20 ENCOUNTER — TELEPHONE (OUTPATIENT)
Dept: FAMILY MEDICINE | Facility: CLINIC | Age: 74
End: 2024-03-20
Payer: MEDICARE

## 2024-03-21 ENCOUNTER — TELEPHONE (OUTPATIENT)
Dept: FAMILY MEDICINE | Facility: CLINIC | Age: 74
End: 2024-03-21
Payer: MEDICARE

## 2024-03-21 NOTE — TELEPHONE ENCOUNTER
----- Message from Eldon Govae MD sent at 3/12/2024  4:44 PM CDT -----  Office visit for declining renal function.

## 2024-03-22 NOTE — TELEPHONE ENCOUNTER
Patient attempted, again, no answer, left voicemail to return call. Patient's contact number and daughters phone number are the same.

## 2024-04-17 ENCOUNTER — HOSPITAL ENCOUNTER (OUTPATIENT)
Dept: CARDIOLOGY | Facility: HOSPITAL | Age: 74
Discharge: HOME | End: 2024-04-17
Attending: INTERNAL MEDICINE
Payer: MEDICARE

## 2024-04-17 DIAGNOSIS — Z95.0 PRESENCE OF CARDIAC PACEMAKER: ICD-10-CM

## 2024-04-17 DIAGNOSIS — Z95.0 PRESENCE OF CARDIAC PACEMAKER: Primary | ICD-10-CM

## 2024-04-17 PROCEDURE — 93294 REM INTERROG EVL PM/LDLS PM: CPT | Mod: ,,, | Performed by: INTERNAL MEDICINE

## 2024-04-17 PROCEDURE — 93296 REM INTERROG EVL PM/IDS: CPT

## 2024-04-22 ENCOUNTER — OFFICE VISIT (OUTPATIENT)
Dept: NEUROLOGY | Facility: CLINIC | Age: 74
End: 2024-04-22
Payer: MEDICARE

## 2024-04-22 VITALS
BODY MASS INDEX: 40.12 KG/M2 | HEIGHT: 64 IN | SYSTOLIC BLOOD PRESSURE: 190 MMHG | RESPIRATION RATE: 18 BRPM | DIASTOLIC BLOOD PRESSURE: 98 MMHG | WEIGHT: 235 LBS | OXYGEN SATURATION: 98 % | HEART RATE: 123 BPM

## 2024-04-22 DIAGNOSIS — R56.9 SEIZURE: Primary | ICD-10-CM

## 2024-04-22 DIAGNOSIS — Z95.0 PRESENCE OF CARDIAC PACEMAKER: Primary | ICD-10-CM

## 2024-04-22 DIAGNOSIS — R41.89 COGNITIVE IMPAIRMENT: ICD-10-CM

## 2024-04-22 PROCEDURE — 1126F AMNT PAIN NOTED NONE PRSNT: CPT | Mod: CPTII,,, | Performed by: PSYCHIATRY & NEUROLOGY

## 2024-04-22 PROCEDURE — 1159F MED LIST DOCD IN RCRD: CPT | Mod: CPTII,,, | Performed by: PSYCHIATRY & NEUROLOGY

## 2024-04-22 PROCEDURE — 99214 OFFICE O/P EST MOD 30 MIN: CPT | Mod: S$PBB,,, | Performed by: PSYCHIATRY & NEUROLOGY

## 2024-04-22 PROCEDURE — 1101F PT FALLS ASSESS-DOCD LE1/YR: CPT | Mod: CPTII,,, | Performed by: PSYCHIATRY & NEUROLOGY

## 2024-04-22 PROCEDURE — 99215 OFFICE O/P EST HI 40 MIN: CPT | Mod: PBBFAC | Performed by: PSYCHIATRY & NEUROLOGY

## 2024-04-22 PROCEDURE — 3077F SYST BP >= 140 MM HG: CPT | Mod: CPTII,,, | Performed by: PSYCHIATRY & NEUROLOGY

## 2024-04-22 PROCEDURE — 3080F DIAST BP >= 90 MM HG: CPT | Mod: CPTII,,, | Performed by: PSYCHIATRY & NEUROLOGY

## 2024-04-22 PROCEDURE — 3288F FALL RISK ASSESSMENT DOCD: CPT | Mod: CPTII,,, | Performed by: PSYCHIATRY & NEUROLOGY

## 2024-04-22 NOTE — PATIENT INSTRUCTIONS
Continue current memory medications but caution with opioid narcotics  Taper off Keppra 500 mg once daily x 2 weeks then every other day x 2 weeks then OFF  Recommend increasing physical activity as tolerated  Follow-up with the PCP regularly regarding consistently elevated blood pressure.  Follow-up six-months

## 2024-04-22 NOTE — PROGRESS NOTES
Subjective:       Patient ID: Renetta Stewart is a 74 y.o. female     Chief Complaint:    Chief Complaint   Patient presents with    Seizures     Pt. States no seizure.        Allergies:  Patient has no known allergies.    Current Medications:    Outpatient Encounter Medications as of 4/22/2024   Medication Sig Dispense Refill    ciprofloxacin HCl (CILOXAN) 0.3 % ophthalmic solution Place 2 drops into both eyes every 4 (four) hours.      dicyclomine (BENTYL) 20 mg tablet Take 20 mg by mouth 2 (two) times daily.      diltiaZEM (CARDIZEM CD) 240 MG 24 hr capsule Take 240 mg by mouth.      esomeprazole (NEXIUM) 40 MG capsule Take 1 capsule (40 mg total) by mouth before breakfast. 90 capsule 1    furosemide (LASIX) 20 MG tablet Take 20 mg by mouth.      hydrALAZINE (APRESOLINE) 100 MG tablet Take 100 mg by mouth 3 (three) times daily.      HYDROcodone-acetaminophen (NORCO) 5-325 mg per tablet Take 1 tablet by mouth every 6 (six) hours as needed for Pain.      levETIRAcetam (KEPPRA) 500 MG Tab Take 1 tablet (500 mg total) by mouth 2 (two) times daily. 60 tablet 11    levothyroxine (SYNTHROID) 50 MCG tablet Take 1 tablet (50 mcg total) by mouth before breakfast. 90 tablet 1    LUMIGAN 0.01 % Drop Place 1 drop into both eyes every evening.      megestroL (MEGACE) 400 mg/10 mL (40 mg/mL) Susp Take 400 mg by mouth 2 (two) times daily.      memantine (NAMENDA) 10 MG Tab Take 10 mg by mouth 2 (two) times daily.      methylPREDNISolone (MEDROL) 4 MG Tab Take by mouth.      mirtazapine (REMERON) 15 MG tablet Take 1 tablet (15 mg total) by mouth every evening. 90 tablet 1    QUEtiapine (SEROQUEL) 25 MG Tab Take 25 mg by mouth every evening.      sertraline (ZOLOFT) 50 MG tablet Take 1 tablet (50 mg total) by mouth every evening. 90 tablet 1     No facility-administered encounter medications on file as of 4/22/2024.       History of Present Illness  74-year-old black female in clinic for follow-up of dementia issues and possible  "seizures?  Patient has multiple medical problems and is on multiple medications- She has not followed up in the office in the last year and was placed on Keppra 500 b.i.d. 1 year ago after possible seizure and ground level fall?  Patient has known med noncompliance and was sundowning per daughter despite Seroquel 25 mg p.o. q.h.s.  Per daughter patient has not had any seizures in the last year since being on Keppra 500 mg b.i.d.          Past Medical History:   Diagnosis Date    Anemia     Anxiety     Dementia     Depression     GERD (gastroesophageal reflux disease)     Hyperlipidemia     Hypertension        No past surgical history on file.    Social History  Ms. Stewart  reports that she has never smoked. She has never been exposed to tobacco smoke. She has never used smokeless tobacco. She reports that she does not currently use alcohol. She reports that she does not use drugs.    Family History  Ms.'s Stewatr family history includes Hypertension in her father.    Review of Systems  Review of Systems   Neurological:  Positive for seizures.   Psychiatric/Behavioral:  Positive for depression and memory loss. The patient is nervous/anxious.    All other systems reviewed and are negative.     Objective:   BP (!) 190/98 (BP Location: Right arm, Patient Position: Sitting, BP Method: Large (Manual))   Pulse (!) 123   Resp 18   Ht 5' 4" (1.626 m)   Wt 106.6 kg (235 lb)   SpO2 98%   BMI 40.34 kg/m²    NEUROLOGICAL EXAMINATION:     MENTAL STATUS   Level of consciousness: drowsy  Knowledge: consistent with education.        Dementia as well as depression and anxiety     CRANIAL NERVES   Cranial nerves II through XII intact.     MOTOR EXAM     Strength   Strength 5/5 throughout.     GAIT AND COORDINATION        Uses cane         Physical Exam  Constitutional:       Appearance: She is obese.   Neurological:      Mental Status: She is alert. Mental status is at baseline.      Cranial Nerves: Cranial nerves 2-12 are intact. "      Motor: Motor strength is normal.         Assessment:     Seizure    Cognitive impairment         Primary Diagnosis and ICD10  Seizure [R56.9]    Plan:     Patient Instructions   Continue current memory medications but caution with opioid narcotics  Taper off Keppra 500 mg once daily x 2 weeks then every other day x 2 weeks then OFF  Recommend increasing physical activity as tolerated  Follow-up with the PCP regularly regarding consistently elevated blood pressure.  Follow-up six-months      There are no discontinued medications.    Requested Prescriptions      No prescriptions requested or ordered in this encounter

## 2024-05-03 DIAGNOSIS — K21.9 GASTROESOPHAGEAL REFLUX DISEASE, UNSPECIFIED WHETHER ESOPHAGITIS PRESENT: ICD-10-CM

## 2024-05-03 RX ORDER — MEMANTINE HYDROCHLORIDE 10 MG/1
10 TABLET ORAL 2 TIMES DAILY
Qty: 180 TABLET | Refills: 1 | Status: SHIPPED | OUTPATIENT
Start: 2024-05-03

## 2024-05-03 RX ORDER — ESOMEPRAZOLE MAGNESIUM 40 MG/1
40 CAPSULE, DELAYED RELEASE ORAL
Qty: 90 CAPSULE | Refills: 1 | Status: SHIPPED | OUTPATIENT
Start: 2024-05-03 | End: 2024-10-30

## 2024-05-03 RX ORDER — FUROSEMIDE 20 MG/1
20 TABLET ORAL DAILY
Qty: 90 TABLET | Refills: 1 | Status: SHIPPED | OUTPATIENT
Start: 2024-05-03

## 2024-05-03 RX ORDER — HYDRALAZINE HYDROCHLORIDE 100 MG/1
100 TABLET, FILM COATED ORAL 3 TIMES DAILY
Qty: 270 TABLET | Refills: 1 | Status: SHIPPED | OUTPATIENT
Start: 2024-05-03

## 2024-05-03 NOTE — TELEPHONE ENCOUNTER
----- Message from Brianna Juarez MA sent at 5/2/2024 11:56 AM CDT -----  Please call in esomeprazole, hydralazine, memantine, and furosemide to [facility] on [address]

## 2024-05-06 ENCOUNTER — OFFICE VISIT (OUTPATIENT)
Dept: CARDIOLOGY | Facility: CLINIC | Age: 74
End: 2024-05-06
Payer: MEDICARE

## 2024-05-06 VITALS
HEART RATE: 104 BPM | OXYGEN SATURATION: 97 % | HEIGHT: 64 IN | DIASTOLIC BLOOD PRESSURE: 80 MMHG | SYSTOLIC BLOOD PRESSURE: 160 MMHG | WEIGHT: 239 LBS | BODY MASS INDEX: 40.8 KG/M2

## 2024-05-06 DIAGNOSIS — E78.2 MIXED HYPERLIPIDEMIA: ICD-10-CM

## 2024-05-06 DIAGNOSIS — R41.89 COGNITIVE IMPAIRMENT: ICD-10-CM

## 2024-05-06 DIAGNOSIS — I10 ESSENTIAL HYPERTENSION: Primary | ICD-10-CM

## 2024-05-06 DIAGNOSIS — I48.91 ATRIAL FIBRILLATION, UNSPECIFIED TYPE: ICD-10-CM

## 2024-05-06 DIAGNOSIS — F32.5 MAJOR DEPRESSIVE DISORDER IN REMISSION, UNSPECIFIED WHETHER RECURRENT: ICD-10-CM

## 2024-05-06 PROCEDURE — 3079F DIAST BP 80-89 MM HG: CPT | Mod: CPTII,,, | Performed by: INTERNAL MEDICINE

## 2024-05-06 PROCEDURE — 99214 OFFICE O/P EST MOD 30 MIN: CPT | Mod: S$PBB,,, | Performed by: INTERNAL MEDICINE

## 2024-05-06 PROCEDURE — 1159F MED LIST DOCD IN RCRD: CPT | Mod: CPTII,,, | Performed by: INTERNAL MEDICINE

## 2024-05-06 PROCEDURE — 3288F FALL RISK ASSESSMENT DOCD: CPT | Mod: CPTII,,, | Performed by: INTERNAL MEDICINE

## 2024-05-06 PROCEDURE — 1101F PT FALLS ASSESS-DOCD LE1/YR: CPT | Mod: CPTII,,, | Performed by: INTERNAL MEDICINE

## 2024-05-06 PROCEDURE — 99214 OFFICE O/P EST MOD 30 MIN: CPT | Mod: PBBFAC,25 | Performed by: INTERNAL MEDICINE

## 2024-05-06 PROCEDURE — 1160F RVW MEDS BY RX/DR IN RCRD: CPT | Mod: CPTII,,, | Performed by: INTERNAL MEDICINE

## 2024-05-06 PROCEDURE — 3077F SYST BP >= 140 MM HG: CPT | Mod: CPTII,,, | Performed by: INTERNAL MEDICINE

## 2024-05-06 PROCEDURE — 93005 ELECTROCARDIOGRAM TRACING: CPT | Mod: PBBFAC | Performed by: INTERNAL MEDICINE

## 2024-05-06 PROCEDURE — 93010 ELECTROCARDIOGRAM REPORT: CPT | Mod: S$PBB,,, | Performed by: INTERNAL MEDICINE

## 2024-05-06 NOTE — PROGRESS NOTES
[facility] Medicine  Discharge Summary      Patient Name: Renetta Stewart  MRN: [id number]  SARAY: [id number]  Patient Class: OP- Hospital Outpatient Clinic  Admission Date: (Not on file)  Hospital Length of Stay: 0 days  Discharge Date and Time:  05/06/2024 12:12 PM  Attending Physician: No att. providers found   Discharging Provider: Brendon Rosenberg DO  Primary Care Provider: Eldon Govea MD    Primary Care Team: Networked reference to record PCT     HPI:   No notes on file      * No surgery found *      Hospital Course:   3/31: Continue with cardiac workup as patient may need to be placed a psych facility and/or have a psych evaluation.    4/1: Will consult GI for dysphagia and patient has already had an EGD and has had a colonoscopy in the past.  She had a barium swallow this hospitalization and this can be evaluated.    4/2: CT abd/pelvis with oral and IV contrast given report of abdominal pain and refusal to eat solid foods as a result.  40 pound weight loss over last 6 months, per family.      4/3: After extensive workup there is no Medical anatomical reason that patient is unable to swallow.  There may be psychological or psychiatric component to her illness.  Case was discussed with GI.  Plan to discharge home with family or to psych unit.  Anticipate discharge tomorrow.    4/4: Plan to discharge to psych facility.     4/5: Patient was accepted at a facility but family was not agreeable due to the distance.    Plan to discharge patient home in the morning.  Family may consider a local facility in the future.  However, [facility] is not an option due to patient's insurance.  [facility] is not an option as they no longer have a geripsych unit.    Overall, patient is improved.  She is A&O x 3 today.  She intermittently refuses to eat solid foods.      4/6:  Patient accepted to psych facility in [facility] and patient and her family are agreeable  to go to this facility.  Plan for discharge today.       Goals of Care Treatment Preferences:  Code Status: Full Code      Consults:         Assessment & plan notes cannot be loaded without a specified hospital service.    There are no hospital problems to display for this patient.      Discharged Condition: fair    Disposition:     Follow Up:    Patient Instructions:   This SmartLink can only be used on hospital encounters.      Significant Diagnostic Studies: Labs: BMP: No results for input(s): GLU, NA, K, CL, CO2, BUN, CREATININE, CALCIUM, MG in the last 48 hours. and CBC No results for input(s): WBC, HGB, HCT, PLT in the last 48 hours.    Pending Diagnostic Studies:       Procedure Component Value Units Date/Time    EKG 12-lead [[id number]]     Order Status: Sent Lab Status: No result            Medications:  Reconciled Home Medications:      Medication List            Accurate as of May 6, 2024  4:05 PM. If you have any questions, ask your nurse or doctor.                CONTINUE taking these medications      ciprofloxacin HCl 0.3 % ophthalmic solution  Commonly known as: CILOXAN  Place 2 drops into both eyes every 4 (four) hours.     dicyclomine 20 mg tablet  Commonly known as: BENTYL  Take 20 mg by mouth 2 (two) times daily.     diltiaZEM 240 MG 24 hr capsule  Commonly known as: CARDIZEM CD  Take 240 mg by mouth.     esomeprazole 40 MG capsule  Commonly known as: NEXIUM  Take 1 capsule (40 mg total) by mouth before breakfast.     furosemide 20 MG tablet  Commonly known as: LASIX  Take 1 tablet (20 mg total) by mouth once daily.     hydrALAZINE 100 MG tablet  Commonly known as: APRESOLINE  Take 1 tablet (100 mg total) by mouth 3 (three) times daily.     HYDROcodone-acetaminophen 5-325 mg per tablet  Commonly known as: NORCO  Take 1 tablet by mouth every 6 (six) hours as needed for Pain.     levETIRAcetam 500 MG Tab  Commonly known as: KEPPRA  Take 1 tablet (500 mg total) by mouth 2 (two) times daily.      levothyroxine 50 MCG tablet  Commonly known as: SYNTHROID  Take 1 tablet (50 mcg total) by mouth before breakfast.     LUMIGAN 0.01 % Drop  Generic drug: bimatoprost  Place 1 drop into both eyes every evening.     megestroL 400 mg/10 mL (40 mg/mL) Susp  Commonly known as: MEGACE  Take 400 mg by mouth 2 (two) times daily.     memantine 10 MG Tab  Commonly known as: NAMENDA  Take 1 tablet (10 mg total) by mouth 2 (two) times daily.     methylPREDNISolone 4 MG Tab  Commonly known as: MEDROL  Take by mouth.     mirtazapine 15 MG tablet  Commonly known as: REMERON  Take 1 tablet (15 mg total) by mouth every evening.     QUEtiapine 25 MG Tab  Commonly known as: SEROQUEL  Take 25 mg by mouth every evening.     sertraline 50 MG tablet  Commonly known as: ZOLOFT  Take 1 tablet (50 mg total) by mouth every evening.              Indwelling Lines/Drains at time of discharge:   Lines/Drains/Airways       None                   Time spent on the discharge of patient: 46 minutes         Brendon Rosenberg DO  Department of [facility] Medicine  [facility] - [facility]

## 2024-05-07 LAB
OHS QRS DURATION: 122 MS
OHS QTC CALCULATION: 486 MS

## 2024-05-30 NOTE — PROGRESS NOTES
Cardiology Clinic Note:    PCP: Eldon Govea MD      REFERRING PHYSICIAN:Eldon Govea MD      CHIEF COMPLAINT: Lower extremity edema    HISTORY OF PRESENT ILLNESS:  Renetta Stewart is a 74 y.o. female for evaluation of lower extremity edema, PAF.     Patient states heart racing and skipping has resolved.  She is active cleaning house and yard without provocation of chest pain, pressure, tightness or shortness of breath.   She notes  lower extremity edema has improved.  Blood pressure better controlled.  She has no new cardiac complaints today.      Review of Systems   Constitutional: Negative for diaphoresis, malaise/fatigue, night sweats and weight gain.   HENT:  Negative for congestion, ear pain, hearing loss, nosebleeds and sore throat.    Eyes:  Negative for blurred vision, double vision, pain, photophobia and visual disturbance.   Cardiovascular:  Positive for leg swelling. Negative for chest pain, claudication, dyspnea on exertion, irregular heartbeat, near-syncope, orthopnea, palpitations and syncope.   Respiratory:  Negative for cough, shortness of breath, sleep disturbances due to breathing, snoring and wheezing.    Endocrine: Negative for cold intolerance, heat intolerance, polydipsia, polyphagia and polyuria.   Hematologic/Lymphatic: Negative for bleeding problem. Does not bruise/bleed easily.   Skin:  Negative for dry skin, flushing, itching, rash and skin cancer.   Musculoskeletal:  Negative for arthritis, back pain, falls, joint pain, muscle cramps, muscle weakness and myalgias.   Gastrointestinal:  Negative for abdominal pain, change in bowel habit, constipation, diarrhea, dysphagia, heartburn, nausea and vomiting.   Genitourinary:  Negative for bladder incontinence, dysuria, flank pain, frequency and nocturia.   Neurological:  Negative for dizziness, focal weakness, headaches, light-headedness, loss of balance, numbness, paresthesias and seizures.   Psychiatric/Behavioral:  Negative for  depression, memory loss and substance abuse. The patient is not nervous/anxious.    Allergic/Immunologic: Negative for environmental allergies.         PAST MEDICAL HISTORY:  Past Medical History:   Diagnosis Date    Anemia     Anxiety     Dementia     Depression     GERD (gastroesophageal reflux disease)     Hyperlipidemia     Hypertension     Stroke        PAST SURGICAL HISTORY:  History reviewed. No pertinent surgical history.    SOCIAL HISTORY:  Social History     Socioeconomic History    Marital status: Single   Tobacco Use    Smoking status: Never     Passive exposure: Never    Smokeless tobacco: Never   Substance and Sexual Activity    Alcohol use: Not Currently    Drug use: Never    Sexual activity: Not Currently     Social Determinants of Health     Financial Resource Strain: Low Risk  (3/31/2023)    Overall Financial Resource Strain (CARDIA)     Difficulty of Paying Living Expenses: Not hard at all   Food Insecurity: No Food Insecurity (3/31/2023)    Hunger Vital Sign     Worried About Running Out of Food in the Last Year: Never true     Ran Out of Food in the Last Year: Never true   Transportation Needs: No Transportation Needs (3/31/2023)    PRAPARE - Transportation     Lack of Transportation (Medical): No     Lack of Transportation (Non-Medical): No   Physical Activity: Inactive (3/31/2023)    Exercise Vital Sign     Days of Exercise per Week: 0 days     Minutes of Exercise per Session: 0 min   Stress: No Stress Concern Present (3/31/2023)    [employer] [employer] - Occupational Stress Questionnaire     Feeling of Stress : Not at all   Housing Stability: Low Risk  (3/31/2023)    Housing Stability Vital Sign     Unable to Pay for Housing in the Last Year: No     Number of Places Lived in the Last Year: 2     Unstable Housing in the Last Year: No       FAMILY HISTORY:  Family History   Problem Relation Name Age of Onset    Hypertension Father         ALLERGIES:  Review of patient's  allergies indicates:  No Known Allergies    MEDICATIONS:    Current Outpatient Medications:     ciprofloxacin HCl (CILOXAN) 0.3 % ophthalmic solution, Place 2 drops into both eyes every 4 (four) hours., Disp: , Rfl:     dicyclomine (BENTYL) 20 mg tablet, Take 20 mg by mouth 2 (two) times daily., Disp: , Rfl:     diltiaZEM (CARDIZEM CD) 240 MG 24 hr capsule, Take 240 mg by mouth., Disp: , Rfl:     esomeprazole (NEXIUM) 40 MG capsule, Take 1 capsule (40 mg total) by mouth before breakfast., Disp: 90 capsule, Rfl: 1    furosemide (LASIX) 20 MG tablet, Take 1 tablet (20 mg total) by mouth once daily., Disp: 90 tablet, Rfl: 1    hydrALAZINE (APRESOLINE) 100 MG tablet, Take 1 tablet (100 mg total) by mouth 3 (three) times daily., Disp: 270 tablet, Rfl: 1    HYDROcodone-acetaminophen (NORCO) 5-325 mg per tablet, Take 1 tablet by mouth every 6 (six) hours as needed for Pain., Disp: , Rfl:     levETIRAcetam (KEPPRA) 500 MG Tab, Take 1 tablet (500 mg total) by mouth 2 (two) times daily., Disp: 60 tablet, Rfl: 11    levothyroxine (SYNTHROID) 50 MCG tablet, Take 1 tablet (50 mcg total) by mouth before breakfast., Disp: 90 tablet, Rfl: 1    LUMIGAN 0.01 % Drop, Place 1 drop into both eyes every evening., Disp: , Rfl:     megestroL (MEGACE) 400 mg/10 mL (40 mg/mL) Susp, Take 400 mg by mouth 2 (two) times daily., Disp: , Rfl:     memantine (NAMENDA) 10 MG Tab, Take 1 tablet (10 mg total) by mouth 2 (two) times daily., Disp: 180 tablet, Rfl: 1    mirtazapine (REMERON) 15 MG tablet, Take 1 tablet (15 mg total) by mouth every evening., Disp: 90 tablet, Rfl: 1    QUEtiapine (SEROQUEL) 25 MG Tab, Take 25 mg by mouth every evening., Disp: , Rfl:     sertraline (ZOLOFT) 50 MG tablet, Take 1 tablet (50 mg total) by mouth every evening., Disp: 90 tablet, Rfl: 1    methylPREDNISolone (MEDROL) 4 MG Tab, Take by mouth. (Patient not taking: Reported on 5/6/2024), Disp: , Rfl:     PHYSICAL EXAM:  BP (!) 160/80 (BP Location: Right arm, Patient  "Position: Sitting)   Pulse 104   Ht 5' 4" (1.626 m)   Wt 108.4 kg (239 lb)   SpO2 97%   BMI 41.02 kg/m²   Wt Readings from Last 3 Encounters:   05/06/24 108.4 kg (239 lb)   04/22/24 106.6 kg (235 lb)   03/11/24 103.4 kg (228 lb)      Body mass index is 41.02 kg/m².    Physical Exam  Vitals and nursing note reviewed.   Constitutional:       Appearance: Normal appearance. She is obese.   HENT:      Head: Normocephalic and atraumatic.      Right Ear: External ear normal.      Left Ear: External ear normal.   Eyes:      General: No scleral icterus.        Right eye: No discharge.         Left eye: No discharge.      Extraocular Movements: Extraocular movements intact.      Conjunctiva/sclera: Conjunctivae normal.      Pupils: Pupils are equal, round, and reactive to light.   Cardiovascular:      Rate and Rhythm: Normal rate and regular rhythm.      Pulses: Normal pulses.      Heart sounds: Normal heart sounds. No murmur heard.     No friction rub. No gallop.   Pulmonary:      Effort: Pulmonary effort is normal.      Breath sounds: Normal breath sounds. No wheezing, rhonchi or rales.   Chest:      Chest wall: No tenderness.   Abdominal:      General: Abdomen is flat. Bowel sounds are normal. There is no distension.      Palpations: Abdomen is soft.      Tenderness: There is no abdominal tenderness. There is no guarding or rebound.   Musculoskeletal:         General: No swelling or tenderness. Normal range of motion.      Cervical back: Normal range of motion and neck supple.      Right lower leg: Edema present.      Left lower leg: Edema present.   Skin:     General: Skin is warm and dry.      Findings: No erythema or rash.   Neurological:      General: No focal deficit present.      Mental Status: She is alert and oriented to person, place, and time.      Cranial Nerves: No cranial nerve deficit.      Motor: No weakness.      Gait: Gait normal.   Psychiatric:         Mood and Affect: Mood normal.         Behavior: " Behavior normal.         Thought Content: Thought content normal.         Judgment: Judgment normal.         LABS REVIEWED:  Lab Results   Component Value Date    WBC 4.89 03/11/2024    RBC 4.82 03/11/2024    HGB 12.6 03/11/2024    HCT 41.8 03/11/2024    MCV 86.7 03/11/2024    MCH 26.1 (L) 03/11/2024    MCHC 30.1 (L) 03/11/2024    RDW 14.3 03/11/2024     03/11/2024    MPV 10.6 03/11/2024    NRBC 0.0 03/11/2024    INR 0.98 09/22/2020     Lab Results   Component Value Date     03/11/2024    K 4.0 03/11/2024     (H) 03/11/2024    CO2 31 03/11/2024    BUN 24 (H) 03/11/2024    MG 2.0 05/22/2023     Lab Results   Component Value Date     09/22/2020    AST 19 03/11/2024    ALT 22 03/11/2024     Lab Results   Component Value Date     03/11/2024     Lab Results   Component Value Date    CHOL 131 02/14/2022    HDL 78 (H) 02/14/2022    TRIG 78 02/14/2022    CHOLHDL 1.7 02/14/2022       CARDIAC STUDIES REVIEWED:  EKG  3/9/22 - NSR. LVH with QRS widening   Lexiscan 3/18/22 -  negative for ischemia or infarct.    OTHER IMAGING STUDIES REVIEWED:    ASSESSMENT/Plan:    1.   Paroxysmal atrial fibrillation,  remains asymptomatic, maintaining NSR in office today,  On Toprol XL 50 mg, qd, Eliquis 5 mg AM and PM,for primary stroke risk  reduction    2.   Hypertension - controlled,                   3.   Morbid obesity - weight down 6  lbs, discussed lifestyle changes..             4.   Abnormal EKG, normal stress imaging, continue lifestyle modifications,   5.   Lower extremity edema, has resolved.

## 2024-06-11 ENCOUNTER — OFFICE VISIT (OUTPATIENT)
Dept: FAMILY MEDICINE | Facility: CLINIC | Age: 74
End: 2024-06-11
Payer: MEDICARE

## 2024-06-11 VITALS
SYSTOLIC BLOOD PRESSURE: 154 MMHG | RESPIRATION RATE: 18 BRPM | OXYGEN SATURATION: 98 % | DIASTOLIC BLOOD PRESSURE: 76 MMHG | HEIGHT: 64 IN | TEMPERATURE: 99 F | WEIGHT: 232 LBS | BODY MASS INDEX: 39.61 KG/M2 | HEART RATE: 82 BPM

## 2024-06-11 DIAGNOSIS — I10 HYPERTENSION, UNSPECIFIED TYPE: Primary | ICD-10-CM

## 2024-06-11 PROCEDURE — 99213 OFFICE O/P EST LOW 20 MIN: CPT | Mod: ,,, | Performed by: FAMILY MEDICINE

## 2024-06-11 PROCEDURE — 1159F MED LIST DOCD IN RCRD: CPT | Mod: ,,, | Performed by: FAMILY MEDICINE

## 2024-06-11 PROCEDURE — 1101F PT FALLS ASSESS-DOCD LE1/YR: CPT | Mod: ,,, | Performed by: FAMILY MEDICINE

## 2024-06-11 PROCEDURE — 3288F FALL RISK ASSESSMENT DOCD: CPT | Mod: ,,, | Performed by: FAMILY MEDICINE

## 2024-06-11 PROCEDURE — 1160F RVW MEDS BY RX/DR IN RCRD: CPT | Mod: ,,, | Performed by: FAMILY MEDICINE

## 2024-06-11 PROCEDURE — 3078F DIAST BP <80 MM HG: CPT | Mod: ,,, | Performed by: FAMILY MEDICINE

## 2024-06-11 PROCEDURE — 1125F AMNT PAIN NOTED PAIN PRSNT: CPT | Mod: ,,, | Performed by: FAMILY MEDICINE

## 2024-06-11 PROCEDURE — 3077F SYST BP >= 140 MM HG: CPT | Mod: ,,, | Performed by: FAMILY MEDICINE

## 2024-06-11 RX ORDER — LOSARTAN POTASSIUM 25 MG/1
25 TABLET ORAL DAILY
Qty: 90 TABLET | Refills: 3 | Status: SHIPPED | OUTPATIENT
Start: 2024-06-11 | End: 2025-06-11

## 2024-06-11 NOTE — PROGRESS NOTES
Renetta Stewart is a 74 y.o. female seen today for follow-up on her hypertension dementia depression and seizure disorder.  She is doing very well sleeping well with no recent seizure activity.  Her blood pressures are elevated and she does have an impaired renal function has a her last lab work.  We discussed adding Cozaar at night to her current regimen and following up with home blood pressure readings and month.  Patient is scheduled for follow-up with Cardiology in November.    Past Medical History:   Diagnosis Date    Anemia     Anxiety     Dementia     Depression     GERD (gastroesophageal reflux disease)     Hyperlipidemia     Hypertension     Stroke      Family History   Problem Relation Name Age of Onset    Hypertension Father       Current Outpatient Medications on File Prior to Visit   Medication Sig Dispense Refill    dicyclomine (BENTYL) 20 mg tablet Take 20 mg by mouth 2 (two) times daily.      diltiaZEM (CARDIZEM CD) 240 MG 24 hr capsule Take 240 mg by mouth.      esomeprazole (NEXIUM) 40 MG capsule Take 1 capsule (40 mg total) by mouth before breakfast. 90 capsule 1    furosemide (LASIX) 20 MG tablet Take 1 tablet (20 mg total) by mouth once daily. 90 tablet 1    hydrALAZINE (APRESOLINE) 100 MG tablet Take 1 tablet (100 mg total) by mouth 3 (three) times daily. 270 tablet 1    HYDROcodone-acetaminophen (NORCO) 5-325 mg per tablet Take 1 tablet by mouth every 6 (six) hours as needed for Pain.      levothyroxine (SYNTHROID) 50 MCG tablet Take 1 tablet (50 mcg total) by mouth before breakfast. 90 tablet 1    LUMIGAN 0.01 % Drop Place 1 drop into both eyes every evening.      megestroL (MEGACE) 400 mg/10 mL (40 mg/mL) Susp Take 400 mg by mouth 2 (two) times daily.      memantine (NAMENDA) 10 MG Tab Take 1 tablet (10 mg total) by mouth 2 (two) times daily. 180 tablet 1    mirtazapine (REMERON) 15 MG tablet Take 1 tablet (15 mg total) by mouth every evening. 90 tablet 1    QUEtiapine (SEROQUEL) 25 MG Tab  Take 25 mg by mouth every evening.      sertraline (ZOLOFT) 50 MG tablet Take 1 tablet (50 mg total) by mouth every evening. 90 tablet 1    ciprofloxacin HCl (CILOXAN) 0.3 % ophthalmic solution Place 2 drops into both eyes every 4 (four) hours. (Patient not taking: Reported on 6/11/2024)      levETIRAcetam (KEPPRA) 500 MG Tab Take 1 tablet (500 mg total) by mouth 2 (two) times daily. 60 tablet 11    methylPREDNISolone (MEDROL) 4 MG Tab Take by mouth.       No current facility-administered medications on file prior to visit.       There is no immunization history on file for this patient.    Review of Systems   Constitutional:  Negative for fever, malaise/fatigue and weight loss.   Respiratory:  Negative for shortness of breath.    Cardiovascular:  Negative for chest pain and palpitations.   Gastrointestinal:  Negative for nausea and vomiting.   Psychiatric/Behavioral:  Negative for depression.         Vitals:    06/11/24 1453   BP: (!) 154/76   Pulse:    Resp:    Temp:        Physical Exam  Vitals reviewed.   Constitutional:       Appearance: Normal appearance.   HENT:      Head: Normocephalic.   Eyes:      Extraocular Movements: Extraocular movements intact.      Conjunctiva/sclera: Conjunctivae normal.      Pupils: Pupils are equal, round, and reactive to light.   Neck:      Thyroid: No thyroid mass or thyromegaly.   Cardiovascular:      Rate and Rhythm: Normal rate and regular rhythm.      Heart sounds: Murmur heard.      Systolic murmur is present with a grade of 3/6.      No gallop.   Pulmonary:      Effort: Pulmonary effort is normal. No respiratory distress.      Breath sounds: Normal breath sounds. No wheezing or rales.   Skin:     General: Skin is warm and dry.      Coloration: Skin is not jaundiced or pale.   Neurological:      Mental Status: She is alert.   Psychiatric:         Mood and Affect: Mood normal.         Behavior: Behavior normal.         Thought Content: Thought content normal.          Judgment: Judgment normal.          Assessment and Plan  1. Hypertension, unspecified type  -     losartan (COZAAR) 25 MG tablet; Take 1 tablet (25 mg total) by mouth once daily.  Dispense: 90 tablet; Refill: 3             Return to clinic in one month with home blood pressure log or    Health Maintenance Topics with due status: Not Due       Topic Last Completion Date    Colorectal Cancer Screening 06/18/2019    Lipid Panel 02/14/2022    Influenza Vaccine 01/12/2023

## 2024-07-15 DIAGNOSIS — Z95.0 PRESENCE OF CARDIAC PACEMAKER: Primary | ICD-10-CM

## 2024-07-16 ENCOUNTER — OFFICE VISIT (OUTPATIENT)
Dept: FAMILY MEDICINE | Facility: CLINIC | Age: 74
End: 2024-07-16
Payer: MEDICARE

## 2024-07-16 VITALS
DIASTOLIC BLOOD PRESSURE: 98 MMHG | RESPIRATION RATE: 20 BRPM | WEIGHT: 240 LBS | BODY MASS INDEX: 40.97 KG/M2 | OXYGEN SATURATION: 98 % | SYSTOLIC BLOOD PRESSURE: 188 MMHG | HEART RATE: 78 BPM | HEIGHT: 64 IN | TEMPERATURE: 98 F

## 2024-07-16 DIAGNOSIS — I10 HYPERTENSION, UNSPECIFIED TYPE: Primary | ICD-10-CM

## 2024-07-16 PROCEDURE — 3080F DIAST BP >= 90 MM HG: CPT | Mod: ,,, | Performed by: FAMILY MEDICINE

## 2024-07-16 PROCEDURE — 4010F ACE/ARB THERAPY RXD/TAKEN: CPT | Mod: ,,, | Performed by: FAMILY MEDICINE

## 2024-07-16 PROCEDURE — 3288F FALL RISK ASSESSMENT DOCD: CPT | Mod: ,,, | Performed by: FAMILY MEDICINE

## 2024-07-16 PROCEDURE — 1160F RVW MEDS BY RX/DR IN RCRD: CPT | Mod: ,,, | Performed by: FAMILY MEDICINE

## 2024-07-16 PROCEDURE — 3077F SYST BP >= 140 MM HG: CPT | Mod: ,,, | Performed by: FAMILY MEDICINE

## 2024-07-16 PROCEDURE — 1159F MED LIST DOCD IN RCRD: CPT | Mod: ,,, | Performed by: FAMILY MEDICINE

## 2024-07-16 PROCEDURE — 99213 OFFICE O/P EST LOW 20 MIN: CPT | Mod: ,,, | Performed by: FAMILY MEDICINE

## 2024-07-16 PROCEDURE — 1101F PT FALLS ASSESS-DOCD LE1/YR: CPT | Mod: ,,, | Performed by: FAMILY MEDICINE

## 2024-07-16 PROCEDURE — 3008F BODY MASS INDEX DOCD: CPT | Mod: ,,, | Performed by: FAMILY MEDICINE

## 2024-07-16 PROCEDURE — 1126F AMNT PAIN NOTED NONE PRSNT: CPT | Mod: ,,, | Performed by: FAMILY MEDICINE

## 2024-07-16 RX ORDER — VALSARTAN 160 MG/1
160 TABLET ORAL DAILY
Qty: 90 TABLET | Refills: 1 | Status: SHIPPED | OUTPATIENT
Start: 2024-07-16 | End: 2025-07-16

## 2024-07-16 NOTE — PROGRESS NOTES
Renetta Stewart is a 74 y.o. female seen today for follow-up on her hypertension.  Patient reports she tolerated the Cozaar well but blood pressures at home remain elevated as here in the office today.  Currently the patient is not having any chest pain or trouble breathing.      Past Medical History:   Diagnosis Date    Anemia     Anxiety     Dementia     Depression     GERD (gastroesophageal reflux disease)     Hyperlipidemia     Hypertension     Stroke      Family History   Problem Relation Name Age of Onset    Hypertension Father       Current Outpatient Medications on File Prior to Visit   Medication Sig Dispense Refill    ciprofloxacin HCl (CILOXAN) 0.3 % ophthalmic solution Place 2 drops into both eyes every 4 (four) hours.      dicyclomine (BENTYL) 20 mg tablet Take 20 mg by mouth 2 (two) times daily.      diltiaZEM (CARDIZEM CD) 240 MG 24 hr capsule Take 240 mg by mouth.      esomeprazole (NEXIUM) 40 MG capsule Take 1 capsule (40 mg total) by mouth before breakfast. 90 capsule 1    furosemide (LASIX) 20 MG tablet Take 1 tablet (20 mg total) by mouth once daily. 90 tablet 1    hydrALAZINE (APRESOLINE) 100 MG tablet Take 1 tablet (100 mg total) by mouth 3 (three) times daily. 270 tablet 1    HYDROcodone-acetaminophen (NORCO) 5-325 mg per tablet Take 1 tablet by mouth every 6 (six) hours as needed for Pain.      levETIRAcetam (KEPPRA) 500 MG Tab Take 1 tablet (500 mg total) by mouth 2 (two) times daily. 60 tablet 11    levothyroxine (SYNTHROID) 50 MCG tablet Take 1 tablet (50 mcg total) by mouth before breakfast. 90 tablet 1    LUMIGAN 0.01 % Drop Place 1 drop into both eyes every evening.      megestroL (MEGACE) 400 mg/10 mL (40 mg/mL) Susp Take 400 mg by mouth 2 (two) times daily.      memantine (NAMENDA) 10 MG Tab Take 1 tablet (10 mg total) by mouth 2 (two) times daily. 180 tablet 1    methylPREDNISolone (MEDROL) 4 MG Tab Take by mouth.      mirtazapine (REMERON) 15 MG tablet Take 1 tablet (15 mg total) by  mouth every evening. 90 tablet 1    QUEtiapine (SEROQUEL) 25 MG Tab Take 25 mg by mouth every evening.      sertraline (ZOLOFT) 50 MG tablet Take 1 tablet (50 mg total) by mouth every evening. 90 tablet 1    [DISCONTINUED] losartan (COZAAR) 25 MG tablet Take 1 tablet (25 mg total) by mouth once daily. 90 tablet 3     No current facility-administered medications on file prior to visit.       There is no immunization history on file for this patient.    Review of Systems   Constitutional:  Negative for fever, malaise/fatigue and weight loss.   Respiratory:  Negative for shortness of breath.    Cardiovascular:  Negative for chest pain and palpitations.   Gastrointestinal:  Negative for nausea and vomiting.   Musculoskeletal:  Positive for back pain, joint pain and myalgias.   Psychiatric/Behavioral:  Negative for depression.       Vitals:    07/16/24 1313   BP: (!) 196/104   Pulse:    Resp:    Temp:        Physical Exam  Vitals reviewed.   Constitutional:       Appearance: Normal appearance.   HENT:      Head: Normocephalic.   Eyes:      Extraocular Movements: Extraocular movements intact.      Conjunctiva/sclera: Conjunctivae normal.      Pupils: Pupils are equal, round, and reactive to light.   Neck:      Thyroid: No thyroid mass or thyromegaly.   Cardiovascular:      Rate and Rhythm: Normal rate and regular rhythm.      Heart sounds: Murmur heard.      Systolic murmur is present with a grade of 3/6.      No gallop.   Pulmonary:      Effort: Pulmonary effort is normal. No respiratory distress.      Breath sounds: Normal breath sounds. No wheezing or rales.   Skin:     General: Skin is warm and dry.      Coloration: Skin is not jaundiced or pale.   Neurological:      Mental Status: She is alert.   Psychiatric:         Mood and Affect: Mood normal.         Behavior: Behavior normal.         Thought Content: Thought content normal.         Judgment: Judgment normal.        Assessment and Plan  1. Hypertension,  unspecified type  -     valsartan (DIOVAN) 160 MG tablet; Take 1 tablet (160 mg total) by mouth once daily.  Dispense: 90 tablet; Refill: 1             Return to clinic in 2 weeks with home blood pressure log or as needed.    Health Maintenance Topics with due status: Not Due       Topic Last Completion Date    Colorectal Cancer Screening 06/18/2019    Lipid Panel 02/14/2022    Influenza Vaccine 01/12/2023

## 2024-07-19 ENCOUNTER — HOSPITAL ENCOUNTER (OUTPATIENT)
Dept: CARDIOLOGY | Facility: HOSPITAL | Age: 74
Discharge: HOME | End: 2024-07-19
Attending: INTERNAL MEDICINE
Payer: MEDICARE

## 2024-07-19 DIAGNOSIS — Z95.0 PRESENCE OF CARDIAC PACEMAKER: Primary | ICD-10-CM

## 2024-07-19 DIAGNOSIS — Z95.0 PRESENCE OF CARDIAC PACEMAKER: ICD-10-CM

## 2024-07-19 LAB
BATTERY VOLTAGE (V): 3 V
ERI (V): 2.63 V
OHS CV DC PP MS1: 0.4 MS
OHS CV DC PP MS2: 0.4 MS
OHS CV DC PP V1: 1.5 V
OHS CV DC PP V2: 2 V

## 2024-07-19 PROCEDURE — 93296 REM INTERROG EVL PM/IDS: CPT

## 2024-07-19 PROCEDURE — 93294 REM INTERROG EVL PM/LDLS PM: CPT | Mod: ,,, | Performed by: INTERNAL MEDICINE

## 2024-08-09 DIAGNOSIS — Z71.89 COMPLEX CARE COORDINATION: ICD-10-CM

## 2024-08-16 ENCOUNTER — OFFICE VISIT (OUTPATIENT)
Dept: FAMILY MEDICINE | Facility: CLINIC | Age: 74
End: 2024-08-16
Payer: MEDICARE

## 2024-08-16 VITALS
HEIGHT: 64 IN | WEIGHT: 240 LBS | RESPIRATION RATE: 18 BRPM | TEMPERATURE: 99 F | HEART RATE: 90 BPM | SYSTOLIC BLOOD PRESSURE: 175 MMHG | BODY MASS INDEX: 40.97 KG/M2 | OXYGEN SATURATION: 98 % | DIASTOLIC BLOOD PRESSURE: 82 MMHG

## 2024-08-16 DIAGNOSIS — I10 ESSENTIAL HYPERTENSION: Primary | ICD-10-CM

## 2024-08-16 PROCEDURE — 99212 OFFICE O/P EST SF 10 MIN: CPT | Mod: ,,, | Performed by: NURSE PRACTITIONER

## 2024-08-16 PROCEDURE — 3288F FALL RISK ASSESSMENT DOCD: CPT | Mod: ,,, | Performed by: NURSE PRACTITIONER

## 2024-08-16 PROCEDURE — 3077F SYST BP >= 140 MM HG: CPT | Mod: ,,, | Performed by: NURSE PRACTITIONER

## 2024-08-16 PROCEDURE — 1160F RVW MEDS BY RX/DR IN RCRD: CPT | Mod: ,,, | Performed by: NURSE PRACTITIONER

## 2024-08-16 PROCEDURE — 1159F MED LIST DOCD IN RCRD: CPT | Mod: ,,, | Performed by: NURSE PRACTITIONER

## 2024-08-16 PROCEDURE — 3008F BODY MASS INDEX DOCD: CPT | Mod: ,,, | Performed by: NURSE PRACTITIONER

## 2024-08-16 PROCEDURE — 1126F AMNT PAIN NOTED NONE PRSNT: CPT | Mod: ,,, | Performed by: NURSE PRACTITIONER

## 2024-08-16 PROCEDURE — 4010F ACE/ARB THERAPY RXD/TAKEN: CPT | Mod: ,,, | Performed by: NURSE PRACTITIONER

## 2024-08-16 PROCEDURE — 1101F PT FALLS ASSESS-DOCD LE1/YR: CPT | Mod: ,,, | Performed by: NURSE PRACTITIONER

## 2024-08-16 PROCEDURE — 3079F DIAST BP 80-89 MM HG: CPT | Mod: ,,, | Performed by: NURSE PRACTITIONER

## 2024-08-16 RX ORDER — VALSARTAN 320 MG/1
320 TABLET ORAL DAILY
Qty: 90 TABLET | Refills: 0 | Status: SHIPPED | OUTPATIENT
Start: 2024-08-16

## 2024-08-16 NOTE — PROGRESS NOTES
[facility]    Chief Complaint      Chief Complaint   Patient presents with    Hypertension     X1 month FU        History of Present Illness      Renetta Stewart is a 74 y.o. female. She  has a past medical history of Anemia, Anxiety, Dementia, Depression, GERD (gastroesophageal reflux disease), Hyperlipidemia, Hypertension, and Stroke., who presents today for 1 mo f/u of her HTN.  BP remains elevated today- 175/82.  At her last visit with Dr. Govea she was switched from Losartan to Valsartan with an increased dosage.      Past Medical History:  Past Medical History:   Diagnosis Date    Anemia     Anxiety     Dementia     Depression     GERD (gastroesophageal reflux disease)     Hyperlipidemia     Hypertension     Stroke        Past Surgical History:   has no past surgical history on file.    Social History:  Social History     Tobacco Use    Smoking status: Never     Passive exposure: Never    Smokeless tobacco: Never   Substance Use Topics    Alcohol use: Not Currently    Drug use: Never       I personally reviewed all past medical, surgical, and social.     Review of Systems   Constitutional:  Negative for fatigue.   Eyes:  Negative for visual disturbance.   Respiratory:  Negative for shortness of breath.    Cardiovascular: Negative.    Gastrointestinal:  Negative for abdominal pain, constipation and diarrhea.   Musculoskeletal:  Negative for gait problem.   Skin: Negative.    Neurological:  Negative for dizziness, light-headedness and headaches.        Medications:  Outpatient Encounter Medications as of 8/16/2024   Medication Sig Dispense Refill    ciprofloxacin HCl (CILOXAN) 0.3 % ophthalmic solution Place 2 drops into both eyes every 4 (four) hours.      dicyclomine (BENTYL) 20 mg tablet Take 20 mg by mouth 2 (two) times daily.      diltiaZEM (CARDIZEM CD) 240 MG 24 hr capsule Take 240 mg by mouth.      esomeprazole (NEXIUM) 40 MG capsule Take 1 capsule (40 mg total) by mouth before breakfast. 90  capsule 1    furosemide (LASIX) 20 MG tablet Take 1 tablet (20 mg total) by mouth once daily. 90 tablet 1    hydrALAZINE (APRESOLINE) 100 MG tablet Take 1 tablet (100 mg total) by mouth 3 (three) times daily. 270 tablet 1    HYDROcodone-acetaminophen (NORCO) 5-325 mg per tablet Take 1 tablet by mouth every 6 (six) hours as needed for Pain.      levothyroxine (SYNTHROID) 50 MCG tablet Take 1 tablet (50 mcg total) by mouth before breakfast. 90 tablet 1    LUMIGAN 0.01 % Drop Place 1 drop into both eyes every evening.      megestroL (MEGACE) 400 mg/10 mL (40 mg/mL) Susp Take 400 mg by mouth 2 (two) times daily.      memantine (NAMENDA) 10 MG Tab Take 1 tablet (10 mg total) by mouth 2 (two) times daily. 180 tablet 1    methylPREDNISolone (MEDROL) 4 MG Tab Take by mouth.      mirtazapine (REMERON) 15 MG tablet Take 1 tablet (15 mg total) by mouth every evening. 90 tablet 1    QUEtiapine (SEROQUEL) 25 MG Tab Take 25 mg by mouth every evening.      sertraline (ZOLOFT) 50 MG tablet Take 1 tablet (50 mg total) by mouth every evening. 90 tablet 1    [DISCONTINUED] valsartan (DIOVAN) 160 MG tablet Take 1 tablet (160 mg total) by mouth once daily. 90 tablet 1    levETIRAcetam (KEPPRA) 500 MG Tab Take 1 tablet (500 mg total) by mouth 2 (two) times daily. 60 tablet 11    valsartan (DIOVAN) 320 MG tablet Take 1 tablet (320 mg total) by mouth once daily. 90 tablet 0     No facility-administered encounter medications on file as of 8/16/2024.       Allergies:  Review of patient's allergies indicates:  No Known Allergies    Health Maintenance:    There is no immunization history on file for this patient.   Health Maintenance   Topic Date Due    Hepatitis C Screening  Never done    TETANUS VACCINE  Never done    Mammogram  Never done    DEXA Scan  Never done    Shingles Vaccine (1 of 2) Never done    Lipid Panel  02/14/2027    Colorectal Cancer Screening  06/18/2029        Physical Exam      Vital Signs  Temp: 98.7 °F (37.1 °C)  Temp  "Source: Oral  Pulse: 90  Resp: 18  SpO2: 98 %  BP: (!) 175/82  BP Location: Right arm  Patient Position: Sitting  Pain Score: 0-No pain  Height and Weight  Height: 5' 4" (162.6 cm)  Weight: 108.9 kg (240 lb)  BSA (Calculated - sq m): 2.22 sq meters  BMI (Calculated): 41.2  Weight in (lb) to have BMI = 25: 145.3]    Physical Exam  Vitals and nursing note reviewed.   Constitutional:       Appearance: Normal appearance. She is well-developed.   HENT:      Head: Normocephalic.      Right Ear: Hearing normal.      Left Ear: Hearing normal.      Nose: Nose normal.   Eyes:      General: Lids are normal.      Conjunctiva/sclera: Conjunctivae normal.   Cardiovascular:      Rate and Rhythm: Normal rate and regular rhythm.      Heart sounds: Normal heart sounds.   Pulmonary:      Effort: Pulmonary effort is normal.      Breath sounds: Normal breath sounds.   Musculoskeletal:         General: Normal range of motion.      Cervical back: Normal range of motion and neck supple.      Right lower leg: No edema.      Left lower leg: No edema.   Skin:     General: Skin is warm and dry.   Neurological:      Mental Status: She is alert and oriented to person, place, and time.      Gait: Gait is intact.   Psychiatric:         Behavior: Behavior is cooperative.          Laboratory:  CBC:  Recent Labs   Lab 05/19/23 2107 05/22/23  1125 03/11/24  1534   WBC 4.28 L 3.71 L 4.89   RBC 3.86 L 4.24 4.82   Hemoglobin 10.4 L 11.3 L 12.6   Hematocrit 33.3 L 36.9 L 41.8   Platelet Count 230 267 249   MCV 86.3 87.0 86.7   MCH 26.9 L 26.7 L 26.1 L   MCHC 31.2 L 30.6 L 30.1 L     CMP:  Recent Labs   Lab 05/19/23 2107 05/22/23  1125 03/11/24  1534   Glucose 126 H 95 103   Calcium 8.4 L 9.4 9.1   Albumin 2.8 L 3.1 L 3.8   Total Protein 6.1 L 6.3 L 7.6   Sodium 136 139 144   Potassium 2.8 L 3.8 4.0   CO2 24 26 31   Chloride 101 105 108 H   BUN 16 16 24 H   Alk Phos 38 L 37 L 96   ALT 18 15 22   AST 12 L 16 19   Bilirubin, Total 0.8 0.9 0.2 "     LIPIDS:  Recent Labs   Lab 02/14/22  1444 09/06/22  1427 03/15/23  1355 03/11/24  1534   TSH 3.720 2.900 3.270 2.260   HDL Cholesterol 78 H  --   --   --    Cholesterol 131  --   --   --    Triglycerides 78  --   --   --    LDL Calculated 37  --   --   --    Cholesterol/HDL Ratio (Risk Factor) 1.7  --   --   --    Non-HDL 53  --   --   --      TSH:  Recent Labs   Lab 09/06/22  1427 03/15/23  1355 03/11/24  1534   TSH 2.900 3.270 2.260     A1C:        Assessment/Plan     Renetta Stewart is a 74 y.o.female with:     1. Essential hypertension  -     valsartan (DIOVAN) 320 MG tablet; Take 1 tablet (320 mg total) by mouth once daily.  Dispense: 90 tablet; Refill: 0    Consulted with Dr. Govea as he is patient's PCP- would like to further increase and maximize her Valsartan today to see if this will bring her BP down.     Total time spent face-to-face and non-face-to-face coordinating care for this encounter was: 15 minutes     Chronic conditions status updated as per HPI.  Other than changes above, cont current medications and maintain follow up with specialists.  Return to clinic in 1 month(s).    Isabel Rowland, FNP  [facility]

## 2024-10-15 ENCOUNTER — OFFICE VISIT (OUTPATIENT)
Dept: FAMILY MEDICINE | Facility: CLINIC | Age: 74
End: 2024-10-15
Payer: MEDICARE

## 2024-10-15 VITALS
OXYGEN SATURATION: 97 % | RESPIRATION RATE: 19 BRPM | BODY MASS INDEX: 38.58 KG/M2 | SYSTOLIC BLOOD PRESSURE: 130 MMHG | WEIGHT: 226 LBS | HEART RATE: 101 BPM | HEIGHT: 64 IN | DIASTOLIC BLOOD PRESSURE: 88 MMHG | TEMPERATURE: 98 F

## 2024-10-15 DIAGNOSIS — R63.0 ANOREXIA: ICD-10-CM

## 2024-10-15 DIAGNOSIS — I10 ESSENTIAL HYPERTENSION: Primary | ICD-10-CM

## 2024-10-15 DIAGNOSIS — Z12.31 ENCOUNTER FOR SCREENING MAMMOGRAM FOR MALIGNANT NEOPLASM OF BREAST: ICD-10-CM

## 2024-10-15 DIAGNOSIS — G89.29 CHRONIC MIDLINE LOW BACK PAIN WITHOUT SCIATICA: ICD-10-CM

## 2024-10-15 DIAGNOSIS — F51.01 PRIMARY INSOMNIA: ICD-10-CM

## 2024-10-15 DIAGNOSIS — E03.9 HYPOTHYROIDISM, UNSPECIFIED TYPE: ICD-10-CM

## 2024-10-15 DIAGNOSIS — K21.9 GASTROESOPHAGEAL REFLUX DISEASE, UNSPECIFIED WHETHER ESOPHAGITIS PRESENT: ICD-10-CM

## 2024-10-15 DIAGNOSIS — M54.50 CHRONIC MIDLINE LOW BACK PAIN WITHOUT SCIATICA: ICD-10-CM

## 2024-10-15 DIAGNOSIS — Z12.39 ENCOUNTER FOR SCREENING FOR MALIGNANT NEOPLASM OF BREAST, UNSPECIFIED SCREENING MODALITY: ICD-10-CM

## 2024-10-15 DIAGNOSIS — F32.A DEPRESSION, UNSPECIFIED DEPRESSION TYPE: ICD-10-CM

## 2024-10-15 LAB
ALBUMIN SERPL BCP-MCNC: 3.8 G/DL (ref 3.5–5)
ALBUMIN/GLOB SERPL: 1.1 {RATIO}
ALP SERPL-CCNC: 82 U/L (ref 55–142)
ALT SERPL W P-5'-P-CCNC: 19 U/L (ref 13–56)
ANION GAP SERPL CALCULATED.3IONS-SCNC: 15 MMOL/L (ref 7–16)
AST SERPL W P-5'-P-CCNC: 18 U/L (ref 15–37)
BASOPHILS # BLD AUTO: 0.02 K/UL (ref 0–0.2)
BASOPHILS NFR BLD AUTO: 0.5 % (ref 0–1)
BILIRUB SERPL-MCNC: 0.5 MG/DL (ref ?–1.2)
BUN SERPL-MCNC: 11 MG/DL (ref 7–18)
BUN/CREAT SERPL: 8 (ref 6–20)
CALCIUM SERPL-MCNC: 9.8 MG/DL (ref 8.5–10.1)
CHLORIDE SERPL-SCNC: 99 MMOL/L (ref 98–107)
CO2 SERPL-SCNC: 27 MMOL/L (ref 21–32)
CREAT SERPL-MCNC: 1.36 MG/DL (ref 0.55–1.02)
DIFFERENTIAL METHOD BLD: ABNORMAL
EGFR (NO RACE VARIABLE) (RUSH/TITUS): 41 ML/MIN/1.73M2
EOSINOPHIL # BLD AUTO: 0.09 K/UL (ref 0–0.5)
EOSINOPHIL NFR BLD AUTO: 2.1 % (ref 1–4)
ERYTHROCYTE [DISTWIDTH] IN BLOOD BY AUTOMATED COUNT: 14.8 % (ref 11.5–14.5)
GLOBULIN SER-MCNC: 3.4 G/DL (ref 2–4)
GLUCOSE SERPL-MCNC: 108 MG/DL (ref 74–106)
HCT VFR BLD AUTO: 46.7 % (ref 38–47)
HGB BLD-MCNC: 14.2 G/DL (ref 12–16)
IMM GRANULOCYTES # BLD AUTO: 0.02 K/UL (ref 0–0.04)
IMM GRANULOCYTES NFR BLD: 0.5 % (ref 0–0.4)
LYMPHOCYTES # BLD AUTO: 1.54 K/UL (ref 1–4.8)
LYMPHOCYTES NFR BLD AUTO: 35.9 % (ref 27–41)
MCH RBC QN AUTO: 24.9 PG (ref 27–31)
MCHC RBC AUTO-ENTMCNC: 30.4 G/DL (ref 32–36)
MCV RBC AUTO: 81.9 FL (ref 80–96)
MONOCYTES # BLD AUTO: 0.51 K/UL (ref 0–0.8)
MONOCYTES NFR BLD AUTO: 11.9 % (ref 2–6)
MPC BLD CALC-MCNC: 12.5 FL (ref 9.4–12.4)
NEUTROPHILS # BLD AUTO: 2.11 K/UL (ref 1.8–7.7)
NEUTROPHILS NFR BLD AUTO: 49.1 % (ref 53–65)
NRBC # BLD AUTO: 0 X10E3/UL
NRBC, AUTO (.00): 0 %
PLATELET # BLD AUTO: 229 K/UL (ref 150–400)
POTASSIUM SERPL-SCNC: 3.5 MMOL/L (ref 3.5–5.1)
PROT SERPL-MCNC: 7.2 G/DL (ref 6.4–8.2)
RBC # BLD AUTO: 5.7 M/UL (ref 4.2–5.4)
SODIUM SERPL-SCNC: 137 MMOL/L (ref 136–145)
T4 FREE SERPL-MCNC: 1.6 NG/DL (ref 0.76–1.46)
TSH SERPL DL<=0.005 MIU/L-ACNC: 3.82 UIU/ML (ref 0.36–3.74)
WBC # BLD AUTO: 4.29 K/UL (ref 4.5–11)

## 2024-10-15 PROCEDURE — 84443 ASSAY THYROID STIM HORMONE: CPT | Mod: ,,, | Performed by: CLINICAL MEDICAL LABORATORY

## 2024-10-15 PROCEDURE — 4010F ACE/ARB THERAPY RXD/TAKEN: CPT | Mod: ,,, | Performed by: FAMILY MEDICINE

## 2024-10-15 PROCEDURE — 1101F PT FALLS ASSESS-DOCD LE1/YR: CPT | Mod: ,,, | Performed by: FAMILY MEDICINE

## 2024-10-15 PROCEDURE — 1125F AMNT PAIN NOTED PAIN PRSNT: CPT | Mod: ,,, | Performed by: FAMILY MEDICINE

## 2024-10-15 PROCEDURE — 99214 OFFICE O/P EST MOD 30 MIN: CPT | Mod: 25,,, | Performed by: FAMILY MEDICINE

## 2024-10-15 PROCEDURE — 3079F DIAST BP 80-89 MM HG: CPT | Mod: ,,, | Performed by: FAMILY MEDICINE

## 2024-10-15 PROCEDURE — 85025 COMPLETE CBC W/AUTO DIFF WBC: CPT | Mod: ,,, | Performed by: CLINICAL MEDICAL LABORATORY

## 2024-10-15 PROCEDURE — 1159F MED LIST DOCD IN RCRD: CPT | Mod: ,,, | Performed by: FAMILY MEDICINE

## 2024-10-15 PROCEDURE — 3008F BODY MASS INDEX DOCD: CPT | Mod: ,,, | Performed by: FAMILY MEDICINE

## 2024-10-15 PROCEDURE — 84439 ASSAY OF FREE THYROXINE: CPT | Mod: ,,, | Performed by: CLINICAL MEDICAL LABORATORY

## 2024-10-15 PROCEDURE — 3075F SYST BP GE 130 - 139MM HG: CPT | Mod: ,,, | Performed by: FAMILY MEDICINE

## 2024-10-15 PROCEDURE — 80053 COMPREHEN METABOLIC PANEL: CPT | Mod: ,,, | Performed by: CLINICAL MEDICAL LABORATORY

## 2024-10-15 PROCEDURE — 1160F RVW MEDS BY RX/DR IN RCRD: CPT | Mod: ,,, | Performed by: FAMILY MEDICINE

## 2024-10-15 PROCEDURE — 96372 THER/PROPH/DIAG INJ SC/IM: CPT | Mod: ,,, | Performed by: FAMILY MEDICINE

## 2024-10-15 PROCEDURE — 3288F FALL RISK ASSESSMENT DOCD: CPT | Mod: ,,, | Performed by: FAMILY MEDICINE

## 2024-10-15 RX ORDER — SERTRALINE HYDROCHLORIDE 50 MG/1
50 TABLET, FILM COATED ORAL NIGHTLY
Qty: 90 TABLET | Refills: 1 | Status: SHIPPED | OUTPATIENT
Start: 2024-10-15

## 2024-10-15 RX ORDER — ESOMEPRAZOLE MAGNESIUM 40 MG/1
40 CAPSULE, DELAYED RELEASE ORAL
Qty: 90 CAPSULE | Refills: 1 | Status: SHIPPED | OUTPATIENT
Start: 2024-10-15 | End: 2025-04-13

## 2024-10-15 RX ORDER — MIRTAZAPINE 15 MG/1
15 TABLET, FILM COATED ORAL NIGHTLY
Qty: 90 TABLET | Refills: 1 | Status: SHIPPED | OUTPATIENT
Start: 2024-10-15

## 2024-10-15 RX ORDER — HYDRALAZINE HYDROCHLORIDE 100 MG/1
100 TABLET, FILM COATED ORAL 3 TIMES DAILY
Qty: 270 TABLET | Refills: 1 | Status: SHIPPED | OUTPATIENT
Start: 2024-10-15

## 2024-10-15 RX ORDER — MEMANTINE HYDROCHLORIDE 10 MG/1
10 TABLET ORAL 2 TIMES DAILY
Qty: 180 TABLET | Refills: 1 | Status: SHIPPED | OUTPATIENT
Start: 2024-10-15

## 2024-10-15 RX ORDER — BETAMETHASONE SODIUM PHOSPHATE AND BETAMETHASONE ACETATE 3; 3 MG/ML; MG/ML
6 INJECTION, SUSPENSION INTRA-ARTICULAR; INTRALESIONAL; INTRAMUSCULAR; SOFT TISSUE
Status: COMPLETED | OUTPATIENT
Start: 2024-10-15 | End: 2024-10-15

## 2024-10-15 RX ORDER — FUROSEMIDE 20 MG/1
20 TABLET ORAL DAILY
Qty: 90 TABLET | Refills: 1 | Status: SHIPPED | OUTPATIENT
Start: 2024-10-15

## 2024-10-15 RX ORDER — VALSARTAN 320 MG/1
320 TABLET ORAL DAILY
Qty: 90 TABLET | Refills: 1 | Status: SHIPPED | OUTPATIENT
Start: 2024-10-15

## 2024-10-15 RX ORDER — LEVOTHYROXINE SODIUM 50 UG/1
50 TABLET ORAL
Qty: 90 TABLET | Refills: 1 | Status: SHIPPED | OUTPATIENT
Start: 2024-10-15

## 2024-10-15 RX ORDER — MEGESTROL ACETATE 40 MG/ML
400 SUSPENSION ORAL 2 TIMES DAILY
Qty: 180 ML | Refills: 1 | Status: SHIPPED | OUTPATIENT
Start: 2024-10-15

## 2024-10-15 RX ADMIN — BETAMETHASONE SODIUM PHOSPHATE AND BETAMETHASONE ACETATE 6 MG: 3; 3 INJECTION, SUSPENSION INTRA-ARTICULAR; INTRALESIONAL; INTRAMUSCULAR; SOFT TISSUE at 11:10

## 2024-10-15 NOTE — PROGRESS NOTES
Renetta Stewart is a 74 y.o. female seen today for follow-up on her history of hyperlipidemia hypertension.  Blood pressures remain well controlled and she has had no chest pain or shortness for breath.  Unfortunately she did stay with another daughter and did not take her medications lately.  Her back pain has worsened and she has not responded to her usual Norco medication.  I recommended they called their pain management physician but we offered a Celestone injection today.  Lately her appetite has dropped off and we discussed restarting her Megace.    Past Medical History:   Diagnosis Date    Anemia     Anxiety     Dementia     Depression     GERD (gastroesophageal reflux disease)     Hyperlipidemia     Hypertension     Stroke      Family History   Problem Relation Name Age of Onset    Hypertension Father       Current Outpatient Medications on File Prior to Visit   Medication Sig Dispense Refill    HYDROcodone-acetaminophen (NORCO) 5-325 mg per tablet Take 1 tablet by mouth every 6 (six) hours as needed for Pain.      [DISCONTINUED] esomeprazole (NEXIUM) 40 MG capsule Take 1 capsule (40 mg total) by mouth before breakfast. 90 capsule 1    [DISCONTINUED] hydrALAZINE (APRESOLINE) 100 MG tablet Take 1 tablet (100 mg total) by mouth 3 (three) times daily. 270 tablet 1    [DISCONTINUED] mirtazapine (REMERON) 15 MG tablet Take 1 tablet (15 mg total) by mouth every evening. 90 tablet 1    [DISCONTINUED] valsartan (DIOVAN) 320 MG tablet Take 1 tablet (320 mg total) by mouth once daily. 90 tablet 0    ciprofloxacin HCl (CILOXAN) 0.3 % ophthalmic solution Place 2 drops into both eyes every 4 (four) hours.      dicyclomine (BENTYL) 20 mg tablet Take 20 mg by mouth 2 (two) times daily.      diltiaZEM (CARDIZEM CD) 240 MG 24 hr capsule Take 240 mg by mouth.      levETIRAcetam (KEPPRA) 500 MG Tab Take 1 tablet (500 mg total) by mouth 2 (two) times daily. 60 tablet 11    LUMIGAN 0.01 % Drop Place 1 drop into both eyes every  evening.      QUEtiapine (SEROQUEL) 25 MG Tab Take 25 mg by mouth every evening.      [DISCONTINUED] furosemide (LASIX) 20 MG tablet Take 1 tablet (20 mg total) by mouth once daily. 90 tablet 1    [DISCONTINUED] levothyroxine (SYNTHROID) 50 MCG tablet Take 1 tablet (50 mcg total) by mouth before breakfast. 90 tablet 1    [DISCONTINUED] megestroL (MEGACE) 400 mg/10 mL (40 mg/mL) Susp Take 400 mg by mouth 2 (two) times daily.      [DISCONTINUED] memantine (NAMENDA) 10 MG Tab Take 1 tablet (10 mg total) by mouth 2 (two) times daily. 180 tablet 1    [DISCONTINUED] methylPREDNISolone (MEDROL) 4 MG Tab Take by mouth. (Patient not taking: Reported on 10/15/2024)      [DISCONTINUED] sertraline (ZOLOFT) 50 MG tablet Take 1 tablet (50 mg total) by mouth every evening. 90 tablet 1     No current facility-administered medications on file prior to visit.       There is no immunization history on file for this patient.    Review of Systems   Constitutional:  Negative for fever, malaise/fatigue and weight loss.   Respiratory:  Negative for shortness of breath.    Cardiovascular:  Negative for chest pain and palpitations.   Gastrointestinal:  Negative for nausea and vomiting.   Musculoskeletal:  Positive for back pain and myalgias.   Psychiatric/Behavioral:  Positive for depression. The patient is nervous/anxious.         Vitals:    10/15/24 1036   BP: 130/88   Pulse: 101   Resp: 19   Temp: 97.6 °F (36.4 °C)       Physical Exam  Vitals reviewed.   Constitutional:       Appearance: Normal appearance.   HENT:      Head: Normocephalic.   Eyes:      Extraocular Movements: Extraocular movements intact.      Conjunctiva/sclera: Conjunctivae normal.      Pupils: Pupils are equal, round, and reactive to light.   Neck:      Thyroid: No thyroid mass or thyromegaly.   Cardiovascular:      Rate and Rhythm: Normal rate and regular rhythm.      Heart sounds: Normal heart sounds. No murmur heard.     No gallop.   Pulmonary:      Effort: Pulmonary  effort is normal. No respiratory distress.      Breath sounds: Normal breath sounds. No wheezing or rales.   Musculoskeletal:      Lumbar back: Spasms and tenderness present. Decreased range of motion.        Back:    Skin:     General: Skin is warm and dry.      Coloration: Skin is not jaundiced or pale.   Neurological:      Mental Status: She is alert.   Psychiatric:         Mood and Affect: Mood normal.         Behavior: Behavior normal.         Thought Content: Thought content normal.         Judgment: Judgment normal.          Assessment and Plan  1. Essential hypertension  -     CBC Auto Differential; Future; Expected date: 10/15/2024  -     Comprehensive Metabolic Panel; Future; Expected date: 10/15/2024  -     valsartan (DIOVAN) 320 MG tablet; Take 1 tablet (320 mg total) by mouth once daily.  Dispense: 90 tablet; Refill: 1    2. Encounter for screening for malignant neoplasm of breast, unspecified screening modality  -     Mammo Digital Screening Bilat w/ Nilo; Future; Expected date: 10/15/2024    3. Primary insomnia  -     mirtazapine (REMERON) 15 MG tablet; Take 1 tablet (15 mg total) by mouth every evening.  Dispense: 90 tablet; Refill: 1    4. Gastroesophageal reflux disease, unspecified whether esophagitis present  -     esomeprazole (NEXIUM) 40 MG capsule; Take 1 capsule (40 mg total) by mouth before breakfast.  Dispense: 90 capsule; Refill: 1    5. Hypothyroidism, unspecified type  -     TSH; Future; Expected date: 10/15/2024  -     T4, Free; Future; Expected date: 10/15/2024  -     levothyroxine (SYNTHROID) 50 MCG tablet; Take 1 tablet (50 mcg total) by mouth before breakfast.  Dispense: 90 tablet; Refill: 1    6. Depression, unspecified depression type  -     sertraline (ZOLOFT) 50 MG tablet; Take 1 tablet (50 mg total) by mouth every evening.  Dispense: 90 tablet; Refill: 1    7. Encounter for screening mammogram for malignant neoplasm of breast  -     Mammo Digital Screening Bilat w/ Nilo;  Future; Expected date: 10/15/2024    8. Anorexia  -     megestroL (MEGACE) 400 mg/10 mL (40 mg/mL) Susp; Take 10 mLs (400 mg total) by mouth 2 (two) times daily.  Dispense: 180 mL; Refill: 1    9. Chronic midline low back pain without sciatica  -     betamethasone acetate-betamethasone sodium phosphate injection 6 mg    Other orders  -     memantine (NAMENDA) 10 MG Tab; Take 1 tablet (10 mg total) by mouth 2 (two) times daily.  Dispense: 180 tablet; Refill: 1  -     hydrALAZINE (APRESOLINE) 100 MG tablet; Take 1 tablet (100 mg total) by mouth 3 (three) times daily.  Dispense: 270 tablet; Refill: 1  -     furosemide (LASIX) 20 MG tablet; Take 1 tablet (20 mg total) by mouth once daily.  Dispense: 90 tablet; Refill: 1             Return to clinic in 1 month for follow-up or as needed    Health Maintenance Topics with due status: Not Due       Topic Last Completion Date    Colorectal Cancer Screening 06/18/2019    Lipid Panel 02/14/2022

## 2024-10-16 ENCOUNTER — TELEPHONE (OUTPATIENT)
Dept: FAMILY MEDICINE | Facility: CLINIC | Age: 74
End: 2024-10-16
Payer: MEDICARE

## 2024-10-16 NOTE — TELEPHONE ENCOUNTER
Pt's daughter, Jacque, called clinic back. Informed daughter of pt's labs showing Worsening renal function and thyroid function. Provider suggested she makes sure to stay well hydrated and takes her medication and we will follow her up as we discussed. Daughter verbalized understanding.

## 2024-10-16 NOTE — TELEPHONE ENCOUNTER
----- Message from Eldon Govea MD sent at 10/16/2024  7:53 AM CDT -----  Worsening renal function and thyroid function.  Please make sure she stays well hydrated and takes her medication and we will follow her up as we discussed.

## 2024-11-01 ENCOUNTER — TELEPHONE (OUTPATIENT)
Dept: CARDIOLOGY | Facility: CLINIC | Age: 74
End: 2024-11-01
Payer: MEDICARE

## 2024-11-15 ENCOUNTER — OFFICE VISIT (OUTPATIENT)
Dept: FAMILY MEDICINE | Facility: CLINIC | Age: 74
End: 2024-11-15
Payer: MEDICARE

## 2024-11-15 VITALS
OXYGEN SATURATION: 99 % | RESPIRATION RATE: 18 BRPM | WEIGHT: 203.38 LBS | BODY MASS INDEX: 34.72 KG/M2 | HEART RATE: 99 BPM | SYSTOLIC BLOOD PRESSURE: 136 MMHG | HEIGHT: 64 IN | DIASTOLIC BLOOD PRESSURE: 81 MMHG | TEMPERATURE: 98 F

## 2024-11-15 DIAGNOSIS — Z09 FOLLOW-UP EXAM: Primary | ICD-10-CM

## 2024-11-15 DIAGNOSIS — I10 ESSENTIAL HYPERTENSION: ICD-10-CM

## 2024-11-15 NOTE — PROGRESS NOTES
[facility]    Chief Complaint      Chief Complaint   Patient presents with    Hypertension     I month follow up hypertension. Pt states having burning sensation all over body and eating as much. Can't sleep at night       History of Present Illness      Renetta Stewart is a 74 y.o. female. She  has a past medical history of Anemia, Anxiety, Dementia, Depression, GERD (gastroesophageal reflux disease), Hyperlipidemia, Hypertension, and Stroke., who presents today for 1 mo f/u of her HTN.  BP is in range today.    She is also still complaining of back pain- she is going to PM- I advised to call and notify them- she does have an appt next week on 11/19/24.     Past Medical History:  Past Medical History:   Diagnosis Date    Anemia     Anxiety     Dementia     Depression     GERD (gastroesophageal reflux disease)     Hyperlipidemia     Hypertension     Stroke        Past Surgical History:   has no past surgical history on file.    Social History:  Social History     Tobacco Use    Smoking status: Never     Passive exposure: Never    Smokeless tobacco: Never   Substance Use Topics    Alcohol use: Not Currently    Drug use: Never       I personally reviewed all past medical, surgical, and social.     Review of Systems   Constitutional:  Negative for fatigue.   Eyes:  Negative for visual disturbance.   Respiratory:  Negative for shortness of breath.    Cardiovascular: Negative.    Gastrointestinal:  Negative for abdominal pain.   Genitourinary:  Negative for difficulty urinating.   Musculoskeletal:  Positive for arthralgias, back pain and myalgias. Negative for gait problem.   Skin: Negative.    Neurological:  Negative for dizziness and headaches.   Psychiatric/Behavioral:  Positive for sleep disturbance.         Medications:  Outpatient Encounter Medications as of 11/15/2024   Medication Sig Dispense Refill    ciprofloxacin HCl (CILOXAN) 0.3 % ophthalmic solution Place 2 drops into both eyes every 4 (four) hours.       dicyclomine (BENTYL) 20 mg tablet Take 20 mg by mouth 2 (two) times daily.      diltiaZEM (CARDIZEM CD) 240 MG 24 hr capsule Take 240 mg by mouth.      esomeprazole (NEXIUM) 40 MG capsule Take 1 capsule (40 mg total) by mouth before breakfast. 90 capsule 1    furosemide (LASIX) 20 MG tablet Take 1 tablet (20 mg total) by mouth once daily. 90 tablet 1    hydrALAZINE (APRESOLINE) 100 MG tablet Take 1 tablet (100 mg total) by mouth 3 (three) times daily. 270 tablet 1    HYDROcodone-acetaminophen (NORCO) 5-325 mg per tablet Take 1 tablet by mouth every 6 (six) hours as needed for Pain.      levETIRAcetam (KEPPRA) 500 MG Tab Take 1 tablet (500 mg total) by mouth 2 (two) times daily. 60 tablet 11    levothyroxine (SYNTHROID) 50 MCG tablet Take 1 tablet (50 mcg total) by mouth before breakfast. 90 tablet 1    LUMIGAN 0.01 % Drop Place 1 drop into both eyes every evening.      megestroL (MEGACE) 400 mg/10 mL (40 mg/mL) Susp Take 10 mLs (400 mg total) by mouth 2 (two) times daily. 180 mL 1    memantine (NAMENDA) 10 MG Tab Take 1 tablet (10 mg total) by mouth 2 (two) times daily. 180 tablet 1    mirtazapine (REMERON) 15 MG tablet Take 1 tablet (15 mg total) by mouth every evening. 90 tablet 1    QUEtiapine (SEROQUEL) 25 MG Tab Take 25 mg by mouth every evening.      sertraline (ZOLOFT) 50 MG tablet Take 1 tablet (50 mg total) by mouth every evening. 90 tablet 1    valsartan (DIOVAN) 320 MG tablet Take 1 tablet (320 mg total) by mouth once daily. 90 tablet 1     No facility-administered encounter medications on file as of 11/15/2024.       Allergies:  Review of patient's allergies indicates:  No Known Allergies    Health Maintenance:    There is no immunization history on file for this patient.   Health Maintenance   Topic Date Due    Hepatitis C Screening  Never done    TETANUS VACCINE  Never done    Mammogram  Never done    DEXA Scan  Never done    Shingles Vaccine (1 of 2) Never done    Lipid Panel  02/14/2027     "Colorectal Cancer Screening  06/18/2029        Physical Exam      Vital Signs  Temp: 98.1 °F (36.7 °C)  Temp Source: Oral  Pulse: 99  Resp: 18  SpO2: 99 %  BP: 136/81  BP Location: Right arm  Patient Position: Sitting  Height and Weight  Height: 5' 4" (162.6 cm)  Weight: 92.3 kg (203 lb 6 oz)  BSA (Calculated - sq m): 2.04 sq meters  BMI (Calculated): 34.9  Weight in (lb) to have BMI = 25: 145.3]    Physical Exam  Vitals and nursing note reviewed.   Constitutional:       Appearance: Normal appearance. She is well-developed.   HENT:      Head: Normocephalic.      Right Ear: Hearing normal.      Left Ear: Hearing normal.      Nose: Nose normal.   Eyes:      General: Lids are normal.      Conjunctiva/sclera: Conjunctivae normal.   Cardiovascular:      Rate and Rhythm: Normal rate and regular rhythm.      Heart sounds: Normal heart sounds.   Pulmonary:      Effort: Pulmonary effort is normal.      Breath sounds: Normal breath sounds.   Musculoskeletal:         General: Normal range of motion.      Cervical back: Normal range of motion and neck supple.      Right lower leg: No edema.      Left lower leg: No edema.   Skin:     General: Skin is warm and dry.   Neurological:      Mental Status: She is alert and oriented to person, place, and time.      Gait: Gait is intact.   Psychiatric:         Behavior: Behavior is cooperative.         Cognition and Memory: Memory is impaired.          Laboratory:  CBC:  Recent Labs   Lab 05/22/23  1125 03/11/24  1534 10/15/24  1059   WBC 3.71 L 4.89 4.29 L   RBC 4.24 4.82 5.70 H   Hemoglobin 11.3 L 12.6 14.2   Hematocrit 36.9 L 41.8 46.7   Platelet Count 267 249 229   MCV 87.0 86.7 81.9   MCH 26.7 L 26.1 L 24.9 L   MCHC 30.6 L 30.1 L 30.4 L     CMP:  Recent Labs   Lab 05/22/23  1125 03/11/24  1534 10/15/24  1059   Glucose 95 103 108 H   Calcium 9.4 9.1 9.8   Albumin 3.1 L 3.8 3.8   Total Protein 6.3 L 7.6 7.2   Sodium 139 144 137   Potassium 3.8 4.0 3.5   CO2 26 31 27   Chloride 105 108 " H 99   BUN 16 24 H 11   Alk Phos 37 L 96 82   ALT 15 22 19   AST 16 19 18   Bilirubin, Total 0.9 0.2 0.5     LIPIDS:  Recent Labs   Lab 02/14/22  1444 09/06/22  1427 03/15/23  1355 03/11/24  1534 10/15/24  1059   TSH 3.720   < > 3.270 2.260 3.820 H   HDL Cholesterol 78 H  --   --   --   --    Cholesterol 131  --   --   --   --    Triglycerides 78  --   --   --   --    LDL Calculated 37  --   --   --   --    Cholesterol/HDL Ratio (Risk Factor) 1.7  --   --   --   --    Non-HDL 53  --   --   --   --     < > = values in this interval not displayed.     TSH:  Recent Labs   Lab 03/15/23  1355 03/11/24  1534 10/15/24  1059   TSH 3.270 2.260 3.820 H     A1C:        Assessment/Plan     Renetta Stewart is a 74 y.o.female with:     1. Follow-up exam    2. Essential hypertension       BP improved  Call pain management for back pain- has f/u appt next week    Total time spent face-to-face and non-face-to-face coordinating care for this encounter was: 20 minutes     Chronic conditions status updated as per HPI.  Other than changes above, cont current medications and maintain follow up with specialists.  Return to clinic in 2 month(s) with Dr. Govea.    Isabel Rowland, [facility]

## 2024-12-20 ENCOUNTER — OFFICE VISIT (OUTPATIENT)
Dept: FAMILY MEDICINE | Facility: CLINIC | Age: 74
End: 2024-12-20
Payer: MEDICARE

## 2024-12-20 ENCOUNTER — HOSPITAL ENCOUNTER (INPATIENT)
Facility: HOSPITAL | Age: 74
LOS: 13 days | Discharge: NURSING, REHAB OR LONG-TERM CARE | DRG: 281 | End: 2025-01-02
Attending: FAMILY MEDICINE | Admitting: INTERNAL MEDICINE
Payer: MEDICARE

## 2024-12-20 VITALS
SYSTOLIC BLOOD PRESSURE: 108 MMHG | OXYGEN SATURATION: 98 % | HEART RATE: 113 BPM | BODY MASS INDEX: 31.82 KG/M2 | TEMPERATURE: 98 F | RESPIRATION RATE: 18 BRPM | HEIGHT: 64 IN | DIASTOLIC BLOOD PRESSURE: 77 MMHG | WEIGHT: 186.38 LBS

## 2024-12-20 DIAGNOSIS — D62 ACUTE BLOOD LOSS ANEMIA: ICD-10-CM

## 2024-12-20 DIAGNOSIS — K20.90 ESOPHAGITIS DETERMINED BY ENDOSCOPY: ICD-10-CM

## 2024-12-20 DIAGNOSIS — R79.89 ELEVATED TROPONIN: ICD-10-CM

## 2024-12-20 DIAGNOSIS — Z95.0 PACEMAKER: ICD-10-CM

## 2024-12-20 DIAGNOSIS — I21.4 NSTEMI (NON-ST ELEVATED MYOCARDIAL INFARCTION): Primary | ICD-10-CM

## 2024-12-20 DIAGNOSIS — I21.4 NON-ST ELEVATION MYOCARDIAL INFARCTION (NSTEMI): ICD-10-CM

## 2024-12-20 DIAGNOSIS — K29.00 ACUTE SUPERFICIAL GASTRITIS WITHOUT HEMORRHAGE: ICD-10-CM

## 2024-12-20 DIAGNOSIS — I21.A1 TYPE 2 MI (MYOCARDIAL INFARCTION): ICD-10-CM

## 2024-12-20 DIAGNOSIS — R11.10 VOMITING, UNSPECIFIED VOMITING TYPE, UNSPECIFIED WHETHER NAUSEA PRESENT: ICD-10-CM

## 2024-12-20 DIAGNOSIS — K44.9 HH (HIATUS HERNIA): ICD-10-CM

## 2024-12-20 DIAGNOSIS — I21.4 NSTEMI (NON-ST ELEVATION MYOCARDIAL INFARCTION): ICD-10-CM

## 2024-12-20 DIAGNOSIS — R55 SYNCOPE, UNSPECIFIED SYNCOPE TYPE: ICD-10-CM

## 2024-12-20 DIAGNOSIS — K31.7 POLYP OF DUODENUM: ICD-10-CM

## 2024-12-20 DIAGNOSIS — R41.82 ALTERED MENTAL STATUS, UNSPECIFIED ALTERED MENTAL STATUS TYPE: Primary | ICD-10-CM

## 2024-12-20 DIAGNOSIS — K92.1 BLOOD IN STOOL: ICD-10-CM

## 2024-12-20 DIAGNOSIS — R07.9 CHEST PAIN: ICD-10-CM

## 2024-12-20 DIAGNOSIS — R61 DIAPHORESIS: ICD-10-CM

## 2024-12-20 DIAGNOSIS — R19.5 POSITIVE FECAL OCCULT BLOOD TEST: ICD-10-CM

## 2024-12-20 DIAGNOSIS — R30.0 DYSURIA: ICD-10-CM

## 2024-12-20 DIAGNOSIS — R41.89 COGNITIVE IMPAIRMENT: ICD-10-CM

## 2024-12-20 DIAGNOSIS — R13.19 ESOPHAGEAL DYSPHAGIA: ICD-10-CM

## 2024-12-20 DIAGNOSIS — D3A.8 NEUROENDOCRINE TUMOR: ICD-10-CM

## 2024-12-20 DIAGNOSIS — R55 SYNCOPE AND COLLAPSE: ICD-10-CM

## 2024-12-20 LAB
ALBUMIN SERPL BCP-MCNC: 3.9 G/DL (ref 3.4–4.8)
ALBUMIN/GLOB SERPL: 1.1 {RATIO}
ALP SERPL-CCNC: 49 U/L (ref 40–150)
ALT SERPL W P-5'-P-CCNC: 33 U/L
ANION GAP SERPL CALCULATED.3IONS-SCNC: 22 MMOL/L (ref 7–16)
AST SERPL W P-5'-P-CCNC: 29 U/L (ref 5–34)
BASOPHILS # BLD AUTO: 0.04 K/UL (ref 0–0.2)
BASOPHILS NFR BLD AUTO: 0.6 % (ref 0–1)
BILIRUB SERPL-MCNC: 0.9 MG/DL
BUN SERPL-MCNC: 22 MG/DL (ref 10–20)
BUN/CREAT SERPL: 16 (ref 6–20)
CALCIUM SERPL-MCNC: 9.3 MG/DL (ref 8.4–10.2)
CHLORIDE SERPL-SCNC: 106 MMOL/L (ref 98–107)
CO2 SERPL-SCNC: 23 MMOL/L (ref 23–31)
CREAT SERPL-MCNC: 1.35 MG/DL (ref 0.55–1.02)
CTP QC/QA: YES
DIFFERENTIAL METHOD BLD: ABNORMAL
EGFR (NO RACE VARIABLE) (RUSH/TITUS): 41 ML/MIN/1.73M2
EOSINOPHIL # BLD AUTO: 0.03 K/UL (ref 0–0.5)
EOSINOPHIL NFR BLD AUTO: 0.4 % (ref 1–4)
ERYTHROCYTE [DISTWIDTH] IN BLOOD BY AUTOMATED COUNT: 17.2 % (ref 11.5–14.5)
GLOBULIN SER-MCNC: 3.5 G/DL (ref 2–4)
GLUCOSE SERPL-MCNC: 117 MG/DL (ref 82–115)
GLUCOSE SERPL-MCNC: 119 MG/DL (ref 70–105)
HCT VFR BLD AUTO: 47.2 % (ref 38–47)
HGB BLD-MCNC: 14.1 G/DL (ref 12–16)
IMM GRANULOCYTES # BLD AUTO: 0.1 K/UL (ref 0–0.04)
IMM GRANULOCYTES NFR BLD: 1.5 % (ref 0–0.4)
LYMPHOCYTES # BLD AUTO: 1.16 K/UL (ref 1–4.8)
LYMPHOCYTES NFR BLD AUTO: 17.1 % (ref 27–41)
MCH RBC QN AUTO: 25.3 PG (ref 27–31)
MCHC RBC AUTO-ENTMCNC: 29.9 G/DL (ref 32–36)
MCV RBC AUTO: 84.6 FL (ref 80–96)
MOLECULAR STREP A: NEGATIVE
MONOCYTES # BLD AUTO: 0.48 K/UL (ref 0–0.8)
MONOCYTES NFR BLD AUTO: 7.1 % (ref 2–6)
MPC BLD CALC-MCNC: 12.3 FL (ref 9.4–12.4)
NEUTROPHILS # BLD AUTO: 4.99 K/UL (ref 1.8–7.7)
NEUTROPHILS NFR BLD AUTO: 73.3 % (ref 53–65)
NRBC # BLD AUTO: 0.02 X10E3/UL
NRBC, AUTO (.00): 0.3 %
NT-PROBNP SERPL-MCNC: 784 PG/ML (ref 1–450)
PLATELET # BLD AUTO: 245 K/UL (ref 150–400)
POC MOLECULAR INFLUENZA A AGN: NEGATIVE
POC MOLECULAR INFLUENZA B AGN: NEGATIVE
POTASSIUM SERPL-SCNC: 3.6 MMOL/L (ref 3.5–5.1)
PROT SERPL-MCNC: 7.4 G/DL (ref 5.8–7.6)
RBC # BLD AUTO: 5.58 M/UL (ref 4.2–5.4)
SARS-COV-2 RDRP RESP QL NAA+PROBE: NEGATIVE
SODIUM SERPL-SCNC: 147 MMOL/L (ref 136–145)
TROPONIN I SERPL HS-MCNC: 53.6 NG/L
TROPONIN I SERPL HS-MCNC: 88.3 NG/L
TSH SERPL DL<=0.005 MIU/L-ACNC: 2.58 UIU/ML (ref 0.35–4.94)
WBC # BLD AUTO: 6.8 K/UL (ref 4.5–11)

## 2024-12-20 PROCEDURE — 3078F DIAST BP <80 MM HG: CPT | Mod: ,,, | Performed by: NURSE PRACTITIONER

## 2024-12-20 PROCEDURE — 25000003 PHARM REV CODE 250: Performed by: NURSE PRACTITIONER

## 2024-12-20 PROCEDURE — 96374 THER/PROPH/DIAG INJ IV PUSH: CPT

## 2024-12-20 PROCEDURE — 93005 ELECTROCARDIOGRAM TRACING: CPT

## 2024-12-20 PROCEDURE — 94799 UNLISTED PULMONARY SVC/PX: CPT

## 2024-12-20 PROCEDURE — 84484 ASSAY OF TROPONIN QUANT: CPT | Performed by: FAMILY MEDICINE

## 2024-12-20 PROCEDURE — 80053 COMPREHEN METABOLIC PANEL: CPT | Performed by: FAMILY MEDICINE

## 2024-12-20 PROCEDURE — 83880 ASSAY OF NATRIURETIC PEPTIDE: CPT | Performed by: FAMILY MEDICINE

## 2024-12-20 PROCEDURE — 87635 SARS-COV-2 COVID-19 AMP PRB: CPT | Mod: RHCUB | Performed by: NURSE PRACTITIONER

## 2024-12-20 PROCEDURE — 87502 INFLUENZA DNA AMP PROBE: CPT | Mod: RHCUB | Performed by: NURSE PRACTITIONER

## 2024-12-20 PROCEDURE — 63600175 PHARM REV CODE 636 W HCPCS: Performed by: NURSE PRACTITIONER

## 2024-12-20 PROCEDURE — 36415 COLL VENOUS BLD VENIPUNCTURE: CPT | Performed by: FAMILY MEDICINE

## 2024-12-20 PROCEDURE — 4010F ACE/ARB THERAPY RXD/TAKEN: CPT | Mod: ,,, | Performed by: NURSE PRACTITIONER

## 2024-12-20 PROCEDURE — 1101F PT FALLS ASSESS-DOCD LE1/YR: CPT | Mod: ,,, | Performed by: NURSE PRACTITIONER

## 2024-12-20 PROCEDURE — 11000001 HC ACUTE MED/SURG PRIVATE ROOM

## 2024-12-20 PROCEDURE — 99900035 HC TECH TIME PER 15 MIN (STAT)

## 2024-12-20 PROCEDURE — 25000003 PHARM REV CODE 250: Performed by: EMERGENCY MEDICINE

## 2024-12-20 PROCEDURE — 85025 COMPLETE CBC W/AUTO DIFF WBC: CPT | Performed by: FAMILY MEDICINE

## 2024-12-20 PROCEDURE — 87651 STREP A DNA AMP PROBE: CPT | Mod: RHCUB | Performed by: NURSE PRACTITIONER

## 2024-12-20 PROCEDURE — 3008F BODY MASS INDEX DOCD: CPT | Mod: ,,, | Performed by: NURSE PRACTITIONER

## 2024-12-20 PROCEDURE — 3074F SYST BP LT 130 MM HG: CPT | Mod: ,,, | Performed by: NURSE PRACTITIONER

## 2024-12-20 PROCEDURE — 3288F FALL RISK ASSESSMENT DOCD: CPT | Mod: ,,, | Performed by: NURSE PRACTITIONER

## 2024-12-20 PROCEDURE — 99215 OFFICE O/P EST HI 40 MIN: CPT | Mod: ,,, | Performed by: NURSE PRACTITIONER

## 2024-12-20 PROCEDURE — 84443 ASSAY THYROID STIM HORMONE: CPT | Performed by: NURSE PRACTITIONER

## 2024-12-20 PROCEDURE — 93010 ELECTROCARDIOGRAM REPORT: CPT | Mod: ,,, | Performed by: STUDENT IN AN ORGANIZED HEALTH CARE EDUCATION/TRAINING PROGRAM

## 2024-12-20 PROCEDURE — 99223 1ST HOSP IP/OBS HIGH 75: CPT | Mod: ,,, | Performed by: INTERNAL MEDICINE

## 2024-12-20 PROCEDURE — 99285 EMERGENCY DEPT VISIT HI MDM: CPT | Mod: 25

## 2024-12-20 PROCEDURE — 82962 GLUCOSE BLOOD TEST: CPT

## 2024-12-20 RX ORDER — POLYETHYLENE GLYCOL 3350 17 G/17G
17 POWDER, FOR SOLUTION ORAL DAILY
Status: DISCONTINUED | OUTPATIENT
Start: 2024-12-21 | End: 2025-01-02 | Stop reason: HOSPADM

## 2024-12-20 RX ORDER — LEVOTHYROXINE SODIUM 50 UG/1
50 TABLET ORAL
Status: DISCONTINUED | OUTPATIENT
Start: 2024-12-21 | End: 2025-01-02 | Stop reason: HOSPADM

## 2024-12-20 RX ORDER — ASPIRIN 325 MG
325 TABLET ORAL
Status: COMPLETED | OUTPATIENT
Start: 2024-12-20 | End: 2024-12-20

## 2024-12-20 RX ORDER — ATORVASTATIN CALCIUM 80 MG/1
80 TABLET, FILM COATED ORAL DAILY
Status: DISCONTINUED | OUTPATIENT
Start: 2024-12-21 | End: 2025-01-02 | Stop reason: HOSPADM

## 2024-12-20 RX ORDER — SERTRALINE HYDROCHLORIDE 50 MG/1
50 TABLET, FILM COATED ORAL NIGHTLY
Status: DISCONTINUED | OUTPATIENT
Start: 2024-12-20 | End: 2024-12-21

## 2024-12-20 RX ORDER — NALOXONE HCL 0.4 MG/ML
0.02 VIAL (ML) INJECTION
Status: DISCONTINUED | OUTPATIENT
Start: 2024-12-20 | End: 2025-01-02 | Stop reason: HOSPADM

## 2024-12-20 RX ORDER — IBUPROFEN 200 MG
24 TABLET ORAL
Status: DISCONTINUED | OUTPATIENT
Start: 2024-12-20 | End: 2025-01-02 | Stop reason: HOSPADM

## 2024-12-20 RX ORDER — MIRTAZAPINE 15 MG/1
15 TABLET, FILM COATED ORAL NIGHTLY
Status: DISCONTINUED | OUTPATIENT
Start: 2024-12-20 | End: 2024-12-21

## 2024-12-20 RX ORDER — ONDANSETRON HYDROCHLORIDE 2 MG/ML
4 INJECTION, SOLUTION INTRAVENOUS EVERY 8 HOURS PRN
Status: DISCONTINUED | OUTPATIENT
Start: 2024-12-20 | End: 2025-01-02 | Stop reason: HOSPADM

## 2024-12-20 RX ORDER — METOPROLOL TARTRATE 25 MG/1
25 TABLET, FILM COATED ORAL 2 TIMES DAILY
Status: DISCONTINUED | OUTPATIENT
Start: 2024-12-20 | End: 2025-01-02 | Stop reason: HOSPADM

## 2024-12-20 RX ORDER — QUETIAPINE FUMARATE 25 MG/1
25 TABLET, FILM COATED ORAL NIGHTLY
Status: DISCONTINUED | OUTPATIENT
Start: 2024-12-20 | End: 2024-12-21

## 2024-12-20 RX ORDER — FUROSEMIDE 20 MG/1
20 TABLET ORAL DAILY
Status: DISCONTINUED | OUTPATIENT
Start: 2024-12-21 | End: 2024-12-20

## 2024-12-20 RX ORDER — ACETAMINOPHEN 325 MG/1
650 TABLET ORAL EVERY 4 HOURS PRN
Status: DISCONTINUED | OUTPATIENT
Start: 2024-12-20 | End: 2025-01-02 | Stop reason: HOSPADM

## 2024-12-20 RX ORDER — NAPROXEN SODIUM 220 MG/1
81 TABLET, FILM COATED ORAL DAILY
Status: DISCONTINUED | OUTPATIENT
Start: 2024-12-21 | End: 2025-01-02 | Stop reason: HOSPADM

## 2024-12-20 RX ORDER — ENOXAPARIN SODIUM 100 MG/ML
40 INJECTION SUBCUTANEOUS EVERY 24 HOURS
Status: DISCONTINUED | OUTPATIENT
Start: 2024-12-20 | End: 2025-01-02 | Stop reason: HOSPADM

## 2024-12-20 RX ORDER — PANTOPRAZOLE SODIUM 40 MG/1
40 TABLET, DELAYED RELEASE ORAL DAILY
Status: DISCONTINUED | OUTPATIENT
Start: 2024-12-21 | End: 2025-01-02 | Stop reason: HOSPADM

## 2024-12-20 RX ORDER — IBUPROFEN 200 MG
16 TABLET ORAL
Status: DISCONTINUED | OUTPATIENT
Start: 2024-12-20 | End: 2025-01-02 | Stop reason: HOSPADM

## 2024-12-20 RX ORDER — NITROGLYCERIN 0.4 MG/1
0.4 TABLET SUBLINGUAL EVERY 5 MIN PRN
Status: DISCONTINUED | OUTPATIENT
Start: 2024-12-20 | End: 2025-01-02 | Stop reason: HOSPADM

## 2024-12-20 RX ORDER — HYDRALAZINE HYDROCHLORIDE 50 MG/1
100 TABLET, FILM COATED ORAL 3 TIMES DAILY
Status: DISCONTINUED | OUTPATIENT
Start: 2024-12-20 | End: 2024-12-20

## 2024-12-20 RX ORDER — PREGABALIN 75 MG/1
75 CAPSULE ORAL NIGHTLY
Status: ON HOLD | COMMUNITY
Start: 2024-12-04

## 2024-12-20 RX ORDER — AMOXICILLIN 250 MG
1 CAPSULE ORAL 2 TIMES DAILY
Status: DISCONTINUED | OUTPATIENT
Start: 2024-12-20 | End: 2025-01-02 | Stop reason: HOSPADM

## 2024-12-20 RX ORDER — OXYCODONE AND ACETAMINOPHEN 7.5; 325 MG/1; MG/1
1 TABLET ORAL
Status: ON HOLD | COMMUNITY
Start: 2024-12-22 | End: 2025-01-21

## 2024-12-20 RX ORDER — PREGABALIN 75 MG/1
75 CAPSULE ORAL NIGHTLY
Status: DISCONTINUED | OUTPATIENT
Start: 2024-12-20 | End: 2024-12-21

## 2024-12-20 RX ORDER — GLUCAGON 1 MG
1 KIT INJECTION
Status: DISCONTINUED | OUTPATIENT
Start: 2024-12-20 | End: 2025-01-02 | Stop reason: HOSPADM

## 2024-12-20 RX ORDER — MEMANTINE HYDROCHLORIDE 10 MG/1
10 TABLET ORAL 2 TIMES DAILY
Status: DISCONTINUED | OUTPATIENT
Start: 2024-12-20 | End: 2024-12-21

## 2024-12-20 RX ORDER — HYDROCODONE BITARTRATE AND ACETAMINOPHEN 5; 325 MG/1; MG/1
1 TABLET ORAL EVERY 6 HOURS PRN
Status: DISCONTINUED | OUTPATIENT
Start: 2024-12-20 | End: 2025-01-02 | Stop reason: HOSPADM

## 2024-12-20 RX ORDER — VALSARTAN 320 MG/1
320 TABLET ORAL DAILY
Status: DISCONTINUED | OUTPATIENT
Start: 2024-12-21 | End: 2024-12-21

## 2024-12-20 RX ORDER — SODIUM CHLORIDE 0.9 % (FLUSH) 0.9 %
10 SYRINGE (ML) INJECTION EVERY 12 HOURS PRN
Status: DISCONTINUED | OUTPATIENT
Start: 2024-12-20 | End: 2025-01-02 | Stop reason: HOSPADM

## 2024-12-20 RX ORDER — ACETAMINOPHEN 325 MG/1
650 TABLET ORAL EVERY 8 HOURS PRN
Status: DISCONTINUED | OUTPATIENT
Start: 2024-12-20 | End: 2025-01-02 | Stop reason: HOSPADM

## 2024-12-20 RX ORDER — TALC
6 POWDER (GRAM) TOPICAL NIGHTLY PRN
Status: DISCONTINUED | OUTPATIENT
Start: 2024-12-20 | End: 2025-01-02 | Stop reason: HOSPADM

## 2024-12-20 RX ADMIN — QUETIAPINE FUMARATE 25 MG: 25 TABLET ORAL at 10:12

## 2024-12-20 RX ADMIN — SERTRALINE HYDROCHLORIDE 50 MG: 50 TABLET ORAL at 10:12

## 2024-12-20 RX ADMIN — ASPIRIN 325 MG ORAL TABLET 325 MG: 325 PILL ORAL at 08:12

## 2024-12-20 RX ADMIN — MIRTAZAPINE 15 MG: 15 TABLET, FILM COATED ORAL at 10:12

## 2024-12-20 RX ADMIN — ONDANSETRON 4 MG: 2 INJECTION INTRAMUSCULAR; INTRAVENOUS at 08:12

## 2024-12-20 RX ADMIN — METOPROLOL TARTRATE 25 MG: 25 TABLET, FILM COATED ORAL at 10:12

## 2024-12-20 RX ADMIN — MEMANTINE 10 MG: 10 TABLET ORAL at 10:12

## 2024-12-20 RX ADMIN — SODIUM CHLORIDE 500 ML: 9 INJECTION, SOLUTION INTRAVENOUS at 09:12

## 2024-12-20 RX ADMIN — PREGABALIN 75 MG: 75 CAPSULE ORAL at 10:12

## 2024-12-20 RX ADMIN — SENNOSIDES AND DOCUSATE SODIUM 1 TABLET: 50; 8.6 TABLET ORAL at 10:12

## 2024-12-20 RX ADMIN — ENOXAPARIN SODIUM 40 MG: 40 INJECTION SUBCUTANEOUS at 10:12

## 2024-12-20 NOTE — PROGRESS NOTES
[facility] Medicine    Chief Complaint      Chief Complaint   Patient presents with    Insomnia    Emesis     Pt daughter states not sleeping and able to keep anything down x2 weeks.     Fatigue       History of Present Illness      Renetta Stewart is a 74 y.o. female. She  has a past medical history of Anemia, Anxiety, Dementia, Depression, GERD (gastroesophageal reflux disease), Hyperlipidemia, Hypertension, PONV (postoperative nausea and vomiting), and Stroke., who presents today for fatigue, trouble sleeping and N/V with poor appetite x2 weeks.      Upon initial intake for rooming with the nurse patient was able to ambulate to her exam room and was alert and talking to the nurse.  She was then going to the restroom when she began to feel weak and asked for a wheelchair.  Prior to the nurse being able to get the wc patient slid down into a chair and became unresponsive but breathing.  She was diaphoretic.  BP was low- 58/38 manually with HR of 112.  Patient had urinated on herself.  Details were reported to provider by her daughter and other staff of events leading up to patient becoming unresponsive.     Past Medical History:  Past Medical History:   Diagnosis Date    Anemia     Anxiety     Dementia     Depression     GERD (gastroesophageal reflux disease)     Hyperlipidemia     Hypertension     PONV (postoperative nausea and vomiting)     Stroke        Past Surgical History:   has a past surgical history that includes Cardiac pacemaker placement.    Social History:  Social History     Tobacco Use    Smoking status: Never     Passive exposure: Never    Smokeless tobacco: Never   Substance Use Topics    Alcohol use: Not Currently    Drug use: Never       I personally reviewed all past medical, surgical, and social.     Review of Systems   Constitutional:  Positive for appetite change and fatigue.   Gastrointestinal:  Positive for nausea and vomiting.   Musculoskeletal:  Positive for gait problem.   Neurological:   Positive for weakness.   Psychiatric/Behavioral:  Positive for sleep disturbance.         Medications:  No facility-administered encounter medications on file as of 12/20/2024.     Outpatient Encounter Medications as of 12/20/2024   Medication Sig Dispense Refill    esomeprazole (NEXIUM) 40 MG capsule Take 1 capsule (40 mg total) by mouth before breakfast. 90 capsule 1    furosemide (LASIX) 20 MG tablet Take 1 tablet (20 mg total) by mouth once daily. 90 tablet 1    hydrALAZINE (APRESOLINE) 100 MG tablet Take 1 tablet (100 mg total) by mouth 3 (three) times daily. 270 tablet 1    levothyroxine (SYNTHROID) 50 MCG tablet Take 1 tablet (50 mcg total) by mouth before breakfast. 90 tablet 1    megestroL (MEGACE) 400 mg/10 mL (40 mg/mL) Susp Take 10 mLs (400 mg total) by mouth 2 (two) times daily. 180 mL 1    memantine (NAMENDA) 10 MG Tab Take 1 tablet (10 mg total) by mouth 2 (two) times daily. 180 tablet 1    mirtazapine (REMERON) 15 MG tablet Take 1 tablet (15 mg total) by mouth every evening. 90 tablet 1    QUEtiapine (SEROQUEL) 25 MG Tab Take 25 mg by mouth every evening.      sertraline (ZOLOFT) 50 MG tablet Take 1 tablet (50 mg total) by mouth every evening. 90 tablet 1    valsartan (DIOVAN) 320 MG tablet Take 1 tablet (320 mg total) by mouth once daily. 90 tablet 1    [DISCONTINUED] ciprofloxacin HCl (CILOXAN) 0.3 % ophthalmic solution Place 2 drops into both eyes every 4 (four) hours.      [DISCONTINUED] dicyclomine (BENTYL) 20 mg tablet Take 20 mg by mouth 2 (two) times daily.      [DISCONTINUED] diltiaZEM (CARDIZEM CD) 240 MG 24 hr capsule Take 240 mg by mouth.      [DISCONTINUED] HYDROcodone-acetaminophen (NORCO) 5-325 mg per tablet Take 1 tablet by mouth every 6 (six) hours as needed for Pain.      [DISCONTINUED] levETIRAcetam (KEPPRA) 500 MG Tab Take 1 tablet (500 mg total) by mouth 2 (two) times daily. 60 tablet 11    [DISCONTINUED] LUMIGAN 0.01 % Drop Place 1 drop into both eyes every evening.    "      Allergies:  Review of patient's allergies indicates:  No Known Allergies    Health Maintenance:    There is no immunization history on file for this patient.   Health Maintenance   Topic Date Due    Hepatitis C Screening  Never done    Annual UACr  Never done    TETANUS VACCINE  Never done    Sign Pain Contract  Never done    Complete Opioid Risk Tool  Never done    Mammogram  Never done    High Dose Statin  Never done    DEXA Scan  Never done    Shingles Vaccine (1 of 2) Never done    Pneumococcal Vaccines (Age 50+) (1 of 1 - PCV) Never done    RSV Vaccine (Age 60+ and Pregnant patients) (1 - Risk 60-74 years 1-dose series) Never done    Influenza Vaccine (1) 09/01/2024    COVID-19 Vaccine (1 - 2024-25 season) Never done    Colorectal Cancer Screening  06/18/2029    Lipid Panel  12/21/2029        Physical Exam      Vital Signs  Temp: 97.5 °F (36.4 °C)  Temp Source: Oral  Pulse: (!) 113  Resp: 18  SpO2: 98 %  BP: 108/77  BP Location: Left arm  Patient Position: Sitting  Height and Weight  Height: 5' 4" (162.6 cm)  Weight: 84.5 kg (186 lb 6 oz)  BSA (Calculated - sq m): 1.95 sq meters  BMI (Calculated): 32  Weight in (lb) to have BMI = 25: 145.3]    Physical Exam  Vitals and nursing note reviewed.   Constitutional:       General: She is in acute distress.      Appearance: She is well-developed. She is obese. She is ill-appearing and diaphoretic.      Interventions: Nasal cannula in place.   HENT:      Head: Normocephalic.      Right Ear: External ear normal.      Left Ear: External ear normal.      Nose: Nose normal.   Eyes:      Pupils:      Right eye: Pupil is sluggish.      Left eye: Pupil is sluggish.   Cardiovascular:      Rate and Rhythm: Regular rhythm. Tachycardia present.      Comments: PM noted to chest wall  Pulmonary:      Effort: Pulmonary effort is normal. No respiratory distress.      Breath sounds: Normal breath sounds.   Genitourinary:     Comments: Patient urinated on herself while coughing " prior to becoming unresponsive  Neurological:      Mental Status: She is unresponsive.          Laboratory:  CBC:  Recent Labs   Lab 12/22/24  1758 12/23/24  0415 12/26/24  0800   WBC 10.85 8.98 4.50   RBC 4.68 4.56 4.07 L   Hemoglobin 11.9 L 11.5 L 10.3 L   Hematocrit 39.0 38.0 33.1 L   Platelet Count 197 161 148 L   MCV 83.3 83.3 81.3   MCH 25.4 L 25.2 L 25.3 L   MCHC 30.5 L 30.3 L 31.1 L     CMP:  Recent Labs   Lab 10/15/24  1059 12/20/24  1609 12/21/24  0120 12/23/24  0415 12/26/24  0800   Glucose 108 H 117 H 119 H   < > 72 L   Calcium 9.8 9.3 8.6   < > 7.7 L   Albumin 3.8 3.9 3.3 L  --   --    Total Protein 7.2 7.4 6.0  --   --    Sodium 137 147 H 145   < > 137   Potassium 3.5 3.6 3.3 L   < > 3.1 L   CO2 27 23 21 L   < > 27   Chloride 99 106 107   < > 100   BUN 11 22 H 25 H   < > 12   Alk Phos 82 49 38 L  --   --    ALT 19 33 30  --   --    AST 18 29 29  --   --    Bilirubin, Total 0.5 0.9 0.7  --   --     < > = values in this interval not displayed.     LIPIDS:  Recent Labs   Lab 02/14/22  1444 09/06/22  1427 03/11/24  1534 10/15/24  1059 12/20/24  1922 12/21/24  0120   TSH 3.720   < > 2.260 3.820 H 2.575  --    HDL Cholesterol 78 H  --   --   --   --  46   Cholesterol 131  --   --   --   --  201 H   Triglycerides 78  --   --   --   --  182 H   LDL Calculated 37  --   --   --   --  119   Cholesterol/HDL Ratio (Risk Factor) 1.7  --   --   --   --  4.4   Non-HDL 53  --   --   --   --  155    < > = values in this interval not displayed.     TSH:  Recent Labs   Lab 03/11/24  1534 10/15/24  1059 12/20/24  1922   TSH 2.260 3.820 H 2.575     A1C:        Assessment/Plan     Renetta Stewart is a 74 y.o.female with:     1. Altered mental status, unspecified altered mental status type    2. Vomiting, unspecified vomiting type, unspecified whether nausea present  -     POCT COVID-19 Rapid Screening  -     POCT Influenza A/B Molecular  -     POCT Strep A, Molecular  -     POCT URINALYSIS W/O SCOPE    3. Dysuria  -     POCT  URINALYSIS W/O SCOPE    4. Diaphoresis         [facility] called for transport to ER  [facility] arrived at approximately 1410- 1415 and assumed care  Report called to Edith at [facility]      Total time spent face-to-face and non-face-to-face coordinating care for this encounter was: 45 minutes    Chronic conditions status updated as per HPI.  Other than changes above, cont current medications and maintain follow up with specialists.  Return to clinic prn if symptoms worsen or fail to improve.    Isabel Rowland, GREGGP  [facility]

## 2024-12-20 NOTE — ED PROVIDER NOTES
Encounter Date: 12/20/2024    SCRIBE #1 NOTE: I, Romina Melissa, garland scribing for, and in the presence of,  Jae Khan DO.       History     Chief Complaint   Patient presents with    Loss of Consciousness     Pt sent from [facility] via [facility] for a possible syncopal episode. Family states pt urinated on herself.     This is a 75 y/o female,who presents to the ED by EMS S/P syncopal episode. Her daughter is in the room and helps with hx. Her daughter notes the pt had a syncopal episode. There was no seizure like activity activity but her daughter notes the pt did urinate on herself. There are no other complaints/pain in the ED at this time. She has a known hx of Dementia, HTN, GERD, and hyperlipidemia. Her daughter states the pt has never had a stroke but according to previous records, the pt has had a stroke. There is no smoking hx on file.     The history is provided by the EMS personnel and a relative.     Review of patient's allergies indicates:  No Known Allergies  Past Medical History:   Diagnosis Date    Anemia     Anxiety     Dementia     Depression     GERD (gastroesophageal reflux disease)     Hyperlipidemia     Hypertension     Stroke      Past Surgical History:   Procedure Laterality Date    CARDIAC PACEMAKER PLACEMENT       Family History   Problem Relation Name Age of Onset    Hypertension Father       Social History     Tobacco Use    Smoking status: Never     Passive exposure: Never    Smokeless tobacco: Never   Substance Use Topics    Alcohol use: Not Currently    Drug use: Never     Review of Systems   Neurological:  Positive for syncope. Negative for seizures.       Physical Exam     Initial Vitals   BP Pulse Resp Temp SpO2   12/20/24 1504 12/20/24 1516 12/20/24 1504 12/20/24 1504 12/20/24 1516   (!) 143/77 76 17 96.9 °F (36.1 °C) 100 %      MAP       --                Physical Exam    Nursing note and vitals reviewed.  Constitutional: She appears well-developed and well-nourished.    HENT:   Head: Normocephalic and atraumatic.   Eyes: Conjunctivae and EOM are normal. Pupils are equal, round, and reactive to light.   Neck: Neck supple.   Normal range of motion.  Cardiovascular:  Normal rate, regular rhythm, normal heart sounds and intact distal pulses.           Pulmonary/Chest: Breath sounds normal.   Abdominal: Abdomen is soft. Bowel sounds are normal.   Musculoskeletal:         General: Normal range of motion.      Cervical back: Normal range of motion and neck supple.     Neurological: She is alert and oriented to person, place, and time. She has normal strength.   Skin: Skin is warm and dry. Capillary refill takes less than 2 seconds.   Psychiatric: She has a normal mood and affect. Thought content normal.         ED Course   Procedures  Labs Reviewed   COMPREHENSIVE METABOLIC PANEL - Abnormal       Result Value    Sodium 147 (*)     Potassium 3.6      Chloride 106      CO2 23      Anion Gap 22 (*)     Glucose 117 (*)     BUN 22 (*)     Creatinine 1.35 (*)     BUN/Creatinine Ratio 16      Calcium 9.3      Total Protein 7.4      Albumin 3.9      Globulin 3.5      A/G Ratio 1.1      Bilirubin, Total 0.9      Alk Phos 49      ALT 33      AST 29      eGFR 41 (*)    TROPONIN I - Abnormal    Troponin I High Sensitivity 53.6 (*)    NT-PRO NATRIURETIC PEPTIDE - Abnormal    ProBNP 784 (*)    CBC WITH DIFFERENTIAL - Abnormal    WBC 6.80      RBC 5.58 (*)     Hemoglobin 14.1      Hematocrit 47.2 (*)     MCV 84.6      MCH 25.3 (*)     MCHC 29.9 (*)     RDW 17.2 (*)     Platelet Count 245      MPV 12.3      Neutrophils % 73.3 (*)     Lymphocytes % 17.1 (*)     Monocytes % 7.1 (*)     Eosinophils % 0.4 (*)     Basophils % 0.6      Immature Granulocytes % 1.5 (*)     nRBC, Auto 0.3 (*)     Neutrophils, Abs 4.99      Lymphocytes, Absolute 1.16      Monocytes, Absolute 0.48      Eosinophils, Absolute 0.03      Basophils, Absolute 0.04      Immature Granulocytes, Absolute 0.10 (*)     nRBC, Absolute 0.02  (*)     Diff Type Auto     TROPONIN I - Abnormal    Troponin I High Sensitivity 88.3 (*)    POCT GLUCOSE MONITORING CONTINUOUS - Abnormal    POC Glucose 119 (*)    TSH - Normal    TSH 2.575     CBC W/ AUTO DIFFERENTIAL    Narrative:     The following orders were created for panel order CBC auto differential.  Procedure                               Abnormality         Status                     ---------                               -----------         ------                     CBC with Differential[[id number]]       Abnormal            Final result                 Please view results for these tests on the individual orders.   PROTIME-INR   APTT   LIPID PANEL   TROPONIN I   TROPONIN I   TYPE & SCREEN          Imaging Results              X-Ray Chest AP Portable (Final result)  Result time 12/20/24 16:32:38      Final result by Yoshi Becerra MD (12/20/24 16:32:38)                   Impression:      As above.      Electronically signed by: Yoshi Becerra MD  Date:    12/20/2024  Time:    16:32               Narrative:    EXAMINATION:  XR CHEST AP PORTABLE    CLINICAL HISTORY:  Chest Pain;    TECHNIQUE:  Single frontal view of the chest was performed.    COMPARISON:  05/19/2023    FINDINGS:  Small left pleural effusion with left basilar atelectasis.  No pneumothorax.  Cardiac silhouette is enlarged.  Cardiac pacemaker noted in the left chest.                                       CT Head Without Contrast (Final result)  Result time 12/20/24 16:35:52      Final result by Manuel Schreiber MD (12/20/24 16:35:52)                   Impression:      No acute intracranial process.  Additional evaluation with MRI of the brain may be obtained, as clinically warranted    Old infarctions within the periventricular white matter and the left thalami.  Additional old infarctions in the basal ganglia and right cerebellum.    Changes of chronic vessel ischemic disease and cerebral volume loss.      Electronically signed by: Manuel  MD Abdoul  Date:    12/20/2024  Time:    16:35               Narrative:    EXAMINATION:  CT HEAD WITHOUT CONTRAST    CLINICAL HISTORY:  Mental status change, unknown cause;    TECHNIQUE:  Low dose axial images were obtained through the head.  Coronal and sagittal reformations were also performed. Contrast was not administered.    COMPARISON:  CT head dated 05/19/2023.    FINDINGS:  The subcutaneous tissues are unremarkable.  The bony calvarium is intact.  There is a mucous retention cyst is within the left maxillary sinus.  The remainder of the paranasal sinuses are unremarkable.  The mastoid air cells are clear.  There are postoperative changes in the globes.    The craniocervical junction is intact.  The sellar and parasellar structures are unremarkable.  There is no evidence of intracranial hemorrhage.  There are no extra-axial fluid collections.    The ventricles and sulci are prominent, consistent cerebral volume loss.  There are old infarctions within the periventricular and subcortical white matter.  There are old infarctions in the basal ganglia.  There is an old infarction in the left thalamus.  There is an old infarction in the right cerebellum.  The gray-white differentiation is maintained.  There is no dense vessel sign.  There is no evidence of mass effect.                                       Medications   pantoprazole EC tablet 40 mg (has no administration in time range)   levothyroxine tablet 50 mcg (has no administration in time range)   memantine tablet 10 mg (10 mg Oral Given 12/20/24 2234)   mirtazapine tablet 15 mg (15 mg Oral Given 12/20/24 2235)   pregabalin capsule 75 mg (75 mg Oral Given 12/20/24 2235)   QUEtiapine tablet 25 mg (25 mg Oral Given 12/20/24 2234)   sertraline tablet 50 mg (50 mg Oral Given 12/20/24 2235)   valsartan tablet 320 mg (has no administration in time range)   sodium chloride 0.9% flush 10 mL (has no administration in time range)   naloxone 0.4 mg/mL injection 0.02 mg  (has no administration in time range)   glucose chewable tablet 16 g (has no administration in time range)   glucose chewable tablet 24 g (has no administration in time range)   dextrose 50% injection 12.5 g (has no administration in time range)   dextrose 50% injection 25 g (has no administration in time range)   glucagon (human recombinant) injection 1 mg (has no administration in time range)   enoxaparin injection 40 mg (40 mg Subcutaneous Given 12/20/24 2235)   acetaminophen tablet 650 mg (has no administration in time range)   HYDROcodone-acetaminophen 5-325 mg per tablet 1 tablet (has no administration in time range)   polyethylene glycol packet 17 g (has no administration in time range)   senna-docusate 8.6-50 mg per tablet 1 tablet (1 tablet Oral Given 12/20/24 2235)   ondansetron injection 4 mg (4 mg Intravenous Given 12/20/24 2039)   melatonin tablet 6 mg (has no administration in time range)   acetaminophen tablet 650 mg (has no administration in time range)   metoprolol tartrate (LOPRESSOR) tablet 25 mg (25 mg Oral Given 12/20/24 2235)   atorvastatin tablet 80 mg (has no administration in time range)   nitroGLYCERIN SL tablet 0.4 mg (has no administration in time range)   aspirin chewable tablet 81 mg (has no administration in time range)   aspirin tablet 325 mg (325 mg Oral Given 12/20/24 2035)   sodium chloride 0.9% bolus 500 mL 500 mL ( Intravenous Restarted 12/20/24 2220)     Medical Decision Making  74-year-old female brought to the emergency department via EMS because of syncope at the doctor's office. The workup of the patient showed that she is in the AFib, her troponin I is elevated and she is slightly hypernatremic.  I contacted the hospitalist.  The patient we will be admitted for observation.    Amount and/or Complexity of Data Reviewed  Labs: ordered. Decision-making details documented in ED Course.  Radiology: ordered.    Risk  Decision regarding hospitalization.              Attending  Attestation:           Physician Attestation for Scribe:  Physician Attestation Statement for Scribe #1: I, Jae Khan, DO, reviewed documentation, as scribed by Romina Melissa in my presence, and it is both accurate and complete.             ED Course as of 12/20/24 2258   Fri Dec 20, 2024   1704 Xray Chest AP Portable:   Small left pleural effusion with left basilar atelectasis.  No pneumothorax.  Cardiac silhouette is enlarged.  Cardiac pacemaker noted in the left chest. [BW]   1705 CT Head without Contrast:   No acute intracranial process.  Additional evaluation with MRI of the brain may be obtained, as clinically warranted     Old infarctions within the periventricular white matter and the left thalami.  Additional old infarctions in the basal ganglia and right cerebellum.     Changes of chronic vessel ischemic disease and cerebral volume loss.      [BW]      ED Course User Index  [BW] Romina Melissa                           Clinical Impression:  Final diagnoses:  [R07.9] Chest pain          ED Disposition Condition    Admit                 Eric Garcia MD  12/20/24 [id number]

## 2024-12-21 PROBLEM — R55 SYNCOPE: Status: ACTIVE | Noted: 2024-12-21

## 2024-12-21 PROBLEM — R79.89 ELEVATED TROPONIN: Status: ACTIVE | Noted: 2024-12-21

## 2024-12-21 LAB
ALBUMIN SERPL BCP-MCNC: 3.3 G/DL (ref 3.4–4.8)
ALBUMIN/GLOB SERPL: 1.2 {RATIO}
ALP SERPL-CCNC: 38 U/L (ref 40–150)
ALT SERPL W P-5'-P-CCNC: 30 U/L
ANION GAP SERPL CALCULATED.3IONS-SCNC: 20 MMOL/L (ref 7–16)
AORTIC ROOT ANNULUS: 2.91 CM
AORTIC VALVE CUSP SEPERATION: 2.1 CM
APICAL FOUR CHAMBER EJECTION FRACTION: 46 %
AST SERPL W P-5'-P-CCNC: 29 U/L (ref 5–34)
AV INDEX (PROSTH): 0.91
AV MEAN GRADIENT: 18 MMHG
AV PEAK GRADIENT: 38.4 MMHG
AV VALVE AREA BY VELOCITY RATIO: 2.3 CM²
AV VALVE AREA: 2.3 CM²
AV VELOCITY RATIO: 0.9
BACTERIA #/AREA URNS HPF: ABNORMAL /HPF
BASOPHILS # BLD AUTO: 0.02 K/UL (ref 0–0.2)
BASOPHILS NFR BLD AUTO: 0.3 % (ref 0–1)
BILIRUB SERPL-MCNC: 0.7 MG/DL
BILIRUB UR QL STRIP: 1
BSA FOR ECHO PROCEDURE: 2.01 M2
BUN SERPL-MCNC: 25 MG/DL (ref 10–20)
BUN/CREAT SERPL: 16 (ref 6–20)
CALCIUM SERPL-MCNC: 8.6 MG/DL (ref 8.4–10.2)
CHLORIDE SERPL-SCNC: 107 MMOL/L (ref 98–107)
CHOLEST SERPL-MCNC: 201 MG/DL
CHOLEST/HDLC SERPL: 4.4 {RATIO}
CLARITY UR: ABNORMAL
CO2 SERPL-SCNC: 21 MMOL/L (ref 23–31)
COLOR UR: ABNORMAL
CREAT SERPL-MCNC: 1.56 MG/DL (ref 0.55–1.02)
CV ECHO LV RWT: 0.7 CM
DIFFERENTIAL METHOD BLD: ABNORMAL
DOP CALC AO PEAK VEL: 3.1 M/S
DOP CALC AO VTI: 60.1 CM
DOP CALC LVOT AREA: 2.5 CM2
DOP CALC LVOT DIAMETER: 1.8 CM
DOP CALC LVOT PEAK VEL: 2.8 M/S
DOP CALC LVOT STROKE VOLUME: 138.9 CM3
DOP CALC MV VTI: 34.3 CM
DOP CALCLVOT PEAK VEL VTI: 54.6 CM
E WAVE DECELERATION TIME: 252.93 MSEC
E/A RATIO: 0.62
E/E' RATIO: 12.8 M/S
ECHO LV POSTERIOR WALL: 1.3 CM (ref 0.6–1.1)
EGFR (NO RACE VARIABLE) (RUSH/TITUS): 35 ML/MIN/1.73M2
EOSINOPHIL # BLD AUTO: 0.01 K/UL (ref 0–0.5)
EOSINOPHIL NFR BLD AUTO: 0.2 % (ref 1–4)
ERYTHROCYTE [DISTWIDTH] IN BLOOD BY AUTOMATED COUNT: 16.8 % (ref 11.5–14.5)
FRACTIONAL SHORTENING: 43.2 % (ref 28–44)
GLOBULIN SER-MCNC: 2.7 G/DL (ref 2–4)
GLUCOSE SERPL-MCNC: 119 MG/DL (ref 82–115)
GLUCOSE SERPL-MCNC: 126 MG/DL (ref 70–105)
GLUCOSE UR STRIP-MCNC: 30 MG/DL
HCO3 UR-SCNC: 26 MMOL/L (ref 21–28)
HCT VFR BLD AUTO: 41.8 % (ref 38–47)
HDLC SERPL-MCNC: 46 MG/DL (ref 35–60)
HGB BLD-MCNC: 13 G/DL (ref 12–16)
HYALINE CASTS #/AREA URNS LPF: ABNORMAL /LPF
IMM GRANULOCYTES # BLD AUTO: 0.05 K/UL (ref 0–0.04)
IMM GRANULOCYTES NFR BLD: 0.9 % (ref 0–0.4)
INDIRECT COOMBS: NORMAL
INR BLD: 1.18
INTERVENTRICULAR SEPTUM: 2.3 CM (ref 0.6–1.1)
IVC DIAMETER: 1.41 CM
KETONES UR STRIP-SCNC: ABNORMAL MG/DL
LDLC SERPL CALC-MCNC: 119 MG/DL
LDLC/HDLC SERPL: 2.6 {RATIO}
LEFT ATRIUM AREA SYSTOLIC (APICAL 4 CHAMBER): 13.4 CM2
LEFT ATRIUM SIZE: 3 CM
LEFT INTERNAL DIMENSION IN SYSTOLE: 2.1 CM (ref 2.1–4)
LEFT VENTRICLE DIASTOLIC VOLUME INDEX: 27.52 ML/M2
LEFT VENTRICLE DIASTOLIC VOLUME: 53.11 ML
LEFT VENTRICLE END DIASTOLIC VOLUME APICAL 4 CHAMBER: 27.33 ML
LEFT VENTRICLE END SYSTOLIC VOLUME APICAL 4 CHAMBER: 26.42 ML
LEFT VENTRICLE MASS INDEX: 146.2 G/M2
LEFT VENTRICLE SYSTOLIC VOLUME INDEX: 7.6 ML/M2
LEFT VENTRICLE SYSTOLIC VOLUME: 14.68 ML
LEFT VENTRICULAR INTERNAL DIMENSION IN DIASTOLE: 3.7 CM (ref 3.5–6)
LEFT VENTRICULAR MASS: 282.1 G
LEUKOCYTE ESTERASE UR QL STRIP: NEGATIVE
LV LATERAL E/E' RATIO: 12.8 M/S
LV SEPTAL E/E' RATIO: 12.8 M/S
LVED V (TEICH): 53.11 ML
LVES V (TEICH): 14.68 ML
LVOT MG: 17.72 MMHG
LVOT MV: 1.84 CM/S
LYMPHOCYTES # BLD AUTO: 0.74 K/UL (ref 1–4.8)
LYMPHOCYTES NFR BLD AUTO: 12.6 % (ref 27–41)
MCH RBC QN AUTO: 25.6 PG (ref 27–31)
MCHC RBC AUTO-ENTMCNC: 31.1 G/DL (ref 32–36)
MCV RBC AUTO: 82.4 FL (ref 80–96)
MONOCYTES # BLD AUTO: 0.55 K/UL (ref 0–0.8)
MONOCYTES NFR BLD AUTO: 9.4 % (ref 2–6)
MPC BLD CALC-MCNC: 11.7 FL (ref 9.4–12.4)
MUCOUS, UA: ABNORMAL /LPF
MV MEAN GRADIENT: 2 MMHG
MV PEAK A VEL: 1.04 M/S
MV PEAK E VEL: 0.64 M/S
MV PEAK GRADIENT: 5 MMHG
MV STENOSIS PRESSURE HALF TIME: 78.34 MS
MV VALVE AREA BY CONTINUITY EQUATION: 4.05 CM2
MV VALVE AREA P 1/2 METHOD: 2.81 CM2
NEUTROPHILS # BLD AUTO: 4.51 K/UL (ref 1.8–7.7)
NEUTROPHILS NFR BLD AUTO: 76.6 % (ref 53–65)
NITRITE UR QL STRIP: NEGATIVE
NONHDLC SERPL-MCNC: 155 MG/DL
NRBC # BLD AUTO: 0 X10E3/UL
NRBC, AUTO (.00): 0 %
OCCULT BLOOD: POSITIVE
OHS CV RV/LV RATIO: 1.05 CM
PCO2 BLDA: 43 MMHG (ref 35–48)
PH SMN: 7.39 [PH] (ref 7.35–7.45)
PH UR STRIP: 5.5 PH UNITS
PISA MRMAX VEL: 5.04 M/S
PISA TR MAX VEL: 2.85 M/S
PLATELET # BLD AUTO: 207 K/UL (ref 150–400)
PO2 BLDA: 72 MMHG (ref 83–108)
POC BASE EXCESS: 0.8 MMOL/L (ref -2–3)
POC SATURATED O2: 94 % (ref 95–98)
POTASSIUM SERPL-SCNC: 3.3 MMOL/L (ref 3.5–5.1)
PROT SERPL-MCNC: 6 G/DL (ref 5.8–7.6)
PROT UR QL STRIP: 100
PROTHROMBIN TIME: 14.9 SECONDS (ref 11.7–14.7)
PV PEAK GRADIENT: 6 MMHG
PV PEAK VELOCITY: 1.21 M/S
RA MAJOR: 3.58 CM
RA PRESSURE ESTIMATED: 3 MMHG
RBC # BLD AUTO: 5.07 M/UL (ref 4.2–5.4)
RBC # UR STRIP: ABNORMAL /UL
RBC #/AREA URNS HPF: 5 /HPF
RH BLD: NORMAL
RIGHT VENTRICLE DIASTOLIC BASEL DIMENSION: 3.9 CM
RIGHT VENTRICLE DIASTOLIC LENGTH: 4.4 CM
RIGHT VENTRICLE DIASTOLIC MID DIMENSION: 3.3 CM
RIGHT VENTRICULAR LENGTH IN DIASTOLE (APICAL 4-CHAMBER VIEW): 4.36 CM
RV MID DIAMA: 3.34 CM
RV TB RVSP: 6 MMHG
SODIUM SERPL-SCNC: 145 MMOL/L (ref 136–145)
SP GR UR STRIP: 1.03
SPECIMEN OUTDATE: NORMAL
SQUAMOUS #/AREA URNS LPF: ABNORMAL /HPF
TDI LATERAL: 0.05 M/S
TDI SEPTAL: 0.05 M/S
TDI: 0.05 M/S
TR MAX PG: 32 MMHG
TRIGL SERPL-MCNC: 182 MG/DL (ref 37–140)
TROPONIN I SERPL HS-MCNC: 108.6 NG/L
TROPONIN I SERPL HS-MCNC: 135.6 NG/L
TV REST PULMONARY ARTERY PRESSURE: 35 MMHG
UROBILINOGEN UR STRIP-ACNC: 6 MG/DL
VLDLC SERPL-MCNC: 36 MG/DL
WBC # BLD AUTO: 5.88 K/UL (ref 4.5–11)
WBC #/AREA URNS HPF: 10 /HPF
Z-SCORE OF LEFT VENTRICULAR DIMENSION IN END DIASTOLE: -3.9
Z-SCORE OF LEFT VENTRICULAR DIMENSION IN END SYSTOLE: -3.72

## 2024-12-21 PROCEDURE — 94761 N-INVAS EAR/PLS OXIMETRY MLT: CPT

## 2024-12-21 PROCEDURE — 84484 ASSAY OF TROPONIN QUANT: CPT | Performed by: NURSE PRACTITIONER

## 2024-12-21 PROCEDURE — 86850 RBC ANTIBODY SCREEN: CPT | Performed by: NURSE PRACTITIONER

## 2024-12-21 PROCEDURE — 99900035 HC TECH TIME PER 15 MIN (STAT)

## 2024-12-21 PROCEDURE — 99223 1ST HOSP IP/OBS HIGH 75: CPT | Mod: ,,, | Performed by: INTERNAL MEDICINE

## 2024-12-21 PROCEDURE — 81003 URINALYSIS AUTO W/O SCOPE: CPT | Performed by: FAMILY MEDICINE

## 2024-12-21 PROCEDURE — 80053 COMPREHEN METABOLIC PANEL: CPT | Performed by: NURSE PRACTITIONER

## 2024-12-21 PROCEDURE — 36600 WITHDRAWAL OF ARTERIAL BLOOD: CPT

## 2024-12-21 PROCEDURE — 63600175 PHARM REV CODE 636 W HCPCS: Performed by: NURSE PRACTITIONER

## 2024-12-21 PROCEDURE — 82962 GLUCOSE BLOOD TEST: CPT

## 2024-12-21 PROCEDURE — 82803 BLOOD GASES ANY COMBINATION: CPT

## 2024-12-21 PROCEDURE — 36415 COLL VENOUS BLD VENIPUNCTURE: CPT | Performed by: NURSE PRACTITIONER

## 2024-12-21 PROCEDURE — 85610 PROTHROMBIN TIME: CPT | Performed by: NURSE PRACTITIONER

## 2024-12-21 PROCEDURE — 82272 OCCULT BLD FECES 1-3 TESTS: CPT | Performed by: NURSE PRACTITIONER

## 2024-12-21 PROCEDURE — 11000001 HC ACUTE MED/SURG PRIVATE ROOM

## 2024-12-21 PROCEDURE — 80061 LIPID PANEL: CPT | Performed by: NURSE PRACTITIONER

## 2024-12-21 PROCEDURE — 80307 DRUG TEST PRSMV CHEM ANLYZR: CPT | Performed by: FAMILY MEDICINE

## 2024-12-21 PROCEDURE — 85025 COMPLETE CBC W/AUTO DIFF WBC: CPT | Performed by: NURSE PRACTITIONER

## 2024-12-21 PROCEDURE — 99223 1ST HOSP IP/OBS HIGH 75: CPT | Mod: ,,, | Performed by: STUDENT IN AN ORGANIZED HEALTH CARE EDUCATION/TRAINING PROGRAM

## 2024-12-21 PROCEDURE — 25000003 PHARM REV CODE 250: Performed by: NURSE PRACTITIONER

## 2024-12-21 RX ADMIN — METOPROLOL TARTRATE 25 MG: 25 TABLET, FILM COATED ORAL at 09:12

## 2024-12-21 RX ADMIN — ENOXAPARIN SODIUM 40 MG: 40 INJECTION SUBCUTANEOUS at 04:12

## 2024-12-21 RX ADMIN — SENNOSIDES AND DOCUSATE SODIUM 1 TABLET: 50; 8.6 TABLET ORAL at 09:12

## 2024-12-21 NOTE — HPI
Pt is a 74-year-old female who presented to her doctor's office today.  She states she had been feeling well just a lot of fatigue.  While she was at the doctor's office she had a syncopal episode with loss of bladder control.  Bystanders report loss of consciousness was less than 15 seconds.  There was no observed seizure-like activity.  Patient denies chest pain, shortness of breath, palpitations, feeling of doom prior to syncopal episode.  Patient reports she does not have pain or shortness of breath at present.. PMH HTN, HLD, CVA, GERD, and pacemaker    In the ED initial vital signs were blood pressure of 143/77, temp 96.9° heart rate 77, O2 sat 98% on room air who.  Initial lab workup revealed a sodium of 147 glucose 117 BUN 22 creatinine 1.35.  CBC is unremarkable.  ProBNP was 74.  Initial troponin 53.6 repeat troponin at 88.3 patient has positive delta. EKG show NSR Rate 77, no pacer spike no st elevation.     Patient will be admitted to hospital medicine service under the direct supervision of Dr KHALIL  for further evaluation and management.

## 2024-12-21 NOTE — CONSULTS
Consult per NSTEMI pathway, received and appreciated. Diet education will be completed on follow up as appropriate.

## 2024-12-21 NOTE — NURSING
Yaquelin in the Lab called with a panic Troponin of 108.6 on patient.  Notified Dr. Walsh and Dr. Lin via secure chat.  No new orders received.

## 2024-12-21 NOTE — PLAN OF CARE
SW received consult for discharge planning.  Visited pt's room but she was asleep. An attempt was also made to contact pt's daughter with no success as daughter's voicemail is not set up.  SW will attempt to meet with pt tomorrow.

## 2024-12-21 NOTE — NURSING
APTT, Lipid panel, Protime- INR and Type screen labs all ordered by Dr. Olga Walsh in the ER were not collected, so I called the Lab to ask why and she said because at the time those orders were placed the patient was still a unit collect so they did not see the orders.  I told her that the patient is a lab collect now and I asked if they can come and draw those labs and she said no they will have to be reordered in addition to the standard morning lab orders that were put in as unit collect also.  Notified Dr. Olga Walsh via secure message.

## 2024-12-21 NOTE — PLAN OF CARE
Problem: Adult Inpatient Plan of Care  Goal: Plan of Care Review  Outcome: Progressing  Goal: Patient-Specific Goal (Individualized)  Outcome: Progressing  Goal: Absence of Hospital-Acquired Illness or Injury  Outcome: Progressing  Goal: Optimal Comfort and Wellbeing  Outcome: Progressing  Goal: Readiness for Transition of Care  Outcome: Progressing     Problem: Acute Coronary Syndrome  Goal: Optimal Adaptation to Illness  Outcome: Progressing  Goal: Absence of Cardiac-Related Pain  Outcome: Progressing  Goal: Normalized Cardiac Rhythm  Outcome: Progressing  Goal: Effective Cardiac Pump Function  Outcome: Progressing  Goal: Adequate Tissue Perfusion  Outcome: Progressing     Problem: Cardiac Catheterization (Diagnostic/Interventional)  Goal: Absence of Bleeding  Outcome: Progressing  Goal: Absence of Contrast-Induced Injury  Outcome: Progressing  Goal: Stable Heart Rate and Rhythm  Outcome: Progressing  Goal: Absence of Embolism Signs and Symptoms  Outcome: Progressing  Goal: Anesthesia/Sedation Recovery  Outcome: Progressing  Goal: Optimal Pain Control and Function  Outcome: Progressing  Goal: Absence of Vascular Access Complication  Outcome: Progressing     Problem: Skin Injury Risk Increased  Goal: Skin Health and Integrity  Outcome: Progressing

## 2024-12-21 NOTE — ASSESSMENT & PLAN NOTE
Patient's blood pressure range in the last 24 hours was: BP  Min: 96/68  Max: 153/72.The patient's inpatient anti-hypertensive regimen is listed below:  Current Antihypertensives  furosemide tablet 20 mg, Daily, Oral  hydrALAZINE tablet 100 mg, 3 times daily, Oral  valsartan tablet 320 mg, Daily, Oral  metoprolol tartrate (LOPRESSOR) tablet 25 mg, 2 times daily, Oral  nitroGLYCERIN SL tablet 0.4 mg, Every 5 min PRN, Sublingual    Plan  - BP is uncontrolled, will adjust as follows: Pt BP decresed when standing and hR increase + orhthos  - Will hold hydralazine and lasix for now

## 2024-12-21 NOTE — NURSING
Notified Dr. Gloria Wayne of the cardiology consult d/t NSTEMI and of patient's last Troponin of 135.6 via secure chat, and she replied received.

## 2024-12-21 NOTE — PROGRESS NOTES
[facility] Medicine  Progress Note    Patient Name: Renetta Stewart  MRN: [id number]  Patient Class: IP- Inpatient   Admission Date: 12/20/2024  Length of Stay: 1 days  Attending Physician: Fuentes Shaver DO  Primary Care Provider: Eldon Govea MD        Subjective     Principal Problem:NSTEMI (non-ST elevated myocardial infarction)        HPI:  Pt is a 74-year-old female who presented to her doctor's office today.  She states she had been feeling well just a lot of fatigue.  While she was at the doctor's office she had a syncopal episode with loss of bladder control.  Bystanders report loss of consciousness was less than 15 seconds.  There was no observed seizure-like activity.  Patient denies chest pain, shortness of breath, palpitations, feeling of doom prior to syncopal episode.  Patient reports she does not have pain or shortness of breath at present.. PMH HTN, HLD, CVA, GERD, and pacemaker    In the ED initial vital signs were blood pressure of 143/77, temp 96.9° heart rate 77, O2 sat 98% on room air who.  Initial lab workup revealed a sodium of 147 glucose 117 BUN 22 creatinine 1.35.  CBC is unremarkable.  ProBNP was 74.  Initial troponin 53.6 repeat troponin at 88.3 patient has positive delta. EKG show NSR Rate 77, no pacer spike no st elevation.     Patient will be admitted to hospital medicine service under the direct supervision of Dr KHALIL  for further evaluation and management.     Overview/Hospital Course:  No notes on file    Interval History:  Patient examined at bedside today.  Patient is somnolent but arousable.  Patient only mumbles barely audible utterances.  Nurse requested to hold a.m. meds secondary to concerns about possible aspiration.  PO meds held this am.    At this time, it is unknown what is contributing to her AMS.  It may be secondary to polypharmacy.  Patient has Lyrica and Seroquel as well as Keppra and Percocet on her home meds list.  These  medications are currently being held.  Patient denies attempting to overdose on exam today.    Ammonia, urinalysis, blood culture, Keppra level and urine drug screen ordered today.  Results pending.    Review of Systems   Unable to perform ROS: Mental status change     Objective:     Vital Signs (Most Recent):  Temp: 97.3 °F (36.3 °C) (12/21/24 1521)  Pulse: 73 (12/21/24 1521)  Resp: 18 (12/21/24 1521)  BP: 111/67 (12/21/24 1521)  SpO2: 95 % (12/21/24 1521) Vital Signs (24h Range):  Temp:  [97.3 °F (36.3 °C)-98 °F (36.7 °C)] 97.3 °F (36.3 °C)  Pulse:  [] 73  Resp:  [14-26] 18  SpO2:  [93 %-100 %] 95 %  BP: ()/(46-93) 111/67     Weight: 87.7 kg (193 lb 5.5 oz)  Body mass index is 33.19 kg/m².    Intake/Output Summary (Last 24 hours) at 12/21/2024 1533  Last data filed at 12/21/2024 0524  Gross per 24 hour   Intake 357 ml   Output 1 ml   Net 356 ml         Physical Exam  Vitals reviewed.   Constitutional:       General: She is not in acute distress.     Appearance: Normal appearance. She is obese. She is ill-appearing. She is not toxic-appearing or diaphoretic.      Comments: Patient is somnolent but arousable.  Patient follows commands.   HENT:      Head: Normocephalic and atraumatic.      Right Ear: External ear normal.      Left Ear: External ear normal.      Nose: Nose normal.      Mouth/Throat:      Mouth: Mucous membranes are moist.   Eyes:      General: No scleral icterus.     Extraocular Movements: Extraocular movements intact.      Pupils: Pupils are equal, round, and reactive to light.   Cardiovascular:      Rate and Rhythm: Normal rate and regular rhythm.      Pulses: Normal pulses.      Heart sounds: Normal heart sounds. No murmur heard.     No friction rub. No gallop.   Pulmonary:      Effort: Pulmonary effort is normal. No respiratory distress.      Breath sounds: Normal breath sounds. No wheezing, rhonchi or rales.   Abdominal:      General: Bowel sounds are normal.      Palpations: Abdomen  is soft.      Tenderness: There is no abdominal tenderness. There is no guarding or rebound.   Musculoskeletal:         General: No swelling. Normal range of motion.      Cervical back: Normal range of motion and neck supple.      Right lower leg: No edema.      Left lower leg: No edema.   Skin:     General: Skin is warm and dry.      Capillary Refill: Capillary refill takes less than 2 seconds.      Coloration: Skin is not jaundiced.      Findings: No erythema or rash.   Neurological:      GCS: GCS eye subscore is 4. GCS verbal subscore is 3. GCS motor subscore is 6.   Psychiatric:         Speech: Speech is delayed.         Behavior: Behavior is slowed.             Significant Labs: All pertinent labs within the past 24 hours have been reviewed.    Significant Imaging: I have reviewed all pertinent imaging results/findings within the past 24 hours.    Assessment and Plan     * NSTEMI (non-ST elevated myocardial infarction)  Pt has Positve Delta, no chest pain, syncopal Episode,  Orhtostatics positive in ED, given pts AGE and risk factors will treat as NSTEMI.   Patient presents with NSTEMI. Chest pain is currently controlled. VIKTOR score is 3. Patient is currently on NSTEMI Pathway.    EKG reviewed. Troponins reviewed and results noted-   Troponin 53, > >88     Lipid panel reviewed and shows-     Lab Results   Component Value Date    LDLCALC 37 02/14/2022     Lab Results   Component Value Date    TRIG 78 02/14/2022         Medical management includes;  ASA,Beta Blocker, High Intensity Stain, and ACE/ARB Echo has not been performed. Latest ECHO results are as follows- Results for orders placed during the hospital encounter of 03/30/23    Echo    Interpretation Summary  · The left ventricle is normal in size with severe concentric hypertrophy and hyperdynamic systolic function.  · The estimated ejection fraction is 70%.  · Mild tricuspid regurgitation.  · Left ventricular mild outflow tract obstruction is present.  ·  Normal right ventricular size with normal right ventricular systolic function.  · Normal central venous pressure (3 mmHg).  · The estimated PA systolic pressure is 43 mmHg.  · ECHO is suggestive of hypertensive heart disease vs. hypertrophic cardiomyopathy  .   Consult for cardiac rehab is not ordered.. Cardiology is consulted. Plan of care not reviewed with cardiology team. Continue to monitor patient closely and adjust therapy as needed.      Syncope and collapse  Pt had observed Syncopal episode <15 second duration, no observed sz activity  Orthostatics in ED + Tilt.   CT head, and c spine negative   ml bolus, encourage PO hydration  Hold lasix, Hydralazine for now  Telemetry  Fall precautions  Sz precautions      Gastroesophageal reflux disease  Continue home protonix      Hyperlipidemia  Continue statin      Essential hypertension  Patient's blood pressure range in the last 24 hours was: BP  Min: 96/68  Max: 153/72.The patient's inpatient anti-hypertensive regimen is listed below:  Current Antihypertensives  furosemide tablet 20 mg, Daily, Oral  hydrALAZINE tablet 100 mg, 3 times daily, Oral  valsartan tablet 320 mg, Daily, Oral  metoprolol tartrate (LOPRESSOR) tablet 25 mg, 2 times daily, Oral  nitroGLYCERIN SL tablet 0.4 mg, Every 5 min PRN, Sublingual    Plan  - BP is uncontrolled, will adjust as follows: Pt BP decresed when standing and hR increase + orhthos  - Will hold hydralazine and lasix for now    Depression  Patient has persistent depression which is unknown and is currently controlled. Will Continue anti-depressant medications. We will not consult psychiatry at this time. Patient does not display psychosis at this time. Continue to monitor closely and adjust plan of care as needed.          VTE Risk Mitigation (From admission, onward)           Ordered     enoxaparin injection 40 mg  Daily         12/20/24 2025     IP VTE HIGH RISK PATIENT  Once         12/20/24 2025     Place sequential  compression device  Until discontinued         12/20/24 2025                    Discharge Planning   GERALDO:      Code Status: Full Code   Medical Readiness for Discharge Date:                            Fuentes Shaver DO  Department of Hospital Medicine   [facility]

## 2024-12-21 NOTE — SUBJECTIVE & OBJECTIVE
Past Medical History:   Diagnosis Date    Anemia     Anxiety     Dementia     Depression     GERD (gastroesophageal reflux disease)     Hyperlipidemia     Hypertension     Stroke        Past Surgical History:   Procedure Laterality Date    CARDIAC PACEMAKER PLACEMENT         Review of patient's allergies indicates:  No Known Allergies    No current facility-administered medications on file prior to encounter.     Current Outpatient Medications on File Prior to Encounter   Medication Sig    pregabalin (LYRICA) 75 MG capsule Take 75 mg by mouth every evening.    ciprofloxacin HCl (CILOXAN) 0.3 % ophthalmic solution Place 2 drops into both eyes every 4 (four) hours.    dicyclomine (BENTYL) 20 mg tablet Take 20 mg by mouth 2 (two) times daily.    diltiaZEM (CARDIZEM CD) 240 MG 24 hr capsule Take 240 mg by mouth.    esomeprazole (NEXIUM) 40 MG capsule Take 1 capsule (40 mg total) by mouth before breakfast.    furosemide (LASIX) 20 MG tablet Take 1 tablet (20 mg total) by mouth once daily.    hydrALAZINE (APRESOLINE) 100 MG tablet Take 1 tablet (100 mg total) by mouth 3 (three) times daily.    HYDROcodone-acetaminophen (NORCO) 5-325 mg per tablet Take 1 tablet by mouth every 6 (six) hours as needed for Pain.    levETIRAcetam (KEPPRA) 500 MG Tab Take 1 tablet (500 mg total) by mouth 2 (two) times daily.    levothyroxine (SYNTHROID) 50 MCG tablet Take 1 tablet (50 mcg total) by mouth before breakfast.    LUMIGAN 0.01 % Drop Place 1 drop into both eyes every evening.    megestroL (MEGACE) 400 mg/10 mL (40 mg/mL) Susp Take 10 mLs (400 mg total) by mouth 2 (two) times daily.    memantine (NAMENDA) 10 MG Tab Take 1 tablet (10 mg total) by mouth 2 (two) times daily.    mirtazapine (REMERON) 15 MG tablet Take 1 tablet (15 mg total) by mouth every evening.    [START ON 12/22/2024] oxyCODONE-acetaminophen (PERCOCET) 7.5-325 mg per tablet Take 1 tablet by mouth.    QUEtiapine (SEROQUEL) 25 MG Tab Take 25 mg by mouth every evening.     sertraline (ZOLOFT) 50 MG tablet Take 1 tablet (50 mg total) by mouth every evening.    valsartan (DIOVAN) 320 MG tablet Take 1 tablet (320 mg total) by mouth once daily.     Family History       Problem Relation (Age of Onset)    Hypertension Father          Tobacco Use    Smoking status: Never     Passive exposure: Never    Smokeless tobacco: Never   Substance and Sexual Activity    Alcohol use: Not Currently    Drug use: Never    Sexual activity: Not Currently     Review of Systems   Constitutional:  Negative for activity change, chills and fever.   HENT:  Negative for trouble swallowing.    Eyes:  Negative for visual disturbance.   Respiratory:  Negative for cough, chest tightness, shortness of breath and wheezing.    Cardiovascular:  Negative for chest pain, palpitations and leg swelling.   Gastrointestinal:  Negative for abdominal pain, constipation, diarrhea, nausea and vomiting.   Genitourinary:  Negative for flank pain, frequency and hematuria.   Musculoskeletal:  Negative for arthralgias, back pain and neck pain.   Skin:  Negative for rash and wound.   Neurological:  Positive for syncope. Negative for weakness, light-headedness and headaches.     Objective:     Vital Signs (Most Recent):  Temp: 96.9 °F (36.1 °C) (12/20/24 1504)  Pulse: 93 (12/20/24 1834)  Resp: 17 (12/20/24 1834)  BP: 135/79 (12/20/24 1834)  SpO2: 96 % (12/20/24 1834) Vital Signs (24h Range):  Temp:  [96.9 °F (36.1 °C)-97.5 °F (36.4 °C)] 96.9 °F (36.1 °C)  Pulse:  [] 93  Resp:  [14-19] 17  SpO2:  [96 %-100 %] 96 %  BP: (108-153)/(72-79) 135/79     Weight: 89.6 kg (197 lb 8.5 oz)  Body mass index is 33.91 kg/m².     Physical Exam  Vitals and nursing note reviewed.   Constitutional:       Appearance: Normal appearance. She is obese.   HENT:      Head: Normocephalic and atraumatic.      Nose: Nose normal.      Mouth/Throat:      Mouth: Mucous membranes are moist.   Eyes:      Extraocular Movements: Extraocular movements intact.    Cardiovascular:      Rate and Rhythm: Normal rate and regular rhythm.      Pulses: Normal pulses.      Heart sounds: Murmur heard.   Pulmonary:      Effort: Pulmonary effort is normal.      Breath sounds: Normal breath sounds.   Abdominal:      General: Bowel sounds are normal.      Palpations: Abdomen is soft.   Musculoskeletal:         General: Normal range of motion.      Cervical back: Normal range of motion and neck supple.      Right lower leg: No edema.      Left lower leg: No edema.   Skin:     General: Skin is warm.      Capillary Refill: Capillary refill takes less than 2 seconds.   Neurological:      General: No focal deficit present.      Mental Status: She is alert and oriented to person, place, and time.   Psychiatric:         Mood and Affect: Mood normal.         Behavior: Behavior normal.                Significant Labs: All pertinent labs within the past 24 hours have been reviewed.    Significant Imaging: I have reviewed all pertinent imaging results/findings within the past 24 hours.

## 2024-12-21 NOTE — ASSESSMENT & PLAN NOTE
Pt has Positve Delta, no chest pain, syncopal Episode,  Orhtostatics positive in ED, given pts AGE and risk factors will treat as NSTEMI.   Patient presents with NSTEMI. Chest pain is currently controlled. VIKTOR score is 3. Patient is currently on NSTEMI Pathway.    EKG reviewed. Troponins reviewed and results noted-   Troponin 53, > >88     Lipid panel reviewed and shows-     Lab Results   Component Value Date    LDLCALC 37 02/14/2022     Lab Results   Component Value Date    TRIG 78 02/14/2022         Medical management includes;  ASA,Beta Blocker, High Intensity Stain, and ACE/ARB Echo has not been performed. Latest ECHO results are as follows- Results for orders placed during the hospital encounter of 03/30/23    Echo    Interpretation Summary  · The left ventricle is normal in size with severe concentric hypertrophy and hyperdynamic systolic function.  · The estimated ejection fraction is 70%.  · Mild tricuspid regurgitation.  · Left ventricular mild outflow tract obstruction is present.  · Normal right ventricular size with normal right ventricular systolic function.  · Normal central venous pressure (3 mmHg).  · The estimated PA systolic pressure is 43 mmHg.  · ECHO is suggestive of hypertensive heart disease vs. hypertrophic cardiomyopathy  .   Consult for cardiac rehab is not ordered.. Cardiology is consulted. Plan of care not reviewed with cardiology team. Continue to monitor patient closely and adjust therapy as needed.

## 2024-12-21 NOTE — ASSESSMENT & PLAN NOTE
Pt had observed Syncopal episode <15 second duration, no observed sz activity  Orthostatics in ED + Tilt.   CT head, and c spine negative   ml bolus, encourage PO hydration  Hold lasix, Hydralazine for now  Telemetry  Fall precautions  Sz precautions

## 2024-12-21 NOTE — SUBJECTIVE & OBJECTIVE
Interval History:  Patient examined at bedside today.  Patient is somnolent but arousable.  Patient only mumbles barely audible utterances.  Nurse requested to hold a.m. meds secondary to concerns about possible aspiration.  PO meds held this am.    At this time, it is unknown what is contributing to her AMS.  It may be secondary to polypharmacy.  Patient has Lyrica and Seroquel as well as Keppra and Percocet on her home meds list.  These medications are currently being held.  Patient denies attempting to overdose on exam today.    Ammonia, urinalysis, blood culture, Keppra level and urine drug screen ordered today.  Results pending.    Review of Systems   Unable to perform ROS: Mental status change     Objective:     Vital Signs (Most Recent):  Temp: 97.3 °F (36.3 °C) (12/21/24 1521)  Pulse: 73 (12/21/24 1521)  Resp: 18 (12/21/24 1521)  BP: 111/67 (12/21/24 1521)  SpO2: 95 % (12/21/24 1521) Vital Signs (24h Range):  Temp:  [97.3 °F (36.3 °C)-98 °F (36.7 °C)] 97.3 °F (36.3 °C)  Pulse:  [] 73  Resp:  [14-26] 18  SpO2:  [93 %-100 %] 95 %  BP: ()/(46-93) 111/67     Weight: 87.7 kg (193 lb 5.5 oz)  Body mass index is 33.19 kg/m².    Intake/Output Summary (Last 24 hours) at 12/21/2024 1533  Last data filed at 12/21/2024 0524  Gross per 24 hour   Intake 357 ml   Output 1 ml   Net 356 ml         Physical Exam  Vitals reviewed.   Constitutional:       General: She is not in acute distress.     Appearance: Normal appearance. She is obese. She is ill-appearing. She is not toxic-appearing or diaphoretic.      Comments: Patient is somnolent but arousable.  Patient follows commands.   HENT:      Head: Normocephalic and atraumatic.      Right Ear: External ear normal.      Left Ear: External ear normal.      Nose: Nose normal.      Mouth/Throat:      Mouth: Mucous membranes are moist.   Eyes:      General: No scleral icterus.     Extraocular Movements: Extraocular movements intact.      Pupils: Pupils are equal,  round, and reactive to light.   Cardiovascular:      Rate and Rhythm: Normal rate and regular rhythm.      Pulses: Normal pulses.      Heart sounds: Normal heart sounds. No murmur heard.     No friction rub. No gallop.   Pulmonary:      Effort: Pulmonary effort is normal. No respiratory distress.      Breath sounds: Normal breath sounds. No wheezing, rhonchi or rales.   Abdominal:      General: Bowel sounds are normal.      Palpations: Abdomen is soft.      Tenderness: There is no abdominal tenderness. There is no guarding or rebound.   Musculoskeletal:         General: No swelling. Normal range of motion.      Cervical back: Normal range of motion and neck supple.      Right lower leg: No edema.      Left lower leg: No edema.   Skin:     General: Skin is warm and dry.      Capillary Refill: Capillary refill takes less than 2 seconds.      Coloration: Skin is not jaundiced.      Findings: No erythema or rash.   Neurological:      GCS: GCS eye subscore is 4. GCS verbal subscore is 3. GCS motor subscore is 6.   Psychiatric:         Speech: Speech is delayed.         Behavior: Behavior is slowed.             Significant Labs: All pertinent labs within the past 24 hours have been reviewed.    Significant Imaging: I have reviewed all pertinent imaging results/findings within the past 24 hours.

## 2024-12-21 NOTE — H&P
[facility] - Emergency Department  Hospital Medicine  History & Physical    Patient Name: Renetta Stewart  MRN: [id number]  Patient Class: IP- Inpatient  Admission Date: 12/20/2024  Attending Physician: Eric Garcia MD   Primary Care Provider: Eldon Govea MD         Patient information was obtained from patient, past medical records, and ER records.     Subjective:     Principal Problem:NSTEMI (non-ST elevated myocardial infarction)    Chief Complaint:   Chief Complaint   Patient presents with    Loss of Consciousness     Pt sent from [facility] for a possible syncopal episode. Family states pt urinated on herself.        HPI: Pt is a 74-year-old female who presented to her doctor's office today.  She states she had been feeling well just a lot of fatigue.  While she was at the doctor's office she had a syncopal episode with loss of bladder control.  Bystanders report loss of consciousness was less than 15 seconds.  There was no observed seizure-like activity.  Patient denies chest pain, shortness of breath, palpitations, feeling of doom prior to syncopal episode.  Patient reports she does not have pain or shortness of breath at present.. PMH HTN, HLD, CVA, GERD, and pacemaker    In the ED initial vital signs were blood pressure of 143/77, temp 96.9° heart rate 77, O2 sat 98% on room air who.  Initial lab workup revealed a sodium of 147 glucose 117 BUN 22 creatinine 1.35.  CBC is unremarkable.  ProBNP was 74.  Initial troponin 53.6 repeat troponin at 88.3 patient has positive delta. EKG show NSR Rate 77, no pacer spike no st elevation.     Patient will be admitted to hospital medicine service under the direct supervision of Dr KHALIL  for further evaluation and management.     Past Medical History:   Diagnosis Date    Anemia     Anxiety     Dementia     Depression     GERD (gastroesophageal reflux disease)     Hyperlipidemia     Hypertension     Stroke        Past Surgical History:    Procedure Laterality Date    CARDIAC PACEMAKER PLACEMENT         Review of patient's allergies indicates:  No Known Allergies    No current facility-administered medications on file prior to encounter.     Current Outpatient Medications on File Prior to Encounter   Medication Sig    pregabalin (LYRICA) 75 MG capsule Take 75 mg by mouth every evening.    ciprofloxacin HCl (CILOXAN) 0.3 % ophthalmic solution Place 2 drops into both eyes every 4 (four) hours.    dicyclomine (BENTYL) 20 mg tablet Take 20 mg by mouth 2 (two) times daily.    diltiaZEM (CARDIZEM CD) 240 MG 24 hr capsule Take 240 mg by mouth.    esomeprazole (NEXIUM) 40 MG capsule Take 1 capsule (40 mg total) by mouth before breakfast.    furosemide (LASIX) 20 MG tablet Take 1 tablet (20 mg total) by mouth once daily.    hydrALAZINE (APRESOLINE) 100 MG tablet Take 1 tablet (100 mg total) by mouth 3 (three) times daily.    HYDROcodone-acetaminophen (NORCO) 5-325 mg per tablet Take 1 tablet by mouth every 6 (six) hours as needed for Pain.    levETIRAcetam (KEPPRA) 500 MG Tab Take 1 tablet (500 mg total) by mouth 2 (two) times daily.    levothyroxine (SYNTHROID) 50 MCG tablet Take 1 tablet (50 mcg total) by mouth before breakfast.    LUMIGAN 0.01 % Drop Place 1 drop into both eyes every evening.    megestroL (MEGACE) 400 mg/10 mL (40 mg/mL) Susp Take 10 mLs (400 mg total) by mouth 2 (two) times daily.    memantine (NAMENDA) 10 MG Tab Take 1 tablet (10 mg total) by mouth 2 (two) times daily.    mirtazapine (REMERON) 15 MG tablet Take 1 tablet (15 mg total) by mouth every evening.    [START ON 12/22/2024] oxyCODONE-acetaminophen (PERCOCET) 7.5-325 mg per tablet Take 1 tablet by mouth.    QUEtiapine (SEROQUEL) 25 MG Tab Take 25 mg by mouth every evening.    sertraline (ZOLOFT) 50 MG tablet Take 1 tablet (50 mg total) by mouth every evening.    valsartan (DIOVAN) 320 MG tablet Take 1 tablet (320 mg total) by mouth once daily.     Family History       Problem  Relation (Age of Onset)    Hypertension Father          Tobacco Use    Smoking status: Never     Passive exposure: Never    Smokeless tobacco: Never   Substance and Sexual Activity    Alcohol use: Not Currently    Drug use: Never    Sexual activity: Not Currently     Review of Systems   Constitutional:  Negative for activity change, chills and fever.   HENT:  Negative for trouble swallowing.    Eyes:  Negative for visual disturbance.   Respiratory:  Negative for cough, chest tightness, shortness of breath and wheezing.    Cardiovascular:  Negative for chest pain, palpitations and leg swelling.   Gastrointestinal:  Negative for abdominal pain, constipation, diarrhea, nausea and vomiting.   Genitourinary:  Negative for flank pain, frequency and hematuria.   Musculoskeletal:  Negative for arthralgias, back pain and neck pain.   Skin:  Negative for rash and wound.   Neurological:  Positive for syncope. Negative for weakness, light-headedness and headaches.     Objective:     Vital Signs (Most Recent):  Temp: 96.9 °F (36.1 °C) (12/20/24 1504)  Pulse: 93 (12/20/24 1834)  Resp: 17 (12/20/24 1834)  BP: 135/79 (12/20/24 1834)  SpO2: 96 % (12/20/24 1834) Vital Signs (24h Range):  Temp:  [96.9 °F (36.1 °C)-97.5 °F (36.4 °C)] 96.9 °F (36.1 °C)  Pulse:  [] 93  Resp:  [14-19] 17  SpO2:  [96 %-100 %] 96 %  BP: (108-153)/(72-79) 135/79     Weight: 89.6 kg (197 lb 8.5 oz)  Body mass index is 33.91 kg/m².     Physical Exam  Vitals and nursing note reviewed.   Constitutional:       Appearance: Normal appearance. She is obese.   HENT:      Head: Normocephalic and atraumatic.      Nose: Nose normal.      Mouth/Throat:      Mouth: Mucous membranes are moist.   Eyes:      Extraocular Movements: Extraocular movements intact.   Cardiovascular:      Rate and Rhythm: Normal rate and regular rhythm.      Pulses: Normal pulses.      Heart sounds: Murmur heard.   Pulmonary:      Effort: Pulmonary effort is normal.      Breath sounds:  Normal breath sounds.   Abdominal:      General: Bowel sounds are normal.      Palpations: Abdomen is soft.   Musculoskeletal:         General: Normal range of motion.      Cervical back: Normal range of motion and neck supple.      Right lower leg: No edema.      Left lower leg: No edema.   Skin:     General: Skin is warm.      Capillary Refill: Capillary refill takes less than 2 seconds.   Neurological:      General: No focal deficit present.      Mental Status: She is alert and oriented to person, place, and time.   Psychiatric:         Mood and Affect: Mood normal.         Behavior: Behavior normal.                Significant Labs: All pertinent labs within the past 24 hours have been reviewed.    Significant Imaging: I have reviewed all pertinent imaging results/findings within the past 24 hours.  Assessment/Plan:     * NSTEMI (non-ST elevated myocardial infarction)  Pt has Positve Delta, no chest pain, syncopal Episode,  Orhtostatics positive in ED, given pts AGE and risk factors will treat as NSTEMI.   Patient presents with NSTEMI. Chest pain is currently controlled. VIKTOR score is 3. Patient is currently on NSTEMI Pathway.    EKG reviewed. Troponins reviewed and results noted-   Troponin 53, > >88     Lipid panel reviewed and shows-     Lab Results   Component Value Date    LDLCALC 37 02/14/2022     Lab Results   Component Value Date    TRIG 78 02/14/2022         Medical management includes;  ASA,Beta Blocker, High Intensity Stain, and ACE/ARB Echo has not been performed. Latest ECHO results are as follows- Results for orders placed during the hospital encounter of 03/30/23    Echo    Interpretation Summary  · The left ventricle is normal in size with severe concentric hypertrophy and hyperdynamic systolic function.  · The estimated ejection fraction is 70%.  · Mild tricuspid regurgitation.  · Left ventricular mild outflow tract obstruction is present.  · Normal right ventricular size with normal right  ventricular systolic function.  · Normal central venous pressure (3 mmHg).  · The estimated PA systolic pressure is 43 mmHg.  · ECHO is suggestive of hypertensive heart disease vs. hypertrophic cardiomyopathy  .   Consult for cardiac rehab is not ordered.. Cardiology is consulted. Plan of care not reviewed with cardiology team. Continue to monitor patient closely and adjust therapy as needed.      Syncope and collapse  Pt had observed Syncopal episode <15 second duration, no observed sz activity  Orthostatics in ED + Tilt.   CT head, and c spine negative   ml bolus, encourage PO hydration  Hold lasix, Hydralazine for now  Telemetry  Fall precautions  Sz precautions      Essential hypertension  Patient's blood pressure range in the last 24 hours was: BP  Min: 96/68  Max: 153/72.The patient's inpatient anti-hypertensive regimen is listed below:  Current Antihypertensives  furosemide tablet 20 mg, Daily, Oral  hydrALAZINE tablet 100 mg, 3 times daily, Oral  valsartan tablet 320 mg, Daily, Oral  metoprolol tartrate (LOPRESSOR) tablet 25 mg, 2 times daily, Oral  nitroGLYCERIN SL tablet 0.4 mg, Every 5 min PRN, Sublingual    Plan  - BP is uncontrolled, will adjust as follows: Pt BP decresed when standing and hR increase + orhthos  - Will hold hydralazine and lasix for now    Hyperlipidemia  Continue statin      Gastroesophageal reflux disease  Continue home protonix      Depression  Patient has persistent depression which is unknown and is currently controlled. Will Continue anti-depressant medications. We will not consult psychiatry at this time. Patient does not display psychosis at this time. Continue to monitor closely and adjust plan of care as needed.          VTE Risk Mitigation (From admission, onward)           Ordered     enoxaparin injection 40 mg  Daily         12/20/24 2025     IP VTE HIGH RISK PATIENT  Once         12/20/24 2025     Place sequential compression device  Until discontinued          12/20/24 2025                                    Olga Walsh MD  Department of Hospital Medicine  [facility] - Emergency Department

## 2024-12-21 NOTE — CARE UPDATE
Patient was found to be difficult to awake.  CT of the head done earlier yesterday afternoon did not show any abnormalities.  Suspect this is due to polypharmacy.  Will hold memantine, sertraline, Remeron and Seroquel for tonight. Will monitor

## 2024-12-21 NOTE — NURSING
Yaquelin in the Lab called with a panic Troponin of 135.6 on patient. Notified Dr. Walsh and Dr. Lin via secure chat. No new orders received.

## 2024-12-21 NOTE — NURSING
During 5am rounds, PCT and I found patient lying in a pool of copious watery stool and brown coffee ground looking fluid coming from her mouth. Patient does not have a hx of a GI bleed but that is exactly what the stool smelled like.  Notified Dr. Lin and Dr. Walsh.  Dr. Lin gave order to make patient NPO and to collect a CBC and collect an occult blood stool sample.  Called Lab and the 0430 CBC was collected but has not resulted yet so no new order for a CBC.

## 2024-12-22 PROBLEM — K92.2 GI BLEED: Status: RESOLVED | Noted: 2024-12-22 | Resolved: 2024-12-22

## 2024-12-22 PROBLEM — K92.2 GI BLEED: Status: ACTIVE | Noted: 2024-12-22

## 2024-12-22 PROBLEM — R19.5 POSITIVE FECAL OCCULT BLOOD TEST: Status: ACTIVE | Noted: 2024-12-22

## 2024-12-22 LAB
AMMONIA PLAS-SCNC: 24 ΜMOL/L (ref 18–72)
BASOPHILS # BLD AUTO: 0.07 K/UL (ref 0–0.2)
BASOPHILS NFR BLD AUTO: 0.6 % (ref 0–1)
DIFFERENTIAL METHOD BLD: ABNORMAL
EOSINOPHIL # BLD AUTO: 0.03 K/UL (ref 0–0.5)
EOSINOPHIL NFR BLD AUTO: 0.3 % (ref 1–4)
ERYTHROCYTE [DISTWIDTH] IN BLOOD BY AUTOMATED COUNT: 17.4 % (ref 11.5–14.5)
FOLATE SERPL-MCNC: 2.5 NG/ML (ref 7–31.4)
HCO3 UR-SCNC: 29.1 MMOL/L (ref 18–23)
HCT VFR BLD AUTO: 39 % (ref 38–47)
HGB BLD-MCNC: 11.9 G/DL (ref 12–16)
IMM GRANULOCYTES # BLD AUTO: 0.09 K/UL (ref 0–0.04)
IMM GRANULOCYTES NFR BLD: 0.8 % (ref 0–0.4)
LACTATE SERPL-SCNC: 2 MMOL/L (ref 0.5–2.2)
LACTATE SERPL-SCNC: 2.2 MMOL/L (ref 0.5–2.2)
LYMPHOCYTES # BLD AUTO: 1.25 K/UL (ref 1–4.8)
LYMPHOCYTES NFR BLD AUTO: 11.5 % (ref 27–41)
MCH RBC QN AUTO: 25.4 PG (ref 27–31)
MCHC RBC AUTO-ENTMCNC: 30.5 G/DL (ref 32–36)
MCV RBC AUTO: 83.3 FL (ref 80–96)
MONOCYTES # BLD AUTO: 0.9 K/UL (ref 0–0.8)
MONOCYTES NFR BLD AUTO: 8.3 % (ref 2–6)
MPC BLD CALC-MCNC: 12.7 FL (ref 9.4–12.4)
NEUTROPHILS # BLD AUTO: 8.51 K/UL (ref 1.8–7.7)
NEUTROPHILS NFR BLD AUTO: 78.5 % (ref 53–65)
NRBC # BLD AUTO: 0.04 X10E3/UL
NRBC, AUTO (.00): 0.4 %
OHS QRS DURATION: 148 MS
OHS QTC CALCULATION: 459 MS
PCO2 BLDA: 48 MMHG
PH SMN: 7.39 [PH]
PLATELET # BLD AUTO: 197 K/UL (ref 150–400)
PO2 BLDA: 90 MMHG
POC A-ADO2: 21 MMHG
POC BASE EXCESS: 3.3 MMOL/L (ref -2–3)
POC SATURATED O2: 98.6 % (ref 95–98)
PROLACTIN SERPL-MCNC: 53.61 NG/ML (ref 5.18–26.53)
RBC # BLD AUTO: 4.68 M/UL (ref 4.2–5.4)
VIT B12 SERPL-MCNC: 763 PG/ML (ref 213–816)
WBC # BLD AUTO: 10.85 K/UL (ref 4.5–11)

## 2024-12-22 PROCEDURE — 82746 ASSAY OF FOLIC ACID SERUM: CPT | Performed by: FAMILY MEDICINE

## 2024-12-22 PROCEDURE — 84146 ASSAY OF PROLACTIN: CPT | Performed by: FAMILY MEDICINE

## 2024-12-22 PROCEDURE — 82140 ASSAY OF AMMONIA: CPT | Performed by: FAMILY MEDICINE

## 2024-12-22 PROCEDURE — 25000003 PHARM REV CODE 250: Performed by: NURSE PRACTITIONER

## 2024-12-22 PROCEDURE — 93005 ELECTROCARDIOGRAM TRACING: CPT

## 2024-12-22 PROCEDURE — 36600 WITHDRAWAL OF ARTERIAL BLOOD: CPT

## 2024-12-22 PROCEDURE — 87040 BLOOD CULTURE FOR BACTERIA: CPT | Performed by: FAMILY MEDICINE

## 2024-12-22 PROCEDURE — 94761 N-INVAS EAR/PLS OXIMETRY MLT: CPT

## 2024-12-22 PROCEDURE — 11000001 HC ACUTE MED/SURG PRIVATE ROOM

## 2024-12-22 PROCEDURE — 25000003 PHARM REV CODE 250: Performed by: FAMILY MEDICINE

## 2024-12-22 PROCEDURE — 99900035 HC TECH TIME PER 15 MIN (STAT)

## 2024-12-22 PROCEDURE — 27000221 HC OXYGEN, UP TO 24 HOURS

## 2024-12-22 PROCEDURE — 36415 COLL VENOUS BLD VENIPUNCTURE: CPT | Performed by: FAMILY MEDICINE

## 2024-12-22 PROCEDURE — 99233 SBSQ HOSP IP/OBS HIGH 50: CPT | Mod: ,,, | Performed by: STUDENT IN AN ORGANIZED HEALTH CARE EDUCATION/TRAINING PROGRAM

## 2024-12-22 PROCEDURE — 93010 ELECTROCARDIOGRAM REPORT: CPT | Mod: ,,, | Performed by: HOSPITALIST

## 2024-12-22 PROCEDURE — 36415 COLL VENOUS BLD VENIPUNCTURE: CPT | Performed by: NURSE PRACTITIONER

## 2024-12-22 PROCEDURE — 63600175 PHARM REV CODE 636 W HCPCS: Performed by: NURSE PRACTITIONER

## 2024-12-22 PROCEDURE — 83605 ASSAY OF LACTIC ACID: CPT | Performed by: FAMILY MEDICINE

## 2024-12-22 PROCEDURE — 82803 BLOOD GASES ANY COMBINATION: CPT

## 2024-12-22 PROCEDURE — 80177 DRUG SCRN QUAN LEVETIRACETAM: CPT | Performed by: FAMILY MEDICINE

## 2024-12-22 PROCEDURE — 63600175 PHARM REV CODE 636 W HCPCS: Performed by: INTERNAL MEDICINE

## 2024-12-22 PROCEDURE — 85025 COMPLETE CBC W/AUTO DIFF WBC: CPT | Performed by: NURSE PRACTITIONER

## 2024-12-22 RX ORDER — SODIUM CHLORIDE, SODIUM LACTATE, POTASSIUM CHLORIDE, CALCIUM CHLORIDE 600; 310; 30; 20 MG/100ML; MG/100ML; MG/100ML; MG/100ML
INJECTION, SOLUTION INTRAVENOUS CONTINUOUS
Status: DISCONTINUED | OUTPATIENT
Start: 2024-12-22 | End: 2024-12-26

## 2024-12-22 RX ADMIN — SODIUM CHLORIDE, SODIUM LACTATE, POTASSIUM CHLORIDE, AND CALCIUM CHLORIDE: 600; 310; 30; 20 INJECTION, SOLUTION INTRAVENOUS at 05:12

## 2024-12-22 RX ADMIN — LEVOTHYROXINE SODIUM 50 MCG: 0.05 TABLET ORAL at 05:12

## 2024-12-22 RX ADMIN — POLYETHYLENE GLYCOL 3350 17 G: 17 POWDER, FOR SOLUTION ORAL at 09:12

## 2024-12-22 RX ADMIN — METOPROLOL TARTRATE 25 MG: 25 TABLET, FILM COATED ORAL at 09:12

## 2024-12-22 RX ADMIN — SENNOSIDES AND DOCUSATE SODIUM 1 TABLET: 50; 8.6 TABLET ORAL at 09:12

## 2024-12-22 RX ADMIN — ENOXAPARIN SODIUM 40 MG: 40 INJECTION SUBCUTANEOUS at 06:12

## 2024-12-22 RX ADMIN — PANTOPRAZOLE SODIUM 40 MG: 40 TABLET, DELAYED RELEASE ORAL at 09:12

## 2024-12-22 RX ADMIN — SODIUM CHLORIDE 1000 ML: 9 INJECTION, SOLUTION INTRAVENOUS at 03:12

## 2024-12-22 RX ADMIN — SODIUM CHLORIDE, SODIUM LACTATE, POTASSIUM CHLORIDE, AND CALCIUM CHLORIDE: 600; 310; 30; 20 INJECTION, SOLUTION INTRAVENOUS at 08:12

## 2024-12-22 RX ADMIN — ASPIRIN 81 MG CHEWABLE TABLET 81 MG: 81 TABLET CHEWABLE at 09:12

## 2024-12-22 RX ADMIN — ATORVASTATIN CALCIUM 80 MG: 80 TABLET, FILM COATED ORAL at 09:12

## 2024-12-22 NOTE — PLAN OF CARE
Problem: Adult Inpatient Plan of Care  Goal: Plan of Care Review  Outcome: Progressing  Goal: Patient-Specific Goal (Individualized)  Outcome: Progressing  Goal: Absence of Hospital-Acquired Illness or Injury  Outcome: Progressing  Goal: Optimal Comfort and Wellbeing  Outcome: Progressing  Goal: Readiness for Transition of Care  Outcome: Progressing     Problem: Acute Coronary Syndrome  Goal: Optimal Adaptation to Illness  Outcome: Progressing  Goal: Absence of Cardiac-Related Pain  Outcome: Progressing  Goal: Normalized Cardiac Rhythm  Outcome: Progressing  Goal: Effective Cardiac Pump Function  Outcome: Progressing  Goal: Adequate Tissue Perfusion  Outcome: Progressing     Problem: Cardiac Catheterization (Diagnostic/Interventional)  Goal: Absence of Bleeding  Outcome: Progressing  Goal: Absence of Contrast-Induced Injury  Outcome: Progressing  Goal: Stable Heart Rate and Rhythm  Outcome: Progressing  Goal: Absence of Embolism Signs and Symptoms  Outcome: Progressing  Goal: Anesthesia/Sedation Recovery  Outcome: Progressing  Goal: Optimal Pain Control and Function  Outcome: Progressing  Goal: Absence of Vascular Access Complication  Outcome: Progressing     Problem: Skin Injury Risk Increased  Goal: Skin Health and Integrity  Outcome: Progressing        08/11/2023 >60 >60 mL/min/1.73m2 Final     Comment:             These results are not intended for use in patients <18 years of age.          eGFR results are calculated without a race factor using the 2021 CKD-EPI equation.  Careful clinical correlation is recommended, particularly when comparing to results   calculated using previous equations.  The CKD-EPI equation is less accurate in patients with extremes of muscle mass, extra-renal   metabolism of creatine, excessive creatine ingestion, or following therapy that affects   renal tubular secretion.          CMP:  Sodium   Date Value Ref Range Status   08/11/2023 138 135 - 145 MMOL/L Final     Potassium   Date Value Ref Range Status   08/11/2023 4.0 3.5 - 5.1 MMOL/L Final     Chloride   Date Value Ref Range Status   08/11/2023 97 (L) 99 - 110 mMol/L Final     CO2   Date Value Ref Range Status   08/11/2023 29 21 - 32 MMOL/L Final     BUN   Date Value Ref Range Status   08/11/2023 15 6 - 23 MG/DL Final     Creatinine   Date Value Ref Range Status   08/11/2023 0.8 0.6 - 1.1 MG/DL Final     Glucose   Date Value Ref Range Status   08/11/2023 282 (H) 70 - 99 MG/DL Final     Calcium   Date Value Ref Range Status   08/11/2023 9.4 8.3 - 10.6 MG/DL Final     Total Protein   Date Value Ref Range Status   03/04/2011 7.4 6.4 - 8.3 g/dL Final     Total Bilirubin   Date Value Ref Range Status   07/05/2023 0.6 0.0 - 1.0 MG/DL Final     Alkaline Phosphatase   Date Value Ref Range Status   07/05/2023 179 (H) 40 - 128 IU/L Final     AST   Date Value Ref Range Status   07/05/2023 30 15 - 37 IU/L Final     ALT   Date Value Ref Range Status   07/05/2023 30 10 - 40 U/L Final     Est, Glom Filt Rate   Date Value Ref Range Status   08/11/2023 >60 >60 mL/min/1.73m2 Final     Comment:             These results are not intended for use in patients <18 years of age.          eGFR results are calculated without a race factor using the 2021 CKD-EPI equation.  Careful clinical correlation is

## 2024-12-22 NOTE — PROGRESS NOTES
[facility] Hospital Medicine  Progress Note    Patient Name: Renetta Stewart  MRN: [id number]  Patient Class: IP- Inpatient   Admission Date: 12/20/2024  Length of Stay: 2 days  Attending Physician: Fuentes Shaver DO  Primary Care Provider: Eldon Govea MD        Subjective     Principal Problem:NSTEMI (non-ST elevated myocardial infarction)        HPI:  Pt is a 74-year-old female who presented to her doctor's office today.  She states she had been feeling well just a lot of fatigue.  While she was at the doctor's office she had a syncopal episode with loss of bladder control.  Bystanders report loss of consciousness was less than 15 seconds.  There was no observed seizure-like activity.  Patient denies chest pain, shortness of breath, palpitations, feeling of doom prior to syncopal episode.  Patient reports she does not have pain or shortness of breath at present.. PMH HTN, HLD, CVA, GERD, and pacemaker    In the ED initial vital signs were blood pressure of 143/77, temp 96.9° heart rate 77, O2 sat 98% on room air who.  Initial lab workup revealed a sodium of 147 glucose 117 BUN 22 creatinine 1.35.  CBC is unremarkable.  ProBNP was 74.  Initial troponin 53.6 repeat troponin at 88.3 patient has positive delta. EKG show NSR Rate 77, no pacer spike no st elevation.     Patient will be admitted to hospital medicine service under the direct supervision of Dr KHALIL  for further evaluation and management.     Overview/Hospital Course:  No notes on file    Interval History:  Patient examined at bedside today.  Patient is resting comfortably in bed in no acute distress.  Patient still appears somnolent but is much more talkative and alert today.  Patient is a poor historian.  Patient states that she has had similar episodes of loss of consciousness in the past.  On chart review, patient had previously seen by neurologist Dr. Mejia and had documented history of seizures.  Patient had been started  on Keppra but it is unclear whether she is taking this medication.  Keppra level has been ordered but is still pending.      It is possible that her somnolence is related to postictal state.  Polypharmacy may also be contributory.    Numerous labs have been ordered but remain pending as  has been unable to draw blood.  Patient will be bolused with normal saline and blood draw will be re-attempted. EEG is also pending.      Patient was able to take her p.o. meds this morning.  Speech therapy consulted for swallow evaluation.  Patient cautiously advanced to soft diet.  Nursing communication order placed to monitor patient's ability to consume food safely.      Review of Systems   Unable to perform ROS: Mental status change     Objective:     Vital Signs (Most Recent):  Temp: 98.8 °F (37.1 °C) (12/22/24 1208)  Pulse: 60 (12/22/24 1208)  Resp: 16 (12/22/24 1208)  BP: (!) 144/84 (12/22/24 1208)  SpO2: 98 % (12/22/24 1500) Vital Signs (24h Range):  Temp:  [97.4 °F (36.3 °C)-98.8 °F (37.1 °C)] 98.8 °F (37.1 °C)  Pulse:  [60-83] 60  Resp:  [16-18] 16  SpO2:  [97 %-100 %] 98 %  BP: (102-160)/(59-86) 144/84     Weight: 86.4 kg (190 lb 7.6 oz)  Body mass index is 32.7 kg/m².  No intake or output data in the 24 hours ending 12/22/24 1534      Physical Exam  Vitals reviewed.   Constitutional:       General: She is not in acute distress.     Appearance: She is not ill-appearing, toxic-appearing or diaphoretic.      Comments: Patient is somnolent but arousable.   HENT:      Head: Normocephalic and atraumatic.      Right Ear: External ear normal.      Left Ear: External ear normal.      Mouth/Throat:      Mouth: Mucous membranes are moist.   Eyes:      General: No scleral icterus.     Pupils: Pupils are equal, round, and reactive to light.   Cardiovascular:      Rate and Rhythm: Normal rate and regular rhythm.      Heart sounds: Normal heart sounds. No murmur heard.     No friction rub. No gallop.   Pulmonary:      Effort:  Pulmonary effort is normal. No respiratory distress.      Breath sounds: Normal breath sounds. No wheezing, rhonchi or rales.   Abdominal:      General: Bowel sounds are normal.      Palpations: Abdomen is soft.      Tenderness: There is no abdominal tenderness. There is no guarding or rebound.   Musculoskeletal:         General: No swelling.      Right lower leg: No edema.      Left lower leg: No edema.   Skin:     General: Skin is warm and dry.      Coloration: Skin is not jaundiced.      Findings: No erythema or rash.   Neurological:      Mental Status: She is alert.      GCS: GCS eye subscore is 4. GCS verbal subscore is 3. GCS motor subscore is 6.      Cranial Nerves: No facial asymmetry.             Significant Labs: All pertinent labs within the past 24 hours have been reviewed.    Significant Imaging: I have reviewed all pertinent imaging results/findings within the past 24 hours.    Assessment and Plan     * NSTEMI (non-ST elevated myocardial infarction)  Pt has Positve Delta, no chest pain, syncopal Episode,  Orhtostatics positive in ED, given pts AGE and risk factors will treat as NSTEMI.   Patient presents with NSTEMI. Chest pain is currently controlled. VIKTOR score is 3. Patient is currently on NSTEMI Pathway.    EKG reviewed. Troponins reviewed and results noted-   Troponin 53, > >88     Lipid panel reviewed and shows-     Lab Results   Component Value Date    LDLCALC 119 12/21/2024     Lab Results   Component Value Date    TRIG 182 (H) 12/21/2024         Medical management includes;  ASA,Beta Blocker, High Intensity Stain, and ACE/ARB Echo has not been performed. Latest ECHO results are as follows- Results for orders placed during the hospital encounter of 03/30/23    Echo    Interpretation Summary  · The left ventricle is normal in size with severe concentric hypertrophy and hyperdynamic systolic function.  · The estimated ejection fraction is 70%.  · Mild tricuspid regurgitation.  · Left ventricular  mild outflow tract obstruction is present.  · Normal right ventricular size with normal right ventricular systolic function.  · Normal central venous pressure (3 mmHg).  · The estimated PA systolic pressure is 43 mmHg.  · ECHO is suggestive of hypertensive heart disease vs. hypertrophic cardiomyopathy  .   Cardiology following  Lexiscan recommended once encephalopathy resolves     Positive fecal occult blood test  GI consulted  Outpt endoscopy recommended for now  Monitor for blood loss       Syncope and collapse  Syncope vs seizure +/- polypharmacy  Echo reveals nl systolic and diastolic dysfunction   Carotid US, EEG and Keppra level pending  Telemetry  Fall precautions  Sz precautions      Gastroesophageal reflux disease  Continue home protonix      Hyperlipidemia  Continue statin      Essential hypertension  Patient's blood pressure range in the last 24 hours was: BP  Min: 96/68  Max: 153/72.The patient's inpatient anti-hypertensive regimen is listed below:  Current Antihypertensives  furosemide tablet 20 mg, Daily, Oral  hydrALAZINE tablet 100 mg, 3 times daily, Oral  valsartan tablet 320 mg, Daily, Oral  metoprolol tartrate (LOPRESSOR) tablet 25 mg, 2 times daily, Oral  nitroGLYCERIN SL tablet 0.4 mg, Every 5 min PRN, Sublingual    Plan  - BP is uncontrolled, will adjust as follows: Pt BP decresed when standing and hR increase + orhthos  - Will hold hydralazine and lasix for now    Depression  Patient has persistent depression which is unknown and is currently controlled. Will Continue anti-depressant medications. We will not consult psychiatry at this time. Patient does not display psychosis at this time. Continue to monitor closely and adjust plan of care as needed.          VTE Risk Mitigation (From admission, onward)           Ordered     enoxaparin injection 40 mg  Daily         12/20/24 2025     IP VTE HIGH RISK PATIENT  Once         12/20/24 2025     Place sequential compression device  Until  discontinued         12/20/24 2025                    Discharge Planning   GERALDO:      Code Status: Full Code   Medical Readiness for Discharge Date:   Discharge Plan A: Home with family                        Fuentes Shaver DO  Department of Hospital Medicine   [facility]

## 2024-12-22 NOTE — ASSESSMENT & PLAN NOTE
Elevated troponin level with positive delta in the absence of chest pain or EKG chages  - Plan for lexiscan when patient's acute encephalopathy improves

## 2024-12-22 NOTE — ASSESSMENT & PLAN NOTE
Pt has Positve Delta, no chest pain, syncopal Episode,  Orhtostatics positive in ED, given pts AGE and risk factors will treat as NSTEMI.   Patient presents with NSTEMI. Chest pain is currently controlled. VIKTOR score is 3. Patient is currently on NSTEMI Pathway.    EKG reviewed. Troponins reviewed and results noted-   Troponin 53, > >88     Lipid panel reviewed and shows-     Lab Results   Component Value Date    LDLCALC 119 12/21/2024     Lab Results   Component Value Date    TRIG 182 (H) 12/21/2024         Medical management includes;  ASA,Beta Blocker, High Intensity Stain, and ACE/ARB Echo has not been performed. Latest ECHO results are as follows- Results for orders placed during the hospital encounter of 03/30/23    Echo    Interpretation Summary  · The left ventricle is normal in size with severe concentric hypertrophy and hyperdynamic systolic function.  · The estimated ejection fraction is 70%.  · Mild tricuspid regurgitation.  · Left ventricular mild outflow tract obstruction is present.  · Normal right ventricular size with normal right ventricular systolic function.  · Normal central venous pressure (3 mmHg).  · The estimated PA systolic pressure is 43 mmHg.  · ECHO is suggestive of hypertensive heart disease vs. hypertrophic cardiomyopathy  .   Cardiology following  Lexiscan recommended once encephalopathy resolves

## 2024-12-22 NOTE — PLAN OF CARE
"[facility]  Initial Discharge Assessment       Primary Care Provider: Eldon Govea MD    Admission Diagnosis: Non-ST elevation myocardial infarction (NSTEMI) [I21.4]  NSTEMI (non-ST elevation myocardial infarction) [I21.4]  Chest pain [R07.9]    Admission Date: 12/20/2024  Expected Discharge Date:     Transition of Care Barriers: (P) None    Payor: [facility] MCARE / Plan: [facility] DUAL COMPLETE HMO SNP / Product Type: Medicare Advantage /     Extended Emergency Contact Information  Primary Emergency Contact: SARAH WITT  Home Phone: [phone]  Mobile Phone: [phone]  Relation: Daughter   needed? No    Discharge Plan A: (P) Home with family  Discharge Plan B: (P) Home Health, Long-term acute care facility (LTAC), Skilled Nursing Facility, Rehab      [facility] - [place], [place] - [address]  [address]  [facility] [address]  Phone: [phone] Fax: [phone]    [id number] - [place], [place] - [address] [facility] [address]  [address]  [facility] [address]  Phone: [phone] Fax: [phone]    [facility] [address] [facility], [place] - [address]  [address]  [facility] [address]  Phone: [phone] Fax: [phone]    [facility]  [address] [place] [address]  Phone: [phone] Fax: [phone]      Initial Assessment (most recent)       Adult Discharge Assessment - 12/22/24 1414          Discharge Assessment    Assessment Type Discharge Planning Assessment (P)      Confirmed/corrected address, phone number and insurance Yes (P)      Confirmed Demographics Correct on Facesheet (P)      Communicated GERALDO with patient/caregiver Date not available/Unable to determine (P)      Reason For Admission Pt states, I went to my doctor and I wasn't feeling good." (P)      People in Home child(carleen), adult (P)      Do you expect to return to your current living situation? Yes " (P)      Do you have help at home or someone to help you manage your care at home? Yes (P)      Who are your caregiver(s) and their phone number(s)? Jacque Stewart (Daughter) [phone] (P)      Prior to hospitilization cognitive status: Unable to Assess (P)      Current cognitive status: Alert/Oriented (P)      Walking or Climbing Stairs Difficulty yes (P)      Walking or Climbing Stairs ambulation difficulty, requires equipment (P)      Mobility Management Cane, rollator (P)      Dressing/Bathing Difficulty yes (P)      Dressing/Bathing bathing difficulty, assistance 1 person;dressing difficulty, assistance 1 person (P)      Dressing/Bathing Management Assistance from daughter (P)      Do you have any problems with: -- (P)    No problems reported    Home Layout Able to live on 1st floor (P)      Equipment Currently Used at Home cane, quad;cane, straight;rollator;walker, standard;other (see comments) (P)    pacemaker    Readmission within 30 days? No (P)      Patient currently being followed by outpatient case management? No (P)      Do you currently have service(s) that help you manage your care at home? No (P)      Do you take prescription medications? Yes (P)      Do you have prescription coverage? Yes (P)      Coverage [employer] Managed Medicare (P)      Do you have any problems affording any of your prescribed medications? No (P)      Is the patient taking medications as prescribed? yes (P)      Who is going to help you get home at discharge? Family (P)      How do you get to doctors appointments? family or friend will provide (P)      Are you on dialysis? No (P)      Do you take coumadin? No (P)      Discharge Plan A Home with family (P)      Discharge Plan B Home Health;Long-term acute care facility (LTAC);Skilled Nursing Facility;Rehab (P)      DME Needed Upon Discharge  none (P)      Discharge Plan discussed with: Patient (P)      Transition of Care Barriers None (P)         Physical Activity    On  average, how many days per week do you engage in moderate to strenuous exercise (like a brisk walk)? 0 days (P)      On average, how many minutes do you engage in exercise at this level? 0 min (P)         Financial Resource Strain    How hard is it for you to pay for the very basics like food, housing, medical care, and heating? Not very hard (P)         Housing Stability    In the last 12 months, was there a time when you were not able to pay the mortgage or rent on time? No (P)      At any time in the past 12 months, were you homeless or living in a shelter (including now)? No (P)         Transportation Needs    Has the lack of transportation kept you from medical appointments, meetings, work or from getting things needed for daily living? No (P)         Food Insecurity    Within the past 12 months, you worried that your food would run out before you got the money to buy more. Never true (P)      Within the past 12 months, the food you bought just didn't last and you didn't have money to get more. Never true (P)         Stress    Do you feel stress - tense, restless, nervous, or anxious, or unable to sleep at night because your mind is troubled all the time - these days? Not at all (P)         Social Isolation    How often do you feel lonely or isolated from those around you?  Never (P)         Alcohol Use    Q1: How often do you have a drink containing alcohol? Never (P)      Q2: How many drinks containing alcohol do you have on a typical day when you are drinking? Patient does not drink (P)      Q3: How often do you have six or more drinks on one occasion? Never (P)         [email or web address]    In the past 12 months has the electric, gas, oil, or water company threatened to shut off services in your home? No (P)         Health Literacy    How often do you need to have someone help you when you read instructions, pamphlets, or other written material from your doctor or pharmacy? Always (P)         OTHER    Name(s) of  People in Home Jacque Stewart (Daughter) (P)                    Spoke with pt today in her room.  Pt lives at home with her daughter and states she will return back home with family at discharge.  Pt is not current with home health and states she has a quad and straight cane, rollator, and standard walker for use.  Pt also reports she has a pacemaker.  SDOH questions completed and IM obtained.  SS following for discharge needs as they arise.

## 2024-12-22 NOTE — HPI
Hx obtained from chart review as pt is somnolent at time of evaluation.     74-year-old female who presented to the ED as transfer from her PCP's office. She endorsed excessive fatigue. While she was at the doctor's office she had a syncopal episode with loss of bladder control.  Bystanders report loss of consciousness was less than 15 seconds.  There was no observed seizure-like activity.  Patient denies chest pain, shortness of breath, palpitations, or other prodromal symptoms prior to syncopal episode.  Patient denied any chest pain or shortness of breath at the time of admission.. PMH HTN, HLD, CVA, GERD, and s/p pacemaker     In the ED initial vital signs were blood pressure of 143/77, temp 96.9° heart rate 77, O2 sat 98% on room air who.  Initial lab workup revealed a sodium of 147 glucose 117 BUN 22 creatinine 1.35.  CBC is unremarkable.  ProBNP was 74.  Initial troponin 53.6 repeat troponin at 88.3 patient has positive delta. EKG show NSR Rate 77,no acute ST-T abnormality.

## 2024-12-22 NOTE — PROGRESS NOTES
[facility] Hospital Medicine  Progress Note    Patient Name: Renetta Stewart  MRN: [id number]  Patient Class: IP- Inpatient   Admission Date: 12/20/2024  Length of Stay: 2 days  Attending Physician: Fuentes Shaver DO  Primary Care Provider: Eldon Govea MD        Subjective     Principal Problem:NSTEMI (non-ST elevated myocardial infarction)        HPI:  Pt is a 74-year-old female who presented to her doctor's office today.  She states she had been feeling well just a lot of fatigue.  While she was at the doctor's office she had a syncopal episode with loss of bladder control.  Bystanders report loss of consciousness was less than 15 seconds.  There was no observed seizure-like activity.  Patient denies chest pain, shortness of breath, palpitations, feeling of doom prior to syncopal episode.  Patient reports she does not have pain or shortness of breath at present.. PMH HTN, HLD, CVA, GERD, and pacemaker    In the ED initial vital signs were blood pressure of 143/77, temp 96.9° heart rate 77, O2 sat 98% on room air who.  Initial lab workup revealed a sodium of 147 glucose 117 BUN 22 creatinine 1.35.  CBC is unremarkable.  ProBNP was 74.  Initial troponin 53.6 repeat troponin at 88.3 patient has positive delta. EKG show NSR Rate 77, no pacer spike no st elevation.     Patient will be admitted to hospital medicine service under the direct supervision of Dr KHALIL  for further evaluation and management.     Overview/Hospital Course:  No notes on file    Interval History:  Patient examined at bedside today.  Patient is resting comfortably in bed in no acute distress.  Patient still appears somnolent but is much more talkative and alert today.  Patient is a poor historian.  Patient states that she has had similar episodes of loss of consciousness in the past.  On chart review, patient had previously seen by neurologist Dr. Mejia and had documented history of seizures.  Patient had been started  on Keppra but it is unclear whether she is taking this medication.  Keppra level has been ordered but is still pending.      It is possible that her somnolence is related to postictal state.  Polypharmacy may also be contributory.    Numerous labs have been ordered but remain pending as  has been able to draw blood.  Patient will be bolused with normal saline and blood draw will be re-attempted. EEG is also pending.      Speech therapy evaluation ordered to evaluate whether patient can be started on a diet safely.      Review of Systems  Objective:     Vital Signs (Most Recent):  Temp: 98.8 °F (37.1 °C) (12/22/24 1208)  Pulse: 60 (12/22/24 1208)  Resp: 16 (12/22/24 1208)  BP: (!) 144/84 (12/22/24 1208)  SpO2: 98 % (12/22/24 1500) Vital Signs (24h Range):  Temp:  [97.4 °F (36.3 °C)-98.8 °F (37.1 °C)] 98.8 °F (37.1 °C)  Pulse:  [60-83] 60  Resp:  [16-18] 16  SpO2:  [97 %-100 %] 98 %  BP: (102-160)/(59-86) 144/84     Weight: 86.4 kg (190 lb 7.6 oz)  Body mass index is 32.7 kg/m².  No intake or output data in the 24 hours ending 12/22/24 1534      Physical Exam  Vitals reviewed.   Constitutional:       General: She is not in acute distress.     Appearance: She is not ill-appearing, toxic-appearing or diaphoretic.      Comments: Patient is somnolent but arousable.   HENT:      Head: Normocephalic and atraumatic.      Right Ear: External ear normal.      Left Ear: External ear normal.      Mouth/Throat:      Mouth: Mucous membranes are moist.   Eyes:      General: No scleral icterus.     Pupils: Pupils are equal, round, and reactive to light.   Cardiovascular:      Rate and Rhythm: Normal rate and regular rhythm.      Heart sounds: Normal heart sounds. No murmur heard.     No friction rub. No gallop.   Pulmonary:      Effort: Pulmonary effort is normal. No respiratory distress.      Breath sounds: Normal breath sounds. No wheezing, rhonchi or rales.   Abdominal:      General: Bowel sounds are normal.       Palpations: Abdomen is soft.      Tenderness: There is no abdominal tenderness. There is no guarding or rebound.   Musculoskeletal:         General: No swelling.      Right lower leg: No edema.      Left lower leg: No edema.   Skin:     General: Skin is warm and dry.      Coloration: Skin is not jaundiced.      Findings: No erythema or rash.   Neurological:      Mental Status: She is alert.      GCS: GCS eye subscore is 4. GCS verbal subscore is 3. GCS motor subscore is 6.      Cranial Nerves: No facial asymmetry.             Significant Labs: All pertinent labs within the past 24 hours have been reviewed.    Significant Imaging: I have reviewed all pertinent imaging results/findings within the past 24 hours.    Assessment and Plan     * NSTEMI (non-ST elevated myocardial infarction)  Pt has Positve Delta, no chest pain, syncopal Episode,  Orhtostatics positive in ED, given pts AGE and risk factors will treat as NSTEMI.   Patient presents with NSTEMI. Chest pain is currently controlled. VIKTOR score is 3. Patient is currently on NSTEMI Pathway.    EKG reviewed. Troponins reviewed and results noted-   Troponin 53, > >88     Lipid panel reviewed and shows-     Lab Results   Component Value Date    LDLCALC 37 02/14/2022     Lab Results   Component Value Date    TRIG 78 02/14/2022         Medical management includes;  ASA,Beta Blocker, High Intensity Stain, and ACE/ARB Echo has not been performed. Latest ECHO results are as follows- Results for orders placed during the hospital encounter of 03/30/23    Echo    Interpretation Summary  · The left ventricle is normal in size with severe concentric hypertrophy and hyperdynamic systolic function.  · The estimated ejection fraction is 70%.  · Mild tricuspid regurgitation.  · Left ventricular mild outflow tract obstruction is present.  · Normal right ventricular size with normal right ventricular systolic function.  · Normal central venous pressure (3 mmHg).  · The estimated PA  systolic pressure is 43 mmHg.  · ECHO is suggestive of hypertensive heart disease vs. hypertrophic cardiomyopathy  .   Consult for cardiac rehab is not ordered.. Cardiology is consulted. Plan of care not reviewed with cardiology team. Continue to monitor patient closely and adjust therapy as needed.      Syncope and collapse  Pt had observed Syncopal episode <15 second duration, no observed sz activity  Orthostatics in ED + Tilt.   CT head, and c spine negative   ml bolus, encourage PO hydration  Hold lasix, Hydralazine for now  Telemetry  Fall precautions  Sz precautions      Gastroesophageal reflux disease  Continue home protonix      Hyperlipidemia  Continue statin      Essential hypertension  Patient's blood pressure range in the last 24 hours was: BP  Min: 96/68  Max: 153/72.The patient's inpatient anti-hypertensive regimen is listed below:  Current Antihypertensives  furosemide tablet 20 mg, Daily, Oral  hydrALAZINE tablet 100 mg, 3 times daily, Oral  valsartan tablet 320 mg, Daily, Oral  metoprolol tartrate (LOPRESSOR) tablet 25 mg, 2 times daily, Oral  nitroGLYCERIN SL tablet 0.4 mg, Every 5 min PRN, Sublingual    Plan  - BP is uncontrolled, will adjust as follows: Pt BP decresed when standing and hR increase + orhthos  - Will hold hydralazine and lasix for now    Depression  Patient has persistent depression which is unknown and is currently controlled. Will Continue anti-depressant medications. We will not consult psychiatry at this time. Patient does not display psychosis at this time. Continue to monitor closely and adjust plan of care as needed.          VTE Risk Mitigation (From admission, onward)           Ordered     enoxaparin injection 40 mg  Daily         12/20/24 2025     IP VTE HIGH RISK PATIENT  Once         12/20/24 2025     Place sequential compression device  Until discontinued         12/20/24 2025                    Discharge Planning   GERALDO:      Code Status: Full Code   Medical  Readiness for Discharge Date:   Discharge Plan A: Home with family                        Fuentes Shaver DO  Department of Hospital Medicine   [facility]

## 2024-12-22 NOTE — ASSESSMENT & PLAN NOTE
Orthostatic vitals positive on admission  D/c valsartan  - Place zio monitor on discharge  - Interrogate PPM

## 2024-12-22 NOTE — ASSESSMENT & PLAN NOTE
Syncope vs seizure  Echo reveals nl systolic and diastolic dysfunction   Carotid US and EEG and Keppra level pending  Telemetry  Fall precautions  Sz precautions

## 2024-12-22 NOTE — SUBJECTIVE & OBJECTIVE
Interval History:  Patient examined at bedside today.  Patient is resting comfortably in bed in no acute distress.  Patient still appears somnolent but is much more talkative and alert today.  Patient is a poor historian.  Patient states that she has had similar episodes of loss of consciousness in the past.  On chart review, patient had previously seen by neurologist Dr. Mejia and had documented history of seizures.  Patient had been started on Keppra but it is unclear whether she is taking this medication.  Keppra level has been ordered but is still pending.      It is possible that her somnolence is related to postictal state.  Polypharmacy may also be contributory.    Numerous labs have been ordered but remain pending as  has been unable to draw blood.  Patient will be bolused with normal saline and blood draw will be re-attempted. EEG is also pending.      Patient was able to take her p.o. meds this morning.  Speech therapy consulted for swallow evaluation.  Patient cautiously advanced to soft diet.  Nursing communication order placed to monitor patient's ability to consume food safely.      Review of Systems   Unable to perform ROS: Mental status change     Objective:     Vital Signs (Most Recent):  Temp: 98.8 °F (37.1 °C) (12/22/24 1208)  Pulse: 60 (12/22/24 1208)  Resp: 16 (12/22/24 1208)  BP: (!) 144/84 (12/22/24 1208)  SpO2: 98 % (12/22/24 1500) Vital Signs (24h Range):  Temp:  [97.4 °F (36.3 °C)-98.8 °F (37.1 °C)] 98.8 °F (37.1 °C)  Pulse:  [60-83] 60  Resp:  [16-18] 16  SpO2:  [97 %-100 %] 98 %  BP: (102-160)/(59-86) 144/84     Weight: 86.4 kg (190 lb 7.6 oz)  Body mass index is 32.7 kg/m².  No intake or output data in the 24 hours ending 12/22/24 1534      Physical Exam  Vitals reviewed.   Constitutional:       General: She is not in acute distress.     Appearance: She is not ill-appearing, toxic-appearing or diaphoretic.      Comments: Patient is somnolent but arousable.   HENT:      Head:  Normocephalic and atraumatic.      Right Ear: External ear normal.      Left Ear: External ear normal.      Mouth/Throat:      Mouth: Mucous membranes are moist.   Eyes:      General: No scleral icterus.     Pupils: Pupils are equal, round, and reactive to light.   Cardiovascular:      Rate and Rhythm: Normal rate and regular rhythm.      Heart sounds: Normal heart sounds. No murmur heard.     No friction rub. No gallop.   Pulmonary:      Effort: Pulmonary effort is normal. No respiratory distress.      Breath sounds: Normal breath sounds. No wheezing, rhonchi or rales.   Abdominal:      General: Bowel sounds are normal.      Palpations: Abdomen is soft.      Tenderness: There is no abdominal tenderness. There is no guarding or rebound.   Musculoskeletal:         General: No swelling.      Right lower leg: No edema.      Left lower leg: No edema.   Skin:     General: Skin is warm and dry.      Coloration: Skin is not jaundiced.      Findings: No erythema or rash.   Neurological:      Mental Status: She is alert.      GCS: GCS eye subscore is 4. GCS verbal subscore is 3. GCS motor subscore is 6.      Cranial Nerves: No facial asymmetry.             Significant Labs: All pertinent labs within the past 24 hours have been reviewed.    Significant Imaging: I have reviewed all pertinent imaging results/findings within the past 24 hours.

## 2024-12-22 NOTE — NURSING
Phlebotomist unable to get blood from patient so I tried in her left AC and right forearm and was unable to get any blood also.  Notified Dr. Lin and he started patient on IV fluids- LR @ 100.

## 2024-12-22 NOTE — SUBJECTIVE & OBJECTIVE
Past Medical History:   Diagnosis Date    Anemia     Anxiety     Dementia     Depression     GERD (gastroesophageal reflux disease)     Hyperlipidemia     Hypertension     Stroke        Past Surgical History:   Procedure Laterality Date    CARDIAC PACEMAKER PLACEMENT         Review of patient's allergies indicates:  No Known Allergies    No current facility-administered medications on file prior to encounter.     Current Outpatient Medications on File Prior to Encounter   Medication Sig    pregabalin (LYRICA) 75 MG capsule Take 75 mg by mouth every evening.    ciprofloxacin HCl (CILOXAN) 0.3 % ophthalmic solution Place 2 drops into both eyes every 4 (four) hours.    dicyclomine (BENTYL) 20 mg tablet Take 20 mg by mouth 2 (two) times daily.    diltiaZEM (CARDIZEM CD) 240 MG 24 hr capsule Take 240 mg by mouth.    esomeprazole (NEXIUM) 40 MG capsule Take 1 capsule (40 mg total) by mouth before breakfast.    furosemide (LASIX) 20 MG tablet Take 1 tablet (20 mg total) by mouth once daily.    hydrALAZINE (APRESOLINE) 100 MG tablet Take 1 tablet (100 mg total) by mouth 3 (three) times daily.    HYDROcodone-acetaminophen (NORCO) 5-325 mg per tablet Take 1 tablet by mouth every 6 (six) hours as needed for Pain.    levETIRAcetam (KEPPRA) 500 MG Tab Take 1 tablet (500 mg total) by mouth 2 (two) times daily.    levothyroxine (SYNTHROID) 50 MCG tablet Take 1 tablet (50 mcg total) by mouth before breakfast.    LUMIGAN 0.01 % Drop Place 1 drop into both eyes every evening.    megestroL (MEGACE) 400 mg/10 mL (40 mg/mL) Susp Take 10 mLs (400 mg total) by mouth 2 (two) times daily.    memantine (NAMENDA) 10 MG Tab Take 1 tablet (10 mg total) by mouth 2 (two) times daily.    mirtazapine (REMERON) 15 MG tablet Take 1 tablet (15 mg total) by mouth every evening.    [START ON 12/22/2024] oxyCODONE-acetaminophen (PERCOCET) 7.5-325 mg per tablet Take 1 tablet by mouth.    QUEtiapine (SEROQUEL) 25 MG Tab Take 25 mg by mouth every evening.     sertraline (ZOLOFT) 50 MG tablet Take 1 tablet (50 mg total) by mouth every evening.    valsartan (DIOVAN) 320 MG tablet Take 1 tablet (320 mg total) by mouth once daily.     Family History       Problem Relation (Age of Onset)    Hypertension Father          Tobacco Use    Smoking status: Never     Passive exposure: Never    Smokeless tobacco: Never   Substance and Sexual Activity    Alcohol use: Not Currently    Drug use: Never    Sexual activity: Not Currently     Review of Systems   Reason unable to perform ROS: Patient is somnolent. Arouses to name, but does not answer questions.     Objective:     Vital Signs (Most Recent):  Temp: 97.5 °F (36.4 °C) (12/21/24 2011)  Pulse: 80 (12/21/24 2011)  Resp: 18 (12/21/24 2011)  BP: (!) 160/86 (12/21/24 2011)  SpO2: 97 % (12/21/24 2011) Vital Signs (24h Range):  Temp:  [97.3 °F (36.3 °C)-98 °F (36.7 °C)] 97.5 °F (36.4 °C)  Pulse:  [61-96] 80  Resp:  [18-20] 18  SpO2:  [93 %-100 %] 97 %  BP: ()/(46-93) 160/86     Weight: 87.7 kg (193 lb 5.5 oz)  Body mass index is 33.19 kg/m².    SpO2: 97 %         Intake/Output Summary (Last 24 hours) at 12/21/2024 2214  Last data filed at 12/21/2024 0524  Gross per 24 hour   Intake 357 ml   Output 1 ml   Net 356 ml       Lines/Drains/Airways       Peripheral Intravenous Line  Duration                  Peripheral IV - Double Lumen 12/20/24 2150 16 G Anterior;Distal;Right Upper Arm 1 day                     Physical Exam  Constitutional:       Comments: Somnolent    Cardiovascular:      Rate and Rhythm: Normal rate and regular rhythm.      Heart sounds: No murmur heard.     No friction rub. No gallop.   Pulmonary:      Breath sounds: No wheezing, rhonchi or rales.   Musculoskeletal:      Right lower leg: No edema.      Left lower leg: No edema.   Skin:     General: Skin is warm.          Significant Labs: All pertinent lab results from the last 24 hours have been reviewed.    Significant Imaging: Echocardiogram: Transthoracic echo  (TTE) complete (Cupid Only):   Results for orders placed or performed during the hospital encounter of 12/20/24   Echo   Result Value Ref Range    BSA 2.01 m2    A4C EF 46 %    LVOT stroke volume 138.9 cm3    LVIDd 3.7 3.5 - 6.0 cm    LV Systolic Volume 14.68 mL    LV Systolic Volume Index 7.6 mL/m2    LVIDs 2.1 2.1 - 4.0 cm    LV Diastolic Volume 53.11 mL    LV ESV A4C 26.42 mL    LV Diastolic Volume Index 27.52 mL/m2    LV EDV A4C 27.33 mL    Left Ventricular End Systolic Volume by Teichholz Method 14.68 mL    Left Ventricular End Diastolic Volume by Teichholz Method 53.11 mL    IVS 2.3 (A) 0.6 - 1.1 cm    LVOT diameter 1.8 cm    LVOT area 2.5 cm2    FS 43.2 28 - 44 %    Left Ventricle Relative Wall Thickness 0.70 cm    PW 1.3 (A) 0.6 - 1.1 cm    LV mass 282.1 g    LV Mass Index 146.2 g/m2    MV Peak E Johan 0.64 m/s    TDI LATERAL 0.05 m/s    TDI SEPTAL 0.05 m/s    E/E' ratio 12.80 m/s    MV Peak A Johan 1.04 m/s    TR Max Johan 2.85 m/s    E/A ratio 0.62     E wave deceleration time 252.93 msec    LV SEPTAL E/E' RATIO 12.80 m/s    LV LATERAL E/E' RATIO 12.80 m/s    LVOT peak johan 2.8 m/s    Left Ventricular Outflow Tract Mean Velocity 1.84 cm/s    Left Ventricular Outflow Tract Mean Gradient 17.72 mmHg    RV- palmer basal diam 3.9 cm    RV-palmer mid d 3.3 cm    RV Basal Diameter 4.36 cm    RV-palmer length 4.4 cm    RV mid diameter 3.34 cm    RV/LV Ratio 1.05 cm    LA size 3.00 cm    RA Major Axis 3.58 cm    AV mean gradient 18.0 mmHg    AV peak gradient 38.4 mmHg    Ao peak johan 3.1 m/s    Ao VTI 60.1 cm    LVOT peak VTI 54.6 cm    AV valve area 2.3 cm²    AV Velocity Ratio 0.90     AV index (prosthetic) 0.91     JULIUS by Velocity Ratio 2.3 cm²    Mr max johan 5.04 m/s    MV mean gradient 2 mmHg    MV peak gradient 5 mmHg    MV stenosis pressure 1/2 time 78.34 ms    MV valve area p 1/2 method 2.81 cm2    MV valve area by continuity eq 4.05 cm2    MV VTI 34.3 cm    Triscuspid Valve Regurgitation Peak Gradient 32 mmHg    PV PEAK  VELOCITY 1.21 m/s    PV peak gradient 6 mmHg    Ao root annulus 2.91 cm    IVC diameter 1.41 cm    Mean e' 0.05 m/s    ZLVIDS -3.72     ZLVIDD -3.90     LA area A4C 13.40 cm2    AORTIC VALVE CUSP SEPERATION 2.10 cm    TV resting pulmonary artery pressure 35 mmHg    RV TB RVSP 6 mmHg    Est. RA pres 3 mmHg    Narrative      Left Ventricle: The left ventricle is normal in size. Severely   increased wall thickness. There is severe concentric hypertrophy. There is   normal systolic function with a visually estimated ejection fraction of 60   - 65%. There is normal diastolic function.    Right Ventricle: Normal right ventricular cavity size. Systolic   function could not be assesed.    Aortic Valve: The aortic valve is a trileaflet valve. Mildly calcified   cusps.    Mitral Valve: There is mild bileaflet sclerosis. There is mild   regurgitation.    Tricuspid Valve: There is mild regurgitation.    Pulmonary Artery: The estimated pulmonary artery systolic pressure is   35 mmHg.    IVC/SVC: Normal venous pressure at 3 mmHg.    ECHO is suggestive of hypertensive heart disease vs. hypertrophic   cardiomyopathy

## 2024-12-22 NOTE — CONSULTS
"CC: anemia    HPI 74 y.o. female with history of GERD, HLD, HTN, dementia, CVA, PPM here with syncopal episode. She was at the doctor's office and had a syncopal episode with loss of bladder control. Bystanders report loss of consciousness was less than 15 seconds. She is a poor historian therefore most of history taken from records. GI consulted due to positive occult blood. H/H 13/41.8.  Medical records reviewed. Additional history supplemented by nursing.     Past Medical History:   Diagnosis Date    Anemia     Anxiety     Dementia     Depression     GERD (gastroesophageal reflux disease)     Hyperlipidemia     Hypertension     Stroke      Past Surgical History:   Procedure Laterality Date    CARDIAC PACEMAKER PLACEMENT       Social History  Social History     Tobacco Use    Smoking status: Never     Passive exposure: Never    Smokeless tobacco: Never   Substance Use Topics    Alcohol use: Not Currently    Drug use: Never     Family History   Problem Relation Name Age of Onset    Hypertension Father       Physical Examination  BP (!) 147/79   Pulse 75   Temp 98.7 °F (37.1 °C) (Oral)   Resp 16   Ht 5' 4" (1.626 m)   Wt 86.4 kg (190 lb 7.6 oz)   SpO2 100%   Breastfeeding No   BMI 32.70 kg/m²   General appearance: chronically ill appearing, cooperative, no distress  HENT: Normocephalic, atraumatic, neck symmetrical, no nasal discharge   Eyes: conjunctivae/corneas clear, PERRL, EOM's intact  Lungs: no labored breathing, symmetric chest wall expansion bilaterally  Heart: regular rate and rhythm without rub; no displacement of the PMI   Abdomen: soft, non-tender; bowel sounds normoactive; no organomegaly  Extremities: extremities symmetric; no clubbing, cyanosis, or edema  Integument: Skin color, texture, turgor normal; no rashes; hair distrubution normal  Neurologic: Alert, follows commands  Psychiatric: no pressured speech; flat affect    Labs:  Lab Results   Component Value Date    WBC 5.88 12/21/2024    HGB " 13.0 12/21/2024    HCT 41.8 12/21/2024    MCV 82.4 12/21/2024     12/21/2024       CMP  Sodium   Date Value Ref Range Status   12/21/2024 145 136 - 145 mmol/L Final     Potassium   Date Value Ref Range Status   12/21/2024 3.3 (L) 3.5 - 5.1 mmol/L Final     Chloride   Date Value Ref Range Status   12/21/2024 107 98 - 107 mmol/L Final     CO2   Date Value Ref Range Status   12/21/2024 21 (L) 23 - 31 mmol/L Final     Glucose   Date Value Ref Range Status   12/21/2024 119 (H) 82 - 115 mg/dL Final     BUN   Date Value Ref Range Status   12/21/2024 25 (H) 10 - 20 mg/dL Final     Creatinine   Date Value Ref Range Status   12/21/2024 1.56 (H) 0.55 - 1.02 mg/dL Final     Calcium   Date Value Ref Range Status   12/21/2024 8.6 8.4 - 10.2 mg/dL Final     Total Protein   Date Value Ref Range Status   12/21/2024 6.0 5.8 - 7.6 g/dL Final     Albumin   Date Value Ref Range Status   12/21/2024 3.3 (L) 3.4 - 4.8 g/dL Final     Bilirubin, Total   Date Value Ref Range Status   12/21/2024 0.7 <=1.5 mg/dL Final     Alk Phos   Date Value Ref Range Status   12/21/2024 38 (L) 40 - 150 U/L Final     AST   Date Value Ref Range Status   12/21/2024 29 5 - 34 U/L Final     ALT   Date Value Ref Range Status   12/21/2024 30 <=55 U/L Final     Anion Gap   Date Value Ref Range Status   12/21/2024 20 (H) 7 - 16 mmol/L Final     eGFR    Date Value Ref Range Status   06/08/2021 51 (L) >=60 mL/min/1.73m² Final     eGFR   Date Value Ref Range Status   02/14/2022 40 (L) >=60 mL/min/1.73m² Final       Imaging:  X-Ray Chest AP Portable   Final Result      As above.         Electronically signed by: Yoshi Becerra MD   Date:    12/20/2024   Time:    16:32      CT Head Without Contrast   Final Result      No acute intracranial process.  Additional evaluation with MRI of the brain may be obtained, as clinically warranted      Old infarctions within the periventricular white matter and the left thalami.  Additional old infarctions in the  basal ganglia and right cerebellum.      Changes of chronic vessel ischemic disease and cerebral volume loss.         Electronically signed by: Manuel Schreiber MD   Date:    12/20/2024   Time:    16:35        Independently reviewed    Assessment:   Positive occult blood in stool  Syncope  Elevated troponin    Plan:   -No plan for endoscopic evaluation unless overt blood loss  -Monitor for blood loss  -Diet as tolerates  -Transfusion as necessary to maintain Hgb>7  -Plan outpatient EGD and colonoscopy        Wilfred Lora MD  [facility]

## 2024-12-22 NOTE — CONSULTS
[facility]  Cardiology  Consult Note    Patient Name: Renetta Stewart  MRN: [id number]  Admission Date: 12/20/2024  Hospital Length of Stay: 1 days  Code Status: Full Code   Attending Provider: Fuentes Shaver DO   Consulting Provider: GLORIA WAYNE MD  Primary Care Physician: Eldon Govea MD  Principal Problem:NSTEMI (non-ST elevated myocardial infarction)    Patient information was obtained from past medical records, ER records, and primary team.     Inpatient consult to Cardiology  Consult performed by: Gloria Wayne MD  Consult ordered by: Olga Walsh MD        Subjective:     Chief Complaint:  Syncope     HPI:   Hx obtained from chart review as pt is somnolent at time of evaluation.     74-year-old female who presented to the ED as transfer from her PCP's office. She endorsed excessive fatigue. While she was at the doctor's office she had a syncopal episode with loss of bladder control.  Bystanders report loss of consciousness was less than 15 seconds.  There was no observed seizure-like activity.  Patient denies chest pain, shortness of breath, palpitations, or other prodromal symptoms prior to syncopal episode.  Patient denied any chest pain or shortness of breath at the time of admission.. PMH HTN, HLD, CVA, GERD, and s/p pacemaker     In the ED initial vital signs were blood pressure of 143/77, temp 96.9° heart rate 77, O2 sat 98% on room air who.  Initial lab workup revealed a sodium of 147 glucose 117 BUN 22 creatinine 1.35.  CBC is unremarkable.  ProBNP was 74.  Initial troponin 53.6 repeat troponin at 88.3 patient has positive delta. EKG show NSR Rate 77,no acute ST-T abnormality.    Past Medical History:   Diagnosis Date    Anemia     Anxiety     Dementia     Depression     GERD (gastroesophageal reflux disease)     Hyperlipidemia     Hypertension     Stroke        Past Surgical History:   Procedure Laterality Date    CARDIAC PACEMAKER PLACEMENT         Review of patient's  allergies indicates:  No Known Allergies    No current facility-administered medications on file prior to encounter.     Current Outpatient Medications on File Prior to Encounter   Medication Sig    pregabalin (LYRICA) 75 MG capsule Take 75 mg by mouth every evening.    ciprofloxacin HCl (CILOXAN) 0.3 % ophthalmic solution Place 2 drops into both eyes every 4 (four) hours.    dicyclomine (BENTYL) 20 mg tablet Take 20 mg by mouth 2 (two) times daily.    diltiaZEM (CARDIZEM CD) 240 MG 24 hr capsule Take 240 mg by mouth.    esomeprazole (NEXIUM) 40 MG capsule Take 1 capsule (40 mg total) by mouth before breakfast.    furosemide (LASIX) 20 MG tablet Take 1 tablet (20 mg total) by mouth once daily.    hydrALAZINE (APRESOLINE) 100 MG tablet Take 1 tablet (100 mg total) by mouth 3 (three) times daily.    HYDROcodone-acetaminophen (NORCO) 5-325 mg per tablet Take 1 tablet by mouth every 6 (six) hours as needed for Pain.    levETIRAcetam (KEPPRA) 500 MG Tab Take 1 tablet (500 mg total) by mouth 2 (two) times daily.    levothyroxine (SYNTHROID) 50 MCG tablet Take 1 tablet (50 mcg total) by mouth before breakfast.    LUMIGAN 0.01 % Drop Place 1 drop into both eyes every evening.    megestroL (MEGACE) 400 mg/10 mL (40 mg/mL) Susp Take 10 mLs (400 mg total) by mouth 2 (two) times daily.    memantine (NAMENDA) 10 MG Tab Take 1 tablet (10 mg total) by mouth 2 (two) times daily.    mirtazapine (REMERON) 15 MG tablet Take 1 tablet (15 mg total) by mouth every evening.    [START ON 12/22/2024] oxyCODONE-acetaminophen (PERCOCET) 7.5-325 mg per tablet Take 1 tablet by mouth.    QUEtiapine (SEROQUEL) 25 MG Tab Take 25 mg by mouth every evening.    sertraline (ZOLOFT) 50 MG tablet Take 1 tablet (50 mg total) by mouth every evening.    valsartan (DIOVAN) 320 MG tablet Take 1 tablet (320 mg total) by mouth once daily.     Family History       Problem Relation (Age of Onset)    Hypertension Father          Tobacco Use    Smoking status:  Never     Passive exposure: Never    Smokeless tobacco: Never   Substance and Sexual Activity    Alcohol use: Not Currently    Drug use: Never    Sexual activity: Not Currently     Review of Systems   Reason unable to perform ROS: Patient is somnolent. Arouses to name, but does not answer questions.     Objective:     Vital Signs (Most Recent):  Temp: 97.5 °F (36.4 °C) (12/21/24 2011)  Pulse: 80 (12/21/24 2011)  Resp: 18 (12/21/24 2011)  BP: (!) 160/86 (12/21/24 2011)  SpO2: 97 % (12/21/24 2011) Vital Signs (24h Range):  Temp:  [97.3 °F (36.3 °C)-98 °F (36.7 °C)] 97.5 °F (36.4 °C)  Pulse:  [61-96] 80  Resp:  [18-20] 18  SpO2:  [93 %-100 %] 97 %  BP: ()/(46-93) 160/86     Weight: 87.7 kg (193 lb 5.5 oz)  Body mass index is 33.19 kg/m².    SpO2: 97 %         Intake/Output Summary (Last 24 hours) at 12/21/2024 2214  Last data filed at 12/21/2024 0524  Gross per 24 hour   Intake 357 ml   Output 1 ml   Net 356 ml       Lines/Drains/Airways       Peripheral Intravenous Line  Duration                  Peripheral IV - Double Lumen 12/20/24 2150 16 G Anterior;Distal;Right Upper Arm 1 day                     Physical Exam  Constitutional:       Comments: Somnolent    Cardiovascular:      Rate and Rhythm: Normal rate and regular rhythm.      Heart sounds: No murmur heard.     No friction rub. No gallop.   Pulmonary:      Breath sounds: No wheezing, rhonchi or rales.   Musculoskeletal:      Right lower leg: No edema.      Left lower leg: No edema.   Skin:     General: Skin is warm.          Significant Labs: All pertinent lab results from the last 24 hours have been reviewed.    Significant Imaging: Echocardiogram: Transthoracic echo (TTE) complete (Cupid Only):   Results for orders placed or performed during the hospital encounter of 12/20/24   Echo   Result Value Ref Range    BSA 2.01 m2    A4C EF 46 %    LVOT stroke volume 138.9 cm3    LVIDd 3.7 3.5 - 6.0 cm    LV Systolic Volume 14.68 mL    LV Systolic Volume Index  7.6 mL/m2    LVIDs 2.1 2.1 - 4.0 cm    LV Diastolic Volume 53.11 mL    LV ESV A4C 26.42 mL    LV Diastolic Volume Index 27.52 mL/m2    LV EDV A4C 27.33 mL    Left Ventricular End Systolic Volume by Teichholz Method 14.68 mL    Left Ventricular End Diastolic Volume by Teichholz Method 53.11 mL    IVS 2.3 (A) 0.6 - 1.1 cm    LVOT diameter 1.8 cm    LVOT area 2.5 cm2    FS 43.2 28 - 44 %    Left Ventricle Relative Wall Thickness 0.70 cm    PW 1.3 (A) 0.6 - 1.1 cm    LV mass 282.1 g    LV Mass Index 146.2 g/m2    MV Peak E Johan 0.64 m/s    TDI LATERAL 0.05 m/s    TDI SEPTAL 0.05 m/s    E/E' ratio 12.80 m/s    MV Peak A Johan 1.04 m/s    TR Max Johan 2.85 m/s    E/A ratio 0.62     E wave deceleration time 252.93 msec    LV SEPTAL E/E' RATIO 12.80 m/s    LV LATERAL E/E' RATIO 12.80 m/s    LVOT peak johan 2.8 m/s    Left Ventricular Outflow Tract Mean Velocity 1.84 cm/s    Left Ventricular Outflow Tract Mean Gradient 17.72 mmHg    RV- palmer basal diam 3.9 cm    RV-palmer mid d 3.3 cm    RV Basal Diameter 4.36 cm    RV-palmer length 4.4 cm    RV mid diameter 3.34 cm    RV/LV Ratio 1.05 cm    LA size 3.00 cm    RA Major Axis 3.58 cm    AV mean gradient 18.0 mmHg    AV peak gradient 38.4 mmHg    Ao peak johan 3.1 m/s    Ao VTI 60.1 cm    LVOT peak VTI 54.6 cm    AV valve area 2.3 cm²    AV Velocity Ratio 0.90     AV index (prosthetic) 0.91     JULIUS by Velocity Ratio 2.3 cm²    Mr max johan 5.04 m/s    MV mean gradient 2 mmHg    MV peak gradient 5 mmHg    MV stenosis pressure 1/2 time 78.34 ms    MV valve area p 1/2 method 2.81 cm2    MV valve area by continuity eq 4.05 cm2    MV VTI 34.3 cm    Triscuspid Valve Regurgitation Peak Gradient 32 mmHg    PV PEAK VELOCITY 1.21 m/s    PV peak gradient 6 mmHg    Ao root annulus 2.91 cm    IVC diameter 1.41 cm    Mean e' 0.05 m/s    ZLVIDS -3.72     ZLVIDD -3.90     LA area A4C 13.40 cm2    AORTIC VALVE CUSP SEPERATION 2.10 cm    TV resting pulmonary artery pressure 35 mmHg    RV TB RVSP 6 mmHg    Est.  RA pres 3 mmHg    Narrative      Left Ventricle: The left ventricle is normal in size. Severely   increased wall thickness. There is severe concentric hypertrophy. There is   normal systolic function with a visually estimated ejection fraction of 60   - 65%. There is normal diastolic function.    Right Ventricle: Normal right ventricular cavity size. Systolic   function could not be assesed.    Aortic Valve: The aortic valve is a trileaflet valve. Mildly calcified   cusps.    Mitral Valve: There is mild bileaflet sclerosis. There is mild   regurgitation.    Tricuspid Valve: There is mild regurgitation.    Pulmonary Artery: The estimated pulmonary artery systolic pressure is   35 mmHg.    IVC/SVC: Normal venous pressure at 3 mmHg.    ECHO is suggestive of hypertensive heart disease vs. hypertrophic   cardiomyopathy       Assessment and Plan:     Elevated troponin  Elevated troponin level with positive delta in the absence of chest pain or EKG chages  - Plan for lexiscan when patient's acute encephalopathy improves     Syncope and collapse  Orthostatic vitals positive on admission  D/c valsartan  - Place zio monitor on discharge  - Interrogate PPM        VTE Risk Mitigation (From admission, onward)           Ordered     enoxaparin injection 40 mg  Daily         12/20/24 2025     IP VTE HIGH RISK PATIENT  Once         12/20/24 2025     Place sequential compression device  Until discontinued         12/20/24 2025                    Thank you for your consult. I will follow-up with patient. Please contact us if you have any additional questions.    GRANT GAUTHIER MD  Cardiology   [facility]

## 2024-12-23 DIAGNOSIS — I48.91 ATRIAL FIBRILLATION, UNSPECIFIED TYPE: Primary | ICD-10-CM

## 2024-12-23 DIAGNOSIS — R55 SYNCOPE, UNSPECIFIED SYNCOPE TYPE: Primary | ICD-10-CM

## 2024-12-23 PROBLEM — I21.A1 TYPE 2 MI (MYOCARDIAL INFARCTION): Status: ACTIVE | Noted: 2024-12-20

## 2024-12-23 LAB
AMPHET UR QL SCN: NEGATIVE
ANION GAP SERPL CALCULATED.3IONS-SCNC: 15 MMOL/L (ref 7–16)
BARBITURATES UR QL SCN: NEGATIVE
BASOPHILS # BLD AUTO: 0.03 K/UL (ref 0–0.2)
BASOPHILS NFR BLD AUTO: 0.3 % (ref 0–1)
BENZODIAZ METAB UR QL SCN: NEGATIVE
BUN SERPL-MCNC: 35 MG/DL (ref 10–20)
BUN/CREAT SERPL: 30 (ref 6–20)
CALCIUM SERPL-MCNC: 8.5 MG/DL (ref 8.4–10.2)
CANNABINOIDS UR QL SCN: NEGATIVE
CHLORIDE SERPL-SCNC: 110 MMOL/L (ref 98–107)
CO2 SERPL-SCNC: 26 MMOL/L (ref 23–31)
COCAINE UR QL SCN: NEGATIVE
CREAT SERPL-MCNC: 1.18 MG/DL (ref 0.55–1.02)
CV STRESS BASE HR: 60 BPM
DIASTOLIC BLOOD PRESSURE: 78 MMHG
DIFFERENTIAL METHOD BLD: ABNORMAL
EGFR (NO RACE VARIABLE) (RUSH/TITUS): 49 ML/MIN/1.73M2
EOSINOPHIL # BLD AUTO: 0.02 K/UL (ref 0–0.5)
EOSINOPHIL NFR BLD AUTO: 0.2 % (ref 1–4)
ERYTHROCYTE [DISTWIDTH] IN BLOOD BY AUTOMATED COUNT: 17.2 % (ref 11.5–14.5)
GLUCOSE SERPL-MCNC: 87 MG/DL (ref 82–115)
HCT VFR BLD AUTO: 38 % (ref 38–47)
HGB BLD-MCNC: 11.5 G/DL (ref 12–16)
IMM GRANULOCYTES # BLD AUTO: 0.08 K/UL (ref 0–0.04)
IMM GRANULOCYTES NFR BLD: 0.9 % (ref 0–0.4)
LYMPHOCYTES # BLD AUTO: 1.26 K/UL (ref 1–4.8)
LYMPHOCYTES NFR BLD AUTO: 14 % (ref 27–41)
MCH RBC QN AUTO: 25.2 PG (ref 27–31)
MCHC RBC AUTO-ENTMCNC: 30.3 G/DL (ref 32–36)
MCV RBC AUTO: 83.3 FL (ref 80–96)
MONOCYTES # BLD AUTO: 0.77 K/UL (ref 0–0.8)
MONOCYTES NFR BLD AUTO: 8.6 % (ref 2–6)
MPC BLD CALC-MCNC: 11.8 FL (ref 9.4–12.4)
NEUTROPHILS # BLD AUTO: 6.82 K/UL (ref 1.8–7.7)
NEUTROPHILS NFR BLD AUTO: 76 % (ref 53–65)
NRBC # BLD AUTO: 0.04 X10E3/UL
NRBC, AUTO (.00): 0.4 %
OHS CV CPX 1 MINUTE RECOVERY HEART RATE: 77 BPM
OHS CV CPX 85 PERCENT MAX PREDICTED HEART RATE MALE: 124
OHS CV CPX MAX PREDICTED HEART RATE: 146
OHS CV CPX PATIENT IS FEMALE: 1
OHS CV CPX PATIENT IS MALE: 0
OHS CV CPX PEAK DIASTOLIC BLOOD PRESSURE: 58 MMHG
OHS CV CPX PEAK HEAR RATE: 82 BPM
OHS CV CPX PEAK RATE PRESSURE PRODUCT: NORMAL
OHS CV CPX PEAK SYSTOLIC BLOOD PRESSURE: 192 MMHG
OHS CV CPX PERCENT MAX PREDICTED HEART RATE ACHIEVED: 58
OHS CV CPX RATE PRESSURE PRODUCT PRESENTING: 9240
OPIATES UR QL SCN: NEGATIVE
PCP UR QL SCN: NEGATIVE
PLATELET # BLD AUTO: 161 K/UL (ref 150–400)
POTASSIUM SERPL-SCNC: 3.5 MMOL/L (ref 3.5–5.1)
RBC # BLD AUTO: 4.56 M/UL (ref 4.2–5.4)
SODIUM SERPL-SCNC: 147 MMOL/L (ref 136–145)
SYSTOLIC BLOOD PRESSURE: 154 MMHG
WBC # BLD AUTO: 8.98 K/UL (ref 4.5–11)

## 2024-12-23 PROCEDURE — 11000001 HC ACUTE MED/SURG PRIVATE ROOM

## 2024-12-23 PROCEDURE — 63600175 PHARM REV CODE 636 W HCPCS: Performed by: NURSE PRACTITIONER

## 2024-12-23 PROCEDURE — 95816 EEG AWAKE AND DROWSY: CPT | Mod: 26,,, | Performed by: INTERNAL MEDICINE

## 2024-12-23 PROCEDURE — A9500 TC99M SESTAMIBI: HCPCS | Performed by: HOSPITALIST

## 2024-12-23 PROCEDURE — 25000003 PHARM REV CODE 250: Performed by: NURSE PRACTITIONER

## 2024-12-23 PROCEDURE — 80048 BASIC METABOLIC PNL TOTAL CA: CPT | Performed by: FAMILY MEDICINE

## 2024-12-23 PROCEDURE — 85025 COMPLETE CBC W/AUTO DIFF WBC: CPT | Performed by: NURSE PRACTITIONER

## 2024-12-23 PROCEDURE — 36415 COLL VENOUS BLD VENIPUNCTURE: CPT | Performed by: NURSE PRACTITIONER

## 2024-12-23 PROCEDURE — 99233 SBSQ HOSP IP/OBS HIGH 50: CPT | Mod: ,,, | Performed by: HOSPITALIST

## 2024-12-23 PROCEDURE — 63600175 PHARM REV CODE 636 W HCPCS: Performed by: INTERNAL MEDICINE

## 2024-12-23 PROCEDURE — 95816 EEG AWAKE AND DROWSY: CPT

## 2024-12-23 PROCEDURE — 63600175 PHARM REV CODE 636 W HCPCS: Performed by: HOSPITALIST

## 2024-12-23 RX ORDER — TETRAKIS(2-METHOXYISOBUTYLISOCYANIDE)COPPER(I) TETRAFLUOROBORATE 1 MG/ML
9.2 INJECTION, POWDER, LYOPHILIZED, FOR SOLUTION INTRAVENOUS
Status: COMPLETED | OUTPATIENT
Start: 2024-12-23 | End: 2024-12-23

## 2024-12-23 RX ORDER — REGADENOSON 0.08 MG/ML
0.4 INJECTION, SOLUTION INTRAVENOUS ONCE
Status: COMPLETED | OUTPATIENT
Start: 2024-12-23 | End: 2024-12-23

## 2024-12-23 RX ORDER — TETRAKIS(2-METHOXYISOBUTYLISOCYANIDE)COPPER(I) TETRAFLUOROBORATE 1 MG/ML
32.4 INJECTION, POWDER, LYOPHILIZED, FOR SOLUTION INTRAVENOUS
Status: COMPLETED | OUTPATIENT
Start: 2024-12-23 | End: 2024-12-23

## 2024-12-23 RX ADMIN — ENOXAPARIN SODIUM 40 MG: 40 INJECTION SUBCUTANEOUS at 05:12

## 2024-12-23 RX ADMIN — LEVOTHYROXINE SODIUM 50 MCG: 0.05 TABLET ORAL at 05:12

## 2024-12-23 RX ADMIN — SODIUM CHLORIDE, SODIUM LACTATE, POTASSIUM CHLORIDE, AND CALCIUM CHLORIDE: 600; 310; 30; 20 INJECTION, SOLUTION INTRAVENOUS at 07:12

## 2024-12-23 RX ADMIN — PANTOPRAZOLE SODIUM 40 MG: 40 TABLET, DELAYED RELEASE ORAL at 08:12

## 2024-12-23 RX ADMIN — REGADENOSON 0.4 MG: 0.08 INJECTION, SOLUTION INTRAVENOUS at 11:12

## 2024-12-23 RX ADMIN — SODIUM CHLORIDE, SODIUM LACTATE, POTASSIUM CHLORIDE, AND CALCIUM CHLORIDE: 600; 310; 30; 20 INJECTION, SOLUTION INTRAVENOUS at 06:12

## 2024-12-23 RX ADMIN — KIT FOR THE PREPARATION OF TECHNETIUM TC99M SESTAMIBI 9.2 MILLICURIE: 1 INJECTION, POWDER, LYOPHILIZED, FOR SOLUTION PARENTERAL at 10:12

## 2024-12-23 RX ADMIN — ATORVASTATIN CALCIUM 80 MG: 80 TABLET, FILM COATED ORAL at 08:12

## 2024-12-23 RX ADMIN — METOPROLOL TARTRATE 25 MG: 25 TABLET, FILM COATED ORAL at 08:12

## 2024-12-23 RX ADMIN — KIT FOR THE PREPARATION OF TECHNETIUM TC99M SESTAMIBI 32.4 MILLICURIE: 1 INJECTION, POWDER, LYOPHILIZED, FOR SOLUTION PARENTERAL at 11:12

## 2024-12-23 NOTE — NURSING
1745  Left brachial midline initiated without event. Good blood return noted, secured, biopatch applied, & transparent dressing applied. Labs obtained & given to phlebotomist.

## 2024-12-23 NOTE — ASSESSMENT & PLAN NOTE
Elevated troponin level with positive delta in the absence of chest pain, no acute ischemic changes   - Plan for lexiscan tomorrow

## 2024-12-23 NOTE — PROGRESS NOTES
[facility]  Cardiology  Progress Note    Patient Name: Renetta Stewart  MRN: [id number]  Admission Date: 12/20/2024  Hospital Length of Stay: 2 days  Code Status: Full Code   Attending Physician: Fuentes Shaver DO   Primary Care Physician: Eldon Govea MD  Expected Discharge Date:   Principal Problem:NSTEMI (non-ST elevated myocardial infarction)    Subjective:       Interval History: Awake and alert at the time of my evaluation today. Denies chest pain or shortness of breath. Does not recall the events of her presentation.     Review of Systems   Constitutional: Negative for chills and fever.   Cardiovascular:  Positive for syncope. Negative for chest pain, dyspnea on exertion, irregular heartbeat, leg swelling, near-syncope, orthopnea, palpitations and paroxysmal nocturnal dyspnea.   Respiratory:  Negative for cough, shortness of breath and wheezing.    Gastrointestinal:  Negative for abdominal pain and diarrhea.     Objective:     Vital Signs (Most Recent):  Temp: 97.5 °F (36.4 °C) (12/22/24 2057)  Pulse: 71 (12/22/24 2057)  Resp: 18 (12/22/24 1653)  BP: (!) 169/84 (12/22/24 2057)  SpO2: (!) 93 % (12/22/24 2057) Vital Signs (24h Range):  Temp:  [97.5 °F (36.4 °C)-98.8 °F (37.1 °C)] 97.5 °F (36.4 °C)  Pulse:  [60-75] 71  Resp:  [16-18] 18  SpO2:  [93 %-100 %] 93 %  BP: (102-169)/(59-84) 169/84     Weight: 86.4 kg (190 lb 7.6 oz)  Body mass index is 32.7 kg/m².     SpO2: (!) 93 %       No intake or output data in the 24 hours ending 12/22/24 2058    Lines/Drains/Airways       Peripheral Intravenous Line  Duration                  Midline Catheter - Single Lumen 12/22/24 1745 Left median cubital vein (antecubital fossa) 20g x 10cm <1 day                       Physical Exam  Constitutional:       General: She is not in acute distress.  Cardiovascular:      Rate and Rhythm: Normal rate and regular rhythm.      Heart sounds: No murmur heard.     No friction rub. No gallop.   Pulmonary:       Breath sounds: No wheezing, rhonchi or rales.   Musculoskeletal:      Right lower leg: No edema.      Left lower leg: No edema.   Skin:     General: Skin is warm.   Neurological:      Mental Status: She is alert.            Significant Labs: All pertinent lab results from the last 24 hours have been reviewed.    Significant Imaging: Echocardiogram: Transthoracic echo (TTE) complete (Cupid Only):   Results for orders placed or performed during the hospital encounter of 12/20/24   Echo   Result Value Ref Range    BSA 2.01 m2    A4C EF 46 %    LVOT stroke volume 138.9 cm3    LVIDd 3.7 3.5 - 6.0 cm    LV Systolic Volume 14.68 mL    LV Systolic Volume Index 7.6 mL/m2    LVIDs 2.1 2.1 - 4.0 cm    LV Diastolic Volume 53.11 mL    LV ESV A4C 26.42 mL    LV Diastolic Volume Index 27.52 mL/m2    LV EDV A4C 27.33 mL    Left Ventricular End Systolic Volume by Teichholz Method 14.68 mL    Left Ventricular End Diastolic Volume by Teichholz Method 53.11 mL    IVS 2.3 (A) 0.6 - 1.1 cm    LVOT diameter 1.8 cm    LVOT area 2.5 cm2    FS 43.2 28 - 44 %    Left Ventricle Relative Wall Thickness 0.70 cm    PW 1.3 (A) 0.6 - 1.1 cm    LV mass 282.1 g    LV Mass Index 146.2 g/m2    MV Peak E Johan 0.64 m/s    TDI LATERAL 0.05 m/s    TDI SEPTAL 0.05 m/s    E/E' ratio 12.80 m/s    MV Peak A Johan 1.04 m/s    TR Max Johan 2.85 m/s    E/A ratio 0.62     E wave deceleration time 252.93 msec    LV SEPTAL E/E' RATIO 12.80 m/s    LV LATERAL E/E' RATIO 12.80 m/s    LVOT peak johan 2.8 m/s    Left Ventricular Outflow Tract Mean Velocity 1.84 cm/s    Left Ventricular Outflow Tract Mean Gradient 17.72 mmHg    RV- palmer basal diam 3.9 cm    RV-palmer mid d 3.3 cm    RV Basal Diameter 4.36 cm    RV-palmer length 4.4 cm    RV mid diameter 3.34 cm    RV/LV Ratio 1.05 cm    LA size 3.00 cm    RA Major Axis 3.58 cm    AV mean gradient 18.0 mmHg    AV peak gradient 38.4 mmHg    Ao peak johan 3.1 m/s    Ao VTI 60.1 cm    LVOT peak VTI 54.6 cm    AV valve area 2.3 cm²    AV  Velocity Ratio 0.90     AV index (prosthetic) 0.91     JULIUS by Velocity Ratio 2.3 cm²    Mr max neymar 5.04 m/s    MV mean gradient 2 mmHg    MV peak gradient 5 mmHg    MV stenosis pressure 1/2 time 78.34 ms    MV valve area p 1/2 method 2.81 cm2    MV valve area by continuity eq 4.05 cm2    MV VTI 34.3 cm    Triscuspid Valve Regurgitation Peak Gradient 32 mmHg    PV PEAK VELOCITY 1.21 m/s    PV peak gradient 6 mmHg    Ao root annulus 2.91 cm    IVC diameter 1.41 cm    Mean e' 0.05 m/s    ZLVIDS -3.72     ZLVIDD -3.90     LA area A4C 13.40 cm2    AORTIC VALVE CUSP SEPERATION 2.10 cm    TV resting pulmonary artery pressure 35 mmHg    RV TB RVSP 6 mmHg    Est. RA pres 3 mmHg    Narrative      Left Ventricle: The left ventricle is normal in size. Severely   increased wall thickness. There is severe concentric hypertrophy. There is   normal systolic function with a visually estimated ejection fraction of 60   - 65%. There is normal diastolic function.    Right Ventricle: Normal right ventricular cavity size. Systolic   function could not be assesed.    Aortic Valve: The aortic valve is a trileaflet valve. Mildly calcified   cusps.    Mitral Valve: There is mild bileaflet sclerosis. There is mild   regurgitation.    Tricuspid Valve: There is mild regurgitation.    Pulmonary Artery: The estimated pulmonary artery systolic pressure is   35 mmHg.    IVC/SVC: Normal venous pressure at 3 mmHg.    ECHO is suggestive of hypertensive heart disease vs. hypertrophic   cardiomyopathy       Assessment and Plan:     Brief HPI: 74-year-old female who presented to the ED as transfer from her PCP's office. She endorsed excessive fatigue. While she was at the doctor's office she had a syncopal episode with loss of bladder control.  Bystanders report loss of consciousness was less than 15 seconds.  There was no observed seizure-like activity.  Patient denies chest pain, shortness of breath, palpitations, or other prodromal symptoms prior to  syncopal episode.  Patient denied any chest pain or shortness of breath at the time of admission.. PMH HTN, HLD, CVA, GERD, and s/p pacemaker     In the ED initial vital signs were blood pressure of 143/77, temp 96.9° heart rate 77, O2 sat 98% on room air who.  Initial lab workup revealed a sodium of 147 glucose 117 BUN 22 creatinine 1.35.  CBC is unremarkable.  ProBNP was 74.  Initial troponin 53.6 repeat troponin at 88.3 patient has positive delta. EKG show NSR Rate 77,no acute ST-T abnormality.    Elevated troponin  Elevated troponin level with positive delta in the absence of chest pain, no acute ischemic changes   - Plan for lexiscan tomorrow    Syncope and collapse  Orthostatic vitals positive on admission  - Valsartan on hold  - Place zio monitor on discharge  - PPM interrogation ordered. Not done by the time of my evaluation today. Pending review.         VTE Risk Mitigation (From admission, onward)           Ordered     enoxaparin injection 40 mg  Daily         12/20/24 2025     IP VTE HIGH RISK PATIENT  Once         12/20/24 2025     Place sequential compression device  Until discontinued         12/20/24 2025                    GRANT GAUTHIER MD  Cardiology  [facility]

## 2024-12-23 NOTE — SUBJECTIVE & OBJECTIVE
Interval History: Awake and alert at the time of my evaluation today. Denies chest pain or shortness of breath. Does not recall the events of her presentation.     Review of Systems   Constitutional: Negative for chills and fever.   Cardiovascular:  Positive for syncope. Negative for chest pain, dyspnea on exertion, irregular heartbeat, leg swelling, near-syncope, orthopnea, palpitations and paroxysmal nocturnal dyspnea.   Respiratory:  Negative for cough, shortness of breath and wheezing.    Gastrointestinal:  Negative for abdominal pain and diarrhea.     Objective:     Vital Signs (Most Recent):  Temp: 97.5 °F (36.4 °C) (12/22/24 2057)  Pulse: 71 (12/22/24 2057)  Resp: 18 (12/22/24 1653)  BP: (!) 169/84 (12/22/24 2057)  SpO2: (!) 93 % (12/22/24 2057) Vital Signs (24h Range):  Temp:  [97.5 °F (36.4 °C)-98.8 °F (37.1 °C)] 97.5 °F (36.4 °C)  Pulse:  [60-75] 71  Resp:  [16-18] 18  SpO2:  [93 %-100 %] 93 %  BP: (102-169)/(59-84) 169/84     Weight: 86.4 kg (190 lb 7.6 oz)  Body mass index is 32.7 kg/m².     SpO2: (!) 93 %       No intake or output data in the 24 hours ending 12/22/24 2058    Lines/Drains/Airways       Peripheral Intravenous Line  Duration                  Midline Catheter - Single Lumen 12/22/24 1745 Left median cubital vein (antecubital fossa) 20g x 10cm <1 day                       Physical Exam  Constitutional:       General: She is not in acute distress.  Cardiovascular:      Rate and Rhythm: Normal rate and regular rhythm.      Heart sounds: No murmur heard.     No friction rub. No gallop.   Pulmonary:      Breath sounds: No wheezing, rhonchi or rales.   Musculoskeletal:      Right lower leg: No edema.      Left lower leg: No edema.   Skin:     General: Skin is warm.   Neurological:      Mental Status: She is alert.            Significant Labs: All pertinent lab results from the last 24 hours have been reviewed.    Significant Imaging: Echocardiogram: Transthoracic echo (TTE) complete (Cupid Only):    Results for orders placed or performed during the hospital encounter of 12/20/24   Echo   Result Value Ref Range    BSA 2.01 m2    A4C EF 46 %    LVOT stroke volume 138.9 cm3    LVIDd 3.7 3.5 - 6.0 cm    LV Systolic Volume 14.68 mL    LV Systolic Volume Index 7.6 mL/m2    LVIDs 2.1 2.1 - 4.0 cm    LV Diastolic Volume 53.11 mL    LV ESV A4C 26.42 mL    LV Diastolic Volume Index 27.52 mL/m2    LV EDV A4C 27.33 mL    Left Ventricular End Systolic Volume by Teichholz Method 14.68 mL    Left Ventricular End Diastolic Volume by Teichholz Method 53.11 mL    IVS 2.3 (A) 0.6 - 1.1 cm    LVOT diameter 1.8 cm    LVOT area 2.5 cm2    FS 43.2 28 - 44 %    Left Ventricle Relative Wall Thickness 0.70 cm    PW 1.3 (A) 0.6 - 1.1 cm    LV mass 282.1 g    LV Mass Index 146.2 g/m2    MV Peak E Johan 0.64 m/s    TDI LATERAL 0.05 m/s    TDI SEPTAL 0.05 m/s    E/E' ratio 12.80 m/s    MV Peak A Johan 1.04 m/s    TR Max Johan 2.85 m/s    E/A ratio 0.62     E wave deceleration time 252.93 msec    LV SEPTAL E/E' RATIO 12.80 m/s    LV LATERAL E/E' RATIO 12.80 m/s    LVOT peak johan 2.8 m/s    Left Ventricular Outflow Tract Mean Velocity 1.84 cm/s    Left Ventricular Outflow Tract Mean Gradient 17.72 mmHg    RV- palmer basal diam 3.9 cm    RV-palmer mid d 3.3 cm    RV Basal Diameter 4.36 cm    RV-palmer length 4.4 cm    RV mid diameter 3.34 cm    RV/LV Ratio 1.05 cm    LA size 3.00 cm    RA Major Axis 3.58 cm    AV mean gradient 18.0 mmHg    AV peak gradient 38.4 mmHg    Ao peak johan 3.1 m/s    Ao VTI 60.1 cm    LVOT peak VTI 54.6 cm    AV valve area 2.3 cm²    AV Velocity Ratio 0.90     AV index (prosthetic) 0.91     JULIUS by Velocity Ratio 2.3 cm²    Mr max johan 5.04 m/s    MV mean gradient 2 mmHg    MV peak gradient 5 mmHg    MV stenosis pressure 1/2 time 78.34 ms    MV valve area p 1/2 method 2.81 cm2    MV valve area by continuity eq 4.05 cm2    MV VTI 34.3 cm    Triscuspid Valve Regurgitation Peak Gradient 32 mmHg    PV PEAK VELOCITY 1.21 m/s    PV peak  gradient 6 mmHg    Ao root annulus 2.91 cm    IVC diameter 1.41 cm    Mean e' 0.05 m/s    ZLVIDS -3.72     ZLVIDD -3.90     LA area A4C 13.40 cm2    AORTIC VALVE CUSP SEPERATION 2.10 cm    TV resting pulmonary artery pressure 35 mmHg    RV TB RVSP 6 mmHg    Est. RA pres 3 mmHg    Narrative      Left Ventricle: The left ventricle is normal in size. Severely   increased wall thickness. There is severe concentric hypertrophy. There is   normal systolic function with a visually estimated ejection fraction of 60   - 65%. There is normal diastolic function.    Right Ventricle: Normal right ventricular cavity size. Systolic   function could not be assesed.    Aortic Valve: The aortic valve is a trileaflet valve. Mildly calcified   cusps.    Mitral Valve: There is mild bileaflet sclerosis. There is mild   regurgitation.    Tricuspid Valve: There is mild regurgitation.    Pulmonary Artery: The estimated pulmonary artery systolic pressure is   35 mmHg.    IVC/SVC: Normal venous pressure at 3 mmHg.    ECHO is suggestive of hypertensive heart disease vs. hypertrophic   cardiomyopathy

## 2024-12-23 NOTE — PT/OT/SLP PROGRESS
Speech Language Pathology      Renetta Stewart  MRN: [id number]    Patient not seen today secondary to Patient unwilling to participate. Patient had just gotten back to her room from a procedure and refused to participate at this time. Will follow-up 12/24/24.

## 2024-12-23 NOTE — PROCEDURES
EEG    Date/Time: 12/23/2024 5:48 PM    Performed by: Collin Zhu MD  Authorized by: Fuentes Shaver DO      ELECTROENCEPHALOGRAM REPORT    DATE OF SERVICE: 12/23/2024  EEG NUMBER: [id number]  LOCATION OF SERVICE: [facility]   Electroencephalographic (EEG) recording is with electrodes placed according to the International 10-20 placement system.  Thirty two (32) channels of digital signal (sampling rate of 512/sec) including T1 and T2 was simultaneously recorded from the scalp and may include  EKG, EMG, and/or eye monitors.  Recording band pass was 0.1 to 512 hz.  Digital video recording of the patient is simultaneously recorded with the EEG.  The patient is instructed report clinical symptoms which may occur during the recording session.  EEG and video recording is stored and archived in digital format. Activation procedures which include photic stimulation, hyperventilation and instructing patients to perform simple task are done in selected patients.    The EEG is displayed on a monitor screen and can be reviewed using different montages.  Computer assisted analysis is employed to detect spike and electrographic seizure activity.   The entire record is submitted for computer analysis.  The entire recording is visually reviewed and the times identified by computer analysis as being spikes or seizures are reviewed again.  Compresses spectral analysis (CSA) is also performed on the activity recorded from each individual channel.  This is displayed as a power display of frequencies from 0 to 30 Hz over time.   The CSA is reviewed looking for asymmetries in power between homologous areas of the scalp and then compared with the original EEG recording.     [employer] software was also utilized in the review of this study.  This software suite analyzes the EEG recording in multiple domains.  Coherence and rhythmicity is computed to identify EEG sections which may contain organized seizures.  Each  channel undergoes analysis to detect presence of spike and sharp waves which have special and morphological characteristic of epileptic activity.  The routine EEG recording is converted from spacial into frequency domain.  This is then displayed comparing homologous areas to identify areas of significant asymmetry.  Algorithm to identify non-cortically generated artifact is used to separate eye movement, EMG and other artifact from the EEG    EEG FINDINGS  During the maximally alert state, a 6-7 Hz poorly formed posterior dominant rhythm was seen which was symmetrical and briskly attenuated to eye opening. In the more anterior head regions, symmetric frontocentral beta frequencies predominated.  As drowsiness occurred, the posterior dominant rhythm attenuated, slow rolling eye movements appeared, and symmetrical vertex sharp transients were seen. Sleep was not captured during this study.    No epileptiform findings were noted, no electrographic seizures were seen, and no clinical events were reported.    Activation Procedures not tested      IMPRESSION:  Posterior background slowing    CLINICAL CORRELATION:  The mild background slowing seen is a nonspecific finding but does suggest a mild degree of encephalopathy. There were no epileptiform findings, electrographic seizures, or no clinical events recorded.     Collin Zhu MD  Neurology

## 2024-12-23 NOTE — ASSESSMENT & PLAN NOTE
Orthostatic vitals positive on admission  - Valsartan on hold  - Place zio monitor on discharge  - PPM interrogation ordered. Not done by the time of my evaluation today. Pending review.

## 2024-12-24 ENCOUNTER — HOSPITAL ENCOUNTER (OUTPATIENT)
Dept: CARDIOLOGY | Facility: HOSPITAL | Age: 74
Discharge: HOME | End: 2024-12-24
Attending: STUDENT IN AN ORGANIZED HEALTH CARE EDUCATION/TRAINING PROGRAM | Admitting: INTERNAL MEDICINE
Payer: MEDICARE

## 2024-12-24 DIAGNOSIS — R55 SYNCOPE, UNSPECIFIED SYNCOPE TYPE: ICD-10-CM

## 2024-12-24 LAB — LEVETIRACETAM SERPL-MCNC: <2 ΜG/ML (ref 12–46)

## 2024-12-24 PROCEDURE — 99233 SBSQ HOSP IP/OBS HIGH 50: CPT | Mod: ,,, | Performed by: HOSPITALIST

## 2024-12-24 PROCEDURE — 94761 N-INVAS EAR/PLS OXIMETRY MLT: CPT

## 2024-12-24 PROCEDURE — 92610 EVALUATE SWALLOWING FUNCTION: CPT

## 2024-12-24 PROCEDURE — 99233 SBSQ HOSP IP/OBS HIGH 50: CPT | Mod: GT,,, | Performed by: REGISTERED NURSE

## 2024-12-24 PROCEDURE — 93246 EXT ECG>7D<15D RECORDING: CPT

## 2024-12-24 PROCEDURE — 97162 PT EVAL MOD COMPLEX 30 MIN: CPT

## 2024-12-24 PROCEDURE — 63600175 PHARM REV CODE 636 W HCPCS: Performed by: INTERNAL MEDICINE

## 2024-12-24 PROCEDURE — 25000003 PHARM REV CODE 250: Performed by: NURSE PRACTITIONER

## 2024-12-24 PROCEDURE — 63600175 PHARM REV CODE 636 W HCPCS: Performed by: NURSE PRACTITIONER

## 2024-12-24 PROCEDURE — 11000001 HC ACUTE MED/SURG PRIVATE ROOM

## 2024-12-24 PROCEDURE — 99900035 HC TECH TIME PER 15 MIN (STAT)

## 2024-12-24 PROCEDURE — 27000221 HC OXYGEN, UP TO 24 HOURS

## 2024-12-24 PROCEDURE — 97165 OT EVAL LOW COMPLEX 30 MIN: CPT

## 2024-12-24 RX ORDER — ATORVASTATIN CALCIUM 80 MG/1
80 TABLET, FILM COATED ORAL DAILY
Qty: 90 TABLET | Refills: 3 | OUTPATIENT
Start: 2024-12-25 | End: 2025-12-25

## 2024-12-24 RX ADMIN — SODIUM CHLORIDE, SODIUM LACTATE, POTASSIUM CHLORIDE, AND CALCIUM CHLORIDE: 600; 310; 30; 20 INJECTION, SOLUTION INTRAVENOUS at 03:12

## 2024-12-24 RX ADMIN — ENOXAPARIN SODIUM 40 MG: 40 INJECTION SUBCUTANEOUS at 05:12

## 2024-12-24 RX ADMIN — SODIUM CHLORIDE, SODIUM LACTATE, POTASSIUM CHLORIDE, AND CALCIUM CHLORIDE: 600; 310; 30; 20 INJECTION, SOLUTION INTRAVENOUS at 05:12

## 2024-12-24 RX ADMIN — METOPROLOL TARTRATE 25 MG: 25 TABLET, FILM COATED ORAL at 09:12

## 2024-12-24 NOTE — ASSESSMENT & PLAN NOTE
Elevated troponin level with positive delta in the absence of chest pain, no acute ischemic changes   - Plan for lexiscan tomorrow    12/24/24  Lexiscan negative for ischemia. Discussed with patient  Cardiology will sign off. Follow up for zio results

## 2024-12-24 NOTE — ASSESSMENT & PLAN NOTE
Syncope vs seizure  Echo reveals nl systolic and diastolic dysfunction   Carotid US and EEG and Keppra level pending  Telemetry  Fall precautions  Sz precautions    12/23: MRI negative for new CVA but there was a prior CVA.   Patient with memory problems, acute on chronic.

## 2024-12-24 NOTE — PT/OT/SLP EVAL
Speech Language Pathology Evaluation  Bedside Swallow    Patient Name:  Renetta Stewart   MRN:  [id number]  Admitting Diagnosis: Type 2 MI (myocardial infarction)    Recommendations:                 General Recommendations:  Follow-up not indicated  Diet recommendations:  Puree, Thin   Aspiration Precautions: 1 bite/sip at a time, Assistance with meals, Feed only when awake/alert, HOB to 90 degrees, and Monitor for s/s of aspiration   General Precautions: Standard, aspiration, fall  Communication strategies:   allow increased response time    Assessment:     Renetta Stewart is a 74 y.o. female with an SLP diagnosis of Dysphagia.  She presents with no overt s/s of aspiration with limited trials of thin liquid and pudding consistencies.  Pt refused solid.  Rec Pureed diet with thin liquids as tolerated.     History:     Past Medical History:   Diagnosis Date    Anemia     Anxiety     Dementia     Depression     GERD (gastroesophageal reflux disease)     Hyperlipidemia     Hypertension     Stroke        Past Surgical History:   Procedure Laterality Date    CARDIAC PACEMAKER PLACEMENT         Social History: Patient lives with daughter.    Prior Intubation HX:  na    Modified Barium Swallow: na    Chest X-Rays: see chart    Prior diet: Regular per daughter.    Occupation/hobbies/homemaking: none reported.    Subjective     Pt lying in bed; Nurse present.  Patient declined BSE on first attempt but reluctantly agreed on second attempt.    Patient goals: None stated     Pain/Comfort:  Pain Rating 1:  (Pt c/o sore throat but did not rate pain. Nurse present and aware.)    Respiratory Status: Room air    Objective:     Oral Musculature Evaluation  Oral Musculature:  (Pt did not participate with Oral-motor exam)  Oral Musculature Comments: Patient did not participate with oral-motor exam.    Bedside Swallow Eval:   Consistencies Assessed:  Thin liquids No overt s/s of aspiration exhibited.  Puree No overt s/s of aspiration exhibited       Oral Phase:   WFL    Pharyngeal Phase:   no overt clinical signs/symptoms of aspiration  no overt clinical signs/symptoms of pharyngeal dysphagia    Compensatory Strategies  None    Treatment: ST not indicated    Goals:   Multidisciplinary Problems       SLP Goals       Not on file                    Plan:     Patient to be seen:   na   Plan of Care expires:   12/24/2024  Plan of Care reviewed with:  patient   SLP Follow-Up:  No       Discharge recommendations:    ST not indicated  Barriers to Discharge:  Level of Skilled Assistance Needed Patient may require increased assistance at discharge    Time Tracking:     SLP Treatment Date:    12/24/2024 Evaluation only  Speech Start Time:  0904  Speech Stop Time:  0915     Speech Total Time (min):  11 min    Billable Minutes: Eval Swallow and Oral Function 11 minutes    12/24/2024

## 2024-12-24 NOTE — ASSESSMENT & PLAN NOTE
Pt has Positve Delta, no chest pain, syncopal Episode,  Orhtostatics positive in ED, given pts AGE and risk factors will treat as NSTEMI.   Patient presents with NSTEMI. Chest pain is currently controlled. VIKTOR score is 3. Patient is currently on NSTEMI Pathway.    EKG reviewed. Troponins reviewed and results noted-   Troponin 53, > >88     Lipid panel reviewed and shows-     Lab Results   Component Value Date    LDLCALC 119 12/21/2024     Lab Results   Component Value Date    TRIG 182 (H) 12/21/2024         Medical management includes;  ASA,Beta Blocker, High Intensity Stain, and ACE/ARB Echo has not been performed. Latest ECHO results are as follows- Results for orders placed during the hospital encounter of 03/30/23    Echo    Interpretation Summary  · The left ventricle is normal in size with severe concentric hypertrophy and hyperdynamic systolic function.  · The estimated ejection fraction is 70%.  · Mild tricuspid regurgitation.  · Left ventricular mild outflow tract obstruction is present.  · Normal right ventricular size with normal right ventricular systolic function.  · Normal central venous pressure (3 mmHg).  · The estimated PA systolic pressure is 43 mmHg.  · ECHO is suggestive of hypertensive heart disease vs. hypertrophic cardiomyopathy  .   Cardiology following  Lexiscan recommended once encephalopathy resolves     12/23: lexiscan today.

## 2024-12-24 NOTE — PROGRESS NOTES
[facility]  Cardiology  Progress Note    Patient Name: Renetta Stewart  MRN: [id number]  Admission Date: 12/20/2024  Hospital Length of Stay: 4 days  Code Status: Full Code   Attending Physician: Min Santos MD   Primary Care Physician: Eldon Govea MD  Expected Discharge Date: 12/24/2024  Principal Problem:Type 2 MI (myocardial infarction)    Subjective:     Hospital Course:   No notes on file    Interval History: COMPLAINS OF NOT FEELING WELL TODAY.     Review of Systems   Constitutional: Negative for chills and fever.   Cardiovascular:  Negative for chest pain, dyspnea on exertion, irregular heartbeat, leg swelling, near-syncope, orthopnea, palpitations, paroxysmal nocturnal dyspnea and syncope.   Respiratory:  Negative for cough, shortness of breath and wheezing.    Gastrointestinal:  Negative for abdominal pain and diarrhea.     Objective:     Vital Signs (Most Recent):  Temp: 98 °F (36.7 °C) (12/24/24 1135)  Pulse: 71 (12/24/24 1135)  Resp: 16 (12/24/24 1135)  BP: (!) 149/65 (12/24/24 1135)  SpO2: 99 % (12/24/24 1135) Vital Signs (24h Range):  Temp:  [97.3 °F (36.3 °C)-98.1 °F (36.7 °C)] 98 °F (36.7 °C)  Pulse:  [69-93] 71  Resp:  [16-18] 16  SpO2:  [96 %-100 %] 99 %  BP: (124-168)/(65-95) 149/65     Weight: 90.7 kg (199 lb 14.4 oz)  Body mass index is 34.31 kg/m².     SpO2: 99 %         Intake/Output Summary (Last 24 hours) at 12/24/2024 1331  Last data filed at 12/24/2024 0554  Gross per 24 hour   Intake 3505.14 ml   Output --   Net 3505.14 ml       Lines/Drains/Airways       Peripheral Intravenous Line  Duration                  Midline Catheter - Single Lumen 12/22/24 1745 Left median cubital vein (antecubital fossa) 20g x 10cm 1 day                       Physical Exam  Constitutional:       General: She is not in acute distress.  Cardiovascular:      Rate and Rhythm: Normal rate and regular rhythm.      Heart sounds: No murmur heard.     No friction rub. No gallop.    Pulmonary:      Breath sounds: No wheezing, rhonchi or rales.   Musculoskeletal:      Right lower leg: No edema.      Left lower leg: No edema.   Skin:     General: Skin is warm.   Neurological:      Mental Status: She is alert.            Significant Labs: All pertinent lab results from the last 24 hours have been reviewed.    Significant Imaging: Echocardiogram: Transthoracic echo (TTE) complete (Cupid Only):   Results for orders placed or performed during the hospital encounter of 12/20/24   Echo   Result Value Ref Range    BSA 2.01 m2    A4C EF 46 %    LVOT stroke volume 138.9 cm3    LVIDd 3.7 3.5 - 6.0 cm    LV Systolic Volume 14.68 mL    LV Systolic Volume Index 7.6 mL/m2    LVIDs 2.1 2.1 - 4.0 cm    LV Diastolic Volume 53.11 mL    LV ESV A4C 26.42 mL    LV Diastolic Volume Index 27.52 mL/m2    LV EDV A4C 27.33 mL    Left Ventricular End Systolic Volume by Teichholz Method 14.68 mL    Left Ventricular End Diastolic Volume by Teichholz Method 53.11 mL    IVS 2.3 (A) 0.6 - 1.1 cm    LVOT diameter 1.8 cm    LVOT area 2.5 cm2    FS 43.2 28 - 44 %    Left Ventricle Relative Wall Thickness 0.70 cm    PW 1.3 (A) 0.6 - 1.1 cm    LV mass 282.1 g    LV Mass Index 146.2 g/m2    MV Peak E Johan 0.64 m/s    TDI LATERAL 0.05 m/s    TDI SEPTAL 0.05 m/s    E/E' ratio 12.80 m/s    MV Peak A Johan 1.04 m/s    TR Max Johan 2.85 m/s    E/A ratio 0.62     E wave deceleration time 252.93 msec    LV SEPTAL E/E' RATIO 12.80 m/s    LV LATERAL E/E' RATIO 12.80 m/s    LVOT peak johan 2.8 m/s    Left Ventricular Outflow Tract Mean Velocity 1.84 cm/s    Left Ventricular Outflow Tract Mean Gradient 17.72 mmHg    RV- palmer basal diam 3.9 cm    RV-palmer mid d 3.3 cm    RV Basal Diameter 4.36 cm    RV-palmer length 4.4 cm    RV mid diameter 3.34 cm    RV/LV Ratio 1.05 cm    LA size 3.00 cm    RA Major Axis 3.58 cm    AV mean gradient 18.0 mmHg    AV peak gradient 38.4 mmHg    Ao peak johan 3.1 m/s    Ao VTI 60.1 cm    LVOT peak VTI 54.6 cm    AV valve  area 2.3 cm²    AV Velocity Ratio 0.90     AV index (prosthetic) 0.91     JULIUS by Velocity Ratio 2.3 cm²    Mr max neymar 5.04 m/s    MV mean gradient 2 mmHg    MV peak gradient 5 mmHg    MV stenosis pressure 1/2 time 78.34 ms    MV valve area p 1/2 method 2.81 cm2    MV valve area by continuity eq 4.05 cm2    MV VTI 34.3 cm    Triscuspid Valve Regurgitation Peak Gradient 32 mmHg    PV PEAK VELOCITY 1.21 m/s    PV peak gradient 6 mmHg    Ao root annulus 2.91 cm    IVC diameter 1.41 cm    Mean e' 0.05 m/s    ZLVIDS -3.72     ZLVIDD -3.90     LA area A4C 13.40 cm2    AORTIC VALVE CUSP SEPERATION 2.10 cm    TV resting pulmonary artery pressure 35 mmHg    RV TB RVSP 6 mmHg    Est. RA pres 3 mmHg    Narrative      Left Ventricle: The left ventricle is normal in size. Severely   increased wall thickness. There is severe concentric hypertrophy. There is   normal systolic function with a visually estimated ejection fraction of 60   - 65%. There is normal diastolic function.    Right Ventricle: Normal right ventricular cavity size. Systolic   function could not be assesed.    Aortic Valve: The aortic valve is a trileaflet valve. Mildly calcified   cusps.    Mitral Valve: There is mild bileaflet sclerosis. There is mild   regurgitation.    Tricuspid Valve: There is mild regurgitation.    Pulmonary Artery: The estimated pulmonary artery systolic pressure is   35 mmHg.    IVC/SVC: Normal venous pressure at 3 mmHg.    ECHO is suggestive of hypertensive heart disease vs. hypertrophic   cardiomyopathy       Assessment and Plan:     Brief HPI: 74-year-old female who presented to the ED as transfer from her PCP's office. She endorsed excessive fatigue. While she was at the doctor's office she had a syncopal episode with loss of bladder control.  Bystanders report loss of consciousness was less than 15 seconds.  There was no observed seizure-like activity.  Patient denies chest pain, shortness of breath, palpitations, or other prodromal  symptoms prior to syncopal episode.  Patient denied any chest pain or shortness of breath at the time of admission.. PMH HTN, HLD, CVA, GERD, and s/p pacemaker     In the ED initial vital signs were blood pressure of 143/77, temp 96.9° heart rate 77, O2 sat 98% on room air who.  Initial lab workup revealed a sodium of 147 glucose 117 BUN 22 creatinine 1.35.  CBC is unremarkable.  ProBNP was 74.  Initial troponin 53.6 repeat troponin at 88.3 patient has positive delta. EKG show NSR Rate 77,no acute ST-T abnormality.          Past Medical History:   Diagnosis Date    Anemia      Anxiety      Dementia      Depression      GERD (gastroesophageal reflux disease)      Hyperlipidemia      Hypertension      Stroke                 Past Surgical History:   Procedure Laterality Date    CARDIAC PACEMAKER PLACEMENT         Elevated troponin  Elevated troponin level with positive delta in the absence of chest pain, no acute ischemic changes   - Plan for lexiscan tomorrow    12/24/24  Lexiscan negative for ischemia. Discussed with patient  Cardiology will sign off. Follow up for zio results    Syncope and collapse  Orthostatic vitals positive on admission  - Valsartan on hold  - Place zio monitor on discharge  - PPM interrogation ordered. Not done by the time of my evaluation today. Pending review.     Hyperlipidemia    CONTINUE STATIN        VTE Risk Mitigation (From admission, onward)           Ordered     enoxaparin injection 40 mg  Daily         12/20/24 2025     IP VTE HIGH RISK PATIENT  Once         12/20/24 2025     Place sequential compression device  Until discontinued         12/20/24 2025                    Oscar Villafuerte, HERACLIO  Cardiology  [facility]

## 2024-12-24 NOTE — SUBJECTIVE & OBJECTIVE
Interval History: COMPLAINS OF NOT FEELING WELL TODAY.     Review of Systems   Constitutional: Negative for chills and fever.   Cardiovascular:  Negative for chest pain, dyspnea on exertion, irregular heartbeat, leg swelling, near-syncope, orthopnea, palpitations, paroxysmal nocturnal dyspnea and syncope.   Respiratory:  Negative for cough, shortness of breath and wheezing.    Gastrointestinal:  Negative for abdominal pain and diarrhea.     Objective:     Vital Signs (Most Recent):  Temp: 98 °F (36.7 °C) (12/24/24 1135)  Pulse: 71 (12/24/24 1135)  Resp: 16 (12/24/24 1135)  BP: (!) 149/65 (12/24/24 1135)  SpO2: 99 % (12/24/24 1135) Vital Signs (24h Range):  Temp:  [97.3 °F (36.3 °C)-98.1 °F (36.7 °C)] 98 °F (36.7 °C)  Pulse:  [69-93] 71  Resp:  [16-18] 16  SpO2:  [96 %-100 %] 99 %  BP: (124-168)/(65-95) 149/65     Weight: 90.7 kg (199 lb 14.4 oz)  Body mass index is 34.31 kg/m².     SpO2: 99 %         Intake/Output Summary (Last 24 hours) at 12/24/2024 1331  Last data filed at 12/24/2024 0554  Gross per 24 hour   Intake 3505.14 ml   Output --   Net 3505.14 ml       Lines/Drains/Airways       Peripheral Intravenous Line  Duration                  Midline Catheter - Single Lumen 12/22/24 1745 Left median cubital vein (antecubital fossa) 20g x 10cm 1 day                       Physical Exam  Constitutional:       General: She is not in acute distress.  Cardiovascular:      Rate and Rhythm: Normal rate and regular rhythm.      Heart sounds: No murmur heard.     No friction rub. No gallop.   Pulmonary:      Breath sounds: No wheezing, rhonchi or rales.   Musculoskeletal:      Right lower leg: No edema.      Left lower leg: No edema.   Skin:     General: Skin is warm.   Neurological:      Mental Status: She is alert.            Significant Labs: All pertinent lab results from the last 24 hours have been reviewed.    Significant Imaging: Echocardiogram: Transthoracic echo (TTE) complete (Cupid Only):   Results for orders  placed or performed during the hospital encounter of 12/20/24   Echo   Result Value Ref Range    BSA 2.01 m2    A4C EF 46 %    LVOT stroke volume 138.9 cm3    LVIDd 3.7 3.5 - 6.0 cm    LV Systolic Volume 14.68 mL    LV Systolic Volume Index 7.6 mL/m2    LVIDs 2.1 2.1 - 4.0 cm    LV Diastolic Volume 53.11 mL    LV ESV A4C 26.42 mL    LV Diastolic Volume Index 27.52 mL/m2    LV EDV A4C 27.33 mL    Left Ventricular End Systolic Volume by Teichholz Method 14.68 mL    Left Ventricular End Diastolic Volume by Teichholz Method 53.11 mL    IVS 2.3 (A) 0.6 - 1.1 cm    LVOT diameter 1.8 cm    LVOT area 2.5 cm2    FS 43.2 28 - 44 %    Left Ventricle Relative Wall Thickness 0.70 cm    PW 1.3 (A) 0.6 - 1.1 cm    LV mass 282.1 g    LV Mass Index 146.2 g/m2    MV Peak E Johan 0.64 m/s    TDI LATERAL 0.05 m/s    TDI SEPTAL 0.05 m/s    E/E' ratio 12.80 m/s    MV Peak A Johan 1.04 m/s    TR Max Johan 2.85 m/s    E/A ratio 0.62     E wave deceleration time 252.93 msec    LV SEPTAL E/E' RATIO 12.80 m/s    LV LATERAL E/E' RATIO 12.80 m/s    LVOT peak johan 2.8 m/s    Left Ventricular Outflow Tract Mean Velocity 1.84 cm/s    Left Ventricular Outflow Tract Mean Gradient 17.72 mmHg    RV- palmer basal diam 3.9 cm    RV-palmer mid d 3.3 cm    RV Basal Diameter 4.36 cm    RV-palmer length 4.4 cm    RV mid diameter 3.34 cm    RV/LV Ratio 1.05 cm    LA size 3.00 cm    RA Major Axis 3.58 cm    AV mean gradient 18.0 mmHg    AV peak gradient 38.4 mmHg    Ao peak johan 3.1 m/s    Ao VTI 60.1 cm    LVOT peak VTI 54.6 cm    AV valve area 2.3 cm²    AV Velocity Ratio 0.90     AV index (prosthetic) 0.91     JULIUS by Velocity Ratio 2.3 cm²    Mr max johan 5.04 m/s    MV mean gradient 2 mmHg    MV peak gradient 5 mmHg    MV stenosis pressure 1/2 time 78.34 ms    MV valve area p 1/2 method 2.81 cm2    MV valve area by continuity eq 4.05 cm2    MV VTI 34.3 cm    Triscuspid Valve Regurgitation Peak Gradient 32 mmHg    PV PEAK VELOCITY 1.21 m/s    PV peak gradient 6 mmHg    Ao  root annulus 2.91 cm    IVC diameter 1.41 cm    Mean e' 0.05 m/s    ZLVIDS -3.72     ZLVIDD -3.90     LA area A4C 13.40 cm2    AORTIC VALVE CUSP SEPERATION 2.10 cm    TV resting pulmonary artery pressure 35 mmHg    RV TB RVSP 6 mmHg    Est. RA pres 3 mmHg    Narrative      Left Ventricle: The left ventricle is normal in size. Severely   increased wall thickness. There is severe concentric hypertrophy. There is   normal systolic function with a visually estimated ejection fraction of 60   - 65%. There is normal diastolic function.    Right Ventricle: Normal right ventricular cavity size. Systolic   function could not be assesed.    Aortic Valve: The aortic valve is a trileaflet valve. Mildly calcified   cusps.    Mitral Valve: There is mild bileaflet sclerosis. There is mild   regurgitation.    Tricuspid Valve: There is mild regurgitation.    Pulmonary Artery: The estimated pulmonary artery systolic pressure is   35 mmHg.    IVC/SVC: Normal venous pressure at 3 mmHg.    ECHO is suggestive of hypertensive heart disease vs. hypertrophic   cardiomyopathy

## 2024-12-24 NOTE — SUBJECTIVE & OBJECTIVE
Interval History:    Review of Systems   Unable to perform ROS: Mental status change     Objective:     Vital Signs (Most Recent):  Temp: 97.9 °F (36.6 °C) (12/23/24 1656)  Pulse: 93 (12/23/24 1656)  Resp: 18 (12/23/24 1656)  BP: (!) 165/85 (12/23/24 1656)  SpO2: 98 % (12/23/24 1656) Vital Signs (24h Range):  Temp:  [97.5 °F (36.4 °C)-98.5 °F (36.9 °C)] 97.9 °F (36.6 °C)  Pulse:  [60-93] 93  Resp:  [16-18] 18  SpO2:  [92 %-99 %] 98 %  BP: (146-169)/(72-85) 165/85     Weight: 90.5 kg (199 lb 8.3 oz)  Body mass index is 34.25 kg/m².    Intake/Output Summary (Last 24 hours) at 12/23/2024 2001  Last data filed at 12/22/2024 2136  Gross per 24 hour   Intake 60 ml   Output --   Net 60 ml         Physical Exam  Vitals reviewed.   Constitutional:       General: She is not in acute distress.     Appearance: She is not ill-appearing, toxic-appearing or diaphoretic.      Comments: Patient is somnolent but arousable.   HENT:      Head: Normocephalic and atraumatic.      Right Ear: External ear normal.      Left Ear: External ear normal.      Mouth/Throat:      Mouth: Mucous membranes are moist.   Eyes:      General: No scleral icterus.     Pupils: Pupils are equal, round, and reactive to light.   Cardiovascular:      Rate and Rhythm: Normal rate and regular rhythm.      Heart sounds: Normal heart sounds. No murmur heard.     No friction rub. No gallop.   Pulmonary:      Effort: Pulmonary effort is normal. No respiratory distress.      Breath sounds: Normal breath sounds. No wheezing, rhonchi or rales.   Abdominal:      General: Bowel sounds are normal.      Palpations: Abdomen is soft.      Tenderness: There is no abdominal tenderness. There is no guarding or rebound.   Musculoskeletal:         General: No swelling.      Right lower leg: No edema.      Left lower leg: No edema.   Skin:     General: Skin is warm and dry.      Coloration: Skin is not jaundiced.      Findings: No erythema or rash.   Neurological:      Mental  Status: She is alert.      GCS: GCS eye subscore is 4. GCS verbal subscore is 3. GCS motor subscore is 6.      Cranial Nerves: No facial asymmetry.             Significant Labs: All pertinent labs within the past 24 hours have been reviewed.    Significant Imaging: I have reviewed all pertinent imaging results/findings within the past 24 hours.

## 2024-12-24 NOTE — PLAN OF CARE
Problem: Occupational Therapy  Goal: Occupational Therapy Goal  Description: STG:  Pt will perform grooming with setup  Pt will bathe with Solitario with setup at EOB  Pt will perform UE dressing with Solitario  Pt will perform LE dressing with Solitario  Pt will sit EOB x 10 min with SBA   Pt will transfer bed/chair/bsc with CGA with RW  Pt will perform standing task x 2 min with CGA with RW   Pt will tolerate 15 minutes of tx without fatigue      LT.Restore to max I with self care and mobility.      Outcome: Progressing

## 2024-12-24 NOTE — PROGRESS NOTES
[facility] Hospital Medicine  Progress Note    Patient Name: Renetta Stewart  MRN: [id number]  Patient Class: IP- Inpatient   Admission Date: 12/20/2024  Length of Stay: 3 days  Attending Physician: Min Santos MD  Primary Care Provider: Eldon Govea MD        Subjective     Principal Problem:Type 2 MI (myocardial infarction)    HPI:  Pt is a 74-year-old female who presented to her doctor's office today.  She states she had been feeling well just a lot of fatigue.  While she was at the doctor's office she had a syncopal episode with loss of bladder control.  Bystanders report loss of consciousness was less than 15 seconds.  There was no observed seizure-like activity.  Patient denies chest pain, shortness of breath, palpitations, feeling of doom prior to syncopal episode.  Patient reports she does not have pain or shortness of breath at present.. PMH HTN, HLD, CVA, GERD, and pacemaker    In the ED initial vital signs were blood pressure of 143/77, temp 96.9° heart rate 77, O2 sat 98% on room air who.  Initial lab workup revealed a sodium of 147 glucose 117 BUN 22 creatinine 1.35.  CBC is unremarkable.  ProBNP was 74.  Initial troponin 53.6 repeat troponin at 88.3 patient has positive delta. EKG show NSR Rate 77, no pacer spike no st elevation.     Patient will be admitted to hospital medicine service under the direct supervision of Dr KHALIL  for further evaluation and management.     Overview/Hospital Course:  No notes on file    Interval History:    Review of Systems   Unable to perform ROS: Mental status change     Objective:     Vital Signs (Most Recent):  Temp: 97.9 °F (36.6 °C) (12/23/24 1656)  Pulse: 93 (12/23/24 1656)  Resp: 18 (12/23/24 1656)  BP: (!) 165/85 (12/23/24 1656)  SpO2: 98 % (12/23/24 1656) Vital Signs (24h Range):  Temp:  [97.5 °F (36.4 °C)-98.5 °F (36.9 °C)] 97.9 °F (36.6 °C)  Pulse:  [60-93] 93  Resp:  [16-18] 18  SpO2:  [92 %-99 %] 98 %  BP: (146-169)/(72-85)  165/85     Weight: 90.5 kg (199 lb 8.3 oz)  Body mass index is 34.25 kg/m².    Intake/Output Summary (Last 24 hours) at 12/23/2024 2001  Last data filed at 12/22/2024 2136  Gross per 24 hour   Intake 60 ml   Output --   Net 60 ml         Physical Exam  Vitals reviewed.   Constitutional:       General: She is not in acute distress.     Appearance: She is not ill-appearing, toxic-appearing or diaphoretic.      Comments: Patient is somnolent but arousable.   HENT:      Head: Normocephalic and atraumatic.      Right Ear: External ear normal.      Left Ear: External ear normal.      Mouth/Throat:      Mouth: Mucous membranes are moist.   Eyes:      General: No scleral icterus.     Pupils: Pupils are equal, round, and reactive to light.   Cardiovascular:      Rate and Rhythm: Normal rate and regular rhythm.      Heart sounds: Normal heart sounds. No murmur heard.     No friction rub. No gallop.   Pulmonary:      Effort: Pulmonary effort is normal. No respiratory distress.      Breath sounds: Normal breath sounds. No wheezing, rhonchi or rales.   Abdominal:      General: Bowel sounds are normal.      Palpations: Abdomen is soft.      Tenderness: There is no abdominal tenderness. There is no guarding or rebound.   Musculoskeletal:         General: No swelling.      Right lower leg: No edema.      Left lower leg: No edema.   Skin:     General: Skin is warm and dry.      Coloration: Skin is not jaundiced.      Findings: No erythema or rash.   Neurological:      Mental Status: She is alert.      GCS: GCS eye subscore is 4. GCS verbal subscore is 3. GCS motor subscore is 6.      Cranial Nerves: No facial asymmetry.             Significant Labs: All pertinent labs within the past 24 hours have been reviewed.    Significant Imaging: I have reviewed all pertinent imaging results/findings within the past 24 hours.    Assessment and Plan     * Type 2 MI (myocardial infarction)  Pt has Positve Delta, no chest pain, syncopal Episode,   Orhtostatics positive in ED, given pts AGE and risk factors will treat as NSTEMI.   Patient presents with NSTEMI. Chest pain is currently controlled. VIKTOR score is 3. Patient is currently on NSTEMI Pathway.    EKG reviewed. Troponins reviewed and results noted-   Troponin 53, > >88     Lipid panel reviewed and shows-     Lab Results   Component Value Date    LDLCALC 119 12/21/2024     Lab Results   Component Value Date    TRIG 182 (H) 12/21/2024         Medical management includes;  ASA,Beta Blocker, High Intensity Stain, and ACE/ARB Echo has not been performed. Latest ECHO results are as follows- Results for orders placed during the hospital encounter of 03/30/23    Echo    Interpretation Summary  · The left ventricle is normal in size with severe concentric hypertrophy and hyperdynamic systolic function.  · The estimated ejection fraction is 70%.  · Mild tricuspid regurgitation.  · Left ventricular mild outflow tract obstruction is present.  · Normal right ventricular size with normal right ventricular systolic function.  · Normal central venous pressure (3 mmHg).  · The estimated PA systolic pressure is 43 mmHg.  · ECHO is suggestive of hypertensive heart disease vs. hypertrophic cardiomyopathy  .   Cardiology following  Lexiscan recommended once encephalopathy resolves     12/23: lexiscan today.     Syncope and collapse  Syncope vs seizure  Echo reveals nl systolic and diastolic dysfunction   Carotid US and EEG and Keppra level pending  Telemetry  Fall precautions  Sz precautions    12/23: MRI negative for new CVA but there was a prior CVA.   Patient with memory problems, acute on chronic.       Positive fecal occult blood test  GI consulted  Outpt endoscopy recommended for now  Monitor for blood loss       Gastroesophageal reflux disease  Continue home protonix      Hyperlipidemia  Continue statin      Essential hypertension  Patient's blood pressure range in the last 24 hours was: BP  Min: 96/68  Max:  153/72.The patient's inpatient anti-hypertensive regimen is listed below:  Current Antihypertensives  furosemide tablet 20 mg, Daily, Oral  hydrALAZINE tablet 100 mg, 3 times daily, Oral  valsartan tablet 320 mg, Daily, Oral  metoprolol tartrate (LOPRESSOR) tablet 25 mg, 2 times daily, Oral  nitroGLYCERIN SL tablet 0.4 mg, Every 5 min PRN, Sublingual    Plan  - BP is uncontrolled, will adjust as follows: Pt BP decresed when standing and hR increase + orhthos  - Will hold hydralazine and lasix for now    Depression  Patient has persistent depression which is unknown and is currently controlled. Will Continue anti-depressant medications. We will not consult psychiatry at this time. Patient does not display psychosis at this time. Continue to monitor closely and adjust plan of care as needed.          VTE Risk Mitigation (From admission, onward)           Ordered     enoxaparin injection 40 mg  Daily         12/20/24 2025     IP VTE HIGH RISK PATIENT  Once         12/20/24 2025     Place sequential compression device  Until discontinued         12/20/24 2025                    Discharge Planning   GERALDO:      Code Status: Full Code   Medical Readiness for Discharge Date:   Discharge Plan A: Home with family                        Min Santos MD  Department of Hospital Medicine   [facility]

## 2024-12-24 NOTE — PLAN OF CARE
Problem: Physical Therapy  Goal: Physical Therapy Goal  Description: Short Term Goals  Ambulate CGA - 50 feet with rolling walker assistive device.   Supine to sit minimum  Sit to stand minimum  SPT minimum  5. Independent with HEP    Long Term Goals  Ambulate CGA - 100 feet with straight cane assistive device.   Supine to sit stand-by  Sit to stand stand-by  SPT stand-by  Needed equipment for home.         Outcome: Progressing

## 2024-12-24 NOTE — PT/OT/SLP EVAL
Physical Therapy Evaluation    Patient Name:  Renetta Stewart   MRN:  [id number]    Recommendations:     Discharge Recommendations: Low Intensity Therapy   Discharge Equipment Recommendations: none   Barriers to discharge: None    Assessment:     Renetta Stewart is a 74 y.o. female admitted with a medical diagnosis of Type 2 MI (myocardial infarction).  She presents with the following impairments/functional limitations: weakness, impaired self care skills, impaired functional mobility, gait instability Patient with flat affect. Slow to answer questions. Seems to be close to her baseline which is household ambulation with cane. Will progress mobility as able. Plan is home    Rehab Prognosis: Fair; patient would benefit from acute skilled PT services to address these deficits and reach maximum level of function.    Recent Surgery: * No surgery found *      Plan:     During this hospitalization, patient to be seen 5 x/week to address the identified rehab impairments via gait training, therapeutic exercises, therapeutic activities and progress toward the following goals:    Plan of Care Expires:  12/24/24    Subjective     Chief Complaint: generalized weakness  Patient/Family Comments/goals: Patient request to be cleaned due to having bm. Minimal verbalization  Pain/Comfort:  Pain Rating 1: 0/10    Patients cultural, spiritual, Sabianist conflicts given the current situation:      Living Environment:  Lives with daughter,   Prior to admission, patients level of function was household with cane.  Equipment used at home: cane, straight.  DME owned (not currently used): single point cane.  Upon discharge, patient will have assistance from daughter.    Objective:     Communicated with nurse prior to session.  Patient found HOB elevated with oxygen, peripheral IV, telemetry  upon PT entry to room.    General Precautions: Standard, fall  Orthopedic Precautions:    Braces:    Respiratory Status: Nasal cannula, flow 2  L/min    Exams:  Cognitive Exam:  Patient is oriented to Person  RLE ROM: WFL  RLE Strength: 3+/5  LLE ROM: WFL  LLE Strength: 3+/5    Functional Mobility:  Bed Mobility:     Supine to Sit: moderate assistance  Sit to Supine: moderate assistance  Transfers:     Sit to Stand:  moderate assistance with hand-held assist  Gait: ambulated x15 feet min assist, 90% decreased cheryl      AM-PAC 6 CLICK MOBILITY  Total Score:18       Treatment & Education:  Tx plan  Call for assist with oob activity    Patient left supine with call button in reach.    GOALS:   Multidisciplinary Problems       Physical Therapy Goals          Problem: Physical Therapy    Goal Priority Disciplines Outcome Interventions   Physical Therapy Goal     PT, PT/OT Progressing    Description: Short Term Goals  Ambulate CGA - 50 feet with rolling walker assistive device.   Supine to sit minimum  Sit to stand minimum  SPT minimum  5. Independent with HEP    Long Term Goals  Ambulate CGA - 100 feet with straight cane assistive device.   Supine to sit stand-by  Sit to stand stand-by  SPT stand-by  Needed equipment for home.                              History:     Past Medical History:   Diagnosis Date    Anemia     Anxiety     Dementia     Depression     GERD (gastroesophageal reflux disease)     Hyperlipidemia     Hypertension     Stroke        Past Surgical History:   Procedure Laterality Date    CARDIAC PACEMAKER PLACEMENT         Time Tracking:     PT Received On: 12/24/24  PT Start Time: 0940     PT Stop Time: 0955  PT Total Time (min): 15 min     Billable Minutes: Evaluation 15      12/24/2024

## 2024-12-24 NOTE — DISCHARGE INSTRUCTIONS
Hospitalist Discharge orders  *Notify your healthcare provider if you experience any of the following: temperature >100.4  *Notify your healthcare provider if you experience any of the following: redness, tenderness, or any signs or symptoms of infection (pain, swelling, redness, odor or green/yellow drainage around incision site).  *Notify your healthcare provider if you experience any of the following: difficulty breathing or increased cough.  *Notify your physician if you experience any persistent nausea, vomiting, diarrhea or headache.  *Notify your physician if you experience any of the following: severe uncontrolled pain, worsening rash, increased confusion, weakness, dizziness, lightheadedness, or visual disturbances.      Procedure Date  12/27/24     Impression  Overall Impression:   Performed forceps biopsies in the esophagus and stomach  Performed forceps biopsies in the duodenum  Esophagitis in the middle third of the esophagus and lower third of the esophagus; performed cold forceps biopsy  5 cm hiatal hernia  Erythematous mucosa in the fundus of the stomach and antrum; performed cold forceps biopsy   The mucosa of the mid and distal esophagus was severely inflamed with white plaque present.  Biopsies were obtained.  The white plaque may represent sloughed tissue and or fungal esophagitis.  A 5 cm hiatal hernia was noted.  Gastritis was present without ulcers and biopsies were obtained from the antrum and fundus.  The pyloric channel was patent.  A polypoid lesion was noted in the duodenal bulb and biopsies were obtained.  The 2nd 3rd portions of the duodenum had a normal appearance.    Impression:  Severe esophagitis, hiatus hernia, gastritis, duodenal polyp.     Recommendation  Await pathology results, due: 12/30/2024        Disposition:  Discharge patient to hospital room in stable condition.  Follow up pathology results in 2 working days.  The patient may have a full liquid or dysphagia diet as  tolerated.  Continue PPI and this may be given IV if the patient can not swallow her tablets.  Consider dilation of the esophagus if needed once the esophagitis heals.

## 2024-12-24 NOTE — PLAN OF CARE
Problem: Adult Inpatient Plan of Care  Goal: Plan of Care Review  Outcome: Progressing  Goal: Patient-Specific Goal (Individualized)  Outcome: Progressing  Goal: Absence of Hospital-Acquired Illness or Injury  Outcome: Progressing  Goal: Optimal Comfort and Wellbeing  Outcome: Progressing  Goal: Readiness for Transition of Care  Outcome: Progressing     Problem: Acute Coronary Syndrome  Goal: Optimal Adaptation to Illness  Outcome: Progressing  Goal: Absence of Cardiac-Related Pain  Outcome: Progressing  Goal: Normalized Cardiac Rhythm  Outcome: Progressing  Goal: Effective Cardiac Pump Function  Outcome: Progressing  Goal: Adequate Tissue Perfusion  Outcome: Progressing     Problem: Cardiac Catheterization (Diagnostic/Interventional)  Goal: Absence of Bleeding  Outcome: Progressing  Goal: Absence of Contrast-Induced Injury  Outcome: Progressing  Goal: Stable Heart Rate and Rhythm  Outcome: Progressing  Goal: Absence of Embolism Signs and Symptoms  Outcome: Progressing  Goal: Anesthesia/Sedation Recovery  Outcome: Progressing  Goal: Optimal Pain Control and Function  Outcome: Progressing  Goal: Absence of Vascular Access Complication  Outcome: Progressing     Problem: Skin Injury Risk Increased  Goal: Skin Health and Integrity  Outcome: Progressing     Problem: Infection  Goal: Absence of Infection Signs and Symptoms  Outcome: Progressing     Problem: Gas Exchange Impaired  Goal: Optimal Gas Exchange  Outcome: Progressing

## 2024-12-24 NOTE — PT/OT/SLP EVAL
Occupational Therapy   Evaluation    Name: Renetta Stewart  MRN: [id number]  Admitting Diagnosis: Type 2 MI (myocardial infarction)  Recent Surgery: * No surgery found *      Recommendations:     Discharge Recommendations: Moderate Intensity Therapy  Discharge Equipment Recommendations:  other (see comments) (to be determined)  Barriers to discharge:  None    Assessment:     Renetta Stewart is a 74 y.o. female with a medical diagnosis of Type 2 MI (myocardial infarction).  She presents with weakness and decline in ADLs. Performance deficits affecting function: weakness, impaired endurance, impaired self care skills, impaired functional mobility, gait instability, impaired balance.      Rehab Prognosis: Good; patient would benefit from acute skilled OT services to address these deficits and reach maximum level of function.       Plan:     Patient to be seen 5 x/week to address the above listed problems via self-care/home management, therapeutic activities, therapeutic exercises  Plan of Care Expires: 01/21/25  Plan of Care Reviewed with: patient    Subjective     Chief Complaint: type 2 MI  Patient/Family Comments/goals: pt agreeable to OT eval    Occupational Profile:  Living Environment: pt lives with daughter in one story home   Previous level of function: receives assist with ADL tasks as needed; utilized cane for functional mobility   Roles and Routines: perform self care  Equipment Used at Home: cane, straight  Assistance upon Discharge: swing bed versus home with home health    Pain/Comfort:  Pain Rating 1: 0/10    Patients cultural, spiritual, Congregation conflicts given the current situation: no    Objective:     Communicated with: PROMISE Chatman prior to session.  Patient found HOB elevated with oxygen, peripheral IV, telemetry upon OT entry to room.    General Precautions: Standard, fall  Orthopedic Precautions: N/A  Braces: N/A  Respiratory Status: Nasal cannula, flow 2 L/min    Occupational Performance:    Bed  Mobility:    Patient completed Supine to Sit with minimum assistance  Patient completed Sit to Supine with minimum assistance    Functional Mobility/Transfers:  Patient completed Sit <> Stand Transfer with minimum assistance  with  hand-held assist   Functional Mobility: pt performed functional mobility in room with Solitario with hand held assist    Activities of Daily Living:  Toileting: maximal assistance to perform toilet hygiene    Cognitive/Visual Perceptual:  Cognitive/Psychosocial Skills:     -       Oriented to: Person, Place, Time, and Situation   -       Follows Commands/attention:Follows multistep  commands  -       Mood/Affect/Coping skills/emotional control: Cooperative    Physical Exam:  Balance:    -       sitting balance CGA; standing balance Solitario  Upper Extremity Range of Motion:     -       Right Upper Extremity: WFL  -       Left Upper Extremity: WFL  Upper Extremity Strength: -       Right Upper Extremity: 4/5  -       Left Upper Extremity: 4/5  Gross motor coordination:   WFL    AMPAC 6 Click ADL:  AMPAC Total Score: 15    Treatment & Education:  Pt educated on OT role/POC.   Importance of OOB activity with staff assistance.  Importance of sitting up in the chair throughout the day as tolerated, especially for meals   Safety during functional t/f and mobility with use of RW  Importance of assisting with self-care activities   All questions/concerns answered within OT scope of practice      Patient left HOB elevated with all lines intact, call button in reach, and CNA present    GOALS:   Multidisciplinary Problems       Occupational Therapy Goals          Problem: Occupational Therapy    Goal Priority Disciplines Outcome Interventions   Occupational Therapy Goal     OT, PT/OT Progressing    Description: STG:  Pt will perform grooming with setup  Pt will bathe with Solitario with setup at EOB  Pt will perform UE dressing with Solitario  Pt will perform LE dressing with Solitario  Pt will sit EOB x 10 min with SBA    Pt will transfer bed/chair/bsc with CGA with RW  Pt will perform standing task x 2 min with CGA with RW   Pt will tolerate 15 minutes of tx without fatigue      LT.Restore to max I with self care and mobility.                           History:     Past Medical History:   Diagnosis Date    Anemia     Anxiety     Dementia     Depression     GERD (gastroesophageal reflux disease)     Hyperlipidemia     Hypertension     Stroke          Past Surgical History:   Procedure Laterality Date    CARDIAC PACEMAKER PLACEMENT         Time Tracking:     OT Date of Treatment: 24  OT Start Time: 940  OT Stop Time: 949  OT Total Time (min): 9 min    Billable Minutes:Evaluation OT min complexity eval    2024

## 2024-12-25 PROBLEM — K92.1 BLOOD IN STOOL: Status: ACTIVE | Noted: 2024-12-25

## 2024-12-25 PROBLEM — D62 ACUTE BLOOD LOSS ANEMIA: Status: ACTIVE | Noted: 2024-12-25

## 2024-12-25 LAB
OHS QRS DURATION: 140 MS
OHS QTC CALCULATION: 458 MS

## 2024-12-25 PROCEDURE — 99233 SBSQ HOSP IP/OBS HIGH 50: CPT | Mod: ,,, | Performed by: HOSPITALIST

## 2024-12-25 PROCEDURE — 27000221 HC OXYGEN, UP TO 24 HOURS

## 2024-12-25 PROCEDURE — 36410 VNPNXR 3YR/> PHY/QHP DX/THER: CPT

## 2024-12-25 PROCEDURE — 63600175 PHARM REV CODE 636 W HCPCS: Performed by: NURSE PRACTITIONER

## 2024-12-25 PROCEDURE — 99233 SBSQ HOSP IP/OBS HIGH 50: CPT | Mod: ,,, | Performed by: INTERNAL MEDICINE

## 2024-12-25 PROCEDURE — 11000001 HC ACUTE MED/SURG PRIVATE ROOM

## 2024-12-25 PROCEDURE — C1751 CATH, INF, PER/CENT/MIDLINE: HCPCS

## 2024-12-25 PROCEDURE — 25000003 PHARM REV CODE 250: Performed by: NURSE PRACTITIONER

## 2024-12-25 PROCEDURE — 63600175 PHARM REV CODE 636 W HCPCS: Performed by: INTERNAL MEDICINE

## 2024-12-25 PROCEDURE — 76937 US GUIDE VASCULAR ACCESS: CPT

## 2024-12-25 RX ADMIN — SENNOSIDES AND DOCUSATE SODIUM 1 TABLET: 50; 8.6 TABLET ORAL at 08:12

## 2024-12-25 RX ADMIN — METOPROLOL TARTRATE 25 MG: 25 TABLET, FILM COATED ORAL at 08:12

## 2024-12-25 RX ADMIN — ENOXAPARIN SODIUM 40 MG: 40 INJECTION SUBCUTANEOUS at 05:12

## 2024-12-25 RX ADMIN — SODIUM CHLORIDE, SODIUM LACTATE, POTASSIUM CHLORIDE, AND CALCIUM CHLORIDE: 600; 310; 30; 20 INJECTION, SOLUTION INTRAVENOUS at 03:12

## 2024-12-25 RX ADMIN — ONDANSETRON 4 MG: 2 INJECTION INTRAMUSCULAR; INTRAVENOUS at 04:12

## 2024-12-25 NOTE — CONSULTS
[facility]  Gastroenterology  Consult Note    Patient Name: Renetta Stewart  MRN: [id number]  Admission Date: 12/20/2024  Hospital Length of Stay: 5 days  Code Status: Full Code   Attending Provider: Min Santos MD   Consulting Provider: Efraín Giron MD  Primary Care Physician: Eldon Govea MD  Principal Problem:Dysphagia    Inpatient consult to Gastroenterology  Consult performed by: Efraín Giron MD  Consult ordered by: Min Santos MD        Subjective:     HPI:  GI consult was received for dysphagia and heme-positive stool.  This patient was scheduled to be discharged to home, but she complains of inability to swallow and she has apparently vomiting anything she eats and drinks.  She does have Hemoccult-positive stool.  She provides little history today.    Past Medical History:   Diagnosis Date    Anemia     Anxiety     Dementia     Depression     GERD (gastroesophageal reflux disease)     Hyperlipidemia     Hypertension     Stroke        Past Surgical History:   Procedure Laterality Date    CARDIAC PACEMAKER PLACEMENT         Review of patient's allergies indicates:  No Known Allergies  Family History       Problem Relation (Age of Onset)    Hypertension Father          Tobacco Use    Smoking status: Never     Passive exposure: Never    Smokeless tobacco: Never   Substance and Sexual Activity    Alcohol use: Not Currently    Drug use: Never    Sexual activity: Not Currently     Review of Systems   Respiratory:  Positive for choking.    Cardiovascular: Negative.    Gastrointestinal:  Positive for vomiting.   Psychiatric/Behavioral:  Positive for confusion.      Objective:     Vital Signs (Most Recent):  Temp: 97.4 °F (36.3 °C) (12/25/24 1206)  Pulse: 81 (12/25/24 1206)  Resp: 16 (12/25/24 1206)  BP: (!) 183/80 (12/25/24 1206)  SpO2: 97 % (12/25/24 1206) Vital Signs (24h Range):  Temp:  [97.4 °F (36.3 °C)-98.4 °F (36.9 °C)] 97.4 °F (36.3 °C)  Pulse:   "[66-81] 81  Resp:  [16-18] 16  SpO2:  [94 %-99 %] 97 %  BP: (163-192)/() 183/80     Weight: 91.6 kg (202 lb) (12/25/24 0600)  Body mass index is 34.67 kg/m².      Intake/Output Summary (Last 24 hours) at 12/25/2024 1355  Last data filed at 12/25/2024 0633  Gross per 24 hour   Intake 1962.42 ml   Output --   Net 1962.42 ml       Lines/Drains/Airways       Peripheral Intravenous Line  Duration                  Midline Catheter - Single Lumen 12/22/24 1745 Left median cubital vein (antecubital fossa) 20g x 10cm 2 days                    Physical Exam  Constitutional:       General: She is not in acute distress.     Appearance: She is well-developed. She is ill-appearing.   HENT:      Head: Normocephalic and atraumatic.      Nose: Nose normal.   Eyes:      Pupils: Pupils are equal, round, and reactive to light.   Cardiovascular:      Rate and Rhythm: Normal rate and regular rhythm.   Pulmonary:      Effort: Pulmonary effort is normal.      Breath sounds: Normal breath sounds. No wheezing.   Abdominal:      General: Abdomen is flat. Bowel sounds are normal. There is no distension.      Palpations: Abdomen is soft.      Tenderness: There is no abdominal tenderness. There is no guarding.   Skin:     General: Skin is warm and dry.      Coloration: Skin is not jaundiced.   Psychiatric:         Attention and Perception: Attention normal.         Mood and Affect: Affect normal.         Speech: Speech normal.         Behavior: Behavior is cooperative.      Comments: Pt was calm while speaking.         Significant Labs:  CBC: No results for input(s): "WBC", "HGB", "HCT", "PLT" in the last 48 hours.  CMP: No results for input(s): "GLU", "CALCIUM", "ALBUMIN", "PROT", "NA", "K", "CO2", "CL", "BUN", "CREATININE", "ALKPHOS", "ALT", "AST", "BILITOT" in the last 48 hours.    Significant Imaging:  Imaging results within the past 24 hours have been reviewed.    Assessment/Plan:     Active Diagnoses:    Diagnosis Date Noted POA    " PRINCIPAL PROBLEM:  Dysphagia [R13.10] 03/06/2023 Yes    Positive fecal occult blood test [R19.5] 12/22/2024 Unknown    Elevated troponin [R79.89] 12/21/2024 Yes    Syncope [R55] 12/21/2024 Yes    Type 2 MI (myocardial infarction) [I21.A1] 12/20/2024 Yes    Syncope and collapse [R55] 12/20/2024 Yes    Gastroesophageal reflux disease [K21.9] 02/16/2023 Yes    Hyperlipidemia [E78.5] 07/02/2021 Yes    Depression [F32.A] 05/27/2021 Yes    Essential hypertension [I10] 05/27/2021 Yes      Problems Resolved During this Admission:    Diagnosis Date Noted Date Resolved POA    GI bleed [K92.2] 12/22/2024 12/22/2024 Unknown         Impression:  Mild anemia, Hemoccult-positive stool, dysphagia with regurgitation and or vomiting.    Plan:  Continue PPI, anticipate EGD with esophageal dilation in a.m..    Thank you for your consult. I will follow-up with patient. Please contact us if you have any additional questions.    Efraín Giron MD  Gastroenterology  [facility]

## 2024-12-25 NOTE — ASSESSMENT & PLAN NOTE
Dysphagia noted.  Patient with history of esophageal stricture s/p EGD with dilation.   GI consulted.   Patient also with FOBT+ so this may be investigated as well.

## 2024-12-25 NOTE — ASSESSMENT & PLAN NOTE
Syncope vs seizure  Echo reveals nl systolic and diastolic dysfunction   Carotid US and EEG and Keppra level pending  Telemetry  Fall precautions  Sz precautions    12/23: MRI negative for new CVA but there was a prior CVA.   Patient with memory problems, acute on chronic.     12/25: Mathew per cardiology

## 2024-12-25 NOTE — ASSESSMENT & PLAN NOTE
Pt has Positve Delta, no chest pain, syncopal Episode,  Orhtostatics positive in ED, given pts AGE and risk factors will treat as NSTEMI.   Patient presents with NSTEMI. Chest pain is currently controlled. VIKTOR score is 3. Patient is currently on NSTEMI Pathway.    EKG reviewed. Troponins reviewed and results noted-   Troponin 53, > >88     Lipid panel reviewed and shows-     Lab Results   Component Value Date    LDLCALC 119 12/21/2024     Lab Results   Component Value Date    TRIG 182 (H) 12/21/2024         Medical management includes;  ASA,Beta Blocker, High Intensity Stain, and ACE/ARB Echo has not been performed. Latest ECHO results are as follows- Results for orders placed during the hospital encounter of 03/30/23    Echo    Interpretation Summary  · The left ventricle is normal in size with severe concentric hypertrophy and hyperdynamic systolic function.  · The estimated ejection fraction is 70%.  · Mild tricuspid regurgitation.  · Left ventricular mild outflow tract obstruction is present.  · Normal right ventricular size with normal right ventricular systolic function.  · Normal central venous pressure (3 mmHg).  · The estimated PA systolic pressure is 43 mmHg.  · ECHO is suggestive of hypertensive heart disease vs. hypertrophic cardiomyopathy  .   Cardiology following  Lexiscan recommended once encephalopathy resolves     12/23: lexiscan today.   12/25: lexiscan negative per cardiology

## 2024-12-25 NOTE — SUBJECTIVE & OBJECTIVE
Interval History:     Review of Systems   Unable to perform ROS: Mental status change     Objective:     Vital Signs (Most Recent):  Temp: 98.4 °F (36.9 °C) (12/25/24 0422)  Pulse: 67 (12/25/24 0610)  Resp: 18 (12/25/24 0546)  BP: (!) 189/80 (12/25/24 0610)  SpO2: 98 % (12/25/24 0423) Vital Signs (24h Range):  Temp:  [98 °F (36.7 °C)-98.4 °F (36.9 °C)] 98.4 °F (36.9 °C)  Pulse:  [66-78] 67  Resp:  [16-18] 18  SpO2:  [94 %-99 %] 98 %  BP: (149-192)/() 189/80     Weight: 91.6 kg (202 lb)  Body mass index is 34.67 kg/m².    Intake/Output Summary (Last 24 hours) at 12/25/2024 0754  Last data filed at 12/25/2024 0633  Gross per 24 hour   Intake 1962.42 ml   Output --   Net 1962.42 ml         Physical Exam  Vitals reviewed.   Constitutional:       General: She is not in acute distress.     Appearance: She is not ill-appearing, toxic-appearing or diaphoretic.      Comments: Patient is somnolent but arousable.   HENT:      Head: Normocephalic and atraumatic.      Right Ear: External ear normal.      Left Ear: External ear normal.      Mouth/Throat:      Mouth: Mucous membranes are moist.   Eyes:      General: No scleral icterus.     Pupils: Pupils are equal, round, and reactive to light.   Cardiovascular:      Rate and Rhythm: Normal rate and regular rhythm.      Heart sounds: Normal heart sounds. No murmur heard.     No friction rub. No gallop.   Pulmonary:      Effort: Pulmonary effort is normal. No respiratory distress.      Breath sounds: Normal breath sounds. No wheezing, rhonchi or rales.   Abdominal:      General: Bowel sounds are normal.      Palpations: Abdomen is soft.      Tenderness: There is no abdominal tenderness. There is no guarding or rebound.   Musculoskeletal:         General: No swelling.      Right lower leg: No edema.      Left lower leg: No edema.   Skin:     General: Skin is warm and dry.      Coloration: Skin is not jaundiced.      Findings: No erythema or rash.   Neurological:      Mental  Status: She is alert.      GCS: GCS eye subscore is 4. GCS verbal subscore is 3. GCS motor subscore is 6.      Cranial Nerves: No facial asymmetry.             Significant Labs: All pertinent labs within the past 24 hours have been reviewed.    Significant Imaging: I have reviewed all pertinent imaging results/findings within the past 24 hours.

## 2024-12-25 NOTE — ASSESSMENT & PLAN NOTE
Dysphagia noted.  Patient with history of esophageal stricture s/p EGD with dilation.   GI consulted.   Patient also with FOBT+ so this may be investigated as well.     12/25: significant dysphagia currently and over the last few weeks.  Family feels she had dysphagia and this caused her to pass out at home.

## 2024-12-25 NOTE — SUBJECTIVE & OBJECTIVE
Interval History:     Review of Systems   Unable to perform ROS: Mental status change     Objective:     Vital Signs (Most Recent):  Temp: 98 °F (36.7 °C) (12/24/24 1613)  Pulse: 74 (12/24/24 1613)  Resp: 16 (12/24/24 1613)  BP: (!) 175/87 (12/24/24 1613)  SpO2: 95 % (12/24/24 1613) Vital Signs (24h Range):  Temp:  [97.3 °F (36.3 °C)-98.1 °F (36.7 °C)] 98 °F (36.7 °C)  Pulse:  [69-83] 74  Resp:  [16-18] 16  SpO2:  [95 %-100 %] 95 %  BP: (124-178)/(65-95) 175/87     Weight: 90.7 kg (199 lb 14.4 oz)  Body mass index is 34.31 kg/m².    Intake/Output Summary (Last 24 hours) at 12/24/2024 1849  Last data filed at 12/24/2024 0554  Gross per 24 hour   Intake 3505.14 ml   Output --   Net 3505.14 ml         Physical Exam  Vitals reviewed.   Constitutional:       General: She is not in acute distress.     Appearance: She is not ill-appearing, toxic-appearing or diaphoretic.      Comments: Patient is somnolent but arousable.   HENT:      Head: Normocephalic and atraumatic.      Right Ear: External ear normal.      Left Ear: External ear normal.      Mouth/Throat:      Mouth: Mucous membranes are moist.   Eyes:      General: No scleral icterus.     Pupils: Pupils are equal, round, and reactive to light.   Cardiovascular:      Rate and Rhythm: Normal rate and regular rhythm.      Heart sounds: Normal heart sounds. No murmur heard.     No friction rub. No gallop.   Pulmonary:      Effort: Pulmonary effort is normal. No respiratory distress.      Breath sounds: Normal breath sounds. No wheezing, rhonchi or rales.   Abdominal:      General: Bowel sounds are normal.      Palpations: Abdomen is soft.      Tenderness: There is no abdominal tenderness. There is no guarding or rebound.   Musculoskeletal:         General: No swelling.      Right lower leg: No edema.      Left lower leg: No edema.   Skin:     General: Skin is warm and dry.      Coloration: Skin is not jaundiced.      Findings: No erythema or rash.   Neurological:       Mental Status: She is alert.      GCS: GCS eye subscore is 4. GCS verbal subscore is 3. GCS motor subscore is 6.      Cranial Nerves: No facial asymmetry.             Significant Labs: All pertinent labs within the past 24 hours have been reviewed.    Significant Imaging: I have reviewed all pertinent imaging results/findings within the past 24 hours.

## 2024-12-25 NOTE — PROGRESS NOTES
[facility] Hospital Medicine  Progress Note    Patient Name: Renetta Stewart  MRN: [id number]  Patient Class: IP- Inpatient   Admission Date: 12/20/2024  Length of Stay: 4 days  Attending Physician: Min Santos MD  Primary Care Provider: Eldon Govea MD        Subjective     Principal Problem:Dysphagia        HPI:  Pt is a 74-year-old female who presented to her doctor's office today.  She states she had been feeling well just a lot of fatigue.  While she was at the doctor's office she had a syncopal episode with loss of bladder control.  Bystanders report loss of consciousness was less than 15 seconds.  There was no observed seizure-like activity.  Patient denies chest pain, shortness of breath, palpitations, feeling of doom prior to syncopal episode.  Patient reports she does not have pain or shortness of breath at present.. PMH HTN, HLD, CVA, GERD, and pacemaker    In the ED initial vital signs were blood pressure of 143/77, temp 96.9° heart rate 77, O2 sat 98% on room air who.  Initial lab workup revealed a sodium of 147 glucose 117 BUN 22 creatinine 1.35.  CBC is unremarkable.  ProBNP was 74.  Initial troponin 53.6 repeat troponin at 88.3 patient has positive delta. EKG show NSR Rate 77, no pacer spike no st elevation.     Patient will be admitted to hospital medicine service under the direct supervision of Dr KHALIL  for further evaluation and management.     Overview/Hospital Course:  No notes on file    No new subjective & objective note has been filed under this hospital service since the last note was generated.      Assessment and Plan     * Dysphagia  Dysphagia noted.  Patient with history of esophageal stricture s/p EGD with dilation.   GI consulted.   Patient also with FOBT+ so this may be investigated as well.         Type 2 MI (myocardial infarction)  Pt has Positve Delta, no chest pain, syncopal Episode,  Orhtostatics positive in ED, given pts AGE and risk factors  will treat as NSTEMI.   Patient presents with NSTEMI. Chest pain is currently controlled. VIKTOR score is 3. Patient is currently on NSTEMI Pathway.    EKG reviewed. Troponins reviewed and results noted-   Troponin 53, > >88     Lipid panel reviewed and shows-     Lab Results   Component Value Date    LDLCALC 119 12/21/2024     Lab Results   Component Value Date    TRIG 182 (H) 12/21/2024         Medical management includes;  ASA,Beta Blocker, High Intensity Stain, and ACE/ARB Echo has not been performed. Latest ECHO results are as follows- Results for orders placed during the hospital encounter of 03/30/23    Echo    Interpretation Summary  · The left ventricle is normal in size with severe concentric hypertrophy and hyperdynamic systolic function.  · The estimated ejection fraction is 70%.  · Mild tricuspid regurgitation.  · Left ventricular mild outflow tract obstruction is present.  · Normal right ventricular size with normal right ventricular systolic function.  · Normal central venous pressure (3 mmHg).  · The estimated PA systolic pressure is 43 mmHg.  · ECHO is suggestive of hypertensive heart disease vs. hypertrophic cardiomyopathy  .   Cardiology following  Lexiscan recommended once encephalopathy resolves     12/23: lexiscan today.     Syncope and collapse  Syncope vs seizure  Echo reveals nl systolic and diastolic dysfunction   Carotid US and EEG and Keppra level pending  Telemetry  Fall precautions  Sz precautions    12/23: MRI negative for new CVA but there was a prior CVA.   Patient with memory problems, acute on chronic.       Positive fecal occult blood test  GI consulted  Outpt endoscopy recommended for now  Monitor for blood loss       Gastroesophageal reflux disease  Continue home protonix      Hyperlipidemia  Continue statin      Essential hypertension  Patient's blood pressure range in the last 24 hours was: BP  Min: 96/68  Max: 153/72.The patient's inpatient anti-hypertensive regimen is listed  below:  Current Antihypertensives  furosemide tablet 20 mg, Daily, Oral  hydrALAZINE tablet 100 mg, 3 times daily, Oral  valsartan tablet 320 mg, Daily, Oral  metoprolol tartrate (LOPRESSOR) tablet 25 mg, 2 times daily, Oral  nitroGLYCERIN SL tablet 0.4 mg, Every 5 min PRN, Sublingual    Plan  - BP is uncontrolled, will adjust as follows: Pt BP decresed when standing and hR increase + orhthos  - Will hold hydralazine and lasix for now    Depression  Patient has persistent depression which is unknown and is currently controlled. Will Continue anti-depressant medications. We will not consult psychiatry at this time. Patient does not display psychosis at this time. Continue to monitor closely and adjust plan of care as needed.          VTE Risk Mitigation (From admission, onward)           Ordered     enoxaparin injection 40 mg  Daily         12/20/24 2025     IP VTE HIGH RISK PATIENT  Once         12/20/24 2025     Place sequential compression device  Until discontinued         12/20/24 2025                    Discharge Planning   GERALDO: 12/24/2024     Code Status: Full Code   Medical Readiness for Discharge Date: 12/24/2024  Discharge Plan A: Home with family                Please place Justification for DME        Min Santos MD  Department of Hospital Medicine   [facility]

## 2024-12-25 NOTE — PROGRESS NOTES
[facility] Medicine  Progress Note    Patient Name: Renetta Stewart  MRN: [id number]  Patient Class: IP- Inpatient   Admission Date: 12/20/2024  Length of Stay: 4 days  Attending Physician: Min Santos MD  Primary Care Provider: Eldon Govea MD        Subjective     Principal Problem:Dysphagia    HPI:  Pt is a 74-year-old female who presented to her doctor's office today.  She states she had been feeling well just a lot of fatigue.  While she was at the doctor's office she had a syncopal episode with loss of bladder control.  Bystanders report loss of consciousness was less than 15 seconds.  There was no observed seizure-like activity.  Patient denies chest pain, shortness of breath, palpitations, feeling of doom prior to syncopal episode.  Patient reports she does not have pain or shortness of breath at present.. PMH HTN, HLD, CVA, GERD, and pacemaker    In the ED initial vital signs were blood pressure of 143/77, temp 96.9° heart rate 77, O2 sat 98% on room air who.  Initial lab workup revealed a sodium of 147 glucose 117 BUN 22 creatinine 1.35.  CBC is unremarkable.  ProBNP was 74.  Initial troponin 53.6 repeat troponin at 88.3 patient has positive delta. EKG show NSR Rate 77, no pacer spike no st elevation.     Patient will be admitted to hospital medicine service under the direct supervision of Dr KHALIL  for further evaluation and management.     Overview/Hospital Course:  No notes on file    Interval History:     Review of Systems   Unable to perform ROS: Mental status change     Objective:     Vital Signs (Most Recent):  Temp: 98 °F (36.7 °C) (12/24/24 1613)  Pulse: 74 (12/24/24 1613)  Resp: 16 (12/24/24 1613)  BP: (!) 175/87 (12/24/24 1613)  SpO2: 95 % (12/24/24 1613) Vital Signs (24h Range):  Temp:  [97.3 °F (36.3 °C)-98.1 °F (36.7 °C)] 98 °F (36.7 °C)  Pulse:  [69-83] 74  Resp:  [16-18] 16  SpO2:  [95 %-100 %] 95 %  BP: (124-178)/(65-95) 175/87     Weight: 90.7  kg (199 lb 14.4 oz)  Body mass index is 34.31 kg/m².    Intake/Output Summary (Last 24 hours) at 12/24/2024 1849  Last data filed at 12/24/2024 0554  Gross per 24 hour   Intake 3505.14 ml   Output --   Net 3505.14 ml         Physical Exam  Vitals reviewed.   Constitutional:       General: She is not in acute distress.     Appearance: She is not ill-appearing, toxic-appearing or diaphoretic.      Comments: Patient is somnolent but arousable.   HENT:      Head: Normocephalic and atraumatic.      Right Ear: External ear normal.      Left Ear: External ear normal.      Mouth/Throat:      Mouth: Mucous membranes are moist.   Eyes:      General: No scleral icterus.     Pupils: Pupils are equal, round, and reactive to light.   Cardiovascular:      Rate and Rhythm: Normal rate and regular rhythm.      Heart sounds: Normal heart sounds. No murmur heard.     No friction rub. No gallop.   Pulmonary:      Effort: Pulmonary effort is normal. No respiratory distress.      Breath sounds: Normal breath sounds. No wheezing, rhonchi or rales.   Abdominal:      General: Bowel sounds are normal.      Palpations: Abdomen is soft.      Tenderness: There is no abdominal tenderness. There is no guarding or rebound.   Musculoskeletal:         General: No swelling.      Right lower leg: No edema.      Left lower leg: No edema.   Skin:     General: Skin is warm and dry.      Coloration: Skin is not jaundiced.      Findings: No erythema or rash.   Neurological:      Mental Status: She is alert.      GCS: GCS eye subscore is 4. GCS verbal subscore is 3. GCS motor subscore is 6.      Cranial Nerves: No facial asymmetry.             Significant Labs: All pertinent labs within the past 24 hours have been reviewed.    Significant Imaging: I have reviewed all pertinent imaging results/findings within the past 24 hours.    Assessment and Plan     * Dysphagia  Dysphagia noted.  Patient with history of esophageal stricture s/p EGD with dilation.   GI  consulted.   Patient also with FOBT+ so this may be investigated as well.         Type 2 MI (myocardial infarction)  Pt has Positve Delta, no chest pain, syncopal Episode,  Orhtostatics positive in ED, given pts AGE and risk factors will treat as NSTEMI.   Patient presents with NSTEMI. Chest pain is currently controlled. VIKTOR score is 3. Patient is currently on NSTEMI Pathway.    EKG reviewed. Troponins reviewed and results noted-   Troponin 53, > >88     Lipid panel reviewed and shows-     Lab Results   Component Value Date    LDLCALC 119 12/21/2024     Lab Results   Component Value Date    TRIG 182 (H) 12/21/2024         Medical management includes;  ASA,Beta Blocker, High Intensity Stain, and ACE/ARB Echo has not been performed. Latest ECHO results are as follows- Results for orders placed during the hospital encounter of 03/30/23    Echo    Interpretation Summary  · The left ventricle is normal in size with severe concentric hypertrophy and hyperdynamic systolic function.  · The estimated ejection fraction is 70%.  · Mild tricuspid regurgitation.  · Left ventricular mild outflow tract obstruction is present.  · Normal right ventricular size with normal right ventricular systolic function.  · Normal central venous pressure (3 mmHg).  · The estimated PA systolic pressure is 43 mmHg.  · ECHO is suggestive of hypertensive heart disease vs. hypertrophic cardiomyopathy  .   Cardiology following  Lexiscan recommended once encephalopathy resolves     12/23: lexiscan today.     Syncope and collapse  Syncope vs seizure  Echo reveals nl systolic and diastolic dysfunction   Carotid US and EEG and Keppra level pending  Telemetry  Fall precautions  Sz precautions    12/23: MRI negative for new CVA but there was a prior CVA.   Patient with memory problems, acute on chronic.       Positive fecal occult blood test  GI consulted  Outpt endoscopy recommended for now  Monitor for blood loss       Gastroesophageal reflux  disease  Continue home protonix      Hyperlipidemia  Continue statin      Essential hypertension  Patient's blood pressure range in the last 24 hours was: BP  Min: 96/68  Max: 153/72.The patient's inpatient anti-hypertensive regimen is listed below:  Current Antihypertensives  furosemide tablet 20 mg, Daily, Oral  hydrALAZINE tablet 100 mg, 3 times daily, Oral  valsartan tablet 320 mg, Daily, Oral  metoprolol tartrate (LOPRESSOR) tablet 25 mg, 2 times daily, Oral  nitroGLYCERIN SL tablet 0.4 mg, Every 5 min PRN, Sublingual    Plan  - BP is uncontrolled, will adjust as follows: Pt BP decresed when standing and hR increase + orhthos  - Will hold hydralazine and lasix for now    Depression  Patient has persistent depression which is unknown and is currently controlled. Will Continue anti-depressant medications. We will not consult psychiatry at this time. Patient does not display psychosis at this time. Continue to monitor closely and adjust plan of care as needed.          VTE Risk Mitigation (From admission, onward)           Ordered     enoxaparin injection 40 mg  Daily         12/20/24 2025     IP VTE HIGH RISK PATIENT  Once         12/20/24 2025     Place sequential compression device  Until discontinued         12/20/24 2025                    Discharge Planning   GERALDO: 12/24/2024     Code Status: Full Code   Medical Readiness for Discharge Date: 12/24/2024  Discharge Plan A: Home with family          Please place Justification for DME        Min Santos MD  Department of Hospital Medicine   [facility]

## 2024-12-25 NOTE — PROGRESS NOTES
[facility] Medicine  Progress Note    Patient Name: Renetta Stewart  MRN: [id number]  Patient Class: IP- Inpatient   Admission Date: 12/20/2024  Length of Stay: 5 days  Attending Physician: Min Santos MD  Primary Care Provider: Eldon Govea MD        Subjective     Principal Problem:Dysphagia    HPI:  Pt is a 74-year-old female who presented to her doctor's office today.  She states she had been feeling well just a lot of fatigue.  While she was at the doctor's office she had a syncopal episode with loss of bladder control.  Bystanders report loss of consciousness was less than 15 seconds.  There was no observed seizure-like activity.  Patient denies chest pain, shortness of breath, palpitations, feeling of doom prior to syncopal episode.  Patient reports she does not have pain or shortness of breath at present.. PMH HTN, HLD, CVA, GERD, and pacemaker    In the ED initial vital signs were blood pressure of 143/77, temp 96.9° heart rate 77, O2 sat 98% on room air who.  Initial lab workup revealed a sodium of 147 glucose 117 BUN 22 creatinine 1.35.  CBC is unremarkable.  ProBNP was 74.  Initial troponin 53.6 repeat troponin at 88.3 patient has positive delta. EKG show NSR Rate 77, no pacer spike no st elevation.     Patient will be admitted to hospital medicine service under the direct supervision of Dr KHALIL  for further evaluation and management.     Overview/Hospital Course:  No notes on file    Interval History:     Review of Systems   Unable to perform ROS: Mental status change     Objective:     Vital Signs (Most Recent):  Temp: 98.4 °F (36.9 °C) (12/25/24 0422)  Pulse: 67 (12/25/24 0610)  Resp: 18 (12/25/24 0546)  BP: (!) 189/80 (12/25/24 0610)  SpO2: 98 % (12/25/24 0423) Vital Signs (24h Range):  Temp:  [98 °F (36.7 °C)-98.4 °F (36.9 °C)] 98.4 °F (36.9 °C)  Pulse:  [66-78] 67  Resp:  [16-18] 18  SpO2:  [94 %-99 %] 98 %  BP: (149-192)/( 189/80     Weight: 91.6  kg (202 lb)  Body mass index is 34.67 kg/m².    Intake/Output Summary (Last 24 hours) at 12/25/2024 0754  Last data filed at 12/25/2024 0633  Gross per 24 hour   Intake 1962.42 ml   Output --   Net 1962.42 ml         Physical Exam  Vitals reviewed.   Constitutional:       General: She is not in acute distress.     Appearance: She is not ill-appearing, toxic-appearing or diaphoretic.      Comments: Patient is somnolent but arousable.   HENT:      Head: Normocephalic and atraumatic.      Right Ear: External ear normal.      Left Ear: External ear normal.      Mouth/Throat:      Mouth: Mucous membranes are moist.   Eyes:      General: No scleral icterus.     Pupils: Pupils are equal, round, and reactive to light.   Cardiovascular:      Rate and Rhythm: Normal rate and regular rhythm.      Heart sounds: Normal heart sounds. No murmur heard.     No friction rub. No gallop.   Pulmonary:      Effort: Pulmonary effort is normal. No respiratory distress.      Breath sounds: Normal breath sounds. No wheezing, rhonchi or rales.   Abdominal:      General: Bowel sounds are normal.      Palpations: Abdomen is soft.      Tenderness: There is no abdominal tenderness. There is no guarding or rebound.   Musculoskeletal:         General: No swelling.      Right lower leg: No edema.      Left lower leg: No edema.   Skin:     General: Skin is warm and dry.      Coloration: Skin is not jaundiced.      Findings: No erythema or rash.   Neurological:      Mental Status: She is alert.      GCS: GCS eye subscore is 4. GCS verbal subscore is 3. GCS motor subscore is 6.      Cranial Nerves: No facial asymmetry.             Significant Labs: All pertinent labs within the past 24 hours have been reviewed.    Significant Imaging: I have reviewed all pertinent imaging results/findings within the past 24 hours.    Assessment and Plan     * Dysphagia  Dysphagia noted.  Patient with history of esophageal stricture s/p EGD with dilation.   GI  consulted.   Patient also with FOBT+ so this may be investigated as well.     12/25: significant dysphagia currently and over the last few weeks.  Family feels she had dysphagia and this caused her to pass out at home.         Type 2 MI (myocardial infarction)  Pt has Positve Delta, no chest pain, syncopal Episode,  Orhtostatics positive in ED, given pts AGE and risk factors will treat as NSTEMI.   Patient presents with NSTEMI. Chest pain is currently controlled. VIKTOR score is 3. Patient is currently on NSTEMI Pathway.    EKG reviewed. Troponins reviewed and results noted-   Troponin 53, > >88     Lipid panel reviewed and shows-     Lab Results   Component Value Date    LDLCALC 119 12/21/2024     Lab Results   Component Value Date    TRIG 182 (H) 12/21/2024         Medical management includes;  ASA,Beta Blocker, High Intensity Stain, and ACE/ARB Echo has not been performed. Latest ECHO results are as follows- Results for orders placed during the hospital encounter of 03/30/23    Echo    Interpretation Summary  · The left ventricle is normal in size with severe concentric hypertrophy and hyperdynamic systolic function.  · The estimated ejection fraction is 70%.  · Mild tricuspid regurgitation.  · Left ventricular mild outflow tract obstruction is present.  · Normal right ventricular size with normal right ventricular systolic function.  · Normal central venous pressure (3 mmHg).  · The estimated PA systolic pressure is 43 mmHg.  · ECHO is suggestive of hypertensive heart disease vs. hypertrophic cardiomyopathy  .   Cardiology following  Lexiscan recommended once encephalopathy resolves     12/23: lexiscan today.   12/25: lexiscan negative per cardiology     Syncope and collapse  Syncope vs seizure  Echo reveals nl systolic and diastolic dysfunction   Carotid US and EEG and Keppra level pending  Telemetry  Fall precautions  Sz precautions    12/23: MRI negative for new CVA but there was a prior CVA.   Patient with  memory problems, acute on chronic.     12/25: Ziopatch per cardiology       Positive fecal occult blood test  GI consulted  Outpt endoscopy recommended for now  Monitor for blood loss       Gastroesophageal reflux disease  Continue home protonix      Hyperlipidemia  Continue statin      Essential hypertension  Patient's blood pressure range in the last 24 hours was: BP  Min: 96/68  Max: 153/72.The patient's inpatient anti-hypertensive regimen is listed below:  Current Antihypertensives  furosemide tablet 20 mg, Daily, Oral  hydrALAZINE tablet 100 mg, 3 times daily, Oral  valsartan tablet 320 mg, Daily, Oral  metoprolol tartrate (LOPRESSOR) tablet 25 mg, 2 times daily, Oral  nitroGLYCERIN SL tablet 0.4 mg, Every 5 min PRN, Sublingual    Plan  - BP is uncontrolled, will adjust as follows: Pt BP decresed when standing and hR increase + orhthos  - Will hold hydralazine and lasix for now    Depression  Patient has persistent depression which is unknown and is currently controlled. Will Continue anti-depressant medications. We will not consult psychiatry at this time. Patient does not display psychosis at this time. Continue to monitor closely and adjust plan of care as needed.          VTE Risk Mitigation (From admission, onward)           Ordered     enoxaparin injection 40 mg  Daily         12/20/24 2025     IP VTE HIGH RISK PATIENT  Once         12/20/24 2025     Place sequential compression device  Until discontinued         12/20/24 2025                    Discharge Planning   GERALDO: 12/24/2024     Code Status: Full Code   Medical Readiness for Discharge Date: 12/24/2024  Discharge Plan A: Home with family      Please place Justification for DME        Min Santos MD  Department of Hospital Medicine   [facility]

## 2024-12-25 NOTE — NURSING
At v/s check pt c/o nausea/vomiting, and pt BP elevated. Pt denies any pain, headaches, or SOB.  Zofran given. Will recheck BP. Care ongoing.

## 2024-12-26 PROBLEM — I16.0 HYPERTENSIVE URGENCY: Status: ACTIVE | Noted: 2024-12-26

## 2024-12-26 LAB
ANION GAP SERPL CALCULATED.3IONS-SCNC: 13 MMOL/L (ref 7–16)
BASOPHILS # BLD AUTO: 0.01 K/UL (ref 0–0.2)
BASOPHILS NFR BLD AUTO: 0.2 % (ref 0–1)
BUN SERPL-MCNC: 12 MG/DL (ref 10–20)
BUN/CREAT SERPL: 17 (ref 6–20)
CALCIUM SERPL-MCNC: 7.7 MG/DL (ref 8.4–10.2)
CHLORIDE SERPL-SCNC: 100 MMOL/L (ref 98–107)
CO2 SERPL-SCNC: 27 MMOL/L (ref 23–31)
CREAT SERPL-MCNC: 0.71 MG/DL (ref 0.55–1.02)
DIFFERENTIAL METHOD BLD: ABNORMAL
EGFR (NO RACE VARIABLE) (RUSH/TITUS): 89 ML/MIN/1.73M2
EOSINOPHIL # BLD AUTO: 0.08 K/UL (ref 0–0.5)
EOSINOPHIL NFR BLD AUTO: 1.8 % (ref 1–4)
ERYTHROCYTE [DISTWIDTH] IN BLOOD BY AUTOMATED COUNT: 16 % (ref 11.5–14.5)
GLUCOSE SERPL-MCNC: 72 MG/DL (ref 82–115)
HCT VFR BLD AUTO: 33.1 % (ref 38–47)
HGB BLD-MCNC: 10.3 G/DL (ref 12–16)
IMM GRANULOCYTES # BLD AUTO: 0.06 K/UL (ref 0–0.04)
IMM GRANULOCYTES NFR BLD: 1.3 % (ref 0–0.4)
LYMPHOCYTES # BLD AUTO: 1.04 K/UL (ref 1–4.8)
LYMPHOCYTES NFR BLD AUTO: 23.1 % (ref 27–41)
MCH RBC QN AUTO: 25.3 PG (ref 27–31)
MCHC RBC AUTO-ENTMCNC: 31.1 G/DL (ref 32–36)
MCV RBC AUTO: 81.3 FL (ref 80–96)
MONOCYTES # BLD AUTO: 0.48 K/UL (ref 0–0.8)
MONOCYTES NFR BLD AUTO: 10.7 % (ref 2–6)
MPC BLD CALC-MCNC: 12.6 FL (ref 9.4–12.4)
NEUTROPHILS # BLD AUTO: 2.83 K/UL (ref 1.8–7.7)
NEUTROPHILS NFR BLD AUTO: 62.9 % (ref 53–65)
NRBC # BLD AUTO: 0.02 X10E3/UL
NRBC, AUTO (.00): 0.4 %
PLATELET # BLD AUTO: 148 K/UL (ref 150–400)
POTASSIUM SERPL-SCNC: 3.1 MMOL/L (ref 3.5–5.1)
RBC # BLD AUTO: 4.07 M/UL (ref 4.2–5.4)
SODIUM SERPL-SCNC: 137 MMOL/L (ref 136–145)
WBC # BLD AUTO: 4.5 K/UL (ref 4.5–11)

## 2024-12-26 PROCEDURE — 85025 COMPLETE CBC W/AUTO DIFF WBC: CPT | Performed by: HOSPITALIST

## 2024-12-26 PROCEDURE — 99232 SBSQ HOSP IP/OBS MODERATE 35: CPT | Mod: ,,, | Performed by: INTERNAL MEDICINE

## 2024-12-26 PROCEDURE — 99233 SBSQ HOSP IP/OBS HIGH 50: CPT | Mod: ,,, | Performed by: HOSPITALIST

## 2024-12-26 PROCEDURE — 97530 THERAPEUTIC ACTIVITIES: CPT | Mod: CQ

## 2024-12-26 PROCEDURE — 11000001 HC ACUTE MED/SURG PRIVATE ROOM

## 2024-12-26 PROCEDURE — 80048 BASIC METABOLIC PNL TOTAL CA: CPT | Performed by: HOSPITALIST

## 2024-12-26 PROCEDURE — 97110 THERAPEUTIC EXERCISES: CPT | Mod: CQ

## 2024-12-26 PROCEDURE — 36415 COLL VENOUS BLD VENIPUNCTURE: CPT | Performed by: HOSPITALIST

## 2024-12-26 PROCEDURE — 63600175 PHARM REV CODE 636 W HCPCS: Performed by: NURSE PRACTITIONER

## 2024-12-26 PROCEDURE — 94761 N-INVAS EAR/PLS OXIMETRY MLT: CPT

## 2024-12-26 PROCEDURE — 63600175 PHARM REV CODE 636 W HCPCS: Performed by: INTERNAL MEDICINE

## 2024-12-26 PROCEDURE — 25000003 PHARM REV CODE 250: Performed by: NURSE PRACTITIONER

## 2024-12-26 PROCEDURE — 25000003 PHARM REV CODE 250: Performed by: HOSPITALIST

## 2024-12-26 RX ORDER — HYDRALAZINE HYDROCHLORIDE 50 MG/1
50 TABLET, FILM COATED ORAL EVERY 8 HOURS
Status: DISCONTINUED | OUTPATIENT
Start: 2024-12-26 | End: 2024-12-31

## 2024-12-26 RX ORDER — VALSARTAN 40 MG/1
40 TABLET ORAL DAILY
Status: DISCONTINUED | OUTPATIENT
Start: 2024-12-26 | End: 2025-01-02 | Stop reason: HOSPADM

## 2024-12-26 RX ORDER — FUROSEMIDE 40 MG/1
40 TABLET ORAL DAILY
Status: DISCONTINUED | OUTPATIENT
Start: 2024-12-26 | End: 2025-01-02 | Stop reason: HOSPADM

## 2024-12-26 RX ADMIN — HYDRALAZINE HYDROCHLORIDE 50 MG: 50 TABLET ORAL at 09:12

## 2024-12-26 RX ADMIN — METOPROLOL TARTRATE 25 MG: 25 TABLET, FILM COATED ORAL at 08:12

## 2024-12-26 RX ADMIN — HYDRALAZINE HYDROCHLORIDE 50 MG: 50 TABLET ORAL at 08:12

## 2024-12-26 RX ADMIN — FUROSEMIDE 40 MG: 40 TABLET ORAL at 08:12

## 2024-12-26 RX ADMIN — SENNOSIDES AND DOCUSATE SODIUM 1 TABLET: 50; 8.6 TABLET ORAL at 08:12

## 2024-12-26 RX ADMIN — ATORVASTATIN CALCIUM 80 MG: 80 TABLET, FILM COATED ORAL at 08:12

## 2024-12-26 RX ADMIN — LEVOTHYROXINE SODIUM 50 MCG: 0.05 TABLET ORAL at 05:12

## 2024-12-26 RX ADMIN — METOPROLOL TARTRATE 25 MG: 25 TABLET, FILM COATED ORAL at 09:12

## 2024-12-26 RX ADMIN — SENNOSIDES AND DOCUSATE SODIUM 1 TABLET: 50; 8.6 TABLET ORAL at 09:12

## 2024-12-26 RX ADMIN — POLYETHYLENE GLYCOL 3350 17 G: 17 POWDER, FOR SOLUTION ORAL at 08:12

## 2024-12-26 RX ADMIN — ENOXAPARIN SODIUM 40 MG: 40 INJECTION SUBCUTANEOUS at 05:12

## 2024-12-26 RX ADMIN — SODIUM CHLORIDE, SODIUM LACTATE, POTASSIUM CHLORIDE, AND CALCIUM CHLORIDE: 600; 310; 30; 20 INJECTION, SOLUTION INTRAVENOUS at 05:12

## 2024-12-26 RX ADMIN — ASPIRIN 81 MG CHEWABLE TABLET 81 MG: 81 TABLET CHEWABLE at 08:12

## 2024-12-26 RX ADMIN — PANTOPRAZOLE SODIUM 40 MG: 40 TABLET, DELAYED RELEASE ORAL at 08:12

## 2024-12-26 NOTE — PT/OT/SLP PROGRESS
Physical Therapy Treatment    Patient Name:  Renetta Stewart   MRN:  [id number]    Recommendations:     Discharge Recommendations: Low Intensity Therapy  Discharge Equipment Recommendations: none  Barriers to discharge:  ongoing medical treatment    Assessment:     Renetta Stewart is a 74 y.o. female admitted with a medical diagnosis of Dysphagia.  She presents with the following impairments/functional limitations: weakness, impaired self care skills, impaired functional mobility, gait instability.    Pt competed exercise and performed bed mobs without sig difficulties, however, pt declined to ambulate this date despite encouragement    PT POC discussed with Hank Hayden DPT     Rehab Prognosis: Good; patient would benefit from acute skilled PT services to address these deficits and reach maximum level of function.    Recent Surgery: * No surgery found *      Plan:     During this hospitalization, patient to be seen 5 x/week to address the identified rehab impairments via gait training, therapeutic activities, therapeutic exercises and progress toward the following goals:    Plan of Care Expires:  12/24/24    Subjective     Chief Complaint: Dysphagia   Patient/Family Comments/goals: pt agreeable  Pain/Comfort:         Objective:     Communicated with Gretta Arredondo RN prior to session.  Patient found left sidelying with oxygen, peripheral IV, telemetry upon PT entry to room.     General Precautions: Standard, fall  Orthopedic Precautions:    Braces:    Respiratory Status: Room air     Functional Mobility:  Bed Mobility:     Scooting: stand by assistance  Supine to Sit: stand by assistance and contact guard assistance  Sit to Supine: contact guard assistance  Balance: standby assist sitting EOB      AM-PAC 6 CLICK MOBILITY  Turning over in bed (including adjusting bedclothes, sheets and blankets)?: 3  Sitting down on and standing up from a chair with arms (e.g., wheelchair, bedside commode, etc.): 3  Moving from lying on  back to sitting on the side of the bed?: 3  Moving to and from a bed to a chair (including a wheelchair)?: 3  Need to walk in hospital room?: 3  Climbing 3-5 steps with a railing?: 3  Basic Mobility Total Score: 18       Treatment & Education:  Pt performed 2 x 15 reps (B) LE exercises: ap, quad set, glut set, straight leg raise, hip ab/adduction, short arc quad, heel slide with active assist       Patient left HOB elevated with all lines intact and call button in reach..    GOALS:   Multidisciplinary Problems       Physical Therapy Goals          Problem: Physical Therapy    Goal Priority Disciplines Outcome Interventions   Physical Therapy Goal     PT, PT/OT Progressing    Description: Short Term Goals  Ambulate CGA - 50 feet with rolling walker assistive device.   Supine to sit minimum  Sit to stand minimum  SPT minimum  5. Independent with HEP    Long Term Goals  Ambulate CGA - 100 feet with straight cane assistive device.   Supine to sit stand-by  Sit to stand stand-by  SPT stand-by  Needed equipment for home.                              Time Tracking:     PT Received On: 12/26/24  PT Start Time: 1002     PT Stop Time: 1027  PT Total Time (min): 25 min     Billable Minutes: Therapeutic Activity 8 and Therapeutic Exercise 15    Treatment Type: Treatment  PT/PTA: PTA     Number of PTA visits since last PT visit: 1 12/26/2024

## 2024-12-26 NOTE — PT/OT/SLP PROGRESS
"Occupational Therapy      Patient Name:  Renetta Stewart   MRN:  [id number]    Patient not seen today secondary to Patient unwilling to participate (OT attempted tx this PM; pt states, " I have already done therapy today and do not feel up to more."). Will follow-up 12/27/24.    12/26/2024  "

## 2024-12-26 NOTE — SUBJECTIVE & OBJECTIVE
Interval History:     Review of Systems   Unable to perform ROS: Mental status change     Objective:     Vital Signs (Most Recent):  Temp: 98.8 °F (37.1 °C) (12/26/24 0423)  Pulse: 65 (12/26/24 0424)  Resp: 15 (12/25/24 2108)  BP: (!) 204/84 (12/26/24 0424)  SpO2: 100 % (12/26/24 0424) Vital Signs (24h Range):  Temp:  [96.1 °F (35.6 °C)-99 °F (37.2 °C)] 98.8 °F (37.1 °C)  Pulse:  [62-81] 65  Resp:  [15-18] 15  SpO2:  [96 %-100 %] 100 %  BP: (128-204)/(70-97) 204/84     Weight: 91.6 kg (202 lb)  Body mass index is 34.67 kg/m².    Intake/Output Summary (Last 24 hours) at 12/26/2024 0740  Last data filed at 12/25/2024 1839  Gross per 24 hour   Intake 1090.53 ml   Output --   Net 1090.53 ml         Physical Exam  Vitals reviewed.   Constitutional:       General: She is not in acute distress.     Appearance: She is not ill-appearing, toxic-appearing or diaphoretic.      Comments: Patient is somnolent but arousable.   HENT:      Head: Normocephalic and atraumatic.      Right Ear: External ear normal.      Left Ear: External ear normal.      Mouth/Throat:      Mouth: Mucous membranes are moist.   Eyes:      General: No scleral icterus.     Pupils: Pupils are equal, round, and reactive to light.   Cardiovascular:      Rate and Rhythm: Normal rate and regular rhythm.      Heart sounds: Normal heart sounds. No murmur heard.     No friction rub. No gallop.   Pulmonary:      Effort: Pulmonary effort is normal. No respiratory distress.      Breath sounds: Normal breath sounds. No wheezing, rhonchi or rales.   Abdominal:      General: Bowel sounds are normal.      Palpations: Abdomen is soft.      Tenderness: There is no abdominal tenderness. There is no guarding or rebound.   Musculoskeletal:         General: No swelling.      Right lower leg: No edema.      Left lower leg: No edema.   Skin:     General: Skin is warm and dry.      Coloration: Skin is not jaundiced.      Findings: No erythema or rash.   Neurological:      Mental  Status: She is alert.      GCS: GCS eye subscore is 4. GCS verbal subscore is 3. GCS motor subscore is 6.      Cranial Nerves: No facial asymmetry.             Significant Labs: All pertinent labs within the past 24 hours have been reviewed.    Significant Imaging: I have reviewed all pertinent imaging results/findings within the past 24 hours.

## 2024-12-26 NOTE — ASSESSMENT & PLAN NOTE
Dysphagia noted.  Patient with history of esophageal stricture s/p EGD with dilation.   GI consulted.   Patient also with FOBT+ so this may be investigated as well.     12/25: significant dysphagia currently and over the last few weeks.  Family feels she had dysphagia and this caused her to pass out at home.     12/26: Plan for EGD today.

## 2024-12-26 NOTE — H&P
[facility]  Gastroenterology  H&P    Patient Name: Renetta Stewart  MRN: [id number]  Admission Date: 12/20/2024  Code Status: Full Code    Attending Provider: Min Santos MD   Primary Care Physician: Eldon Govea MD  Principal Problem:Dysphagia    Subjective:     History of Present Illness:  this patient is a 74-year-old female with complaints of esophageal dysphagia as well as nausea and vomiting.  She presents for EGD with dilation of the esophagus.  The patient would not give consent for the procedure and states that she wants her daughter to sign for the procedure but her daughter is not available and the daughter's phone goes to [facility] [employer].  Therefore, the EGD with esophageal dilation is canceled today.    Past Medical History:   Diagnosis Date    Anemia     Anxiety     Dementia     Depression     GERD (gastroesophageal reflux disease)     Hyperlipidemia     Hypertension     PONV (postoperative nausea and vomiting)     Stroke        Past Surgical History:   Procedure Laterality Date    CARDIAC PACEMAKER PLACEMENT         Review of patient's allergies indicates:  No Known Allergies  Family History       Problem Relation (Age of Onset)    Hypertension Father          Tobacco Use    Smoking status: Never     Passive exposure: Never    Smokeless tobacco: Never   Substance and Sexual Activity    Alcohol use: Not Currently    Drug use: Never    Sexual activity: Not Currently     Review of Systems   Respiratory:  Positive for choking.    Cardiovascular: Negative.    Gastrointestinal:  Positive for nausea and vomiting.     Objective:     Vital Signs (Most Recent):  Temp: 98.7 °F (37.1 °C) (12/26/24 0859)  Pulse: 74 (12/26/24 0859)  Resp: 14 (12/26/24 0859)  BP: (!) 161/78 (12/26/24 0859)  SpO2: 98 % (12/26/24 0859) Vital Signs (24h Range):  Temp:  [96.1 °F (35.6 °C)-99 °F (37.2 °C)] 98.7 °F (37.1 °C)  Pulse:  [62-81] 74  Resp:  [14-16] 14  SpO2:  [96 %-100 %] 98 %  BP:  (128-204)/(70-97) 161/78     Weight: 91.6 kg (202 lb) (12/26/24 0859)  Body mass index is 34.67 kg/m².      Intake/Output Summary (Last 24 hours) at 12/26/2024 0936  Last data filed at 12/25/2024 1839  Gross per 24 hour   Intake 1090.53 ml   Output --   Net 1090.53 ml       Lines/Drains/Airways       Peripheral Intravenous Line  Duration                  Midline Catheter - Single Lumen 12/22/24 1745 Left median cubital vein (antecubital fossa) 20g x 10cm 3 days                    Physical Exam  Vitals reviewed.   Constitutional:       General: She is not in acute distress.     Appearance: Normal appearance. She is well-developed. She is not ill-appearing.   HENT:      Head: Normocephalic and atraumatic.      Nose: Nose normal.   Eyes:      Pupils: Pupils are equal, round, and reactive to light.   Cardiovascular:      Rate and Rhythm: Normal rate and regular rhythm.   Pulmonary:      Effort: Pulmonary effort is normal.      Breath sounds: Normal breath sounds. No wheezing.   Abdominal:      General: Abdomen is flat. Bowel sounds are normal. There is no distension.      Palpations: Abdomen is soft.      Tenderness: There is no abdominal tenderness. There is no guarding.   Skin:     General: Skin is warm and dry.      Coloration: Skin is not jaundiced.   Neurological:      Mental Status: She is alert.      Motor: Weakness present.   Psychiatric:         Attention and Perception: Attention normal.         Mood and Affect: Affect normal.         Speech: Speech normal.         Behavior: Behavior is cooperative.      Comments: Pt was calm while speaking.         Significant Labs:  CBC:   Recent Labs   Lab 12/26/24  0800   WBC 4.50   HGB 10.3*   HCT 33.1*   *     CMP:   Recent Labs   Lab 12/26/24  0800   GLU 72*   CALCIUM 7.7*      K 3.1*   CO2 27      BUN 12   CREATININE 0.71       Significant Imaging:  Imaging results within the past 24 hours have been reviewed.    Assessment/Plan:     Active Diagnoses:     Diagnosis Date Noted POA    PRINCIPAL PROBLEM:  Dysphagia [R13.10] 03/06/2023 Yes    Hypertensive urgency [I16.0] 12/26/2024 Yes    Blood in stool [K92.1] 12/25/2024 Yes    Acute blood loss anemia [D62] 12/25/2024 Yes    Positive fecal occult blood test [R19.5] 12/22/2024 Unknown    Elevated troponin [R79.89] 12/21/2024 Yes    Syncope [R55] 12/21/2024 Yes    Type 2 MI (myocardial infarction) [I21.A1] 12/20/2024 Yes    Syncope and collapse [R55] 12/20/2024 Yes    Gastroesophageal reflux disease [K21.9] 02/16/2023 Yes    Hyperlipidemia [E78.5] 07/02/2021 Yes    Depression [F32.A] 05/27/2021 Yes    Essential hypertension [I10] 05/27/2021 Yes      Problems Resolved During this Admission:    Diagnosis Date Noted Date Resolved POA    GI bleed [K92.2] 12/22/2024 12/22/2024 Unknown         Impression:  Esophageal dysphagia , blood in stool.   Plan:    We had to cancel of the EGD with dilation because of the patient will give consent and her daughter is not available to consent.  After the patient returned to her room, her daughter called and gave consent for egd with dilation. So, we'll add her on for EGD with dilation tomorrow.  Efraín Giron MD  Gastroenterology  [facility]

## 2024-12-26 NOTE — ASSESSMENT & PLAN NOTE
Patient has a current diagnosis of hypertensive urgency (without evidence of end organ damage) which is uncontrolled.  Latest blood pressure and vitals reviewed-   Temp:  [96.1 °F (35.6 °C)-99 °F (37.2 °C)]   Pulse:  [62-81]   Resp:  [15-18]   BP: (128-204)/(70-97)   SpO2:  [96 %-100 %] .   Patient currently off IV antihypertensives.   Home meds for hypertension were reviewed and noted below.   Hypertension Medications               furosemide (LASIX) 20 MG tablet Take 1 tablet (20 mg total) by mouth once daily.    hydrALAZINE (APRESOLINE) 100 MG tablet Take 1 tablet (100 mg total) by mouth 3 (three) times daily.    valsartan (DIOVAN) 320 MG tablet Take 1 tablet (320 mg total) by mouth once daily.            Medication adjustment for hospital antihypertensives is as follows-   Adding back home medications over time.  Patient was not able to swallow but able to swallow now.     Will aim for controlled BP reduction by medications noted above. Monitor and mitigate end organ damage as indicated.

## 2024-12-26 NOTE — PROGRESS NOTES
[facility] Hospital Medicine  Progress Note    Patient Name: Renetta Stewart  MRN: [id number]  Patient Class: IP- Inpatient   Admission Date: 12/20/2024  Length of Stay: 6 days  Attending Physician: Min Santos MD  Primary Care Provider: Eldon Govea MD        Subjective     Principal Problem:Dysphagia    HPI:  Pt is a 74-year-old female who presented to her doctor's office today.  She states she had been feeling well just a lot of fatigue.  While she was at the doctor's office she had a syncopal episode with loss of bladder control.  Bystanders report loss of consciousness was less than 15 seconds.  There was no observed seizure-like activity.  Patient denies chest pain, shortness of breath, palpitations, feeling of doom prior to syncopal episode.  Patient reports she does not have pain or shortness of breath at present.. PMH HTN, HLD, CVA, GERD, and pacemaker    In the ED initial vital signs were blood pressure of 143/77, temp 96.9° heart rate 77, O2 sat 98% on room air who.  Initial lab workup revealed a sodium of 147 glucose 117 BUN 22 creatinine 1.35.  CBC is unremarkable.  ProBNP was 74.  Initial troponin 53.6 repeat troponin at 88.3 patient has positive delta. EKG show NSR Rate 77, no pacer spike no st elevation.     Patient will be admitted to hospital medicine service under the direct supervision of Dr KHALIL  for further evaluation and management.     Overview/Hospital Course:  No notes on file    Interval History:     Review of Systems   Unable to perform ROS: Mental status change     Objective:     Vital Signs (Most Recent):  Temp: 98.8 °F (37.1 °C) (12/26/24 0423)  Pulse: 65 (12/26/24 0424)  Resp: 15 (12/25/24 2108)  BP: (!) 204/84 (12/26/24 0424)  SpO2: 100 % (12/26/24 0424) Vital Signs (24h Range):  Temp:  [96.1 °F (35.6 °C)-99 °F (37.2 °C)] 98.8 °F (37.1 °C)  Pulse:  [62-81] 65  Resp:  [15-18] 15  SpO2:  [96 %-100 %] 100 %  BP: (128-204)/(70-97) 204/84     Weight:  91.6 kg (202 lb)  Body mass index is 34.67 kg/m².    Intake/Output Summary (Last 24 hours) at 12/26/2024 0740  Last data filed at 12/25/2024 1839  Gross per 24 hour   Intake 1090.53 ml   Output --   Net 1090.53 ml         Physical Exam  Vitals reviewed.   Constitutional:       General: She is not in acute distress.     Appearance: She is not ill-appearing, toxic-appearing or diaphoretic.      Comments: Patient is somnolent but arousable.   HENT:      Head: Normocephalic and atraumatic.      Right Ear: External ear normal.      Left Ear: External ear normal.      Mouth/Throat:      Mouth: Mucous membranes are moist.   Eyes:      General: No scleral icterus.     Pupils: Pupils are equal, round, and reactive to light.   Cardiovascular:      Rate and Rhythm: Normal rate and regular rhythm.      Heart sounds: Normal heart sounds. No murmur heard.     No friction rub. No gallop.   Pulmonary:      Effort: Pulmonary effort is normal. No respiratory distress.      Breath sounds: Normal breath sounds. No wheezing, rhonchi or rales.   Abdominal:      General: Bowel sounds are normal.      Palpations: Abdomen is soft.      Tenderness: There is no abdominal tenderness. There is no guarding or rebound.   Musculoskeletal:         General: No swelling.      Right lower leg: No edema.      Left lower leg: No edema.   Skin:     General: Skin is warm and dry.      Coloration: Skin is not jaundiced.      Findings: No erythema or rash.   Neurological:      Mental Status: She is alert.      GCS: GCS eye subscore is 4. GCS verbal subscore is 3. GCS motor subscore is 6.      Cranial Nerves: No facial asymmetry.             Significant Labs: All pertinent labs within the past 24 hours have been reviewed.    Significant Imaging: I have reviewed all pertinent imaging results/findings within the past 24 hours.    Assessment and Plan     * Dysphagia  Dysphagia noted.  Patient with history of esophageal stricture s/p EGD with dilation.   GI  consulted.   Patient also with FOBT+ so this may be investigated as well.     12/25: significant dysphagia currently and over the last few weeks.  Family feels she had dysphagia and this caused her to pass out at home.     12/26: Plan for EGD today.         Hypertensive urgency  Patient has a current diagnosis of hypertensive urgency (without evidence of end organ damage) which is uncontrolled.  Latest blood pressure and vitals reviewed-   Temp:  [96.1 °F (35.6 °C)-99 °F (37.2 °C)]   Pulse:  [62-81]   Resp:  [15-18]   BP: (128-204)/(70-97)   SpO2:  [96 %-100 %] .   Patient currently off IV antihypertensives.   Home meds for hypertension were reviewed and noted below.   Hypertension Medications               furosemide (LASIX) 20 MG tablet Take 1 tablet (20 mg total) by mouth once daily.    hydrALAZINE (APRESOLINE) 100 MG tablet Take 1 tablet (100 mg total) by mouth 3 (three) times daily.    valsartan (DIOVAN) 320 MG tablet Take 1 tablet (320 mg total) by mouth once daily.            Medication adjustment for hospital antihypertensives is as follows-   Adding back home medications over time.  Patient was not able to swallow but able to swallow now.     Will aim for controlled BP reduction by medications noted above. Monitor and mitigate end organ damage as indicated.    Syncope and collapse  Syncope vs seizure  Echo reveals nl systolic and diastolic dysfunction   Carotid US and EEG and Keppra level pending  Telemetry  Fall precautions  Sz precautions    12/23: MRI negative for new CVA but there was a prior CVA.   Patient with memory problems, acute on chronic.     12/25: Netooparadha per cardiology       Type 2 MI (myocardial infarction)  Pt has Positve Delta, no chest pain, syncopal Episode,  Orhtostatics positive in ED, given pts AGE and risk factors will treat as NSTEMI.   Patient presents with NSTEMI. Chest pain is currently controlled. VIKTOR score is 3. Patient is currently on NSTEMI Pathway.    EKG reviewed.  Troponins reviewed and results noted-   Troponin 53, > >88     Lipid panel reviewed and shows-     Lab Results   Component Value Date    LDLCALC 119 12/21/2024     Lab Results   Component Value Date    TRIG 182 (H) 12/21/2024         Medical management includes;  ASA,Beta Blocker, High Intensity Stain, and ACE/ARB Echo has not been performed. Latest ECHO results are as follows- Results for orders placed during the hospital encounter of 03/30/23    Echo    Interpretation Summary  · The left ventricle is normal in size with severe concentric hypertrophy and hyperdynamic systolic function.  · The estimated ejection fraction is 70%.  · Mild tricuspid regurgitation.  · Left ventricular mild outflow tract obstruction is present.  · Normal right ventricular size with normal right ventricular systolic function.  · Normal central venous pressure (3 mmHg).  · The estimated PA systolic pressure is 43 mmHg.  · ECHO is suggestive of hypertensive heart disease vs. hypertrophic cardiomyopathy  .   Cardiology following  Lexiscan recommended once encephalopathy resolves     12/23: lexiscan today.   12/25: lexiscan negative per cardiology     Positive fecal occult blood test  GI consulted  Outpt endoscopy recommended for now  Monitor for blood loss       Gastroesophageal reflux disease  Continue home protonix      Hyperlipidemia  Continue statin      Essential hypertension  Patient's blood pressure range in the last 24 hours was: BP  Min: 96/68  Max: 153/72.The patient's inpatient anti-hypertensive regimen is listed below:  Current Antihypertensives  furosemide tablet 20 mg, Daily, Oral  hydrALAZINE tablet 100 mg, 3 times daily, Oral  valsartan tablet 320 mg, Daily, Oral  metoprolol tartrate (LOPRESSOR) tablet 25 mg, 2 times daily, Oral  nitroGLYCERIN SL tablet 0.4 mg, Every 5 min PRN, Sublingual    Plan  - BP is uncontrolled, will adjust as follows: Pt BP decresed when standing and hR increase + orhthos  - Will hold hydralazine and  lasix for now    Depression  Patient has persistent depression which is unknown and is currently controlled. Will Continue anti-depressant medications. We will not consult psychiatry at this time. Patient does not display psychosis at this time. Continue to monitor closely and adjust plan of care as needed.          VTE Risk Mitigation (From admission, onward)           Ordered     enoxaparin injection 40 mg  Daily         12/20/24 2025     IP VTE HIGH RISK PATIENT  Once         12/20/24 2025     Place sequential compression device  Until discontinued         12/20/24 2025                    Discharge Planning   GERALDO: 12/24/2024     Code Status: Full Code   Medical Readiness for Discharge Date: 12/24/2024  Discharge Plan A: Home with family          Please place Justification for DME        Min Santos MD  Department of Hospital Medicine   [facility]

## 2024-12-26 NOTE — PLAN OF CARE
Problem: Adult Inpatient Plan of Care  Goal: Plan of Care Review  12/26/2024 1732 by Gretta Arredondo RN  Outcome: Progressing  12/26/2024 0945 by Gretta Arredondo RN  Outcome: Progressing  Goal: Patient-Specific Goal (Individualized)  12/26/2024 1732 by Gretta Arredondo RN  Outcome: Progressing  12/26/2024 0945 by Gretta Arredondo RN  Outcome: Progressing  Goal: Absence of Hospital-Acquired Illness or Injury  12/26/2024 1732 by Gretta Arredondo RN  Outcome: Progressing  12/26/2024 0945 by Gretta Arredondo RN  Outcome: Progressing  Goal: Optimal Comfort and Wellbeing  12/26/2024 1732 by Gretta Arredondo RN  Outcome: Progressing  12/26/2024 0945 by Gretta Arredondo RN  Outcome: Progressing  Goal: Readiness for Transition of Care  12/26/2024 1732 by Gretta Arredondo RN  Outcome: Progressing  12/26/2024 0945 by Gretta Arredondo RN  Outcome: Progressing     Problem: Acute Coronary Syndrome  Goal: Optimal Adaptation to Illness  12/26/2024 1732 by Gretta Arredondo RN  Outcome: Progressing  12/26/2024 0945 by Gretta Arredondo RN  Outcome: Progressing  Goal: Absence of Cardiac-Related Pain  12/26/2024 1732 by Gretta Arredondo RN  Outcome: Progressing  12/26/2024 0945 by Gretta Arredondo RN  Outcome: Progressing  Goal: Normalized Cardiac Rhythm  12/26/2024 1732 by Gretta Arredondo RN  Outcome: Progressing  12/26/2024 0945 by Gretta Arredondo RN  Outcome: Progressing  Goal: Effective Cardiac Pump Function  12/26/2024 1732 by Gretta Arredondo RN  Outcome: Progressing  12/26/2024 0945 by Gretta Arredondo RN  Outcome: Progressing  Goal: Adequate Tissue Perfusion  12/26/2024 1732 by Gretta Arredondo RN  Outcome: Progressing  12/26/2024 0945 by Gretta Arredondo RN  Outcome: Progressing     Problem: Cardiac Catheterization (Diagnostic/Interventional)  Goal: Absence of Bleeding  12/26/2024 1732 by Gretta Arredondo RN  Outcome: Progressing  12/26/2024 0945 by Liberal, Gretta, RN  Outcome: Progressing  Goal: Absence of Contrast-Induced Injury  12/26/2024 1732 by Gretta Arredondo, RN  Outcome:  Progressing  12/26/2024 0945 by Gretta Arredondo RN  Outcome: Progressing  Goal: Stable Heart Rate and Rhythm  12/26/2024 1732 by Gretta Arredondo RN  Outcome: Progressing  12/26/2024 0945 by Gretta Arredondo RN  Outcome: Progressing  Goal: Absence of Embolism Signs and Symptoms  12/26/2024 1732 by Gretta Arredondo RN  Outcome: Progressing  12/26/2024 0945 by Gretta Arredondo RN  Outcome: Progressing  Goal: Anesthesia/Sedation Recovery  12/26/2024 1732 by Gretta Arredondo RN  Outcome: Progressing  12/26/2024 0945 by Gretta Arredondo RN  Outcome: Progressing  Goal: Optimal Pain Control and Function  12/26/2024 1732 by Gretta Arredondo RN  Outcome: Progressing  12/26/2024 0945 by Gretta Arredondo RN  Outcome: Progressing  Goal: Absence of Vascular Access Complication  12/26/2024 1732 by Gretta Arredondo RN  Outcome: Progressing  12/26/2024 0945 by Gretta Arredondo RN  Outcome: Progressing     Problem: Skin Injury Risk Increased  Goal: Skin Health and Integrity  12/26/2024 1732 by Gretta Arredondo RN  Outcome: Progressing  12/26/2024 0945 by Gretta Arredondo RN  Outcome: Progressing     Problem: Infection  Goal: Absence of Infection Signs and Symptoms  12/26/2024 1732 by Gretta Arredondo RN  Outcome: Progressing  12/26/2024 0945 by Gretta Arredondo RN  Outcome: Progressing     Problem: Gas Exchange Impaired  Goal: Optimal Gas Exchange  12/26/2024 1732 by Gretta Arredondo RN  Outcome: Progressing  12/26/2024 0945 by Gretta Arredondo RN  Outcome: Progressing

## 2024-12-27 ENCOUNTER — ANESTHESIA EVENT (OUTPATIENT)
Dept: GASTROENTEROLOGY | Facility: HOSPITAL | Age: 74
End: 2024-12-27
Payer: MEDICARE

## 2024-12-27 ENCOUNTER — ANESTHESIA (OUTPATIENT)
Dept: GASTROENTEROLOGY | Facility: HOSPITAL | Age: 74
End: 2024-12-27
Payer: MEDICARE

## 2024-12-27 PROBLEM — E53.8 FOLATE DEFICIENCY: Status: ACTIVE | Noted: 2024-12-27

## 2024-12-27 PROBLEM — K20.90 ESOPHAGITIS DETERMINED BY ENDOSCOPY: Status: ACTIVE | Noted: 2024-12-27

## 2024-12-27 PROBLEM — K31.7 POLYP OF DUODENUM: Status: ACTIVE | Noted: 2024-12-27

## 2024-12-27 PROBLEM — K29.00 ACUTE SUPERFICIAL GASTRITIS WITHOUT HEMORRHAGE: Status: ACTIVE | Noted: 2024-12-27

## 2024-12-27 LAB
FERRITIN SERPL-MCNC: 573 NG/ML (ref 5–204)
IRON SATN MFR SERPL: 51 % (ref 20–50)
IRON SERPL-MCNC: 73 UG/DL (ref 50–170)
TIBC SERPL-MCNC: 142 UG/DL (ref 250–450)
TIBC SERPL-MCNC: 69 UG/DL (ref 70–310)
TRANSFERRIN SERPL-MCNC: 119 MG/DL (ref 173–360)

## 2024-12-27 PROCEDURE — 88342 IMHCHEM/IMCYTCHM 1ST ANTB: CPT | Mod: 26,59,, | Performed by: PATHOLOGY

## 2024-12-27 PROCEDURE — 82728 ASSAY OF FERRITIN: CPT | Performed by: INTERNAL MEDICINE

## 2024-12-27 PROCEDURE — 99900035 HC TECH TIME PER 15 MIN (STAT)

## 2024-12-27 PROCEDURE — 88312 SPECIAL STAINS GROUP 1: CPT | Mod: 26,,, | Performed by: PATHOLOGY

## 2024-12-27 PROCEDURE — 0DB98ZX EXCISION OF DUODENUM, VIA NATURAL OR ARTIFICIAL OPENING ENDOSCOPIC, DIAGNOSTIC: ICD-10-PCS | Performed by: INTERNAL MEDICINE

## 2024-12-27 PROCEDURE — 88360 TUMOR IMMUNOHISTOCHEM/MANUAL: CPT | Mod: 26,,, | Performed by: PATHOLOGY

## 2024-12-27 PROCEDURE — 11000001 HC ACUTE MED/SURG PRIVATE ROOM

## 2024-12-27 PROCEDURE — 94761 N-INVAS EAR/PLS OXIMETRY MLT: CPT

## 2024-12-27 PROCEDURE — 99233 SBSQ HOSP IP/OBS HIGH 50: CPT | Mod: ,,, | Performed by: HOSPITALIST

## 2024-12-27 PROCEDURE — 0DB38ZX EXCISION OF LOWER ESOPHAGUS, VIA NATURAL OR ARTIFICIAL OPENING ENDOSCOPIC, DIAGNOSTIC: ICD-10-PCS | Performed by: INTERNAL MEDICINE

## 2024-12-27 PROCEDURE — 63600175 PHARM REV CODE 636 W HCPCS: Performed by: NURSE PRACTITIONER

## 2024-12-27 PROCEDURE — 88305 TISSUE EXAM BY PATHOLOGIST: CPT | Mod: 26,,, | Performed by: PATHOLOGY

## 2024-12-27 PROCEDURE — 0DB68ZX EXCISION OF STOMACH, VIA NATURAL OR ARTIFICIAL OPENING ENDOSCOPIC, DIAGNOSTIC: ICD-10-PCS | Performed by: INTERNAL MEDICINE

## 2024-12-27 PROCEDURE — 0DB28ZX EXCISION OF MIDDLE ESOPHAGUS, VIA NATURAL OR ARTIFICIAL OPENING ENDOSCOPIC, DIAGNOSTIC: ICD-10-PCS | Performed by: INTERNAL MEDICINE

## 2024-12-27 PROCEDURE — 88342 IMHCHEM/IMCYTCHM 1ST ANTB: CPT | Mod: TC,SUR | Performed by: INTERNAL MEDICINE

## 2024-12-27 PROCEDURE — 25000003 PHARM REV CODE 250: Performed by: NURSE ANESTHETIST, CERTIFIED REGISTERED

## 2024-12-27 PROCEDURE — 36415 COLL VENOUS BLD VENIPUNCTURE: CPT | Performed by: INTERNAL MEDICINE

## 2024-12-27 PROCEDURE — 43239 EGD BIOPSY SINGLE/MULTIPLE: CPT | Performed by: INTERNAL MEDICINE

## 2024-12-27 PROCEDURE — 25000003 PHARM REV CODE 250: Performed by: NURSE PRACTITIONER

## 2024-12-27 PROCEDURE — 25000003 PHARM REV CODE 250: Performed by: HOSPITALIST

## 2024-12-27 PROCEDURE — 43239 EGD BIOPSY SINGLE/MULTIPLE: CPT | Mod: ,,, | Performed by: INTERNAL MEDICINE

## 2024-12-27 PROCEDURE — 83550 IRON BINDING TEST: CPT | Performed by: INTERNAL MEDICINE

## 2024-12-27 PROCEDURE — 63600175 PHARM REV CODE 636 W HCPCS: Performed by: NURSE ANESTHETIST, CERTIFIED REGISTERED

## 2024-12-27 PROCEDURE — 88341 IMHCHEM/IMCYTCHM EA ADD ANTB: CPT | Mod: 26,59,, | Performed by: PATHOLOGY

## 2024-12-27 PROCEDURE — 25000003 PHARM REV CODE 250: Performed by: INTERNAL MEDICINE

## 2024-12-27 RX ORDER — SODIUM CHLORIDE, SODIUM LACTATE, POTASSIUM CHLORIDE, CALCIUM CHLORIDE 600; 310; 30; 20 MG/100ML; MG/100ML; MG/100ML; MG/100ML
INJECTION, SOLUTION INTRAVENOUS CONTINUOUS
Status: DISCONTINUED | OUTPATIENT
Start: 2024-12-27 | End: 2024-12-28

## 2024-12-27 RX ORDER — SODIUM CHLORIDE 0.9 % (FLUSH) 0.9 %
10 SYRINGE (ML) INJECTION EVERY 6 HOURS PRN
Status: DISCONTINUED | OUTPATIENT
Start: 2024-12-27 | End: 2024-12-27 | Stop reason: HOSPADM

## 2024-12-27 RX ORDER — LIDOCAINE HYDROCHLORIDE 20 MG/ML
INJECTION, SOLUTION EPIDURAL; INFILTRATION; INTRACAUDAL; PERINEURAL
Status: DISCONTINUED | OUTPATIENT
Start: 2024-12-27 | End: 2024-12-27

## 2024-12-27 RX ORDER — ETOMIDATE 2 MG/ML
INJECTION INTRAVENOUS
Status: DISCONTINUED | OUTPATIENT
Start: 2024-12-27 | End: 2024-12-27

## 2024-12-27 RX ORDER — PROPOFOL 10 MG/ML
INJECTION, EMULSION INTRAVENOUS
Status: DISCONTINUED | OUTPATIENT
Start: 2024-12-27 | End: 2024-12-27

## 2024-12-27 RX ADMIN — ETOMIDATE 10 MG: 2 INJECTION INTRAVENOUS at 02:12

## 2024-12-27 RX ADMIN — HYDRALAZINE HYDROCHLORIDE 50 MG: 50 TABLET ORAL at 08:12

## 2024-12-27 RX ADMIN — METOPROLOL TARTRATE 25 MG: 25 TABLET, FILM COATED ORAL at 08:12

## 2024-12-27 RX ADMIN — ENOXAPARIN SODIUM 40 MG: 40 INJECTION SUBCUTANEOUS at 04:12

## 2024-12-27 RX ADMIN — LIDOCAINE HYDROCHLORIDE 50 MG: 20 INJECTION, SOLUTION INTRAVENOUS at 02:12

## 2024-12-27 RX ADMIN — ONDANSETRON 4 MG: 2 INJECTION INTRAMUSCULAR; INTRAVENOUS at 04:12

## 2024-12-27 RX ADMIN — METOPROLOL TARTRATE 25 MG: 25 TABLET, FILM COATED ORAL at 10:12

## 2024-12-27 RX ADMIN — ATORVASTATIN CALCIUM 80 MG: 80 TABLET, FILM COATED ORAL at 10:12

## 2024-12-27 RX ADMIN — PROPOFOL 70 MG: 10 INJECTION, EMULSION INTRAVENOUS at 02:12

## 2024-12-27 RX ADMIN — PANTOPRAZOLE SODIUM 40 MG: 40 TABLET, DELAYED RELEASE ORAL at 10:12

## 2024-12-27 RX ADMIN — FUROSEMIDE 40 MG: 40 TABLET ORAL at 10:12

## 2024-12-27 RX ADMIN — VALSARTAN 40 MG: 40 TABLET, FILM COATED ORAL at 10:12

## 2024-12-27 RX ADMIN — PROPOFOL 30 MG: 10 INJECTION, EMULSION INTRAVENOUS at 02:12

## 2024-12-27 NOTE — ASSESSMENT & PLAN NOTE
GI consulted  Outpt endoscopy recommended for now  Monitor for blood loss     12/27: plan for EGD today.

## 2024-12-27 NOTE — TRANSFER OF CARE
"Anesthesia Transfer of Care Note    Patient: Renetta Stewart    Procedure(s) Performed: * No procedures listed *    Patient location: PACU    Anesthesia Type: MAC    Transport from OR: Transported from OR on room air with adequate spontaneous ventilation    Post pain: adequate analgesia    Post assessment: no apparent anesthetic complications and tolerated procedure well    Post vital signs: stable    Level of consciousness: alert and responds to stimulation    Nausea/Vomiting: no nausea/vomiting    Complications: none    Transfer of care protocol was followed      Last vitals: Visit Vitals  /62   Pulse 97   Temp 36.9 °C (98.5 °F) (Oral)   Resp 19   Ht 5' 4" (1.626 m)   Wt 92.5 kg (204 lb)   SpO2 97%   Breastfeeding No   BMI 35.02 kg/m²     "

## 2024-12-27 NOTE — PLAN OF CARE
12/27/24 1303   Rounds   Attendance Provider;;Charge nurse;Physical therapist;Pharmacist   Discharge Plan A Home with family   Why the patient remains in the hospital Requires continued medical care   Transition of Care Barriers None     Chart reviewed. Pt for EGD today. Ss following

## 2024-12-27 NOTE — PROGRESS NOTES
[facility] Hospital Medicine  Progress Note    Patient Name: Renetta Stewart  MRN: [id number]  Patient Class: IP- Inpatient   Admission Date: 12/20/2024  Length of Stay: 7 days  Attending Physician: Min Santos MD  Primary Care Provider: Eldon Govea MD        Subjective     Principal Problem:Dysphagia    HPI:  Pt is a 74-year-old female who presented to her doctor's office today.  She states she had been feeling well just a lot of fatigue.  While she was at the doctor's office she had a syncopal episode with loss of bladder control.  Bystanders report loss of consciousness was less than 15 seconds.  There was no observed seizure-like activity.  Patient denies chest pain, shortness of breath, palpitations, feeling of doom prior to syncopal episode.  Patient reports she does not have pain or shortness of breath at present.. PMH HTN, HLD, CVA, GERD, and pacemaker    In the ED initial vital signs were blood pressure of 143/77, temp 96.9° heart rate 77, O2 sat 98% on room air who.  Initial lab workup revealed a sodium of 147 glucose 117 BUN 22 creatinine 1.35.  CBC is unremarkable.  ProBNP was 74.  Initial troponin 53.6 repeat troponin at 88.3 patient has positive delta. EKG show NSR Rate 77, no pacer spike no st elevation.     Patient will be admitted to hospital medicine service under the direct supervision of Dr KHALIL  for further evaluation and management.     Overview/Hospital Course:  No notes on file    Interval History:     Review of Systems   Unable to perform ROS: Mental status change     Objective:     Vital Signs (Most Recent):  Temp: 98 °F (36.7 °C) (12/27/24 0404)  Pulse: 69 (12/27/24 0404)  Resp: 18 (12/27/24 0404)  BP: (!) 165/84 (12/27/24 0404)  SpO2: 97 % (12/27/24 0404) Vital Signs (24h Range):  Temp:  [98 °F (36.7 °C)-98.7 °F (37.1 °C)] 98 °F (36.7 °C)  Pulse:  [64-74] 69  Resp:  [14-18] 18  SpO2:  [95 %-100 %] 97 %  BP: (161-189)/(76-96) 165/84     Weight: 92.9 kg  (204 lb 12.9 oz)  Body mass index is 35.16 kg/m².  No intake or output data in the 24 hours ending 12/27/24 0731      Physical Exam  Vitals reviewed.   Constitutional:       General: She is not in acute distress.     Appearance: She is not ill-appearing, toxic-appearing or diaphoretic.      Comments: Patient is somnolent but arousable.   HENT:      Head: Normocephalic and atraumatic.      Right Ear: External ear normal.      Left Ear: External ear normal.      Mouth/Throat:      Mouth: Mucous membranes are moist.   Eyes:      General: No scleral icterus.     Pupils: Pupils are equal, round, and reactive to light.   Cardiovascular:      Rate and Rhythm: Normal rate and regular rhythm.      Heart sounds: Normal heart sounds. No murmur heard.     No friction rub. No gallop.   Pulmonary:      Effort: Pulmonary effort is normal. No respiratory distress.      Breath sounds: Normal breath sounds. No wheezing, rhonchi or rales.   Abdominal:      General: Bowel sounds are normal.      Palpations: Abdomen is soft.      Tenderness: There is no abdominal tenderness. There is no guarding or rebound.   Musculoskeletal:         General: No swelling.      Right lower leg: No edema.      Left lower leg: No edema.   Skin:     General: Skin is warm and dry.      Coloration: Skin is not jaundiced.      Findings: No erythema or rash.   Neurological:      Mental Status: She is alert.      GCS: GCS eye subscore is 4. GCS verbal subscore is 3. GCS motor subscore is 6.      Cranial Nerves: No facial asymmetry.             Significant Labs: All pertinent labs within the past 24 hours have been reviewed.    Significant Imaging: I have reviewed all pertinent imaging results/findings within the past 24 hours.    Assessment and Plan     * Dysphagia  Dysphagia noted.  Patient with history of esophageal stricture s/p EGD with dilation.   GI consulted.   Patient also with FOBT+ so this may be investigated as well.     12/25: significant dysphagia  currently and over the last few weeks.  Family feels she had dysphagia and this caused her to pass out at home.     12/26: Plan for EGD today.     12/27: EGD cancelled yesterday due to inability to obtain consent.  Consent obtained now.  I spoke with patient's daughter (Jacque Stewart).          Hypertensive urgency  Patient has a current diagnosis of hypertensive urgency (without evidence of end organ damage) which is uncontrolled.  Latest blood pressure and vitals reviewed-   Temp:  [96.1 °F (35.6 °C)-99 °F (37.2 °C)]   Pulse:  [62-81]   Resp:  [15-18]   BP: (128-204)/(70-97)   SpO2:  [96 %-100 %] .   Patient currently off IV antihypertensives.   Home meds for hypertension were reviewed and noted below.   Hypertension Medications               furosemide (LASIX) 20 MG tablet Take 1 tablet (20 mg total) by mouth once daily.    hydrALAZINE (APRESOLINE) 100 MG tablet Take 1 tablet (100 mg total) by mouth 3 (three) times daily.    valsartan (DIOVAN) 320 MG tablet Take 1 tablet (320 mg total) by mouth once daily.            Medication adjustment for hospital antihypertensives is as follows-   Adding back home medications over time.  Patient was not able to swallow but able to swallow now.     Will aim for controlled BP reduction by medications noted above. Monitor and mitigate end organ damage as indicated.    Syncope and collapse  Syncope vs seizure  Echo reveals nl systolic and diastolic dysfunction   Carotid US and EEG and Keppra level pending  Telemetry  Fall precautions  Sz precautions    12/23: MRI negative for new CVA but there was a prior CVA.   Patient with memory problems, acute on chronic.     12/25: Mathew per cardiology       Type 2 MI (myocardial infarction)  Pt has Positve Delta, no chest pain, syncopal Episode,  Orhtostatics positive in ED, given pts AGE and risk factors will treat as NSTEMI.   Patient presents with NSTEMI. Chest pain is currently controlled. VIKTOR score is 3. Patient is currently on  NSTEMI Pathway.    EKG reviewed. Troponins reviewed and results noted-   Troponin 53, > >88     Lipid panel reviewed and shows-     Lab Results   Component Value Date    LDLCALC 119 12/21/2024     Lab Results   Component Value Date    TRIG 182 (H) 12/21/2024         Medical management includes;  ASA,Beta Blocker, High Intensity Stain, and ACE/ARB Echo has not been performed. Latest ECHO results are as follows- Results for orders placed during the hospital encounter of 03/30/23    Echo    Interpretation Summary  · The left ventricle is normal in size with severe concentric hypertrophy and hyperdynamic systolic function.  · The estimated ejection fraction is 70%.  · Mild tricuspid regurgitation.  · Left ventricular mild outflow tract obstruction is present.  · Normal right ventricular size with normal right ventricular systolic function.  · Normal central venous pressure (3 mmHg).  · The estimated PA systolic pressure is 43 mmHg.  · ECHO is suggestive of hypertensive heart disease vs. hypertrophic cardiomyopathy  .   Cardiology following  Lexiscan recommended once encephalopathy resolves     12/23: lexiscan today.   12/25: lexiscan negative per cardiology     Positive fecal occult blood test  GI consulted  Outpt endoscopy recommended for now  Monitor for blood loss     12/27: plan for EGD today.       Gastroesophageal reflux disease  Continue home protonix      Hyperlipidemia  Continue statin      Essential hypertension  Patient's blood pressure range in the last 24 hours was: BP  Min: 96/68  Max: 153/72.The patient's inpatient anti-hypertensive regimen is listed below:  Current Antihypertensives  furosemide tablet 20 mg, Daily, Oral  hydrALAZINE tablet 100 mg, 3 times daily, Oral  valsartan tablet 320 mg, Daily, Oral  metoprolol tartrate (LOPRESSOR) tablet 25 mg, 2 times daily, Oral  nitroGLYCERIN SL tablet 0.4 mg, Every 5 min PRN, Sublingual    Plan  - BP is uncontrolled, will adjust as follows: Pt BP decresed when  standing and hR increase + orhthos  - Will hold hydralazine and lasix for now    Depression  Patient has persistent depression which is unknown and is currently controlled. Will Continue anti-depressant medications. We will not consult psychiatry at this time. Patient does not display psychosis at this time. Continue to monitor closely and adjust plan of care as needed.          VTE Risk Mitigation (From admission, onward)           Ordered     enoxaparin injection 40 mg  Daily         12/20/24 2025     IP VTE HIGH RISK PATIENT  Once         12/20/24 2025     Place sequential compression device  Until discontinued         12/20/24 2025                    Discharge Planning   GERALDO: 12/27/2024     Code Status: Full Code   Medical Readiness for Discharge Date: 12/24/2024  Discharge Plan A: Home with family        Please place Justification for DME        Min Santos MD  Department of Hospital Medicine   [facility]

## 2024-12-27 NOTE — ANESTHESIA POSTPROCEDURE EVALUATION
Anesthesia Post Evaluation    Patient: Renetta Stewart    Procedure(s) Performed: * No procedures listed *    Final Anesthesia Type: MAC      Patient location during evaluation: PACU  Patient participation: Yes- Able to Participate  Level of consciousness: awake and alert and oriented  Post-procedure vital signs: reviewed and stable  Pain management: adequate  Airway patency: patent    PONV status at discharge: No PONV  Anesthetic complications: no      Cardiovascular status: blood pressure returned to baseline and hemodynamically stable  Respiratory status: unassisted  Hydration status: euvolemic  Follow-up not needed.  Comments: Refer to nursing note for pain/swetha score upon discharge from recovery.              Vitals Value Taken Time   /69 12/27/24 1436   Temp  12/27/24 1438   Pulse 79 12/27/24 1437   Resp 15 12/27/24 1437   SpO2 100 % 12/27/24 1437   Vitals shown include unfiled device data.      No case tracking events are documented in the log.      Pain/Swetha Score: Swetha Score: 9 (12/27/2024  2:21 PM)

## 2024-12-27 NOTE — ANESTHESIA PREPROCEDURE EVALUATION
12/27/2024  Renetta Stewart is a 74 y.o., female.      Pre-op Assessment    I have reviewed the Patient Summary Reports.     I have reviewed the Nursing Notes. I have reviewed the NPO Status.   I have reviewed the Medications.     Review of Systems  Anesthesia Hx:  No problems with previous Anesthesia   History of prior surgery of interest to airway management or planning:          Denies Family Hx of Anesthesia complications.    Denies Personal Hx of Anesthesia complications.                    Cardiovascular:     Hypertension  Past MI               ECG has been reviewed. Left Ventricle: The left ventricle is normal in size. Severely increased wall thickness. There is severe concentric hypertrophy. There is normal systolic function with a visually estimated ejection fraction of 60 - 65%. There is normal diastolic function.  ·  Right Ventricle: Normal right ventricular cavity size. Systolic function could not be assesed.  ·  Aortic Valve: The aortic valve is a trileaflet valve. Mildly calcified cusps.  ·  Mitral Valve: There is mild bileaflet sclerosis. There is mild regurgitation.  ·  Tricuspid Valve: There is mild regurgitation.  ·  Pulmonary Artery: The estimated pulmonary artery systolic pressure is 35 mmHg.  ·  IVC/SVC: Normal venous pressure at 3 mmHg.  ·  ECHO is suggestive of hypertensive heart disease vs. hypertrophic cardiomyopathy                 Hx of Myocardial Infarction                  Hypertension     Atrial Fibrillation     Renal/:  Chronic Renal Disease        Kidney Function/Disease             Hepatic/GI:    Hiatal Hernia, GERD         Gerd    Hernia, Hiatal Hernia      Neurological:   CVA Neuromuscular Disease,                      CVA - Cerebrovasular Accident               Neuromuscular Disease   Endocrine:   Hypothyroidism       Hypothyroidism          Psych:  Psychiatric History                 Active Ambulatory Problems     Diagnosis Date Noted    Depression 05/27/2021    Essential hypertension 05/27/2021    Hyperlipidemia 07/02/2021    Atrial fibrillation 07/02/2021    Morbid obesity 04/19/2022    Primary insomnia 02/16/2023    Acquired hypothyroidism 02/16/2023    Gastroesophageal reflux disease 02/16/2023    Constipation 03/06/2023    Dysphagia 03/06/2023    Weight loss 03/06/2023    Complex renal cyst 03/06/2023    HH (hiatus hernia) 03/11/2023    Stage 3b chronic kidney disease 03/29/2023    Renal insufficiency 03/29/2023    Hypertensive nephrosclerosis 03/29/2023    Failure to thrive in adult 03/29/2023    Cognitive impairment 04/02/2023    Type 2 MI (myocardial infarction) 12/20/2024    Syncope and collapse 12/20/2024    Elevated troponin 12/21/2024    Syncope 12/21/2024    Positive fecal occult blood test 12/22/2024    Blood in stool 12/25/2024    Acute blood loss anemia 12/25/2024    Hypertensive urgency 12/26/2024     Resolved Ambulatory Problems     Diagnosis Date Noted    Abnormal electrocardiogram (ECG) (EKG) 03/17/2022    Lower extremity edema 11/06/2022    GI bleed 12/22/2024     Past Medical History:   Diagnosis Date    Anemia     Anxiety     Dementia     GERD (gastroesophageal reflux disease)     Hypertension     PONV (postoperative nausea and vomiting)     Stroke     Review of patient's allergies indicates:  No Known Allergies   Current Facility-Administered Medications on File Prior to Visit   Medication Dose Route Frequency Provider Last Rate Last Admin    acetaminophen tablet 650 mg  650 mg Oral Q4H PRN Olga Walsh MD        acetaminophen tablet 650 mg  650 mg Oral Q8H PRN Olga Walsh MD        aspirin chewable tablet 81 mg  81 mg Oral Daily Olga Walsh MD   81 mg at 12/26/24 0800    atorvastatin tablet 80 mg  80 mg Oral Daily Olga Walsh MD   80 mg at 12/26/24 0800    dextrose 50% injection 12.5 g  12.5 g Intravenous PRN Olga Walsh MD        dextrose 50%  injection 25 g  25 g Intravenous PRN Olga Walsh MD        enoxaparin injection 40 mg  40 mg Subcutaneous Daily Olga Walsh MD   40 mg at 12/26/24 1715    furosemide tablet 40 mg  40 mg Oral Daily Min Santos MD   40 mg at 12/26/24 0800    glucagon (human recombinant) injection 1 mg  1 mg Intramuscular PRN Olga Walsh MD        glucose chewable tablet 16 g  16 g Oral PRN Olga Walsh MD        glucose chewable tablet 24 g  24 g Oral PRN Olga Walsh MD        hydrALAZINE tablet 50 mg  50 mg Oral Q8H Min Santos MD   50 mg at 12/26/24 2129    HYDROcodone-acetaminophen 5-325 mg per tablet 1 tablet  1 tablet Oral Q6H PRN Olga Walsh MD        levothyroxine tablet 50 mcg  50 mcg Oral Before breakfast Olga Walsh MD   50 mcg at 12/26/24 0556    melatonin tablet 6 mg  6 mg Oral Nightly PRN Olga Walsh MD        metoprolol tartrate (LOPRESSOR) tablet 25 mg  25 mg Oral BID Olga Walsh MD   25 mg at 12/26/24 2129    naloxone 0.4 mg/mL injection 0.02 mg  0.02 mg Intravenous PRN Olga Walsh MD        nitroGLYCERIN SL tablet 0.4 mg  0.4 mg Sublingual Q5 Min PRN Olga Walsh MD        ondansetron injection 4 mg  4 mg Intravenous Q8H PRN Olga Walsh MD   4 mg at 12/25/24 0438    pantoprazole EC tablet 40 mg  40 mg Oral Daily Olga Walsh MD   40 mg at 12/26/24 0800    polyethylene glycol packet 17 g  17 g Oral Daily Olga Walsh MD   17 g at 12/26/24 0800    senna-docusate 8.6-50 mg per tablet 1 tablet  1 tablet Oral BID Olga Walsh MD   1 tablet at 12/26/24 2100    sodium chloride 0.9% flush 10 mL  10 mL Intravenous Q12H PRN Olga Walsh MD        valsartan tablet 40 mg  40 mg Oral Daily Min Santos MD         Current Outpatient Medications on File Prior to Visit   Medication Sig Dispense Refill    esomeprazole (NEXIUM) 40 MG capsule Take 1 capsule (40 mg total) by mouth before breakfast. 90 capsule 1    furosemide (LASIX) 20 MG tablet Take 1 tablet (20 mg  total) by mouth once daily. 90 tablet 1    hydrALAZINE (APRESOLINE) 100 MG tablet Take 1 tablet (100 mg total) by mouth 3 (three) times daily. 270 tablet 1    levothyroxine (SYNTHROID) 50 MCG tablet Take 1 tablet (50 mcg total) by mouth before breakfast. 90 tablet 1    megestroL (MEGACE) 400 mg/10 mL (40 mg/mL) Susp Take 10 mLs (400 mg total) by mouth 2 (two) times daily. 180 mL 1    memantine (NAMENDA) 10 MG Tab Take 1 tablet (10 mg total) by mouth 2 (two) times daily. 180 tablet 1    mirtazapine (REMERON) 15 MG tablet Take 1 tablet (15 mg total) by mouth every evening. 90 tablet 1    oxyCODONE-acetaminophen (PERCOCET) 7.5-325 mg per tablet Take 1 tablet by mouth.      pregabalin (LYRICA) 75 MG capsule Take 75 mg by mouth every evening.      QUEtiapine (SEROQUEL) 25 MG Tab Take 25 mg by mouth every evening.      sertraline (ZOLOFT) 50 MG tablet Take 1 tablet (50 mg total) by mouth every evening. 90 tablet 1    valsartan (DIOVAN) 320 MG tablet Take 1 tablet (320 mg total) by mouth once daily. 90 tablet 1      Past Surgical History:   Procedure Laterality Date    CARDIAC PACEMAKER PLACEMENT          Physical Exam  General: Well nourished    Airway:  Mallampati: II   Mouth Opening: Normal  TM Distance: Normal, at least 6 cm  Tongue: Normal  Neck ROM: Normal ROM        Anesthesia Plan  Type of Anesthesia, risks & benefits discussed:    Anesthesia Type: MAC  Intra-op Monitoring Plan: Standard ASA Monitors  Post Op Pain Control Plan: multimodal analgesia  Induction:  IV  Informed Consent: Informed consent signed with the Patient and all parties understand the risks and agree with anesthesia plan.  All questions answered. Patient consented to blood products? No  ASA Score: 3  Day of Surgery Review of History & Physical: H&P Update referred to the surgeon/provider.I have interviewed and examined the patient. I have reviewed the patient's H&P dated: There are no significant changes.     Ready For Surgery From Anesthesia  Perspective.     .

## 2024-12-27 NOTE — ASSESSMENT & PLAN NOTE
Dysphagia noted.  Patient with history of esophageal stricture s/p EGD with dilation.   GI consulted.   Patient also with FOBT+ so this may be investigated as well.     12/25: significant dysphagia currently and over the last few weeks.  Family feels she had dysphagia and this caused her to pass out at home.     12/26: Plan for EGD today.     12/27: EGD cancelled yesterday due to inability to obtain consent.  Consent obtained now.  I spoke with patient's daughter (Jacque Stewart).

## 2024-12-27 NOTE — SUBJECTIVE & OBJECTIVE
Interval History:     Review of Systems   Unable to perform ROS: Mental status change     Objective:     Vital Signs (Most Recent):  Temp: 98 °F (36.7 °C) (12/27/24 0404)  Pulse: 69 (12/27/24 0404)  Resp: 18 (12/27/24 0404)  BP: (!) 165/84 (12/27/24 0404)  SpO2: 97 % (12/27/24 0404) Vital Signs (24h Range):  Temp:  [98 °F (36.7 °C)-98.7 °F (37.1 °C)] 98 °F (36.7 °C)  Pulse:  [64-74] 69  Resp:  [14-18] 18  SpO2:  [95 %-100 %] 97 %  BP: (161-189)/(76-96) 165/84     Weight: 92.9 kg (204 lb 12.9 oz)  Body mass index is 35.16 kg/m².  No intake or output data in the 24 hours ending 12/27/24 0731      Physical Exam  Vitals reviewed.   Constitutional:       General: She is not in acute distress.     Appearance: She is not ill-appearing, toxic-appearing or diaphoretic.      Comments: Patient is somnolent but arousable.   HENT:      Head: Normocephalic and atraumatic.      Right Ear: External ear normal.      Left Ear: External ear normal.      Mouth/Throat:      Mouth: Mucous membranes are moist.   Eyes:      General: No scleral icterus.     Pupils: Pupils are equal, round, and reactive to light.   Cardiovascular:      Rate and Rhythm: Normal rate and regular rhythm.      Heart sounds: Normal heart sounds. No murmur heard.     No friction rub. No gallop.   Pulmonary:      Effort: Pulmonary effort is normal. No respiratory distress.      Breath sounds: Normal breath sounds. No wheezing, rhonchi or rales.   Abdominal:      General: Bowel sounds are normal.      Palpations: Abdomen is soft.      Tenderness: There is no abdominal tenderness. There is no guarding or rebound.   Musculoskeletal:         General: No swelling.      Right lower leg: No edema.      Left lower leg: No edema.   Skin:     General: Skin is warm and dry.      Coloration: Skin is not jaundiced.      Findings: No erythema or rash.   Neurological:      Mental Status: She is alert.      GCS: GCS eye subscore is 4. GCS verbal subscore is 3. GCS motor subscore is  6.      Cranial Nerves: No facial asymmetry.             Significant Labs: All pertinent labs within the past 24 hours have been reviewed.    Significant Imaging: I have reviewed all pertinent imaging results/findings within the past 24 hours.

## 2024-12-27 NOTE — PT/OT/SLP PROGRESS
Physical Therapy      Patient Name:  Renetta Stewart   MRN:  [id number]    Patient not seen today secondary to (AM)Nausea/vomiting, (PM) - Off the floor for procedure/surgery. Will follow-up next treatment date.

## 2024-12-27 NOTE — H&P
[facility]  Gastroenterology  H&P    Patient Name: Renetta Stewart  MRN: [id number]  Admission Date: 12/20/2024  Code Status: Full Code    Attending Provider: Min Santos MD   Primary Care Physician: Eldon Govea MD  Principal Problem:Dysphagia    Subjective:     History of Present Illness: This  patient is a 74-year-old female with complaints of esophageal dysphagia associated with some nausea and vomiting.  She presents for EGD with dilation of the esophagus    Past Medical History:   Diagnosis Date    Anemia     Anxiety     Dementia     Depression     GERD (gastroesophageal reflux disease)     Hyperlipidemia     Hypertension     PONV (postoperative nausea and vomiting)     Stroke        Past Surgical History:   Procedure Laterality Date    CARDIAC PACEMAKER PLACEMENT         Review of patient's allergies indicates:  No Known Allergies  Family History       Problem Relation (Age of Onset)    Hypertension Father          Tobacco Use    Smoking status: Never     Passive exposure: Never    Smokeless tobacco: Never   Substance and Sexual Activity    Alcohol use: Not Currently    Drug use: Never    Sexual activity: Not Currently     Review of Systems   Constitutional:  Positive for activity change.   Respiratory: Negative.     Cardiovascular: Negative.    Gastrointestinal:  Positive for nausea and vomiting.   Neurological:  Positive for weakness.     Objective:     Vital Signs (Most Recent):  Temp: 98.5 °F (36.9 °C) (12/27/24 1303)  Pulse: 82 (12/27/24 1303)  Resp: 15 (12/27/24 1303)  BP: (!) 196/81 (12/27/24 1303)  SpO2: 98 % (12/27/24 1303) Vital Signs (24h Range):  Temp:  [98 °F (36.7 °C)-98.7 °F (37.1 °C)] 98.5 °F (36.9 °C)  Pulse:  [64-82] 82  Resp:  [15-18] 15  SpO2:  [96 %-98 %] 98 %  BP: (145-196)/() 196/81     Weight: 92.5 kg (204 lb) (12/27/24 1303)  Body mass index is 35.02 kg/m².    No intake or output data in the 24 hours ending 12/27/24 1410    Lines/Drains/Airways        Peripheral Intravenous Line  Duration                  Midline Catheter - Single Lumen 12/22/24 1745 Left median cubital vein (antecubital fossa) 20g x 10cm 4 days         Peripheral IV - Single Lumen 12/27/24 1317 22 G Left;Posterior Hand <1 day                    Physical Exam  Vitals reviewed.   Constitutional:       General: She is not in acute distress.     Appearance: Normal appearance. She is well-developed. She is obese. She is not ill-appearing.   HENT:      Head: Normocephalic and atraumatic.      Nose: Nose normal.   Eyes:      Pupils: Pupils are equal, round, and reactive to light.   Cardiovascular:      Rate and Rhythm: Normal rate and regular rhythm.   Pulmonary:      Effort: Pulmonary effort is normal.      Breath sounds: Normal breath sounds. No wheezing.   Abdominal:      General: Abdomen is flat. Bowel sounds are normal. There is no distension.      Palpations: Abdomen is soft.      Tenderness: There is no abdominal tenderness. There is no guarding.   Skin:     General: Skin is warm and dry.      Coloration: Skin is not jaundiced.   Neurological:      Mental Status: She is alert.   Psychiatric:         Attention and Perception: Attention normal.         Mood and Affect: Affect normal.         Speech: Speech normal.         Behavior: Behavior is cooperative.      Comments: Pt was calm while speaking.         Significant Labs:  CBC:   Recent Labs   Lab 12/26/24  0800   WBC 4.50   HGB 10.3*   HCT 33.1*   *     CMP:   Recent Labs   Lab 12/26/24  0800   GLU 72*   CALCIUM 7.7*      K 3.1*   CO2 27      BUN 12   CREATININE 0.71       Significant Imaging:  Imaging results within the past 24 hours have been reviewed.    Assessment/Plan:     Active Diagnoses:    Diagnosis Date Noted POA    PRINCIPAL PROBLEM:  Dysphagia [R13.10] 03/06/2023 Yes    Hypertensive urgency [I16.0] 12/26/2024 Yes    Blood in stool [K92.1] 12/25/2024 Yes    Acute blood loss anemia [D62] 12/25/2024 Yes    Positive  fecal occult blood test [R19.5] 12/22/2024 Yes    Elevated troponin [R79.89] 12/21/2024 Yes    Syncope [R55] 12/21/2024 Yes    Type 2 MI (myocardial infarction) [I21.A1] 12/20/2024 Yes    Syncope and collapse [R55] 12/20/2024 Yes    Gastroesophageal reflux disease [K21.9] 02/16/2023 Yes    Hyperlipidemia [E78.5] 07/02/2021 Yes    Depression [F32.A] 05/27/2021 Yes    Essential hypertension [I10] 05/27/2021 Yes      Problems Resolved During this Admission:    Diagnosis Date Noted Date Resolved POA    GI bleed [K92.2] 12/22/2024 12/22/2024 Unknown          Impression:  Esophageal dysphagia, nausea, vomiting   Plan:  EGD with dilation of the esophagus    Efraín Giron MD  Gastroenterology  [facility]

## 2024-12-27 NOTE — PT/OT/SLP PROGRESS
Occupational Therapy      Patient Name:  Renetta Stewart   MRN:  [id number]    Patient not seen today secondary to Off the floor for procedure/surgery (pt gone for EGD). Will follow-up 12/30/24.    12/27/2024

## 2024-12-28 PROBLEM — E66.9 OBESITY: Status: ACTIVE | Noted: 2024-12-28

## 2024-12-28 LAB
ACANTHOCYTES BLD QL SMEAR: ABNORMAL
ANION GAP SERPL CALCULATED.3IONS-SCNC: 19 MMOL/L (ref 7–16)
ANISOCYTOSIS BLD QL SMEAR: ABNORMAL
BACTERIA BLD CULT: NORMAL
BASOPHILS # BLD AUTO: 0.02 K/UL (ref 0–0.2)
BASOPHILS NFR BLD AUTO: 0.5 % (ref 0–1)
BUN SERPL-MCNC: 16 MG/DL (ref 10–20)
BUN/CREAT SERPL: 21 (ref 6–20)
CALCIUM SERPL-MCNC: 8 MG/DL (ref 8.4–10.2)
CHLORIDE SERPL-SCNC: 102 MMOL/L (ref 98–107)
CO2 SERPL-SCNC: 21 MMOL/L (ref 23–31)
CREAT SERPL-MCNC: 0.76 MG/DL (ref 0.55–1.02)
DIFFERENTIAL METHOD BLD: ABNORMAL
EGFR (NO RACE VARIABLE) (RUSH/TITUS): 82 ML/MIN/1.73M2
EOSINOPHIL # BLD AUTO: 0.05 K/UL (ref 0–0.5)
EOSINOPHIL NFR BLD AUTO: 1.2 % (ref 1–4)
EOSINOPHIL NFR BLD MANUAL: 2 % (ref 1–4)
ERYTHROCYTE [DISTWIDTH] IN BLOOD BY AUTOMATED COUNT: 15.8 % (ref 11.5–14.5)
GLUCOSE SERPL-MCNC: 73 MG/DL (ref 70–105)
GLUCOSE SERPL-MCNC: 75 MG/DL (ref 82–115)
HCT VFR BLD AUTO: 36.4 % (ref 38–47)
HGB BLD-MCNC: 11.7 G/DL (ref 12–16)
IMM GRANULOCYTES # BLD AUTO: 0.17 K/UL (ref 0–0.04)
IMM GRANULOCYTES NFR BLD: 4.1 % (ref 0–0.4)
LYMPHOCYTES # BLD AUTO: 1 K/UL (ref 1–4.8)
LYMPHOCYTES NFR BLD AUTO: 23.9 % (ref 27–41)
LYMPHOCYTES NFR BLD MANUAL: 32 % (ref 27–41)
MCH RBC QN AUTO: 25.7 PG (ref 27–31)
MCHC RBC AUTO-ENTMCNC: 32.1 G/DL (ref 32–36)
MCV RBC AUTO: 80 FL (ref 80–96)
MONOCYTES # BLD AUTO: 0.38 K/UL (ref 0–0.8)
MONOCYTES NFR BLD AUTO: 9.1 % (ref 2–6)
MONOCYTES NFR BLD MANUAL: 4 % (ref 2–6)
MPC BLD CALC-MCNC: 11.6 FL (ref 9.4–12.4)
NEUTROPHILS # BLD AUTO: 2.57 K/UL (ref 1.8–7.7)
NEUTROPHILS NFR BLD AUTO: 61.2 % (ref 53–65)
NEUTS SEG NFR BLD MANUAL: 62 % (ref 50–62)
NRBC # BLD AUTO: 0 X10E3/UL
NRBC BLD MANUAL-RTO: 1 /100 WBC
NRBC, AUTO (.00): 0 %
OVALOCYTES BLD QL SMEAR: ABNORMAL
PLATELET # BLD AUTO: 154 K/UL (ref 150–400)
PLATELET MORPHOLOGY: ABNORMAL
POTASSIUM SERPL-SCNC: 3.4 MMOL/L (ref 3.5–5.1)
RBC # BLD AUTO: 4.55 M/UL (ref 4.2–5.4)
REACTIVE LYMPHOCYTES: ABNORMAL
SODIUM SERPL-SCNC: 139 MMOL/L (ref 136–145)
WBC # BLD AUTO: 4.19 K/UL (ref 4.5–11)

## 2024-12-28 PROCEDURE — 25000003 PHARM REV CODE 250: Performed by: NURSE PRACTITIONER

## 2024-12-28 PROCEDURE — 82962 GLUCOSE BLOOD TEST: CPT

## 2024-12-28 PROCEDURE — 85025 COMPLETE CBC W/AUTO DIFF WBC: CPT | Performed by: HOSPITALIST

## 2024-12-28 PROCEDURE — 25000003 PHARM REV CODE 250: Performed by: HOSPITALIST

## 2024-12-28 PROCEDURE — 99232 SBSQ HOSP IP/OBS MODERATE 35: CPT | Mod: ,,,

## 2024-12-28 PROCEDURE — 25000003 PHARM REV CODE 250: Performed by: INTERNAL MEDICINE

## 2024-12-28 PROCEDURE — 80048 BASIC METABOLIC PNL TOTAL CA: CPT | Performed by: HOSPITALIST

## 2024-12-28 PROCEDURE — 63600175 PHARM REV CODE 636 W HCPCS: Performed by: NURSE PRACTITIONER

## 2024-12-28 PROCEDURE — 11000001 HC ACUTE MED/SURG PRIVATE ROOM

## 2024-12-28 PROCEDURE — 36415 COLL VENOUS BLD VENIPUNCTURE: CPT | Performed by: HOSPITALIST

## 2024-12-28 RX ADMIN — ONDANSETRON 4 MG: 2 INJECTION INTRAMUSCULAR; INTRAVENOUS at 05:12

## 2024-12-28 RX ADMIN — ENOXAPARIN SODIUM 40 MG: 40 INJECTION SUBCUTANEOUS at 05:12

## 2024-12-28 RX ADMIN — VALSARTAN 40 MG: 40 TABLET, FILM COATED ORAL at 08:12

## 2024-12-28 RX ADMIN — HYDRALAZINE HYDROCHLORIDE 50 MG: 50 TABLET ORAL at 02:12

## 2024-12-28 RX ADMIN — ASPIRIN 81 MG CHEWABLE TABLET 81 MG: 81 TABLET CHEWABLE at 08:12

## 2024-12-28 RX ADMIN — METOPROLOL TARTRATE 25 MG: 25 TABLET, FILM COATED ORAL at 09:12

## 2024-12-28 RX ADMIN — METOPROLOL TARTRATE 25 MG: 25 TABLET, FILM COATED ORAL at 08:12

## 2024-12-28 RX ADMIN — HYDRALAZINE HYDROCHLORIDE 50 MG: 50 TABLET ORAL at 09:12

## 2024-12-28 RX ADMIN — HYDRALAZINE HYDROCHLORIDE 50 MG: 50 TABLET ORAL at 05:12

## 2024-12-28 RX ADMIN — PANTOPRAZOLE SODIUM 40 MG: 40 TABLET, DELAYED RELEASE ORAL at 08:12

## 2024-12-28 RX ADMIN — FOLIC ACID-PYRIDOXINE-CYANOCOBALAMIN TAB 2.5-25-2 MG 1 TABLET: 2.5-25-2 TAB at 08:12

## 2024-12-28 RX ADMIN — ATORVASTATIN CALCIUM 80 MG: 80 TABLET, FILM COATED ORAL at 08:12

## 2024-12-28 RX ADMIN — FUROSEMIDE 40 MG: 40 TABLET ORAL at 08:12

## 2024-12-28 RX ADMIN — LEVOTHYROXINE SODIUM 50 MCG: 0.05 TABLET ORAL at 05:12

## 2024-12-28 NOTE — ASSESSMENT & PLAN NOTE
12/28  - EGD resulted, esophagitis noted, per GI continue p.o. Protonix daily, dilatation not done as esophagitis found deferred to a later date, biopsy sent we will follow with results  - reviewed images, reports

## 2024-12-28 NOTE — ASSESSMENT & PLAN NOTE
Body mass index is 35.02 kg/m². Morbid obesity complicates all aspects of disease management from diagnostic modalities to treatment. Weight loss encouraged and health benefits explained to patient.

## 2024-12-28 NOTE — SUBJECTIVE & OBJECTIVE
Interval History:     Review of Systems   All other systems reviewed and are negative.    Objective:     Vital Signs (Most Recent):  Temp: 98.9 °F (37.2 °C) (12/28/24 1154)  Pulse: 69 (12/28/24 1154)  Resp: 16 (12/28/24 1154)  BP: (!) 146/77 (12/28/24 1154)  SpO2: 98 % (12/28/24 1154) Vital Signs (24h Range):  Temp:  [97.6 °F (36.4 °C)-98.9 °F (37.2 °C)] 98.9 °F (37.2 °C)  Pulse:  [67-97] 69  Resp:  [11-19] 16  SpO2:  [92 %-100 %] 98 %  BP: ()/() 146/77     Weight: 92.5 kg (204 lb)  Body mass index is 35.02 kg/m².    Intake/Output Summary (Last 24 hours) at 12/28/2024 1235  Last data filed at 12/28/2024 0500  Gross per 24 hour   Intake 40 ml   Output --   Net 40 ml         Physical Exam  Vitals and nursing note reviewed.   Constitutional:       Appearance: She is obese.   HENT:      Head: Normocephalic.      Mouth/Throat:      Mouth: Mucous membranes are moist.   Eyes:      Extraocular Movements: Extraocular movements intact.   Cardiovascular:      Rate and Rhythm: Normal rate and regular rhythm.   Pulmonary:      Effort: Pulmonary effort is normal. No respiratory distress.      Breath sounds: Normal breath sounds.   Abdominal:      General: Abdomen is flat. Bowel sounds are normal. There is no distension.      Palpations: Abdomen is soft.   Musculoskeletal:         General: No swelling or tenderness. Normal range of motion.      Cervical back: Normal range of motion and neck supple. No tenderness.   Skin:     General: Skin is warm and dry.   Neurological:      General: No focal deficit present.      Mental Status: She is alert. Mental status is at baseline.   Psychiatric:         Mood and Affect: Mood normal.             Significant Labs: All pertinent labs within the past 24 hours have been reviewed.  CBC:   Recent Labs   Lab 12/28/24  1138   WBC 4.19*   HGB 11.7*   HCT 36.4*        CMP:   Recent Labs   Lab 12/28/24  0743      K 3.4*      CO2 21*   GLU 75*   BUN 16   CREATININE 0.76    CALCIUM 8.0*   ANIONGAP 19*       Significant Imaging: I have reviewed all pertinent imaging results/findings within the past 24 hours.

## 2024-12-28 NOTE — PLAN OF CARE
Problem: Adult Inpatient Plan of Care  Goal: Plan of Care Review  12/27/2024 1822 by Bianca Juarez RN  Outcome: Progressing  12/27/2024 1822 by Bianca Juarez RN  Outcome: Progressing  Goal: Patient-Specific Goal (Individualized)  12/27/2024 1822 by Bianca Juarez RN  Outcome: Progressing  12/27/2024 1822 by Bianca Juarez RN  Outcome: Progressing  Goal: Absence of Hospital-Acquired Illness or Injury  12/27/2024 1822 by Bianca Juarez RN  Outcome: Progressing  12/27/2024 1822 by Bianca Juarez RN  Outcome: Progressing  Goal: Optimal Comfort and Wellbeing  12/27/2024 1822 by Bianca Juarez RN  Outcome: Progressing  12/27/2024 1822 by Bianca Juarez RN  Outcome: Progressing  Goal: Readiness for Transition of Care  12/27/2024 1822 by Bianca Juarez RN  Outcome: Progressing  12/27/2024 1822 by Bianca Juarez RN  Outcome: Progressing     Problem: Acute Coronary Syndrome  Goal: Optimal Adaptation to Illness  12/27/2024 1822 by Bianca Juarez RN  Outcome: Progressing  12/27/2024 1822 by Bianca Juarez RN  Outcome: Progressing  Goal: Absence of Cardiac-Related Pain  12/27/2024 1822 by Bianca Juarez RN  Outcome: Progressing  12/27/2024 1822 by Bianca Juarez RN  Outcome: Progressing  Goal: Normalized Cardiac Rhythm  12/27/2024 1822 by Bianca Juarez RN  Outcome: Progressing  12/27/2024 1822 by Bianca Juarez RN  Outcome: Progressing  Goal: Effective Cardiac Pump Function  12/27/2024 1822 by Bianca Juarez RN  Outcome: Progressing  12/27/2024 1822 by Bianca Juarez RN  Outcome: Progressing  Goal: Adequate Tissue Perfusion  12/27/2024 1822 by Bianca Juarez RN  Outcome: Progressing  12/27/2024 1822 by Bianca Juarez, RN  Outcome: Progressing     Problem: Cardiac Catheterization (Diagnostic/Interventional)  Goal: Absence of Bleeding  12/27/2024 1822 by Bianca Juarez RN  Outcome: Progressing  12/27/2024 1822 by Ann,  Bianca CHAMBERS, RN  Outcome: Progressing  Goal: Absence of Contrast-Induced Injury  12/27/2024 1822 by Bianca Juarez RN  Outcome: Progressing  12/27/2024 1822 by Bianca Juarez RN  Outcome: Progressing  Goal: Stable Heart Rate and Rhythm  12/27/2024 1822 by Bianca Juarez RN  Outcome: Progressing  12/27/2024 1822 by Binaca Juarez, RN  Outcome: Progressing  Goal: Absence of Embolism Signs and Symptoms  12/27/2024 1822 by Bianca Juarez, RN  Outcome: Progressing  12/27/2024 1822 by Bianca Juarez, RN  Outcome: Progressing  Goal: Anesthesia/Sedation Recovery  12/27/2024 1822 by Bianca Juarez RN  Outcome: Progressing  12/27/2024 1822 by Bianca Juarez RN  Outcome: Progressing  Goal: Optimal Pain Control and Function  12/27/2024 1822 by Bianca Juarez, RN  Outcome: Progressing  12/27/2024 1822 by Bainca Juarez RN  Outcome: Progressing  Goal: Absence of Vascular Access Complication  12/27/2024 1822 by Bianca Juarez RN  Outcome: Progressing  12/27/2024 1822 by Bianca Juarez RN  Outcome: Progressing     Problem: Skin Injury Risk Increased  Goal: Skin Health and Integrity  12/27/2024 1822 by Bianca Juarez RN  Outcome: Progressing  12/27/2024 1822 by Bianca Juarez, RN  Outcome: Progressing     Problem: Infection  Goal: Absence of Infection Signs and Symptoms  12/27/2024 1822 by Bianca Juarez RN  Outcome: Progressing  12/27/2024 1822 by Bianca Juarez RN  Outcome: Progressing     Problem: Gas Exchange Impaired  Goal: Optimal Gas Exchange  12/27/2024 1822 by Bianca Juarez RN  Outcome: Progressing  12/27/2024 1822 by Bianca Juarez RN  Outcome: Progressing

## 2024-12-28 NOTE — PHYSICIAN QUERY
Please specify the diagnosis associated with the clinical findings.     acute kidney failure/injury

## 2024-12-28 NOTE — ASSESSMENT & PLAN NOTE
Dysphagia noted.  Patient with history of esophageal stricture s/p EGD with dilation.   GI consulted.   Patient also with FOBT+ so this may be investigated as well.     12/25: significant dysphagia currently and over the last few weeks.  Family feels she had dysphagia and this caused her to pass out at home.     12/26: Plan for EGD today.     12/27: EGD cancelled yesterday due to inability to obtain consent.  Consent obtained now.  I spoke with patient's daughter (Jacque Stewart).      12/28: EGD complete, esophagitis noted, dilatation to be considered at a later date

## 2024-12-28 NOTE — PLAN OF CARE
Problem: Adult Inpatient Plan of Care  Goal: Plan of Care Review  Outcome: Progressing  Goal: Patient-Specific Goal (Individualized)  Outcome: Progressing  Goal: Absence of Hospital-Acquired Illness or Injury  Outcome: Progressing  Goal: Optimal Comfort and Wellbeing  Outcome: Progressing  Goal: Readiness for Transition of Care  Outcome: Progressing     Problem: Acute Coronary Syndrome  Goal: Optimal Adaptation to Illness  Outcome: Progressing  Goal: Absence of Cardiac-Related Pain  Outcome: Progressing  Goal: Normalized Cardiac Rhythm  Outcome: Progressing  Goal: Effective Cardiac Pump Function  Outcome: Progressing  Goal: Adequate Tissue Perfusion  Outcome: Progressing     Problem: Cardiac Catheterization (Diagnostic/Interventional)  Goal: Absence of Bleeding  Outcome: Progressing  Goal: Absence of Contrast-Induced Injury  Outcome: Progressing  Goal: Stable Heart Rate and Rhythm  Outcome: Progressing  Goal: Absence of Embolism Signs and Symptoms  Outcome: Progressing  Goal: Anesthesia/Sedation Recovery  Outcome: Progressing  Goal: Optimal Pain Control and Function  Outcome: Progressing  Goal: Absence of Vascular Access Complication  Outcome: Progressing     Problem: Skin Injury Risk Increased  Goal: Skin Health and Integrity  Outcome: Progressing     Problem: Infection  Goal: Absence of Infection Signs and Symptoms  Outcome: Progressing     Problem: Gas Exchange Impaired  Goal: Optimal Gas Exchange  Outcome: Progressing     Problem: Fall Injury Risk  Goal: Absence of Fall and Fall-Related Injury  Outcome: Progressing

## 2024-12-28 NOTE — HOSPITAL COURSE
12/28  - EGD resulted, esophagitis noted, per GI continue p.o. Protonix daily, dilatation not done as esophagitis found deferred to a later date, biopsy sent we will follow with results    12/29  - patient is ready for discharge however we are unable to reach daughter. Called daughter 3 times myself and nurse has called as well, will report to  if nothing is heard from family today for abandonment   - results from biopsy will result in first of the week, these can be released to patient by primary    12/30  - patient's daughter in room today, miscommunication problem addressed, daughter is a very nice woman who has to give full time care to another family member.   - Discussed in depth that patient's condition and swing bed need. Daughter agrees she wants patient to go to swing bed so she can gain some of her independence back  - PT/OT to eval again today and social following for placement    12/31  - RRT called yesterday as patient became lightheaded and unresponsive after using the bathroom and ambulating to the bed, work up complete and like due to orthostatics vs vasovagal   - orthostatic vitals ordered however patient refusing to ambulate right now likely because when she does she has this occur  - will change hydralazine 3 times a day to amlodipine once a day  - try for orthostatic vitals when patient cooperates  - if positive will continue evaluating meds and try abdominal binder if needed    1/1  - refused medications yesterday, family trying to encourage patient  - unable to get orthos as patient will not participate, will continue to try  - monitoring blood pressure  - awaiting placement    1/2  - ready for discharge to swingbed  - type 2 mi due to demand ischemia: see cardiology outpatient, ziopatch placed, medications adjusted  - Acute gastritis and esophagitis: seen by GI with EGD recs Impression:  The patient is EGD biopsies revealed ulcerative reflux esophagitis, gastritis and a  neuroendocrine tumor of the duodenal bulb. Recommendation:  Ppi therapy, consider referral to Dr. Brice at [facility] for possible EMR of duodenal bulb tumor after the patient's condition improves. CT of abdomen outpatient.   - Syncope: likely due to orthostatics, changed blood pressure medications, stopped hydralazine tid and start amlodipine 10 mg daily

## 2024-12-28 NOTE — PROGRESS NOTES
[facility] Hospital Medicine  Progress Note    Patient Name: Renetta Stewart  MRN: [id number]  Patient Class: IP- Inpatient   Admission Date: 12/20/2024  Length of Stay: 8 days  Attending Physician: Malu Rivera MD  Primary Care Provider: Eldon Govea MD        Subjective     Principal Problem:Esophagitis determined by endoscopy        HPI:  Pt is a 74-year-old female who presented to her doctor's office today.  She states she had been feeling well just a lot of fatigue.  While she was at the doctor's office she had a syncopal episode with loss of bladder control.  Bystanders report loss of consciousness was less than 15 seconds.  There was no observed seizure-like activity.  Patient denies chest pain, shortness of breath, palpitations, feeling of doom prior to syncopal episode.  Patient reports she does not have pain or shortness of breath at present.. PMH HTN, HLD, CVA, GERD, and pacemaker    In the ED initial vital signs were blood pressure of 143/77, temp 96.9° heart rate 77, O2 sat 98% on room air who.  Initial lab workup revealed a sodium of 147 glucose 117 BUN 22 creatinine 1.35.  CBC is unremarkable.  ProBNP was 74.  Initial troponin 53.6 repeat troponin at 88.3 patient has positive delta. EKG show NSR Rate 77, no pacer spike no st elevation.     Patient will be admitted to hospital medicine service under the direct supervision of Dr KHALIL  for further evaluation and management.     Overview/Hospital Course:  12/28  - EGD resulted, esophagitis noted, per GI continue p.o. Protonix daily, dilatation not done as esophagitis found deferred to a later date, biopsy sent we will follow with results      Interval History:     Review of Systems   All other systems reviewed and are negative.    Objective:     Vital Signs (Most Recent):  Temp: 98.9 °F (37.2 °C) (12/28/24 1154)  Pulse: 69 (12/28/24 1154)  Resp: 16 (12/28/24 1154)  BP: (!) 146/77 (12/28/24 1154)  SpO2: 98 % (12/28/24 1154)  Vital Signs (24h Range):  Temp:  [97.6 °F (36.4 °C)-98.9 °F (37.2 °C)] 98.9 °F (37.2 °C)  Pulse:  [67-97] 69  Resp:  [11-19] 16  SpO2:  [92 %-100 %] 98 %  BP: ()/() 146/77     Weight: 92.5 kg (204 lb)  Body mass index is 35.02 kg/m².    Intake/Output Summary (Last 24 hours) at 12/28/2024 1235  Last data filed at 12/28/2024 0500  Gross per 24 hour   Intake 40 ml   Output --   Net 40 ml         Physical Exam  Vitals and nursing note reviewed.   Constitutional:       Appearance: She is obese.   HENT:      Head: Normocephalic.      Mouth/Throat:      Mouth: Mucous membranes are moist.   Eyes:      Extraocular Movements: Extraocular movements intact.   Cardiovascular:      Rate and Rhythm: Normal rate and regular rhythm.   Pulmonary:      Effort: Pulmonary effort is normal. No respiratory distress.      Breath sounds: Normal breath sounds.   Abdominal:      General: Abdomen is flat. Bowel sounds are normal. There is no distension.      Palpations: Abdomen is soft.   Musculoskeletal:         General: No swelling or tenderness. Normal range of motion.      Cervical back: Normal range of motion and neck supple. No tenderness.   Skin:     General: Skin is warm and dry.   Neurological:      General: No focal deficit present.      Mental Status: She is alert. Mental status is at baseline.   Psychiatric:         Mood and Affect: Mood normal.             Significant Labs: All pertinent labs within the past 24 hours have been reviewed.  CBC:   Recent Labs   Lab 12/28/24  1138   WBC 4.19*   HGB 11.7*   HCT 36.4*        CMP:   Recent Labs   Lab 12/28/24  0743      K 3.4*      CO2 21*   GLU 75*   BUN 16   CREATININE 0.76   CALCIUM 8.0*   ANIONGAP 19*       Significant Imaging: I have reviewed all pertinent imaging results/findings within the past 24 hours.    Assessment and Plan     * Esophagitis determined by endoscopy  12/28  - EGD resulted, esophagitis noted, per GI continue p.o. Protonix daily,  dilatation not done as esophagitis found deferred to a later date, biopsy sent we will follow with results  - reviewed images, reports     Obesity  Body mass index is 35.02 kg/m². Morbid obesity complicates all aspects of disease management from diagnostic modalities to treatment. Weight loss encouraged and health benefits explained to patient.         Folate deficiency  replaced      Acute superficial gastritis without hemorrhage  Seen on EGD 12/27      Hypertensive urgency  Patient has a current diagnosis of hypertensive urgency (without evidence of end organ damage) which is uncontrolled.  Latest blood pressure and vitals reviewed-   Temp:  [96.1 °F (35.6 °C)-99 °F (37.2 °C)]   Pulse:  [62-81]   Resp:  [15-18]   BP: (128-204)/(70-97)   SpO2:  [96 %-100 %] .   Patient currently off IV antihypertensives.   Home meds for hypertension were reviewed and noted below.   Hypertension Medications               furosemide (LASIX) 20 MG tablet Take 1 tablet (20 mg total) by mouth once daily.    hydrALAZINE (APRESOLINE) 100 MG tablet Take 1 tablet (100 mg total) by mouth 3 (three) times daily.    valsartan (DIOVAN) 320 MG tablet Take 1 tablet (320 mg total) by mouth once daily.            Medication adjustment for hospital antihypertensives is as follows-   Adding back home medications over time.  Patient was not able to swallow but able to swallow now.     Will aim for controlled BP reduction by medications noted above. Monitor and mitigate end organ damage as indicated.    Positive fecal occult blood test  GI consulted  Outpt endoscopy recommended for now  Monitor for blood loss     12/27: plan for EGD today.       Syncope and collapse  Syncope vs seizure  Echo reveals nl systolic and diastolic dysfunction   Carotid US and EEG and Keppra level pending  Telemetry  Fall precautions  Sz precautions    12/23: MRI negative for new CVA but there was a prior CVA.   Patient with memory problems, acute on chronic.     12/25:  Ziopatch per cardiology       Type 2 MI (myocardial infarction)  Pt has Positve Delta, no chest pain, syncopal Episode,  Orhtostatics positive in ED, given pts AGE and risk factors will treat as NSTEMI.   Patient presents with NSTEMI. Chest pain is currently controlled. VIKTOR score is 3. Patient is currently on NSTEMI Pathway.    EKG reviewed. Troponins reviewed and results noted-   Troponin 53, > >88     Lipid panel reviewed and shows-     Lab Results   Component Value Date    LDLCALC 119 12/21/2024     Lab Results   Component Value Date    TRIG 182 (H) 12/21/2024         Medical management includes;  ASA,Beta Blocker, High Intensity Stain, and ACE/ARB Echo has not been performed. Latest ECHO results are as follows- Results for orders placed during the hospital encounter of 03/30/23    Echo    Interpretation Summary  · The left ventricle is normal in size with severe concentric hypertrophy and hyperdynamic systolic function.  · The estimated ejection fraction is 70%.  · Mild tricuspid regurgitation.  · Left ventricular mild outflow tract obstruction is present.  · Normal right ventricular size with normal right ventricular systolic function.  · Normal central venous pressure (3 mmHg).  · The estimated PA systolic pressure is 43 mmHg.  · ECHO is suggestive of hypertensive heart disease vs. hypertrophic cardiomyopathy  .   Cardiology following  Lexiscan recommended once encephalopathy resolves     12/23: lexiscan today.   12/25: lexiscan negative per cardiology     Dysphagia  Dysphagia noted.  Patient with history of esophageal stricture s/p EGD with dilation.   GI consulted.   Patient also with FOBT+ so this may be investigated as well.     12/25: significant dysphagia currently and over the last few weeks.  Family feels she had dysphagia and this caused her to pass out at home.     12/26: Plan for EGD today.     12/27: EGD cancelled yesterday due to inability to obtain consent.  Consent obtained now.  I spoke with  patient's daughter (Jacque Stewart).      12/28: EGD complete, esophagitis noted, dilatation to be considered at a later date      Gastroesophageal reflux disease  Continue home protonix      Hyperlipidemia  Continue statin      Essential hypertension  Patient's blood pressure range in the last 24 hours was: BP  Min: 96/68  Max: 153/72.The patient's inpatient anti-hypertensive regimen is listed below:  Current Antihypertensives  furosemide tablet 20 mg, Daily, Oral  hydrALAZINE tablet 100 mg, 3 times daily, Oral  valsartan tablet 320 mg, Daily, Oral  metoprolol tartrate (LOPRESSOR) tablet 25 mg, 2 times daily, Oral  nitroGLYCERIN SL tablet 0.4 mg, Every 5 min PRN, Sublingual    Plan  - BP is uncontrolled, will adjust as follows: Pt BP decresed when standing and hR increase + orhthos  - Will hold hydralazine and lasix for now    Depression  Patient has persistent depression which is unknown and is currently controlled. Will Continue anti-depressant medications. We will not consult psychiatry at this time. Patient does not display psychosis at this time. Continue to monitor closely and adjust plan of care as needed.          VTE Risk Mitigation (From admission, onward)           Ordered     enoxaparin injection 40 mg  Daily         12/20/24 2025     IP VTE HIGH RISK PATIENT  Once         12/20/24 2025     Place sequential compression device  Until discontinued         12/20/24 2025                    Discharge Planning   GERALDO: 12/27/2024     Code Status: Full Code   Medical Readiness for Discharge Date: TBD  Discharge Plan A: Home with family                Please place Justification for DME        Malu Rivera MD  Department of Hospital Medicine   [facility]

## 2024-12-29 LAB — GLUCOSE SERPL-MCNC: 91 MG/DL (ref 70–105)

## 2024-12-29 PROCEDURE — 25000003 PHARM REV CODE 250: Performed by: HOSPITALIST

## 2024-12-29 PROCEDURE — 25000003 PHARM REV CODE 250: Performed by: NURSE PRACTITIONER

## 2024-12-29 PROCEDURE — 99232 SBSQ HOSP IP/OBS MODERATE 35: CPT | Mod: ,,,

## 2024-12-29 PROCEDURE — 82962 GLUCOSE BLOOD TEST: CPT

## 2024-12-29 PROCEDURE — 63600175 PHARM REV CODE 636 W HCPCS: Performed by: NURSE PRACTITIONER

## 2024-12-29 PROCEDURE — 25000003 PHARM REV CODE 250: Performed by: INTERNAL MEDICINE

## 2024-12-29 PROCEDURE — 11000001 HC ACUTE MED/SURG PRIVATE ROOM

## 2024-12-29 RX ADMIN — LEVOTHYROXINE SODIUM 50 MCG: 0.05 TABLET ORAL at 05:12

## 2024-12-29 RX ADMIN — ENOXAPARIN SODIUM 40 MG: 40 INJECTION SUBCUTANEOUS at 04:12

## 2024-12-29 RX ADMIN — HYDRALAZINE HYDROCHLORIDE 50 MG: 50 TABLET ORAL at 05:12

## 2024-12-29 RX ADMIN — METOPROLOL TARTRATE 25 MG: 25 TABLET, FILM COATED ORAL at 08:12

## 2024-12-29 RX ADMIN — SENNOSIDES AND DOCUSATE SODIUM 1 TABLET: 50; 8.6 TABLET ORAL at 09:12

## 2024-12-29 RX ADMIN — ATORVASTATIN CALCIUM 80 MG: 80 TABLET, FILM COATED ORAL at 08:12

## 2024-12-29 RX ADMIN — VALSARTAN 40 MG: 40 TABLET, FILM COATED ORAL at 08:12

## 2024-12-29 RX ADMIN — PANTOPRAZOLE SODIUM 40 MG: 40 TABLET, DELAYED RELEASE ORAL at 08:12

## 2024-12-29 RX ADMIN — HYDRALAZINE HYDROCHLORIDE 50 MG: 50 TABLET ORAL at 09:12

## 2024-12-29 RX ADMIN — ASPIRIN 81 MG CHEWABLE TABLET 81 MG: 81 TABLET CHEWABLE at 08:12

## 2024-12-29 RX ADMIN — HYDRALAZINE HYDROCHLORIDE 50 MG: 50 TABLET ORAL at 04:12

## 2024-12-29 RX ADMIN — POLYETHYLENE GLYCOL 3350 17 G: 17 POWDER, FOR SOLUTION ORAL at 08:12

## 2024-12-29 RX ADMIN — SENNOSIDES AND DOCUSATE SODIUM 1 TABLET: 50; 8.6 TABLET ORAL at 08:12

## 2024-12-29 RX ADMIN — METOPROLOL TARTRATE 25 MG: 25 TABLET, FILM COATED ORAL at 09:12

## 2024-12-29 RX ADMIN — FUROSEMIDE 40 MG: 40 TABLET ORAL at 08:12

## 2024-12-29 RX ADMIN — FOLIC ACID-PYRIDOXINE-CYANOCOBALAMIN TAB 2.5-25-2 MG 1 TABLET: 2.5-25-2 TAB at 08:12

## 2024-12-29 NOTE — ASSESSMENT & PLAN NOTE
12/28  - EGD resulted, esophagitis noted, per GI continue p.o. Protonix daily, dilatation not done as esophagitis found deferred to a later date, biopsy sent we will follow with results  - reviewed images, reports     12/29  - patient is ready for discharge however we are unable to reach daughter. Called daughter 3 times myself and nurse has called as well, will report to  if nothing is heard from family today for abandonment   - results from biopsy will result in first of the week, these can be released to patient by primary

## 2024-12-29 NOTE — SUBJECTIVE & OBJECTIVE
Interval History:     Review of Systems   All other systems reviewed and are negative.    Objective:     Vital Signs (Most Recent):  Temp: 98.4 °F (36.9 °C) (12/29/24 1110)  Pulse: 87 (12/29/24 1110)  Resp: 18 (12/29/24 1110)  BP: 123/74 (12/29/24 1110)  SpO2: 97 % (12/29/24 1110) Vital Signs (24h Range):  Temp:  [97.6 °F (36.4 °C)-98.4 °F (36.9 °C)] 98.4 °F (36.9 °C)  Pulse:  [72-89] 87  Resp:  [18-19] 18  SpO2:  [95 %-99 %] 97 %  BP: (104-148)/(64-82) 123/74     Weight: 92.5 kg (204 lb)  Body mass index is 35.02 kg/m².    Intake/Output Summary (Last 24 hours) at 12/29/2024 1340  Last data filed at 12/29/2024 0529  Gross per 24 hour   Intake 80 ml   Output --   Net 80 ml         Physical Exam  Vitals and nursing note reviewed.   Constitutional:       Appearance: She is obese.   HENT:      Head: Normocephalic.      Mouth/Throat:      Mouth: Mucous membranes are moist.   Eyes:      Extraocular Movements: Extraocular movements intact.   Cardiovascular:      Rate and Rhythm: Normal rate and regular rhythm.   Pulmonary:      Effort: Pulmonary effort is normal. No respiratory distress.      Breath sounds: Normal breath sounds.   Abdominal:      General: Abdomen is flat. Bowel sounds are normal. There is no distension.      Palpations: Abdomen is soft.   Musculoskeletal:         General: No swelling or tenderness. Normal range of motion.      Cervical back: Normal range of motion and neck supple. No tenderness.   Skin:     General: Skin is warm and dry.   Neurological:      General: No focal deficit present.      Mental Status: She is alert. Mental status is at baseline.   Psychiatric:         Mood and Affect: Mood normal.             Significant Labs: All pertinent labs within the past 24 hours have been reviewed.  CBC:   Recent Labs   Lab 12/28/24  1138   WBC 4.19*   HGB 11.7*   HCT 36.4*        CMP:   Recent Labs   Lab 12/28/24  0743      K 3.4*      CO2 21*   GLU 75*   BUN 16   CREATININE 0.76    CALCIUM 8.0*   ANIONGAP 19*       Significant Imaging: I have reviewed all pertinent imaging results/findings within the past 24 hours.

## 2024-12-29 NOTE — PROGRESS NOTES
[facility] Medicine  Progress Note    Patient Name: Renetta Stewart  MRN: [id number]  Patient Class: IP- Inpatient   Admission Date: 12/20/2024  Length of Stay: 9 days  Attending Physician: Malu Rivera MD  Primary Care Provider: Eldon Govea MD        Subjective     Principal Problem:Esophagitis determined by endoscopy        HPI:  Pt is a 74-year-old female who presented to her doctor's office today.  She states she had been feeling well just a lot of fatigue.  While she was at the doctor's office she had a syncopal episode with loss of bladder control.  Bystanders report loss of consciousness was less than 15 seconds.  There was no observed seizure-like activity.  Patient denies chest pain, shortness of breath, palpitations, feeling of doom prior to syncopal episode.  Patient reports she does not have pain or shortness of breath at present.. PMH HTN, HLD, CVA, GERD, and pacemaker    In the ED initial vital signs were blood pressure of 143/77, temp 96.9° heart rate 77, O2 sat 98% on room air who.  Initial lab workup revealed a sodium of 147 glucose 117 BUN 22 creatinine 1.35.  CBC is unremarkable.  ProBNP was 74.  Initial troponin 53.6 repeat troponin at 88.3 patient has positive delta. EKG show NSR Rate 77, no pacer spike no st elevation.     Patient will be admitted to hospital medicine service under the direct supervision of Dr KHALIL  for further evaluation and management.     Overview/Hospital Course:  12/28  - EGD resulted, esophagitis noted, per GI continue p.o. Protonix daily, dilatation not done as esophagitis found deferred to a later date, biopsy sent we will follow with results    12/29  - patient is ready for discharge however we are unable to reach daughter. Called daughter 3 times myself and nurse has called as well, will report to  if nothing is heard from family today for abandonment   - results from biopsy will result in first of the week, these  can be released to patient by primary      Interval History:     Review of Systems   All other systems reviewed and are negative.    Objective:     Vital Signs (Most Recent):  Temp: 98.4 °F (36.9 °C) (12/29/24 1110)  Pulse: 87 (12/29/24 1110)  Resp: 18 (12/29/24 1110)  BP: 123/74 (12/29/24 1110)  SpO2: 97 % (12/29/24 1110) Vital Signs (24h Range):  Temp:  [97.6 °F (36.4 °C)-98.4 °F (36.9 °C)] 98.4 °F (36.9 °C)  Pulse:  [72-89] 87  Resp:  [18-19] 18  SpO2:  [95 %-99 %] 97 %  BP: (104-148)/(64-82) 123/74     Weight: 92.5 kg (204 lb)  Body mass index is 35.02 kg/m².    Intake/Output Summary (Last 24 hours) at 12/29/2024 1340  Last data filed at 12/29/2024 0529  Gross per 24 hour   Intake 80 ml   Output --   Net 80 ml         Physical Exam  Vitals and nursing note reviewed.   Constitutional:       Appearance: She is obese.   HENT:      Head: Normocephalic.      Mouth/Throat:      Mouth: Mucous membranes are moist.   Eyes:      Extraocular Movements: Extraocular movements intact.   Cardiovascular:      Rate and Rhythm: Normal rate and regular rhythm.   Pulmonary:      Effort: Pulmonary effort is normal. No respiratory distress.      Breath sounds: Normal breath sounds.   Abdominal:      General: Abdomen is flat. Bowel sounds are normal. There is no distension.      Palpations: Abdomen is soft.   Musculoskeletal:         General: No swelling or tenderness. Normal range of motion.      Cervical back: Normal range of motion and neck supple. No tenderness.   Skin:     General: Skin is warm and dry.   Neurological:      General: No focal deficit present.      Mental Status: She is alert. Mental status is at baseline.   Psychiatric:         Mood and Affect: Mood normal.             Significant Labs: All pertinent labs within the past 24 hours have been reviewed.  CBC:   Recent Labs   Lab 12/28/24  1138   WBC 4.19*   HGB 11.7*   HCT 36.4*        CMP:   Recent Labs   Lab 12/28/24  0743      K 3.4*      CO2 21*    GLU 75*   BUN 16   CREATININE 0.76   CALCIUM 8.0*   ANIONGAP 19*       Significant Imaging: I have reviewed all pertinent imaging results/findings within the past 24 hours.    Assessment and Plan     * Esophagitis determined by endoscopy  12/28  - EGD resulted, esophagitis noted, per GI continue p.o. Protonix daily, dilatation not done as esophagitis found deferred to a later date, biopsy sent we will follow with results  - reviewed images, reports     12/29  - patient is ready for discharge however we are unable to reach daughter. Called daughter 3 times myself and nurse has called as well, will report to  if nothing is heard from family today for abandonment   - results from biopsy will result in first of the week, these can be released to patient by primary    Obesity  Body mass index is 35.02 kg/m². Morbid obesity complicates all aspects of disease management from diagnostic modalities to treatment. Weight loss encouraged and health benefits explained to patient.         Folate deficiency  replaced      Acute superficial gastritis without hemorrhage  Seen on EGD 12/27      Hypertensive urgency  Patient has a current diagnosis of hypertensive urgency (without evidence of end organ damage) which is uncontrolled.  Latest blood pressure and vitals reviewed-   Temp:  [96.1 °F (35.6 °C)-99 °F (37.2 °C)]   Pulse:  [62-81]   Resp:  [15-18]   BP: (128-204)/(70-97)   SpO2:  [96 %-100 %] .   Patient currently off IV antihypertensives.   Home meds for hypertension were reviewed and noted below.   Hypertension Medications               furosemide (LASIX) 20 MG tablet Take 1 tablet (20 mg total) by mouth once daily.    hydrALAZINE (APRESOLINE) 100 MG tablet Take 1 tablet (100 mg total) by mouth 3 (three) times daily.    valsartan (DIOVAN) 320 MG tablet Take 1 tablet (320 mg total) by mouth once daily.            Medication adjustment for hospital antihypertensives is as follows-   Adding back home medications  over time.  Patient was not able to swallow but able to swallow now.     Will aim for controlled BP reduction by medications noted above. Monitor and mitigate end organ damage as indicated.    Positive fecal occult blood test  GI consulted  Outpt endoscopy recommended for now  Monitor for blood loss     12/27: plan for EGD today.       Syncope and collapse  Syncope vs seizure  Echo reveals nl systolic and diastolic dysfunction   Carotid US and EEG and Keppra level pending  Telemetry  Fall precautions  Sz precautions    12/23: MRI negative for new CVA but there was a prior CVA.   Patient with memory problems, acute on chronic.     12/25: Ziopatch per cardiology       Type 2 MI (myocardial infarction)  Pt has Positve Delta, no chest pain, syncopal Episode,  Orhtostatics positive in ED, given pts AGE and risk factors will treat as NSTEMI.   Patient presents with NSTEMI. Chest pain is currently controlled. VIKTOR score is 3. Patient is currently on NSTEMI Pathway.    EKG reviewed. Troponins reviewed and results noted-   Troponin 53, > >88     Lipid panel reviewed and shows-     Lab Results   Component Value Date    LDLCALC 119 12/21/2024     Lab Results   Component Value Date    TRIG 182 (H) 12/21/2024         Medical management includes;  ASA,Beta Blocker, High Intensity Stain, and ACE/ARB Echo has not been performed. Latest ECHO results are as follows- Results for orders placed during the hospital encounter of 03/30/23    Echo    Interpretation Summary  · The left ventricle is normal in size with severe concentric hypertrophy and hyperdynamic systolic function.  · The estimated ejection fraction is 70%.  · Mild tricuspid regurgitation.  · Left ventricular mild outflow tract obstruction is present.  · Normal right ventricular size with normal right ventricular systolic function.  · Normal central venous pressure (3 mmHg).  · The estimated PA systolic pressure is 43 mmHg.  · ECHO is suggestive of hypertensive heart  disease vs. hypertrophic cardiomyopathy  .   Cardiology following  Lexiscan recommended once encephalopathy resolves     12/23: lexiscan today.   12/25: lexiscan negative per cardiology     Dysphagia  Dysphagia noted.  Patient with history of esophageal stricture s/p EGD with dilation.   GI consulted.   Patient also with FOBT+ so this may be investigated as well.     12/25: significant dysphagia currently and over the last few weeks.  Family feels she had dysphagia and this caused her to pass out at home.     12/26: Plan for EGD today.     12/27: EGD cancelled yesterday due to inability to obtain consent.  Consent obtained now.  I spoke with patient's daughter (Jacque Stewart).      12/28: EGD complete, esophagitis noted, dilatation to be considered at a later date      Gastroesophageal reflux disease  Continue home protonix      Hyperlipidemia  Continue statin      Essential hypertension  Patient's blood pressure range in the last 24 hours was: BP  Min: 96/68  Max: 153/72.The patient's inpatient anti-hypertensive regimen is listed below:  Current Antihypertensives  furosemide tablet 20 mg, Daily, Oral  hydrALAZINE tablet 100 mg, 3 times daily, Oral  valsartan tablet 320 mg, Daily, Oral  metoprolol tartrate (LOPRESSOR) tablet 25 mg, 2 times daily, Oral  nitroGLYCERIN SL tablet 0.4 mg, Every 5 min PRN, Sublingual    Plan  - BP is uncontrolled, will adjust as follows: Pt BP decresed when standing and hR increase + orhthos  - Will hold hydralazine and lasix for now    Depression  Patient has persistent depression which is unknown and is currently controlled. Will Continue anti-depressant medications. We will not consult psychiatry at this time. Patient does not display psychosis at this time. Continue to monitor closely and adjust plan of care as needed.          VTE Risk Mitigation (From admission, onward)           Ordered     enoxaparin injection 40 mg  Daily         12/20/24 2025     IP VTE HIGH RISK PATIENT  Once          12/20/24 2025     Place sequential compression device  Until discontinued         12/20/24 2025                    Discharge Planning   GERALDO: 12/27/2024     Code Status: Full Code   Medical Readiness for Discharge Date: 12/24/2024  Discharge Plan A: Home with family                Please place Justification for DME        Malu Rivera MD  Department of Hospital Medicine   [facility]

## 2024-12-29 NOTE — PLAN OF CARE
Problem: Cardiac Catheterization (Diagnostic/Interventional)  Goal: Absence of Bleeding  Outcome: Progressing  Goal: Absence of Contrast-Induced Injury  Outcome: Progressing  Goal: Stable Heart Rate and Rhythm  Outcome: Progressing  Goal: Absence of Embolism Signs and Symptoms  Outcome: Progressing  Goal: Anesthesia/Sedation Recovery  Outcome: Progressing  Goal: Optimal Pain Control and Function  Outcome: Progressing  Goal: Absence of Vascular Access Complication  Outcome: Progressing     Problem: Skin Injury Risk Increased  Goal: Skin Health and Integrity  Outcome: Progressing     Problem: Infection  Goal: Absence of Infection Signs and Symptoms  Outcome: Progressing     Problem: Gas Exchange Impaired  Goal: Optimal Gas Exchange  Outcome: Progressing     Problem: Fall Injury Risk  Goal: Absence of Fall and Fall-Related Injury  Outcome: Progressing

## 2024-12-30 PROBLEM — D3A.8 NEUROENDOCRINE TUMOR: Status: ACTIVE | Noted: 2024-12-30

## 2024-12-30 LAB
ALBUMIN SERPL BCP-MCNC: 2.9 G/DL (ref 3.4–4.8)
ALBUMIN/GLOB SERPL: 0.9 {RATIO}
ALP SERPL-CCNC: 38 U/L (ref 40–150)
ALT SERPL W P-5'-P-CCNC: 66 U/L
ANION GAP SERPL CALCULATED.3IONS-SCNC: 21 MMOL/L (ref 7–16)
AST SERPL W P-5'-P-CCNC: 51 U/L (ref 5–34)
BASOPHILS # BLD AUTO: 0.03 K/UL (ref 0–0.2)
BASOPHILS NFR BLD AUTO: 0.4 % (ref 0–1)
BILIRUB SERPL-MCNC: 0.9 MG/DL
BUN SERPL-MCNC: 19 MG/DL (ref 10–20)
BUN/CREAT SERPL: 20 (ref 6–20)
CALCIUM SERPL-MCNC: 7.8 MG/DL (ref 8.4–10.2)
CHLORIDE SERPL-SCNC: 101 MMOL/L (ref 98–107)
CO2 SERPL-SCNC: 20 MMOL/L (ref 23–31)
CREAT SERPL-MCNC: 0.93 MG/DL (ref 0.55–1.02)
DHEA SERPL-MCNC: NORMAL
DIFFERENTIAL METHOD BLD: ABNORMAL
EGFR (NO RACE VARIABLE) (RUSH/TITUS): 65 ML/MIN/1.73M2
EOSINOPHIL # BLD AUTO: 0.07 K/UL (ref 0–0.5)
EOSINOPHIL NFR BLD AUTO: 0.9 % (ref 1–4)
ERYTHROCYTE [DISTWIDTH] IN BLOOD BY AUTOMATED COUNT: 15.9 % (ref 11.5–14.5)
ESTROGEN SERPL-MCNC: NORMAL PG/ML
GLOBULIN SER-MCNC: 3.3 G/DL (ref 2–4)
GLUCOSE SERPL-MCNC: 115 MG/DL (ref 70–105)
GLUCOSE SERPL-MCNC: 89 MG/DL (ref 82–115)
HCO3 UR-SCNC: 22.3 MMOL/L (ref 21–28)
HCT VFR BLD AUTO: 40.2 % (ref 38–47)
HGB BLD-MCNC: 12.8 G/DL (ref 12–16)
IMM GRANULOCYTES # BLD AUTO: 0.14 K/UL (ref 0–0.04)
IMM GRANULOCYTES NFR BLD: 1.9 % (ref 0–0.4)
INSULIN SERPL-ACNC: NORMAL U[IU]/ML
LAB AP GROSS DESCRIPTION: NORMAL
LAB AP LABORATORY NOTES: NORMAL
LYMPHOCYTES # BLD AUTO: 2.76 K/UL (ref 1–4.8)
LYMPHOCYTES NFR BLD AUTO: 36.8 % (ref 27–41)
MCH RBC QN AUTO: 25.3 PG (ref 27–31)
MCHC RBC AUTO-ENTMCNC: 31.8 G/DL (ref 32–36)
MCV RBC AUTO: 79.6 FL (ref 80–96)
MONOCYTES # BLD AUTO: 0.61 K/UL (ref 0–0.8)
MONOCYTES NFR BLD AUTO: 8.1 % (ref 2–6)
MPC BLD CALC-MCNC: 11.3 FL (ref 9.4–12.4)
NEUTROPHILS # BLD AUTO: 3.9 K/UL (ref 1.8–7.7)
NEUTROPHILS NFR BLD AUTO: 51.9 % (ref 53–65)
NRBC # BLD AUTO: 0.03 X10E3/UL
NRBC, AUTO (.00): 0.4 %
PCO2 BLDA: 30 MMHG (ref 35–48)
PH SMN: 7.48 [PH] (ref 7.35–7.45)
PLATELET # BLD AUTO: 148 K/UL (ref 150–400)
PO2 BLDA: 153 MMHG (ref 83–108)
POC BASE EXCESS: -0.4 MMOL/L (ref -2–3)
POC SATURATED O2: 99 % (ref 95–98)
POTASSIUM SERPL-SCNC: 3.1 MMOL/L (ref 3.5–5.1)
PROT SERPL-MCNC: 6.2 G/DL (ref 5.8–7.6)
RBC # BLD AUTO: 5.05 M/UL (ref 4.2–5.4)
SODIUM SERPL-SCNC: 139 MMOL/L (ref 136–145)
T3RU NFR SERPL: NORMAL %
WBC # BLD AUTO: 7.51 K/UL (ref 4.5–11)

## 2024-12-30 PROCEDURE — 11000001 HC ACUTE MED/SURG PRIVATE ROOM

## 2024-12-30 PROCEDURE — 99900035 HC TECH TIME PER 15 MIN (STAT)

## 2024-12-30 PROCEDURE — 82803 BLOOD GASES ANY COMBINATION: CPT

## 2024-12-30 PROCEDURE — 25000003 PHARM REV CODE 250: Performed by: NURSE PRACTITIONER

## 2024-12-30 PROCEDURE — 97116 GAIT TRAINING THERAPY: CPT

## 2024-12-30 PROCEDURE — 25000003 PHARM REV CODE 250: Performed by: INTERNAL MEDICINE

## 2024-12-30 PROCEDURE — 97530 THERAPEUTIC ACTIVITIES: CPT

## 2024-12-30 PROCEDURE — 97535 SELF CARE MNGMENT TRAINING: CPT

## 2024-12-30 PROCEDURE — 63600175 PHARM REV CODE 636 W HCPCS: Performed by: NURSE PRACTITIONER

## 2024-12-30 PROCEDURE — 25000003 PHARM REV CODE 250: Performed by: HOSPITALIST

## 2024-12-30 PROCEDURE — 80053 COMPREHEN METABOLIC PANEL: CPT

## 2024-12-30 PROCEDURE — 99232 SBSQ HOSP IP/OBS MODERATE 35: CPT | Mod: ,,,

## 2024-12-30 PROCEDURE — 25000003 PHARM REV CODE 250

## 2024-12-30 PROCEDURE — 36415 COLL VENOUS BLD VENIPUNCTURE: CPT

## 2024-12-30 PROCEDURE — 85025 COMPLETE CBC W/AUTO DIFF WBC: CPT

## 2024-12-30 PROCEDURE — 82962 GLUCOSE BLOOD TEST: CPT

## 2024-12-30 PROCEDURE — 99232 SBSQ HOSP IP/OBS MODERATE 35: CPT | Mod: ,,, | Performed by: INTERNAL MEDICINE

## 2024-12-30 PROCEDURE — 36600 WITHDRAWAL OF ARTERIAL BLOOD: CPT

## 2024-12-30 RX ADMIN — ENOXAPARIN SODIUM 40 MG: 40 INJECTION SUBCUTANEOUS at 04:12

## 2024-12-30 RX ADMIN — ATORVASTATIN CALCIUM 80 MG: 80 TABLET, FILM COATED ORAL at 09:12

## 2024-12-30 RX ADMIN — FUROSEMIDE 40 MG: 40 TABLET ORAL at 09:12

## 2024-12-30 RX ADMIN — HYDRALAZINE HYDROCHLORIDE 50 MG: 50 TABLET ORAL at 05:12

## 2024-12-30 RX ADMIN — FOLIC ACID-PYRIDOXINE-CYANOCOBALAMIN TAB 2.5-25-2 MG 1 TABLET: 2.5-25-2 TAB at 09:12

## 2024-12-30 RX ADMIN — SODIUM CHLORIDE 1000 ML: 9 INJECTION, SOLUTION INTRAVENOUS at 02:12

## 2024-12-30 RX ADMIN — PANTOPRAZOLE SODIUM 40 MG: 40 TABLET, DELAYED RELEASE ORAL at 09:12

## 2024-12-30 RX ADMIN — LEVOTHYROXINE SODIUM 50 MCG: 0.05 TABLET ORAL at 05:12

## 2024-12-30 RX ADMIN — VALSARTAN 40 MG: 40 TABLET, FILM COATED ORAL at 09:12

## 2024-12-30 RX ADMIN — METOPROLOL TARTRATE 25 MG: 25 TABLET, FILM COATED ORAL at 09:12

## 2024-12-30 RX ADMIN — ASPIRIN 81 MG CHEWABLE TABLET 81 MG: 81 TABLET CHEWABLE at 09:12

## 2024-12-30 RX ADMIN — SENNOSIDES AND DOCUSATE SODIUM 1 TABLET: 50; 8.6 TABLET ORAL at 09:12

## 2024-12-30 NOTE — PROGRESS NOTES
[facility] Medicine  Progress Note    Patient Name: Renetta Stewart  MRN: [id number]  Patient Class: IP- Inpatient   Admission Date: 12/20/2024  Length of Stay: 10 days  Attending Physician: Malu Rivera MD  Primary Care Provider: Eldon Govea MD        Subjective     Principal Problem:Esophagitis determined by endoscopy        HPI:  Pt is a 74-year-old female who presented to her doctor's office today.  She states she had been feeling well just a lot of fatigue.  While she was at the doctor's office she had a syncopal episode with loss of bladder control.  Bystanders report loss of consciousness was less than 15 seconds.  There was no observed seizure-like activity.  Patient denies chest pain, shortness of breath, palpitations, feeling of doom prior to syncopal episode.  Patient reports she does not have pain or shortness of breath at present.. PMH HTN, HLD, CVA, GERD, and pacemaker    In the ED initial vital signs were blood pressure of 143/77, temp 96.9° heart rate 77, O2 sat 98% on room air who.  Initial lab workup revealed a sodium of 147 glucose 117 BUN 22 creatinine 1.35.  CBC is unremarkable.  ProBNP was 74.  Initial troponin 53.6 repeat troponin at 88.3 patient has positive delta. EKG show NSR Rate 77, no pacer spike no st elevation.     Patient will be admitted to hospital medicine service under the direct supervision of Dr KHALIL  for further evaluation and management.     Overview/Hospital Course:  12/28  - EGD resulted, esophagitis noted, per GI continue p.o. Protonix daily, dilatation not done as esophagitis found deferred to a later date, biopsy sent we will follow with results    12/29  - patient is ready for discharge however we are unable to reach daughter. Called daughter 3 times myself and nurse has called as well, will report to  if nothing is heard from family today for abandonment   - results from biopsy will result in first of the week, these  can be released to patient by primary    12/30  - patient's daughter in room today, miscommunication problem addressed, daughter is a very nice woman who has to give full time care to another family member.   - Discussed in depth that patient's condition and swing bed need. Daughter agrees she wants patient to go to swing bed so she can gain some of her independence back  - PT/OT to eval again today and social following for placement    Interval History:     Review of Systems   All other systems reviewed and are negative.    Objective:     Vital Signs (Most Recent):  Temp: 98.2 °F (36.8 °C) (12/30/24 1202)  Pulse: 72 (12/30/24 1202)  Resp: 18 (12/30/24 1202)  BP: (!) 153/75 (12/30/24 1202)  SpO2: 98 % (12/30/24 1202) Vital Signs (24h Range):  Temp:  [98.1 °F (36.7 °C)-98.4 °F (36.9 °C)] 98.2 °F (36.8 °C)  Pulse:  [72-94] 72  Resp:  [16-19] 18  SpO2:  [96 %-98 %] 98 %  BP: (112-169)/(70-89) 153/75     Weight: 89 kg (196 lb 1.6 oz)  Body mass index is 33.66 kg/m².  No intake or output data in the 24 hours ending 12/30/24 1337        Physical Exam  Vitals and nursing note reviewed.   Constitutional:       Appearance: She is obese.   HENT:      Head: Normocephalic.      Mouth/Throat:      Mouth: Mucous membranes are moist.   Eyes:      Extraocular Movements: Extraocular movements intact.   Cardiovascular:      Rate and Rhythm: Normal rate and regular rhythm.   Pulmonary:      Effort: Pulmonary effort is normal. No respiratory distress.      Breath sounds: Normal breath sounds.   Abdominal:      General: Abdomen is flat. Bowel sounds are normal. There is no distension.      Palpations: Abdomen is soft.   Musculoskeletal:         General: No swelling or tenderness. Normal range of motion.      Cervical back: Normal range of motion and neck supple. No tenderness.   Skin:     General: Skin is warm and dry.   Neurological:      General: No focal deficit present.      Mental Status: She is alert. Mental status is at baseline.    Psychiatric:         Mood and Affect: Mood normal.             Significant Labs: All pertinent labs within the past 24 hours have been reviewed.      Significant Imaging: I have reviewed all pertinent imaging results/findings within the past 24 hours.    Assessment and Plan     * Esophagitis determined by endoscopy  12/28  - EGD resulted, esophagitis noted, per GI continue p.o. Protonix daily, dilatation not done as esophagitis found deferred to a later date, biopsy sent we will follow with results  - reviewed images, reports     12/29  - patient is ready for discharge however we are unable to reach daughter. Called daughter 3 times myself and nurse has called as well, will report to  if nothing is heard from family today for abandonment   - results from biopsy will result in first of the week, these can be released to patient by primary    Obesity  Body mass index is 35.02 kg/m². Morbid obesity complicates all aspects of disease management from diagnostic modalities to treatment. Weight loss encouraged and health benefits explained to patient.         Folate deficiency  replaced      Acute superficial gastritis without hemorrhage  Seen on EGD 12/27      Hypertensive urgency  Patient has a current diagnosis of hypertensive urgency (without evidence of end organ damage) which is uncontrolled.  Latest blood pressure and vitals reviewed-   Temp:  [96.1 °F (35.6 °C)-99 °F (37.2 °C)]   Pulse:  [62-81]   Resp:  [15-18]   BP: (128-204)/(70-97)   SpO2:  [96 %-100 %] .   Patient currently off IV antihypertensives.   Home meds for hypertension were reviewed and noted below.   Hypertension Medications               furosemide (LASIX) 20 MG tablet Take 1 tablet (20 mg total) by mouth once daily.    hydrALAZINE (APRESOLINE) 100 MG tablet Take 1 tablet (100 mg total) by mouth 3 (three) times daily.    valsartan (DIOVAN) 320 MG tablet Take 1 tablet (320 mg total) by mouth once daily.            Medication  adjustment for hospital antihypertensives is as follows-   Adding back home medications over time.  Patient was not able to swallow but able to swallow now.     Will aim for controlled BP reduction by medications noted above. Monitor and mitigate end organ damage as indicated.    Positive fecal occult blood test  GI consulted  Outpt endoscopy recommended for now  Monitor for blood loss     12/27: plan for EGD today.       Syncope and collapse  Syncope vs seizure  Echo reveals nl systolic and diastolic dysfunction   Carotid US and EEG and Keppra level pending  Telemetry  Fall precautions  Sz precautions    12/23: MRI negative for new CVA but there was a prior CVA.   Patient with memory problems, acute on chronic.     12/25: Ziopatch per cardiology       Type 2 MI (myocardial infarction)  Pt has Positve Delta, no chest pain, syncopal Episode,  Orhtostatics positive in ED, given pts AGE and risk factors will treat as NSTEMI.   Patient presents with NSTEMI. Chest pain is currently controlled. VIKTOR score is 3. Patient is currently on NSTEMI Pathway.    EKG reviewed. Troponins reviewed and results noted-   Troponin 53, > >88     Lipid panel reviewed and shows-     Lab Results   Component Value Date    LDLCALC 119 12/21/2024     Lab Results   Component Value Date    TRIG 182 (H) 12/21/2024         Medical management includes;  ASA,Beta Blocker, High Intensity Stain, and ACE/ARB Echo has not been performed. Latest ECHO results are as follows- Results for orders placed during the hospital encounter of 03/30/23    Echo    Interpretation Summary  · The left ventricle is normal in size with severe concentric hypertrophy and hyperdynamic systolic function.  · The estimated ejection fraction is 70%.  · Mild tricuspid regurgitation.  · Left ventricular mild outflow tract obstruction is present.  · Normal right ventricular size with normal right ventricular systolic function.  · Normal central venous pressure (3 mmHg).  · The  estimated PA systolic pressure is 43 mmHg.  · ECHO is suggestive of hypertensive heart disease vs. hypertrophic cardiomyopathy  .   Cardiology following  Lexiscan recommended once encephalopathy resolves     12/23: lexiscan today.   12/25: lexiscan negative per cardiology     Dysphagia  Dysphagia noted.  Patient with history of esophageal stricture s/p EGD with dilation.   GI consulted.   Patient also with FOBT+ so this may be investigated as well.     12/25: significant dysphagia currently and over the last few weeks.  Family feels she had dysphagia and this caused her to pass out at home.     12/26: Plan for EGD today.     12/27: EGD cancelled yesterday due to inability to obtain consent.  Consent obtained now.  I spoke with patient's daughter (Jacque Stewart).      12/28: EGD complete, esophagitis noted, dilatation to be considered at a later date      Gastroesophageal reflux disease  Continue home protonix      Hyperlipidemia  Continue statin      Essential hypertension  Patient's blood pressure range in the last 24 hours was: BP  Min: 96/68  Max: 153/72.The patient's inpatient anti-hypertensive regimen is listed below:  Current Antihypertensives  furosemide tablet 20 mg, Daily, Oral  hydrALAZINE tablet 100 mg, 3 times daily, Oral  valsartan tablet 320 mg, Daily, Oral  metoprolol tartrate (LOPRESSOR) tablet 25 mg, 2 times daily, Oral  nitroGLYCERIN SL tablet 0.4 mg, Every 5 min PRN, Sublingual    Plan  - BP is uncontrolled, will adjust as follows: Pt BP decresed when standing and hR increase + orhthos  - Will hold hydralazine and lasix for now    Depression  Patient has persistent depression which is unknown and is currently controlled. Will Continue anti-depressant medications. We will not consult psychiatry at this time. Patient does not display psychosis at this time. Continue to monitor closely and adjust plan of care as needed.          VTE Risk Mitigation (From admission, onward)           Ordered      enoxaparin injection 40 mg  Daily         12/20/24 2025     IP VTE HIGH RISK PATIENT  Once         12/20/24 2025     Place sequential compression device  Until discontinued         12/20/24 2025                    Discharge Planning   GERALDO: 12/27/2024     Code Status: Full Code   Medical Readiness for Discharge Date: 12/24/2024  Discharge plan a: swing bed               Please place Justification for DME        Malu Rivera MD  Department of Hospital Medicine   [facility]

## 2024-12-30 NOTE — PROGRESS NOTES
[facility]  Gastroenterology  Progress Note    Patient Name: Renetta Stewart  MRN: [id number]  Admission Date: 12/20/2024  Hospital Length of Stay: 10 days  Code Status: Full Code   Attending Provider: Malu Rivera MD  Consulting Provider: Efraín Giron MD  Primary Care Physician: Eldon Govea MD  Principal Problem: Esophagitis determined by endoscopy    Subjective:     Interval History:  had an episode of syncope and diaphoresis today. She reportedly responded to 1 liter of IV NS bolus. She is awake, but chronically ill appearing. No family is at bedside currently.    Review of Systems   Respiratory: Negative.     Cardiovascular: Negative.    Gastrointestinal: Negative.    Neurological:  Positive for weakness.   Psychiatric/Behavioral:  Positive for confusion.      Objective:     Vital Signs (Most Recent):  Temp: 97.4 °F (36.3 °C) (12/30/24 1551)  Pulse: 72 (12/30/24 1551)  Resp: 18 (12/30/24 1551)  BP: 132/68 (12/30/24 1551)  SpO2: 100 % (12/30/24 1551) Vital Signs (24h Range):  Temp:  [97.4 °F (36.3 °C)-98.3 °F (36.8 °C)] 97.4 °F (36.3 °C)  Pulse:  [72-94] 72  Resp:  [16-19] 18  SpO2:  [94 %-100 %] 100 %  BP: (112-169)/(54-89) 132/68     Weight: 89 kg (196 lb 1.6 oz) (12/30/24 0600)  Body mass index is 33.66 kg/m².    No intake or output data in the 24 hours ending 12/30/24 1637    Lines/Drains/Airways       Peripheral Intravenous Line  Duration                  Peripheral IV - Single Lumen 12/27/24 1317 22 G Left;Posterior Hand 3 days         Peripheral IV - Single Lumen 12/30/24 1435 18 G Right Antecubital <1 day                    Physical Exam  Vitals reviewed.   Constitutional:       General: She is not in acute distress.     Appearance: She is well-developed. She is ill-appearing.   HENT:      Head: Normocephalic and atraumatic.      Nose: Nose normal.   Eyes:      Pupils: Pupils are equal, round, and reactive to light.   Cardiovascular:      Rate and Rhythm: Normal  "rate. Rhythm irregular.   Pulmonary:      Effort: Pulmonary effort is normal.      Breath sounds: Normal breath sounds. No wheezing.   Abdominal:      General: Abdomen is flat. Bowel sounds are normal. There is no distension.      Palpations: Abdomen is soft.      Tenderness: There is no abdominal tenderness. There is no guarding.   Skin:     General: Skin is warm and dry.      Coloration: Skin is not jaundiced.   Neurological:      Mental Status: She is alert.   Psychiatric:         Attention and Perception: Attention normal.         Mood and Affect: Affect normal.         Speech: Speech normal.         Behavior: Behavior is cooperative.      Comments: Pt was calm while speaking.         Significant Labs:  CBC:   Recent Labs   Lab 12/30/24  1430   WBC 7.51   HGB 12.8   HCT 40.2   *     CMP: No results for input(s): "GLU", "CALCIUM", "ALBUMIN", "PROT", "NA", "K", "CO2", "CL", "BUN", "CREATININE", "ALKPHOS", "ALT", "AST", "BILITOT" in the last 48 hours.      Significant Imaging:  Imaging results within the past 24 hours have been reviewed.    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.8] 12/30/2024 Yes    Obesity [E66.9] 12/28/2024 Yes    Acute superficial gastritis without hemorrhage [K29.00] 12/27/2024 Yes    Polyp of duodenum [K31.7] 12/27/2024 Yes    Folate deficiency [E53.8] 12/27/2024 Yes    Hypertensive urgency [I16.0] 12/26/2024 Yes    Blood in stool [K92.1] 12/25/2024 Yes    Acute blood loss anemia [D62] 12/25/2024 Yes    Positive fecal occult blood test [R19.5] 12/22/2024 Yes    Elevated troponin [R79.89] 12/21/2024 Yes    Syncope [R55] 12/21/2024 Yes    Type 2 MI (myocardial infarction) [I21.A1] 12/20/2024 Yes    Syncope and collapse [R55] 12/20/2024 Yes    Dysphagia [R13.10] 03/06/2023 Yes    Gastroesophageal reflux disease [K21.9] 02/16/2023 Yes    Hyperlipidemia " [E78.5] 07/02/2021 Yes    Depression [F32.A] 05/27/2021 Yes    Essential hypertension [I10] 05/27/2021 Yes      Problems Resolved During this Admission:    Diagnosis Date Noted Date Resolved POA    GI bleed [K92.2] 12/22/2024 12/22/2024 Unknown         Impression:  The patient is EGD biopsies revealed ulcerative reflux esophagitis, gastritis and a neuroendocrine tumor of the duodenal bulb.    Recommendation:  Ppi therapy, consider referral to Dr. Brice at [facility] for possible EMR of duodenal bulb tumor after the patient's condition improves.    Thank you for your consult. I will sign off. Please contact us if you have any additional questions.    Efraín Giron MD  Gastroenterology  [facility]

## 2024-12-30 NOTE — PROGRESS NOTES
EGD biopsies show ulcerated reflux esophagitis, gastritis and a neuroendocrine tumor from the duodenal bulb.  Recommendation:  Avoid nonsteroidal anti-inflammatories, continue Protonix, obtain CT abdomen, refer patient to Dr. Brice at [facility] for endoscopic ultrasound and possible removal of duodenal bulb polyp. prior surgery

## 2024-12-30 NOTE — PLAN OF CARE
Problem: Adult Inpatient Plan of Care  Goal: Plan of Care Review  12/30/2024 0604 by Robert Feldman RN  Outcome: Progressing  12/29/2024 2203 by Robert Feldman RN  Outcome: Progressing  Goal: Patient-Specific Goal (Individualized)  12/30/2024 0604 by Robert Feldman RN  Outcome: Progressing  12/29/2024 2203 by Robert Feldman RN  Outcome: Progressing  Goal: Absence of Hospital-Acquired Illness or Injury  12/30/2024 0604 by Robert Feldman RN  Outcome: Progressing  12/29/2024 2203 by Robert Feldman RN  Outcome: Progressing  Goal: Optimal Comfort and Wellbeing  12/30/2024 0604 by Robert Feldman RN  Outcome: Progressing  12/29/2024 2203 by Robert Feldman RN  Outcome: Progressing  Goal: Readiness for Transition of Care  12/30/2024 0604 by Robert Feldman RN  Outcome: Progressing  12/29/2024 2203 by Robert Feldman RN  Outcome: Progressing     Problem: Acute Coronary Syndrome  Goal: Optimal Adaptation to Illness  12/30/2024 0604 by Robert Feldman RN  Outcome: Progressing  12/29/2024 2203 by Robert Feldman RN  Outcome: Progressing  Goal: Absence of Cardiac-Related Pain  12/30/2024 0604 by Robert Feldman RN  Outcome: Progressing  12/29/2024 2203 by Robert Feldman RN  Outcome: Progressing  Goal: Normalized Cardiac Rhythm  12/30/2024 0604 by Robert Feldman RN  Outcome: Progressing  12/29/2024 2203 by Robert Feldman RN  Outcome: Progressing  Goal: Effective Cardiac Pump Function  12/30/2024 0604 by Robert Feldman RN  Outcome: Progressing  12/29/2024 2203 by Robert Feldman RN  Outcome: Progressing  Goal: Adequate Tissue Perfusion  12/30/2024 0604 by Robert Feldman RN  Outcome: Progressing  12/29/2024 2203 by Robert Feldman RN  Outcome: Progressing     Problem: Cardiac Catheterization (Diagnostic/Interventional)  Goal: Absence of Bleeding  12/30/2024 0604 by Robert Feldman RN  Outcome: Progressing  12/29/2024 2203 by Robert Feldman RN  Outcome: Progressing  Goal:  Absence of Contrast-Induced Injury  12/30/2024 0604 by Robert Feldman RN  Outcome: Progressing  12/29/2024 2203 by Robert Feldman RN  Outcome: Progressing  Goal: Stable Heart Rate and Rhythm  12/30/2024 0604 by Robert Feldman RN  Outcome: Progressing  12/29/2024 2203 by Robert Feldman RN  Outcome: Progressing  Goal: Absence of Embolism Signs and Symptoms  12/30/2024 0604 by Robert Feldman RN  Outcome: Progressing  12/29/2024 2203 by Robert Feldman RN  Outcome: Progressing  Goal: Anesthesia/Sedation Recovery  12/30/2024 0604 by Robert Feldman RN  Outcome: Progressing  12/29/2024 2203 by Robert Feldman RN  Outcome: Progressing  Goal: Optimal Pain Control and Function  12/30/2024 0604 by Robert Feldman RN  Outcome: Progressing  12/29/2024 2203 by Robert Feldman RN  Outcome: Progressing  Goal: Absence of Vascular Access Complication  12/30/2024 0604 by Robert Feldman RN  Outcome: Progressing  12/29/2024 2203 by Robert Feldman RN  Outcome: Progressing     Problem: Skin Injury Risk Increased  Goal: Skin Health and Integrity  12/30/2024 0604 by Robert Feldman RN  Outcome: Progressing  12/29/2024 2203 by Robert Feldman RN  Outcome: Progressing     Problem: Infection  Goal: Absence of Infection Signs and Symptoms  12/30/2024 0604 by Robert Feldman RN  Outcome: Progressing  12/29/2024 2203 by Robert Feldman RN  Outcome: Progressing     Problem: Gas Exchange Impaired  Goal: Optimal Gas Exchange  12/30/2024 0604 by Robert Feldman RN  Outcome: Progressing  12/29/2024 2203 by Robert Feldman RN  Outcome: Progressing     Problem: Fall Injury Risk  Goal: Absence of Fall and Fall-Related Injury  12/30/2024 0604 by Robert Feldman RN  Outcome: Progressing  12/29/2024 2203 by Robert Feldman RN  Outcome: Progressing

## 2024-12-30 NOTE — NURSING
PT to nurses station at this time reporting that patient is sweaty and unresponsive after getting up and walking to bathroom/having a BM.  RN to room. Patient sitting in stretcher chair slumped over extremely diaphoretic, but pulse present. RRT called. Patient moved to stretcher at this time. Code cart brought to room and defib pads placed on patient. Patient hooked up to VS machine. Patient hypotensive. Patient placed in trendelenburg position.   Dr. Rivera at bedside. Verbal order received for 1L NS bolus and labs. New 18g IV access obtained in RAC.   1440: NS bolus started in new IV.   1445: Patient more awake at this time, following commands, and less diaphoretic.  Blood pressure improving. Dr. Rivera aware/remains at bedside, and placing orders.   Patient remains on monitor. Safety measures in place. Patient educated to use call button if she starts to feel bad again.

## 2024-12-30 NOTE — PT/OT/SLP PROGRESS
Physical Therapy Treatment    Patient Name:  Renetta Stewart   MRN:  [id number]    Recommendations:     Discharge Recommendations: Moderate Intensity Therapy  Discharge Equipment Recommendations: to be determined by next level of care  Barriers to discharge:  ongoing medical care; awaiting placement    Assessment:     Renetta Stewart is a 74 y.o. female admitted with a medical diagnosis of Esophagitis determined by endoscopy.  She presents with the following impairments/functional limitations: weakness, impaired endurance, impaired functional mobility, gait instability, impaired balance, decreased lower extremity function, decreased safety awareness, impaired cardiopulmonary response to activity. Pt initially tolerated session well, demo gait of 20ft x 2 trials with bilat HHA, Min a x 2 and toileting act. Upon return gait to recliner, pt demo decline in YONATHAN and became diaphoretic. RN notified and RRT called. Manual BP 64/45 after pt total lifted back to bed by therapy and nursing staff. Pt slowly returned to baseline with care of nursing staff.     Rehab Prognosis: Good and Fair; patient would benefit from acute skilled PT services to address these deficits and reach maximum level of function.    Recent Surgery: * No surgery found *      Plan:     During this hospitalization, patient to be seen 5 x/week to address the identified rehab impairments via gait training, therapeutic activities, therapeutic exercises, neuromuscular re-education and progress toward the following goals:    Plan of Care Expires:  01/24/25    Subjective     Chief Complaint: esophagitis determined by endoscopy  Patient/Family Comments/goals: agreeable to treatment  Pain/Comfort:  Pain Rating 1: 0/10      Objective:     Communicated with LALO Barakat RN prior to session.  Patient found HOB elevated with peripheral IV, telemetry upon PT entry to room.     General Precautions: Standard, fall  Orthopedic Precautions: N/A  Braces: N/A  Respiratory Status:  Room air     Functional Mobility:  Bed Mobility:     Scooting: minimum assistance  Supine to Sit: minimum assistance  Transfers:     Sit to Stand:  minimum assistance and of 1-2 persons with hand-held assist  Toilet Transfer: minimum assistance and of 1-2 persons with  hand-held assist  using  Step Transfer  Gait: 20ft x 2 with bilat HHA, Min A x 2 with short step length, non-reciprocal step-to gait pattern, axial flexion noted requiring cues for trunk and neck extension to neutral position   Balance: Fair- in stance       AM-PAC 6 CLICK MOBILITY  Turning over in bed (including adjusting bedclothes, sheets and blankets)?: 3  Sitting down on and standing up from a chair with arms (e.g., wheelchair, bedside commode, etc.): 3  Moving from lying on back to sitting on the side of the bed?: 3  Moving to and from a bed to a chair (including a wheelchair)?: 3  Need to walk in hospital room?: 3  Climbing 3-5 steps with a railing?: 2  Basic Mobility Total Score: 17       Treatment & Education:  Mobility as stated above. Pt required manual transfer back to bed after becoming verbally unresponsive and diaphoretic, as stated above.     Patient left supine with all lines intact, call button in reach, and Rapid response team present..    GOALS:   Multidisciplinary Problems       Physical Therapy Goals          Problem: Physical Therapy    Goal Priority Disciplines Outcome Interventions   Physical Therapy Goal     PT, PT/OT Progressing    Description: Short Term Goals  Ambulate CGA - 50 feet with rolling walker assistive device.   Supine to sit minimum  Sit to stand minimum  SPT minimum  5. Independent with HEP    Long Term Goals  Ambulate CGA - 100 feet with straight cane assistive device.   Supine to sit stand-by  Sit to stand stand-by  SPT stand-by  Needed equipment for home.                              Time Tracking:     PT Received On: 12/30/24  PT Start Time: 1348     PT Stop Time: 1417  PT Total Time (min): 29 min      Billable Minutes: Gait Training 15 and Therapeutic Activity 14    Treatment Type: Treatment  PT/PTA: PT     Number of PTA visits since last PT visit: 0     12/30/2024

## 2024-12-30 NOTE — PLAN OF CARE
SS consulted for swb. Ss spoke with pt and family at bedside. Choice obtained for [facility]. Ss faxed referral and notified Alexus. Irving following

## 2024-12-30 NOTE — SUBJECTIVE & OBJECTIVE
Interval History:     Review of Systems   All other systems reviewed and are negative.    Objective:     Vital Signs (Most Recent):  Temp: 98.2 °F (36.8 °C) (12/30/24 1202)  Pulse: 72 (12/30/24 1202)  Resp: 18 (12/30/24 1202)  BP: (!) 153/75 (12/30/24 1202)  SpO2: 98 % (12/30/24 1202) Vital Signs (24h Range):  Temp:  [98.1 °F (36.7 °C)-98.4 °F (36.9 °C)] 98.2 °F (36.8 °C)  Pulse:  [72-94] 72  Resp:  [16-19] 18  SpO2:  [96 %-98 %] 98 %  BP: (112-169)/(70-89) 153/75     Weight: 89 kg (196 lb 1.6 oz)  Body mass index is 33.66 kg/m².  No intake or output data in the 24 hours ending 12/30/24 1337        Physical Exam  Vitals and nursing note reviewed.   Constitutional:       Appearance: She is obese.   HENT:      Head: Normocephalic.      Mouth/Throat:      Mouth: Mucous membranes are moist.   Eyes:      Extraocular Movements: Extraocular movements intact.   Cardiovascular:      Rate and Rhythm: Normal rate and regular rhythm.   Pulmonary:      Effort: Pulmonary effort is normal. No respiratory distress.      Breath sounds: Normal breath sounds.   Abdominal:      General: Abdomen is flat. Bowel sounds are normal. There is no distension.      Palpations: Abdomen is soft.   Musculoskeletal:         General: No swelling or tenderness. Normal range of motion.      Cervical back: Normal range of motion and neck supple. No tenderness.   Skin:     General: Skin is warm and dry.   Neurological:      General: No focal deficit present.      Mental Status: She is alert. Mental status is at baseline.   Psychiatric:         Mood and Affect: Mood normal.             Significant Labs: All pertinent labs within the past 24 hours have been reviewed.      Significant Imaging: I have reviewed all pertinent imaging results/findings within the past 24 hours.

## 2024-12-30 NOTE — PT/OT/SLP PROGRESS
Occupational Therapy   Treatment    Name: Renetta Stewart  MRN: [id number]  Admitting Diagnosis:  Esophagitis determined by endoscopy       Recommendations:     Discharge Recommendations: Moderate Intensity Therapy  Discharge Equipment Recommendations:  other (see comments) (to be determined)  Barriers to discharge:  None    Assessment:     Renetta Stewart is a 74 y.o. female with a medical diagnosis of Esophagitis determined by endoscopy.  She presents with weakness and decline in ADLs. Performance deficits affecting function are weakness, impaired endurance, impaired self care skills, impaired functional mobility, gait instability, impaired balance. Pt agreeable to OT tx. Pt with CGA to Solitario to perform bed mobility and able to ambulate to bathroom with Solitario with hand held assist. Pt performed toileting in bathroom with Solitario to perform toilet t/f; however MaxA to perform toilet hygiene. Pt able to ambulate back to chair with Solitario with hand held assist. Pt noted to become diaphoretic once seated in chair. Pt became unresponsive and PROMISE Jones notified and in room. Pt left in physician and nursing care.     Rehab Prognosis:  Good; patient would benefit from acute skilled OT services to address these deficits and reach maximum level of function.       Plan:     Patient to be seen 5 x/week to address the above listed problems via self-care/home management, therapeutic activities, therapeutic exercises  Plan of Care Expires: 01/21/25  Plan of Care Reviewed with: patient    Subjective     Chief Complaint: weakness  Patient/Family Comments/goals: pt agreeable to OT tx  Pain/Comfort:  Pain Rating 1: 0/10    Objective:     Communicated with: PROMISE Jones prior to session.  Patient found HOB elevated with peripheral IV, telemetry upon OT entry to room.    General Precautions: Standard, fall    Orthopedic Precautions:N/A  Braces: N/A  Respiratory Status: Room air     Occupational Performance:     Bed Mobility:    Patient completed  Supine to Sit with minimum assistance     Functional Mobility/Transfers:  Patient completed Sit <> Stand Transfer with minimum assistance  with  hand-held assist   Patient completed Toilet Transfer Step Transfer technique with minimum assistance with  hand-held assist  Functional Mobility: pt performed functional mobility to bathroom and back to chair with Solitario with hand held assist    Activities of Daily Living:  Toileting: maximal assistance to perform toilet hgyiene      [facility] 6 Click ADL:      Treatment & Education:  Pt performed ADL training as listed above. Pt performed functional mobility and bed mobility as listed above. Pt left in nursing and physician care.     Patient left supine with all lines intact, call button in reach, and PROMISE Jones and Dr Rivera present    GOALS:   Multidisciplinary Problems       Occupational Therapy Goals          Problem: Occupational Therapy    Goal Priority Disciplines Outcome Interventions   Occupational Therapy Goal     OT, PT/OT Progressing    Description: STG:  Pt will perform grooming with setup  Pt will bathe with Solitario with setup at EOB  Pt will perform UE dressing with Solitario  Pt will perform LE dressing with Solitario  Pt will sit EOB x 10 min with SBA   Pt will transfer bed/chair/bsc with CGA with RW  Pt will perform standing task x 2 min with CGA with RW   Pt will tolerate 15 minutes of tx without fatigue      LT.Restore to max I with self care and mobility.                           Time Tracking:     OT Date of Treatment: 24  OT Start Time: 1349  OT Stop Time: 1418  OT Total Time (min): 29 min    Billable Minutes:Self Care/Home Management 10 minutes  Therapeutic Activity 15 minutes    OT/YOLANDA: OT          2024

## 2024-12-31 LAB
BASOPHILS # BLD AUTO: 0.03 K/UL (ref 0–0.2)
BASOPHILS NFR BLD AUTO: 0.5 % (ref 0–1)
DIFFERENTIAL METHOD BLD: ABNORMAL
EOSINOPHIL # BLD AUTO: 0.07 K/UL (ref 0–0.5)
EOSINOPHIL NFR BLD AUTO: 1.2 % (ref 1–4)
ERYTHROCYTE [DISTWIDTH] IN BLOOD BY AUTOMATED COUNT: 15.9 % (ref 11.5–14.5)
GLUCOSE SERPL-MCNC: 83 MG/DL (ref 70–105)
HCT VFR BLD AUTO: 35.6 % (ref 38–47)
HGB BLD-MCNC: 11.2 G/DL (ref 12–16)
IMM GRANULOCYTES # BLD AUTO: 0.06 K/UL (ref 0–0.04)
IMM GRANULOCYTES NFR BLD: 1 % (ref 0–0.4)
LYMPHOCYTES # BLD AUTO: 1.12 K/UL (ref 1–4.8)
LYMPHOCYTES NFR BLD AUTO: 18.6 % (ref 27–41)
MCH RBC QN AUTO: 25.4 PG (ref 27–31)
MCHC RBC AUTO-ENTMCNC: 31.5 G/DL (ref 32–36)
MCV RBC AUTO: 80.7 FL (ref 80–96)
MONOCYTES # BLD AUTO: 0.44 K/UL (ref 0–0.8)
MONOCYTES NFR BLD AUTO: 7.3 % (ref 2–6)
MPC BLD CALC-MCNC: 12.3 FL (ref 9.4–12.4)
NEUTROPHILS # BLD AUTO: 4.3 K/UL (ref 1.8–7.7)
NEUTROPHILS NFR BLD AUTO: 71.4 % (ref 53–65)
NRBC # BLD AUTO: 0 X10E3/UL
NRBC, AUTO (.00): 0 %
PLATELET # BLD AUTO: 119 K/UL (ref 150–400)
RBC # BLD AUTO: 4.41 M/UL (ref 4.2–5.4)
WBC # BLD AUTO: 6.02 K/UL (ref 4.5–11)

## 2024-12-31 PROCEDURE — 25000003 PHARM REV CODE 250: Performed by: INTERNAL MEDICINE

## 2024-12-31 PROCEDURE — 85025 COMPLETE CBC W/AUTO DIFF WBC: CPT

## 2024-12-31 PROCEDURE — 99232 SBSQ HOSP IP/OBS MODERATE 35: CPT | Mod: ,,,

## 2024-12-31 PROCEDURE — 25000003 PHARM REV CODE 250

## 2024-12-31 PROCEDURE — 97110 THERAPEUTIC EXERCISES: CPT

## 2024-12-31 PROCEDURE — 25000003 PHARM REV CODE 250: Performed by: HOSPITALIST

## 2024-12-31 PROCEDURE — 25000003 PHARM REV CODE 250: Performed by: NURSE PRACTITIONER

## 2024-12-31 PROCEDURE — 11000001 HC ACUTE MED/SURG PRIVATE ROOM

## 2024-12-31 PROCEDURE — 36415 COLL VENOUS BLD VENIPUNCTURE: CPT

## 2024-12-31 PROCEDURE — 63600175 PHARM REV CODE 636 W HCPCS: Performed by: NURSE PRACTITIONER

## 2024-12-31 PROCEDURE — 82962 GLUCOSE BLOOD TEST: CPT

## 2024-12-31 RX ORDER — AMLODIPINE BESYLATE 10 MG/1
10 TABLET ORAL NIGHTLY
Status: DISCONTINUED | OUTPATIENT
Start: 2024-12-31 | End: 2025-01-02 | Stop reason: HOSPADM

## 2024-12-31 RX ADMIN — FOLIC ACID-PYRIDOXINE-CYANOCOBALAMIN TAB 2.5-25-2 MG 1 TABLET: 2.5-25-2 TAB at 09:12

## 2024-12-31 RX ADMIN — SENNOSIDES AND DOCUSATE SODIUM 1 TABLET: 50; 8.6 TABLET ORAL at 09:12

## 2024-12-31 RX ADMIN — ATORVASTATIN CALCIUM 80 MG: 80 TABLET, FILM COATED ORAL at 09:12

## 2024-12-31 RX ADMIN — METOPROLOL TARTRATE 25 MG: 25 TABLET, FILM COATED ORAL at 09:12

## 2024-12-31 RX ADMIN — ASPIRIN 81 MG CHEWABLE TABLET 81 MG: 81 TABLET CHEWABLE at 09:12

## 2024-12-31 RX ADMIN — FUROSEMIDE 40 MG: 40 TABLET ORAL at 09:12

## 2024-12-31 RX ADMIN — VALSARTAN 40 MG: 40 TABLET, FILM COATED ORAL at 09:12

## 2024-12-31 RX ADMIN — PANTOPRAZOLE SODIUM 40 MG: 40 TABLET, DELAYED RELEASE ORAL at 09:12

## 2024-12-31 RX ADMIN — ENOXAPARIN SODIUM 40 MG: 40 INJECTION SUBCUTANEOUS at 05:12

## 2024-12-31 RX ADMIN — POLYETHYLENE GLYCOL 3350 17 G: 17 POWDER, FOR SOLUTION ORAL at 09:12

## 2024-12-31 NOTE — PROGRESS NOTES
[facility]  Adult Nutrition  First Assessment Note         Reason for Assessment  Reason For Assessment: length of stay        Assessment and Plan  Patient is a 75yo female admitted 12/20 for esophagitis. She is assessed for length of stay.     Patient is 88.1kg with a BMI of 33.34 and is obese. She is ordered a full liquid diet and tolerating well. Documented intake 50% per flowsheet. Recommend advance towards a Regular diet as medically appropriate and tolerated. Encourage good PO intakes.     Last BM 12/30 per flowsheet.    Medications/labs reviewed. RD following.       Learning Needs/Social Determinants of Health  Learning Assessment       12/20/2024 2321 [facility] (12/20/2024 - Present)   Created by Fernanda Sanford RN - RN (Nurse) Status: Complete                 PRIMARY LEARNER     Primary Learner Name:  Renetta Stewart [place] - 12/20/2024 2321    Relationship:  Patient [place] - 12/20/2024 2321    Does the primary learner have any barriers to learning?:  Cognitive [place] - 12/20/2024 2321    What is the preferred language of the primary learner?:  English [place] - 12/20/2024 2321    Is an  required?:  No [place] - 12/20/2024 2321    How does the primary learner prefer to learn new concepts?:  Demonstration, Pictures/Video, Listening [place] - 12/20/2024 2321    How often do you need to have someone help you read instructions, pamphlets, or written material from your doctor or pharmacy?:  Sometimes [place] - 12/20/2024 2321        CO-LEARNER #1     No question answered        CO-LEARNER #2     No question answered        SPECIAL TOPICS     No question answered        ANSWERED BY:     No question answered        Comments         Edit History       Fernanda Sanford, RN - RN (Nurse)   12/20/2024 2321                           Social Drivers of Health     Tobacco Use: Low Risk  (12/27/2024)    Patient History     Smoking Tobacco Use: Never     Smokeless Tobacco Use: Never     Passive Exposure:  Never   Recent Concern: Tobacco Use - Medium Risk (10/22/2024)    Received from [facility]    Patient History     Smoking Tobacco Use: Former     Smokeless Tobacco Use: Never     Passive Exposure: Not on file   Alcohol Use: Not At Risk (12/22/2024)    AUDIT-C     Frequency of Alcohol Consumption: Never     Average Number of Drinks: Patient does not drink     Frequency of Binge Drinking: Never   Financial Resource Strain: Low Risk  (12/22/2024)    Overall Financial Resource Strain (CARDIA)     Difficulty of Paying Living Expenses: Not very hard   Food Insecurity: No Food Insecurity (12/22/2024)    Hunger Vital Sign     Worried About Running Out of Food in the Last Year: Never true     Ran Out of Food in the Last Year: Never true   Transportation Needs: No Transportation Needs (12/22/2024)    TRANSPORTATION NEEDS     Transportation : No   Physical Activity: Inactive (12/22/2024)    Exercise Vital Sign     Days of Exercise per Week: 0 days     Minutes of Exercise per Session: 0 min   Stress: No Stress Concern Present (12/22/2024)    [employer] - Occupational Stress Questionnaire     Feeling of Stress : Not at all   Housing Stability: Low Risk  (12/22/2024)    Housing Stability Vital Sign     Unable to Pay for Housing in the Last Year: No     Homeless in the Last Year: No   Depression: Low Risk  (12/20/2024)    Depression     Last PHQ-4: Flowsheet Data: 0   Utilities: Not At Risk (12/22/2024)    [facility] Utilities     Threatened with loss of utilities: No   Health Literacy: Inadequate Health Literacy (12/22/2024)     Health Literacy     Frequency of need for help with medical instructions: Always   Social Isolation: Socially Integrated (12/22/2024)    Social Isolation     Social Isolation: 1            Malnutrition  Is Patient Malnourished: No    Nutrition Diagnosis  Altered GI function related to Dysphagia/ difficulty swallowing as evidenced by esophagitis,  "dysphagia  Comments: continue to advance diet as appropriate and tolerated    Recent Labs   Lab 12/30/24  1628 12/31/24  0605   GLU 89  --    POCGLU  --  83         Nutrition Prescription / Recommendations  Recommendation/Intervention: Recommend advance diet as medically appropriate and tolerated. Recommend Regular diet. Encourage good PO intakes.  Goals: Weight maintenance during admission, intake 50-75% of meals during admission  Nutrition Goal Status: new  Current Diet Order: Full liquid  Chewing or Swallowing Difficulty?: Swallowing difficulty  Recommended Diet:  As medically appropriate  Recommended Oral Supplement: No Oral Supplements  Is Nutrition Support Recommended: [facility] Support: No  Is Nutrition Education Recommended: No    Monitor and Evaluation  % current Intake: P.O. intake of 50 - 75 %  % intake to meet estimated needs: 50 - 75 %  Food and Nutrient Intake: food and beverage intake  Food and Nutrient Adminstration: diet order  Anthropometric Measurements: weight, weight change  Biochemical Data, Medical Tests and Procedures: electrolyte and renal panel, gastrointestinal profile, glucose/endocrine profile, inflammatory profile, lipid profile       Current Medical Diagnosis and Past Medical History  Diagnosis: gastrointestinal disease  Past Medical History:   Diagnosis Date    Anemia     Anxiety     Dementia     Depression     GERD (gastroesophageal reflux disease)     Hyperlipidemia     Hypertension     PONV (postoperative nausea and vomiting)     Stroke        Nutrition/Diet History  Spiritual, Cultural Beliefs, Sikhism Practices, Values that Affect Care: no    Lab/Procedures/Meds  Recent Labs   Lab 12/30/24  1628      K 3.1*   BUN 19   CREATININE 0.93   CALCIUM 7.8*   ALBUMIN 2.9*      ALT 66*   AST 51*   Note: K+, Ca++, Alb low.     Last A1c: No results found for: "HGBA1C"  Lab Results   Component Value Date    RBC 5.05 12/30/2024    HGB 12.8 12/30/2024    HCT 40.2 " "12/30/2024    MCV 79.6 (L) 12/30/2024    MCH 25.3 (L) 12/30/2024    MCHC 31.8 (L) 12/30/2024    TIBC 142 (L) 12/27/2024     Pertinent Labs Reviewed: reviewed  Pertinent Medications Reviewed: reviewed  Scheduled Meds:   aspirin  81 mg Oral Daily    atorvastatin  80 mg Oral Daily    enoxparin  40 mg Subcutaneous Daily    folic acid-vit B6-vit B12 2.5-25-2 mg  1 tablet Oral Daily    furosemide  40 mg Oral Daily    hydrALAZINE  50 mg Oral Q8H    levothyroxine  50 mcg Oral Before breakfast    metoprolol tartrate  25 mg Oral BID    pantoprazole  40 mg Oral Daily    polyethylene glycol  17 g Oral Daily    senna-docusate 8.6-50 mg  1 tablet Oral BID    valsartan  40 mg Oral Daily     Continuous Infusions:  PRN Meds:.  Current Facility-Administered Medications:     acetaminophen, 650 mg, Oral, Q4H PRN    acetaminophen, 650 mg, Oral, Q8H PRN    dextrose 50%, 12.5 g, Intravenous, PRN    dextrose 50%, 25 g, Intravenous, PRN    glucagon (human recombinant), 1 mg, Intramuscular, PRN    glucose, 16 g, Oral, PRN    glucose, 24 g, Oral, PRN    HYDROcodone-acetaminophen, 1 tablet, Oral, Q6H PRN    melatonin, 6 mg, Oral, Nightly PRN    naloxone, 0.02 mg, Intravenous, PRN    nitroGLYCERIN, 0.4 mg, Sublingual, Q5 Min PRN    ondansetron, 4 mg, Intravenous, Q8H PRN    sodium chloride 0.9%, 10 mL, Intravenous, Q12H PRN    Anthropometrics  Temp: 98 °F (36.7 °C)  Height Method: Stated  Height: 5' 4" (162.6 cm)  Height (inches): 64 in  Weight Method: Bed Scale  Weight: 88.1 kg (194 lb 3.6 oz)  Weight (lb): 194.23 lb  Ideal Body Weight (IBW), Female: 120 lb  % Ideal Body Weight, Female (lb): 170 %  BMI (Calculated): 33.3       Estimated/Assessed Needs  RMR ([place]-St. Jeor Equation): 1366     Temp: 98 °F (36.7 °C)Oral  Weight Used For Calorie Calculations: 88.1 kg (194 lb 3.6 oz)     Energy Calorie Requirements (kcal): 1762-2203kcal (25-30kcal/kg)  Weight Used For Protein Calculations: 88.1 kg (194 lb 3.6 oz)  Protein Requirements: 88-106g " (1.0-1.2g/kg)       RDA Method (mL): 1762       Nutrition by Nursing     Intake (%): 50%  Diet/Feeding Assistance: tray set-up  Diet/Feeding Tolerance: fair  Last Bowel Movement: 12/30/24                Nutrition Follow-Up  RD Follow-up?: Yes      Nutrition Discharge Planning: Plan to d/c to swb. Will benefit from liberalized diet as tolerated on discharge          Trudy Unger, MS, RD, LD  Available via Secure Chat

## 2024-12-31 NOTE — PLAN OF CARE
Problem: Adult Inpatient Plan of Care  Goal: Plan of Care Review  Outcome: Not Progressing  Goal: Patient-Specific Goal (Individualized)  Outcome: Not Progressing  Goal: Absence of Hospital-Acquired Illness or Injury  Outcome: Not Progressing  Goal: Optimal Comfort and Wellbeing  Outcome: Not Progressing  Goal: Readiness for Transition of Care  Outcome: Not Progressing     Problem: Acute Coronary Syndrome  Goal: Optimal Adaptation to Illness  Outcome: Not Progressing  Goal: Absence of Cardiac-Related Pain  Outcome: Not Progressing  Goal: Normalized Cardiac Rhythm  Outcome: Not Progressing  Goal: Effective Cardiac Pump Function  Outcome: Not Progressing  Goal: Adequate Tissue Perfusion  Outcome: Not Progressing     Problem: Cardiac Catheterization (Diagnostic/Interventional)  Goal: Absence of Bleeding  Outcome: Not Progressing  Goal: Absence of Contrast-Induced Injury  Outcome: Not Progressing  Goal: Stable Heart Rate and Rhythm  Outcome: Not Progressing  Goal: Absence of Embolism Signs and Symptoms  Outcome: Not Progressing  Goal: Anesthesia/Sedation Recovery  Outcome: Not Progressing  Goal: Optimal Pain Control and Function  Outcome: Not Progressing  Goal: Absence of Vascular Access Complication  Outcome: Not Progressing     Problem: Skin Injury Risk Increased  Goal: Skin Health and Integrity  Outcome: Not Progressing     Problem: Infection  Goal: Absence of Infection Signs and Symptoms  Outcome: Not Progressing     Problem: Gas Exchange Impaired  Goal: Optimal Gas Exchange  Outcome: Not Progressing     Problem: Fall Injury Risk  Goal: Absence of Fall and Fall-Related Injury  Outcome: Not Progressing

## 2024-12-31 NOTE — PLAN OF CARE
Problem: Adult Inpatient Plan of Care  Goal: Plan of Care Review  12/31/2024 0601 by Robert Feldman RN  Outcome: Progressing  12/30/2024 2207 by Robert Feldman RN  Outcome: Not Progressing  Goal: Patient-Specific Goal (Individualized)  12/31/2024 0601 by Robert Feldman RN  Outcome: Progressing  12/30/2024 2207 by Robert Feldman RN  Outcome: Not Progressing  Goal: Absence of Hospital-Acquired Illness or Injury  12/31/2024 0601 by Robert Feldman RN  Outcome: Progressing  12/30/2024 2207 by Robert Feldman RN  Outcome: Not Progressing  Goal: Optimal Comfort and Wellbeing  12/31/2024 0601 by Robert Feldman RN  Outcome: Progressing  12/30/2024 2207 by Robert Feldman RN  Outcome: Not Progressing  Goal: Readiness for Transition of Care  12/31/2024 0601 by Robert Feldman RN  Outcome: Progressing  12/30/2024 2207 by Robert Feldman RN  Outcome: Not Progressing     Problem: Acute Coronary Syndrome  Goal: Optimal Adaptation to Illness  12/31/2024 0601 by Robert Feldman RN  Outcome: Progressing  12/30/2024 2207 by Robert Feldman RN  Outcome: Not Progressing  Goal: Absence of Cardiac-Related Pain  12/31/2024 0601 by Robert Feldman RN  Outcome: Progressing  12/30/2024 2207 by Robert Feldman RN  Outcome: Not Progressing  Goal: Normalized Cardiac Rhythm  12/31/2024 0601 by Robert Feldman RN  Outcome: Progressing  12/30/2024 2207 by Robert Feldman RN  Outcome: Not Progressing  Goal: Effective Cardiac Pump Function  12/31/2024 0601 by Robert Feldman RN  Outcome: Progressing  12/30/2024 2207 by Robert Feldman RN  Outcome: Not Progressing  Goal: Adequate Tissue Perfusion  12/31/2024 0601 by Robert Feldman RN  Outcome: Progressing  12/30/2024 2207 by oRbert Feldman RN  Outcome: Not Progressing     Problem: Cardiac Catheterization (Diagnostic/Interventional)  Goal: Absence of Bleeding  12/31/2024 0601 by Robert Feldman RN  Outcome: Progressing  12/30/2024 2207 by Francia,  PROMISE Jones  Outcome: Not Progressing  Goal: Absence of Contrast-Induced Injury  12/31/2024 0601 by Robert Feldman RN  Outcome: Progressing  12/30/2024 2207 by Robert Feldman RN  Outcome: Not Progressing  Goal: Stable Heart Rate and Rhythm  12/31/2024 0601 by Robert Feldman RN  Outcome: Progressing  12/30/2024 2207 by Robert Feldman RN  Outcome: Not Progressing  Goal: Absence of Embolism Signs and Symptoms  12/31/2024 0601 by Robert Feldman RN  Outcome: Progressing  12/30/2024 2207 by Robert Feldman RN  Outcome: Not Progressing  Goal: Anesthesia/Sedation Recovery  12/31/2024 0601 by Robert Feldman RN  Outcome: Progressing  12/30/2024 2207 by Robert Feldman RN  Outcome: Not Progressing  Goal: Optimal Pain Control and Function  12/31/2024 0601 by Robert Feldman RN  Outcome: Progressing  12/30/2024 2207 by Robert Feldman RN  Outcome: Not Progressing  Goal: Absence of Vascular Access Complication  12/31/2024 0601 by Robert Feldman RN  Outcome: Progressing  12/30/2024 2207 by Robert Feldman RN  Outcome: Not Progressing     Problem: Skin Injury Risk Increased  Goal: Skin Health and Integrity  12/31/2024 0601 by Robert Feldmna RN  Outcome: Progressing  12/30/2024 2207 by Robert Feldman RN  Outcome: Not Progressing     Problem: Infection  Goal: Absence of Infection Signs and Symptoms  12/31/2024 0601 by Robert Feldman RN  Outcome: Progressing  12/30/2024 2207 by Robert Feldman RN  Outcome: Not Progressing     Problem: Gas Exchange Impaired  Goal: Optimal Gas Exchange  12/31/2024 0601 by Robert Feldman RN  Outcome: Progressing  12/30/2024 2207 by Robert Feldman RN  Outcome: Not Progressing     Problem: Fall Injury Risk  Goal: Absence of Fall and Fall-Related Injury  12/31/2024 0601 by Robert Feldman RN  Outcome: Progressing  12/30/2024 2207 by Robert Feldman RN  Outcome: Not Progressing

## 2024-12-31 NOTE — PROGRESS NOTES
[facility] Hospital Medicine  Progress Note    Patient Name: Renetta Stewart  MRN: [id number]  Patient Class: IP- Inpatient   Admission Date: 12/20/2024  Length of Stay: 11 days  Attending Physician: Malu Rivera MD  Primary Care Provider: Eldon Govea MD        Subjective     Principal Problem:Esophagitis determined by endoscopy        HPI:  Pt is a 74-year-old female who presented to her doctor's office today.  She states she had been feeling well just a lot of fatigue.  While she was at the doctor's office she had a syncopal episode with loss of bladder control.  Bystanders report loss of consciousness was less than 15 seconds.  There was no observed seizure-like activity.  Patient denies chest pain, shortness of breath, palpitations, feeling of doom prior to syncopal episode.  Patient reports she does not have pain or shortness of breath at present.. PMH HTN, HLD, CVA, GERD, and pacemaker    In the ED initial vital signs were blood pressure of 143/77, temp 96.9° heart rate 77, O2 sat 98% on room air who.  Initial lab workup revealed a sodium of 147 glucose 117 BUN 22 creatinine 1.35.  CBC is unremarkable.  ProBNP was 74.  Initial troponin 53.6 repeat troponin at 88.3 patient has positive delta. EKG show NSR Rate 77, no pacer spike no st elevation.     Patient will be admitted to hospital medicine service under the direct supervision of Dr KHALIL  for further evaluation and management.     Overview/Hospital Course:  12/28  - EGD resulted, esophagitis noted, per GI continue p.o. Protonix daily, dilatation not done as esophagitis found deferred to a later date, biopsy sent we will follow with results    12/29  - patient is ready for discharge however we are unable to reach daughter. Called daughter 3 times myself and nurse has called as well, will report to  if nothing is heard from family today for abandonment   - results from biopsy will result in first of the week, these  can be released to patient by primary    12/30  - patient's daughter in room today, miscommunication problem addressed, daughter is a very nice woman who has to give full time care to another family member.   - Discussed in depth that patient's condition and swing bed need. Daughter agrees she wants patient to go to swing bed so she can gain some of her independence back  - PT/OT to eval again today and social following for placement    12/31  - RRT called yesterday as patient became lightheaded and unresponsive after using the bathroom and ambulating to the bed, work up complete and like due to orthostatics vs vasovagal   - orthostatic vitals ordered however patient refusing to ambulate right now likely because when she does she has this occur  - will change hydralazine 3 times a day to amlodipine once a day  - try for orthostatic vitals when patient cooperates  - if positive will continue evaluating meds and try abdominal binder if needed      Interval History:     Review of Systems   All other systems reviewed and are negative.    Objective:     Vital Signs (Most Recent):  Temp: 98.7 °F (37.1 °C) (12/31/24 1209)  Pulse: 99 (12/31/24 1209)  Resp: 16 (12/31/24 1209)  BP: 118/80 (12/31/24 1209)  SpO2: 96 % (12/31/24 1209) Vital Signs (24h Range):  Temp:  [97.2 °F (36.2 °C)-98.7 °F (37.1 °C)] 98.7 °F (37.1 °C)  Pulse:  [72-99] 99  Resp:  [13-18] 16  SpO2:  [96 %-100 %] 96 %  BP: (118-157)/(54-82) 118/80     Weight: 88.1 kg (194 lb 3.6 oz)  Body mass index is 33.34 kg/m².  No intake or output data in the 24 hours ending 12/31/24 1503        Physical Exam  Vitals and nursing note reviewed.   Constitutional:       Appearance: She is obese.   HENT:      Head: Normocephalic.      Mouth/Throat:      Mouth: Mucous membranes are moist.   Eyes:      Extraocular Movements: Extraocular movements intact.   Cardiovascular:      Rate and Rhythm: Normal rate and regular rhythm.   Pulmonary:      Effort: Pulmonary effort is normal.  No respiratory distress.      Breath sounds: Normal breath sounds.   Abdominal:      General: Abdomen is flat. Bowel sounds are normal. There is no distension.      Palpations: Abdomen is soft.   Musculoskeletal:         General: No swelling or tenderness. Normal range of motion.      Cervical back: Normal range of motion and neck supple. No tenderness.   Skin:     General: Skin is warm and dry.   Neurological:      General: No focal deficit present.      Mental Status: She is alert. Mental status is at baseline.   Psychiatric:         Mood and Affect: Mood normal.             Significant Labs: All pertinent labs within the past 24 hours have been reviewed.      Significant Imaging: I have reviewed all pertinent imaging results/findings within the past 24 hours.    Assessment and Plan     * Esophagitis determined by endoscopy  12/28  - EGD resulted, esophagitis noted, per GI continue p.o. Protonix daily, dilatation not done as esophagitis found deferred to a later date, biopsy sent we will follow with results  - reviewed images, reports     12/29  - patient is ready for discharge however we are unable to reach daughter. Called daughter 3 times myself and nurse has called as well, will report to  if nothing is heard from family today for abandonment   - results from biopsy will result in first of the week, these can be released to patient by primary    Syncope and collapse  Syncope vs seizure  Echo reveals nl systolic and diastolic dysfunction   Carotid US and EEG and Keppra level pending  Telemetry  Fall precautions  Sz precautions    12/23: MRI negative for new CVA but there was a prior CVA.   Patient with memory problems, acute on chronic.     12/25: Ziop[facility] per cardiology       12/31  - RRT called yesterday as patient became lightheaded and unresponsive after using the bathroom and ambulating to the bed, work up complete and like due to orthostatics vs vasovagal   - orthostatic vitals ordered  however patient refusing to ambulate right now likely because when she does she has this occur  - will change hydralazine 3 times a day to amlodipine once a day  - try for orthostatic vitals when patient cooperates  - if positive will continue evaluating meds and try abdominal binder if needed    Obesity  Body mass index is 35.02 kg/m². Morbid obesity complicates all aspects of disease management from diagnostic modalities to treatment. Weight loss encouraged and health benefits explained to patient.         Folate deficiency  replaced      Acute superficial gastritis without hemorrhage  Seen on EGD 12/27      Hypertensive urgency  Patient has a current diagnosis of hypertensive urgency (without evidence of end organ damage) which is uncontrolled.  Latest blood pressure and vitals reviewed-   Temp:  [96.1 °F (35.6 °C)-99 °F (37.2 °C)]   Pulse:  [62-81]   Resp:  [15-18]   BP: (128-204)/(70-97)   SpO2:  [96 %-100 %] .   Patient currently off IV antihypertensives.   Home meds for hypertension were reviewed and noted below.   Hypertension Medications               furosemide (LASIX) 20 MG tablet Take 1 tablet (20 mg total) by mouth once daily.    hydrALAZINE (APRESOLINE) 100 MG tablet Take 1 tablet (100 mg total) by mouth 3 (three) times daily.    valsartan (DIOVAN) 320 MG tablet Take 1 tablet (320 mg total) by mouth once daily.            Medication adjustment for hospital antihypertensives is as follows-   Adding back home medications over time.  Patient was not able to swallow but able to swallow now.     Will aim for controlled BP reduction by medications noted above. Monitor and mitigate end organ damage as indicated.    Positive fecal occult blood test  GI consulted  Outpt endoscopy recommended for now  Monitor for blood loss     12/27: plan for EGD today.       Type 2 MI (myocardial infarction)  Pt has Positve Delta, no chest pain, syncopal Episode,  Orhtostatics positive in ED, given pts AGE and risk factors  will treat as NSTEMI.   Patient presents with NSTEMI. Chest pain is currently controlled. VIKTOR score is 3. Patient is currently on NSTEMI Pathway.    EKG reviewed. Troponins reviewed and results noted-   Troponin 53, > >88     Lipid panel reviewed and shows-     Lab Results   Component Value Date    LDLCALC 119 12/21/2024     Lab Results   Component Value Date    TRIG 182 (H) 12/21/2024         Medical management includes;  ASA,Beta Blocker, High Intensity Stain, and ACE/ARB Echo has not been performed. Latest ECHO results are as follows- Results for orders placed during the hospital encounter of 03/30/23    Echo    Interpretation Summary  · The left ventricle is normal in size with severe concentric hypertrophy and hyperdynamic systolic function.  · The estimated ejection fraction is 70%.  · Mild tricuspid regurgitation.  · Left ventricular mild outflow tract obstruction is present.  · Normal right ventricular size with normal right ventricular systolic function.  · Normal central venous pressure (3 mmHg).  · The estimated PA systolic pressure is 43 mmHg.  · ECHO is suggestive of hypertensive heart disease vs. hypertrophic cardiomyopathy  .   Cardiology following  Lexiscan recommended once encephalopathy resolves     12/23: lexiscan today.   12/25: lexiscan negative per cardiology     Dysphagia  Dysphagia noted.  Patient with history of esophageal stricture s/p EGD with dilation.   GI consulted.   Patient also with FOBT+ so this may be investigated as well.     12/25: significant dysphagia currently and over the last few weeks.  Family feels she had dysphagia and this caused her to pass out at home.     12/26: Plan for EGD today.     12/27: EGD cancelled yesterday due to inability to obtain consent.  Consent obtained now.  I spoke with patient's daughter (Jacque Stewart).      12/28: EGD complete, esophagitis noted, dilatation to be considered at a later date      Gastroesophageal reflux disease  Continue home  protonix      Hyperlipidemia  Continue statin      Essential hypertension  Patient's blood pressure range in the last 24 hours was: BP  Min: 96/68  Max: 153/72.The patient's inpatient anti-hypertensive regimen is listed below:  Current Antihypertensives  furosemide tablet 20 mg, Daily, Oral  hydrALAZINE tablet 100 mg, 3 times daily, Oral  valsartan tablet 320 mg, Daily, Oral  metoprolol tartrate (LOPRESSOR) tablet 25 mg, 2 times daily, Oral  nitroGLYCERIN SL tablet 0.4 mg, Every 5 min PRN, Sublingual    Plan  - BP is uncontrolled, will adjust as follows: Pt BP decresed when standing and hR increase + orhthos  - Will hold hydralazine and lasix for now    Depression  Patient has persistent depression which is unknown and is currently controlled. Will Continue anti-depressant medications. We will not consult psychiatry at this time. Patient does not display psychosis at this time. Continue to monitor closely and adjust plan of care as needed.          VTE Risk Mitigation (From admission, onward)           Ordered     enoxaparin injection 40 mg  Daily         12/20/24 2025     IP VTE HIGH RISK PATIENT  Once         12/20/24 2025     Place sequential compression device  Until discontinued         12/20/24 2025                    Discharge Planning   GERALDO: 1/3/2025     Code Status: Full Code   Medical Readiness for Discharge Date: 12/24/2024  Discharge plan to swing bed               Please place Justification for DME        Malu Rivera MD  Department of Hospital Medicine   [facility]

## 2024-12-31 NOTE — PLAN OF CARE
SS spoke with Alexus at [facility], [facility] currently full and unable to accept pt. SS spoke with pt's daughter and obtained choice for [facility]. SS faxed referral and notified Martita. Irving following

## 2024-12-31 NOTE — SUBJECTIVE & OBJECTIVE
Interval History:     Review of Systems   All other systems reviewed and are negative.    Objective:     Vital Signs (Most Recent):  Temp: 98.7 °F (37.1 °C) (12/31/24 1209)  Pulse: 99 (12/31/24 1209)  Resp: 16 (12/31/24 1209)  BP: 118/80 (12/31/24 1209)  SpO2: 96 % (12/31/24 1209) Vital Signs (24h Range):  Temp:  [97.2 °F (36.2 °C)-98.7 °F (37.1 °C)] 98.7 °F (37.1 °C)  Pulse:  [72-99] 99  Resp:  [13-18] 16  SpO2:  [96 %-100 %] 96 %  BP: (118-157)/(54-82) 118/80     Weight: 88.1 kg (194 lb 3.6 oz)  Body mass index is 33.34 kg/m².  No intake or output data in the 24 hours ending 12/31/24 1503        Physical Exam  Vitals and nursing note reviewed.   Constitutional:       Appearance: She is obese.   HENT:      Head: Normocephalic.      Mouth/Throat:      Mouth: Mucous membranes are moist.   Eyes:      Extraocular Movements: Extraocular movements intact.   Cardiovascular:      Rate and Rhythm: Normal rate and regular rhythm.   Pulmonary:      Effort: Pulmonary effort is normal. No respiratory distress.      Breath sounds: Normal breath sounds.   Abdominal:      General: Abdomen is flat. Bowel sounds are normal. There is no distension.      Palpations: Abdomen is soft.   Musculoskeletal:         General: No swelling or tenderness. Normal range of motion.      Cervical back: Normal range of motion and neck supple. No tenderness.   Skin:     General: Skin is warm and dry.   Neurological:      General: No focal deficit present.      Mental Status: She is alert. Mental status is at baseline.   Psychiatric:         Mood and Affect: Mood normal.             Significant Labs: All pertinent labs within the past 24 hours have been reviewed.      Significant Imaging: I have reviewed all pertinent imaging results/findings within the past 24 hours.

## 2024-12-31 NOTE — PT/OT/SLP PROGRESS
Physical Therapy Treatment    Patient Name:  Renetta Stewart   MRN:  [id number]    Recommendations:     Discharge Recommendations: Moderate Intensity Therapy  Discharge Equipment Recommendations: to be determined by next level of care  Barriers to discharge:  awaiting placement    Assessment:     Renetta Stewart is a 74 y.o. female admitted with a medical diagnosis of Esophagitis determined by endoscopy.  She presents with the following impairments/functional limitations: weakness, impaired endurance, impaired functional mobility, gait instability, impaired balance, decreased lower extremity function, decreased safety awareness, impaired cardiopulmonary response to activity. OOB act deferred today per nursing request. Pt reports prob with L eye, RN  notified as well as physician (Miguel) seeing pt during PT session and informed of same. Pt req repeated cues to remain on task with bed mob, positioning and ex.     Rehab Prognosis: Good and Fair; patient would benefit from acute skilled PT services to address these deficits and reach maximum level of function.    Recent Surgery: * No surgery found *      Plan:     During this hospitalization, patient to be seen 5 x/week to address the identified rehab impairments via gait training, therapeutic activities, therapeutic exercises, neuromuscular re-education and progress toward the following goals:    Plan of Care Expires:  01/24/25    Subjective     Chief Complaint: esophagitis  Patient/Family Comments/goals: agreeable  Pain/Comfort: none reported          Objective:     Communicated with nursing prior to session.  Patient found HOB elevated with peripheral IV, telemetry upon PT entry to room.     General Precautions: Standard, fall  Orthopedic Precautions: N/A  Braces: N/A  Respiratory Status: Room air     Functional Mobility:  Bed Mobility:     Scooting: moderate assistance, maximal assistance, and of 2 persons  Bridging: maximal assistance and of 2 persons      AM-PAC 6 CLICK  MOBILITY          Treatment & Education:  Bilateral lower extremity exercise x 15-20 reps: ankle pumps, short arc quads, heel slides, hip abduction/adduction, straight leg raises, Long arc quads, and Marching with active assist ROM, verbal cues for sequencing and safety, and tactile cues     Patient left with bed in chair position with all lines intact, call button in reach, and RN notified..    GOALS:   Multidisciplinary Problems       Physical Therapy Goals          Problem: Physical Therapy    Goal Priority Disciplines Outcome Interventions   Physical Therapy Goal     PT, PT/OT Progressing    Description: Short Term Goals  Ambulate CGA - 50 feet with rolling walker assistive device.   Supine to sit minimum  Sit to stand minimum  SPT minimum  5. Independent with HEP    Long Term Goals  Ambulate CGA - 100 feet with straight cane assistive device.   Supine to sit stand-by  Sit to stand stand-by  SPT stand-by  Needed equipment for home.                              Time Tracking:     PT Received On: 12/31/24  PT Start Time: 1147     PT Stop Time: 1202  PT Total Time (min): 15 min     Billable Minutes: Therapeutic Exercise 10    Treatment Type: Treatment  PT/PTA: PT     Number of PTA visits since last PT visit: 0     12/31/2024

## 2024-12-31 NOTE — PT/OT/SLP PROGRESS
Occupational Therapy   Treatment    Name: Renetta Stewart  MRN: [id number]  Admitting Diagnosis:  Esophagitis determined by endoscopy       Recommendations:     Discharge Recommendations: Moderate Intensity Therapy  Discharge Equipment Recommendations:  other (see comments) (to be determined)  Barriers to discharge:  None    Assessment:     Renetta Stewart is a 74 y.o. female with a medical diagnosis of Esophagitis determined by endoscopy.  She presents with weakness and decline in ADLs. Performance deficits affecting function are weakness, impaired endurance, impaired self care skills, impaired functional mobility, gait instability, impaired balance. Pt with son present upon OT arrival. Pt agreeable to OT tx; however pt with eyes remained closed and maximum encouragement needed throughout tx.     Rehab Prognosis:  Fair; patient would benefit from acute skilled OT services to address these deficits and reach maximum level of function.       Plan:     Patient to be seen 5 x/week to address the above listed problems via self-care/home management, therapeutic activities, therapeutic exercises  Plan of Care Expires: 01/21/25  Plan of Care Reviewed with: patient    Subjective     Chief Complaint: weakness  Patient/Family Comments/goals: pt agreeable to OT tx  Pain/Comfort:  Pain Rating 1: 0/10    Objective:     Communicated with: PROMISE Santana prior to session.  Patient found HOB elevated with peripheral IV, telemetry upon OT entry to room.    General Precautions: Standard, fall    Orthopedic Precautions:N/A  Braces: N/A  Respiratory Status: Room air     Occupational Performance:     Bed Mobility:    Not performed     Functional Mobility/Transfers:  Not performed    Activities of Daily Living:  Not performed      [facility] 6 Click ADL:      Treatment & Education:  Pt performed x 15 reps each bicep curls 1#db, IR/ER 1#db, and shoulder press AROM in order to improve BUE strength and endurance to perform ADL and ADL t/f with less assist.      Patient left HOB elevated with all lines intact, call button in reach, Esther RN notified, and son present    GOALS:   Multidisciplinary Problems       Occupational Therapy Goals          Problem: Occupational Therapy    Goal Priority Disciplines Outcome Interventions   Occupational Therapy Goal     OT, PT/OT Progressing    Description: STG:  Pt will perform grooming with setup  Pt will bathe with Solitario with setup at EOB  Pt will perform UE dressing with Solitario  Pt will perform LE dressing with Solitario  Pt will sit EOB x 10 min with SBA   Pt will transfer bed/chair/bsc with CGA with RW  Pt will perform standing task x 2 min with CGA with RW   Pt will tolerate 15 minutes of tx without fatigue      LT.Restore to max I with self care and mobility.                           Time Tracking:     OT Date of Treatment: 24  OT Start Time: 1449  OT Stop Time: 1506  OT Total Time (min): 17 min    Billable Minutes:Therapeutic Exercise 15 minutes    OT/YOLANDA: OT          2024

## 2024-12-31 NOTE — ASSESSMENT & PLAN NOTE
Syncope vs seizure  Echo reveals nl systolic and diastolic dysfunction   Carotid US and EEG and Keppra level pending  Telemetry  Fall precautions  Sz precautions    12/23: MRI negative for new CVA but there was a prior CVA.   Patient with memory problems, acute on chronic.     12/25: Mathew per cardiology       12/31  - RRT called yesterday as patient became lightheaded and unresponsive after using the bathroom and ambulating to the bed, work up complete and like due to orthostatics vs vasovagal   - orthostatic vitals ordered however patient refusing to ambulate right now likely because when she does she has this occur  - will change hydralazine 3 times a day to amlodipine once a day  - try for orthostatic vitals when patient cooperates  - if positive will continue evaluating meds and try abdominal binder if needed

## 2025-01-01 ENCOUNTER — HOSPITAL ENCOUNTER (INPATIENT)
Facility: HOSPITAL | Age: 75
LOS: 16 days | DRG: 091 | End: 2025-01-18
Attending: EMERGENCY MEDICINE | Admitting: FAMILY MEDICINE
Payer: MEDICARE

## 2025-01-01 VITALS
TEMPERATURE: 98 F | WEIGHT: 247.13 LBS | DIASTOLIC BLOOD PRESSURE: 50 MMHG | BODY MASS INDEX: 42.19 KG/M2 | SYSTOLIC BLOOD PRESSURE: 100 MMHG | OXYGEN SATURATION: 100 % | HEIGHT: 64 IN

## 2025-01-01 DIAGNOSIS — R06.02 SOB (SHORTNESS OF BREATH): ICD-10-CM

## 2025-01-01 DIAGNOSIS — E87.20 LACTIC ACIDOSIS: ICD-10-CM

## 2025-01-01 DIAGNOSIS — R41.82 ALTERED MENTAL STATUS: ICD-10-CM

## 2025-01-01 DIAGNOSIS — R55 SYNCOPE, UNSPECIFIED SYNCOPE TYPE: ICD-10-CM

## 2025-01-01 DIAGNOSIS — R55 SYNCOPE AND COLLAPSE: ICD-10-CM

## 2025-01-01 DIAGNOSIS — K56.609 SMALL BOWEL OBSTRUCTION: Primary | ICD-10-CM

## 2025-01-01 DIAGNOSIS — R07.9 CHEST PAIN: ICD-10-CM

## 2025-01-01 DIAGNOSIS — K22.3 ESOPHAGEAL PERFORATION: ICD-10-CM

## 2025-01-01 LAB
ABO + RH BLD: NORMAL
ALBUMIN SERPL BCP-MCNC: 1.6 G/DL (ref 3.4–4.8)
ALBUMIN SERPL BCP-MCNC: 2.3 G/DL (ref 3.4–4.8)
ALBUMIN SERPL BCP-MCNC: 2.4 G/DL (ref 3.4–4.8)
ALBUMIN SERPL BCP-MCNC: 2.6 G/DL (ref 3.4–4.8)
ALBUMIN/GLOB SERPL: 0.7 {RATIO}
ALBUMIN/GLOB SERPL: 0.8 {RATIO}
ALBUMIN/GLOB SERPL: 0.8 {RATIO}
ALP SERPL-CCNC: 36 U/L (ref 40–150)
ALP SERPL-CCNC: 38 U/L (ref 40–150)
ALP SERPL-CCNC: 39 U/L (ref 40–150)
ALP SERPL-CCNC: 51 U/L (ref 40–150)
ALT SERPL W P-5'-P-CCNC: 61 U/L
ALT SERPL W P-5'-P-CCNC: 80 U/L
ALT SERPL W P-5'-P-CCNC: 84 U/L
ALT SERPL W P-5'-P-CCNC: 89 U/L
ANION GAP SERPL CALCULATED.3IONS-SCNC: 13 MMOL/L (ref 7–16)
ANION GAP SERPL CALCULATED.3IONS-SCNC: 14 MMOL/L (ref 7–16)
ANION GAP SERPL CALCULATED.3IONS-SCNC: 14 MMOL/L (ref 7–16)
ANION GAP SERPL CALCULATED.3IONS-SCNC: 15 MMOL/L (ref 7–16)
ANION GAP SERPL CALCULATED.3IONS-SCNC: 15 MMOL/L (ref 7–16)
ANION GAP SERPL CALCULATED.3IONS-SCNC: 16 MMOL/L (ref 7–16)
ANION GAP SERPL CALCULATED.3IONS-SCNC: 17 MMOL/L (ref 7–16)
ANION GAP SERPL CALCULATED.3IONS-SCNC: 18 MMOL/L (ref 7–16)
ANION GAP SERPL CALCULATED.3IONS-SCNC: 19 MMOL/L (ref 7–16)
ANION GAP SERPL CALCULATED.3IONS-SCNC: 20 MMOL/L (ref 7–16)
ANION GAP SERPL CALCULATED.3IONS-SCNC: 21 MMOL/L (ref 7–16)
ANION GAP SERPL CALCULATED.3IONS-SCNC: 22 MMOL/L (ref 7–16)
ANION GAP SERPL CALCULATED.3IONS-SCNC: 23 MMOL/L (ref 7–16)
ANION GAP SERPL CALCULATED.3IONS-SCNC: 28 MMOL/L (ref 7–16)
ANISOCYTOSIS BLD QL SMEAR: ABNORMAL
AORTIC ROOT ANNULUS: 2.21 CM
APTT PPP: 26.8 SECONDS (ref 25.2–37.3)
APTT PPP: 32.6 SECONDS (ref 25.2–37.3)
APTT PPP: 58.7 SECONDS (ref 25.2–37.3)
AST SERPL W P-5'-P-CCNC: 46 U/L (ref 5–34)
AST SERPL W P-5'-P-CCNC: 50 U/L (ref 5–34)
AST SERPL W P-5'-P-CCNC: 64 U/L (ref 5–34)
AST SERPL W P-5'-P-CCNC: 68 U/L (ref 5–34)
AV MEAN GRADIENT: 38.7 MMHG
AV PEAK GRADIENT: 77.4 MMHG
BACTERIA #/AREA URNS HPF: ABNORMAL /HPF
BACTERIA #/AREA URNS HPF: ABNORMAL /HPF
BACTERIA BLD CULT: NORMAL
BASOPHILS # BLD AUTO: 0.01 K/UL (ref 0–0.2)
BASOPHILS # BLD AUTO: 0.02 K/UL (ref 0–0.2)
BASOPHILS # BLD AUTO: 0.03 K/UL (ref 0–0.2)
BASOPHILS # BLD AUTO: 0.04 K/UL (ref 0–0.2)
BASOPHILS # BLD AUTO: 0.05 K/UL (ref 0–0.2)
BASOPHILS # BLD AUTO: 0.09 K/UL (ref 0–0.2)
BASOPHILS NFR BLD AUTO: 0.1 % (ref 0–1)
BASOPHILS NFR BLD AUTO: 0.2 % (ref 0–1)
BASOPHILS NFR BLD AUTO: 0.3 % (ref 0–1)
BASOPHILS NFR BLD AUTO: 0.4 % (ref 0–1)
BASOPHILS NFR BLD AUTO: 0.5 % (ref 0–1)
BASOPHILS NFR BLD AUTO: 0.6 % (ref 0–1)
BASOPHILS NFR BLD AUTO: 0.6 % (ref 0–1)
BASOPHILS NFR BLD AUTO: 0.7 % (ref 0–1)
BASOPHILS NFR BLD AUTO: 0.7 % (ref 0–1)
BASOPHILS NFR BLD AUTO: 0.8 % (ref 0–1)
BASOPHILS NFR BLD AUTO: 1 % (ref 0–1)
BASOPHILS NFR BLD AUTO: 1.4 % (ref 0–1)
BASOPHILS NFR BLD MANUAL: 1 % (ref 0–1)
BASOPHILS NFR BLD MANUAL: 1 % (ref 0–1)
BILIRUB DIRECT SERPL-MCNC: 0.2 MG/DL
BILIRUB SERPL-MCNC: 0.4 MG/DL
BILIRUB SERPL-MCNC: 0.8 MG/DL
BILIRUB SERPL-MCNC: 0.8 MG/DL
BILIRUB SERPL-MCNC: 1 MG/DL
BILIRUB UR QL STRIP: 1
BILIRUB UR QL STRIP: NEGATIVE
BLD PROD TYP BPU: NORMAL
BLOOD UNIT EXPIRATION DATE: NORMAL
BLOOD UNIT TYPE CODE: 1700
BLOOD UNIT TYPE CODE: 1700
BLOOD UNIT TYPE CODE: 5100
BLOOD UNIT TYPE CODE: 6200
BSA FOR ECHO PROCEDURE: 1.99 M2
BUN SERPL-MCNC: 33 MG/DL (ref 10–20)
BUN SERPL-MCNC: 34 MG/DL (ref 10–20)
BUN SERPL-MCNC: 46 MG/DL (ref 10–20)
BUN SERPL-MCNC: 49 MG/DL (ref 10–20)
BUN SERPL-MCNC: 50 MG/DL (ref 10–20)
BUN SERPL-MCNC: 51 MG/DL (ref 10–20)
BUN SERPL-MCNC: 53 MG/DL (ref 10–20)
BUN SERPL-MCNC: 53 MG/DL (ref 10–20)
BUN SERPL-MCNC: 57 MG/DL (ref 10–20)
BUN SERPL-MCNC: 57 MG/DL (ref 10–20)
BUN SERPL-MCNC: 61 MG/DL (ref 10–20)
BUN SERPL-MCNC: 64 MG/DL (ref 10–20)
BUN SERPL-MCNC: 66 MG/DL (ref 10–20)
BUN SERPL-MCNC: 68 MG/DL (ref 10–20)
BUN SERPL-MCNC: 72 MG/DL (ref 10–20)
BUN SERPL-MCNC: 73 MG/DL (ref 10–20)
BUN SERPL-MCNC: 76 MG/DL (ref 10–20)
BUN SERPL-MCNC: 77 MG/DL (ref 10–20)
BUN/CREAT SERPL: 10 (ref 6–20)
BUN/CREAT SERPL: 11 (ref 6–20)
BUN/CREAT SERPL: 12 (ref 6–20)
BUN/CREAT SERPL: 13 (ref 6–20)
BUN/CREAT SERPL: 13 (ref 6–20)
BUN/CREAT SERPL: 14 (ref 6–20)
BUN/CREAT SERPL: 14 (ref 6–20)
BUN/CREAT SERPL: 16 (ref 6–20)
BUN/CREAT SERPL: 17 (ref 6–20)
BUN/CREAT SERPL: 20 (ref 6–20)
BUN/CREAT SERPL: 21 (ref 6–20)
CALCIUM SERPL-MCNC: 6.4 MG/DL (ref 8.4–10.2)
CALCIUM SERPL-MCNC: 6.5 MG/DL (ref 8.4–10.2)
CALCIUM SERPL-MCNC: 6.5 MG/DL (ref 8.4–10.2)
CALCIUM SERPL-MCNC: 6.6 MG/DL (ref 8.4–10.2)
CALCIUM SERPL-MCNC: 6.6 MG/DL (ref 8.4–10.2)
CALCIUM SERPL-MCNC: 6.7 MG/DL (ref 8.4–10.2)
CALCIUM SERPL-MCNC: 6.7 MG/DL (ref 8.4–10.2)
CALCIUM SERPL-MCNC: 6.8 MG/DL (ref 8.4–10.2)
CALCIUM SERPL-MCNC: 6.9 MG/DL (ref 8.4–10.2)
CALCIUM SERPL-MCNC: 7 MG/DL (ref 8.4–10.2)
CALCIUM SERPL-MCNC: 7.3 MG/DL (ref 8.4–10.2)
CALCIUM SERPL-MCNC: 7.4 MG/DL (ref 8.4–10.2)
CALCIUM SERPL-MCNC: 7.4 MG/DL (ref 8.4–10.2)
CALCIUM SERPL-MCNC: 7.5 MG/DL (ref 8.4–10.2)
CALCIUM SERPL-MCNC: 7.6 MG/DL (ref 8.4–10.2)
CALCIUM SERPL-MCNC: 7.7 MG/DL (ref 8.4–10.2)
CALCIUM SERPL-MCNC: 7.7 MG/DL (ref 8.4–10.2)
CALCIUM SERPL-MCNC: 7.8 MG/DL (ref 8.4–10.2)
CALCIUM SERPL-MCNC: 8 MG/DL (ref 8.4–10.2)
CALCIUM SERPL-MCNC: 8 MG/DL (ref 8.4–10.2)
CHLORIDE SERPL-SCNC: 100 MMOL/L (ref 98–107)
CHLORIDE SERPL-SCNC: 101 MMOL/L (ref 98–107)
CHLORIDE SERPL-SCNC: 102 MMOL/L (ref 98–107)
CHLORIDE SERPL-SCNC: 103 MMOL/L (ref 98–107)
CHLORIDE SERPL-SCNC: 104 MMOL/L (ref 98–107)
CHLORIDE SERPL-SCNC: 98 MMOL/L (ref 98–107)
CLARITY FLD: CLEAR
CLARITY UR: ABNORMAL
CLARITY UR: ABNORMAL
CO2 SERPL-SCNC: 16 MMOL/L (ref 23–31)
CO2 SERPL-SCNC: 17 MMOL/L (ref 23–31)
CO2 SERPL-SCNC: 18 MMOL/L (ref 23–31)
CO2 SERPL-SCNC: 19 MMOL/L (ref 23–31)
CO2 SERPL-SCNC: 20 MMOL/L (ref 23–31)
CO2 SERPL-SCNC: 21 MMOL/L (ref 23–31)
CO2 SERPL-SCNC: 22 MMOL/L (ref 23–31)
CO2 SERPL-SCNC: 22 MMOL/L (ref 23–31)
CO2 SERPL-SCNC: 23 MMOL/L (ref 23–31)
CO2 SERPL-SCNC: 24 MMOL/L (ref 23–31)
COLOR FLD: COLORLESS
COLOR UR: YELLOW
COLOR UR: YELLOW
CREAT SERPL-MCNC: 1.59 MG/DL (ref 0.55–1.02)
CREAT SERPL-MCNC: 1.73 MG/DL (ref 0.55–1.02)
CREAT SERPL-MCNC: 3.39 MG/DL (ref 0.55–1.02)
CREAT SERPL-MCNC: 4.19 MG/DL (ref 0.55–1.02)
CREAT SERPL-MCNC: 4.53 MG/DL (ref 0.55–1.02)
CREAT SERPL-MCNC: 4.6 MG/DL (ref 0.55–1.02)
CREAT SERPL-MCNC: 4.61 MG/DL (ref 0.55–1.02)
CREAT SERPL-MCNC: 4.64 MG/DL (ref 0.55–1.02)
CREAT SERPL-MCNC: 4.66 MG/DL (ref 0.55–1.02)
CREAT SERPL-MCNC: 4.75 MG/DL (ref 0.55–1.02)
CREAT SERPL-MCNC: 4.78 MG/DL (ref 0.55–1.02)
CREAT SERPL-MCNC: 4.81 MG/DL (ref 0.55–1.02)
CREAT SERPL-MCNC: 4.96 MG/DL (ref 0.55–1.02)
CREAT SERPL-MCNC: 4.97 MG/DL (ref 0.55–1.02)
CREAT SERPL-MCNC: 5.04 MG/DL (ref 0.55–1.02)
CREAT SERPL-MCNC: 5.13 MG/DL (ref 0.55–1.02)
CREAT SERPL-MCNC: 5.23 MG/DL (ref 0.55–1.02)
CREAT SERPL-MCNC: 5.97 MG/DL (ref 0.55–1.02)
CREAT SERPL-MCNC: 6.05 MG/DL (ref 0.55–1.02)
CREAT SERPL-MCNC: 6.13 MG/DL (ref 0.55–1.02)
CRENATED CELLS: ABNORMAL
CROSSMATCH INTERPRETATION: NORMAL
CULTURE, LOWER RESPIRATORY: NORMAL
CV ECHO LV RWT: 1.83 CM
D DIMER PPP FEU-MCNC: 0.88 ΜG/ML (ref 0–0.47)
D DIMER PPP FEU-MCNC: 1.27 ΜG/ML (ref 0–0.47)
DIFFERENTIAL METHOD BLD: ABNORMAL
DISPENSE STATUS: NORMAL
DOP CALC AO PEAK VEL: 4.4 M/S
DOP CALC AO VTI: 103.6 CM
DOP CALC LVOT AREA: 2.8 CM2
DOP CALC LVOT DIAMETER: 1.9 CM
E WAVE DECELERATION TIME: 324.56 MSEC
E/A RATIO: 0.86
E/E' RATIO: 10.31 M/S
ECHO LV POSTERIOR WALL: 2.2 CM (ref 0.6–1.1)
EGFR (NO RACE VARIABLE) (RUSH/TITUS): 10 ML/MIN/1.73M2
EGFR (NO RACE VARIABLE) (RUSH/TITUS): 11 ML/MIN/1.73M2
EGFR (NO RACE VARIABLE) (RUSH/TITUS): 14 ML/MIN/1.73M2
EGFR (NO RACE VARIABLE) (RUSH/TITUS): 31 ML/MIN/1.73M2
EGFR (NO RACE VARIABLE) (RUSH/TITUS): 34 ML/MIN/1.73M2
EGFR (NO RACE VARIABLE) (RUSH/TITUS): 7 ML/MIN/1.73M2
EGFR (NO RACE VARIABLE) (RUSH/TITUS): 8 ML/MIN/1.73M2
EGFR (NO RACE VARIABLE) (RUSH/TITUS): 8 ML/MIN/1.73M2
EGFR (NO RACE VARIABLE) (RUSH/TITUS): 9 ML/MIN/1.73M2
EOSINOPHIL # BLD AUTO: 0 K/UL (ref 0–0.5)
EOSINOPHIL # BLD AUTO: 0.01 K/UL (ref 0–0.5)
EOSINOPHIL # BLD AUTO: 0.02 K/UL (ref 0–0.5)
EOSINOPHIL # BLD AUTO: 0.03 K/UL (ref 0–0.5)
EOSINOPHIL # BLD AUTO: 0.04 K/UL (ref 0–0.5)
EOSINOPHIL # BLD AUTO: 0.05 K/UL (ref 0–0.5)
EOSINOPHIL # BLD AUTO: 0.05 K/UL (ref 0–0.5)
EOSINOPHIL # BLD AUTO: 0.06 K/UL (ref 0–0.5)
EOSINOPHIL # BLD AUTO: 0.08 K/UL (ref 0–0.5)
EOSINOPHIL # BLD AUTO: 0.09 K/UL (ref 0–0.5)
EOSINOPHIL # BLD AUTO: 0.12 K/UL (ref 0–0.5)
EOSINOPHIL NFR BLD AUTO: 0 % (ref 1–4)
EOSINOPHIL NFR BLD AUTO: 0.2 % (ref 1–4)
EOSINOPHIL NFR BLD AUTO: 0.4 % (ref 1–4)
EOSINOPHIL NFR BLD AUTO: 0.5 % (ref 1–4)
EOSINOPHIL NFR BLD AUTO: 0.6 % (ref 1–4)
EOSINOPHIL NFR BLD AUTO: 0.7 % (ref 1–4)
EOSINOPHIL NFR BLD AUTO: 0.8 % (ref 1–4)
EOSINOPHIL NFR BLD AUTO: 0.9 % (ref 1–4)
EOSINOPHIL NFR BLD AUTO: 1 % (ref 1–4)
EOSINOPHIL NFR BLD AUTO: 1.3 % (ref 1–4)
EOSINOPHIL NFR BLD AUTO: 1.3 % (ref 1–4)
EOSINOPHIL NFR BLD AUTO: 1.4 % (ref 1–4)
EOSINOPHIL NFR BLD AUTO: 1.7 % (ref 1–4)
EOSINOPHIL NFR BLD AUTO: 1.9 % (ref 1–4)
EOSINOPHIL NFR BLD AUTO: 2 % (ref 1–4)
EOSINOPHIL NFR BLD AUTO: 2.1 % (ref 1–4)
EOSINOPHIL NFR BLD AUTO: 2.6 % (ref 1–4)
EOSINOPHIL NFR BLD MANUAL: 1 % (ref 1–4)
EOSINOPHIL NFR BLD MANUAL: 2 % (ref 1–4)
EOSINOPHIL NFR BLD MANUAL: 2 % (ref 1–4)
EOSINOPHIL NFR BLD MANUAL: 3 % (ref 1–4)
EOSINOPHIL NFR BLD MANUAL: 4 % (ref 1–4)
ERYTHROCYTE [DISTWIDTH] IN BLOOD BY AUTOMATED COUNT: 15.9 % (ref 11.5–14.5)
ERYTHROCYTE [DISTWIDTH] IN BLOOD BY AUTOMATED COUNT: 16.2 % (ref 11.5–14.5)
ERYTHROCYTE [DISTWIDTH] IN BLOOD BY AUTOMATED COUNT: 16.4 % (ref 11.5–14.5)
ERYTHROCYTE [DISTWIDTH] IN BLOOD BY AUTOMATED COUNT: 16.5 % (ref 11.5–14.5)
ERYTHROCYTE [DISTWIDTH] IN BLOOD BY AUTOMATED COUNT: 16.6 % (ref 11.5–14.5)
ERYTHROCYTE [DISTWIDTH] IN BLOOD BY AUTOMATED COUNT: 16.6 % (ref 11.5–14.5)
ERYTHROCYTE [DISTWIDTH] IN BLOOD BY AUTOMATED COUNT: 16.7 % (ref 11.5–14.5)
ERYTHROCYTE [DISTWIDTH] IN BLOOD BY AUTOMATED COUNT: 16.7 % (ref 11.5–14.5)
ERYTHROCYTE [DISTWIDTH] IN BLOOD BY AUTOMATED COUNT: 16.8 % (ref 11.5–14.5)
ERYTHROCYTE [DISTWIDTH] IN BLOOD BY AUTOMATED COUNT: 17 % (ref 11.5–14.5)
ERYTHROCYTE [DISTWIDTH] IN BLOOD BY AUTOMATED COUNT: 17.2 % (ref 11.5–14.5)
ERYTHROCYTE [DISTWIDTH] IN BLOOD BY AUTOMATED COUNT: 17.3 % (ref 11.5–14.5)
ERYTHROCYTE [DISTWIDTH] IN BLOOD BY AUTOMATED COUNT: 17.9 % (ref 11.5–14.5)
FRACTIONAL SHORTENING: 20.8 % (ref 28–44)
FSP TITR PPP LA: 357 MG/DL (ref 283–506)
FSP TITR PPP LA: 394 MG/DL (ref 283–506)
GLOBULIN SER-MCNC: 3.2 G/DL (ref 2–4)
GLOBULIN SER-MCNC: 3.4 G/DL (ref 2–4)
GLOBULIN SER-MCNC: 3.4 G/DL (ref 2–4)
GLUCOSE SERPL-MCNC: 100 MG/DL (ref 82–115)
GLUCOSE SERPL-MCNC: 104 MG/DL (ref 70–105)
GLUCOSE SERPL-MCNC: 107 MG/DL (ref 70–105)
GLUCOSE SERPL-MCNC: 109 MG/DL (ref 70–105)
GLUCOSE SERPL-MCNC: 110 MG/DL (ref 70–105)
GLUCOSE SERPL-MCNC: 111 MG/DL (ref 70–105)
GLUCOSE SERPL-MCNC: 111 MG/DL (ref 82–115)
GLUCOSE SERPL-MCNC: 112 MG/DL (ref 70–105)
GLUCOSE SERPL-MCNC: 113 MG/DL (ref 70–105)
GLUCOSE SERPL-MCNC: 115 MG/DL (ref 70–105)
GLUCOSE SERPL-MCNC: 115 MG/DL (ref 70–105)
GLUCOSE SERPL-MCNC: 116 MG/DL (ref 82–115)
GLUCOSE SERPL-MCNC: 117 MG/DL (ref 70–105)
GLUCOSE SERPL-MCNC: 117 MG/DL (ref 70–105)
GLUCOSE SERPL-MCNC: 124 MG/DL (ref 82–115)
GLUCOSE SERPL-MCNC: 124 MG/DL (ref 82–115)
GLUCOSE SERPL-MCNC: 127 MG/DL (ref 70–105)
GLUCOSE SERPL-MCNC: 128 MG/DL (ref 70–105)
GLUCOSE SERPL-MCNC: 129 MG/DL (ref 70–105)
GLUCOSE SERPL-MCNC: 131 MG/DL (ref 70–105)
GLUCOSE SERPL-MCNC: 131 MG/DL (ref 70–105)
GLUCOSE SERPL-MCNC: 133 MG/DL (ref 82–115)
GLUCOSE SERPL-MCNC: 136 MG/DL (ref 70–105)
GLUCOSE SERPL-MCNC: 137 MG/DL (ref 82–115)
GLUCOSE SERPL-MCNC: 138 MG/DL (ref 70–105)
GLUCOSE SERPL-MCNC: 140 MG/DL (ref 70–105)
GLUCOSE SERPL-MCNC: 140 MG/DL (ref 70–105)
GLUCOSE SERPL-MCNC: 140 MG/DL (ref 82–115)
GLUCOSE SERPL-MCNC: 142 MG/DL (ref 70–105)
GLUCOSE SERPL-MCNC: 143 MG/DL (ref 70–105)
GLUCOSE SERPL-MCNC: 145 MG/DL (ref 82–115)
GLUCOSE SERPL-MCNC: 148 MG/DL (ref 70–105)
GLUCOSE SERPL-MCNC: 152 MG/DL (ref 70–105)
GLUCOSE SERPL-MCNC: 157 MG/DL (ref 70–105)
GLUCOSE SERPL-MCNC: 181 MG/DL (ref 70–105)
GLUCOSE SERPL-MCNC: 242 MG/DL (ref 82–115)
GLUCOSE SERPL-MCNC: 66 MG/DL (ref 70–105)
GLUCOSE SERPL-MCNC: 67 MG/DL (ref 70–105)
GLUCOSE SERPL-MCNC: 67 MG/DL (ref 82–115)
GLUCOSE SERPL-MCNC: 72 MG/DL (ref 70–105)
GLUCOSE SERPL-MCNC: 73 MG/DL (ref 70–105)
GLUCOSE SERPL-MCNC: 76 MG/DL (ref 82–115)
GLUCOSE SERPL-MCNC: 77 MG/DL (ref 70–105)
GLUCOSE SERPL-MCNC: 78 MG/DL (ref 70–105)
GLUCOSE SERPL-MCNC: 79 MG/DL (ref 82–115)
GLUCOSE SERPL-MCNC: 80 MG/DL (ref 70–105)
GLUCOSE SERPL-MCNC: 80 MG/DL (ref 70–105)
GLUCOSE SERPL-MCNC: 80 MG/DL (ref 82–115)
GLUCOSE SERPL-MCNC: 80 MG/DL (ref 82–115)
GLUCOSE SERPL-MCNC: 83 MG/DL (ref 70–105)
GLUCOSE SERPL-MCNC: 83 MG/DL (ref 70–105)
GLUCOSE SERPL-MCNC: 86 MG/DL (ref 82–115)
GLUCOSE SERPL-MCNC: 89 MG/DL (ref 70–105)
GLUCOSE SERPL-MCNC: 92 MG/DL (ref 82–115)
GLUCOSE SERPL-MCNC: 93 MG/DL (ref 70–105)
GLUCOSE SERPL-MCNC: 96 MG/DL (ref 70–105)
GLUCOSE SERPL-MCNC: 96 MG/DL (ref 82–115)
GLUCOSE SERPL-MCNC: 97 MG/DL (ref 82–115)
GLUCOSE SERPL-MCNC: 97 MG/DL (ref 82–115)
GLUCOSE SERPL-MCNC: 98 MG/DL (ref 70–105)
GLUCOSE SERPL-MCNC: 99 MG/DL (ref 70–105)
GLUCOSE UR STRIP-MCNC: 30 MG/DL
GLUCOSE UR STRIP-MCNC: NORMAL MG/DL
GRAM STN SPEC: NORMAL
GRANULOCYTES NFR FLD MANUAL: 3 % (ref 0–25)
HAV IGM SER QL: NORMAL
HBV CORE IGM SER QL: NORMAL
HBV SURFACE AG SERPL QL IA: NORMAL
HCO3 UR-SCNC: 16.3 MMOL/L (ref 18–23)
HCO3 UR-SCNC: 18.5 MMOL/L (ref 21–28)
HCO3 UR-SCNC: 23.1 MMOL/L (ref 18–23)
HCO3 UR-SCNC: 23.7 MMOL/L (ref 21–28)
HCT VFR BLD AUTO: 19.1 % (ref 38–47)
HCT VFR BLD AUTO: 21.1 % (ref 38–47)
HCT VFR BLD AUTO: 21.2 % (ref 38–47)
HCT VFR BLD AUTO: 21.3 % (ref 38–47)
HCT VFR BLD AUTO: 21.7 % (ref 38–47)
HCT VFR BLD AUTO: 21.9 % (ref 38–47)
HCT VFR BLD AUTO: 22.7 % (ref 38–47)
HCT VFR BLD AUTO: 22.7 % (ref 38–47)
HCT VFR BLD AUTO: 23 % (ref 38–47)
HCT VFR BLD AUTO: 23.2 % (ref 38–47)
HCT VFR BLD AUTO: 23.6 % (ref 38–47)
HCT VFR BLD AUTO: 23.6 % (ref 38–47)
HCT VFR BLD AUTO: 23.7 % (ref 38–47)
HCT VFR BLD AUTO: 24.3 % (ref 38–47)
HCT VFR BLD AUTO: 24.9 % (ref 38–47)
HCT VFR BLD AUTO: 25.1 % (ref 38–47)
HCT VFR BLD AUTO: 25.3 % (ref 38–47)
HCT VFR BLD AUTO: 25.6 % (ref 38–47)
HCT VFR BLD AUTO: 26.6 % (ref 38–47)
HCT VFR BLD AUTO: 28 % (ref 38–47)
HCT VFR BLD AUTO: 28.1 % (ref 38–47)
HCT VFR BLD AUTO: 32 % (ref 38–47)
HCT VFR BLD AUTO: 34.4 % (ref 38–47)
HCT VFR BLD AUTO: 34.9 % (ref 38–47)
HCT VFR BLD AUTO: 35.9 % (ref 38–47)
HCT VFR BLD AUTO: 37.2 % (ref 38–47)
HCV AB SER QL: NORMAL
HGB BLD-MCNC: 10.2 G/DL (ref 12–16)
HGB BLD-MCNC: 10.3 G/DL (ref 12–16)
HGB BLD-MCNC: 11.1 G/DL (ref 12–16)
HGB BLD-MCNC: 11.3 G/DL (ref 12–16)
HGB BLD-MCNC: 11.4 G/DL (ref 12–16)
HGB BLD-MCNC: 6.5 G/DL (ref 12–16)
HGB BLD-MCNC: 6.8 G/DL (ref 12–16)
HGB BLD-MCNC: 6.8 G/DL (ref 12–16)
HGB BLD-MCNC: 6.9 G/DL (ref 12–16)
HGB BLD-MCNC: 7.2 G/DL (ref 12–16)
HGB BLD-MCNC: 7.2 G/DL (ref 12–16)
HGB BLD-MCNC: 7.3 G/DL (ref 12–16)
HGB BLD-MCNC: 7.3 G/DL (ref 12–16)
HGB BLD-MCNC: 7.5 G/DL (ref 12–16)
HGB BLD-MCNC: 7.6 G/DL (ref 12–16)
HGB BLD-MCNC: 7.7 G/DL (ref 12–16)
HGB BLD-MCNC: 7.7 G/DL (ref 12–16)
HGB BLD-MCNC: 7.8 G/DL (ref 12–16)
HGB BLD-MCNC: 7.8 G/DL (ref 12–16)
HGB BLD-MCNC: 8.1 G/DL (ref 12–16)
HGB BLD-MCNC: 8.2 G/DL (ref 12–16)
HGB BLD-MCNC: 8.3 G/DL (ref 12–16)
HGB BLD-MCNC: 8.5 G/DL (ref 12–16)
HGB BLD-MCNC: 8.5 G/DL (ref 12–16)
HGB BLD-MCNC: 8.7 G/DL (ref 12–16)
HGB BLD-MCNC: 9 G/DL (ref 12–16)
HYPOCHROMIA BLD QL SMEAR: ABNORMAL
IMM GRANULOCYTES # BLD AUTO: 0.03 K/UL (ref 0–0.04)
IMM GRANULOCYTES # BLD AUTO: 0.04 K/UL (ref 0–0.04)
IMM GRANULOCYTES # BLD AUTO: 0.05 K/UL (ref 0–0.04)
IMM GRANULOCYTES # BLD AUTO: 0.06 K/UL (ref 0–0.04)
IMM GRANULOCYTES # BLD AUTO: 0.07 K/UL (ref 0–0.04)
IMM GRANULOCYTES # BLD AUTO: 0.07 K/UL (ref 0–0.04)
IMM GRANULOCYTES # BLD AUTO: 0.09 K/UL (ref 0–0.04)
IMM GRANULOCYTES # BLD AUTO: 0.09 K/UL (ref 0–0.04)
IMM GRANULOCYTES # BLD AUTO: 0.1 K/UL (ref 0–0.04)
IMM GRANULOCYTES # BLD AUTO: 0.12 K/UL (ref 0–0.04)
IMM GRANULOCYTES # BLD AUTO: 0.19 K/UL (ref 0–0.04)
IMM GRANULOCYTES NFR BLD: 0.5 % (ref 0–0.4)
IMM GRANULOCYTES NFR BLD: 0.6 % (ref 0–0.4)
IMM GRANULOCYTES NFR BLD: 0.8 % (ref 0–0.4)
IMM GRANULOCYTES NFR BLD: 0.8 % (ref 0–0.4)
IMM GRANULOCYTES NFR BLD: 0.9 % (ref 0–0.4)
IMM GRANULOCYTES NFR BLD: 1 % (ref 0–0.4)
IMM GRANULOCYTES NFR BLD: 1 % (ref 0–0.4)
IMM GRANULOCYTES NFR BLD: 1.1 % (ref 0–0.4)
IMM GRANULOCYTES NFR BLD: 1.2 % (ref 0–0.4)
IMM GRANULOCYTES NFR BLD: 1.6 % (ref 0–0.4)
IMM GRANULOCYTES NFR BLD: 1.9 % (ref 0–0.4)
IMM GRANULOCYTES NFR BLD: 2 % (ref 0–0.4)
IMM GRANULOCYTES NFR BLD: 2.3 % (ref 0–0.4)
IMM GRANULOCYTES NFR BLD: 2.5 % (ref 0–0.4)
IMM GRANULOCYTES NFR BLD: 2.9 % (ref 0–0.4)
IMM GRANULOCYTES NFR BLD: 4.1 % (ref 0–0.4)
INDIRECT COOMBS: NORMAL
INR BLD: 1.14
INR BLD: 1.25
INR BLD: 1.27
INSULIN SERPL-ACNC: NORMAL U[IU]/ML
INTERVENTRICULAR SEPTUM: 2.3 CM (ref 0.6–1.1)
IVC DIAMETER: 1.11 CM
KETONES UR STRIP-SCNC: 10 MG/DL
KETONES UR STRIP-SCNC: ABNORMAL MG/DL
LAB AP CLINICAL INFORMATION: NORMAL
LAB AP GROSS DESCRIPTION: NORMAL
LAB AP LABORATORY NOTES: NORMAL
LAB AP SPECIMEN A NON-GYN GENERAL CATEGORIZATION: NEGATIVE
LAB AP SPECIMEN A NON-GYN INTERPETATION: NORMAL
LACTATE SERPL-SCNC: 0.7 MMOL/L (ref 0.5–2.2)
LACTATE SERPL-SCNC: 0.7 MMOL/L (ref 0.5–2.2)
LACTATE SERPL-SCNC: 0.8 MMOL/L (ref 0.5–2.2)
LACTATE SERPL-SCNC: 0.9 MMOL/L (ref 0.5–2.2)
LACTATE SERPL-SCNC: 0.9 MMOL/L (ref 0.5–2.2)
LACTATE SERPL-SCNC: 1.1 MMOL/L (ref 0.5–2.2)
LACTATE SERPL-SCNC: 1.2 MMOL/L (ref 0.5–2.2)
LACTATE SERPL-SCNC: 1.5 MMOL/L (ref 0.5–2.2)
LACTATE SERPL-SCNC: 1.9 MMOL/L (ref 0.5–2.2)
LACTATE SERPL-SCNC: 2.2 MMOL/L (ref 0.5–2.2)
LACTATE SERPL-SCNC: 2.4 MMOL/L (ref 0.5–2.2)
LACTATE SERPL-SCNC: 2.5 MMOL/L (ref 0.5–2.2)
LACTATE SERPL-SCNC: 2.6 MMOL/L (ref 0.5–2.2)
LACTATE SERPL-SCNC: 2.7 MMOL/L (ref 0.5–2.2)
LACTATE SERPL-SCNC: 2.8 MMOL/L (ref 0.5–2.2)
LACTATE SERPL-SCNC: 2.8 MMOL/L (ref 0.5–2.2)
LACTATE SERPL-SCNC: 2.9 MMOL/L (ref 0.5–2.2)
LACTATE SERPL-SCNC: 3 MMOL/L (ref 0.5–2.2)
LACTATE SERPL-SCNC: 3.1 MMOL/L (ref 0.5–2.2)
LACTATE SERPL-SCNC: 3.1 MMOL/L (ref 0.5–2.2)
LACTATE SERPL-SCNC: 3.2 MMOL/L (ref 0.5–2.2)
LACTATE SERPL-SCNC: 3.3 MMOL/L (ref 0.5–2.2)
LACTATE SERPL-SCNC: 3.4 MMOL/L (ref 0.5–2.2)
LACTATE SERPL-SCNC: 3.5 MMOL/L (ref 0.5–2.2)
LACTATE SERPL-SCNC: 3.5 MMOL/L (ref 0.5–2.2)
LACTATE SERPL-SCNC: 3.6 MMOL/L (ref 0.5–2.2)
LACTATE SERPL-SCNC: 3.7 MMOL/L (ref 0.5–2.2)
LACTATE SERPL-SCNC: 3.7 MMOL/L (ref 0.5–2.2)
LACTATE SERPL-SCNC: 3.8 MMOL/L (ref 0.5–2.2)
LACTATE SERPL-SCNC: 4 MMOL/L (ref 0.5–2.2)
LACTATE SERPL-SCNC: 4.1 MMOL/L (ref 0.5–2.2)
LACTATE SERPL-SCNC: 4.1 MMOL/L (ref 0.5–2.2)
LACTATE SERPL-SCNC: 4.3 MMOL/L (ref 0.5–2.2)
LACTATE SERPL-SCNC: 4.5 MMOL/L (ref 0.5–2.2)
LACTATE SERPL-SCNC: 5.4 MMOL/L (ref 0.5–2.2)
LACTATE SERPL-SCNC: 5.5 MMOL/L (ref 0.5–2.2)
LACTATE SERPL-SCNC: 5.7 MMOL/L (ref 0.5–2.2)
LACTATE SERPL-SCNC: 5.8 MMOL/L (ref 0.5–2.2)
LACTATE SERPL-SCNC: 5.8 MMOL/L (ref 0.5–2.2)
LACTATE SERPL-SCNC: 6.3 MMOL/L (ref 0.5–2.2)
LACTATE SERPL-SCNC: 6.4 MMOL/L (ref 0.5–2.2)
LACTATE SERPL-SCNC: 6.5 MMOL/L (ref 0.5–2.2)
LACTATE SERPL-SCNC: 6.8 MMOL/L (ref 0.5–2.2)
LACTATE SERPL-SCNC: 6.8 MMOL/L (ref 0.5–2.2)
LACTATE SERPL-SCNC: 7.2 MMOL/L (ref 0.5–2.2)
LEFT ATRIUM SIZE: 2.9 CM
LEFT INTERNAL DIMENSION IN SYSTOLE: 1.9 CM (ref 2.1–4)
LEFT VENTRICLE DIASTOLIC VOLUME INDEX: 14.1 ML/M2
LEFT VENTRICLE DIASTOLIC VOLUME: 27.22 ML
LEFT VENTRICLE MASS INDEX: 136 G/M2
LEFT VENTRICLE SYSTOLIC VOLUME INDEX: 5.6 ML/M2
LEFT VENTRICLE SYSTOLIC VOLUME: 10.75 ML
LEFT VENTRICULAR INTERNAL DIMENSION IN DIASTOLE: 2.4 CM (ref 3.5–6)
LEFT VENTRICULAR MASS: 262.4 G
LEUKOCYTE ESTERASE UR QL STRIP: NEGATIVE
LEUKOCYTE ESTERASE UR QL STRIP: NEGATIVE
LV LATERAL E/E' RATIO: 7.44 M/S
LV SEPTAL E/E' RATIO: 16.75 M/S
LVED V (TEICH): 27.22 ML
LVES V (TEICH): 10.75 ML
LYMPHOCYTES # BLD AUTO: 0.34 K/UL (ref 1–4.8)
LYMPHOCYTES # BLD AUTO: 0.39 K/UL (ref 1–4.8)
LYMPHOCYTES # BLD AUTO: 0.42 K/UL (ref 1–4.8)
LYMPHOCYTES # BLD AUTO: 0.48 K/UL (ref 1–4.8)
LYMPHOCYTES # BLD AUTO: 0.53 K/UL (ref 1–4.8)
LYMPHOCYTES # BLD AUTO: 0.53 K/UL (ref 1–4.8)
LYMPHOCYTES # BLD AUTO: 0.56 K/UL (ref 1–4.8)
LYMPHOCYTES # BLD AUTO: 0.58 K/UL (ref 1–4.8)
LYMPHOCYTES # BLD AUTO: 0.59 K/UL (ref 1–4.8)
LYMPHOCYTES # BLD AUTO: 0.6 K/UL (ref 1–4.8)
LYMPHOCYTES # BLD AUTO: 0.6 K/UL (ref 1–4.8)
LYMPHOCYTES # BLD AUTO: 0.62 K/UL (ref 1–4.8)
LYMPHOCYTES # BLD AUTO: 0.64 K/UL (ref 1–4.8)
LYMPHOCYTES # BLD AUTO: 0.68 K/UL (ref 1–4.8)
LYMPHOCYTES # BLD AUTO: 0.68 K/UL (ref 1–4.8)
LYMPHOCYTES # BLD AUTO: 0.74 K/UL (ref 1–4.8)
LYMPHOCYTES # BLD AUTO: 0.76 K/UL (ref 1–4.8)
LYMPHOCYTES # BLD AUTO: 0.79 K/UL (ref 1–4.8)
LYMPHOCYTES # BLD AUTO: 0.8 K/UL (ref 1–4.8)
LYMPHOCYTES # BLD AUTO: 0.85 K/UL (ref 1–4.8)
LYMPHOCYTES # BLD AUTO: 0.93 K/UL (ref 1–4.8)
LYMPHOCYTES # BLD AUTO: 1.21 K/UL (ref 1–4.8)
LYMPHOCYTES NFR BLD AUTO: 10.2 % (ref 27–41)
LYMPHOCYTES NFR BLD AUTO: 10.3 % (ref 27–41)
LYMPHOCYTES NFR BLD AUTO: 11.1 % (ref 27–41)
LYMPHOCYTES NFR BLD AUTO: 11.4 % (ref 27–41)
LYMPHOCYTES NFR BLD AUTO: 11.6 % (ref 27–41)
LYMPHOCYTES NFR BLD AUTO: 11.6 % (ref 27–41)
LYMPHOCYTES NFR BLD AUTO: 12.1 % (ref 27–41)
LYMPHOCYTES NFR BLD AUTO: 12.1 % (ref 27–41)
LYMPHOCYTES NFR BLD AUTO: 12.6 % (ref 27–41)
LYMPHOCYTES NFR BLD AUTO: 12.7 % (ref 27–41)
LYMPHOCYTES NFR BLD AUTO: 13.3 % (ref 27–41)
LYMPHOCYTES NFR BLD AUTO: 13.5 % (ref 27–41)
LYMPHOCYTES NFR BLD AUTO: 14.4 % (ref 27–41)
LYMPHOCYTES NFR BLD AUTO: 15.8 % (ref 27–41)
LYMPHOCYTES NFR BLD AUTO: 15.9 % (ref 27–41)
LYMPHOCYTES NFR BLD AUTO: 16.7 % (ref 27–41)
LYMPHOCYTES NFR BLD AUTO: 16.9 % (ref 27–41)
LYMPHOCYTES NFR BLD AUTO: 17 % (ref 27–41)
LYMPHOCYTES NFR BLD AUTO: 18.8 % (ref 27–41)
LYMPHOCYTES NFR BLD AUTO: 6.5 % (ref 27–41)
LYMPHOCYTES NFR BLD AUTO: 9.2 % (ref 27–41)
LYMPHOCYTES NFR BLD AUTO: 9.7 % (ref 27–41)
LYMPHOCYTES NFR BLD MANUAL: 10 % (ref 27–41)
LYMPHOCYTES NFR BLD MANUAL: 10 % (ref 27–41)
LYMPHOCYTES NFR BLD MANUAL: 11 % (ref 27–41)
LYMPHOCYTES NFR BLD MANUAL: 12 % (ref 27–41)
LYMPHOCYTES NFR BLD MANUAL: 12 % (ref 27–41)
LYMPHOCYTES NFR BLD MANUAL: 13 % (ref 27–41)
LYMPHOCYTES NFR BLD MANUAL: 14 % (ref 27–41)
LYMPHOCYTES NFR BLD MANUAL: 14 % (ref 27–41)
LYMPHOCYTES NFR BLD MANUAL: 18 % (ref 27–41)
LYMPHOCYTES NFR BLD MANUAL: 21 % (ref 27–41)
LYMPHOCYTES NFR BLD MANUAL: 4 % (ref 27–41)
LYMPHOCYTES NFR BLD MANUAL: 8 % (ref 27–41)
LYMPHOCYTES NFR BLD MANUAL: 9 % (ref 27–41)
LYMPHOCYTES NFR BLD MANUAL: 9 % (ref 27–41)
LYMPHOCYTES NFR FLD MANUAL: 12 %
MACROPHAGES NFR FLD MANUAL: 85 %
MAGNESIUM SERPL-MCNC: 1.2 MG/DL (ref 1.6–2.6)
MAGNESIUM SERPL-MCNC: 1.3 MG/DL (ref 1.6–2.6)
MAGNESIUM SERPL-MCNC: 1.7 MG/DL (ref 1.6–2.6)
MAGNESIUM SERPL-MCNC: 2 MG/DL (ref 1.6–2.6)
MAGNESIUM SERPL-MCNC: 2.2 MG/DL (ref 1.6–2.6)
MCH RBC QN AUTO: 25.1 PG (ref 27–31)
MCH RBC QN AUTO: 25.2 PG (ref 27–31)
MCH RBC QN AUTO: 25.3 PG (ref 27–31)
MCH RBC QN AUTO: 25.6 PG (ref 27–31)
MCH RBC QN AUTO: 25.6 PG (ref 27–31)
MCH RBC QN AUTO: 26 PG (ref 27–31)
MCH RBC QN AUTO: 26.3 PG (ref 27–31)
MCH RBC QN AUTO: 26.4 PG (ref 27–31)
MCH RBC QN AUTO: 27 PG (ref 27–31)
MCH RBC QN AUTO: 27.1 PG (ref 27–31)
MCH RBC QN AUTO: 27.1 PG (ref 27–31)
MCH RBC QN AUTO: 27.5 PG (ref 27–31)
MCH RBC QN AUTO: 27.6 PG (ref 27–31)
MCH RBC QN AUTO: 27.8 PG (ref 27–31)
MCH RBC QN AUTO: 27.8 PG (ref 27–31)
MCH RBC QN AUTO: 27.9 PG (ref 27–31)
MCH RBC QN AUTO: 28 PG (ref 27–31)
MCH RBC QN AUTO: 28.2 PG (ref 27–31)
MCH RBC QN AUTO: 28.4 PG (ref 27–31)
MCH RBC QN AUTO: 29.5 PG (ref 27–31)
MCHC RBC AUTO-ENTMCNC: 29.5 G/DL (ref 32–36)
MCHC RBC AUTO-ENTMCNC: 30.2 G/DL (ref 32–36)
MCHC RBC AUTO-ENTMCNC: 30.6 G/DL (ref 32–36)
MCHC RBC AUTO-ENTMCNC: 31.3 G/DL (ref 32–36)
MCHC RBC AUTO-ENTMCNC: 31.5 G/DL (ref 32–36)
MCHC RBC AUTO-ENTMCNC: 31.5 G/DL (ref 32–36)
MCHC RBC AUTO-ENTMCNC: 31.9 G/DL (ref 32–36)
MCHC RBC AUTO-ENTMCNC: 31.9 G/DL (ref 32–36)
MCHC RBC AUTO-ENTMCNC: 32.1 G/DL (ref 32–36)
MCHC RBC AUTO-ENTMCNC: 32.1 G/DL (ref 32–36)
MCHC RBC AUTO-ENTMCNC: 32.3 G/DL (ref 32–36)
MCHC RBC AUTO-ENTMCNC: 32.5 G/DL (ref 32–36)
MCHC RBC AUTO-ENTMCNC: 32.9 G/DL (ref 32–36)
MCHC RBC AUTO-ENTMCNC: 33 G/DL (ref 32–36)
MCHC RBC AUTO-ENTMCNC: 33.1 G/DL (ref 32–36)
MCHC RBC AUTO-ENTMCNC: 33.2 G/DL (ref 32–36)
MCHC RBC AUTO-ENTMCNC: 33.3 G/DL (ref 32–36)
MCHC RBC AUTO-ENTMCNC: 33.5 G/DL (ref 32–36)
MCHC RBC AUTO-ENTMCNC: 33.6 G/DL (ref 32–36)
MCHC RBC AUTO-ENTMCNC: 34 G/DL (ref 32–36)
MCHC RBC AUTO-ENTMCNC: 34 G/DL (ref 32–36)
MCHC RBC AUTO-ENTMCNC: 34.1 G/DL (ref 32–36)
MCV RBC AUTO: 78.5 FL (ref 80–96)
MCV RBC AUTO: 79.6 FL (ref 80–96)
MCV RBC AUTO: 80.1 FL (ref 80–96)
MCV RBC AUTO: 80.4 FL (ref 80–96)
MCV RBC AUTO: 80.8 FL (ref 80–96)
MCV RBC AUTO: 81.2 FL (ref 80–96)
MCV RBC AUTO: 82.1 FL (ref 80–96)
MCV RBC AUTO: 82.3 FL (ref 80–96)
MCV RBC AUTO: 82.8 FL (ref 80–96)
MCV RBC AUTO: 82.8 FL (ref 80–96)
MCV RBC AUTO: 82.9 FL (ref 80–96)
MCV RBC AUTO: 83.2 FL (ref 80–96)
MCV RBC AUTO: 83.5 FL (ref 80–96)
MCV RBC AUTO: 83.5 FL (ref 80–96)
MCV RBC AUTO: 83.6 FL (ref 80–96)
MCV RBC AUTO: 83.9 FL (ref 80–96)
MCV RBC AUTO: 84.1 FL (ref 80–96)
MCV RBC AUTO: 84.3 FL (ref 80–96)
MCV RBC AUTO: 84.6 FL (ref 80–96)
MCV RBC AUTO: 84.7 FL (ref 80–96)
MCV RBC AUTO: 85.7 FL (ref 80–96)
MCV RBC AUTO: 86.8 FL (ref 80–96)
MICROCYTES BLD QL SMEAR: ABNORMAL
MONOCYTES # BLD AUTO: 0.08 K/UL (ref 0–0.8)
MONOCYTES # BLD AUTO: 0.1 K/UL (ref 0–0.8)
MONOCYTES # BLD AUTO: 0.11 K/UL (ref 0–0.8)
MONOCYTES # BLD AUTO: 0.13 K/UL (ref 0–0.8)
MONOCYTES # BLD AUTO: 0.13 K/UL (ref 0–0.8)
MONOCYTES # BLD AUTO: 0.15 K/UL (ref 0–0.8)
MONOCYTES # BLD AUTO: 0.16 K/UL (ref 0–0.8)
MONOCYTES # BLD AUTO: 0.16 K/UL (ref 0–0.8)
MONOCYTES # BLD AUTO: 0.18 K/UL (ref 0–0.8)
MONOCYTES # BLD AUTO: 0.19 K/UL (ref 0–0.8)
MONOCYTES # BLD AUTO: 0.19 K/UL (ref 0–0.8)
MONOCYTES # BLD AUTO: 0.22 K/UL (ref 0–0.8)
MONOCYTES # BLD AUTO: 0.3 K/UL (ref 0–0.8)
MONOCYTES # BLD AUTO: 0.33 K/UL (ref 0–0.8)
MONOCYTES # BLD AUTO: 0.35 K/UL (ref 0–0.8)
MONOCYTES # BLD AUTO: 0.36 K/UL (ref 0–0.8)
MONOCYTES # BLD AUTO: 0.36 K/UL (ref 0–0.8)
MONOCYTES # BLD AUTO: 0.37 K/UL (ref 0–0.8)
MONOCYTES # BLD AUTO: 0.39 K/UL (ref 0–0.8)
MONOCYTES # BLD AUTO: 0.4 K/UL (ref 0–0.8)
MONOCYTES # BLD AUTO: 0.41 K/UL (ref 0–0.8)
MONOCYTES # BLD AUTO: 0.44 K/UL (ref 0–0.8)
MONOCYTES NFR BLD AUTO: 2.6 % (ref 2–6)
MONOCYTES NFR BLD AUTO: 2.8 % (ref 2–6)
MONOCYTES NFR BLD AUTO: 2.8 % (ref 2–6)
MONOCYTES NFR BLD AUTO: 3.3 % (ref 2–6)
MONOCYTES NFR BLD AUTO: 3.3 % (ref 2–6)
MONOCYTES NFR BLD AUTO: 3.4 % (ref 2–6)
MONOCYTES NFR BLD AUTO: 3.6 % (ref 2–6)
MONOCYTES NFR BLD AUTO: 5 % (ref 2–6)
MONOCYTES NFR BLD AUTO: 5.1 % (ref 2–6)
MONOCYTES NFR BLD AUTO: 5.7 % (ref 2–6)
MONOCYTES NFR BLD AUTO: 6.2 % (ref 2–6)
MONOCYTES NFR BLD AUTO: 6.9 % (ref 2–6)
MONOCYTES NFR BLD AUTO: 7.2 % (ref 2–6)
MONOCYTES NFR BLD AUTO: 7.8 % (ref 2–6)
MONOCYTES NFR BLD AUTO: 8.1 % (ref 2–6)
MONOCYTES NFR BLD AUTO: 8.4 % (ref 2–6)
MONOCYTES NFR BLD AUTO: 8.4 % (ref 2–6)
MONOCYTES NFR BLD MANUAL: 1 % (ref 2–6)
MONOCYTES NFR BLD MANUAL: 2 % (ref 2–6)
MONOCYTES NFR BLD MANUAL: 3 % (ref 2–6)
MONOCYTES NFR BLD MANUAL: 3 % (ref 2–6)
MONOCYTES NFR BLD MANUAL: 4 % (ref 2–6)
MONOCYTES NFR BLD MANUAL: 5 % (ref 2–6)
MONOCYTES NFR BLD MANUAL: 5 % (ref 2–6)
MONOCYTES NFR BLD MANUAL: 6 % (ref 2–6)
MPC BLD CALC-MCNC: 10.6 FL (ref 9.4–12.4)
MPC BLD CALC-MCNC: 10.6 FL (ref 9.4–12.4)
MPC BLD CALC-MCNC: 10.7 FL (ref 9.4–12.4)
MPC BLD CALC-MCNC: 10.9 FL (ref 9.4–12.4)
MPC BLD CALC-MCNC: 11 FL (ref 9.4–12.4)
MPC BLD CALC-MCNC: 11 FL (ref 9.4–12.4)
MPC BLD CALC-MCNC: 11.1 FL (ref 9.4–12.4)
MPC BLD CALC-MCNC: 11.3 FL (ref 9.4–12.4)
MPC BLD CALC-MCNC: 11.5 FL (ref 9.4–12.4)
MPC BLD CALC-MCNC: 11.7 FL (ref 9.4–12.4)
MPC BLD CALC-MCNC: 11.8 FL (ref 9.4–12.4)
MPC BLD CALC-MCNC: 12.2 FL (ref 9.4–12.4)
MPC BLD CALC-MCNC: 12.3 FL (ref 9.4–12.4)
MPC BLD CALC-MCNC: 12.3 FL (ref 9.4–12.4)
MPC BLD CALC-MCNC: 12.7 FL (ref 9.4–12.4)
MPC BLD CALC-MCNC: 13.2 FL (ref 9.4–12.4)
MPC BLD CALC-MCNC: ABNORMAL G/DL
MPC BLD CALC-MCNC: ABNORMAL G/DL
MUCOUS, UA: ABNORMAL /LPF
MV PEAK A VEL: 0.78 M/S
MV PEAK E VEL: 0.67 M/S
NEUTROPHILS # BLD AUTO: 2.34 K/UL (ref 1.8–7.7)
NEUTROPHILS # BLD AUTO: 2.45 K/UL (ref 1.8–7.7)
NEUTROPHILS # BLD AUTO: 2.68 K/UL (ref 1.8–7.7)
NEUTROPHILS # BLD AUTO: 2.9 K/UL (ref 1.8–7.7)
NEUTROPHILS # BLD AUTO: 2.98 K/UL (ref 1.8–7.7)
NEUTROPHILS # BLD AUTO: 3.02 K/UL (ref 1.8–7.7)
NEUTROPHILS # BLD AUTO: 3.15 K/UL (ref 1.8–7.7)
NEUTROPHILS # BLD AUTO: 3.36 K/UL (ref 1.8–7.7)
NEUTROPHILS # BLD AUTO: 3.37 K/UL (ref 1.8–7.7)
NEUTROPHILS # BLD AUTO: 3.42 K/UL (ref 1.8–7.7)
NEUTROPHILS # BLD AUTO: 3.46 K/UL (ref 1.8–7.7)
NEUTROPHILS # BLD AUTO: 3.54 K/UL (ref 1.8–7.7)
NEUTROPHILS # BLD AUTO: 3.63 K/UL (ref 1.8–7.7)
NEUTROPHILS # BLD AUTO: 4.03 K/UL (ref 1.8–7.7)
NEUTROPHILS # BLD AUTO: 4.12 K/UL (ref 1.8–7.7)
NEUTROPHILS # BLD AUTO: 4.98 K/UL (ref 1.8–7.7)
NEUTROPHILS # BLD AUTO: 5.06 K/UL (ref 1.8–7.7)
NEUTROPHILS # BLD AUTO: 5.44 K/UL (ref 1.8–7.7)
NEUTROPHILS # BLD AUTO: 5.69 K/UL (ref 1.8–7.7)
NEUTROPHILS # BLD AUTO: 5.75 K/UL (ref 1.8–7.7)
NEUTROPHILS # BLD AUTO: 8.14 K/UL (ref 1.8–7.7)
NEUTROPHILS # BLD AUTO: 9.88 K/UL (ref 1.8–7.7)
NEUTROPHILS NFR BLD AUTO: 73.5 % (ref 53–65)
NEUTROPHILS NFR BLD AUTO: 74 % (ref 53–65)
NEUTROPHILS NFR BLD AUTO: 74.1 % (ref 53–65)
NEUTROPHILS NFR BLD AUTO: 74.3 % (ref 53–65)
NEUTROPHILS NFR BLD AUTO: 74.9 % (ref 53–65)
NEUTROPHILS NFR BLD AUTO: 75.4 % (ref 53–65)
NEUTROPHILS NFR BLD AUTO: 76.9 % (ref 53–65)
NEUTROPHILS NFR BLD AUTO: 76.9 % (ref 53–65)
NEUTROPHILS NFR BLD AUTO: 77 % (ref 53–65)
NEUTROPHILS NFR BLD AUTO: 77.9 % (ref 53–65)
NEUTROPHILS NFR BLD AUTO: 79.5 % (ref 53–65)
NEUTROPHILS NFR BLD AUTO: 79.7 % (ref 53–65)
NEUTROPHILS NFR BLD AUTO: 79.9 % (ref 53–65)
NEUTROPHILS NFR BLD AUTO: 80.3 % (ref 53–65)
NEUTROPHILS NFR BLD AUTO: 80.3 % (ref 53–65)
NEUTROPHILS NFR BLD AUTO: 81.8 % (ref 53–65)
NEUTROPHILS NFR BLD AUTO: 82.4 % (ref 53–65)
NEUTROPHILS NFR BLD AUTO: 83.4 % (ref 53–65)
NEUTROPHILS NFR BLD AUTO: 83.7 % (ref 53–65)
NEUTROPHILS NFR BLD AUTO: 84.3 % (ref 53–65)
NEUTROPHILS NFR BLD AUTO: 84.6 % (ref 53–65)
NEUTROPHILS NFR BLD AUTO: 88.3 % (ref 53–65)
NEUTS BAND NFR BLD MANUAL: 1 % (ref 1–5)
NEUTS BAND NFR BLD MANUAL: 11 % (ref 1–5)
NEUTS BAND NFR BLD MANUAL: 19 % (ref 1–5)
NEUTS BAND NFR BLD MANUAL: 2 % (ref 1–5)
NEUTS BAND NFR BLD MANUAL: 2 % (ref 1–5)
NEUTS BAND NFR BLD MANUAL: 26 % (ref 1–5)
NEUTS BAND NFR BLD MANUAL: 26 % (ref 1–5)
NEUTS BAND NFR BLD MANUAL: 3 % (ref 1–5)
NEUTS BAND NFR BLD MANUAL: 5 % (ref 1–5)
NEUTS BAND NFR BLD MANUAL: 6 % (ref 1–5)
NEUTS BAND NFR BLD MANUAL: 6 % (ref 1–5)
NEUTS BAND NFR BLD MANUAL: 7 % (ref 1–5)
NEUTS BAND NFR BLD MANUAL: 8 % (ref 1–5)
NEUTS SEG NFR BLD MANUAL: 57 % (ref 50–62)
NEUTS SEG NFR BLD MANUAL: 64 % (ref 50–62)
NEUTS SEG NFR BLD MANUAL: 67 % (ref 50–62)
NEUTS SEG NFR BLD MANUAL: 70 % (ref 50–62)
NEUTS SEG NFR BLD MANUAL: 70 % (ref 50–62)
NEUTS SEG NFR BLD MANUAL: 71 % (ref 50–62)
NEUTS SEG NFR BLD MANUAL: 76 % (ref 50–62)
NEUTS SEG NFR BLD MANUAL: 77 % (ref 50–62)
NEUTS SEG NFR BLD MANUAL: 79 % (ref 50–62)
NEUTS SEG NFR BLD MANUAL: 81 % (ref 50–62)
NEUTS SEG NFR BLD MANUAL: 82 % (ref 50–62)
NEUTS SEG NFR BLD MANUAL: 83 % (ref 50–62)
NEUTS SEG NFR BLD MANUAL: 84 % (ref 50–62)
NEUTS SEG NFR BLD MANUAL: 86 % (ref 50–62)
NEUTS SEG NFR BLD MANUAL: 86 % (ref 50–62)
NEUTS SEG NFR BLD MANUAL: 87 % (ref 50–62)
NEUTS SEG NFR BLD MANUAL: 88 % (ref 50–62)
NEUTS SEG NFR BLD MANUAL: 90 % (ref 50–62)
NITRITE UR QL STRIP: NEGATIVE
NITRITE UR QL STRIP: NEGATIVE
NRBC # BLD AUTO: 0 X10E3/UL
NRBC # BLD AUTO: 0.02 X10E3/UL
NRBC # BLD AUTO: 0.03 X10E3/UL
NRBC # BLD AUTO: 0.04 X10E3/UL
NRBC # BLD AUTO: 0.04 X10E3/UL
NRBC # BLD AUTO: 0.05 X10E3/UL
NRBC # BLD AUTO: 0.06 X10E3/UL
NRBC # BLD AUTO: 0.06 X10E3/UL
NRBC # BLD AUTO: 0.07 X10E3/UL
NRBC # BLD AUTO: 0.08 X10E3/UL
NRBC # BLD AUTO: 0.17 X10E3/UL
NRBC # BLD AUTO: 0.21 X10E3/UL
NRBC BLD MANUAL-RTO: 1 /100 WBC
NRBC BLD MANUAL-RTO: 14 /100 WBC
NRBC BLD MANUAL-RTO: 2 /100 WBC
NRBC BLD MANUAL-RTO: 3 /100 WBC
NRBC BLD MANUAL-RTO: 4 /100 WBC
NRBC BLD MANUAL-RTO: 5 /100 WBC
NRBC, AUTO (.00): 0 %
NRBC, AUTO (.00): 0.4 %
NRBC, AUTO (.00): 0.5 %
NRBC, AUTO (.00): 0.5 %
NRBC, AUTO (.00): 0.6 %
NRBC, AUTO (.00): 0.6 %
NRBC, AUTO (.00): 0.8 %
NRBC, AUTO (.00): 0.9 %
NRBC, AUTO (.00): 1 %
NRBC, AUTO (.00): 1.3 %
NRBC, AUTO (.00): 1.4 %
NRBC, AUTO (.00): 1.9 %
NRBC, AUTO (.00): 3.7 %
NRBC, AUTO (.00): 4.7 %
NT-PROBNP SERPL-MCNC: 4707 PG/ML (ref 1–450)
OCCULT BLOOD: POSITIVE
OHS CV RV/LV RATIO: 1.04 CM
OHS QRS DURATION: 126 MS
OHS QRS DURATION: 132 MS
OHS QTC CALCULATION: 442 MS
OHS QTC CALCULATION: 459 MS
OVALOCYTES BLD QL SMEAR: ABNORMAL
PCO2 BLDA: 27 MMHG (ref 35–48)
PCO2 BLDA: 29 MMHG (ref 35–48)
PCO2 BLDA: 31 MMHG (ref 35–48)
PCO2 BLDA: 36 MMHG (ref 35–48)
PH SMN: 7.32 [PH] (ref 7.35–7.45)
PH SMN: 7.39 [PH]
PH SMN: 7.48 [PH] (ref 7.35–7.45)
PH SMN: 7.52 [PH] (ref 7.35–7.45)
PH UR STRIP: 5.5 PH UNITS
PH UR STRIP: 5.5 PH UNITS
PHOSPHATE SERPL-MCNC: 3 MG/DL (ref 2.3–4.7)
PHOSPHATE SERPL-MCNC: 3.2 MG/DL (ref 2.3–4.7)
PHOSPHATE SERPL-MCNC: 3.8 MG/DL (ref 2.3–4.7)
PHOSPHATE SERPL-MCNC: 4 MG/DL (ref 2.3–4.7)
PHOSPHATE SERPL-MCNC: 4.1 MG/DL (ref 2.3–4.7)
PISA TR MAX VEL: 2.72 M/S
PLATELET # BLD AUTO: 10 K/UL (ref 150–400)
PLATELET # BLD AUTO: 110 K/UL (ref 150–400)
PLATELET # BLD AUTO: 117 K/UL (ref 150–400)
PLATELET # BLD AUTO: 15 K/UL (ref 150–400)
PLATELET # BLD AUTO: 26 K/UL (ref 150–400)
PLATELET # BLD AUTO: 28 K/UL (ref 150–400)
PLATELET # BLD AUTO: 30 K/UL (ref 150–400)
PLATELET # BLD AUTO: 30 K/UL (ref 150–400)
PLATELET # BLD AUTO: 35 K/UL (ref 150–400)
PLATELET # BLD AUTO: 37 K/UL (ref 150–400)
PLATELET # BLD AUTO: 40 K/UL (ref 150–400)
PLATELET # BLD AUTO: 40 K/UL (ref 150–400)
PLATELET # BLD AUTO: 44 K/UL (ref 150–400)
PLATELET # BLD AUTO: 55 K/UL (ref 150–400)
PLATELET # BLD AUTO: 64 K/UL (ref 150–400)
PLATELET # BLD AUTO: 78 K/UL (ref 150–400)
PLATELET # BLD AUTO: 79 K/UL (ref 150–400)
PLATELET # BLD AUTO: 82 K/UL (ref 150–400)
PLATELET # BLD AUTO: 87 K/UL (ref 150–400)
PLATELET # BLD AUTO: 90 K/UL (ref 150–400)
PLATELET # BLD AUTO: 90 K/UL (ref 150–400)
PLATELET # BLD AUTO: 91 K/UL (ref 150–400)
PLATELET MORPHOLOGY: ABNORMAL
PLATELET MORPHOLOGY: NORMAL
PO2 BLDA: 188 MMHG (ref 83–108)
PO2 BLDA: 203 MMHG (ref 83–108)
PO2 BLDA: 508 MMHG (ref 83–108)
PO2 BLDA: 90 MMHG
POC A-ADO2: 120 MMHG
POC A-ADO2: 21 MMHG
POC BASE EXCESS: -6.9 MMOL/L (ref -2–3)
POC BASE EXCESS: -7.1 MMOL/L (ref -2–3)
POC BASE EXCESS: 0.4 MMOL/L
POC BASE EXCESS: 1.6 MMOL/L (ref -2–3)
POC SATURATED O2: 100 % (ref 95–98)
POC SATURATED O2: 100 % (ref 95–98)
POC SATURATED O2: 100.2 % (ref 95–98)
POC SATURATED O2: 98.8 % (ref 95–98)
POLYCHROMASIA BLD QL SMEAR: ABNORMAL
POTASSIUM SERPL-SCNC: 2.5 MMOL/L (ref 3.5–5.1)
POTASSIUM SERPL-SCNC: 2.5 MMOL/L (ref 3.5–5.1)
POTASSIUM SERPL-SCNC: 2.6 MMOL/L (ref 3.5–5.1)
POTASSIUM SERPL-SCNC: 2.6 MMOL/L (ref 3.5–5.1)
POTASSIUM SERPL-SCNC: 2.7 MMOL/L (ref 3.5–5.1)
POTASSIUM SERPL-SCNC: 3 MMOL/L (ref 3.5–5.1)
POTASSIUM SERPL-SCNC: 3.1 MMOL/L (ref 3.5–5.1)
POTASSIUM SERPL-SCNC: 3.2 MMOL/L (ref 3.5–5.1)
POTASSIUM SERPL-SCNC: 3.3 MMOL/L (ref 3.5–5.1)
POTASSIUM SERPL-SCNC: 3.3 MMOL/L (ref 3.5–5.1)
POTASSIUM SERPL-SCNC: 3.4 MMOL/L (ref 3.5–5.1)
POTASSIUM SERPL-SCNC: 3.6 MMOL/L (ref 3.5–5.1)
POTASSIUM SERPL-SCNC: 3.7 MMOL/L (ref 3.5–5.1)
PROT SERPL-MCNC: 4.5 G/DL (ref 5.8–7.6)
PROT SERPL-MCNC: 5.6 G/DL (ref 5.8–7.6)
PROT SERPL-MCNC: 5.7 G/DL (ref 5.8–7.6)
PROT SERPL-MCNC: 6 G/DL (ref 5.8–7.6)
PROT UR QL STRIP: 100
PROT UR QL STRIP: 30
PROTHROMBIN TIME: 14.5 SECONDS (ref 11.7–14.7)
PROTHROMBIN TIME: 15.6 SECONDS (ref 11.7–14.7)
PROTHROMBIN TIME: 15.8 SECONDS (ref 11.7–14.7)
PV PEAK GRADIENT: 10 MMHG
PV PEAK VELOCITY: 1.62 M/S
RA VOL SYS: 24.43 ML
RBC # BLD AUTO: 2.2 M/UL (ref 4.2–5.4)
RBC # BLD AUTO: 2.56 M/UL (ref 4.2–5.4)
RBC # BLD AUTO: 2.58 M/UL (ref 4.2–5.4)
RBC # BLD AUTO: 2.61 M/UL (ref 4.2–5.4)
RBC # BLD AUTO: 2.66 M/UL (ref 4.2–5.4)
RBC # BLD AUTO: 2.66 M/UL (ref 4.2–5.4)
RBC # BLD AUTO: 2.68 M/UL (ref 4.2–5.4)
RBC # BLD AUTO: 2.72 M/UL (ref 4.2–5.4)
RBC # BLD AUTO: 2.78 M/UL (ref 4.2–5.4)
RBC # BLD AUTO: 2.79 M/UL (ref 4.2–5.4)
RBC # BLD AUTO: 2.8 M/UL (ref 4.2–5.4)
RBC # BLD AUTO: 2.91 M/UL (ref 4.2–5.4)
RBC # BLD AUTO: 2.92 M/UL (ref 4.2–5.4)
RBC # BLD AUTO: 2.99 M/UL (ref 4.2–5.4)
RBC # BLD AUTO: 3.09 M/UL (ref 4.2–5.4)
RBC # BLD AUTO: 3.32 M/UL (ref 4.2–5.4)
RBC # BLD AUTO: 3.36 M/UL (ref 4.2–5.4)
RBC # BLD AUTO: 3.98 M/UL (ref 4.2–5.4)
RBC # BLD AUTO: 4.07 M/UL (ref 4.2–5.4)
RBC # BLD AUTO: 4.38 M/UL (ref 4.2–5.4)
RBC # BLD AUTO: 4.51 M/UL (ref 4.2–5.4)
RBC # BLD AUTO: 4.53 M/UL (ref 4.2–5.4)
RBC # FLD MANUAL: <3000 /CUMM
RBC # UR STRIP: ABNORMAL /UL
RBC # UR STRIP: NEGATIVE /UL
RBC #/AREA URNS HPF: 4 /HPF
RBC #/AREA URNS HPF: 66 /HPF
REACTIVE LYMPHOCYTES: ABNORMAL
RH BLD: NORMAL
RIGHT ATRIAL AREA: 12.4 CM2
RIGHT ATRIUM VOLUME AREA LENGTH APICAL 4 CHAMBER: 25.33 ML
RIGHT VENTRICLE DIASTOLIC BASEL DIMENSION: 2.5 CM
RIGHT VENTRICLE DIASTOLIC LENGTH: 6 CM
RIGHT VENTRICLE DIASTOLIC MID DIMENSION: 2.1 CM
RIGHT VENTRICULAR LENGTH IN DIASTOLE (APICAL 4-CHAMBER VIEW): 5.96 CM
RV MID DIAMA: 2.08 CM
SCHISTOCYTES BLD QL AUTO: ABNORMAL
SODIUM SERPL-SCNC: 132 MMOL/L (ref 136–145)
SODIUM SERPL-SCNC: 132 MMOL/L (ref 136–145)
SODIUM SERPL-SCNC: 133 MMOL/L (ref 136–145)
SODIUM SERPL-SCNC: 133 MMOL/L (ref 136–145)
SODIUM SERPL-SCNC: 134 MMOL/L (ref 136–145)
SODIUM SERPL-SCNC: 135 MMOL/L (ref 136–145)
SODIUM SERPL-SCNC: 135 MMOL/L (ref 136–145)
SODIUM SERPL-SCNC: 136 MMOL/L (ref 136–145)
SODIUM SERPL-SCNC: 137 MMOL/L (ref 136–145)
SODIUM SERPL-SCNC: 140 MMOL/L (ref 136–145)
SODIUM SERPL-SCNC: 141 MMOL/L (ref 136–145)
SODIUM SERPL-SCNC: 142 MMOL/L (ref 136–145)
SP GR UR STRIP: 1.02
SP GR UR STRIP: 1.03
SPECIMEN OUTDATE: NORMAL
SQUAMOUS #/AREA URNS LPF: ABNORMAL /HPF
SQUAMOUS #/AREA URNS LPF: ABNORMAL /HPF
TARGETS BLD QL SMEAR: ABNORMAL
TARGETS BLD QL SMEAR: ABNORMAL
TDI LATERAL: 0.09 M/S
TDI SEPTAL: 0.04 M/S
TDI: 0.07 M/S
TR MAX PG: 30 MMHG
TROPONIN I SERPL HS-MCNC: 159.6 NG/L
UA COMPLETE W REFLEX CULTURE PNL UR: NO GROWTH
UNIT NUMBER: NORMAL
UROBILINOGEN UR STRIP-ACNC: 2 MG/DL
UROBILINOGEN UR STRIP-ACNC: NORMAL MG/DL
WBC # BLD AUTO: 11.72 K/UL (ref 4.5–11)
WBC # BLD AUTO: 3.08 K/UL (ref 4.5–11)
WBC # BLD AUTO: 3.12 K/UL (ref 4.5–11)
WBC # BLD AUTO: 3.52 K/UL (ref 4.5–11)
WBC # BLD AUTO: 3.61 K/UL (ref 4.5–11)
WBC # BLD AUTO: 3.77 K/UL (ref 4.5–11)
WBC # BLD AUTO: 3.93 K/UL (ref 4.5–11)
WBC # BLD AUTO: 4.29 K/UL (ref 4.5–11)
WBC # BLD AUTO: 4.31 K/UL (ref 4.5–11)
WBC # BLD AUTO: 4.38 K/UL (ref 4.5–11)
WBC # BLD AUTO: 4.44 K/UL (ref 4.5–11)
WBC # BLD AUTO: 4.46 K/UL (ref 4.5–11)
WBC # BLD AUTO: 4.55 K/UL (ref 4.5–11)
WBC # BLD AUTO: 4.63 K/UL (ref 4.5–11)
WBC # BLD AUTO: 4.89 K/UL (ref 4.5–11)
WBC # BLD AUTO: 5.56 K/UL (ref 4.5–11)
WBC # BLD AUTO: 6.3 K/UL (ref 4.5–11)
WBC # BLD AUTO: 6.39 K/UL (ref 4.5–11)
WBC # BLD AUTO: 6.64 K/UL (ref 4.5–11)
WBC # BLD AUTO: 6.83 K/UL (ref 4.5–11)
WBC # BLD AUTO: 6.87 K/UL (ref 4.5–11)
WBC # BLD AUTO: 9.22 K/UL (ref 4.5–11)
WBC # FLD MANUAL: 3 /CUMM
WBC #/AREA URNS HPF: 3 /HPF
WBC #/AREA URNS HPF: 9 /HPF
Z-SCORE OF LEFT VENTRICULAR DIMENSION IN END DIASTOLE: -8.23
Z-SCORE OF LEFT VENTRICULAR DIMENSION IN END SYSTOLE: -4.51

## 2025-01-01 PROCEDURE — 83735 ASSAY OF MAGNESIUM: CPT

## 2025-01-01 PROCEDURE — 36415 COLL VENOUS BLD VENIPUNCTURE: CPT | Performed by: FAMILY MEDICINE

## 2025-01-01 PROCEDURE — 27000221 HC OXYGEN, UP TO 24 HOURS

## 2025-01-01 PROCEDURE — 36415 COLL VENOUS BLD VENIPUNCTURE: CPT | Performed by: INTERNAL MEDICINE

## 2025-01-01 PROCEDURE — 99233 SBSQ HOSP IP/OBS HIGH 50: CPT | Mod: ,,,

## 2025-01-01 PROCEDURE — 85730 THROMBOPLASTIN TIME PARTIAL: CPT | Performed by: INTERNAL MEDICINE

## 2025-01-01 PROCEDURE — 20000000 HC ICU ROOM

## 2025-01-01 PROCEDURE — 76937 US GUIDE VASCULAR ACCESS: CPT

## 2025-01-01 PROCEDURE — 82962 GLUCOSE BLOOD TEST: CPT

## 2025-01-01 PROCEDURE — 36415 COLL VENOUS BLD VENIPUNCTURE: CPT

## 2025-01-01 PROCEDURE — 99900026 HC AIRWAY MAINTENANCE (STAT)

## 2025-01-01 PROCEDURE — 85025 COMPLETE CBC W/AUTO DIFF WBC: CPT | Performed by: INTERNAL MEDICINE

## 2025-01-01 PROCEDURE — 94003 VENT MGMT INPAT SUBQ DAY: CPT

## 2025-01-01 PROCEDURE — 85384 FIBRINOGEN ACTIVITY: CPT | Performed by: INTERNAL MEDICINE

## 2025-01-01 PROCEDURE — 36415 COLL VENOUS BLD VENIPUNCTURE: CPT | Performed by: STUDENT IN AN ORGANIZED HEALTH CARE EDUCATION/TRAINING PROGRAM

## 2025-01-01 PROCEDURE — 85018 HEMOGLOBIN: CPT | Performed by: STUDENT IN AN ORGANIZED HEALTH CARE EDUCATION/TRAINING PROGRAM

## 2025-01-01 PROCEDURE — 87040 BLOOD CULTURE FOR BACTERIA: CPT

## 2025-01-01 PROCEDURE — 85025 COMPLETE CBC W/AUTO DIFF WBC: CPT

## 2025-01-01 PROCEDURE — 80048 BASIC METABOLIC PNL TOTAL CA: CPT | Performed by: STUDENT IN AN ORGANIZED HEALTH CARE EDUCATION/TRAINING PROGRAM

## 2025-01-01 PROCEDURE — C1751 CATH, INF, PER/CENT/MIDLINE: HCPCS

## 2025-01-01 PROCEDURE — 83605 ASSAY OF LACTIC ACID: CPT | Performed by: STUDENT IN AN ORGANIZED HEALTH CARE EDUCATION/TRAINING PROGRAM

## 2025-01-01 PROCEDURE — 94761 N-INVAS EAR/PLS OXIMETRY MLT: CPT

## 2025-01-01 PROCEDURE — P9035 PLATELET PHERES LEUKOREDUCED: HCPCS | Performed by: STUDENT IN AN ORGANIZED HEALTH CARE EDUCATION/TRAINING PROGRAM

## 2025-01-01 PROCEDURE — 11000001 HC ACUTE MED/SURG PRIVATE ROOM

## 2025-01-01 PROCEDURE — 87070 CULTURE OTHR SPECIMN AEROBIC: CPT

## 2025-01-01 PROCEDURE — 85379 FIBRIN DEGRADATION QUANT: CPT

## 2025-01-01 PROCEDURE — 0B978ZZ DRAINAGE OF LEFT MAIN BRONCHUS, VIA NATURAL OR ARTIFICIAL OPENING ENDOSCOPIC: ICD-10-PCS | Performed by: STUDENT IN AN ORGANIZED HEALTH CARE EDUCATION/TRAINING PROGRAM

## 2025-01-01 PROCEDURE — 25000003 PHARM REV CODE 250

## 2025-01-01 PROCEDURE — 99900035 HC TECH TIME PER 15 MIN (STAT)

## 2025-01-01 PROCEDURE — 85730 THROMBOPLASTIN TIME PARTIAL: CPT | Performed by: STUDENT IN AN ORGANIZED HEALTH CARE EDUCATION/TRAINING PROGRAM

## 2025-01-01 PROCEDURE — 25000003 PHARM REV CODE 250: Performed by: INTERNAL MEDICINE

## 2025-01-01 PROCEDURE — 80053 COMPREHEN METABOLIC PANEL: CPT | Performed by: FAMILY MEDICINE

## 2025-01-01 PROCEDURE — 63600175 PHARM REV CODE 636 W HCPCS: Performed by: STUDENT IN AN ORGANIZED HEALTH CARE EDUCATION/TRAINING PROGRAM

## 2025-01-01 PROCEDURE — 99292 CRITICAL CARE ADDL 30 MIN: CPT | Mod: 25,,, | Performed by: STUDENT IN AN ORGANIZED HEALTH CARE EDUCATION/TRAINING PROGRAM

## 2025-01-01 PROCEDURE — 5A1D70Z PERFORMANCE OF URINARY FILTRATION, INTERMITTENT, LESS THAN 6 HOURS PER DAY: ICD-10-PCS | Performed by: INTERNAL MEDICINE

## 2025-01-01 PROCEDURE — 25000003 PHARM REV CODE 250: Performed by: STUDENT IN AN ORGANIZED HEALTH CARE EDUCATION/TRAINING PROGRAM

## 2025-01-01 PROCEDURE — 36430 TRANSFUSION BLD/BLD COMPNT: CPT

## 2025-01-01 PROCEDURE — 82803 BLOOD GASES ANY COMBINATION: CPT

## 2025-01-01 PROCEDURE — 36410 VNPNXR 3YR/> PHY/QHP DX/THER: CPT

## 2025-01-01 PROCEDURE — 63600175 PHARM REV CODE 636 W HCPCS

## 2025-01-01 PROCEDURE — 63600175 PHARM REV CODE 636 W HCPCS: Performed by: INTERNAL MEDICINE

## 2025-01-01 PROCEDURE — 83605 ASSAY OF LACTIC ACID: CPT

## 2025-01-01 PROCEDURE — 36556 INSERT NON-TUNNEL CV CATH: CPT | Mod: ,,, | Performed by: STUDENT IN AN ORGANIZED HEALTH CARE EDUCATION/TRAINING PROGRAM

## 2025-01-01 PROCEDURE — 63600175 PHARM REV CODE 636 W HCPCS: Performed by: FAMILY MEDICINE

## 2025-01-01 PROCEDURE — 51702 INSERT TEMP BLADDER CATH: CPT

## 2025-01-01 PROCEDURE — 85025 COMPLETE CBC W/AUTO DIFF WBC: CPT | Performed by: STUDENT IN AN ORGANIZED HEALTH CARE EDUCATION/TRAINING PROGRAM

## 2025-01-01 PROCEDURE — 99232 SBSQ HOSP IP/OBS MODERATE 35: CPT | Mod: ,,,

## 2025-01-01 PROCEDURE — 88305 TISSUE EXAM BY PATHOLOGIST: CPT | Mod: 26,,, | Performed by: PATHOLOGY

## 2025-01-01 PROCEDURE — 86923 COMPATIBILITY TEST ELECTRIC: CPT

## 2025-01-01 PROCEDURE — P9016 RBC LEUKOCYTES REDUCED: HCPCS | Performed by: STUDENT IN AN ORGANIZED HEALTH CARE EDUCATION/TRAINING PROGRAM

## 2025-01-01 PROCEDURE — 80048 BASIC METABOLIC PNL TOTAL CA: CPT

## 2025-01-01 PROCEDURE — 85014 HEMATOCRIT: CPT | Performed by: STUDENT IN AN ORGANIZED HEALTH CARE EDUCATION/TRAINING PROGRAM

## 2025-01-01 PROCEDURE — 85610 PROTHROMBIN TIME: CPT | Performed by: STUDENT IN AN ORGANIZED HEALTH CARE EDUCATION/TRAINING PROGRAM

## 2025-01-01 PROCEDURE — A9521 TC99M EXAMETAZIME: HCPCS | Mod: JZ,TB | Performed by: INTERNAL MEDICINE

## 2025-01-01 PROCEDURE — 99291 CRITICAL CARE FIRST HOUR: CPT | Mod: ,,, | Performed by: STUDENT IN AN ORGANIZED HEALTH CARE EDUCATION/TRAINING PROGRAM

## 2025-01-01 PROCEDURE — 84100 ASSAY OF PHOSPHORUS: CPT

## 2025-01-01 PROCEDURE — 5A1955Z RESPIRATORY VENTILATION, GREATER THAN 96 CONSECUTIVE HOURS: ICD-10-PCS | Performed by: STUDENT IN AN ORGANIZED HEALTH CARE EDUCATION/TRAINING PROGRAM

## 2025-01-01 PROCEDURE — 0B938ZZ DRAINAGE OF RIGHT MAIN BRONCHUS, VIA NATURAL OR ARTIFICIAL OPENING ENDOSCOPIC: ICD-10-PCS | Performed by: STUDENT IN AN ORGANIZED HEALTH CARE EDUCATION/TRAINING PROGRAM

## 2025-01-01 PROCEDURE — 25000003 PHARM REV CODE 250: Performed by: FAMILY MEDICINE

## 2025-01-01 PROCEDURE — 83880 ASSAY OF NATRIURETIC PEPTIDE: CPT

## 2025-01-01 PROCEDURE — 31624 DX BRONCHOSCOPE/LAVAGE: CPT

## 2025-01-01 PROCEDURE — 80048 BASIC METABOLIC PNL TOTAL CA: CPT | Performed by: INTERNAL MEDICINE

## 2025-01-01 PROCEDURE — 31622 DX BRONCHOSCOPE/WASH: CPT

## 2025-01-01 PROCEDURE — 99233 SBSQ HOSP IP/OBS HIGH 50: CPT | Mod: ,,, | Performed by: INTERNAL MEDICINE

## 2025-01-01 PROCEDURE — S5010 5% DEXTROSE AND 0.45% SALINE: HCPCS | Performed by: STUDENT IN AN ORGANIZED HEALTH CARE EDUCATION/TRAINING PROGRAM

## 2025-01-01 PROCEDURE — 81003 URINALYSIS AUTO W/O SCOPE: CPT | Performed by: FAMILY MEDICINE

## 2025-01-01 PROCEDURE — 93010 ELECTROCARDIOGRAM REPORT: CPT | Mod: ,,, | Performed by: INTERNAL MEDICINE

## 2025-01-01 PROCEDURE — 93005 ELECTROCARDIOGRAM TRACING: CPT

## 2025-01-01 PROCEDURE — 31500 INSERT EMERGENCY AIRWAY: CPT

## 2025-01-01 PROCEDURE — 36600 WITHDRAWAL OF ARTERIAL BLOOD: CPT

## 2025-01-01 PROCEDURE — 99223 1ST HOSP IP/OBS HIGH 75: CPT | Mod: ,,, | Performed by: INTERNAL MEDICINE

## 2025-01-01 PROCEDURE — 99232 SBSQ HOSP IP/OBS MODERATE 35: CPT | Mod: ,,, | Performed by: SURGERY

## 2025-01-01 PROCEDURE — 0BH17EZ INSERTION OF ENDOTRACHEAL AIRWAY INTO TRACHEA, VIA NATURAL OR ARTIFICIAL OPENING: ICD-10-PCS | Performed by: ANESTHESIOLOGY

## 2025-01-01 PROCEDURE — 95812 EEG 41-60 MINUTES: CPT

## 2025-01-01 PROCEDURE — 63600175 PHARM REV CODE 636 W HCPCS: Performed by: NURSE PRACTITIONER

## 2025-01-01 PROCEDURE — P9016 RBC LEUKOCYTES REDUCED: HCPCS

## 2025-01-01 PROCEDURE — 30233N1 TRANSFUSION OF NONAUTOLOGOUS RED BLOOD CELLS INTO PERIPHERAL VEIN, PERCUTANEOUS APPROACH: ICD-10-PCS | Performed by: STUDENT IN AN ORGANIZED HEALTH CARE EDUCATION/TRAINING PROGRAM

## 2025-01-01 PROCEDURE — 99232 SBSQ HOSP IP/OBS MODERATE 35: CPT | Mod: ,,, | Performed by: INTERNAL MEDICINE

## 2025-01-01 PROCEDURE — 80048 BASIC METABOLIC PNL TOTAL CA: CPT | Performed by: FAMILY MEDICINE

## 2025-01-01 PROCEDURE — 85025 COMPLETE CBC W/AUTO DIFF WBC: CPT | Performed by: FAMILY MEDICINE

## 2025-01-01 PROCEDURE — 86850 RBC ANTIBODY SCREEN: CPT | Performed by: STUDENT IN AN ORGANIZED HEALTH CARE EDUCATION/TRAINING PROGRAM

## 2025-01-01 PROCEDURE — 80076 HEPATIC FUNCTION PANEL: CPT | Performed by: STUDENT IN AN ORGANIZED HEALTH CARE EDUCATION/TRAINING PROGRAM

## 2025-01-01 PROCEDURE — 99223 1ST HOSP IP/OBS HIGH 75: CPT | Mod: ,,, | Performed by: SURGERY

## 2025-01-01 PROCEDURE — 85379 FIBRIN DEGRADATION QUANT: CPT | Performed by: STUDENT IN AN ORGANIZED HEALTH CARE EDUCATION/TRAINING PROGRAM

## 2025-01-01 PROCEDURE — 99285 EMERGENCY DEPT VISIT HI MDM: CPT | Mod: 25

## 2025-01-01 PROCEDURE — 25000003 PHARM REV CODE 250: Performed by: NURSE PRACTITIONER

## 2025-01-01 PROCEDURE — 86923 COMPATIBILITY TEST ELECTRIC: CPT | Performed by: INTERNAL MEDICINE

## 2025-01-01 PROCEDURE — 80074 ACUTE HEPATITIS PANEL: CPT | Performed by: INTERNAL MEDICINE

## 2025-01-01 PROCEDURE — 27100108

## 2025-01-01 PROCEDURE — 80100014 HC HEMODIALYSIS 1:1

## 2025-01-01 PROCEDURE — 86900 BLOOD TYPING SEROLOGIC ABO: CPT | Performed by: STUDENT IN AN ORGANIZED HEALTH CARE EDUCATION/TRAINING PROGRAM

## 2025-01-01 PROCEDURE — 81003 URINALYSIS AUTO W/O SCOPE: CPT | Performed by: STUDENT IN AN ORGANIZED HEALTH CARE EDUCATION/TRAINING PROGRAM

## 2025-01-01 PROCEDURE — 99900025 HC BRONCHOSCOPY-ASST (STAT)

## 2025-01-01 PROCEDURE — 85384 FIBRINOGEN ACTIVITY: CPT | Performed by: STUDENT IN AN ORGANIZED HEALTH CARE EDUCATION/TRAINING PROGRAM

## 2025-01-01 PROCEDURE — 30233R1 TRANSFUSION OF NONAUTOLOGOUS PLATELETS INTO PERIPHERAL VEIN, PERCUTANEOUS APPROACH: ICD-10-PCS | Performed by: STUDENT IN AN ORGANIZED HEALTH CARE EDUCATION/TRAINING PROGRAM

## 2025-01-01 PROCEDURE — 83605 ASSAY OF LACTIC ACID: CPT | Performed by: INTERNAL MEDICINE

## 2025-01-01 PROCEDURE — 84484 ASSAY OF TROPONIN QUANT: CPT

## 2025-01-01 PROCEDURE — 88112 CYTOPATH CELL ENHANCE TECH: CPT | Mod: 26,,, | Performed by: PATHOLOGY

## 2025-01-01 PROCEDURE — 87086 URINE CULTURE/COLONY COUNT: CPT | Performed by: STUDENT IN AN ORGANIZED HEALTH CARE EDUCATION/TRAINING PROGRAM

## 2025-01-01 PROCEDURE — 83605 ASSAY OF LACTIC ACID: CPT | Performed by: FAMILY MEDICINE

## 2025-01-01 PROCEDURE — P9016 RBC LEUKOCYTES REDUCED: HCPCS | Performed by: INTERNAL MEDICINE

## 2025-01-01 PROCEDURE — 86901 BLOOD TYPING SEROLOGIC RH(D): CPT | Performed by: INTERNAL MEDICINE

## 2025-01-01 PROCEDURE — 82272 OCCULT BLD FECES 1-3 TESTS: CPT | Performed by: STUDENT IN AN ORGANIZED HEALTH CARE EDUCATION/TRAINING PROGRAM

## 2025-01-01 PROCEDURE — 27202055 HC BRONCHOSCOPE, DISP

## 2025-01-01 PROCEDURE — 86923 COMPATIBILITY TEST ELECTRIC: CPT | Performed by: STUDENT IN AN ORGANIZED HEALTH CARE EDUCATION/TRAINING PROGRAM

## 2025-01-01 PROCEDURE — 27100080 HC AIRWAY ADAPTER-END TIDAL CO2

## 2025-01-01 PROCEDURE — 88112 CYTOPATH CELL ENHANCE TECH: CPT | Mod: TC,MCY

## 2025-01-01 PROCEDURE — 87040 BLOOD CULTURE FOR BACTERIA: CPT | Performed by: FAMILY MEDICINE

## 2025-01-01 PROCEDURE — 94002 VENT MGMT INPAT INIT DAY: CPT

## 2025-01-01 PROCEDURE — 89050 BODY FLUID CELL COUNT: CPT

## 2025-01-01 PROCEDURE — 80053 COMPREHEN METABOLIC PANEL: CPT

## 2025-01-01 PROCEDURE — 85610 PROTHROMBIN TIME: CPT | Performed by: INTERNAL MEDICINE

## 2025-01-01 PROCEDURE — 99291 CRITICAL CARE FIRST HOUR: CPT | Mod: 25,,, | Performed by: STUDENT IN AN ORGANIZED HEALTH CARE EDUCATION/TRAINING PROGRAM

## 2025-01-01 PROCEDURE — P9035 PLATELET PHERES LEUKOREDUCED: HCPCS

## 2025-01-01 PROCEDURE — 95822 EEG COMA OR SLEEP ONLY: CPT

## 2025-01-01 PROCEDURE — 02HV33Z INSERTION OF INFUSION DEVICE INTO SUPERIOR VENA CAVA, PERCUTANEOUS APPROACH: ICD-10-PCS | Performed by: STUDENT IN AN ORGANIZED HEALTH CARE EDUCATION/TRAINING PROGRAM

## 2025-01-01 PROCEDURE — 31624 DX BRONCHOSCOPE/LAVAGE: CPT | Mod: ,,, | Performed by: STUDENT IN AN ORGANIZED HEALTH CARE EDUCATION/TRAINING PROGRAM

## 2025-01-01 RX ORDER — ETOMIDATE 2 MG/ML
30 INJECTION INTRAVENOUS ONCE
Status: COMPLETED | OUTPATIENT
Start: 2025-01-01 | End: 2025-01-01

## 2025-01-01 RX ORDER — POTASSIUM CHLORIDE 29.8 MG/ML
20 INJECTION INTRAVENOUS ONCE
Status: DISCONTINUED | OUTPATIENT
Start: 2025-01-01 | End: 2025-01-01

## 2025-01-01 RX ORDER — MAGNESIUM SULFATE HEPTAHYDRATE 40 MG/ML
2 INJECTION, SOLUTION INTRAVENOUS ONCE
Status: COMPLETED | OUTPATIENT
Start: 2025-01-01 | End: 2025-01-01

## 2025-01-01 RX ORDER — HEPARIN SODIUM,PORCINE/PF 10 UNIT/ML
50 SYRINGE (ML) INTRAVENOUS EVERY 8 HOURS PRN
Status: DISCONTINUED | OUTPATIENT
Start: 2025-01-01 | End: 2025-01-01

## 2025-01-01 RX ORDER — ROCURONIUM BROMIDE 10 MG/ML
80 INJECTION, SOLUTION INTRAVENOUS ONCE
Status: COMPLETED | OUTPATIENT
Start: 2025-01-01 | End: 2025-01-01

## 2025-01-01 RX ORDER — MORPHINE SULFATE 2 MG/ML
2 INJECTION, SOLUTION INTRAMUSCULAR; INTRAVENOUS EVERY 30 MIN PRN
Status: DISCONTINUED | OUTPATIENT
Start: 2025-01-01 | End: 2025-01-01 | Stop reason: HOSPADM

## 2025-01-01 RX ORDER — MORPHINE SULFATE 4 MG/ML
4 INJECTION, SOLUTION INTRAMUSCULAR; INTRAVENOUS ONCE
Status: COMPLETED | OUTPATIENT
Start: 2025-01-01 | End: 2025-01-01

## 2025-01-01 RX ORDER — GLUCAGON 1 MG
1 KIT INJECTION
Status: DISCONTINUED | OUTPATIENT
Start: 2025-01-01 | End: 2025-01-01 | Stop reason: HOSPADM

## 2025-01-01 RX ORDER — INSULIN ASPART 100 [IU]/ML
0-10 INJECTION, SOLUTION INTRAVENOUS; SUBCUTANEOUS EVERY 6 HOURS PRN
Status: DISCONTINUED | OUTPATIENT
Start: 2025-01-01 | End: 2025-01-01 | Stop reason: HOSPADM

## 2025-01-01 RX ORDER — POTASSIUM CHLORIDE 7.45 MG/ML
10 INJECTION INTRAVENOUS
Status: COMPLETED | OUTPATIENT
Start: 2025-01-01 | End: 2025-01-01

## 2025-01-01 RX ORDER — METOPROLOL TARTRATE 25 MG/1
12.5 TABLET ORAL 2 TIMES DAILY
Status: DISCONTINUED | OUTPATIENT
Start: 2025-01-01 | End: 2025-01-01

## 2025-01-01 RX ORDER — SODIUM CHLORIDE 0.9 % (FLUSH) 0.9 %
10 SYRINGE (ML) INJECTION EVERY 12 HOURS PRN
Status: DISCONTINUED | OUTPATIENT
Start: 2025-01-01 | End: 2025-01-01 | Stop reason: HOSPADM

## 2025-01-01 RX ORDER — HYDROCODONE BITARTRATE AND ACETAMINOPHEN 500; 5 MG/1; MG/1
TABLET ORAL
Status: DISCONTINUED | OUTPATIENT
Start: 2025-01-01 | End: 2025-01-01

## 2025-01-01 RX ORDER — NOREPINEPHRINE BITARTRATE/D5W 4MG/250ML
0-.2 PLASTIC BAG, INJECTION (ML) INTRAVENOUS CONTINUOUS
Status: DISPENSED | OUTPATIENT
Start: 2025-01-01 | End: 2025-01-01

## 2025-01-01 RX ORDER — PANTOPRAZOLE SODIUM 40 MG/10ML
40 INJECTION, POWDER, LYOPHILIZED, FOR SOLUTION INTRAVENOUS DAILY
Status: DISCONTINUED | OUTPATIENT
Start: 2025-01-01 | End: 2025-01-01 | Stop reason: HOSPADM

## 2025-01-01 RX ORDER — TRANEXAMIC ACID 100 MG/ML
1000 INJECTION, SOLUTION INTRAVENOUS ONCE
Status: COMPLETED | OUTPATIENT
Start: 2025-01-01 | End: 2025-01-01

## 2025-01-01 RX ORDER — METOPROLOL TARTRATE 25 MG/1
25 TABLET, FILM COATED ORAL 2 TIMES DAILY
Status: DISCONTINUED | OUTPATIENT
Start: 2025-01-01 | End: 2025-01-01

## 2025-01-01 RX ORDER — POTASSIUM CHLORIDE 29.8 MG/ML
40 INJECTION INTRAVENOUS ONCE
Status: COMPLETED | OUTPATIENT
Start: 2025-01-01 | End: 2025-01-01

## 2025-01-01 RX ORDER — IBUPROFEN 200 MG
24 TABLET ORAL
Status: DISCONTINUED | OUTPATIENT
Start: 2025-01-01 | End: 2025-01-01 | Stop reason: HOSPADM

## 2025-01-01 RX ORDER — PROMETHAZINE HYDROCHLORIDE 25 MG/1
25 SUPPOSITORY RECTAL EVERY 6 HOURS PRN
Status: DISCONTINUED | OUTPATIENT
Start: 2025-01-01 | End: 2025-01-01 | Stop reason: HOSPADM

## 2025-01-01 RX ORDER — NOREPINEPHRINE BITARTRATE/D5W 4MG/250ML
PLASTIC BAG, INJECTION (ML) INTRAVENOUS
Status: DISPENSED
Start: 2025-01-01 | End: 2025-01-01

## 2025-01-01 RX ORDER — FENTANYL CITRATE 50 UG/ML
100 INJECTION, SOLUTION INTRAMUSCULAR; INTRAVENOUS ONCE
Status: COMPLETED | OUTPATIENT
Start: 2025-01-01 | End: 2025-01-01

## 2025-01-01 RX ORDER — HEPARIN SODIUM 1000 [USP'U]/ML
2000 INJECTION, SOLUTION INTRAVENOUS; SUBCUTANEOUS
Status: DISCONTINUED | OUTPATIENT
Start: 2025-01-01 | End: 2025-01-01

## 2025-01-01 RX ORDER — FENTANYL CITRAT/DEXTROSE 5%/PF 100 MCG/10
0-250 PATIENT CONTROLLED ANALGESIA SYRINGE INTRAVENOUS CONTINUOUS
Status: DISCONTINUED | OUTPATIENT
Start: 2025-01-01 | End: 2025-01-01

## 2025-01-01 RX ORDER — NAPROXEN SODIUM 220 MG/1
81 TABLET, FILM COATED ORAL DAILY
Status: DISCONTINUED | OUTPATIENT
Start: 2025-01-01 | End: 2025-01-01

## 2025-01-01 RX ORDER — HYDROCODONE BITARTRATE AND ACETAMINOPHEN 500; 5 MG/1; MG/1
TABLET ORAL
Status: DISCONTINUED | OUTPATIENT
Start: 2025-01-01 | End: 2025-01-01 | Stop reason: HOSPADM

## 2025-01-01 RX ORDER — GLUCAGON 1 MG
1 KIT INJECTION
Status: DISCONTINUED | OUTPATIENT
Start: 2025-01-01 | End: 2025-01-01

## 2025-01-01 RX ORDER — NOREPINEPHRINE BITARTRATE/D5W 4MG/250ML
0-.2 PLASTIC BAG, INJECTION (ML) INTRAVENOUS CONTINUOUS
Status: DISCONTINUED | OUTPATIENT
Start: 2025-01-01 | End: 2025-01-01 | Stop reason: HOSPADM

## 2025-01-01 RX ORDER — NALOXONE HCL 0.4 MG/ML
0.02 VIAL (ML) INJECTION
Status: DISCONTINUED | OUTPATIENT
Start: 2025-01-01 | End: 2025-01-01 | Stop reason: HOSPADM

## 2025-01-01 RX ORDER — HEPARIN SODIUM 1000 [USP'U]/ML
4000 INJECTION, SOLUTION INTRAVENOUS; SUBCUTANEOUS
Status: DISCONTINUED | OUTPATIENT
Start: 2025-01-01 | End: 2025-01-01 | Stop reason: HOSPADM

## 2025-01-01 RX ORDER — ATORVASTATIN CALCIUM 80 MG/1
80 TABLET, FILM COATED ORAL DAILY
Status: DISCONTINUED | OUTPATIENT
Start: 2025-01-01 | End: 2025-01-01

## 2025-01-01 RX ORDER — AMLODIPINE BESYLATE 10 MG/1
10 TABLET ORAL NIGHTLY
Status: DISCONTINUED | OUTPATIENT
Start: 2025-01-01 | End: 2025-01-01

## 2025-01-01 RX ORDER — CEFEPIME HYDROCHLORIDE 1 G/1
1 INJECTION, POWDER, FOR SOLUTION INTRAMUSCULAR; INTRAVENOUS
Status: DISCONTINUED | OUTPATIENT
Start: 2025-01-01 | End: 2025-01-01

## 2025-01-01 RX ORDER — ATORVASTATIN CALCIUM 80 MG/1
80 TABLET, FILM COATED ORAL DAILY
Status: DISCONTINUED | OUTPATIENT
Start: 2025-01-01 | End: 2025-01-01 | Stop reason: HOSPADM

## 2025-01-01 RX ORDER — AMLODIPINE BESYLATE 10 MG/1
10 TABLET ORAL DAILY
Status: DISCONTINUED | OUTPATIENT
Start: 2025-01-01 | End: 2025-01-01

## 2025-01-01 RX ORDER — IBUPROFEN 200 MG
16 TABLET ORAL
Status: DISCONTINUED | OUTPATIENT
Start: 2025-01-01 | End: 2025-01-01 | Stop reason: HOSPADM

## 2025-01-01 RX ORDER — CALCIUM GLUCONATE 20 MG/ML
1 INJECTION, SOLUTION INTRAVENOUS ONCE
Status: COMPLETED | OUTPATIENT
Start: 2025-01-01 | End: 2025-01-01

## 2025-01-01 RX ORDER — PANTOPRAZOLE SODIUM 40 MG/1
40 TABLET, DELAYED RELEASE ORAL DAILY
Status: DISCONTINUED | OUTPATIENT
Start: 2025-01-01 | End: 2025-01-01

## 2025-01-01 RX ORDER — SODIUM CHLORIDE, SODIUM LACTATE, POTASSIUM CHLORIDE, CALCIUM CHLORIDE 600; 310; 30; 20 MG/100ML; MG/100ML; MG/100ML; MG/100ML
INJECTION, SOLUTION INTRAVENOUS CONTINUOUS
Status: DISCONTINUED | OUTPATIENT
Start: 2025-01-01 | End: 2025-01-01

## 2025-01-01 RX ORDER — POTASSIUM CHLORIDE 29.8 MG/ML
40 INJECTION INTRAVENOUS ONCE
Status: DISCONTINUED | OUTPATIENT
Start: 2025-01-01 | End: 2025-01-01

## 2025-01-01 RX ORDER — PROPOFOL 10 MG/ML
INJECTION, EMULSION INTRAVENOUS
Status: COMPLETED
Start: 2025-01-01 | End: 2025-01-01

## 2025-01-01 RX ORDER — MUPIROCIN 20 MG/G
OINTMENT TOPICAL 2 TIMES DAILY
Status: DISPENSED | OUTPATIENT
Start: 2025-01-01 | End: 2025-01-01

## 2025-01-01 RX ORDER — LEVOTHYROXINE SODIUM 50 UG/1
50 TABLET ORAL
Status: DISCONTINUED | OUTPATIENT
Start: 2025-01-01 | End: 2025-01-01 | Stop reason: HOSPADM

## 2025-01-01 RX ORDER — NOREPINEPHRINE BITARTRATE/D5W 4MG/250ML
0-3 PLASTIC BAG, INJECTION (ML) INTRAVENOUS CONTINUOUS
Status: DISCONTINUED | OUTPATIENT
Start: 2025-01-01 | End: 2025-01-01

## 2025-01-01 RX ORDER — IBUPROFEN 200 MG
24 TABLET ORAL
Status: DISCONTINUED | OUTPATIENT
Start: 2025-01-01 | End: 2025-01-01

## 2025-01-01 RX ORDER — PROPOFOL 10 MG/ML
0-50 INJECTION, EMULSION INTRAVENOUS CONTINUOUS
Status: DISCONTINUED | OUTPATIENT
Start: 2025-01-01 | End: 2025-01-01

## 2025-01-01 RX ORDER — IBUPROFEN 200 MG
16 TABLET ORAL
Status: DISCONTINUED | OUTPATIENT
Start: 2025-01-01 | End: 2025-01-01

## 2025-01-01 RX ORDER — CEFEPIME HYDROCHLORIDE 1 G/1
1 INJECTION, POWDER, FOR SOLUTION INTRAMUSCULAR; INTRAVENOUS
Status: COMPLETED | OUTPATIENT
Start: 2025-01-01 | End: 2025-01-01

## 2025-01-01 RX ORDER — DEXTROSE MONOHYDRATE AND SODIUM CHLORIDE 5; .45 G/100ML; G/100ML
INJECTION, SOLUTION INTRAVENOUS CONTINUOUS
Status: DISCONTINUED | OUTPATIENT
Start: 2025-01-01 | End: 2025-01-01

## 2025-01-01 RX ORDER — LEVOTHYROXINE SODIUM 50 UG/1
50 TABLET ORAL
Status: DISCONTINUED | OUTPATIENT
Start: 2025-01-01 | End: 2025-01-01

## 2025-01-01 RX ORDER — SODIUM CHLORIDE 9 MG/ML
INJECTION, SOLUTION INTRAVENOUS
Status: DISCONTINUED | OUTPATIENT
Start: 2025-01-01 | End: 2025-01-01 | Stop reason: HOSPADM

## 2025-01-01 RX ADMIN — SODIUM CHLORIDE, POTASSIUM CHLORIDE, SODIUM LACTATE AND CALCIUM CHLORIDE 1000 ML: 600; 310; 30; 20 INJECTION, SOLUTION INTRAVENOUS at 01:01

## 2025-01-01 RX ADMIN — PIPERACILLIN SODIUM AND TAZOBACTAM SODIUM 4.5 G: 4; .5 INJECTION, POWDER, LYOPHILIZED, FOR SOLUTION INTRAVENOUS at 03:01

## 2025-01-01 RX ADMIN — CEFEPIME 1 G: 1 INJECTION, POWDER, FOR SOLUTION INTRAMUSCULAR; INTRAVENOUS at 08:01

## 2025-01-01 RX ADMIN — MUPIROCIN: 20 OINTMENT TOPICAL at 08:01

## 2025-01-01 RX ADMIN — PANTOPRAZOLE SODIUM 40 MG: 40 INJECTION, POWDER, LYOPHILIZED, FOR SOLUTION INTRAVENOUS at 09:01

## 2025-01-01 RX ADMIN — ASPIRIN 81 MG CHEWABLE TABLET 81 MG: 81 TABLET CHEWABLE at 03:01

## 2025-01-01 RX ADMIN — PROPOFOL 10 MCG/KG/MIN: 10 INJECTION, EMULSION INTRAVENOUS at 10:01

## 2025-01-01 RX ADMIN — SODIUM CHLORIDE, POTASSIUM CHLORIDE, SODIUM LACTATE AND CALCIUM CHLORIDE: 600; 310; 30; 20 INJECTION, SOLUTION INTRAVENOUS at 11:01

## 2025-01-01 RX ADMIN — SODIUM CHLORIDE, POTASSIUM CHLORIDE, SODIUM LACTATE AND CALCIUM CHLORIDE 500 ML: 600; 310; 30; 20 INJECTION, SOLUTION INTRAVENOUS at 11:01

## 2025-01-01 RX ADMIN — PIPERACILLIN SODIUM AND TAZOBACTAM SODIUM 4.5 G: 4; .5 INJECTION, POWDER, LYOPHILIZED, FOR SOLUTION INTRAVENOUS at 04:01

## 2025-01-01 RX ADMIN — SODIUM CHLORIDE, POTASSIUM CHLORIDE, SODIUM LACTATE AND CALCIUM CHLORIDE 1000 ML: 600; 310; 30; 20 INJECTION, SOLUTION INTRAVENOUS at 04:01

## 2025-01-01 RX ADMIN — NOREPINEPHRINE BITARTRATE 0.04 MCG/KG/MIN: 4 INJECTION, SOLUTION INTRAVENOUS at 01:01

## 2025-01-01 RX ADMIN — ATORVASTATIN CALCIUM 80 MG: 80 TABLET, FILM COATED ORAL at 08:01

## 2025-01-01 RX ADMIN — POTASSIUM CHLORIDE 10 MEQ: 7.46 INJECTION, SOLUTION INTRAVENOUS at 06:01

## 2025-01-01 RX ADMIN — PIPERACILLIN SODIUM AND TAZOBACTAM SODIUM 4.5 G: 4; .5 INJECTION, POWDER, LYOPHILIZED, FOR SOLUTION INTRAVENOUS at 08:01

## 2025-01-01 RX ADMIN — CEFEPIME 1 G: 1 INJECTION, POWDER, FOR SOLUTION INTRAMUSCULAR; INTRAVENOUS at 12:01

## 2025-01-01 RX ADMIN — LEVOTHYROXINE SODIUM 50 MCG: 0.05 TABLET ORAL at 05:01

## 2025-01-01 RX ADMIN — CEFEPIME 1 G: 1 INJECTION, POWDER, FOR SOLUTION INTRAMUSCULAR; INTRAVENOUS at 11:01

## 2025-01-01 RX ADMIN — POTASSIUM CHLORIDE 10 MEQ: 7.46 INJECTION, SOLUTION INTRAVENOUS at 05:01

## 2025-01-01 RX ADMIN — PANTOPRAZOLE SODIUM 40 MG: 40 INJECTION, POWDER, LYOPHILIZED, FOR SOLUTION INTRAVENOUS at 08:01

## 2025-01-01 RX ADMIN — SODIUM CHLORIDE, POTASSIUM CHLORIDE, SODIUM LACTATE AND CALCIUM CHLORIDE 1000 ML: 600; 310; 30; 20 INJECTION, SOLUTION INTRAVENOUS at 05:01

## 2025-01-01 RX ADMIN — SODIUM CHLORIDE, POTASSIUM CHLORIDE, SODIUM LACTATE AND CALCIUM CHLORIDE: 600; 310; 30; 20 INJECTION, SOLUTION INTRAVENOUS at 02:01

## 2025-01-01 RX ADMIN — PROPOFOL 15 MCG/KG/MIN: 10 INJECTION, EMULSION INTRAVENOUS at 05:01

## 2025-01-01 RX ADMIN — NOREPINEPHRINE BITARTRATE 0.14 MCG/KG/MIN: 4 INJECTION, SOLUTION INTRAVENOUS at 06:01

## 2025-01-01 RX ADMIN — SODIUM CHLORIDE: 9 INJECTION, SOLUTION INTRAVENOUS at 11:01

## 2025-01-01 RX ADMIN — MUPIROCIN: 20 OINTMENT TOPICAL at 09:01

## 2025-01-01 RX ADMIN — POTASSIUM CHLORIDE 40 MEQ: 29.8 INJECTION, SOLUTION INTRAVENOUS at 11:01

## 2025-01-01 RX ADMIN — NOREPINEPHRINE BITARTRATE 0.14 MCG/KG/MIN: 4 INJECTION, SOLUTION INTRAVENOUS at 12:01

## 2025-01-01 RX ADMIN — NOREPINEPHRINE BITARTRATE 0.1 MCG/KG/MIN: 4 INJECTION, SOLUTION INTRAVENOUS at 05:01

## 2025-01-01 RX ADMIN — TECHNETIUM TC-99M EXAMETAZIME AND COBALTOUS CHLORIDE 20 MILLICURIE: KIT at 10:01

## 2025-01-01 RX ADMIN — NOREPINEPHRINE BITARTRATE 0.08 MCG/KG/MIN: 4 INJECTION, SOLUTION INTRAVENOUS at 02:01

## 2025-01-01 RX ADMIN — ETOMIDATE 30 MG: 2 INJECTION INTRAVENOUS at 10:01

## 2025-01-01 RX ADMIN — POTASSIUM CHLORIDE 10 MEQ: 7.46 INJECTION, SOLUTION INTRAVENOUS at 03:01

## 2025-01-01 RX ADMIN — NOREPINEPHRINE BITARTRATE 0.02 MCG/KG/MIN: 4 INJECTION, SOLUTION INTRAVENOUS at 12:01

## 2025-01-01 RX ADMIN — DEXTROSE MONOHYDRATE 12.5 G: 25 INJECTION, SOLUTION INTRAVENOUS at 12:01

## 2025-01-01 RX ADMIN — PANTOPRAZOLE SODIUM 40 MG: 40 TABLET, DELAYED RELEASE ORAL at 03:01

## 2025-01-01 RX ADMIN — ATORVASTATIN CALCIUM 80 MG: 80 TABLET, FILM COATED ORAL at 09:01

## 2025-01-01 RX ADMIN — MUPIROCIN: 20 OINTMENT TOPICAL at 12:01

## 2025-01-01 RX ADMIN — POTASSIUM CHLORIDE 10 MEQ: 7.45 INJECTION INTRAVENOUS at 01:01

## 2025-01-01 RX ADMIN — NOREPINEPHRINE BITARTRATE 0.05 MCG/KG/MIN: 4 INJECTION, SOLUTION INTRAVENOUS at 08:01

## 2025-01-01 RX ADMIN — CEFEPIME 1 G: 1 INJECTION, POWDER, FOR SOLUTION INTRAMUSCULAR; INTRAVENOUS at 01:01

## 2025-01-01 RX ADMIN — NOREPINEPHRINE BITARTRATE 0.06 MCG/KG/MIN: 4 INJECTION, SOLUTION INTRAVENOUS at 09:01

## 2025-01-01 RX ADMIN — NOREPINEPHRINE BITARTRATE 0.04 MCG/KG/MIN: 4 INJECTION, SOLUTION INTRAVENOUS at 07:01

## 2025-01-01 RX ADMIN — MORPHINE SULFATE 2 MG: 2 INJECTION, SOLUTION INTRAMUSCULAR; INTRAVENOUS at 02:01

## 2025-01-01 RX ADMIN — PROPOFOL 15 MCG/KG/MIN: 10 INJECTION, EMULSION INTRAVENOUS at 02:01

## 2025-01-01 RX ADMIN — SODIUM CHLORIDE: 0.9 INJECTION, SOLUTION INTRAVENOUS at 10:01

## 2025-01-01 RX ADMIN — PIPERACILLIN SODIUM AND TAZOBACTAM SODIUM 4.5 G: 4; .5 INJECTION, POWDER, LYOPHILIZED, FOR SOLUTION INTRAVENOUS at 11:01

## 2025-01-01 RX ADMIN — SODIUM CHLORIDE, POTASSIUM CHLORIDE, SODIUM LACTATE AND CALCIUM CHLORIDE: 600; 310; 30; 20 INJECTION, SOLUTION INTRAVENOUS at 06:01

## 2025-01-01 RX ADMIN — POTASSIUM CHLORIDE 10 MEQ: 7.46 INJECTION, SOLUTION INTRAVENOUS at 01:01

## 2025-01-01 RX ADMIN — ROCURONIUM BROMIDE 80 MG: 10 INJECTION, SOLUTION INTRAVENOUS at 10:01

## 2025-01-01 RX ADMIN — NOREPINEPHRINE BITARTRATE 0.08 MCG/KG/MIN: 4 INJECTION, SOLUTION INTRAVENOUS at 07:01

## 2025-01-01 RX ADMIN — SODIUM CHLORIDE, POTASSIUM CHLORIDE, SODIUM LACTATE AND CALCIUM CHLORIDE 1000 ML: 600; 310; 30; 20 INJECTION, SOLUTION INTRAVENOUS at 11:01

## 2025-01-01 RX ADMIN — SODIUM CHLORIDE 1000 ML: 9 INJECTION, SOLUTION INTRAVENOUS at 02:01

## 2025-01-01 RX ADMIN — PANTOPRAZOLE SODIUM 40 MG: 40 INJECTION, POWDER, LYOPHILIZED, FOR SOLUTION INTRAVENOUS at 12:01

## 2025-01-01 RX ADMIN — NOREPINEPHRINE BITARTRATE 0.08 MCG/KG/MIN: 4 INJECTION, SOLUTION INTRAVENOUS at 10:01

## 2025-01-01 RX ADMIN — SODIUM CHLORIDE, POTASSIUM CHLORIDE, SODIUM LACTATE AND CALCIUM CHLORIDE: 600; 310; 30; 20 INJECTION, SOLUTION INTRAVENOUS at 10:01

## 2025-01-01 RX ADMIN — NOREPINEPHRINE BITARTRATE 0.02 MCG/KG/MIN: 4 INJECTION, SOLUTION INTRAVENOUS at 11:01

## 2025-01-01 RX ADMIN — MORPHINE SULFATE 4 MG: 4 INJECTION, SOLUTION INTRAMUSCULAR; INTRAVENOUS at 02:01

## 2025-01-01 RX ADMIN — DEXTROSE MONOHYDRATE 12.5 G: 25 INJECTION, SOLUTION INTRAVENOUS at 07:01

## 2025-01-01 RX ADMIN — FOLIC ACID-PYRIDOXINE-CYANOCOBALAMIN TAB 2.5-25-2 MG 1 TABLET: 2.5-25-2 TAB at 03:01

## 2025-01-01 RX ADMIN — LEVOTHYROXINE SODIUM 50 MCG: 0.05 TABLET ORAL at 06:01

## 2025-01-01 RX ADMIN — SODIUM CHLORIDE, POTASSIUM CHLORIDE, SODIUM LACTATE AND CALCIUM CHLORIDE: 600; 310; 30; 20 INJECTION, SOLUTION INTRAVENOUS at 12:01

## 2025-01-01 RX ADMIN — NOREPINEPHRINE BITARTRATE 0.04 MCG/KG/MIN: 4 INJECTION, SOLUTION INTRAVENOUS at 11:01

## 2025-01-01 RX ADMIN — PIPERACILLIN SODIUM AND TAZOBACTAM SODIUM 4.5 G: 4; .5 INJECTION, POWDER, LYOPHILIZED, FOR SOLUTION INTRAVENOUS at 01:01

## 2025-01-01 RX ADMIN — PANTOPRAZOLE SODIUM 40 MG: 40 INJECTION, POWDER, LYOPHILIZED, FOR SOLUTION INTRAVENOUS at 01:01

## 2025-01-01 RX ADMIN — INSULIN ASPART 2 UNITS: 100 INJECTION, SOLUTION INTRAVENOUS; SUBCUTANEOUS at 05:01

## 2025-01-01 RX ADMIN — NOREPINEPHRINE BITARTRATE 0.06 MCG/KG/MIN: 4 INJECTION, SOLUTION INTRAVENOUS at 07:01

## 2025-01-01 RX ADMIN — MAGNESIUM SULFATE HEPTAHYDRATE 2 G: 40 INJECTION, SOLUTION INTRAVENOUS at 01:01

## 2025-01-01 RX ADMIN — TRANEXAMIC ACID 1000 MG: 100 INJECTION, SOLUTION INTRAVENOUS at 03:01

## 2025-01-01 RX ADMIN — SODIUM CHLORIDE: 9 INJECTION, SOLUTION INTRAVENOUS at 10:01

## 2025-01-01 RX ADMIN — POTASSIUM CHLORIDE 10 MEQ: 7.46 INJECTION, SOLUTION INTRAVENOUS at 02:01

## 2025-01-01 RX ADMIN — HEPARIN SODIUM 4000 UNITS: 1000 INJECTION, SOLUTION INTRAVENOUS; SUBCUTANEOUS at 11:01

## 2025-01-01 RX ADMIN — INSULIN ASPART 2 UNITS: 100 INJECTION, SOLUTION INTRAVENOUS; SUBCUTANEOUS at 06:01

## 2025-01-01 RX ADMIN — POTASSIUM CHLORIDE 10 MEQ: 7.46 INJECTION, SOLUTION INTRAVENOUS at 04:01

## 2025-01-01 RX ADMIN — NOREPINEPHRINE BITARTRATE 0.08 MCG/KG/MIN: 4 INJECTION, SOLUTION INTRAVENOUS at 04:01

## 2025-01-01 RX ADMIN — NOREPINEPHRINE BITARTRATE 0.06 MCG/KG/MIN: 4 INJECTION, SOLUTION INTRAVENOUS at 08:01

## 2025-01-01 RX ADMIN — TRANEXAMIC ACID 500 MG: 100 INJECTION, SOLUTION INTRAVENOUS at 05:01

## 2025-01-01 RX ADMIN — PROPOFOL 15 MCG/KG/MIN: 10 INJECTION, EMULSION INTRAVENOUS at 03:01

## 2025-01-01 RX ADMIN — ENOXAPARIN SODIUM 40 MG: 40 INJECTION SUBCUTANEOUS at 05:01

## 2025-01-01 RX ADMIN — MAGNESIUM SULFATE HEPTAHYDRATE 2 G: 40 INJECTION, SOLUTION INTRAVENOUS at 04:01

## 2025-01-01 RX ADMIN — SODIUM CHLORIDE, POTASSIUM CHLORIDE, SODIUM LACTATE AND CALCIUM CHLORIDE: 600; 310; 30; 20 INJECTION, SOLUTION INTRAVENOUS at 03:01

## 2025-01-01 RX ADMIN — CALCIUM GLUCONATE 1 G: 20 INJECTION, SOLUTION INTRAVENOUS at 04:01

## 2025-01-01 RX ADMIN — NOREPINEPHRINE BITARTRATE 0.04 MCG/KG/MIN: 4 INJECTION, SOLUTION INTRAVENOUS at 12:01

## 2025-01-01 RX ADMIN — HEPARIN SODIUM 4000 UNITS: 1000 INJECTION, SOLUTION INTRAVENOUS; SUBCUTANEOUS at 02:01

## 2025-01-01 RX ADMIN — ATORVASTATIN CALCIUM 80 MG: 80 TABLET, FILM COATED ORAL at 03:01

## 2025-01-01 RX ADMIN — POTASSIUM CHLORIDE 10 MEQ: 7.45 INJECTION INTRAVENOUS at 02:01

## 2025-01-01 RX ADMIN — NOREPINEPHRINE BITARTRATE 0.1 MCG/KG/MIN: 4 INJECTION, SOLUTION INTRAVENOUS at 06:01

## 2025-01-01 RX ADMIN — HEPARIN SODIUM 2000 UNITS: 1000 INJECTION, SOLUTION INTRAVENOUS; SUBCUTANEOUS at 10:01

## 2025-01-01 RX ADMIN — VANCOMYCIN HYDROCHLORIDE 1750 MG: 500 INJECTION, POWDER, LYOPHILIZED, FOR SOLUTION INTRAVENOUS at 02:01

## 2025-01-01 RX ADMIN — DEXTROSE AND SODIUM CHLORIDE: 5; 450 INJECTION, SOLUTION INTRAVENOUS at 11:01

## 2025-01-01 RX ADMIN — FENTANYL CITRATE 25 MCG/HR: 50 INJECTION, SOLUTION INTRAMUSCULAR; INTRAVENOUS at 10:01

## 2025-01-01 RX ADMIN — POTASSIUM BICARBONATE 20 MEQ: 782 TABLET, EFFERVESCENT ORAL at 11:01

## 2025-01-01 RX ADMIN — FENTANYL CITRATE 50 MCG: 50 INJECTION, SOLUTION INTRAMUSCULAR; INTRAVENOUS at 10:01

## 2025-01-01 RX ADMIN — SODIUM CHLORIDE: 9 INJECTION, SOLUTION INTRAVENOUS at 02:01

## 2025-01-01 RX ADMIN — CEFEPIME 1 G: 1 INJECTION, POWDER, FOR SOLUTION INTRAMUSCULAR; INTRAVENOUS at 09:01

## 2025-01-01 RX ADMIN — SODIUM CHLORIDE: 9 INJECTION, SOLUTION INTRAVENOUS at 09:01

## 2025-01-01 RX ADMIN — NOREPINEPHRINE BITARTRATE 0.08 MCG/KG/MIN: 4 INJECTION, SOLUTION INTRAVENOUS at 01:01

## 2025-01-01 NOTE — PROGRESS NOTES
[facility] Hospital Medicine  Progress Note    Patient Name: Renetta Stewart  MRN: [id number]  Patient Class: IP- Inpatient   Admission Date: 12/20/2024  Length of Stay: 12 days  Attending Physician: Malu Rivera MD  Primary Care Provider: Eldon Govea MD        Subjective     Principal Problem:Esophagitis determined by endoscopy        HPI:  Pt is a 74-year-old female who presented to her doctor's office today.  She states she had been feeling well just a lot of fatigue.  While she was at the doctor's office she had a syncopal episode with loss of bladder control.  Bystanders report loss of consciousness was less than 15 seconds.  There was no observed seizure-like activity.  Patient denies chest pain, shortness of breath, palpitations, feeling of doom prior to syncopal episode.  Patient reports she does not have pain or shortness of breath at present.. PMH HTN, HLD, CVA, GERD, and pacemaker    In the ED initial vital signs were blood pressure of 143/77, temp 96.9° heart rate 77, O2 sat 98% on room air who.  Initial lab workup revealed a sodium of 147 glucose 117 BUN 22 creatinine 1.35.  CBC is unremarkable.  ProBNP was 74.  Initial troponin 53.6 repeat troponin at 88.3 patient has positive delta. EKG show NSR Rate 77, no pacer spike no st elevation.     Patient will be admitted to hospital medicine service under the direct supervision of Dr KHALIL  for further evaluation and management.     Overview/Hospital Course:  12/28  - EGD resulted, esophagitis noted, per GI continue p.o. Protonix daily, dilatation not done as esophagitis found deferred to a later date, biopsy sent we will follow with results    12/29  - patient is ready for discharge however we are unable to reach daughter. Called daughter 3 times myself and nurse has called as well, will report to  if nothing is heard from family today for abandonment   - results from biopsy will result in first of the week, these  can be released to patient by primary    12/30  - patient's daughter in room today, miscommunication problem addressed, daughter is a very nice woman who has to give full time care to another family member.   - Discussed in depth that patient's condition and swing bed need. Daughter agrees she wants patient to go to swing bed so she can gain some of her independence back  - PT/OT to eval again today and social following for placement    12/31  - RRT called yesterday as patient became lightheaded and unresponsive after using the bathroom and ambulating to the bed, work up complete and like due to orthostatics vs vasovagal   - orthostatic vitals ordered however patient refusing to ambulate right now likely because when she does she has this occur  - will change hydralazine 3 times a day to amlodipine once a day  - try for orthostatic vitals when patient cooperates  - if positive will continue evaluating meds and try abdominal binder if needed    1/1  - refused medications yesterday, family trying to encourage patient  - unable to get orthos as patient will not participate, will continue to try  - monitoring blood pressure  - awaiting placement    Interval History:     Review of Systems   All other systems reviewed and are negative.    Objective:     Vital Signs (Most Recent):  Temp: 98.4 °F (36.9 °C) (01/01/25 0725)  Pulse: 97 (01/01/25 0725)  Resp: 18 (01/01/25 0725)  BP: 98/65 (01/01/25 0725)  SpO2: 98 % (01/01/25 0725) Vital Signs (24h Range):  Temp:  [97.8 °F (36.6 °C)-98.7 °F (37.1 °C)] 98.4 °F (36.9 °C)  Pulse:  [84-99] 97  Resp:  [16-20] 18  SpO2:  [96 %-100 %] 98 %  BP: ()/(65-85) 98/65     Weight: 88.1 kg (194 lb 3.6 oz)  Body mass index is 33.34 kg/m².  No intake or output data in the 24 hours ending 01/01/25 1029        Physical Exam  Vitals and nursing note reviewed.   Constitutional:       Appearance: She is obese.   HENT:      Head: Normocephalic.      Mouth/Throat:      Mouth: Mucous membranes are  moist.   Eyes:      Extraocular Movements: Extraocular movements intact.   Cardiovascular:      Rate and Rhythm: Normal rate and regular rhythm.   Pulmonary:      Effort: Pulmonary effort is normal. No respiratory distress.      Breath sounds: Normal breath sounds.   Abdominal:      General: Abdomen is flat. Bowel sounds are normal. There is no distension.      Palpations: Abdomen is soft.   Musculoskeletal:         General: No swelling or tenderness. Normal range of motion.      Cervical back: Normal range of motion and neck supple. No tenderness.   Skin:     General: Skin is warm and dry.   Neurological:      General: No focal deficit present.      Mental Status: She is alert. Mental status is at baseline.   Psychiatric:         Mood and Affect: Mood normal.             Significant Labs: All pertinent labs within the past 24 hours have been reviewed.      Significant Imaging: I have reviewed all pertinent imaging results/findings within the past 24 hours.    Assessment and Plan     * Esophagitis determined by endoscopy  12/28  - EGD resulted, esophagitis noted, per GI continue p.o. Protonix daily, dilatation not done as esophagitis found deferred to a later date, biopsy sent we will follow with results  - reviewed images, reports     12/29  - patient is ready for discharge however we are unable to reach daughter. Called daughter 3 times myself and nurse has called as well, will report to  if nothing is heard from family today for abandonment   - results from biopsy will result in first of the week, these can be released to patient by primary    Syncope and collapse  Syncope vs seizure  Echo reveals nl systolic and diastolic dysfunction   Carotid US and EEG and Keppra level pending  Telemetry  Fall precautions  Sz precautions    12/23: MRI negative for new CVA but there was a prior CVA.   Patient with memory problems, acute on chronic.     12/25: Mathew per cardiology       12/31  - RRT called  yesterday as patient became lightheaded and unresponsive after using the bathroom and ambulating to the bed, work up complete and like due to orthostatics vs vasovagal   - orthostatic vitals ordered however patient refusing to ambulate right now likely because when she does she has this occur  - will change hydralazine 3 times a day to amlodipine once a day  - try for orthostatic vitals when patient cooperates  - if positive will continue evaluating meds and try abdominal binder if needed    1/1  - refused medications yesterday, family trying to encourage patient  - unable to get orthos as patient will not participate, will continue to try  - monitoring blood pressure  - awaiting placement    Obesity  Body mass index is 35.02 kg/m². Morbid obesity complicates all aspects of disease management from diagnostic modalities to treatment. Weight loss encouraged and health benefits explained to patient.         Folate deficiency  replaced      Acute superficial gastritis without hemorrhage  Seen on EGD 12/27      Hypertensive urgency  Patient has a current diagnosis of hypertensive urgency (without evidence of end organ damage) which is uncontrolled.  Latest blood pressure and vitals reviewed-   Temp:  [96.1 °F (35.6 °C)-99 °F (37.2 °C)]   Pulse:  [62-81]   Resp:  [15-18]   BP: (128-204)/(70-97)   SpO2:  [96 %-100 %] .   Patient currently off IV antihypertensives.   Home meds for hypertension were reviewed and noted below.   Hypertension Medications               furosemide (LASIX) 20 MG tablet Take 1 tablet (20 mg total) by mouth once daily.    hydrALAZINE (APRESOLINE) 100 MG tablet Take 1 tablet (100 mg total) by mouth 3 (three) times daily.    valsartan (DIOVAN) 320 MG tablet Take 1 tablet (320 mg total) by mouth once daily.            Medication adjustment for hospital antihypertensives is as follows-   Adding back home medications over time.  Patient was not able to swallow but able to swallow now.     Will aim for  controlled BP reduction by medications noted above. Monitor and mitigate end organ damage as indicated.    Positive fecal occult blood test  GI consulted  Outpt endoscopy recommended for now  Monitor for blood loss     12/27: plan for EGD today.       Type 2 MI (myocardial infarction)  Pt has Positve Delta, no chest pain, syncopal Episode,  Orhtostatics positive in ED, given pts AGE and risk factors will treat as NSTEMI.   Patient presents with NSTEMI. Chest pain is currently controlled. VIKTOR score is 3. Patient is currently on NSTEMI Pathway.    EKG reviewed. Troponins reviewed and results noted-   Troponin 53, > >88     Lipid panel reviewed and shows-     Lab Results   Component Value Date    LDLCALC 119 12/21/2024     Lab Results   Component Value Date    TRIG 182 (H) 12/21/2024         Medical management includes;  ASA,Beta Blocker, High Intensity Stain, and ACE/ARB Echo has not been performed. Latest ECHO results are as follows- Results for orders placed during the hospital encounter of 03/30/23    Echo    Interpretation Summary  · The left ventricle is normal in size with severe concentric hypertrophy and hyperdynamic systolic function.  · The estimated ejection fraction is 70%.  · Mild tricuspid regurgitation.  · Left ventricular mild outflow tract obstruction is present.  · Normal right ventricular size with normal right ventricular systolic function.  · Normal central venous pressure (3 mmHg).  · The estimated PA systolic pressure is 43 mmHg.  · ECHO is suggestive of hypertensive heart disease vs. hypertrophic cardiomyopathy  .   Cardiology following  Lexiscan recommended once encephalopathy resolves     12/23: lexiscan today.   12/25: lexiscan negative per cardiology     Dysphagia  Dysphagia noted.  Patient with history of esophageal stricture s/p EGD with dilation.   GI consulted.   Patient also with FOBT+ so this may be investigated as well.     12/25: significant dysphagia currently and over the last  few weeks.  Family feels she had dysphagia and this caused her to pass out at home.     12/26: Plan for EGD today.     12/27: EGD cancelled yesterday due to inability to obtain consent.  Consent obtained now.  I spoke with patient's daughter (Jacque Stewart).      12/28: EGD complete, esophagitis noted, dilatation to be considered at a later date      Gastroesophageal reflux disease  Continue home protonix      Hyperlipidemia  Continue statin      Essential hypertension  Patient's blood pressure range in the last 24 hours was: BP  Min: 96/68  Max: 153/72.The patient's inpatient anti-hypertensive regimen is listed below:  Current Antihypertensives  furosemide tablet 20 mg, Daily, Oral  hydrALAZINE tablet 100 mg, 3 times daily, Oral  valsartan tablet 320 mg, Daily, Oral  metoprolol tartrate (LOPRESSOR) tablet 25 mg, 2 times daily, Oral  nitroGLYCERIN SL tablet 0.4 mg, Every 5 min PRN, Sublingual    Plan  - BP is uncontrolled, will adjust as follows: Pt BP decresed when standing and hR increase + orhthos  - Will hold hydralazine and lasix for now    Depression  Patient has persistent depression which is unknown and is currently controlled. Will Continue anti-depressant medications. We will not consult psychiatry at this time. Patient does not display psychosis at this time. Continue to monitor closely and adjust plan of care as needed.          VTE Risk Mitigation (From admission, onward)           Ordered     enoxaparin injection 40 mg  Daily         12/20/24 2025     IP VTE HIGH RISK PATIENT  Once         12/20/24 2025     Place sequential compression device  Until discontinued         12/20/24 2025                    Discharge Planning   GERALDO: 1/3/2025     Code Status: Full Code   Medical Readiness for Discharge Date: 12/24/2024  Discharge plan a: swing bed                Please place Justification for DME        Malu Rivera MD  Department of Hospital Medicine   [facility]

## 2025-01-01 NOTE — ASSESSMENT & PLAN NOTE
Syncope vs seizure  Echo reveals nl systolic and diastolic dysfunction   Carotid US and EEG and Keppra level pending  Telemetry  Fall precautions  Sz precautions    12/23: MRI negative for new CVA but there was a prior CVA.   Patient with memory problems, acute on chronic.     12/25: Mathew per cardiology       12/31  - RRT called yesterday as patient became lightheaded and unresponsive after using the bathroom and ambulating to the bed, work up complete and like due to orthostatics vs vasovagal   - orthostatic vitals ordered however patient refusing to ambulate right now likely because when she does she has this occur  - will change hydralazine 3 times a day to amlodipine once a day  - try for orthostatic vitals when patient cooperates  - if positive will continue evaluating meds and try abdominal binder if needed    1/1  - refused medications yesterday, family trying to encourage patient  - unable to get orthos as patient will not participate, will continue to try  - monitoring blood pressure  - awaiting placement

## 2025-01-01 NOTE — SUBJECTIVE & OBJECTIVE
Interval History:     Review of Systems   All other systems reviewed and are negative.    Objective:     Vital Signs (Most Recent):  Temp: 98.4 °F (36.9 °C) (01/01/25 0725)  Pulse: 97 (01/01/25 0725)  Resp: 18 (01/01/25 0725)  BP: 98/65 (01/01/25 0725)  SpO2: 98 % (01/01/25 0725) Vital Signs (24h Range):  Temp:  [97.8 °F (36.6 °C)-98.7 °F (37.1 °C)] 98.4 °F (36.9 °C)  Pulse:  [84-99] 97  Resp:  [16-20] 18  SpO2:  [96 %-100 %] 98 %  BP: ()/(65-85) 98/65     Weight: 88.1 kg (194 lb 3.6 oz)  Body mass index is 33.34 kg/m².  No intake or output data in the 24 hours ending 01/01/25 1029        Physical Exam  Vitals and nursing note reviewed.   Constitutional:       Appearance: She is obese.   HENT:      Head: Normocephalic.      Mouth/Throat:      Mouth: Mucous membranes are moist.   Eyes:      Extraocular Movements: Extraocular movements intact.   Cardiovascular:      Rate and Rhythm: Normal rate and regular rhythm.   Pulmonary:      Effort: Pulmonary effort is normal. No respiratory distress.      Breath sounds: Normal breath sounds.   Abdominal:      General: Abdomen is flat. Bowel sounds are normal. There is no distension.      Palpations: Abdomen is soft.   Musculoskeletal:         General: No swelling or tenderness. Normal range of motion.      Cervical back: Normal range of motion and neck supple. No tenderness.   Skin:     General: Skin is warm and dry.   Neurological:      General: No focal deficit present.      Mental Status: She is alert. Mental status is at baseline.   Psychiatric:         Mood and Affect: Mood normal.             Significant Labs: All pertinent labs within the past 24 hours have been reviewed.      Significant Imaging: I have reviewed all pertinent imaging results/findings within the past 24 hours.

## 2025-01-02 VITALS
HEIGHT: 64 IN | TEMPERATURE: 98 F | BODY MASS INDEX: 33.16 KG/M2 | SYSTOLIC BLOOD PRESSURE: 95 MMHG | RESPIRATION RATE: 18 BRPM | OXYGEN SATURATION: 97 % | DIASTOLIC BLOOD PRESSURE: 51 MMHG | WEIGHT: 194.25 LBS | HEART RATE: 101 BPM

## 2025-01-02 PROBLEM — R41.82 ALTERED MENTAL STATUS: Status: ACTIVE | Noted: 2025-01-02

## 2025-01-02 PROCEDURE — 86580 TB INTRADERMAL TEST: CPT

## 2025-01-02 PROCEDURE — 30200315 PPD INTRADERMAL TEST REV CODE 302

## 2025-01-02 PROCEDURE — 51798 US URINE CAPACITY MEASURE: CPT

## 2025-01-02 PROCEDURE — 99239 HOSP IP/OBS DSCHRG MGMT >30: CPT | Mod: ,,,

## 2025-01-02 PROCEDURE — 97110 THERAPEUTIC EXERCISES: CPT | Mod: CO

## 2025-01-02 RX ORDER — NAPROXEN SODIUM 220 MG/1
81 TABLET, FILM COATED ORAL DAILY
Qty: 360 TABLET | Refills: 0 | Status: ON HOLD | OUTPATIENT
Start: 2025-01-03 | End: 2026-01-03

## 2025-01-02 RX ORDER — AMLODIPINE BESYLATE 10 MG/1
10 TABLET ORAL NIGHTLY
Qty: 90 TABLET | Refills: 3 | Status: ON HOLD | OUTPATIENT
Start: 2025-01-02 | End: 2026-01-02

## 2025-01-02 RX ORDER — ATORVASTATIN CALCIUM 80 MG/1
80 TABLET, FILM COATED ORAL DAILY
Qty: 90 TABLET | Refills: 3 | Status: ON HOLD | OUTPATIENT
Start: 2025-01-03 | End: 2026-01-03

## 2025-01-02 RX ORDER — METOPROLOL TARTRATE 25 MG/1
25 TABLET, FILM COATED ORAL 2 TIMES DAILY
Qty: 180 TABLET | Refills: 3 | Status: ON HOLD | OUTPATIENT
Start: 2025-01-02 | End: 2026-01-02

## 2025-01-02 RX ADMIN — TUBERCULIN PURIFIED PROTEIN DERIVATIVE 5 UNITS: 5 INJECTION, SOLUTION INTRADERMAL at 12:01

## 2025-01-02 NOTE — Clinical Note
Saray Stewart accompanied their grandmother to the emergency department on 1/2/2025. They may return to work on 01/03/2024.      If you have any questions or concerns, please don't hesitate to call.      PROMISE Forbes

## 2025-01-02 NOTE — Clinical Note
Diagnosis: Altered mental status [780.97.ICD-9-CM]   Future Attending Provider: MONTSE REAVES JR [[id number]]   Reason for IP Medical Treatment  (Clinical interventions that can only be accomplished in the IP setting? ) :: Patient requires further medical evaluation of syncope, altered mental status.   Special Needs:: No Special Needs [1]

## 2025-01-02 NOTE — PLAN OF CARE
[facility]  Discharge Final Note    Primary Care Provider: Eldon Govea MD    Expected Discharge Date: 1/2/2025    Final Discharge Note (most recent)       Final Note - 01/02/25 1338          Final Note    Assessment Type Final Discharge Note     Anticipated Discharge Disposition Skilled Nursing Facility     What phone number can be called within the next 1-3 days to see how you are doing after discharge? [phone]        Post-Acute Status    Post-Acute Authorization Placement     Post-Acute Placement Status Set-up Complete/Auth obtained     Coverage [facility] Medicare     Patient choice form signed by patient/caregiver List with quality metrics by geographic area provided;List from CMS Compare;List from System Post-Acute Care     Discharge Delays None known at this time                     Important Message from Medicare  Important Message from Medicare regarding Discharge Appeal Rights: Given to patient/caregiver, Explained to patient/caregiver, Signed/date by patient/caregiver     Date IMM was signed: 01/02/25  Time IMM was signed: 1030     Follow-up providers       Oscar Villafuerte ACNP   Specialty: Cardiology, Emergency Medicine    [address] [place] [id number]   Phone: [phone]       Next Steps: Schedule an appointment as soon as possible for a visit in 4 week(s)    Instructions: zio results  Please follow up on January 23 at 10:00 a.m              After-discharge care                Destination       [facility]   Service: Skilled Nursing    [address] [place], [place] [address]   Phone: [phone]                             Pt dc to [facility]. DC paperwork faxed. Tb skin test and tb s&s faxed. IM updated. No further needs noted

## 2025-01-02 NOTE — ED PROVIDER NOTES
Encounter Date: 1/2/2025    SCRIBE #1 NOTE: I, Tali Marine, am scribing for, and in the presence of,  Dustin Braxton MD.       History     Chief Complaint   Patient presents with    Loss of Consciousness     Patient presents to ED via [facility] from [facility] with c/o syncope.  Patient was just discharged around 1400 from [facility], when patient arrived at [facility], nursing staff reports that patient passed out and was unresponsive.  Upon arrival to ED, patient will open eyes spontaneously and respond to physical stimulation and voice.       This 73 y/o female pt presents to the ED via EMS with c/o LOC (S/P syncopal episode).  Pt was just discharged around 14:00 today from [facility]. When pt arrived at [facility], nursing staff reports pt passed out and was unresponsive. In the ED, pt's daughter reports the pt has had syncopal episodes in the past. Pt did urinate and defecated on herself in the ED. The daughter notes the pt does not usually urinate on herself or unable to control her edilson.  She has a known Mhx of Dementia, HTN, GERD, Stroke and Pace maker. There are no other complaints/pain in the ED at this time.            The history is provided by a relative and the EMS personnel. No  was used.     Review of patient's allergies indicates:  No Known Allergies  Past Medical History:   Diagnosis Date    Anemia     Anxiety     Dementia     Depression     GERD (gastroesophageal reflux disease)     Hyperlipidemia     Hypertension     PONV (postoperative nausea and vomiting)     Stroke      Past Surgical History:   Procedure Laterality Date    CARDIAC PACEMAKER PLACEMENT       Family History   Problem Relation Name Age of Onset    Hypertension Father       Social History     Tobacco Use    Smoking status: Never     Passive exposure: Never    Smokeless tobacco: Never   Substance Use Topics    Alcohol use: Not Currently    Drug use: Never     Review of Systems   Constitutional:  Negative for  chills, fatigue and fever.   Eyes:  Negative for pain.   Respiratory:  Negative for shortness of breath.    Cardiovascular:  Negative for chest pain.   Gastrointestinal:  Positive for diarrhea. Negative for nausea and vomiting.   Endocrine: Negative for polyuria.   Genitourinary:  Negative for difficulty urinating.   Allergic/Immunologic: Negative.    Neurological:  Positive for syncope.   Psychiatric/Behavioral:  Positive for confusion.    All other systems reviewed and are negative.      Physical Exam     Initial Vitals [01/02/25 1555]   BP Pulse Resp Temp SpO2   127/64 90 19 96.7 °F (35.9 °C) 100 %      MAP       --         Physical Exam    Nursing note and vitals reviewed.  HENT:   Head: Normocephalic and atraumatic. Mouth/Throat: Oropharynx is clear and moist.   Eyes: Pupils are equal, round, and reactive to light.   Neck: Neck supple.   Normal range of motion.  Cardiovascular:  Normal rate and regular rhythm.           Pulmonary/Chest: Effort normal and breath sounds normal.   Abdominal: Abdomen is soft. She exhibits no distension.   Musculoskeletal:         General: Normal range of motion.      Cervical back: Normal range of motion and neck supple.     Neurological:   Pt is confused and nonverbal and will open eyes and respond to physical stimulation and voice.   Skin: Skin is warm. Capillary refill takes less than 2 seconds.         ED Course   Procedures  Labs Reviewed - No data to display       Imaging Results    None          Medications - No data to display  Medical Decision Making  Risk  Decision regarding hospitalization.              Attending Attestation:           Physician Attestation for Scribe:  Physician Attestation Statement for Scribe #1: I, Dustin Braxton MD, reviewed documentation, as scribed by Tali Hawthorne in my presence, and it is both accurate and complete.             ED Course as of 01/02/25 1638   Thu Jan 02, 2025   1551 Medical decision-making:  Differential diagnosis includes syncope,  seizure, altered mental status.  Because patient was just discharged from hospital today I discussed case with hospitalist on-call and asked for them to see your and give their opinion. [BB]   5616 The hospitalist came by to evaluate patient and are going to admit. [BB]      ED Course User Index  [BB] Dustin Braxton MD                           Clinical Impression:  Final diagnoses:  [R41.82] Altered mental status  [R55] Syncope, unspecified syncope type (Primary)          ED Disposition Condition    Admit Stable                Dustin Braxton MD  01/02/25 6070

## 2025-01-02 NOTE — DISCHARGE SUMMARY
[facility] Hospital Medicine  Discharge Summary      Patient Name: Renetta Stewart  MRN: [id number]  SARAY: [id number]  Patient Class: IP- Inpatient  Admission Date: 12/20/2024  Hospital Length of Stay: 13 days  Discharge Date and Time:  01/02/2025 12:39 PM  Attending Physician: Malu Rivera MD   Discharging Provider: Malu Rivera MD  Primary Care Provider: Eldon Govea MD    Primary Care Team: Networked reference to record PCT     HPI:   Pt is a 74-year-old female who presented to her doctor's office today.  She states she had been feeling well just a lot of fatigue.  While she was at the doctor's office she had a syncopal episode with loss of bladder control.  Bystanders report loss of consciousness was less than 15 seconds.  There was no observed seizure-like activity.  Patient denies chest pain, shortness of breath, palpitations, feeling of doom prior to syncopal episode.  Patient reports she does not have pain or shortness of breath at present.. PMH HTN, HLD, CVA, GERD, and pacemaker    In the ED initial vital signs were blood pressure of 143/77, temp 96.9° heart rate 77, O2 sat 98% on room air who.  Initial lab workup revealed a sodium of 147 glucose 117 BUN 22 creatinine 1.35.  CBC is unremarkable.  ProBNP was 74.  Initial troponin 53.6 repeat troponin at 88.3 patient has positive delta. EKG show NSR Rate 77, no pacer spike no st elevation.     Patient will be admitted to hospital medicine service under the direct supervision of Dr KHALIL  for further evaluation and management.     * No surgery found *      Hospital Course:   12/28  - EGD resulted, esophagitis noted, per GI continue p.o. Protonix daily, dilatation not done as esophagitis found deferred to a later date, biopsy sent we will follow with results    12/29  - patient is ready for discharge however we are unable to reach daughter. Called daughter 3 times myself and nurse has called as well, will report to  if  nothing is heard from family today for abandonment   - results from biopsy will result in first of the week, these can be released to patient by primary    12/30  - patient's daughter in room today, miscommunication problem addressed, daughter is a very nice woman who has to give full time care to another family member.   - Discussed in depth that patient's condition and swing bed need. Daughter agrees she wants patient to go to swing bed so she can gain some of her independence back  - PT/OT to eval again today and social following for placement    12/31  - RRT called yesterday as patient became lightheaded and unresponsive after using the bathroom and ambulating to the bed, work up complete and like due to orthostatics vs vasovagal   - orthostatic vitals ordered however patient refusing to ambulate right now likely because when she does she has this occur  - will change hydralazine 3 times a day to amlodipine once a day  - try for orthostatic vitals when patient cooperates  - if positive will continue evaluating meds and try abdominal binder if needed    1/1  - refused medications yesterday, family trying to encourage patient  - unable to get orthos as patient will not participate, will continue to try  - monitoring blood pressure  - awaiting placement    1/2  - ready for discharge to swingbed  - type 2 mi due to demand ischemia: see cardiology outpatient, ziopatch placed, medications adjusted  - Acute gastritis and esophagitis: seen by GI with EGD recs Impression:  The patient is EGD biopsies revealed ulcerative reflux esophagitis, gastritis and a neuroendocrine tumor of the duodenal bulb. Recommendation:  Ppi therapy, consider referral to Dr. Brice at [facility] for possible EMR of duodenal bulb tumor after the patient's condition improves. CT of abdomen outpatient.   - Syncope: likely due to orthostatics, changed blood pressure medications, stopped hydralazine tid and start amlodipine 10 mg daily     Goals of Care  Treatment Preferences:  Code Status: Full Code      SDOH Screening:  The patient was screened for utility difficulties, food insecurity, transport difficulties, housing insecurity, and interpersonal safety and there were no concerns identified this admission.     Consults:   Consults (From admission, onward)          Status Ordering Provider     Inpatient consult to Social Work  Once        Provider:  (Not yet assigned)    Completed ENEDINA ESCALERA     Inpatient consult to Gastroenterology  Once        Provider:  Efraín Giron MD    Completed MERCEDES MARKS     Inpatient consult to Gastroenterology  Once        Provider:  Wilfred Lora MD    Completed THI VANEGAS     Inpatient consult to Registered Dietitian/Nutritionist  Once        Provider:  (Not yet assigned)    Completed CECI SIMON     Inpatient consult to Social Work/Case Management  Once        Provider:  (Not yet assigned)    Completed CECI SIMON     Inpatient consult to Cardiology  Once        Provider:  (Not yet assigned)    Completed CECI SIMON            * Esophagitis determined by endoscopy  12/28  - EGD resulted, esophagitis noted, per GI continue p.o. Protonix daily, dilatation not done as esophagitis found deferred to a later date, biopsy sent we will follow with results  - reviewed images, reports     12/29  - patient is ready for discharge however we are unable to reach daughter. Called daughter 3 times myself and nurse has called as well, will report to  if nothing is heard from family today for abandonment   - results from biopsy will result in first of the week, these can be released to patient by primary    1/2  Impression:  The patient is EGD biopsies revealed ulcerative reflux esophagitis, gastritis and a neuroendocrine tumor of the duodenal bulb.    Recommendation:  Ppi therapy, consider referral to Dr. Brice at [facility] for possible EMR of duodenal bulb tumor after the patient's condition  improves.    Syncope and collapse  Syncope vs seizure  Echo reveals nl systolic and diastolic dysfunction   Carotid US and EEG and Keppra level pending  Telemetry  Fall precautions  Sz precautions    12/23: MRI negative for new CVA but there was a prior CVA.   Patient with memory problems, acute on chronic.     12/25: Mathew per cardiology       12/31  - RRT called yesterday as patient became lightheaded and unresponsive after using the bathroom and ambulating to the bed, work up complete and like due to orthostatics vs vasovagal   - orthostatic vitals ordered however patient refusing to ambulate right now likely because when she does she has this occur  - will change hydralazine 3 times a day to amlodipine once a day  - try for orthostatic vitals when patient cooperates  - if positive will continue evaluating meds and try abdominal binder if needed    1/1  - refused medications yesterday, family trying to encourage patient  - unable to get orthos as patient will not participate, will continue to try  - monitoring blood pressure  - awaiting placement    Obesity  Body mass index is 35.02 kg/m². Morbid obesity complicates all aspects of disease management from diagnostic modalities to treatment. Weight loss encouraged and health benefits explained to patient.         Folate deficiency  replaced      Acute superficial gastritis without hemorrhage  Seen on EGD 12/27      Hypertensive urgency  Patient has a current diagnosis of hypertensive urgency (without evidence of end organ damage) which is uncontrolled.  Latest blood pressure and vitals reviewed-   Temp:  [96.1 °F (35.6 °C)-99 °F (37.2 °C)]   Pulse:  [62-81]   Resp:  [15-18]   BP: (128-204)/(70-97)   SpO2:  [96 %-100 %] .   Patient currently off IV antihypertensives.   Home meds for hypertension were reviewed and noted below.   Hypertension Medications               furosemide (LASIX) 20 MG tablet Take 1 tablet (20 mg total) by mouth once daily.    hydrALAZINE  (APRESOLINE) 100 MG tablet Take 1 tablet (100 mg total) by mouth 3 (three) times daily.    valsartan (DIOVAN) 320 MG tablet Take 1 tablet (320 mg total) by mouth once daily.            Medication adjustment for hospital antihypertensives is as follows-   Adding back home medications over time.  Patient was not able to swallow but able to swallow now.     Will aim for controlled BP reduction by medications noted above. Monitor and mitigate end organ damage as indicated.    Positive fecal occult blood test  GI consulted  Outpt endoscopy recommended for now  Monitor for blood loss     12/27: plan for EGD today.       Type 2 MI (myocardial infarction)  Pt has Positve Delta, no chest pain, syncopal Episode,  Orhtostatics positive in ED, given pts AGE and risk factors will treat as NSTEMI.   Patient presents with NSTEMI. Chest pain is currently controlled. VIKTOR score is 3. Patient is currently on NSTEMI Pathway.    EKG reviewed. Troponins reviewed and results noted-   Troponin 53, > >88     Lipid panel reviewed and shows-     Lab Results   Component Value Date    LDLCALC 119 12/21/2024     Lab Results   Component Value Date    TRIG 182 (H) 12/21/2024         Medical management includes;  ASA,Beta Blocker, High Intensity Stain, and ACE/ARB Echo has not been performed. Latest ECHO results are as follows- Results for orders placed during the hospital encounter of 03/30/23    Echo    Interpretation Summary  · The left ventricle is normal in size with severe concentric hypertrophy and hyperdynamic systolic function.  · The estimated ejection fraction is 70%.  · Mild tricuspid regurgitation.  · Left ventricular mild outflow tract obstruction is present.  · Normal right ventricular size with normal right ventricular systolic function.  · Normal central venous pressure (3 mmHg).  · The estimated PA systolic pressure is 43 mmHg.  · ECHO is suggestive of hypertensive heart disease vs. hypertrophic cardiomyopathy  .   Cardiology  following  Lexiscan recommended once encephalopathy resolves     12/23: lexiscan today.   12/25: lexiscan negative per cardiology     Dysphagia  Dysphagia noted.  Patient with history of esophageal stricture s/p EGD with dilation.   GI consulted.   Patient also with FOBT+ so this may be investigated as well.     12/25: significant dysphagia currently and over the last few weeks.  Family feels she had dysphagia and this caused her to pass out at home.     12/26: Plan for EGD today.     12/27: EGD cancelled yesterday due to inability to obtain consent.  Consent obtained now.  I spoke with patient's daughter (Jacque Stewart).      12/28: EGD complete, esophagitis noted, dilatation to be considered at a later date      Gastroesophageal reflux disease  Continue home protonix      Hyperlipidemia  Continue statin      Essential hypertension  Patient's blood pressure range in the last 24 hours was: BP  Min: 96/68  Max: 153/72.The patient's inpatient anti-hypertensive regimen is listed below:  Current Antihypertensives  furosemide tablet 20 mg, Daily, Oral  hydrALAZINE tablet 100 mg, 3 times daily, Oral  valsartan tablet 320 mg, Daily, Oral  metoprolol tartrate (LOPRESSOR) tablet 25 mg, 2 times daily, Oral  nitroGLYCERIN SL tablet 0.4 mg, Every 5 min PRN, Sublingual    Plan  - BP is uncontrolled, will adjust as follows: Pt BP decresed when standing and hR increase + orhthos  - Will hold hydralazine and lasix for now    Depression  Patient has persistent depression which is unknown and is currently controlled. Will Continue anti-depressant medications. We will not consult psychiatry at this time. Patient does not display psychosis at this time. Continue to monitor closely and adjust plan of care as needed.          Final Active Diagnoses:    Diagnosis Date Noted POA    PRINCIPAL PROBLEM:  Esophagitis determined by endoscopy [K20.90] 12/27/2024 Yes    Syncope and collapse [R55] 12/20/2024 Yes    Neuroendocrine tumor [D3A.8]  12/30/2024 Yes    Obesity [E66.9] 12/28/2024 Yes    Acute superficial gastritis without hemorrhage [K29.00] 12/27/2024 Yes    Polyp of duodenum [K31.7] 12/27/2024 Yes    Folate deficiency [E53.8] 12/27/2024 Yes    Hypertensive urgency [I16.0] 12/26/2024 Yes    Blood in stool [K92.1] 12/25/2024 Yes    Acute blood loss anemia [D62] 12/25/2024 Yes    Positive fecal occult blood test [R19.5] 12/22/2024 Yes    Elevated troponin [R79.89] 12/21/2024 Yes    Syncope [R55] 12/21/2024 Yes    Type 2 MI (myocardial infarction) [I21.A1] 12/20/2024 Yes    Dysphagia [R13.10] 03/06/2023 Yes    Gastroesophageal reflux disease [K21.9] 02/16/2023 Yes    Hyperlipidemia [E78.5] 07/02/2021 Yes    Depression [F32.A] 05/27/2021 Yes    Essential hypertension [I10] 05/27/2021 Yes      Problems Resolved During this Admission:    Diagnosis Date Noted Date Resolved POA    GI bleed [K92.2] 12/22/2024 12/22/2024 Unknown       Discharged Condition: stable    Disposition: Skilled Nursing Facility    Follow Up:   Contact information for follow-up providers       Oscar Villafuerte ACNP. Schedule an appointment as soon as possible for a visit in 4 week(s).    Specialties: Cardiology, Emergency Medicine  Why: zio results  Contact information:  [address] [place] [address]  [phone]                       Contact information for after-discharge care       Destination       [facility] .    Service: Skilled Nursing  Contact information:  [address] [place] [address]  [phone]                                 Patient Instructions:      Diet Cardiac     Notify your health care provider if you experience any of the following:  temperature >100.4     Notify your health care provider if you experience any of the following:  persistent nausea and vomiting or diarrhea     Notify your health care provider if you experience any of the following:  severe persistent headache     Notify your health care provider if you  experience any of the following:  persistent dizziness, light-headedness, or visual disturbances     Notify your health care provider if you experience any of the following:  increased confusion or weakness     Activity as tolerated       Significant Diagnostic Studies: N/A    Pending Diagnostic Studies:       Procedure Component Value Units Date/Time    EKG 12-lead [[id number]]     Order Status: Sent Lab Status: No result     EKG 12-lead [[id number]]     Order Status: Sent Lab Status: No result            Medications:  Reconciled Home Medications:      Medication List        START taking these medications      amLODIPine 10 MG tablet  Commonly known as: NORVASC  Take 1 tablet (10 mg total) by mouth every evening.     aspirin 81 MG Chew  Take 1 tablet (81 mg total) by mouth once daily.  Start taking on: January 3, 2025     atorvastatin 80 MG tablet  Commonly known as: LIPITOR  Take 1 tablet (80 mg total) by mouth once daily.  Start taking on: January 3, 2025     metoprolol tartrate 25 MG tablet  Commonly known as: LOPRESSOR  Take 1 tablet (25 mg total) by mouth 2 (two) times daily.            CONTINUE taking these medications      esomeprazole 40 MG capsule  Commonly known as: NEXIUM  Take 1 capsule (40 mg total) by mouth before breakfast.     furosemide 20 MG tablet  Commonly known as: LASIX  Take 1 tablet (20 mg total) by mouth once daily.     levothyroxine 50 MCG tablet  Commonly known as: SYNTHROID  Take 1 tablet (50 mcg total) by mouth before breakfast.     megestroL 400 mg/10 mL (40 mg/mL) Susp  Commonly known as: MEGACE  Take 10 mLs (400 mg total) by mouth 2 (two) times daily.     memantine 10 MG Tab  Commonly known as: NAMENDA  Take 1 tablet (10 mg total) by mouth 2 (two) times daily.     oxyCODONE-acetaminophen 7.5-325 mg per tablet  Commonly known as: PERCOCET  Take 1 tablet by mouth.     pregabalin 75 MG capsule  Commonly known as: LYRICA  Take 75 mg by mouth every evening.     QUEtiapine 25 MG  Tab  Commonly known as: SEROQUEL  Take 25 mg by mouth every evening.     sertraline 50 MG tablet  Commonly known as: ZOLOFT  Take 1 tablet (50 mg total) by mouth every evening.     valsartan 320 MG tablet  Commonly known as: DIOVAN  Take 1 tablet (320 mg total) by mouth once daily.            STOP taking these medications      hydrALAZINE 100 MG tablet  Commonly known as: APRESOLINE     mirtazapine 15 MG tablet  Commonly known as: REMERON              Indwelling Lines/Drains at time of discharge:   Lines/Drains/Airways       None                   Time spent on the discharge of patient: 50 minutes         Malu Rivera MD  Department of Hospital Medicine  [facility]

## 2025-01-02 NOTE — PLAN OF CARE
SS spoke with Martita at [facility], pt is accepted for today. Pt will need tb skin test, tb s&s. SS notified Ayah SHAFFER RN, and PROMISE Lewis. SS faxed chest x-ray. Packet started. Ss following

## 2025-01-02 NOTE — Clinical Note
Rick Stewart accompanied their grandmother to the emergency department on 1/2/2025. They may return to work on 01/03/2024.      If you have any questions or concerns, please don't hesitate to call.      PROMISE Forbes

## 2025-01-02 NOTE — PT/OT/SLP PROGRESS
Occupational Therapy   Treatment    Name: Renetta Stewart  MRN: [id number]  Admitting Diagnosis:  Esophagitis determined by endoscopy       Recommendations:     Discharge Recommendations: Moderate Intensity Therapy  Discharge Equipment Recommendations:  to be determined by next level of care  Barriers to discharge:       Assessment:     Renetta Stewart is a 74 y.o. female with a medical diagnosis of Esophagitis determined by endoscopy.  She presents with the following Performance deficits affecting function are weakness, decreased upper extremity function, impaired endurance, impaired balance, decreased safety awareness, impaired self care skills, impaired functional mobility, decreased coordination.   Patient tolerated treatment poorly. Patient was agreeable to OT tx but was very lethargy upon STANFORD arrival with difficulty opening eyes and following commands. Patient required MAX encouragement throughout tx. Patient is suppose to d/c to [facility] today per RN.   Rehab Prognosis:  Fair and Poor; patient would benefit from acute skilled OT services to address these deficits and reach maximum level of function.       Plan:     Patient to be seen 5 x/week to address the above listed problems via self-care/home management, therapeutic activities, therapeutic exercises  Plan of Care Expires: 01/21/25  Plan of Care Reviewed with: patient    Subjective   Patient was laying supine in bed upon STANFORD arrival. Patient with difficulty arousing, following commands and opening eyes to complete exercises required MAX encouragement to participate in exercises.   Patient/Family Comments/goals: patient agreeable to OT tx.  Pain/Comfort:  Pain Rating 1: 0/10    Objective:     Communicated with: Debbie VIRGEN prior to session.  Patient found HOB elevated with peripheral IV, telemetry upon OT entry to room.    General Precautions: Standard, fall    Orthopedic Precautions:N/A  Braces: N/A  Respiratory Status: Room air    Therapeutic  exercises:  Patient completed the following AAROM exercises on right and left upper extremities  to work on ROM/strengthening in preparation to complete of bed mobility, ADLs and transfers:     -Elbow flexion/extension: 2 sets of 10   -Shoulder flexion: 2 sets of 10   -Chest press: 2 sets of 10   -Internal Rotation/External Rotation: 2 sets of 10    Increased time/effort needed to complete each exercise.       Patient left HOB elevated with call button in reach and RN notified    GOALS:   Multidisciplinary Problems       Occupational Therapy Goals          Problem: Occupational Therapy    Goal Priority Disciplines Outcome Interventions   Occupational Therapy Goal     OT, PT/OT Progressing    Description: STG:  Pt will perform grooming with setup  Pt will bathe with Solitario with setup at EOB  Pt will perform UE dressing with Solitario  Pt will perform LE dressing with Solitario  Pt will sit EOB x 10 min with SBA   Pt will transfer bed/chair/bsc with CGA with RW  Pt will perform standing task x 2 min with CGA with RW   Pt will tolerate 15 minutes of tx without fatigue      LT.Restore to max I with self care and mobility.                           Time Tracking:     OT Date of Treatment: 25  OT Start Time: 1114  OT Stop Time: 1129  OT Total Time (min): 15 min    Billable Minutes:Therapeutic Exercise 15 minutes    OT/YOLANDA: YOLANDA     Number of YOLANDA visits since last OT visit: 1    YOLANDA Thomas  2025  11:43 AM

## 2025-01-02 NOTE — ASSESSMENT & PLAN NOTE
12/28  - EGD resulted, esophagitis noted, per GI continue p.o. Protonix daily, dilatation not done as esophagitis found deferred to a later date, biopsy sent we will follow with results  - reviewed images, reports     12/29  - patient is ready for discharge however we are unable to reach daughter. Called daughter 3 times myself and nurse has called as well, will report to  if nothing is heard from family today for abandonment   - results from biopsy will result in first of the week, these can be released to patient by primary    1/2  Impression:  The patient is EGD biopsies revealed ulcerative reflux esophagitis, gastritis and a neuroendocrine tumor of the duodenal bulb.    Recommendation:  Ppi therapy, consider referral to Dr. Brice at [facility] for possible EMR of duodenal bulb tumor after the patient's condition improves.

## 2025-01-02 NOTE — LETTER
January 15, 2025         [address] [place] [address] [place] [address]  Phone: [phone]  Fax: [phone]       Date of Visit: 01/15/2025    To Whom It May Concern:    Saray Stewart was at [facility] on 01/15/2025. The patient may return to work/school on 1/16/2025. If you have any questions or concerns, or if I can be of further assistance, please do not hesitate to contact me.    Sincerely,    Susan Barakat RN

## 2025-01-03 PROBLEM — K52.9 COLITIS: Status: ACTIVE | Noted: 2025-01-03

## 2025-01-03 NOTE — HOSPITAL COURSE
1/3  - lactic acid repeated last night and increased to 4 after some fluids, given another fluid bolus. Multiple attempts today to draw labs however patient completely refuses and will not let anyone draw even at incidence of daughter  - Got a CT abdomen that shows colitis otherwise unremarkable, abdomen is soft and nondistended, no warning signs of bowel ischemia or critical pathology  - patient is also refusing to eat or drink anything and when trying to feed she will immediately spit it out, daughter also unable to get her to eat   - patient refusing meds as well  - patient is alert and oriented and will participate with exam some of the time. At this time patient has capacity to make her decisions and refuse work up.   - will keep her on abx, will start flagyl to cover for any anaerobic infections  - ordered EEG as she seemed post ictal this morning and not responding verbally as she did this afternoon  - monitoring    1/4  - worsening mentation today and very lethargic  - overnight only 40 ml of urine made and increase in cr today from 1.8 to 3.8, ct ab/pelvis yesterday with no hydronephrosis  - worsening lactic acidosis as well  - discussed with nephrology, will increase fluids to 125 cc  - trend lactic acid q4 hours for now  - full work up so far has been benign including ABG, imaging, chemistries, EEG  - ordered BNP and will assess cardiac function as well    1/5  - overnight lactic acid worsened despite ample fluids being given along renal function and mentation  - discussed with critical doctor for transfer to ICU for closer monitoring  - nephrology following as well  - up date of care to this point. Patient was discharged on 1/2 to [facility] however on arrival had additional syncopal episode and readmitted here same day. The prior 10 days stay patient was seen by GI and EGD done 12/27 with biopsies of the stomach with ulcerative reflux esophagitis, gastritis and a neuroendocrine tumor of the duodenal  bulb to be addressed outpatient. Patient also has esophageal narrowing that would be addressed outpatient.

## 2025-01-03 NOTE — ASSESSMENT & PLAN NOTE
Seen on CT, possible source of lactic acidosis  - unable to trend LA as patient refuses blood draws  - given 2 L NS  - on rocephin, adding flagyl  - monitor

## 2025-01-03 NOTE — ASSESSMENT & PLAN NOTE
Creatine stable for now. BMP reviewed- noted Estimated Creatinine Clearance: 30.6 mL/min (A) (based on SCr of 1.73 mg/dL (H)). according to latest data. Based on current GFR, CKD stage is stage 3 - GFR 30-59.  Monitor UOP and serial BMP and adjust therapy as needed. Renally dose meds. Avoid nephrotoxic medications and procedures.

## 2025-01-03 NOTE — SUBJECTIVE & OBJECTIVE
Past Medical History:   Diagnosis Date    Anemia     Anxiety     Dementia     Depression     GERD (gastroesophageal reflux disease)     Hyperlipidemia     Hypertension     PONV (postoperative nausea and vomiting)     Stroke        Past Surgical History:   Procedure Laterality Date    CARDIAC PACEMAKER PLACEMENT         Review of patient's allergies indicates:  No Known Allergies    Current Facility-Administered Medications on File Prior to Encounter   Medication    [COMPLETED] tuberculin injection 5 Units    [DISCONTINUED] acetaminophen tablet 650 mg    [DISCONTINUED] acetaminophen tablet 650 mg    [DISCONTINUED] amLODIPine tablet 10 mg    [DISCONTINUED] aspirin chewable tablet 81 mg    [DISCONTINUED] atorvastatin tablet 80 mg    [DISCONTINUED] dextrose 50% injection 12.5 g    [DISCONTINUED] dextrose 50% injection 25 g    [DISCONTINUED] enoxaparin injection 40 mg    [DISCONTINUED] folic acid-vit B6-vit B12 2.5-25-2 mg tablet 1 tablet    [DISCONTINUED] furosemide tablet 40 mg    [DISCONTINUED] glucagon (human recombinant) injection 1 mg    [DISCONTINUED] glucose chewable tablet 16 g    [DISCONTINUED] glucose chewable tablet 24 g    [DISCONTINUED] HYDROcodone-acetaminophen 5-325 mg per tablet 1 tablet    [DISCONTINUED] levothyroxine tablet 50 mcg    [DISCONTINUED] melatonin tablet 6 mg    [DISCONTINUED] metoprolol tartrate (LOPRESSOR) tablet 25 mg    [DISCONTINUED] naloxone 0.4 mg/mL injection 0.02 mg    [DISCONTINUED] nitroGLYCERIN SL tablet 0.4 mg    [DISCONTINUED] ondansetron injection 4 mg    [DISCONTINUED] pantoprazole EC tablet 40 mg    [DISCONTINUED] polyethylene glycol packet 17 g    [DISCONTINUED] potassium bicarbonate disintegrating tablet 40 mEq    [DISCONTINUED] senna-docusate 8.6-50 mg per tablet 1 tablet    [DISCONTINUED] sodium chloride 0.9% flush 10 mL    [DISCONTINUED] valsartan tablet 40 mg     Current Outpatient Medications on File Prior to Encounter   Medication Sig    esomeprazole (NEXIUM) 40 MG  capsule Take 1 capsule (40 mg total) by mouth before breakfast.    furosemide (LASIX) 20 MG tablet Take 1 tablet (20 mg total) by mouth once daily.    levothyroxine (SYNTHROID) 50 MCG tablet Take 1 tablet (50 mcg total) by mouth before breakfast.    memantine (NAMENDA) 10 MG Tab Take 1 tablet (10 mg total) by mouth 2 (two) times daily.    oxyCODONE-acetaminophen (PERCOCET) 7.5-325 mg per tablet Take 1 tablet by mouth.    pregabalin (LYRICA) 75 MG capsule Take 75 mg by mouth every evening.    sertraline (ZOLOFT) 50 MG tablet Take 1 tablet (50 mg total) by mouth every evening.    valsartan (DIOVAN) 320 MG tablet Take 1 tablet (320 mg total) by mouth once daily.    amLODIPine (NORVASC) 10 MG tablet Take 1 tablet (10 mg total) by mouth every evening.    [START ON 1/3/2025] aspirin 81 MG Chew Take 1 tablet (81 mg total) by mouth once daily.    [START ON 1/3/2025] atorvastatin (LIPITOR) 80 MG tablet Take 1 tablet (80 mg total) by mouth once daily.    megestroL (MEGACE) 400 mg/10 mL (40 mg/mL) Susp Take 10 mLs (400 mg total) by mouth 2 (two) times daily.    metoprolol tartrate (LOPRESSOR) 25 MG tablet Take 1 tablet (25 mg total) by mouth 2 (two) times daily.    QUEtiapine (SEROQUEL) 25 MG Tab Take 25 mg by mouth every evening.    [DISCONTINUED] hydrALAZINE (APRESOLINE) 100 MG tablet Take 1 tablet (100 mg total) by mouth 3 (three) times daily.    [DISCONTINUED] mirtazapine (REMERON) 15 MG tablet Take 1 tablet (15 mg total) by mouth every evening.     Family History       Problem Relation (Age of Onset)    Hypertension Father          Tobacco Use    Smoking status: Never     Passive exposure: Never    Smokeless tobacco: Never   Substance and Sexual Activity    Alcohol use: Not Currently    Drug use: Never    Sexual activity: Not Currently     Review of Systems   Unable to perform ROS: Mental status change     Objective:     Vital Signs (Most Recent):  Temp: 97.8 °F (36.6 °C) (01/02/25 2030)  Pulse: 77 (01/02/25 2030)  Resp:  "14 (01/02/25 2030)  BP: 139/84 (01/02/25 2030)  SpO2: 100 % (01/02/25 2030) Vital Signs (24h Range):  Temp:  [96.6 °F (35.9 °C)-99 °F (37.2 °C)] 97.8 °F (36.6 °C)  Pulse:  [] 77  Resp:  [14-19] 14  SpO2:  [96 %-100 %] 100 %  BP: ()/(51-84) 139/84     Weight: 88 kg (194 lb)  Body mass index is 33.3 kg/m².     Physical Exam  Vitals reviewed.   Constitutional:       General: She is not in acute distress.     Appearance: She is ill-appearing.      Comments: Lethargic but responsive   HENT:      Head: Normocephalic and atraumatic.      Right Ear: External ear normal.      Left Ear: External ear normal.      Nose: Nose normal.   Eyes:      General: No scleral icterus.     Extraocular Movements: Extraocular movements intact.   Cardiovascular:      Rate and Rhythm: Normal rate. Rhythm irregular.   Pulmonary:      Effort: Pulmonary effort is normal.      Breath sounds: Normal breath sounds.   Abdominal:      General: Bowel sounds are normal. There is no distension.      Palpations: Abdomen is soft.      Tenderness: There is no abdominal tenderness.   Musculoskeletal:      Right lower leg: No edema.      Left lower leg: No edema.   Skin:     General: Skin is warm and dry.      Capillary Refill: Capillary refill takes less than 2 seconds.      Comments: Skin turgor diminished   Neurological:      Comments: Patient minimally responds to voice alone however moves all extremities in response to touch.  Squeezes hands on command bilaterally with good  strength.  She grunts but makes no real attempt to speak.  She squeezes eyes to avoid eye exam however when forced it is noted that extraocular movements are intact no facial droop.                Significant Labs: All pertinent labs within the past 24 hours have been reviewed.  BMP: No results for input(s): "GLU", "NA", "K", "CL", "CO2", "BUN", "CREATININE", "CALCIUM", "MG" in the last 48 hours.  CBC:   Recent Labs   Lab 12/31/24  2135 01/01/25  1437   WBC 6.02 5.56 "   HGB 11.2* 11.1*   HCT 35.6* 34.4*   * 117*       Significant Imaging: I have reviewed all pertinent imaging results/findings within the past 24 hours.

## 2025-01-03 NOTE — ASSESSMENT & PLAN NOTE
Patient's blood pressure range in the last 24 hours was: BP  Min: 90/61  Max: 139/84.The patient's inpatient anti-hypertensive regimen is listed below:  Current Antihypertensives       Plan  - Hold antihypertensives meds for now due to hypotension.

## 2025-01-03 NOTE — HPI
History as above.  Patient is a 74-year-old  female just released from [facility] after extensive workup and 10 day hospital stay for syncope and collapse.  It was felt the patient may be having orthostatic hypotension.  She was discharged to swing bed today but on presentation had an additional syncope and collapse episode.  However she has not come back to her baseline mental status at this time.  She will not talk but she moves all extremities and follows directions.  It was noted that there may be some urinary and fecal incontinence that occurred in the emergency department.  She will be readmitted to [facility] for further investigation

## 2025-01-03 NOTE — PROGRESS NOTES
[facility] - [address] [place] [address] Medicine  Progress Note    Patient Name: Renetta Stewart  MRN: [id number]  Patient Class: IP- Inpatient   Admission Date: 1/2/2025  Length of Stay: 1 days  Attending Physician: Malu Rivera MD  Primary Care Provider: Eldon Govea MD        Subjective     Principal Problem:Altered mental status        HPI:  History as above.  Patient is a 74-year-old  female just released from [facility] after extensive workup and 10 day hospital stay for syncope and collapse.  It was felt the patient may be having orthostatic hypotension.  She was discharged to swing bed today but on presentation had an additional syncope and collapse episode.  However she has not come back to her baseline mental status at this time.  She will not talk but she moves all extremities and follows directions.  It was noted that there may be some urinary and fecal incontinence that occurred in the emergency department.  She will be readmitted to [facility] for further investigation    Overview/Hospital Course:  1/3  - lactic acid repeated last night and increased to 4 after some fluids, given another fluid bolus. Multiple attempts today to draw labs however patient completely refuses and will not let anyone draw even at incidence of daughter  - Got a CT abdomen that shows colitis otherwise unremarkable, abdomen is soft and nondistended, no warning signs of bowel ischemia or critical pathology  - patient is also refusing to eat or drink anything and when trying to feed she will immediately spit it out, daughter also unable to get her to eat   - patient refusing meds as well  - patient is alert and oriented and will participate with exam some of the time. At this time patient has capacity to make her decisions and refuse work up.   - will keep her on abx, will start flagyl to cover for any anaerobic infections  - ordered EEG as she seemed post ictal this morning and not responding verbally  as she did this afternoon  - monitoring    Interval History:     Review of Systems   All other systems reviewed and are negative.    Objective:     Vital Signs (Most Recent):  Temp: 97.4 °F (36.3 °C) (01/03/25 1154)  Pulse: 93 (01/03/25 1154)  Resp: 16 (01/03/25 1154)  BP: (!) 149/89 (01/03/25 1154)  SpO2: 97 % (01/03/25 1154) Vital Signs (24h Range):  Temp:  [96.7 °F (35.9 °C)-98.2 °F (36.8 °C)] 97.4 °F (36.3 °C)  Pulse:  [77-95] 93  Resp:  [14-19] 16  SpO2:  [96 %-100 %] 97 %  BP: (120-175)/() 149/89     Weight: 88 kg (194 lb)  Body mass index is 33.3 kg/m².  No intake or output data in the 24 hours ending 01/03/25 1528        Physical Exam  Vitals and nursing note reviewed.   Constitutional:       Appearance: She is obese. She is ill-appearing.   HENT:      Head: Normocephalic.      Mouth/Throat:      Mouth: Mucous membranes are moist.   Eyes:      Extraocular Movements: Extraocular movements intact.   Cardiovascular:      Rate and Rhythm: Normal rate and regular rhythm.   Pulmonary:      Effort: Pulmonary effort is normal. No respiratory distress.      Breath sounds: Normal breath sounds.   Abdominal:      General: Abdomen is flat. Bowel sounds are normal. There is no distension.      Palpations: Abdomen is soft.   Musculoskeletal:         General: No swelling or tenderness. Normal range of motion.      Cervical back: Normal range of motion and neck supple. No tenderness.   Skin:     General: Skin is warm and dry.   Neurological:      General: No focal deficit present.      Mental Status: She is alert.      Comments: Nonverbal and slow to respond this morning on exam, however this afternoon she was at her baseline   Psychiatric:         Mood and Affect: Mood normal.             Significant Labs: All pertinent labs within the past 24 hours have been reviewed.      Significant Imaging: I have reviewed all pertinent imaging results/findings within the past 24 hours.    Assessment and Plan     * Altered mental  status    Extensive work up for syncope over  previous hospital stay.  BP a bit low and patient appears dry.  Will bolus with normal saline and re-asses mental status. Plan EEG, repeat CT head (though multiple imaging studies obtained last few days).  Performance on exam but almost seems purposeful. Holding all sedating meds overnight.     Esophagitis determined by endoscopy  Noted on eeg at last admission (5 days ago)      Syncope and collapse    Continue evalution as above.  IVF for IVVD, EEG, CT head.     Colitis    Seen on CT, possible source of lactic acidosis  - unable to trend LA as patient refuses blood draws  - given 2 L NS  - on rocephin, adding flagyl  - monitor    Neuroendocrine tumor  See Egd notes, will work up outpatient      Stage 3b chronic kidney disease  Creatine stable for now. BMP reviewed- noted Estimated Creatinine Clearance: 30.6 mL/min (A) (based on SCr of 1.73 mg/dL (H)). according to latest data. Based on current GFR, CKD stage is stage 3 - GFR 30-59.  Monitor UOP and serial BMP and adjust therapy as needed. Renally dose meds. Avoid nephrotoxic medications and procedures.    Essential hypertension  Patient's blood pressure range in the last 24 hours was: BP  Min: 120/67  Max: 175/99.The patient's inpatient anti-hypertensive regimen is listed below:  Current Antihypertensives  amLODIPine tablet 10 mg, Daily, Oral  metoprolol tartrate (LOPRESSOR) split tablet 12.5 mg, 2 times daily, Oral    Plan  - Hold antihypertensives meds for now due to hypotension.     1/3  - hypertensive this morning, will restart home meds      VTE Risk Mitigation (From admission, onward)           Ordered     IP VTE HIGH RISK PATIENT  Once         01/02/25 2050     Place sequential compression device  Until discontinued         01/02/25 2050                    Discharge Planning   GERALDO:      Code Status: Full Code   Medical Readiness for Discharge Date:   Discharge Plan A: Home with family                        Malu  MD Miguel  Department of Hospital Medicine   [facility] - [address] [place] [address]

## 2025-01-03 NOTE — PLAN OF CARE
Problem: Adult Inpatient Plan of Care  Goal: Plan of Care Review  Outcome: Progressing  Flowsheets (Taken 1/2/2025 2325)  Plan of Care Reviewed With: patient  Goal: Optimal Comfort and Wellbeing  Outcome: Progressing  Intervention: Monitor Pain and Promote Comfort  Flowsheets (Taken 1/2/2025 2325)  Pain Management Interventions:   pain management plan reviewed with patient/caregiver   quiet environment facilitated   pillow support provided   position adjusted   relaxation techniques promoted  Intervention: Provide Person-Centered Care  Flowsheets (Taken 1/2/2025 2325)  Trust Relationship/Rapport:   care explained   choices provided   emotional support provided   empathic listening provided   questions answered   thoughts/feelings acknowledged   reassurance provided   questions encouraged     Problem: Skin Injury Risk Increased  Goal: Skin Health and Integrity  Outcome: Progressing  Intervention: Optimize Skin Protection  Flowsheets (Taken 1/2/2025 2325)  Pressure Reduction Techniques:   frequent weight shift encouraged   heels elevated off bed   positioned off wounds   pressure points protected   weight shift assistance provided   rest period provided between sit times  Pressure Reduction Devices: positioning supports utilized  Skin Protection:   incontinence pads utilized   transparent dressing maintained  Head of Bed (HOB) Positioning: HOB elevated  Intervention: Promote and Optimize Oral Intake  Flowsheets (Taken 1/2/2025 2325)  Oral Nutrition Promotion:   physical activity promoted   adaptive equipment use encouraged   social interaction promoted   rest periods promoted

## 2025-01-03 NOTE — NURSING
"In room with son as he talk to his mother she appears to be aware of her surroundings as when she opened her eyes to see who was talking to her she looked and me and he son stated that "its me momma" she turned to him and nodded. Son inquired about other options for pt to get nutrition such as NG tube or PEG tube and pt immediatly shook her head  NO in response to both. It was explained to the son that these are the questions that he would need to ask the HCP if these are options. Pt appeared to go off into a deep sleep , snoring but is easily aroused when her son calls her name again. Left pt and son in room with pt sitting up in bed with her eyes open. Will continue to monitor.  "

## 2025-01-03 NOTE — PROGRESS NOTES
[facility] - [facility] Tele[facility]  Wound Care    Patient Name:  Renetta Stewart   MRN:  [id number]  Date: 1/3/2025  Diagnosis: Altered mental status    History:     Past Medical History:   Diagnosis Date    Anemia     Anxiety     Dementia     Depression     GERD (gastroesophageal reflux disease)     Hyperlipidemia     Hypertension     PONV (postoperative nausea and vomiting)     Stroke        Social History     Socioeconomic History    Marital status: Single   Tobacco Use    Smoking status: Never     Passive exposure: Never    Smokeless tobacco: Never   Substance and Sexual Activity    Alcohol use: Not Currently    Drug use: Never    Sexual activity: Not Currently     Social Drivers of Health     Financial Resource Strain: Low Risk  (1/3/2025)    Overall Financial Resource Strain (CARDIA)     Difficulty of Paying Living Expenses: Not hard at all   Food Insecurity: No Food Insecurity (1/3/2025)    Hunger Vital Sign     Worried About Running Out of Food in the Last Year: Never true     Ran Out of Food in the Last Year: Never true   Transportation Needs: No Transportation Needs (1/3/2025)    TRANSPORTATION NEEDS     Transportation : No   Physical Activity: Inactive (1/3/2025)    Exercise Vital Sign     Days of Exercise per Week: 0 days     Minutes of Exercise per Session: 0 min   Stress: No Stress Concern Present (1/3/2025)    [employer] - Occupational Stress Questionnaire     Feeling of Stress : Not at all   Housing Stability: Low Risk  (1/3/2025)    Housing Stability Vital Sign     Unable to Pay for Housing in the Last Year: No     Homeless in the Last Year: No       Precautions:     Allergies as of 01/02/2025    (No Known Allergies)       WOC Assessment Details/Treatment   Narrative: Seen patient for initial preventative skin care measures.  Foam borders noted to bilateral heels and sacral. No redness or open wounds noted.  Consult wound care of any findings.      01/03/2025

## 2025-01-03 NOTE — H&P
[facility] - [address] [place] [address] Medicine  History & Physical    Patient Name: Renetta Stewart  MRN: [id number]  Patient Class: IP- Inpatient  Admission Date: 1/2/2025  Attending Physician: Venkat Pryor Jr., MD   Primary Care Provider: Eldon Govea MD         Patient information was obtained from patient and ER records.     Subjective:     Principal Problem:Altered mental status    Chief Complaint:   Chief Complaint   Patient presents with    Loss of Consciousness     Patient presents to ED via [facility] from [facility] with c/o syncope.  Patient was just discharged around 1400 from [facility], when patient arrived at [facility], nursing staff reports that patient passed out and was unresponsive.  Upon arrival to ED, patient will open eyes spontaneously and respond to physical stimulation and voice.          HPI: History as above.  Patient is a 74-year-old  female just released from [facility] after extensive workup and 10 day hospital stay for syncope and collapse.  It was felt the patient may be having orthostatic hypotension.  She was discharged to swing bed today but on presentation had an additional syncope and collapse episode.  However she has not come back to her baseline mental status at this time.  She will not talk but she moves all extremities and follows directions.  It was noted that there may be some urinary and fecal incontinence that occurred in the emergency department.  She will be readmitted to [facility] for further investigation    Past Medical History:   Diagnosis Date    Anemia     Anxiety     Dementia     Depression     GERD (gastroesophageal reflux disease)     Hyperlipidemia     Hypertension     PONV (postoperative nausea and vomiting)     Stroke        Past Surgical History:   Procedure Laterality Date    CARDIAC PACEMAKER PLACEMENT         Review of patient's allergies indicates:  No Known Allergies    Current Facility-Administered Medications on File  Prior to Encounter   Medication    [COMPLETED] tuberculin injection 5 Units    [DISCONTINUED] acetaminophen tablet 650 mg    [DISCONTINUED] acetaminophen tablet 650 mg    [DISCONTINUED] amLODIPine tablet 10 mg    [DISCONTINUED] aspirin chewable tablet 81 mg    [DISCONTINUED] atorvastatin tablet 80 mg    [DISCONTINUED] dextrose 50% injection 12.5 g    [DISCONTINUED] dextrose 50% injection 25 g    [DISCONTINUED] enoxaparin injection 40 mg    [DISCONTINUED] folic acid-vit B6-vit B12 2.5-25-2 mg tablet 1 tablet    [DISCONTINUED] furosemide tablet 40 mg    [DISCONTINUED] glucagon (human recombinant) injection 1 mg    [DISCONTINUED] glucose chewable tablet 16 g    [DISCONTINUED] glucose chewable tablet 24 g    [DISCONTINUED] HYDROcodone-acetaminophen 5-325 mg per tablet 1 tablet    [DISCONTINUED] levothyroxine tablet 50 mcg    [DISCONTINUED] melatonin tablet 6 mg    [DISCONTINUED] metoprolol tartrate (LOPRESSOR) tablet 25 mg    [DISCONTINUED] naloxone 0.4 mg/mL injection 0.02 mg    [DISCONTINUED] nitroGLYCERIN SL tablet 0.4 mg    [DISCONTINUED] ondansetron injection 4 mg    [DISCONTINUED] pantoprazole EC tablet 40 mg    [DISCONTINUED] polyethylene glycol packet 17 g    [DISCONTINUED] potassium bicarbonate disintegrating tablet 40 mEq    [DISCONTINUED] senna-docusate 8.6-50 mg per tablet 1 tablet    [DISCONTINUED] sodium chloride 0.9% flush 10 mL    [DISCONTINUED] valsartan tablet 40 mg     Current Outpatient Medications on File Prior to Encounter   Medication Sig    esomeprazole (NEXIUM) 40 MG capsule Take 1 capsule (40 mg total) by mouth before breakfast.    furosemide (LASIX) 20 MG tablet Take 1 tablet (20 mg total) by mouth once daily.    levothyroxine (SYNTHROID) 50 MCG tablet Take 1 tablet (50 mcg total) by mouth before breakfast.    memantine (NAMENDA) 10 MG Tab Take 1 tablet (10 mg total) by mouth 2 (two) times daily.    oxyCODONE-acetaminophen (PERCOCET) 7.5-325 mg per tablet Take 1 tablet by mouth.    pregabalin  (LYRICA) 75 MG capsule Take 75 mg by mouth every evening.    sertraline (ZOLOFT) 50 MG tablet Take 1 tablet (50 mg total) by mouth every evening.    valsartan (DIOVAN) 320 MG tablet Take 1 tablet (320 mg total) by mouth once daily.    amLODIPine (NORVASC) 10 MG tablet Take 1 tablet (10 mg total) by mouth every evening.    [START ON 1/3/2025] aspirin 81 MG Chew Take 1 tablet (81 mg total) by mouth once daily.    [START ON 1/3/2025] atorvastatin (LIPITOR) 80 MG tablet Take 1 tablet (80 mg total) by mouth once daily.    megestroL (MEGACE) 400 mg/10 mL (40 mg/mL) Susp Take 10 mLs (400 mg total) by mouth 2 (two) times daily.    metoprolol tartrate (LOPRESSOR) 25 MG tablet Take 1 tablet (25 mg total) by mouth 2 (two) times daily.    QUEtiapine (SEROQUEL) 25 MG Tab Take 25 mg by mouth every evening.    [DISCONTINUED] hydrALAZINE (APRESOLINE) 100 MG tablet Take 1 tablet (100 mg total) by mouth 3 (three) times daily.    [DISCONTINUED] mirtazapine (REMERON) 15 MG tablet Take 1 tablet (15 mg total) by mouth every evening.     Family History       Problem Relation (Age of Onset)    Hypertension Father          Tobacco Use    Smoking status: Never     Passive exposure: Never    Smokeless tobacco: Never   Substance and Sexual Activity    Alcohol use: Not Currently    Drug use: Never    Sexual activity: Not Currently     Review of Systems   Unable to perform ROS: Mental status change     Objective:     Vital Signs (Most Recent):  Temp: 97.8 °F (36.6 °C) (01/02/25 2030)  Pulse: 77 (01/02/25 2030)  Resp: 14 (01/02/25 2030)  BP: 139/84 (01/02/25 2030)  SpO2: 100 % (01/02/25 2030) Vital Signs (24h Range):  Temp:  [96.6 °F (35.9 °C)-99 °F (37.2 °C)] 97.8 °F (36.6 °C)  Pulse:  [] 77  Resp:  [14-19] 14  SpO2:  [96 %-100 %] 100 %  BP: ()/(51-84) 139/84     Weight: 88 kg (194 lb)  Body mass index is 33.3 kg/m².     Physical Exam  Vitals reviewed.   Constitutional:       General: She is not in acute distress.     Appearance: She  "is ill-appearing.      Comments: Lethargic but responsive   HENT:      Head: Normocephalic and atraumatic.      Right Ear: External ear normal.      Left Ear: External ear normal.      Nose: Nose normal.   Eyes:      General: No scleral icterus.     Extraocular Movements: Extraocular movements intact.   Cardiovascular:      Rate and Rhythm: Normal rate. Rhythm irregular.   Pulmonary:      Effort: Pulmonary effort is normal.      Breath sounds: Normal breath sounds.   Abdominal:      General: Bowel sounds are normal. There is no distension.      Palpations: Abdomen is soft.      Tenderness: There is no abdominal tenderness.   Musculoskeletal:      Right lower leg: No edema.      Left lower leg: No edema.   Skin:     General: Skin is warm and dry.      Capillary Refill: Capillary refill takes less than 2 seconds.      Comments: Skin turgor diminished   Neurological:      Comments: Patient minimally responds to voice alone however moves all extremities in response to touch.  Squeezes hands on command bilaterally with good  strength.  She grunts but makes no real attempt to speak.  She squeezes eyes to avoid eye exam however when forced it is noted that extraocular movements are intact no facial droop.                Significant Labs: All pertinent labs within the past 24 hours have been reviewed.  BMP: No results for input(s): "GLU", "NA", "K", "CL", "CO2", "BUN", "CREATININE", "CALCIUM", "MG" in the last 48 hours.  CBC:   Recent Labs   Lab 12/31/24  2135 01/01/25  1437   WBC 6.02 5.56   HGB 11.2* 11.1*   HCT 35.6* 34.4*   * 117*       Significant Imaging: I have reviewed all pertinent imaging results/findings within the past 24 hours.  Assessment/Plan:     * Altered mental status    Extensive work up for syncope over  previous hospital stay.  BP a bit low and patient appears dry.  Will bolus with normal saline and re-asses mental status. Plan EEG, repeat CT head (though multiple imaging studies obtained " last few days).  Performance on exam but almost seems purposeful. Holding all sedating meds overnight.     Syncope and collapse    Continue evalution as above.  IVF for IVVD, EEG, CT head.     Essential hypertension  Patient's blood pressure range in the last 24 hours was: BP  Min: 90/61  Max: 139/84.The patient's inpatient anti-hypertensive regimen is listed below:  Current Antihypertensives       Plan  - Hold antihypertensives meds for now due to hypotension.       VTE Risk Mitigation (From admission, onward)      None                            Venkat Pryor Jr, MD  Department of Hospital Medicine  [facility] - [address] [place] [address]

## 2025-01-03 NOTE — PLAN OF CARE
[facility] - [facility] Telemetry  Initial Discharge Assessment       Primary Care Provider: Eldon Govea MD    Admission Diagnosis: Altered mental status [R41.82]  Syncope, unspecified syncope type [R55]    Admission Date: 1/2/2025  Expected Discharge Date:     Transition of Care Barriers: None    Payor: [facility] MCARE / Plan: [facility] DUAL COMPLETE HMO SNP / Product Type: Medicare Advantage /     Extended Emergency Contact Information  Primary Emergency Contact: SARAH STEWART  Home Phone: [phone]  Mobile Phone: [phone]  Relation: Daughter   needed? No  Secondary Emergency Contact: Mike Stewart  Mobile Phone: [phone]  Relation: Son    Discharge Plan A: Home with family  Discharge Plan B: Home with family, Home Health, Long-term acute care facility (LTAC), Rehab, Skilled Nursing Facility      [facility] - [place], [place] - [address]  [address]  [facility] [address]  Phone: [phone] Fax: [phone]    [id number] - [place], [place] - [address] AT [facility] [address]  [address]  [place] [address]  Phone: [phone] Fax: [phone]    [facility], [place] - [address]  [address]  [facility] [address]  Phone: [phone] Fax: [phone]    [facility]  [address] [place] [address]  Phone: [phone] Fax: [phone]      Initial Assessment (most recent)       Adult Discharge Assessment - 01/03/25 1005          Discharge Assessment    Assessment Type Discharge Planning Assessment     Confirmed/corrected address, phone number and insurance Yes     Confirmed Demographics Correct on Facesheet     Source of Information family     People in Home child(carleen), adult     Do you expect to return to your current living situation? Yes     Do you have help at home or someone to help you manage your care at home? Yes     Who are your caregiver(s) and their  phone number(s)? Jacque Stewart, daughter, [phone]     Prior to hospitilization cognitive status: Unable to Assess     Current cognitive status: Unable to Assess     Walking or Climbing Stairs Difficulty yes     Walking or Climbing Stairs ambulation difficulty, requires equipment     Mobility Management rollator, quad cane, rw     Dressing/Bathing Difficulty yes     Dressing/Bathing dressing difficulty, assistance 1 person;bathing difficulty, assistance 1 person     Dressing/Bathing Management family     Home Accessibility wheelchair accessible     Home Layout Able to live on 1st floor     Equipment Currently Used at Home cane, straight;walker, rolling;rollator     Readmission within 30 days? No     Patient currently being followed by outpatient case management? No     Do you currently have service(s) that help you manage your care at home? No     Do you take prescription medications? Yes     Do you have prescription coverage? Yes     Coverage [facility] medicare     Do you have any problems affording any of your prescribed medications? No     Is the patient taking medications as prescribed? yes     Who is going to help you get home at discharge? Jacque Stewart, daughter, [phone]     How do you get to doctors appointments? family or friend will provide     Are you on dialysis? No     Do you take coumadin? No     Discharge Plan A Home with family     Discharge Plan B Home with family;Home Health;Long-term acute care facility (LTAC);Rehab;Skilled Nursing Facility     DME Needed Upon Discharge  none     Discharge Plan discussed with: Adult children     Transition of Care Barriers None        Physical Activity    On average, how many days per week do you engage in moderate to strenuous exercise (like a brisk walk)? 0 days     On average, how many minutes do you engage in exercise at this level? 0 min        Financial Resource Strain    How hard is it for you to pay for the very basics like food, housing, medical care, and  heating? Not hard at all        Housing Stability    In the last 12 months, was there a time when you were not able to pay the mortgage or rent on time? No     At any time in the past 12 months, were you homeless or living in a shelter (including now)? No        Transportation Needs    Has the lack of transportation kept you from medical appointments, meetings, work or from getting things needed for daily living? No        Food Insecurity    Within the past 12 months, you worried that your food would run out before you got the money to buy more. Never true     Within the past 12 months, the food you bought just didn't last and you didn't have money to get more. Never true        Stress    Do you feel stress - tense, restless, nervous, or anxious, or unable to sleep at night because your mind is troubled all the time - these days? Not at all        Social Isolation    How often do you feel lonely or isolated from those around you?  Never        Alcohol Use    Q1: How often do you have a drink containing alcohol? Never     Q2: How many drinks containing alcohol do you have on a typical day when you are drinking? Patient does not drink     Q3: How often do you have six or more drinks on one occasion? Never        Utilities    In the past 12 months has the electric, gas, oil, or water company threatened to shut off services in your home? No        Health Literacy    How often do you need to have someone help you when you read instructions, pamphlets, or other written material from your doctor or pharmacy? Rarely        OTHER    Name(s) of People in Home Jacque Stewart, daughter, [phone]                   SS spoke with pt's daughter via phone due to pt being unable to complete initial assessment questions and no family at bedside at this time. Pt lives at home with her daughter and plans to return to current living arrangements if pt does not require swb. Pt was discharged from [facility] to [facility]  1/2/2025, but was re-admitted to [facility]. Pt has required dme and is not current with hh. SDOH completed. IM explained to pt's daughter. Pt's daughter states she will be at hospital today and will notify ss to sign IM. Ss following

## 2025-01-03 NOTE — NURSING
"Late entry note: in room with pt and daughter daughter wants pt to have diet, after assisting daughter with given pt water that pt daughter force her mouth open to get water pt  held the water In her mouth after which pt then spit water out along with excessive secretions. I informed daughter to not force to drink water as she may cause pt to aspirate she was agitated and upset with her mother and  loudly speaking to her saying "why are you doing that" . I did notify HCP. Left pt sitting  high fowlers, bed low, cab in reach with TV on for stimulation. Will continue to monitor .  "

## 2025-01-03 NOTE — SUBJECTIVE & OBJECTIVE
Interval History:     Review of Systems   All other systems reviewed and are negative.    Objective:     Vital Signs (Most Recent):  Temp: 97.4 °F (36.3 °C) (01/03/25 1154)  Pulse: 93 (01/03/25 1154)  Resp: 16 (01/03/25 1154)  BP: (!) 149/89 (01/03/25 1154)  SpO2: 97 % (01/03/25 1154) Vital Signs (24h Range):  Temp:  [96.7 °F (35.9 °C)-98.2 °F (36.8 °C)] 97.4 °F (36.3 °C)  Pulse:  [77-95] 93  Resp:  [14-19] 16  SpO2:  [96 %-100 %] 97 %  BP: (120-175)/() 149/89     Weight: 88 kg (194 lb)  Body mass index is 33.3 kg/m².  No intake or output data in the 24 hours ending 01/03/25 1528        Physical Exam  Vitals and nursing note reviewed.   Constitutional:       Appearance: She is obese. She is ill-appearing.   HENT:      Head: Normocephalic.      Mouth/Throat:      Mouth: Mucous membranes are moist.   Eyes:      Extraocular Movements: Extraocular movements intact.   Cardiovascular:      Rate and Rhythm: Normal rate and regular rhythm.   Pulmonary:      Effort: Pulmonary effort is normal. No respiratory distress.      Breath sounds: Normal breath sounds.   Abdominal:      General: Abdomen is flat. Bowel sounds are normal. There is no distension.      Palpations: Abdomen is soft.   Musculoskeletal:         General: No swelling or tenderness. Normal range of motion.      Cervical back: Normal range of motion and neck supple. No tenderness.   Skin:     General: Skin is warm and dry.   Neurological:      General: No focal deficit present.      Mental Status: She is alert.      Comments: Nonverbal and slow to respond this morning on exam, however this afternoon she was at her baseline   Psychiatric:         Mood and Affect: Mood normal.             Significant Labs: All pertinent labs within the past 24 hours have been reviewed.      Significant Imaging: I have reviewed all pertinent imaging results/findings within the past 24 hours.

## 2025-01-03 NOTE — ASSESSMENT & PLAN NOTE
Patient's blood pressure range in the last 24 hours was: BP  Min: 120/67  Max: 175/99.The patient's inpatient anti-hypertensive regimen is listed below:  Current Antihypertensives  amLODIPine tablet 10 mg, Daily, Oral  metoprolol tartrate (LOPRESSOR) split tablet 12.5 mg, 2 times daily, Oral    Plan  - Hold antihypertensives meds for now due to hypotension.     1/3  - hypertensive this morning, will restart home meds

## 2025-01-03 NOTE — ASSESSMENT & PLAN NOTE
Extensive work up for syncope over  previous hospital stay.  BP a bit low and patient appears dry.  Will bolus with normal saline and re-asses mental status. Plan EEG, repeat CT head (though multiple imaging studies obtained last few days).  Performance on exam but almost seems purposeful. Holding all sedating meds overnight.

## 2025-01-04 PROBLEM — E87.20 LACTIC ACIDOSIS: Status: ACTIVE | Noted: 2025-01-01

## 2025-01-04 PROBLEM — G93.41 ENCEPHALOPATHY, METABOLIC: Status: ACTIVE | Noted: 2025-01-04

## 2025-01-04 PROBLEM — N17.9 ACUTE RENAL FAILURE SUPERIMPOSED ON STAGE 3A CHRONIC KIDNEY DISEASE: Status: ACTIVE | Noted: 2025-01-04

## 2025-01-04 PROBLEM — N18.31 ACUTE RENAL FAILURE SUPERIMPOSED ON STAGE 3A CHRONIC KIDNEY DISEASE: Status: ACTIVE | Noted: 2025-01-04

## 2025-01-04 NOTE — ASSESSMENT & PLAN NOTE
1/4  - worsening mentation today and very lethargic  - overnight only 40 ml of urine made and increase in cr today from 1.8 to 3.8, ct ab/pelvis yesterday with no hydronephrosis  - worsening lactic acidosis as well  - discussed with nephrology, will increase fluids to 125 cc  - trend lactic acid q4 hours for now  - full work up so far has been benign including ABG, imaging, chemistries, EEG  - ordered BNP and will assess cardiac function as well

## 2025-01-04 NOTE — PROGRESS NOTES
[facility] Tele[facility]  Nephrology  Progress Note    Patient Name: Renetta Stewart  MRN: [id number]  Admission Date: 1/2/2025  Hospital Length of Stay: 2 days  Attending Provider: Malu Rivera MD   Primary Care Physician: Eldon Govea MD  Principal Problem:Altered mental status    Consults  Subjective:     Interval History:  Ms. Gonzalez is a 74-year-old lady we are asked to see with acute renal failure.  She is admitted with orthostatic hypotension or episodes of fainting at home.  She came in with a creatinine of about 1.7 yesterday and today is 3.4.  She is not eating or drinking and appears volume depleted.  IV fluids have been begun and we will increase him.      Chest CT scan yesterday without contrast demonstrating no hydronephrosis.  She has had a bladder scan today with only 40 cc of urine in her bladder.      Her affect is strange in that she is arousable but sleeps for the most part and groans.      Her chest is clear and heart without rub or gallop.  She has no neck vein distention.  Abdomen is soft nontender.  Extremities without peripheral edema.  Blood pressure about 110 systolic.        Review of patient's allergies indicates:  No Known Allergies  Current Facility-Administered Medications   Medication Frequency    amLODIPine tablet 10 mg Daily    aspirin chewable tablet 81 mg Daily    atorvastatin tablet 80 mg Daily    dextrose 50% injection 12.5 g PRN    dextrose 50% injection 25 g PRN    glucagon (human recombinant) injection 1 mg PRN    glucose chewable tablet 16 g PRN    glucose chewable tablet 24 g PRN    lactated ringers infusion Continuous    levothyroxine tablet 50 mcg Before breakfast    metoprolol tartrate (LOPRESSOR) split tablet 12.5 mg BID    naloxone 0.4 mg/mL injection 0.02 mg PRN    pantoprazole EC tablet 40 mg Daily    piperacillin-tazobactam (ZOSYN) 4.5 g in D5W 100 mL IVPB (MB+) Q8H    promethazine suppository 25 mg Q6H PRN    sodium chloride 0.9% flush  10 mL Q12H PRN       Objective:     Vital Signs (Most Recent):  Temp: 98 °F (36.7 °C) (01/04/25 1148)  Pulse: 91 (01/04/25 1148)  Resp: 19 (01/04/25 1148)  BP: 110/71 (01/04/25 1148)  SpO2: 98 % (01/04/25 1148) Vital Signs (24h Range):  Temp:  [98 °F (36.7 °C)-98.6 °F (37 °C)] 98 °F (36.7 °C)  Pulse:  [] 91  Resp:  [17-19] 19  SpO2:  [96 %-98 %] 98 %  BP: ()/(59-84) 110/71     Weight: 88.3 kg (194 lb 10.7 oz) (01/04/25 0200)  Body mass index is 33.41 kg/m².  Body surface area is 2 meters squared.    No intake/output data recorded.    Physical Exam no edema.  Clear chest.  She is very sleepy for the most part but will wake up with stimulus but does not converse.    Significant Labs:sureBMP:   Recent Labs   Lab 01/04/25  1120   GLU 97      K 3.1*      CO2 19*   BUN 46*   CREATININE 3.39*   CALCIUM 7.4*     CBC:   Recent Labs   Lab 01/04/25  1120   WBC 6.39   RBC 3.98*   HGB 10.2*   HCT 32.0*   PLT 82*   MCV 80.4   MCH 25.6*   MCHC 31.9*     All labs within the past 24 hours have been reviewed.    Significant Imaging:  Labs: Reviewed  CT: Reviewed  No hydronephrosis    Assessment/Plan:     Active Diagnoses:    Diagnosis Date Noted POA    PRINCIPAL PROBLEM:  Altered mental status [R41.82] 01/02/2025 Yes    Colitis [K52.9] 01/03/2025 Yes    Neuroendocrine tumor [D3A.8] 12/30/2024 Yes    Esophagitis determined by endoscopy [K20.90] 12/27/2024 Yes    Syncope and collapse [R55] 12/20/2024 Yes    Stage 3b chronic kidney disease [N18.32] 03/29/2023 Yes    Essential hypertension [I10] 05/27/2021 Yes      Problems Resolved During this Admission:       Impression acute renal failure hopefully secondary to volume depletion.  Altered mental status but baseline not clear.    Plan we will increase her IV fluids to 125 cc/hour repeat lab tomorrow.    Thank you for your consult. I will follow-up with patient. Please contact us if you have any additional questions.    Yaron Acuna MD  Nephrology  [facility]  [facility]

## 2025-01-04 NOTE — SUBJECTIVE & OBJECTIVE
Interval History:     Review of Systems   All other systems reviewed and are negative.    Objective:     Vital Signs (Most Recent):  Temp: 98.6 °F (37 °C) (01/04/25 1626)  Pulse: 84 (01/04/25 1626)  Resp: 15 (01/04/25 1626)  BP: 123/83 (01/04/25 1626)  SpO2: 98 % (01/04/25 1626) Vital Signs (24h Range):  Temp:  [98 °F (36.7 °C)-98.6 °F (37 °C)] 98.6 °F (37 °C)  Pulse:  [] 84  Resp:  [15-19] 15  SpO2:  [96 %-98 %] 98 %  BP: ()/(59-83) 123/83     Weight: 88.3 kg (194 lb 10.7 oz)  Body mass index is 33.41 kg/m².  No intake or output data in the 24 hours ending 01/04/25 1644        Physical Exam  Vitals and nursing note reviewed.   Constitutional:       Appearance: She is obese.   HENT:      Head: Normocephalic.      Mouth/Throat:      Mouth: Mucous membranes are moist.   Eyes:      Extraocular Movements: Extraocular movements intact.   Cardiovascular:      Rate and Rhythm: Normal rate and regular rhythm.   Pulmonary:      Effort: Pulmonary effort is normal. No respiratory distress.      Breath sounds: Normal breath sounds.   Abdominal:      General: Abdomen is flat. Bowel sounds are normal. There is no distension.      Palpations: Abdomen is soft.   Musculoskeletal:         General: No swelling or tenderness. Normal range of motion.      Cervical back: Normal range of motion and neck supple. No tenderness.   Skin:     General: Skin is warm and dry.   Neurological:      General: No focal deficit present.      Mental Status: She is alert.      Comments: lethargic   Psychiatric:         Mood and Affect: Mood normal.             Significant Labs: All pertinent labs within the past 24 hours have been reviewed.  CBC:   Recent Labs   Lab 01/03/25  1242 01/04/25  1120   WBC 6.30 6.39   HGB 11.3* 10.2*   HCT 35.9* 32.0*   PLT 90* 82*     CMP:   Recent Labs   Lab 01/02/25  2220 01/03/25  1233 01/04/25  1120    141 141   K 3.1* 3.2* 3.1*    101 104   CO2 22* 24 19*   GLU 86 79* 97   BUN 33* 34* 46*    CREATININE 1.59* 1.73* 3.39*   CALCIUM 7.5* 7.8* 7.4*   PROT  --  6.0 5.6*   ALBUMIN  --  2.6* 2.4*   BILITOT  --  1.0 0.8   ALKPHOS  --  39* 38*   AST  --  68* 50*   ALT  --  89* 80*   ANIONGAP 19* 19* 21*       Significant Imaging: I have reviewed all pertinent imaging results/findings within the past 24 hours.

## 2025-01-04 NOTE — ASSESSMENT & PLAN NOTE
ANY is likely due to pre-renal azotemia due to intravascular volume depletion. Baseline creatinine is  1.4 . Most recent creatinine and eGFR are listed below.  Recent Labs     01/02/25  2220 01/03/25  1233 01/04/25  1120   CREATININE 1.59* 1.73* 3.39*   EGFRNORACEVR 34* 31* 14*      Plan  - ANY is worsening. Will adjust treatment as follows: consulted nephrology  - Avoid nephrotoxins and renally dose meds for GFR listed above  - Monitor urine output, serial BMP, and adjust therapy as needed

## 2025-01-04 NOTE — NURSING
"Daughter refused lab on pt. She stated " I dont want them to keep poking and prodding on my momma ". Notified HCP.   "

## 2025-01-04 NOTE — PROCEDURES
EEG    Date/Time: 1/3/2025 9:40 PM    Performed by: Collin Zhu MD  Authorized by: Malu Rivera MD      ELECTROENCEPHALOGRAM REPORT    DATE OF SERVICE: 01/03/2025  EEG NUMBER: [id number]  LOCATION OF SERVICE: [facility]   Electroencephalographic (EEG) recording is with electrodes placed according to the International 10-20 placement system.  Thirty two (32) channels of digital signal (sampling rate of 512/sec) including T1 and T2 was simultaneously recorded from the scalp and may include  EKG, EMG, and/or eye monitors.  Recording band pass was 0.1 to 512 hz.  Digital video recording of the patient is simultaneously recorded with the EEG.  The patient is instructed report clinical symptoms which may occur during the recording session.  EEG and video recording is stored and archived in digital format. Activation procedures which include photic stimulation, hyperventilation and instructing patients to perform simple task are done in selected patients.    The EEG is displayed on a monitor screen and can be reviewed using different montages.  Computer assisted analysis is employed to detect spike and electrographic seizure activity.   The entire record is submitted for computer analysis.  The entire recording is visually reviewed and the times identified by computer analysis as being spikes or seizures are reviewed again.  Compresses spectral analysis (CSA) is also performed on the activity recorded from each individual channel.  This is displayed as a power display of frequencies from 0 to 30 Hz over time.   The CSA is reviewed looking for asymmetries in power between homologous areas of the scalp and then compared with the original EEG recording.     StrongView software was also utilized in the review of this study.  This software suite analyzes the EEG recording in multiple domains.  Coherence and rhythmicity is computed to identify EEG sections which may contain organized seizures.  Each channel  undergoes analysis to detect presence of spike and sharp waves which have special and morphological characteristic of epileptic activity.  The routine EEG recording is converted from spacial into frequency domain.  This is then displayed comparing homologous areas to identify areas of significant asymmetry.  Algorithm to identify non-cortically generated artifact is used to separate eye movement, EMG and other artifact from the EEG    EEG FINDINGS  During the most alert state, there was no clear posterior dominant rhythm. Rather, the background consisted of continuous, generalized, reactive 5-6 Hz theta activity with intermittent delta range activity. Normal sleep architecture was absent,     No epileptiform findings were noted, no electrographic seizures were seen, and no clinical events were reported.      Activation Procedures were not performed.      IMPRESSION:  Continuous reactive theta activity, generalized  Intermittent reactive delta activity, generalized    CLINICAL CORRELATION:  The diffuse slowing is indicative of non-specific mild global cerebral dysfunction consistent with toxic metabolic etiology. There were no epileptiform findings, electrographic seizures, or no clinical events recorded.     Collin Zhu MD  Neurology

## 2025-01-04 NOTE — PLAN OF CARE
Problem: Adult Inpatient Plan of Care  Goal: Absence of Hospital-Acquired Illness or Injury  Outcome: Progressing  Intervention: Identify and Manage Fall Risk  Flowsheets (Taken 1/4/2025 1045)  Safety Promotion/Fall Prevention:   assistive device/personal item within reach   bed alarm set   medications reviewed   nonskid shoes/socks when out of bed   side rails raised x 3  Intervention: Prevent Skin Injury  Flowsheets (Taken 1/4/2025 1045)  Body Position: turned  Skin Protection: incontinence pads utilized  Device Skin Pressure Protection:   positioning supports utilized   pressure points protected  Intervention: Prevent and Manage VTE (Venous Thromboembolism) Risk  Flowsheets (Taken 1/4/2025 1045)  VTE Prevention/Management: remove, assess skin, and reapply sequential compression device  Intervention: Prevent Infection  Flowsheets (Taken 1/4/2025 1045)  Infection Prevention:   cohorting utilized   personal protective equipment utilized   environmental surveillance performed   rest/sleep promoted   equipment surfaces disinfected   single patient room provided   hand hygiene promoted  Goal: Optimal Comfort and Wellbeing  Outcome: Progressing  Intervention: Monitor Pain and Promote Comfort  Flowsheets (Taken 1/4/2025 1045)  Pain Management Interventions: quiet environment facilitated  Intervention: Provide Person-Centered Care  Flowsheets (Taken 1/4/2025 1045)  Trust Relationship/Rapport: care explained     Problem: Skin Injury Risk Increased  Goal: Skin Health and Integrity  Outcome: Progressing  Intervention: Optimize Skin Protection  Flowsheets (Taken 1/4/2025 1045)  Pressure Reduction Devices:   positioning supports utilized   foam padding utilized  Skin Protection: incontinence pads utilized  Activity Management: Patient unable to perform activities  Head of Bed (HOB) Positioning: HOB at 20-30 degrees

## 2025-01-04 NOTE — PROGRESS NOTES
[facility] - [address] [place] [address] Medicine  Progress Note    Patient Name: Renetta Stewart  MRN: [id number]  Patient Class: IP- Inpatient   Admission Date: 1/2/2025  Length of Stay: 2 days  Attending Physician: Malu Rivera MD  Primary Care Provider: Eldon Govea MD        Subjective     Principal Problem:Altered mental status        HPI:  History as above.  Patient is a 74-year-old  female just released from [facility] after extensive workup and 10 day hospital stay for syncope and collapse.  It was felt the patient may be having orthostatic hypotension.  She was discharged to swing bed today but on presentation had an additional syncope and collapse episode.  However she has not come back to her baseline mental status at this time.  She will not talk but she moves all extremities and follows directions.  It was noted that there may be some urinary and fecal incontinence that occurred in the emergency department.  She will be readmitted to [facility] for further investigation    Overview/Hospital Course:  1/3  - lactic acid repeated last night and increased to 4 after some fluids, given another fluid bolus. Multiple attempts today to draw labs however patient completely refuses and will not let anyone draw even at incidence of daughter  - Got a CT abdomen that shows colitis otherwise unremarkable, abdomen is soft and nondistended, no warning signs of bowel ischemia or critical pathology  - patient is also refusing to eat or drink anything and when trying to feed she will immediately spit it out, daughter also unable to get her to eat   - patient refusing meds as well  - patient is alert and oriented and will participate with exam some of the time. At this time patient has capacity to make her decisions and refuse work up.   - will keep her on abx, will start flagyl to cover for any anaerobic infections  - ordered EEG as she seemed post ictal this morning and not responding verbally  as she did this afternoon  - monitoring    1/4  - worsening mentation today and very lethargic  - overnight only 40 ml of urine made and increase in cr today from 1.8 to 3.8, ct ab/pelvis yesterday with no hydronephrosis  - worsening lactic acidosis as well  - discussed with nephrology, will increase fluids to 125 cc  - trend lactic acid q4 hours for now  - full work up so far has been benign including ABG, imaging, chemistries, EEG  - ordered BNP and will assess cardiac function as well    Interval History:     Review of Systems   All other systems reviewed and are negative.    Objective:     Vital Signs (Most Recent):  Temp: 98.6 °F (37 °C) (01/04/25 1626)  Pulse: 84 (01/04/25 1626)  Resp: 15 (01/04/25 1626)  BP: 123/83 (01/04/25 1626)  SpO2: 98 % (01/04/25 1626) Vital Signs (24h Range):  Temp:  [98 °F (36.7 °C)-98.6 °F (37 °C)] 98.6 °F (37 °C)  Pulse:  [] 84  Resp:  [15-19] 15  SpO2:  [96 %-98 %] 98 %  BP: ()/(59-83) 123/83     Weight: 88.3 kg (194 lb 10.7 oz)  Body mass index is 33.41 kg/m².  No intake or output data in the 24 hours ending 01/04/25 1644        Physical Exam  Vitals and nursing note reviewed.   Constitutional:       Appearance: She is obese.   HENT:      Head: Normocephalic.      Mouth/Throat:      Mouth: Mucous membranes are moist.   Eyes:      Extraocular Movements: Extraocular movements intact.   Cardiovascular:      Rate and Rhythm: Normal rate and regular rhythm.   Pulmonary:      Effort: Pulmonary effort is normal. No respiratory distress.      Breath sounds: Normal breath sounds.   Abdominal:      General: Abdomen is flat. Bowel sounds are normal. There is no distension.      Palpations: Abdomen is soft.   Musculoskeletal:         General: No swelling or tenderness. Normal range of motion.      Cervical back: Normal range of motion and neck supple. No tenderness.   Skin:     General: Skin is warm and dry.   Neurological:      General: No focal deficit present.      Mental  Status: She is alert.      Comments: lethargic   Psychiatric:         Mood and Affect: Mood normal.             Significant Labs: All pertinent labs within the past 24 hours have been reviewed.  CBC:   Recent Labs   Lab 01/03/25  1242 01/04/25  1120   WBC 6.30 6.39   HGB 11.3* 10.2*   HCT 35.9* 32.0*   PLT 90* 82*     CMP:   Recent Labs   Lab 01/02/25  2220 01/03/25  1233 01/04/25  1120    141 141   K 3.1* 3.2* 3.1*    101 104   CO2 22* 24 19*   GLU 86 79* 97   BUN 33* 34* 46*   CREATININE 1.59* 1.73* 3.39*   CALCIUM 7.5* 7.8* 7.4*   PROT  --  6.0 5.6*   ALBUMIN  --  2.6* 2.4*   BILITOT  --  1.0 0.8   ALKPHOS  --  39* 38*   AST  --  68* 50*   ALT  --  89* 80*   ANIONGAP 19* 19* 21*       Significant Imaging: I have reviewed all pertinent imaging results/findings within the past 24 hours.    Assessment and Plan     * Altered mental status    Extensive work up for syncope over  previous hospital stay.  BP a bit low and patient appears dry.  Will bolus with normal saline and re-asses mental status. Plan EEG, repeat CT head (though multiple imaging studies obtained last few days).  Performance on exam but almost seems purposeful. Holding all sedating meds overnight.     Esophagitis determined by endoscopy  Noted on eeg at last admission (5 days ago)      Syncope and collapse    Continue evalution as above.  IVF for IVVD, EEG, CT head.     Acute renal failure superimposed on stage 3a chronic kidney disease  ANY is likely due to pre-renal azotemia due to intravascular volume depletion. Baseline creatinine is  1.4 . Most recent creatinine and eGFR are listed below.  Recent Labs     01/02/25  2220 01/03/25  1233 01/04/25  1120   CREATININE 1.59* 1.73* 3.39*   EGFRNORACEVR 34* 31* 14*      Plan  - ANY is worsening. Will adjust treatment as follows: consulted nephrology  - Avoid nephrotoxins and renally dose meds for GFR listed above  - Monitor urine output, serial BMP, and adjust therapy as  needed      Encephalopathy, metabolic    1/4  - worsening mentation today and very lethargic  - overnight only 40 ml of urine made and increase in cr today from 1.8 to 3.8, ct ab/pelvis yesterday with no hydronephrosis  - worsening lactic acidosis as well  - discussed with nephrology, will increase fluids to 125 cc  - trend lactic acid q4 hours for now  - full work up so far has been benign including ABG, imaging, chemistries, EEG  - ordered BNP and will assess cardiac function as well    Lactic acidosis  1/4  - worsening mentation today and very lethargic  - overnight only 40 ml of urine made and increase in cr today from 1.8 to 3.8, ct ab/pelvis yesterday with no hydronephrosis  - worsening lactic acidosis as well  - discussed with nephrology, will increase fluids to 125 cc  - trend lactic acid q4 hours for now  - full work up so far has been benign including ABG, imaging, chemistries, EEG  - ordered BNP and will assess cardiac function as well    Colitis    Seen on CT, possible source of lactic acidosis  - unable to trend LA as patient refuses blood draws  - given 2 L NS  - on rocephin, adding flagyl  - monitor    Neuroendocrine tumor  See Egd notes, will work up outpatient      Stage 3b chronic kidney disease  Creatine stable for now. BMP reviewed- noted Estimated Creatinine Clearance: 15.7 mL/min (A) (based on SCr of 3.39 mg/dL (H)). according to latest data. Based on current GFR, CKD stage is stage 3 - GFR 30-59.  Monitor UOP and serial BMP and adjust therapy as needed. Renally dose meds. Avoid nephrotoxic medications and procedures.    Essential hypertension  Patient's blood pressure range in the last 24 hours was: BP  Min: 120/67  Max: 175/99.The patient's inpatient anti-hypertensive regimen is listed below:  Current Antihypertensives  amLODIPine tablet 10 mg, Daily, Oral  metoprolol tartrate (LOPRESSOR) split tablet 12.5 mg, 2 times daily, Oral    Plan  - Hold antihypertensives meds for now due to  hypotension.     1/3  - hypertensive this morning, will restart home meds      VTE Risk Mitigation (From admission, onward)           Ordered     IP VTE HIGH RISK PATIENT  Once         01/02/25 2050     Place sequential compression device  Until discontinued         01/02/25 2050                    Discharge Planning   GERALDO:      Code Status: Full Code   Medical Readiness for Discharge Date:   Discharge Plan A: Home with family                        Malu Rivera MD  Department of Hospital Medicine   [facility] - [facility] [address] [facility] Tele[facility]

## 2025-01-04 NOTE — ASSESSMENT & PLAN NOTE
Creatine stable for now. BMP reviewed- noted Estimated Creatinine Clearance: 15.7 mL/min (A) (based on SCr of 3.39 mg/dL (H)). according to latest data. Based on current GFR, CKD stage is stage 3 - GFR 30-59.  Monitor UOP and serial BMP and adjust therapy as needed. Renally dose meds. Avoid nephrotoxic medications and procedures.

## 2025-01-05 PROBLEM — T88.4XXA HARD TO INTUBATE: Status: ACTIVE | Noted: 2025-01-01

## 2025-01-05 PROBLEM — K56.609 SMALL BOWEL OBSTRUCTION: Status: ACTIVE | Noted: 2025-01-01

## 2025-01-05 PROBLEM — T79.7XXA SUBCUTANEOUS AIR: Status: ACTIVE | Noted: 2025-01-01

## 2025-01-05 NOTE — PLAN OF CARE
Problem: Adult Inpatient Plan of Care  Goal: Plan of Care Review  Outcome: Progressing  Goal: Patient-Specific Goal (Individualized)  Outcome: Progressing  Goal: Absence of Hospital-Acquired Illness or Injury  Outcome: Progressing  Goal: Optimal Comfort and Wellbeing  Outcome: Progressing  Goal: Readiness for Transition of Care  Outcome: Progressing     Problem: Skin Injury Risk Increased  Goal: Skin Health and Integrity  Outcome: Progressing     Problem: Acute Kidney Injury/Impairment  Goal: Fluid and Electrolyte Balance  Outcome: Progressing  Goal: Improved Oral Intake  Outcome: Progressing  Goal: Effective Renal Function  Outcome: Progressing

## 2025-01-05 NOTE — PROGRESS NOTES
[facility] Hospital Medicine  Progress Note    Patient Name: Renetta Stewart  MRN: [id number]  Patient Class: IP- Inpatient   Admission Date: 1/2/2025  Length of Stay: 3 days  Attending Physician: Jagjit Wayne MD  Primary Care Provider: Eldon Govea MD        Subjective     Principal Problem:Lactic acidosis        HPI:  History as above.  Patient is a 74-year-old  female just released from [facility] after extensive workup and 10 day hospital stay for syncope and collapse.  It was felt the patient may be having orthostatic hypotension.  She was discharged to swing bed today but on presentation had an additional syncope and collapse episode.  However she has not come back to her baseline mental status at this time.  She will not talk but she moves all extremities and follows directions.  It was noted that there may be some urinary and fecal incontinence that occurred in the emergency department.  She will be readmitted to [facility] for further investigation    Overview/Hospital Course:  1/3  - lactic acid repeated last night and increased to 4 after some fluids, given another fluid bolus. Multiple attempts today to draw labs however patient completely refuses and will not let anyone draw even at incidence of daughter  - Got a CT abdomen that shows colitis otherwise unremarkable, abdomen is soft and nondistended, no warning signs of bowel ischemia or critical pathology  - patient is also refusing to eat or drink anything and when trying to feed she will immediately spit it out, daughter also unable to get her to eat   - patient refusing meds as well  - patient is alert and oriented and will participate with exam some of the time. At this time patient has capacity to make her decisions and refuse work up.   - will keep her on abx, will start flagyl to cover for any anaerobic infections  - ordered EEG as she seemed post ictal this morning and not responding verbally as she did this  afternoon  - monitoring    1/4  - worsening mentation today and very lethargic  - overnight only 40 ml of urine made and increase in cr today from 1.8 to 3.8, ct ab/pelvis yesterday with no hydronephrosis  - worsening lactic acidosis as well  - discussed with nephrology, will increase fluids to 125 cc  - trend lactic acid q4 hours for now  - full work up so far has been benign including ABG, imaging, chemistries, EEG  - ordered BNP and will assess cardiac function as well    1/5  - overnight lactic acid worsened despite ample fluids being given along renal function and mentation  - discussed with critical doctor for transfer to ICU for closer monitoring  - nephrology following as well  - up date of care to this point. Patient was discharged on 1/2 to [facility] however on arrival had additional syncopal episode and readmitted here same day. The prior 10 days stay patient was seen by GI and EGD done 12/27 with biopsies of the stomach with ulcerative reflux esophagitis, gastritis and a neuroendocrine tumor of the duodenal bulb to be addressed outpatient. Patient also has esophageal narrowing that would be addressed outpatient.      Interval History:     Review of Systems   Reason unable to perform ROS: altered.     Objective:     Vital Signs (Most Recent):  Temp: 96.1 °F (35.6 °C) (01/05/25 1300)  Pulse: 66 (01/05/25 1400)  Resp: 16 (01/05/25 1400)  BP: (!) 85/44 (01/05/25 1350)  SpO2: 98 % (01/05/25 1400) Vital Signs (24h Range):  Temp:  [96.1 °F (35.6 °C)-99.9 °F (37.7 °C)] 96.1 °F (35.6 °C)  Pulse:  [] 66  Resp:  [0-23] 16  SpO2:  [87 %-100 %] 98 %  BP: ()/(30-93) 85/44     Weight: 88.3 kg (194 lb 10.7 oz)  Body mass index is 33.41 kg/m².    Intake/Output Summary (Last 24 hours) at 1/5/2025 1436  Last data filed at 1/5/2025 1033  Gross per 24 hour   Intake 2609.87 ml   Output 900 ml   Net 1709.87 ml         Physical Exam  Vitals and nursing note reviewed.   Constitutional:       General: She is in  acute distress.      Appearance: She is ill-appearing.   HENT:      Head: Normocephalic.      Mouth/Throat:      Mouth: Mucous membranes are dry.   Eyes:      Extraocular Movements: Extraocular movements intact.   Neck:      Vascular: No carotid bruit.   Cardiovascular:      Rate and Rhythm: Normal rate and regular rhythm.      Heart sounds: No murmur heard.  Pulmonary:      Effort: Pulmonary effort is normal.      Breath sounds: Normal breath sounds.   Abdominal:      General: Abdomen is flat. Bowel sounds are normal. There is no distension.      Palpations: Abdomen is soft.      Tenderness: There is no abdominal tenderness. There is no guarding.   Musculoskeletal:      Cervical back: No tenderness.      Right lower leg: No edema.      Left lower leg: No edema.   Skin:     General: Skin is dry.   Neurological:      Comments: Lethargic, GCS 8             Significant Labs: All pertinent labs within the past 24 hours have been reviewed.  CBC:   Recent Labs   Lab 01/04/25  1120 01/05/25  0226 01/05/25  0944   WBC 6.39 6.64 9.22   HGB 10.2* 10.3* 11.4*   HCT 32.0* 34.9* 37.2*   PLT 82* 78* 87*     CMP:   Recent Labs   Lab 01/04/25  1120 01/05/25  0226 01/05/25  0950    141 142   K 3.1* 3.1* 3.4*    103 101   CO2 19* 19* 16*   GLU 97 96 116*   BUN 46* 50* 50*   CREATININE 3.39* 4.19* 4.66*   CALCIUM 7.4* 7.4* 7.7*   PROT 5.6* 5.7*  --    ALBUMIN 2.4* 2.3*  --    BILITOT 0.8 0.8  --    ALKPHOS 38* 36*  --    AST 50* 64*  --    ALT 80* 84*  --    ANIONGAP 21* 22* 28*     Lactic Acid:   Recent Labs   Lab 01/05/25  0651 01/05/25  0944 01/05/25  1249   LACTATE 5.4* 7.2* 6.5*       Significant Imaging: I have reviewed all pertinent imaging results/findings within the past 24 hours.    Assessment and Plan     * Lactic acidosis  1/4  - worsening mentation today and very lethargic  - overnight only 40 ml of urine made and increase in cr today from 1.8 to 3.8, ct ab/pelvis yesterday with no hydronephrosis  - worsening  lactic acidosis as well  - discussed with nephrology, will increase fluids to 125 cc  - trend lactic acid q4 hours for now  - full work up so far has been benign including ABG, imaging, chemistries, EEG  - ordered BNP and will assess cardiac function as well    1/5  - overnight lactic acid worsened despite ample fluids being given along renal function and mentation  - discussed with critical doctor for transfer to ICU for closer monitoring  - nephrology following as well  - up date of care to this point. Patient was discharged on 1/2 to [facility] however on arrival had additional syncopal episode and readmitted here same day. The prior 10 days stay patient was seen by GI and EGD done 12/27 with biopsies of the stomach with ulcerative reflux esophagitis, gastritis and a neuroendocrine tumor of the duodenal bulb to be addressed outpatient. Patient also has esophageal narrowing that would be addressed outpatient.    Esophagitis determined by endoscopy  Noted on eeg at last admission (5 days ago)      Syncope and collapse    Continue evalution as above.  IVF for IVVD, EEG, CT head.     Acute renal failure superimposed on stage 3a chronic kidney disease  ANY is likely due to pre-renal azotemia due to intravascular volume depletion. Baseline creatinine is  1.4 . Most recent creatinine and eGFR are listed below.  Recent Labs     01/04/25  1120 01/05/25  0226 01/05/25  0950   CREATININE 3.39* 4.19* 4.66*   EGFRNORACEVR 14* 11* 9*        Plan  - ANY is worsening. Will adjust treatment as follows: consulted nephrology  - Avoid nephrotoxins and renally dose meds for GFR listed above  - Monitor urine output, serial BMP, and adjust therapy as needed      Encephalopathy, metabolic    1/4  - worsening mentation today and very lethargic  - overnight only 40 ml of urine made and increase in cr today from 1.8 to 3.8, ct ab/pelvis yesterday with no hydronephrosis  - worsening lactic acidosis as well  - discussed with nephrology,  will increase fluids to 125 cc  - trend lactic acid q4 hours for now  - full work up so far has been benign including ABG, imaging, chemistries, EEG  - ordered BNP and will assess cardiac function as well    Colitis    Seen on CT, possible source of lactic acidosis  - unable to trend LA as patient refuses blood draws  - given 2 L NS  - on rocephin, adding flagyl  - monitor    Altered mental status    Extensive work up for syncope over  previous hospital stay.  BP a bit low and patient appears dry.  Will bolus with normal saline and re-asses mental status. Plan EEG, repeat CT head (though multiple imaging studies obtained last few days).  Performance on exam but almost seems purposeful. Holding all sedating meds overnight.     Neuroendocrine tumor  See Egd notes, will work up outpatient      Stage 3b chronic kidney disease  Creatine stable for now. BMP reviewed- noted Estimated Creatinine Clearance: 15.7 mL/min (A) (based on SCr of 3.39 mg/dL (H)). according to latest data. Based on current GFR, CKD stage is stage 3 - GFR 30-59.  Monitor UOP and serial BMP and adjust therapy as needed. Renally dose meds. Avoid nephrotoxic medications and procedures.    Essential hypertension  Patient's blood pressure range in the last 24 hours was: BP  Min: 120/67  Max: 175/99.The patient's inpatient anti-hypertensive regimen is listed below:  Current Antihypertensives  amLODIPine tablet 10 mg, Daily, Oral  metoprolol tartrate (LOPRESSOR) split tablet 12.5 mg, 2 times daily, Oral    Plan  - Hold antihypertensives meds for now due to hypotension.     1/3  - hypertensive this morning, will restart home meds      VTE Risk Mitigation (From admission, onward)           Ordered     heparin (porcine) injection 2,000 Units  As needed (PRN)         01/05/25 1054     IP VTE HIGH RISK PATIENT  Once         01/02/25 2050     Place sequential compression device  Until discontinued         01/02/25 2050                    Discharge Planning   GERALDO:       Code Status: Full Code   Medical Readiness for Discharge Date:   Discharge Plan A: Home with family                        Malu Rivera MD  Department of Hospital Medicine   [facility]

## 2025-01-05 NOTE — ASSESSMENT & PLAN NOTE
1/4  - worsening mentation today and very lethargic  - overnight only 40 ml of urine made and increase in cr today from 1.8 to 3.8, ct ab/pelvis yesterday with no hydronephrosis  - worsening lactic acidosis as well  - discussed with nephrology, will increase fluids to 125 cc  - trend lactic acid q4 hours for now  - full work up so far has been benign including ABG, imaging, chemistries, EEG  - ordered BNP and will assess cardiac function as well    1/5  - overnight lactic acid worsened despite ample fluids being given along renal function and mentation  - discussed with critical doctor for transfer to ICU for closer monitoring  - nephrology following as well  - up date of care to this point. Patient was discharged on 1/2 to [facility] however on arrival had additional syncopal episode and readmitted here same day. The prior 10 days stay patient was seen by GI and EGD done 12/27 with biopsies of the stomach with ulcerative reflux esophagitis, gastritis and a neuroendocrine tumor of the duodenal bulb to be addressed outpatient. Patient also has esophageal narrowing that would be addressed outpatient.

## 2025-01-05 NOTE — SUBJECTIVE & OBJECTIVE
Interval History:     Review of Systems   Reason unable to perform ROS: altered.     Objective:     Vital Signs (Most Recent):  Temp: 96.1 °F (35.6 °C) (01/05/25 1300)  Pulse: 66 (01/05/25 1400)  Resp: 16 (01/05/25 1400)  BP: (!) 85/44 (01/05/25 1350)  SpO2: 98 % (01/05/25 1400) Vital Signs (24h Range):  Temp:  [96.1 °F (35.6 °C)-99.9 °F (37.7 °C)] 96.1 °F (35.6 °C)  Pulse:  [] 66  Resp:  [0-23] 16  SpO2:  [87 %-100 %] 98 %  BP: ()/(30-93) 85/44     Weight: 88.3 kg (194 lb 10.7 oz)  Body mass index is 33.41 kg/m².    Intake/Output Summary (Last 24 hours) at 1/5/2025 1436  Last data filed at 1/5/2025 1033  Gross per 24 hour   Intake 2609.87 ml   Output 900 ml   Net 1709.87 ml         Physical Exam  Vitals and nursing note reviewed.   Constitutional:       General: She is in acute distress.      Appearance: She is ill-appearing.   HENT:      Head: Normocephalic.      Mouth/Throat:      Mouth: Mucous membranes are dry.   Eyes:      Extraocular Movements: Extraocular movements intact.   Neck:      Vascular: No carotid bruit.   Cardiovascular:      Rate and Rhythm: Normal rate and regular rhythm.      Heart sounds: No murmur heard.  Pulmonary:      Effort: Pulmonary effort is normal.      Breath sounds: Normal breath sounds.   Abdominal:      General: Abdomen is flat. Bowel sounds are normal. There is no distension.      Palpations: Abdomen is soft.      Tenderness: There is no abdominal tenderness. There is no guarding.   Musculoskeletal:      Cervical back: No tenderness.      Right lower leg: No edema.      Left lower leg: No edema.   Skin:     General: Skin is dry.   Neurological:      Comments: Lethargic, GCS 8             Significant Labs: All pertinent labs within the past 24 hours have been reviewed.  CBC:   Recent Labs   Lab 01/04/25  1120 01/05/25  0226 01/05/25  0944   WBC 6.39 6.64 9.22   HGB 10.2* 10.3* 11.4*   HCT 32.0* 34.9* 37.2*   PLT 82* 78* 87*     CMP:   Recent Labs   Lab 01/04/25  1120  01/05/25  0226 01/05/25  0950    141 142   K 3.1* 3.1* 3.4*    103 101   CO2 19* 19* 16*   GLU 97 96 116*   BUN 46* 50* 50*   CREATININE 3.39* 4.19* 4.66*   CALCIUM 7.4* 7.4* 7.7*   PROT 5.6* 5.7*  --    ALBUMIN 2.4* 2.3*  --    BILITOT 0.8 0.8  --    ALKPHOS 38* 36*  --    AST 50* 64*  --    ALT 80* 84*  --    ANIONGAP 21* 22* 28*     Lactic Acid:   Recent Labs   Lab 01/05/25  0651 01/05/25  0944 01/05/25  1249   LACTATE 5.4* 7.2* 6.5*       Significant Imaging: I have reviewed all pertinent imaging results/findings within the past 24 hours.

## 2025-01-05 NOTE — PLAN OF CARE
Problem: Adult Inpatient Plan of Care  Goal: Absence of Hospital-Acquired Illness or Injury  Outcome: Progressing  Intervention: Identify and Manage Fall Risk  Flowsheets (Taken 1/5/2025 0733)  Safety Promotion/Fall Prevention:   assistive device/personal item within reach   bed alarm set   nonskid shoes/socks when out of bed   medications reviewed   side rails raised x 3  Intervention: Prevent Skin Injury  Flowsheets (Taken 1/5/2025 0733)  Body Position: position maintained  Skin Protection: incontinence pads utilized  Device Skin Pressure Protection: positioning supports utilized  Intervention: Prevent and Manage VTE (Venous Thromboembolism) Risk  Flowsheets (Taken 1/5/2025 0733)  VTE Prevention/Management: remove, assess skin, and reapply sequential compression device  Intervention: Prevent Infection  Flowsheets (Taken 1/5/2025 0733)  Infection Prevention:   cohorting utilized   personal protective equipment utilized   environmental surveillance performed   rest/sleep promoted   equipment surfaces disinfected   single patient room provided   hand hygiene promoted

## 2025-01-05 NOTE — PROGRESS NOTES
Pharmacokinetic Initial Assessment: IV Vancomycin    Assessment/Plan:    Initiate intravenous vancomycin as 1750 mg IV x 1 today  Desired empiric serum trough concentration is 15 to 20 mcg/mL  Draw vancomycin random level on 1/8 at 0400.  Pharmacy will continue to follow and monitor vancomycin.      Please contact pharmacy at extension [phone] with any questions regarding this assessment.     Patient brief summary:  Renetta Stewart is a 74 y.o. female initiated on antimicrobial therapy with IV Vancomycin for treatment of suspected sepsis    Drug Allergies:   Review of patient's allergies indicates:  No Known Allergies    Actual Body Weight:   88.3 kg    Renal Function:   Estimated Creatinine Clearance: 11.4 mL/min (A) (based on SCr of 4.66 mg/dL (H)).,     Dialysis Method (if applicable):  N/A    CBC (last 72 hours):  Recent Labs   Lab Result Units 01/03/25  1242 01/04/25  1120 01/05/25  0226 01/05/25  0944   WBC K/uL 6.30 6.39 6.64 9.22   Hemoglobin g/dL 11.3* 10.2* 10.3* 11.4*   Hematocrit % 35.9* 32.0* 34.9* 37.2*   Platelet Count K/uL 90* 82* 78* 87*   Lymphocytes % % 12.1* 13.3* 10.2* 6.5*   Lymphocytes, Man % %  --   --   --  9*   Monocytes % % 5.7 6.9* 6.2* 3.6   Monocytes, Man % %  --   --   --  4   Eosinophils % % 0.8* 0.6* 0.2* 0.0*   Basophils % % 0.5 0.8 0.8 0.5   Basophils, Man % %  --   --   --  1   Diff Type  Auto Auto Auto Manual       Metabolic Panel (last 72 hours):  Recent Labs   Lab Result Units 01/02/25  2220 01/03/25  0246 01/03/25  1233 01/04/25  1120 01/05/25  0226 01/05/25  0906 01/05/25  0950   Sodium mmol/L 141  --  141 141 141  --  142   Potassium mmol/L 3.1*  --  3.2* 3.1* 3.1*  --  3.4*   Chloride mmol/L 103  --  101 104 103  --  101   CO2 mmol/L 22*  --  24 19* 19*  --  16*   Glucose mg/dL 86  --  79* 97 96  --  116*   Glucose, UA mg/dL  --  Normal  --   --   --  30*  --    BUN mg/dL 33*  --  34* 46* 50*  --  50*   Creatinine mg/dL 1.59*  --  1.73* 3.39* 4.19*  --  4.66*   Albumin g/dL  --    "--  2.6* 2.4* 2.3*  --   --    Bilirubin, Total mg/dL  --   --  1.0 0.8 0.8  --   --    Alk Phos U/L  --   --  39* 38* 36*  --   --    AST U/L  --   --  68* 50* 64*  --   --    ALT U/L  --   --  89* 80* 84*  --   --        Drug levels (last 3 results):  No results for input(s): "VANCOMYCINRA", "VANCORANDOM", "VANCOMYCINPE", "VANCOPEAK", "VANCOMYCINTR", "VANCOTROUGH" in the last 72 hours.    Microbiologic Results:  Microbiology Results (last 7 days)       Procedure Component Value Units Date/Time    Culture, Lower Respiratory [[id number]] Collected: 01/05/25 1113    Order Status: Sent Specimen: Respiratory from Bronchial Wash Updated: 01/05/25 1142    Blood culture (site 1) [[id number]]     Order Status: Sent Specimen: Blood     Blood culture (site 2) [[id number]]     Order Status: Sent Specimen: Blood     Urine culture [[id number]] Collected: 01/05/25 0906    Order Status: Sent Specimen: Urine, Catheterized Updated: 01/05/25 1101    Blood culture [[id number]] Collected: 01/03/25 1233    Order Status: Completed Specimen: Blood Updated: 01/05/25 0707     Culture, Blood No Growth At 24 Hours            "

## 2025-01-05 NOTE — PROGRESS NOTES
[facility]  Nephrology  Progress Note    Patient Name: Renetta Stewart  MRN: [id number]  Admission Date: 1/2/2025  Hospital Length of Stay: 3 days  Attending Provider: Jagjit Wayne MD   Primary Care Physician: Eldon Govea MD  Principal Problem:Altered mental status    Consults  Subjective:     Interval History:  Ms. Matthews is seen in follow-up of her renal failure.  She experienced an apparent episode of gastric aspiration today and required intubation and transfer to the intensive care unit.  She has been hypotensive with that.  Under perfusion is led to increased lactate level of 7 now where yesterday and he had been about 4.    Serum creatinine is rising with oliguria in the presence of hypotension.        Review of patient's allergies indicates:  No Known Allergies  Current Facility-Administered Medications   Medication Frequency    amLODIPine tablet 10 mg Daily    aspirin chewable tablet 81 mg Daily    atorvastatin tablet 80 mg Daily    ceFEPIme injection 1 g Q24H    dextrose 50% injection 12.5 g PRN    dextrose 50% injection 25 g PRN    fentaNYL 2500 mcg in D5W 250 mL infusion premix (titrating) (conc: 10 mcg/mL) Continuous    glucagon (human recombinant) injection 1 mg PRN    glucose chewable tablet 16 g PRN    glucose chewable tablet 24 g PRN    heparin (porcine) injection 2,000 Units PRN    lactated ringers bolus 1,000 mL Once    lactated ringers infusion Continuous    levothyroxine tablet 50 mcg Before breakfast    metoprolol tartrate (LOPRESSOR) split tablet 12.5 mg BID    mupirocin 2 % ointment BID    naloxone 0.4 mg/mL injection 0.02 mg PRN    NORepinephrine bitartrate-D5W 4 mg/250 mL (16 mcg/mL) infusion Soln     NORepinephrine bitartrate-D5W 4 mg/250 mL (16 mcg/mL) PERIPHERAL access infusion Continuous    pantoprazole EC tablet 40 mg Daily    promethazine suppository 25 mg Q6H PRN    propofol (DIPRIVAN) 10 mg/mL infusion Continuous    sodium chloride 0.9% flush 10 mL Q12H PRN     vancomycin (VANCOCIN) 1,750 mg in 0.9% NaCl 500 mL IVPB Once       Objective:     Vital Signs (Most Recent):  Temp: 98.2 °F (36.8 °C) (01/05/25 0751)  Pulse: 109 (01/05/25 0830)  Resp: 20 (01/05/25 1000)  BP: (!) 96/45 (01/05/25 0830)  SpO2: 100 % (01/05/25 0830) Vital Signs (24h Range):  Temp:  [97.5 °F (36.4 °C)-99.9 °F (37.7 °C)] 98.2 °F (36.8 °C)  Pulse:  [] 109  Resp:  [15-21] 20  SpO2:  [88 %-100 %] 100 %  BP: ()/(45-84) 96/45     Weight: 88.3 kg (194 lb 10.7 oz) (01/04/25 0200)  Body mass index is 33.41 kg/m².  Body surface area is 2 meters squared.    I/O last 3 completed shifts:  In: 2609.9 [I.V.:1006.1; IV Piggyback:1603.7]  Out: 100 [Urine:100]    Physical Exam most significant edema.  She is intubated and unresponsive.    Significant Labs:sureBMP:   Recent Labs   Lab 01/05/25  0950   *      K 3.4*      CO2 16*   BUN 50*   CREATININE 4.66*   CALCIUM 7.7*     CBC:   Recent Labs   Lab 01/05/25  0944   WBC 9.22   RBC 4.53   HGB 11.4*   HCT 37.2*   PLT 87*   MCV 82.1   MCH 25.2*   MCHC 30.6*     All labs within the past 24 hours have been reviewed.    Significant Imaging:  Labs: Reviewed    Assessment/Plan:     Active Diagnoses:    Diagnosis Date Noted POA    PRINCIPAL PROBLEM:  Altered mental status [R41.82] 01/02/2025 Yes    Hard to intubate [T88.4XXA] 01/05/2025 Unknown    Lactic acidosis [E87.20] 01/04/2025 Yes    Encephalopathy, metabolic [G93.41] 01/04/2025 Yes    Acute renal failure superimposed on stage 3a chronic kidney disease [N17.9, N18.31] 01/04/2025 No    Colitis [K52.9] 01/03/2025 Yes    Neuroendocrine tumor [D3A.8] 12/30/2024 Yes    Esophagitis determined by endoscopy [K20.90] 12/27/2024 Yes    Syncope and collapse [R55] 12/20/2024 Yes    Stage 3b chronic kidney disease [N18.32] 03/29/2023 Yes    Essential hypertension [I10] 05/27/2021 Yes      Problems Resolved During this Admission:       Patient was seen in route to the radiology department where she is to  have CT scan.  We will continue to follow her and we can support with dialysis as necessary.  She does not need dialysis at present.  We appreciate the placement of the right IJ dialysis catheter.    Thank you for your consult. I will follow-up with patient. Please contact us if you have any additional questions.    Yaron Acuna MD  Nephrology  [facility]

## 2025-01-05 NOTE — ASSESSMENT & PLAN NOTE
ANY is likely due to pre-renal azotemia due to intravascular volume depletion. Baseline creatinine is  1.4 . Most recent creatinine and eGFR are listed below.  Recent Labs     01/04/25  1120 01/05/25  0226 01/05/25  0950   CREATININE 3.39* 4.19* 4.66*   EGFRNORACEVR 14* 11* 9*        Plan  - ANY is worsening. Will adjust treatment as follows: consulted nephrology  - Avoid nephrotoxins and renally dose meds for GFR listed above  - Monitor urine output, serial BMP, and adjust therapy as needed

## 2025-01-05 NOTE — NURSING
0748 - pt declining GSC and VS, dr Rivera notified  0802 - RRT called pt BP 77/52, unresponsive without vigorous stimuli. Pt agonal breathing. VS obtained, Dr Pelletier on the way.  0807 - Respiratory present, Dr Chatman and Dr Hanson present. Verbal order to bolus LR. Pt placed in trendelenburg   0809 - Dr Pelletier present, verbal order for ABG, Ddimer, glucose (110), troponin, CBC, BMP -- orders placed.   Verbal order to transfer patient to ICU bed 15.  0812 - Pt transferred with ICU nurses, Dr Pelletier, Magdalena Pham RN, respiratory therapist.

## 2025-01-06 NOTE — ASSESSMENT & PLAN NOTE
Small-bowel obstruction partial or complete based on recent CT scan, patient with NG tube in place.  Surgery has been consulted.

## 2025-01-06 NOTE — PLAN OF CARE
Problem: Adult Inpatient Plan of Care  Goal: Plan of Care Review  Outcome: Progressing     Problem: Skin Injury Risk Increased  Goal: Skin Health and Integrity  Outcome: Progressing     Problem: Artificial Airway  Goal: Effective Communication  Outcome: Progressing  Goal: Optimal Device Function  Outcome: Progressing  Goal: Absence of Device-Related Skin or Tissue Injury  Outcome: Progressing     Problem: Mechanical Ventilation Invasive  Goal: Effective Communication  Outcome: Progressing  Goal: Optimal Device Function  Outcome: Progressing  Goal: Mechanical Ventilation Liberation  Outcome: Progressing  Goal: Absence of Device-Related Skin and Tissue Injury  Outcome: Progressing  Goal: Absence of Ventilator-Induced Lung Injury  Outcome: Progressing     Problem: Gas Exchange Impaired  Goal: Optimal Gas Exchange  Outcome: Progressing     Problem: Breathing Pattern Ineffective  Goal: Effective Breathing Pattern  Outcome: Progressing      Yes

## 2025-01-06 NOTE — ASSESSMENT & PLAN NOTE
Severe esophagitis present on recent EGD.  Gastroenterology has been consulted who will come by to see the patient 1/6/25 and we will also evaluate for any evidence of esophageal injury.

## 2025-01-06 NOTE — PLAN OF CARE
Problem: Artificial Airway  Goal: Effective Communication  Outcome: Progressing  Goal: Optimal Device Function  Outcome: Progressing  Goal: Absence of Device-Related Skin or Tissue Injury  Outcome: Progressing     Problem: Mechanical Ventilation Invasive  Goal: Effective Communication  Outcome: Progressing  Goal: Optimal Device Function  Outcome: Progressing  Goal: Mechanical Ventilation Liberation  Outcome: Progressing  Goal: Absence of Device-Related Skin and Tissue Injury  Outcome: Progressing  Goal: Absence of Ventilator-Induced Lung Injury  Outcome: Progressing     Problem: Gas Exchange Impaired  Goal: Optimal Gas Exchange  Outcome: Progressing

## 2025-01-06 NOTE — HOSPITAL COURSE
1/6/25-overnight improving lactic acidosis, still persistent.  Echocardiogram with severe concentric hypertrophy, still on low-moderate dose single vasopressor support without evidence of any active oozing from her mouth or pneumothorax on her chest x-ray scans.  Seen by Dr. Langston at bedside on 1/5/25 who does not recommend any significant intervention, agrees with packing and clinical monitoring without any significant acute intervention.    1/7/25-patient with a minor ooze from the mouth overnight, controlled now, hypokalemic this morning status post replenishment of potassium.  Vancomycin discontinuing continuing cefepime, holding chemical DVT prophylaxis and using SCDs at this time in the setting of mild ooze from mouth.  10 cc of urine output overnight.  Gradually downtrending hemoglobin at 7.5 now.  No evidence of crepitus in the neck or subcutaneous emphysema on physical exam.  Reviewed case with Gastroenterology who believe there may be minor esophageal perforation, possibly from NG tube placement with nothing additional to do at this time.    1/8/25-patient with worsening thrombocytopenia no obvious bleeding noted at this time.  We will attempt spontaneous awakening and spontaneous breathing trial again today.  Patient to receive 2 units platelets and 1 unit PRBC today along with potassium per placement.    1/9/25-with minimal ooze from the mouth, pack by surgery at bedside, re-evaluated by Dr. Langston at bedside.  Status post administration of additional platelets with improvement in thrombocytopenia and additional PRBC.  Family updated in detail at bedside.    1/10/25-mild oozing present.  Platelets down to 35, pending additional unit platelets.  Appreciate ENT and surgery coming by and helping with packing of mouth.  No significant neurological response.  Pending CT scan head, neck and EEG.    1/11/25-improvement in thrombocytopenia without administration of platelets.  Extremely mild oozing with back  mouth today.  Appreciate Hematology recommendation.  Pending repeat coagulation studies.  Mild elevation of PT. patient to receive dialysis today.  CT soft tissue neck with interval resolution subcutaneous air and lower neck.  Evidence of left upper lobe pneumonia.  CT head with chronic changes.  EEG indicative of toxic metabolic encephalopathy.    1/12/25-stable hemoglobin at 7.6.  Received additional platelets today after her platelet was at 40, increased to 90.  Mouth continues to remain packed.  Lactate now down to 3.0 from 3.5 earlier this morning.  No improvement in mental status    1/18/25: No overnight events. Discussion with family to include 3 daughters & 1 son regarding their plans for withdrawal & comfort care. They will let us know when they are ready. AM labs reveal sodium 132, potassium 3.7, BUN/Cr 77/4.6, glucose 97, WBCs 4, H/H 7.6/22, & platelets 28. We will continue our course & await family's decision on withdrawal of care.    1/18/25 1445: Levophed discontinued & patient was extubated on comfort care. Morphine given for comfort. TOD 1520.

## 2025-01-06 NOTE — ASSESSMENT & PLAN NOTE
Worsening creatinine since admission, consented the family and placed a right-sided Trialysis catheter with plans to perform dialysis if the patient requires this.  Likely etiology at this time is from hypotension/hypovolemia causing acute tubular necrosis.  We will continue to monitor potassium.

## 2025-01-06 NOTE — SUBJECTIVE & OBJECTIVE
Interval History/Significant Events:  Refer to hospital course    Review of Systems  Objective:     Vital Signs (Most Recent):  Temp: 96.8 °F (36 °C) (01/06/25 1345)  Pulse: 80 (01/06/25 1345)  Resp: 16 (01/06/25 1345)  BP: (!) 114/45 (01/06/25 1345)  SpO2: 100 % (01/06/25 1345) Vital Signs (24h Range):  Temp:  [94.5 °F (34.7 °C)-98.3 °F (36.8 °C)] 96.8 °F (36 °C)  Pulse:  [58-80] 80  Resp:  [10-19] 16  SpO2:  [86 %-100 %] 100 %  BP: ()/() 114/45   Weight: 94.2 kg (207 lb 10.8 oz)  Body mass index is 35.65 kg/m².      Intake/Output Summary (Last 24 hours) at 1/6/2025 1404  Last data filed at 1/6/2025 1341  Gross per 24 hour   Intake 5380.58 ml   Output 1165 ml   Net 4215.58 ml          Physical Exam  Constitutional:       General: She is in acute distress.      Appearance: Normal appearance. She is obese. She is ill-appearing. She is not diaphoretic.   HENT:      Head: Normocephalic and atraumatic.      Nose: No congestion or rhinorrhea.      Mouth/Throat:      Mouth: Mucous membranes are dry.      Comments: No significant oozing from mouth this a.m. in terms of bleeding  Cardiovascular:      Rate and Rhythm: Normal rate and regular rhythm.      Pulses: Normal pulses.      Heart sounds: Normal heart sounds.   Pulmonary:      Effort: Pulmonary effort is normal. No respiratory distress.      Breath sounds: No stridor. Rhonchi present. No wheezing or rales.   Chest:      Chest wall: No tenderness.   Abdominal:      General: Abdomen is flat.   Musculoskeletal:      Cervical back: Normal range of motion. No rigidity.      Right lower leg: Edema present.      Left lower leg: Edema present.   Skin:     General: Skin is warm.      Findings: No erythema.   Neurological:      Mental Status: She is alert and oriented to person, place, and time.      Comments: Intubated, sedated            Vents:  Vent Mode: A/C (01/06/25 1226)  Ventilator Initiated: Yes (01/05/25 1015)  Set Rate: 16 BPM (01/06/25 1226)  Vt Set: 450  mL (01/06/25 1226)  PEEP/CPAP: 5 cmH20 (01/06/25 1226)  Oxygen Concentration (%): 60 (01/06/25 1226)  Peak Airway Pressure: 22.5 cmH20 (01/06/25 1226)  Plateau Pressure: 19.9 cmH20 (01/06/25 0400)  Total Ve: 7.02 L/m (01/06/25 1226)  F/VT Ratio<105 (RSBI): (!) 36.53 (01/06/25 0823)  Lines/Drains/Airways       Central Venous Catheter Line  Duration             Trialysis (Dialysis) Catheter 01/05/25 1058 external jugular;right internal jugular 1 day              Drain  Duration                  NG/OG Tube 01/05/25 1033 Right nostril 1 day         Urethral Catheter 01/05/25 0900 Silicone 16 Fr. 1 day              Airway  Duration                  Airway - Non-Surgical 01/05/25 1015 1 day              Peripheral Intravenous Line  Duration                  Midline Catheter - Single Lumen 01/05/25 0945 Right basilic vein (medial side of arm) 20g x 10cm 1 day         Peripheral IV - Single Lumen 01/05/25 0414 20 G Anterior;Left Forearm 1 day                  Significant Labs:    CBC/Anemia Profile:  Recent Labs   Lab 01/05/25  0226 01/05/25  0944 01/06/25  0305 01/06/25  0931 01/06/25  1246   WBC 6.64 9.22 6.87  --   --    HGB 10.3* 11.4* 8.5* 8.2* 8.5*   HCT 34.9* 37.2* 28.1* 25.3* 26.6*   PLT 78* 87* 37*  --   --    MCV 85.7 82.1 83.6  --   --    RDW 16.6* 16.7* 16.6*  --   --         Chemistries:  Recent Labs   Lab 01/05/25  0226 01/05/25  0950 01/06/25  0119    142 140   K 3.1* 3.4* 3.1*    101 104   CO2 19* 16* 16*   BUN 50* 50* 50*   CREATININE 4.19* 4.66* 4.53*   CALCIUM 7.4* 7.7* 6.9*   ALBUMIN 2.3*  --   --    PROT 5.7*  --   --    BILITOT 0.8  --   --    ALKPHOS 36*  --   --    ALT 84*  --   --    AST 64*  --   --    MG  --   --  1.2*   PHOS  --   --  3.8       All pertinent labs within the past 24 hours have been reviewed.    Significant Imaging:  I have reviewed all pertinent imaging results/findings within the past 24 hours.

## 2025-01-06 NOTE — PROGRESS NOTES
[facility]  Nephrology  Progress Note    Patient Name: Renetta Stewart  MRN: [id number]  Admission Date: 1/2/2025  Hospital Length of Stay: 4 days  Attending Provider: Jagjit Wayne MD   Primary Care Physician: Eldon Govea MD  Principal Problem:Lactic acidosis    Consults  Subjective:     Interval History:  Ms. Otoole is seen in follow-up of her acute on chronic renal failure.  She remains oliguric.  Blood pressure is 100 systolic and she is on Levophed for blood pressure support.  She remains on ventilatory support as well.      Laboratory with a creatinine today of 4.5 up from yesterday's 4.2.  Potassium is 3.1.      She will likely require dialysis.  She does not need it today.  She is receiving IV fluid and we will have to be judicious in the amount that we give.  She does not appear to be overtly volume depleted.    We will continue to follow with you    Review of patient's allergies indicates:  No Known Allergies  Current Facility-Administered Medications   Medication Frequency    aspirin chewable tablet 81 mg Daily    atorvastatin tablet 80 mg Daily    ceFEPIme injection 1 g Q24H    dextrose 50% injection 12.5 g PRN    dextrose 50% injection 25 g PRN    fentaNYL 2500 mcg in D5W 250 mL infusion premix (titrating) (conc: 10 mcg/mL) Continuous    glucagon (human recombinant) injection 1 mg PRN    glucose chewable tablet 16 g PRN    glucose chewable tablet 24 g PRN    heparin (porcine) injection 2,000 Units PRN    lactated ringers infusion Continuous    levothyroxine tablet 50 mcg Before breakfast    mupirocin 2 % ointment BID    naloxone 0.4 mg/mL injection 0.02 mg PRN    NORepinephrine bitartrate-D5W 4 mg/250 mL (16 mcg/mL) PERIPHERAL access infusion Continuous    pantoprazole EC tablet 40 mg Daily    promethazine suppository 25 mg Q6H PRN    propofol (DIPRIVAN) 10 mg/mL infusion Continuous    sodium chloride 0.9% flush 10 mL Q12H PRN    tranexamic acid nebulizer Soln 500 mg Once     vancomycin - pharmacy to dose pharmacy to manage frequency       Objective:     Vital Signs (Most Recent):  Temp: (!) 94.6 °F (34.8 °C) (01/06/25 1015)  Pulse: 71 (01/06/25 1015)  Resp: 16 (01/06/25 1015)  BP: (!) 96/47 (01/06/25 1015)  SpO2: 100 % (01/06/25 1015) Vital Signs (24h Range):  Temp:  [94.5 °F (34.7 °C)-98.3 °F (36.8 °C)] 94.6 °F (34.8 °C)  Pulse:  [] 71  Resp:  [10-20] 16  SpO2:  [86 %-100 %] 100 %  BP: ()/() 96/47     Weight: 94.2 kg (207 lb 10.8 oz) (01/06/25 0230)  Body mass index is 35.65 kg/m².  Body surface area is 2.06 meters squared.    I/O last 3 completed shifts:  In: 8313.3 [I.V.:2677.3; IV Piggyback:5636]  Out: 1705 [Urine:5; Drains:1700]    Physical Exam unresponsive, sedated on the ventilator.  No peripheral edema.    Significant Labs:sureBMP:   Recent Labs   Lab 01/06/25  0119   *      K 3.1*      CO2 16*   BUN 50*   CREATININE 4.53*   CALCIUM 6.9*   MG 1.2*     CBC:   Recent Labs   Lab 01/06/25  0305 01/06/25  0931   WBC 6.87  --    RBC 3.36*  --    HGB 8.5* 8.2*   HCT 28.1* 25.3*   PLT 37*  --    MCV 83.6  --    MCH 25.3*  --    MCHC 30.2*  --      All labs within the past 24 hours have been reviewed.    Significant Imaging:  Labs: Reviewed  X-Ray: Reviewed  US: Reviewed  CT: Reviewed    Assessment/Plan:     Active Diagnoses:    Diagnosis Date Noted POA    PRINCIPAL PROBLEM:  Lactic acidosis [E87.20] 01/04/2025 Yes    Hard to intubate [T88.4XXA] 01/05/2025 Yes    Small bowel obstruction [K56.609] 01/05/2025 No    Subcutaneous air-neck [T79.7XXA] 01/05/2025 No    Encephalopathy, metabolic [G93.41] 01/04/2025 Yes    Acute renal failure superimposed on stage 3a chronic kidney disease [N17.9, N18.31] 01/04/2025 No    Colitis [K52.9] 01/03/2025 Yes    Altered mental status [R41.82] 01/02/2025 Yes    Neuroendocrine tumor [D3A.8] 12/30/2024 Yes    Esophagitis determined by endoscopy [K20.90] 12/27/2024 Yes    Acute superficial gastritis without hemorrhage  [K29.00] 12/27/2024 Yes    Elevated troponin [R79.89] 12/21/2024 Yes    Syncope [R55] 12/21/2024 Yes    Syncope and collapse [R55] 12/20/2024 Yes    Type 2 MI (myocardial infarction) [I21.A1] 12/20/2024 Yes    Stage 3b chronic kidney disease [N18.32] 03/29/2023 Yes    Complex renal cyst [N28.1] 03/06/2023 Not Applicable    Morbid obesity [E66.01] 04/19/2022 Yes    Essential hypertension [I10] 05/27/2021 Yes      Problems Resolved During this Admission:       We will continue to follow and can support with dialysis when necessary.    Thank you for your consult. I will follow-up with patient. Please contact us if you have any additional questions.    Yaron Acuna MD  Nephrology  [facility]

## 2025-01-06 NOTE — PLAN OF CARE
Problem: Adult Inpatient Plan of Care  Goal: Plan of Care Review  1/6/2025 1038 by Steffi Ortega RN  Outcome: Progressing  1/6/2025 1037 by Steffi Ortega RN  Outcome: Progressing  Goal: Patient-Specific Goal (Individualized)  1/6/2025 1038 by Steffi Ortega RN  Outcome: Progressing  1/6/2025 1037 by Steffi Ortega RN  Outcome: Progressing  Goal: Absence of Hospital-Acquired Illness or Injury  1/6/2025 1038 by Steffi Ortega RN  Outcome: Progressing  1/6/2025 1037 by Steffi Ortega RN  Outcome: Progressing  Goal: Optimal Comfort and Wellbeing  1/6/2025 1038 by Steffi Ortega RN  Outcome: Progressing  1/6/2025 1037 by Steffi Ortega RN  Outcome: Progressing  Goal: Readiness for Transition of Care  1/6/2025 1038 by Steffi Ortega RN  Outcome: Progressing  1/6/2025 1037 by Steffi Ortega RN  Outcome: Progressing     Problem: Skin Injury Risk Increased  Goal: Skin Health and Integrity  1/6/2025 1038 by Steffi Ortega RN  Outcome: Progressing  1/6/2025 1037 by Steffi Ortega RN  Outcome: Progressing     Problem: Acute Kidney Injury/Impairment  Goal: Fluid and Electrolyte Balance  1/6/2025 1038 by Steffi Ortega RN  Outcome: Progressing  1/6/2025 1037 by Steffi Ortega RN  Outcome: Progressing  Goal: Improved Oral Intake  1/6/2025 1038 by Steffi Ortega RN  Outcome: Progressing  1/6/2025 1037 by Steffi Ortega RN  Outcome: Progressing  Goal: Effective Renal Function  1/6/2025 1038 by Steffi Ortega RN  Outcome: Progressing  1/6/2025 1037 by Steffi Ortega RN  Outcome: Progressing     Problem: Infection  Goal: Absence of Infection Signs and Symptoms  1/6/2025 1038 by Steffi Ortega RN  Outcome: Progressing  1/6/2025 1037 by Steffi Ortega RN  Outcome: Progressing     Problem: Fall Injury Risk  Goal: Absence of Fall and Fall-Related Injury  1/6/2025 1038 by Ortega, Steffi, RN  Outcome: Progressing  1/6/2025 1037 by Steffi Ortega RN  Outcome: Progressing     Problem: Restraint, Nonviolent  Goal: Absence of Harm or Injury  1/6/2025 1038 by  Ortega, Steffi, RN  Outcome: Progressing  1/6/2025 1037 by Steffi Ortega RN  Outcome: Progressing     Problem: Artificial Airway  Goal: Effective Communication  1/6/2025 1038 by Steffi Ortega RN  Outcome: Progressing  1/6/2025 1037 by Steffi Ortega RN  Outcome: Progressing  Goal: Optimal Device Function  1/6/2025 1038 by Steffi Ortega RN  Outcome: Progressing  1/6/2025 1037 by Steffi Ortega RN  Outcome: Progressing  Goal: Absence of Device-Related Skin or Tissue Injury  1/6/2025 1038 by Steffi Ortega RN  Outcome: Progressing  1/6/2025 1037 by Steffi Ortega RN  Outcome: Progressing     Problem: Mechanical Ventilation Invasive  Goal: Effective Communication  1/6/2025 1038 by Steffi Ortega RN  Outcome: Progressing  1/6/2025 1037 by Steffi Ortega RN  Outcome: Progressing  Goal: Optimal Device Function  1/6/2025 1038 by Steffi Ortega RN  Outcome: Progressing  1/6/2025 1037 by Steffi Ortega RN  Outcome: Progressing  Goal: Mechanical Ventilation Liberation  1/6/2025 1038 by Steffi Ortega RN  Outcome: Progressing  1/6/2025 1037 by Steffi Ortega RN  Outcome: Progressing  Goal: Optimal Nutrition Delivery  1/6/2025 1038 by Steffi Ortega RN  Outcome: Progressing  1/6/2025 1037 by Steffi Ortega RN  Outcome: Progressing  Goal: Absence of Device-Related Skin and Tissue Injury  1/6/2025 1038 by Steffi Ortega RN  Outcome: Progressing  1/6/2025 1037 by Steffi Ortega RN  Outcome: Progressing  Goal: Absence of Ventilator-Induced Lung Injury  1/6/2025 1038 by Steffi Ortega RN  Outcome: Progressing  1/6/2025 1037 by Steffi Ortega RN  Outcome: Progressing     Problem: Gas Exchange Impaired  Goal: Optimal Gas Exchange  1/6/2025 1038 by Steffi Ortega RN  Outcome: Progressing  1/6/2025 1037 by Steffi Ortega RN  Outcome: Progressing     Problem: Breathing Pattern Ineffective  Goal: Effective Breathing Pattern  1/6/2025 1038 by Steffi Ortega, RN  Outcome: Progressing  1/6/2025 1037 by Steffi Ortega, RN  Outcome: Progressing

## 2025-01-06 NOTE — ASSESSMENT & PLAN NOTE
There is a renal mass seen on the recent CT scan and an ultrasound has been ordered.  Prior CT scan showed evidence of renal cysts.

## 2025-01-06 NOTE — PLAN OF CARE
Per MD notes pt remains on vent. Monitoring labs with tx prn, remains on abx. Following dc needs as arise.

## 2025-01-06 NOTE — ASSESSMENT & PLAN NOTE
History of syncope, being worked up with monitoring.  She does have a pacemaker which will be interrogated.  Likely cause of her altered mental status at this time is toxic metabolic encephalopathy.

## 2025-01-06 NOTE — PROCEDURES
"Renetta Stewart is a 74 y.o. female patient.    Temp: 96.1 °F (35.6 °C) (01/05/25 1300)  Pulse: 66 (01/05/25 1615)  Resp: 16 (01/05/25 1615)  BP: (!) 124/58 (01/05/25 1615)  SpO2: 99 % (01/05/25 1615)  Weight: 88.3 kg (194 lb 10.7 oz) (01/04/25 0200)  Height: 5' 4" (162.6 cm) (01/02/25 1555)       Procedures    1/5/2025    BRONCHOSCOPY 01/05/2025       Indications:  Aspiration pneumonia     Proceduralist:  Jagjit Wayne MD      Procedure     An emergent post intubation bronchoscopy was performed after intubation.  A regular sized (green) disposable Ambu bronchoscope was used.  The bronchoscope was advanced through the endotracheal tube which showed significant amount of aspirated gastric contents present at the distal michelle as well as bilateral bronchial trace.  No evidence of endobronchial lesion present.  Gastric context (bilious appearing, thick, brown and greenish in color) were aspirated using the bronchoscope.  At this time a bronchial washing was performed with instillation of 100 cc of saline with return of 20 cc which has been sent for culture, cell count and cytology analysis.  The bronchoscope was then withdrawn, after verifying that the trachea was 3 cm from the michelle.  The sample has been sent for culture analysis.    Complications:  No immediate complications.  The patient tolerated the procedure well      Impression:  Significant aspirated gastric contents from recent vomitus, with successful aspiration of gastric contents.        Jagjit Wayne MD  Interventional Pulmonary and Critical Care  [facility]   "

## 2025-01-06 NOTE — CONSULTS
[facility]  General Surgery  Consult Note    Inpatient consult to General Surgery  Consult performed by: Arthur Gaston MD  Consult ordered by: Jagjit Wayne MD  Reason for consult: Bowel obstruction with persistent elevated lactic acidosis  Assessment/Recommendations: Initial concern is bowel obstruction versus ischemic bowel.  Patient has had persistent elevated lactic acidosis slightly downtrending in the last 24 hours.  Clinically she is doing better as well pressors reduced and she had a bowel movement yesterday per report.  Potassium is normal and given her other history that could be the cause of her increased lactic acidosis.  High-risk for surgery especially if it is negative.  I have a lower clinical suspicion of ongoing necrotic bowel given the above findings.  We treated that is just a regular bowel obstruction.  NG tube decompression bowel rest hydration for her fluid shift due to her bowel obstruction and will continue to follow in case her clinical picture changes        Subjective:     Chief Complaint/Reason for Admission:  Obtunded with to go to intubation, aspiration, and bowel obstruction seen on CT scan    History of Present Illness:  74-year-old female extensive history recently discharged to a swing bed came back due to confusion.  Had to be intubated and has some aspiration episodes.  Found to have some lactic acidosis poor p.o. intake and some decreased appetite and nausea and discomfort.  CT scan showed dilated bowels possible while wall thickening and some lactic acidosis.  Given about 5 L of fluid overnight and her lactic acidosis is still high but down trending, potassium has been low, dialysis catheter inserted due to oliguria, and no obvious complaints of abdominal pain before she had her event on the floor yesterday.    No current facility-administered medications on file prior to encounter.     Current Outpatient Medications on File Prior to Encounter    Medication Sig    esomeprazole (NEXIUM) 40 MG capsule Take 1 capsule (40 mg total) by mouth before breakfast.    furosemide (LASIX) 20 MG tablet Take 1 tablet (20 mg total) by mouth once daily.    levothyroxine (SYNTHROID) 50 MCG tablet Take 1 tablet (50 mcg total) by mouth before breakfast.    memantine (NAMENDA) 10 MG Tab Take 1 tablet (10 mg total) by mouth 2 (two) times daily.    oxyCODONE-acetaminophen (PERCOCET) 7.5-325 mg per tablet Take 1 tablet by mouth.    pregabalin (LYRICA) 75 MG capsule Take 75 mg by mouth every evening.    sertraline (ZOLOFT) 50 MG tablet Take 1 tablet (50 mg total) by mouth every evening.    valsartan (DIOVAN) 320 MG tablet Take 1 tablet (320 mg total) by mouth once daily.    amLODIPine (NORVASC) 10 MG tablet Take 1 tablet (10 mg total) by mouth every evening.    aspirin 81 MG Chew Take 1 tablet (81 mg total) by mouth once daily.    atorvastatin (LIPITOR) 80 MG tablet Take 1 tablet (80 mg total) by mouth once daily.    megestroL (MEGACE) 400 mg/10 mL (40 mg/mL) Susp Take 10 mLs (400 mg total) by mouth 2 (two) times daily.    metoprolol tartrate (LOPRESSOR) 25 MG tablet Take 1 tablet (25 mg total) by mouth 2 (two) times daily.    QUEtiapine (SEROQUEL) 25 MG Tab Take 25 mg by mouth every evening.       Review of patient's allergies indicates:  No Known Allergies    Past Medical History:   Diagnosis Date    Anemia     Anxiety     Dementia     Depression     GERD (gastroesophageal reflux disease)     Hyperlipidemia     Hypertension     PONV (postoperative nausea and vomiting)     Stroke      Past Surgical History:   Procedure Laterality Date    CARDIAC PACEMAKER PLACEMENT       Family History       Problem Relation (Age of Onset)    Hypertension Father          Tobacco Use    Smoking status: Never     Passive exposure: Never    Smokeless tobacco: Never   Substance and Sexual Activity    Alcohol use: Not Currently    Drug use: Never    Sexual activity: Not Currently     Review of Systems    Unable to perform ROS: Intubated     Objective:     Vital Signs (Most Recent):  Temp: (!) 95.9 °F (35.5 °C) (01/06/25 1215)  Pulse: 73 (01/06/25 1215)  Resp: 16 (01/06/25 1215)  BP: (!) 89/38 (01/06/25 1215)  SpO2: 100 % (01/06/25 1215) Vital Signs (24h Range):  Temp:  [94.5 °F (34.7 °C)-98.3 °F (36.8 °C)] 95.9 °F (35.5 °C)  Pulse:  [58-92] 73  Resp:  [10-20] 16  SpO2:  [86 %-100 %] 100 %  BP: ()/() 89/38     Weight: 94.2 kg (207 lb 10.8 oz)  Body mass index is 35.65 kg/m².      Intake/Output Summary (Last 24 hours) at 1/6/2025 1217  Last data filed at 1/6/2025 1026  Gross per 24 hour   Intake 5986.23 ml   Output 1315 ml   Net 4671.23 ml       Physical Exam  Constitutional:       General: She is in acute distress.   HENT:      Head: Normocephalic.   Cardiovascular:      Rate and Rhythm: Normal rate and regular rhythm.      Pulses: Normal pulses.   Pulmonary:      Effort: Pulmonary effort is normal. No respiratory distress.      Breath sounds: Normal breath sounds.   Abdominal:      General: Abdomen is flat. There is no distension.      Palpations: Abdomen is soft.      Tenderness: There is no abdominal tenderness.   Musculoskeletal:         General: Normal range of motion.   Skin:     General: Skin is warm.   Neurological:      General: No focal deficit present.      Mental Status: She is oriented to person, place, and time.         Significant Labs:  CBC:   Recent Labs   Lab 01/06/25  0305 01/06/25  0931   WBC 6.87  --    RBC 3.36*  --    HGB 8.5* 8.2*   HCT 28.1* 25.3*   PLT 37*  --    MCV 83.6  --    MCH 25.3*  --    MCHC 30.2*  --      CMP:   Recent Labs   Lab 01/05/25  0226 01/05/25  0950 01/06/25  0119   GLU 96   < > 124*   CALCIUM 7.4*   < > 6.9*   ALBUMIN 2.3*  --   --    PROT 5.7*  --   --       < > 140   K 3.1*   < > 3.1*   CO2 19*   < > 16*      < > 104   BUN 50*   < > 50*   CREATININE 4.19*   < > 4.53*   ALKPHOS 36*  --   --    ALT 84*  --   --    AST 64*  --   --    BILITOT 0.8  " --   --     < > = values in this interval not displayed.     Coagulation: No results for input(s): "PT", "INR", "APTT" in the last 48 hours.    Significant Diagnostics:  I have reviewed all pertinent imaging results/findings within the past 24 hours.    Assessment/Plan:     Active Diagnoses:    Diagnosis Date Noted POA    PRINCIPAL PROBLEM:  Lactic acidosis [E87.20] 01/04/2025 Yes    Hard to intubate [T88.4XXA] 01/05/2025 Yes    Small bowel obstruction [K56.609] 01/05/2025 No    Subcutaneous air-neck [T79.7XXA] 01/05/2025 No    Encephalopathy, metabolic [G93.41] 01/04/2025 Yes    Acute renal failure superimposed on stage 3a chronic kidney disease [N17.9, N18.31] 01/04/2025 No    Colitis [K52.9] 01/03/2025 Yes    Altered mental status [R41.82] 01/02/2025 Yes    Neuroendocrine tumor [D3A.8] 12/30/2024 Yes    Esophagitis determined by endoscopy [K20.90] 12/27/2024 Yes    Acute superficial gastritis without hemorrhage [K29.00] 12/27/2024 Yes    Elevated troponin [R79.89] 12/21/2024 Yes    Syncope [R55] 12/21/2024 Yes    Syncope and collapse [R55] 12/20/2024 Yes    Type 2 MI (myocardial infarction) [I21.A1] 12/20/2024 Yes    Stage 3b chronic kidney disease [N18.32] 03/29/2023 Yes    Complex renal cyst [N28.1] 03/06/2023 Not Applicable    Morbid obesity [E66.01] 04/19/2022 Yes    Essential hypertension [I10] 05/27/2021 Yes      Problems Resolved During this Admission:       Thank you for your consult. I will follow-up with patient. Please contact us if you have any additional questions.    Arthur Gaston MD  General Surgery  [facility]  "

## 2025-01-06 NOTE — ASSESSMENT & PLAN NOTE
Severe lactic acidosis, likely in the setting of hypovolemia and possible vasodilatory shock, requiring fluid resuscitation.  We will continue to perform serial checks.  She has had some improvement on 1/6/25

## 2025-01-06 NOTE — PLAN OF CARE
Problem: Skin Injury Risk Increased  Goal: Skin Health and Integrity  Outcome: Progressing  Intervention: Optimize Skin Protection  Flowsheets (Taken 1/5/2025 2132)  Pressure Reduction Techniques:   heels elevated off bed   sit time limited to 2 hours  Pressure Reduction Devices:   alternating pressure pump (GAETANO)   foam padding utilized   positioning supports utilized  Skin Protection:   incontinence pads utilized   silicone foam dressing in place  Activity Management: Patient unable to perform activities  Head of Bed (HOB) Positioning: HOB at 30-45 degrees     Problem: Acute Kidney Injury/Impairment  Goal: Fluid and Electrolyte Balance  Outcome: Progressing  Intervention: Monitor and Manage Fluid and Electrolyte Balance  Flowsheets (Taken 1/5/2025 2132)  Fluid/Electrolyte Management: fluids provided  Goal: Effective Renal Function  Outcome: Progressing  Intervention: Monitor and Support Renal Function  Flowsheets (Taken 1/5/2025 2132)  Stabilization Measures: tactile stimulation provided  Medication Review/Management: medications reviewed     Problem: Fall Injury Risk  Goal: Absence of Fall and Fall-Related Injury  Outcome: Progressing  Intervention: Identify and Manage Contributors  Flowsheets (Taken 1/5/2025 2132)  Medication Review/Management: medications reviewed  Intervention: Promote Injury-Free Environment  Flowsheets (Taken 1/5/2025 2132)  Safety Promotion/Fall Prevention:   bed alarm set   Fall Risk signage in place   Fall Risk reviewed with patient/family   side rails raised x 2

## 2025-01-06 NOTE — CONSULTS
"Gastroenterology Consult Note    Chief Complaint: subcutaneous emphysema, abnormal CT     Consulted by: Dr. Wayne      HPI:  Renetta Stewart is a 74 y.o. AAF with MMP including syncope with suspected orthostatic hypotension with recent admission that was re-admitted with metabolic encephalopathy ultimately found to have a colitis on imaging with course complicated by respiratory distress requiring transfer to the ICU and intubation. Patient intubated and unable to provide HPI. Patient was a difficult intubation and has history of severe esophagitis from prior EGD in the last 2 weeks. CT obtained this afternoon that showed subcutaneous emphysema in the neck and mediastinum concerning for perforation for which GI is consulted. 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     Past Medical History:   Diagnosis Date    Anemia     Anxiety     Dementia     Depression     GERD (gastroesophageal reflux disease)     Hyperlipidemia     Hypertension     PONV (postoperative nausea and vomiting)     Stroke      Past Surgical History:   Procedure Laterality Date    CARDIAC PACEMAKER PLACEMENT       Family History   Problem Relation Name Age of Onset    Hypertension Father         OBJECTIVE:  BP (!) 124/58   Pulse 66   Temp 96.1 °F (35.6 °C)   Resp 16   Ht 5' 4" (1.626 m)   Wt 88.3 kg (194 lb 10.7 oz)   SpO2 99%   Breastfeeding No   BMI 33.41 kg/m²   GEN: ill appearing, intubated/sedated  HEENT: NCAT, OP benign, MMM  NECK: Supple, no LAD, no noticeable crepitus   CV: normal rate, regular rhythm  RESP: CTA bilaterally, unlabored  ABD: NABS, ND, NT, soft, no guarding  EXT: No clubbing, cyanosis, or edema.  SKIN: Warm and dry  NEURO: intubated/sedated. Afocal.    LABS:  CMP  Sodium   Date Value Ref Range Status   01/05/2025 142 136 - 145 mmol/L Final     Potassium   Date Value Ref Range Status   01/05/2025 3.4 (L) 3.5 - 5.1 mmol/L Final "     Chloride   Date Value Ref Range Status   01/05/2025 101 98 - 107 mmol/L Final     CO2   Date Value Ref Range Status   01/05/2025 16 (L) 23 - 31 mmol/L Final     Glucose   Date Value Ref Range Status   01/05/2025 116 (H) 82 - 115 mg/dL Final     BUN   Date Value Ref Range Status   01/05/2025 50 (H) 10 - 20 mg/dL Final     Creatinine   Date Value Ref Range Status   01/05/2025 4.66 (H) 0.55 - 1.02 mg/dL Final     Calcium   Date Value Ref Range Status   01/05/2025 7.7 (L) 8.4 - 10.2 mg/dL Final     Total Protein   Date Value Ref Range Status   01/05/2025 5.7 (L) 5.8 - 7.6 g/dL Final     Albumin   Date Value Ref Range Status   01/05/2025 2.3 (L) 3.4 - 4.8 g/dL Final     Bilirubin, Total   Date Value Ref Range Status   01/05/2025 0.8 <=1.5 mg/dL Final     Alk Phos   Date Value Ref Range Status   01/05/2025 36 (L) 40 - 150 U/L Final     AST   Date Value Ref Range Status   01/05/2025 64 (H) 5 - 34 U/L Final     ALT   Date Value Ref Range Status   01/05/2025 84 (H) <=55 U/L Final     Anion Gap   Date Value Ref Range Status   01/05/2025 28 (H) 7 - 16 mmol/L Final     eGFR   Date Value Ref Range Status   01/05/2025 9 (L) >=60 mL/min/1.73m2 Final     Recent Results (from the past 2 weeks)   CBC with Differential    Collection Time: 01/05/25  9:44 AM   Result Value Ref Range    WBC 9.22 4.50 - 11.00 K/uL    Hemoglobin 11.4 (L) 12.0 - 16.0 g/dL    Hematocrit 37.2 (L) 38.0 - 47.0 %    Platelet Count 87 (L) 150 - 400 K/uL   CBC with Differential    Collection Time: 01/05/25  2:26 AM   Result Value Ref Range    WBC 6.64 4.50 - 11.00 K/uL    Hemoglobin 10.3 (L) 12.0 - 16.0 g/dL    Hematocrit 34.9 (L) 38.0 - 47.0 %    Platelet Count 78 (L) 150 - 400 K/uL   CBC with Differential    Collection Time: 01/04/25 11:20 AM   Result Value Ref Range    WBC 6.39 4.50 - 11.00 K/uL    Hemoglobin 10.2 (L) 12.0 - 16.0 g/dL    Hematocrit 32.0 (L) 38.0 - 47.0 %    Platelet Count 82 (L) 150 - 400 K/uL     Lab Results   Component Value Date    INR  1.18 12/21/2024    INR 0.98 09/22/2020    INR 0.91 04/24/2020       IMAGING:  Imaging Results              CT Head Without Contrast (Final result)  Result time 01/02/25 20:49:25      Final result by Manuel Schreiber MD (01/02/25 20:49:25)                   Impression:      No acute intracranial process.  Stable CT scan of the head.  Consideration for MRI of the brain, as clinically warranted.    Left maxillary sinus disease.      Electronically signed by: Manuel Schreiber MD  Date:    01/02/2025  Time:    20:49               Narrative:    EXAMINATION:  CT HEAD WITHOUT CONTRAST    CLINICAL HISTORY:  Mental status change, unknown cause;    TECHNIQUE:  Low dose axial images were obtained through the head.  Coronal and sagittal reformations were also performed. Contrast was not administered.    COMPARISON:  CT dated 12/30/2024.    MRI dated 12/23/2024.    FINDINGS:  The subcutaneous tissues are unremarkable.  The bony calvarium is intact.  There is near complete opacification of the left maxillary sinus.  There is mucosal thickening within the left sphenoid sinus.  The mastoid air cells are clear.  There are postoperative changes in the globes.    The craniocervical junction is intact.  The sellar and parasellar structures are unremarkable.  There is no evidence of intracranial hemorrhage.  There are no extra-axial fluid collections.    The ventricles and sulci are prominent, consistent cerebral volume loss.  There is unchanged appearance of an old infarction in the left medial thalamus.  There are extensive hypodensities within the periventricular and subcortical white matter.  There also old infarctions in the cerebellum.  There is no dense vessel sign.  There is no evidence of mass effect.                                    Imaging reviewed, notable for subcutaneous emphysema in the neck and possible small amount of air in the posterior mediastinum     ASSESSMENT:    74 y.o. AAF with MMP including syncope with suspected  orthostatic hypotension with recent admission that was re-admitted with metabolic encephalopathy ultimately found to have a colitis on imaging with course complicated by respiratory distress requiring transfer to the ICU and intubation with imaging suggestive of subcutaneous emphysema.     PLAN:    Abnormal CT Imaging   - CT with subcutaneous emphysema in the neck; minimal amount in posterior mediastinum following difficult intubation  - imaging reviewed; would have higher suspicion for pharyngeal rather than esophageal injury given distribution of SQ emphysema and history   - patient intubated/sedated, unable to undergo esophogram   - would recommend conservative management at this point with broad spectrum antibiotics and serial imaging; if there is evidence of fluid collection or worsening emphysematous changes, could consider further evaluation with flexibly laryngoscopy or EGD, though insufflation could worsen emphysematous changes   - once patient is extubated, could consider Esophagram to evaluate for extravasation       Thank you for the consult and including me in the care of this patient. Dr. Lora will assume the consult service tomorrow. Please call the on-call GI physrycian with questions.     Kenny Chatman MD  Gastroenterology

## 2025-01-06 NOTE — SUBJECTIVE & OBJECTIVE
Past Medical History:   Diagnosis Date    Anemia     Anxiety     Dementia     Depression     GERD (gastroesophageal reflux disease)     Hyperlipidemia     Hypertension     PONV (postoperative nausea and vomiting)     Stroke        Past Surgical History:   Procedure Laterality Date    CARDIAC PACEMAKER PLACEMENT         Review of patient's allergies indicates:  No Known Allergies    Family History       Problem Relation (Age of Onset)    Hypertension Father          Tobacco Use    Smoking status: Never     Passive exposure: Never    Smokeless tobacco: Never   Substance and Sexual Activity    Alcohol use: Not Currently    Drug use: Never    Sexual activity: Not Currently      Review of Systems  Objective:     Vital Signs (Most Recent):  Temp: 96.1 °F (35.6 °C) (01/05/25 1300)  Pulse: 66 (01/05/25 1615)  Resp: 16 (01/05/25 1615)  BP: (!) 124/58 (01/05/25 1615)  SpO2: 99 % (01/05/25 1615) Vital Signs (24h Range):  Temp:  [96.1 °F (35.6 °C)-99.9 °F (37.7 °C)] 96.1 °F (35.6 °C)  Pulse:  [] 66  Resp:  [0-23] 16  SpO2:  [87 %-100 %] 99 %  BP: ()/(30-93) 124/58   Weight: 88.3 kg (194 lb 10.7 oz)  Body mass index is 33.41 kg/m².      Intake/Output Summary (Last 24 hours) at 1/5/2025 1814  Last data filed at 1/5/2025 1615  Gross per 24 hour   Intake 5211.82 ml   Output 1250 ml   Net 3961.82 ml          Physical Exam  Constitutional:       General: She is in acute distress.      Appearance: Normal appearance. She is obese. She is ill-appearing. She is not diaphoretic.   HENT:      Head: Normocephalic and atraumatic.      Nose: No congestion or rhinorrhea.      Mouth/Throat:      Mouth: Mucous membranes are dry.   Cardiovascular:      Rate and Rhythm: Normal rate and regular rhythm.      Pulses: Normal pulses.      Heart sounds: Normal heart sounds.   Pulmonary:      Effort: Pulmonary effort is normal. No respiratory distress.      Breath sounds: No stridor. Rhonchi present. No wheezing or rales.   Chest:      Chest  wall: No tenderness.   Abdominal:      General: Abdomen is flat.   Musculoskeletal:      Cervical back: Normal range of motion. No rigidity.      Right lower leg: Edema present.      Left lower leg: Edema present.   Skin:     General: Skin is warm.      Findings: No erythema.   Neurological:      Mental Status: She is alert and oriented to person, place, and time.      Comments: Unresponsive, occasionally moving all 4 extremities            Vents:  Vent Mode: A/C (01/05/25 1542)  Ventilator Initiated: Yes (01/05/25 1015)  Set Rate: 16 BPM (01/05/25 1542)  Vt Set: 450 mL (01/05/25 1542)  PEEP/CPAP: 5 cmH20 (01/05/25 1542)  Oxygen Concentration (%): 80 (01/05/25 1242)  Peak Airway Pressure: 24.7 cmH20 (01/05/25 1542)  Total Ve: 6.98 L/m (01/05/25 1542)  F/VT Ratio<105 (RSBI): (!) 36.04 (01/05/25 1310)  Lines/Drains/Airways       Central Venous Catheter Line  Duration             Trialysis (Dialysis) Catheter 01/05/25 1058 external jugular;right internal jugular <1 day              Drain  Duration                  NG/OG Tube 01/05/25 1033 Right nostril <1 day         Urethral Catheter 01/05/25 0900 Silicone 16 Fr. <1 day              Airway  Duration                  Airway - Non-Surgical 01/05/25 1015 <1 day              Peripheral Intravenous Line  Duration                  Midline Catheter - Single Lumen 01/05/25 0945 Right basilic vein (medial side of arm) 20g x 10cm <1 day         Peripheral IV - Single Lumen 01/05/25 0414 20 G Anterior;Left Forearm <1 day                  Significant Labs:    CBC/Anemia Profile:  Recent Labs   Lab 01/04/25  1120 01/05/25  0226 01/05/25  0944   WBC 6.39 6.64 9.22   HGB 10.2* 10.3* 11.4*   HCT 32.0* 34.9* 37.2*   PLT 82* 78* 87*   MCV 80.4 85.7 82.1   RDW 16.2* 16.6* 16.7*        Chemistries:  Recent Labs   Lab 01/04/25  1120 01/05/25  0226 01/05/25  0950    141 142   K 3.1* 3.1* 3.4*    103 101   CO2 19* 19* 16*   BUN 46* 50* 50*   CREATININE 3.39* 4.19* 4.66*    CALCIUM 7.4* 7.4* 7.7*   ALBUMIN 2.4* 2.3*  --    PROT 5.6* 5.7*  --    BILITOT 0.8 0.8  --    ALKPHOS 38* 36*  --    ALT 80* 84*  --    AST 50* 64*  --        All pertinent labs within the past 24 hours have been reviewed.    Significant Imaging: I have reviewed all pertinent imaging results/findings within the past 24 hours.

## 2025-01-06 NOTE — ASSESSMENT & PLAN NOTE
Patient with subcutaneous air in the neck on CT scan that was performed.  There is no evidence of subcutaneous emphysema/crepitus on physical examination.  She does not have elevated peak pressures and no evidence of significant large pneumomediastinum.  At this time the etiology of the subcutaneous air in her neck are from possible esophageal injury given the patient's underlying severe esophagitis and placement NG tube, injury from her intubation from posterior pharynx and less likely to be pneumomediastinum in the setting of positive airway pressure as she has no evidence of significant mediastinal air in the distal trachea to suggest this as the cause.     Status post packing in her mouth with TXA soaked gauze, with improvement and resolution of her oozing.  Serial chest x-rays without any evidence of pneumothorax, physical examination without evidence of subcutaneous emphysema.  Evaluated the patient bedside with ENT physician Dr. Langston who has recommended continuing clinical monitoring, no intervention at this time needed and no additional imaging of the neck.  Appreciate evaluation by Dr. Langston on 1/5/25 evening.    Pending gastroenterology consultation 1/6/25.

## 2025-01-06 NOTE — HPI
74-year-old female with past medical history significant for syncope, collapse, worked up for orthostatic hypotension who presented to the ICU on 1/5/25 in the setting of being obtunded and found to have small-bowel obstruction, now intubated and sedated with shock and lactic acidosis.    The patient was admitted to hospital medicine on 1/3/25.  CT abdomen at the time showed evidence of diffuse wall thickening compatible with colitis.  An EEG was performed due to altered mental status 1/13/25 which showed evidence of toxic metabolic encephalopathy.  No evidence of epileptiform findings or electrographic seizures noted.  Of note, she also had an EGD performed on 12/27/24 which showed evidence of esophagitis and a 5 cm hiatal hernia with erythematous mucosa in the fundus of the stomach and antrum.  The mucosa of mid and distal esophagus was reportedly severely inflamed with white plaque present.    She was being worked up for oliguria and lactic acidosis when she was found to be acutely obtunded and nonresponsive on the morning of 1/5/25.      The patient was brought down emergently to the ICU.  At this time, she began acutely decompensating without the ability to protect her airway, saturating on facemask.  She was then emergently intubated bedside.  She prove to be a difficult intubation (did not have any significant episodes of desaturation) however she had a significant amount of vomitus and bile which complicated her intubation, requiring anesthesia attending to perform the intubation at bedside.  We had already consented the family prior to the intubation and a Trialysis catheter was placed in anticipation of possible dialysis in case her oliguria worsens.

## 2025-01-06 NOTE — PROCEDURES
"Renetta Stewart is a 74 y.o. female patient.    Temp: 96.1 °F (35.6 °C) (01/05/25 1300)  Pulse: 66 (01/05/25 1615)  Resp: 16 (01/05/25 1615)  BP: (!) 124/58 (01/05/25 1615)  SpO2: 99 % (01/05/25 1615)  Weight: 88.3 kg (194 lb 10.7 oz) (01/04/25 0200)  Height: 5' 4" (162.6 cm) (01/02/25 1555)       Central Line    Date/Time: 1/5/2025 6:47 PM    Performed by: Jagjit Wayne MD  Authorized by: Jagjit Wayne MD    Location procedure was performed:  [facility]  Consent Done ?:  Yes  Time out complete?: Verified correct patient, procedure, equipment, staff, and site/side    Indications:  Hemodialysis and vascular access (Septic shock)  Preparation:  Skin prepped with ChloraPrep  Skin prep agent dried: Skin prep agent completely dried prior to procedure    Sterile barriers: All five maximal sterile barriers used - gloves, gown, cap, mask and large sterile sheet    Hand hygiene: Hand hygiene performed immediately prior to central venous catheter insertion    Location:  Right internal jugular  Catheter type:  Trialysis  Catheter size:  12 Fr  Inserted Catheter Length (cm):  15  Ultrasound guidance: Yes    Vessel Caliber:  Medium  Needle advanced into vessel with real time ultrasound guidance.    Guidewire confirmed in vessel.    Steril sheath on probe.    Sterile gel used.  Manometry: No    Number of attempts:  1  Securement:  Line sutured, chlorhexidine patch, sterile dressing applied and blood return through all ports  Complications: No    Guidewire: guidewire removed intact, verified with nurse    XRay:  Placement verified by x-ray  Adverse Events:  NoneTermination Site: superior vena cava      1/5/2025    "

## 2025-01-06 NOTE — ASSESSMENT & PLAN NOTE
Severe metabolic encephalopathy.  Her altered mental status is likely in the setting of toxic metabolic encephalopathy, as suggested by an EEG done a couple of days ago.  I also took a detailed history from the family and there is no evidence of nuchal rigidity, recent ear infection, very high fevers to suggest meningitis or encephalitis at this time.  Continues to remain intubated and sedated at this time

## 2025-01-06 NOTE — ASSESSMENT & PLAN NOTE
Patient with subcutaneous air in the neck on CT scan that was performed.  There is no evidence of subcutaneous emphysema/crepitus on physical examination.  She does not have elevated peak pressures and no evidence of significant large pneumomediastinum.  At this time the etiology of the subcutaneous air in her neck are from possible esophageal injury given the patient's underlying severe esophagitis and placement NG tube, injury from her intubation from posterior pharynx and less likely to be pneumomediastinum in the setting of positive airway pressure as she has no evidence of significant mediastinal air in the distal trachea to suggest this as the cause.  She also has evidence of bleeding from her mouth which again may be in the setting of recent intubation causing trauma.  At this time it has stabilized to a slow ooze/has stopped after being packed in the back of her pharynx with TXA gauze.  I have spoken to ENT physician Dr. Langston who will come bedside to evaluate the patient but has preliminary told us to monitor the patient and agrees with plan of care at this time.  We will perform serial chest x-rays to ensure the patient has no enlarging pneumothorax (she has a very tiny pneumothorax mentioned on the CT report which may be a component of the posterior mediastinal air).  Even though her bleeding has stabilized, we will order serial H&H to monitor.  Hold off on DVT prophylaxis in the setting of this recent bleed.

## 2025-01-06 NOTE — PLAN OF CARE
Problem: Adult Inpatient Plan of Care  Goal: Plan of Care Review  Outcome: Progressing  Goal: Patient-Specific Goal (Individualized)  Outcome: Progressing  Goal: Absence of Hospital-Acquired Illness or Injury  Outcome: Progressing  Goal: Optimal Comfort and Wellbeing  Outcome: Progressing  Goal: Readiness for Transition of Care  Outcome: Progressing     Problem: Skin Injury Risk Increased  Goal: Skin Health and Integrity  Outcome: Progressing     Problem: Acute Kidney Injury/Impairment  Goal: Fluid and Electrolyte Balance  Outcome: Progressing  Goal: Improved Oral Intake  Outcome: Progressing  Goal: Effective Renal Function  Outcome: Progressing     Problem: Infection  Goal: Absence of Infection Signs and Symptoms  Outcome: Progressing

## 2025-01-06 NOTE — ASSESSMENT & PLAN NOTE
Patient extremely difficult to intubate by bedside.  Appreciate prompt anesthesia response to help and anesthesia attending was able to intubate the patient with cricoid pressure, with the help of a bougie tube.  The patient's anatomy indicates that she has very anterior in terms of her vocal cords.  In addition, her intubation was very complicated due to a significant amount of vomitus of bile during the intubation.  A prompt bedside bronchoscopy was performed immediately with clearance are of aspirated gastric contents.

## 2025-01-06 NOTE — ASSESSMENT & PLAN NOTE
Small-bowel obstruction partial or complete based on recent CT scan, patient with NG tube in place.  Appreciate surgery consultation, agree that less likely to be mesenteric ischemia given improving lactic acidosis and low-dose vasopressor support requirements.  Still may have some third spacing from her small-bowel obstruction.

## 2025-01-06 NOTE — H&P
[facility] ICU  Critical Care Medicine  History & Physical    Patient Name: Renetta Stewart  MRN: [id number]  Admission Date: 1/2/2025  Hospital Length of Stay: 3 days  Code Status: Full Code  Attending Physician: Jagjit Wayne MD   Primary Care Provider: Eldon Govea MD   Principal Problem: Lactic acidosis    Subjective:     HPI:  74-year-old female with past medical history significant for syncope, collapse, worked up for orthostatic hypotension who presented to the ICU on 1/5/25 in the setting of being obtunded and found to have small-bowel obstruction, now intubated and sedated with shock and lactic acidosis.    The patient was admitted to hospital medicine on 1/3/25.  CT abdomen at the time showed evidence of diffuse wall thickening compatible with colitis.  An EEG was performed due to altered mental status 1/13/25 which showed evidence of toxic metabolic encephalopathy.  No evidence of epileptiform findings or electrographic seizures noted.  Of note, she also had an EGD performed on 12/27/24 which showed evidence of esophagitis and a 5 cm hiatal hernia with erythematous mucosa in the fundus of the stomach and antrum.  The mucosa of mid and distal esophagus was reportedly severely inflamed with white plaque present.    She was being worked up for oliguria and lactic acidosis when she was found to be acutely obtunded and nonresponsive on the morning of 1/5/25.      The patient was brought down emergently to the ICU.  At this time, she began acutely decompensating without the ability to protect her airway, saturating on facemask.  She was then emergently intubated bedside.  She prove to be a difficult intubation (did not have any significant episodes of desaturation) however she had a significant amount of vomitus and bile which complicated her intubation, requiring anesthesia attending to perform the intubation at bedside.  We had already consented the family prior to the intubation and a  Trialysis catheter was placed in anticipation of possible dialysis in case her oliguria worsens.        Hospital/ICU Course:  No notes on file     Past Medical History:   Diagnosis Date    Anemia     Anxiety     Dementia     Depression     GERD (gastroesophageal reflux disease)     Hyperlipidemia     Hypertension     PONV (postoperative nausea and vomiting)     Stroke        Past Surgical History:   Procedure Laterality Date    CARDIAC PACEMAKER PLACEMENT         Review of patient's allergies indicates:  No Known Allergies    Family History       Problem Relation (Age of Onset)    Hypertension Father          Tobacco Use    Smoking status: Never     Passive exposure: Never    Smokeless tobacco: Never   Substance and Sexual Activity    Alcohol use: Not Currently    Drug use: Never    Sexual activity: Not Currently      Review of Systems  Objective:     Vital Signs (Most Recent):  Temp: 96.1 °F (35.6 °C) (01/05/25 1300)  Pulse: 66 (01/05/25 1615)  Resp: 16 (01/05/25 1615)  BP: (!) 124/58 (01/05/25 1615)  SpO2: 99 % (01/05/25 1615) Vital Signs (24h Range):  Temp:  [96.1 °F (35.6 °C)-99.9 °F (37.7 °C)] 96.1 °F (35.6 °C)  Pulse:  [] 66  Resp:  [0-23] 16  SpO2:  [87 %-100 %] 99 %  BP: ()/(30-93) 124/58   Weight: 88.3 kg (194 lb 10.7 oz)  Body mass index is 33.41 kg/m².      Intake/Output Summary (Last 24 hours) at 1/5/2025 1814  Last data filed at 1/5/2025 1615  Gross per 24 hour   Intake 5211.82 ml   Output 1250 ml   Net 3961.82 ml          Physical Exam  Constitutional:       General: She is in acute distress.      Appearance: Normal appearance. She is obese. She is ill-appearing. She is not diaphoretic.   HENT:      Head: Normocephalic and atraumatic.      Nose: No congestion or rhinorrhea.      Mouth/Throat:      Mouth: Mucous membranes are dry.   Cardiovascular:      Rate and Rhythm: Normal rate and regular rhythm.      Pulses: Normal pulses.      Heart sounds: Normal heart sounds.   Pulmonary:       Effort: Pulmonary effort is normal. No respiratory distress.      Breath sounds: No stridor. Rhonchi present. No wheezing or rales.   Chest:      Chest wall: No tenderness.   Abdominal:      General: Abdomen is flat.   Musculoskeletal:      Cervical back: Normal range of motion. No rigidity.      Right lower leg: Edema present.      Left lower leg: Edema present.   Skin:     General: Skin is warm.      Findings: No erythema.   Neurological:      Mental Status: She is alert and oriented to person, place, and time.      Comments: Unresponsive, occasionally moving all 4 extremities            Vents:  Vent Mode: A/C (01/05/25 1542)  Ventilator Initiated: Yes (01/05/25 1015)  Set Rate: 16 BPM (01/05/25 1542)  Vt Set: 450 mL (01/05/25 1542)  PEEP/CPAP: 5 cmH20 (01/05/25 1542)  Oxygen Concentration (%): 80 (01/05/25 1242)  Peak Airway Pressure: 24.7 cmH20 (01/05/25 1542)  Total Ve: 6.98 L/m (01/05/25 1542)  F/VT Ratio<105 (RSBI): (!) 36.04 (01/05/25 1310)  Lines/Drains/Airways       Central Venous Catheter Line  Duration             Trialysis (Dialysis) Catheter 01/05/25 1058 external jugular;right internal jugular <1 day              Drain  Duration                  NG/OG Tube 01/05/25 1033 Right nostril <1 day         Urethral Catheter 01/05/25 0900 Silicone 16 Fr. <1 day              Airway  Duration                  Airway - Non-Surgical 01/05/25 1015 <1 day              Peripheral Intravenous Line  Duration                  Midline Catheter - Single Lumen 01/05/25 0945 Right basilic vein (medial side of arm) 20g x 10cm <1 day         Peripheral IV - Single Lumen 01/05/25 0414 20 G Anterior;Left Forearm <1 day                  Significant Labs:    CBC/Anemia Profile:  Recent Labs   Lab 01/04/25  1120 01/05/25  0226 01/05/25  0944   WBC 6.39 6.64 9.22   HGB 10.2* 10.3* 11.4*   HCT 32.0* 34.9* 37.2*   PLT 82* 78* 87*   MCV 80.4 85.7 82.1   RDW 16.2* 16.6* 16.7*        Chemistries:  Recent Labs   Lab 01/04/25  1125  01/05/25  0226 01/05/25  0950    141 142   K 3.1* 3.1* 3.4*    103 101   CO2 19* 19* 16*   BUN 46* 50* 50*   CREATININE 3.39* 4.19* 4.66*   CALCIUM 7.4* 7.4* 7.7*   ALBUMIN 2.4* 2.3*  --    PROT 5.6* 5.7*  --    BILITOT 0.8 0.8  --    ALKPHOS 38* 36*  --    ALT 80* 84*  --    AST 50* 64*  --        All pertinent labs within the past 24 hours have been reviewed.    Significant Imaging: I have reviewed all pertinent imaging results/findings within the past 24 hours.  Assessment/Plan:     Neuro  Encephalopathy, metabolic  Severe metabolic encephalopathy.  Her altered mental status is likely in the setting of toxic metabolic encephalopathy, as suggested by an EEG done a couple of days ago.  I also took a detailed history from the family and there is no evidence of nuchal rigidity, recent ear infection, very high fevers to suggest meningitis or encephalitis at this time.    Pulmonary  Hard to intubate  Patient extremely difficult to intubate by bedside.  Appreciate prompt anesthesia response to help and anesthesia attending was able to intubate the patient with cricoid pressure, with the help of a bougie tube.  The patient's anatomy indicates that she has very anterior in terms of her vocal cords.  In addition, her intubation was very complicated due to a significant amount of vomitus of bile during the intubation.  A prompt bedside bronchoscopy was performed immediately with clearance are of aspirated gastric contents.    Cardiac/Vascular  Elevated troponin  Likely in the setting of her demand ischemia    Renal/  * Lactic acidosis  Severe lactic acidosis, likely in the setting of hypovolemia and possible vasodilatory shock, requiring fluid resuscitation.  We will continue to perform serial checks.    Acute renal failure superimposed on stage 3a chronic kidney disease  Worsening creatinine since admission, consented the family and placed a right-sided Trialysis catheter with plans to perform dialysis if the  patient requires this.  Likely etiology at this time is from hypotension/hypovolemia causing acute tubular necrosis.  We will continue to monitor potassium.    Complex renal cyst  There is a renal mass seen on the recent CT scan and an ultrasound has been ordered.  Prior CT scan showed evidence of renal cysts.    Endocrine  Morbid obesity  Complicates all aspects of care    GI  Small bowel obstruction  Small-bowel obstruction partial or complete based on recent CT scan, patient with NG tube in place.  Surgery has been consulted.    Acute superficial gastritis without hemorrhage  Severe esophagitis present on recent EGD.  Gastroenterology has been consulted who will come by to see the patient 1/6/25 and we will also evaluate for any evidence of esophageal injury.    Other  Subcutaneous air-neck  Patient with subcutaneous air in the neck on CT scan that was performed.  There is no evidence of subcutaneous emphysema/crepitus on physical examination.  She does not have elevated peak pressures and no evidence of significant large pneumomediastinum.  At this time the etiology of the subcutaneous air in her neck are from possible esophageal injury given the patient's underlying severe esophagitis and placement NG tube, injury from her intubation from posterior pharynx and less likely to be pneumomediastinum in the setting of positive airway pressure as she has no evidence of significant mediastinal air in the distal trachea to suggest this as the cause.  She also has evidence of bleeding from her mouth which again may be in the setting of recent intubation causing trauma.  At this time it has stabilized to a slow ooze/has stopped after being packed in the back of her pharynx with TXA gauze.  I have spoken to ENT physician Dr. Langston who will come bedside to evaluate the patient but has preliminary told us to monitor the patient and agrees with plan of care at this time.  We will perform serial chest x-rays to ensure the  patient has no enlarging pneumothorax (she has a very tiny pneumothorax mentioned on the CT report which may be a component of the posterior mediastinal air).  Even though her bleeding has stabilized, we will order serial H&H to monitor.  Hold off on DVT prophylaxis in the setting of this recent bleed.    Syncope  History of syncope, being worked up with monitoring.  She does have a pacemaker which will be interrogated.  Likely cause of her altered mental status at this time is toxic metabolic encephalopathy.     Critical Care Checklist    Refer to chart for details regarding spontaneous awakening trial (SAT) and spontaneous breathing trial (SBT), CAM-ICU assessment, early mobility and feeding.      Analgesia and sedation- propofol, fentanyl  Thromboembolic prophylaxis- holding off on heparin for DVT prophylaxis in the setting of bleeding from mouth, we will initiate SCDs  Height of bed greater than 30°- Yes  Stress ulcer prophylaxis- pantoprazole  Indwelling catheter (vascular access lines/Loya catheter)-Reviewed  Deescalation of antimicrobials/pharmacotherapies-Reviewed and addressed appropriately  Code status- Full Code        Critical Care Time:  120 minutes  Critical secondary to Patient has a condition that poses threat to life and bodily function:  Septic shock requiring vasopressor support, altered mental status requiring intubation for airway protection, small-bowel obstruction      Critical care was time spent personally by me on the following activities: development of treatment plan with patient or surrogate and bedside caregivers, discussions with consultants, evaluation of patient's response to treatment, examination of patient, ordering and performing treatments and interventions, ordering and review of laboratory studies, ordering and review of radiographic studies, pulse oximetry, re-evaluation of patient's condition. This critical care time did not overlap with that of any other provider or  involve time for any procedures.     Jagjit Wayne MD  Critical Care Medicine  [facility]

## 2025-01-06 NOTE — PROGRESS NOTES
[facility]  Critical Care Medicine  Progress Note    Patient Name: Renetta Stewart  MRN: [id number]  Admission Date: 1/2/2025  Hospital Length of Stay: 4 days  Code Status: Full Code  Attending Provider: Jagjit Wayne MD  Primary Care Provider: Eldon Govea MD   Principal Problem: Lactic acidosis    Subjective:     HPI:  74-year-old female with past medical history significant for syncope, collapse, worked up for orthostatic hypotension who presented to the ICU on 1/5/25 in the setting of being obtunded and found to have small-bowel obstruction, now intubated and sedated with shock and lactic acidosis.    The patient was admitted to hospital medicine on 1/3/25.  CT abdomen at the time showed evidence of diffuse wall thickening compatible with colitis.  An EEG was performed due to altered mental status 1/13/25 which showed evidence of toxic metabolic encephalopathy.  No evidence of epileptiform findings or electrographic seizures noted.  Of note, she also had an EGD performed on 12/27/24 which showed evidence of esophagitis and a 5 cm hiatal hernia with erythematous mucosa in the fundus of the stomach and antrum.  The mucosa of mid and distal esophagus was reportedly severely inflamed with white plaque present.    She was being worked up for oliguria and lactic acidosis when she was found to be acutely obtunded and nonresponsive on the morning of 1/5/25.      The patient was brought down emergently to the ICU.  At this time, she began acutely decompensating without the ability to protect her airway, saturating on facemask.  She was then emergently intubated bedside.  She prove to be a difficult intubation (did not have any significant episodes of desaturation) however she had a significant amount of vomitus and bile which complicated her intubation, requiring anesthesia attending to perform the intubation at bedside.  We had already consented the family prior to the intubation and a Trialysis  catheter was placed in anticipation of possible dialysis in case her oliguria worsens.        Hospital/ICU Course:  1/6/25-overnight improving lactic acidosis, still persistent.  Echocardiogram with severe concentric hypertrophy, still on low-moderate dose single vasopressor support without evidence of any active oozing from her mouth or pneumothorax on her chest x-ray scans.  Seen by Dr. Langston at bedside on 1/5/25 who does not recommend any significant intervention, agrees with packing and clinical monitoring without any significant acute intervention.    Interval History/Significant Events:  Refer to hospital course    Review of Systems  Objective:     Vital Signs (Most Recent):  Temp: 96.8 °F (36 °C) (01/06/25 1345)  Pulse: 80 (01/06/25 1345)  Resp: 16 (01/06/25 1345)  BP: (!) 114/45 (01/06/25 1345)  SpO2: 100 % (01/06/25 1345) Vital Signs (24h Range):  Temp:  [94.5 °F (34.7 °C)-98.3 °F (36.8 °C)] 96.8 °F (36 °C)  Pulse:  [58-80] 80  Resp:  [10-19] 16  SpO2:  [86 %-100 %] 100 %  BP: ()/() 114/45   Weight: 94.2 kg (207 lb 10.8 oz)  Body mass index is 35.65 kg/m².      Intake/Output Summary (Last 24 hours) at 1/6/2025 1404  Last data filed at 1/6/2025 1341  Gross per 24 hour   Intake 5380.58 ml   Output 1165 ml   Net 4215.58 ml          Physical Exam  Constitutional:       General: She is in acute distress.      Appearance: Normal appearance. She is obese. She is ill-appearing. She is not diaphoretic.   HENT:      Head: Normocephalic and atraumatic.      Nose: No congestion or rhinorrhea.      Mouth/Throat:      Mouth: Mucous membranes are dry.      Comments: No significant oozing from mouth this a.m. in terms of bleeding  Cardiovascular:      Rate and Rhythm: Normal rate and regular rhythm.      Pulses: Normal pulses.      Heart sounds: Normal heart sounds.   Pulmonary:      Effort: Pulmonary effort is normal. No respiratory distress.      Breath sounds: No stridor. Rhonchi present. No wheezing or  rales.   Chest:      Chest wall: No tenderness.   Abdominal:      General: Abdomen is flat.   Musculoskeletal:      Cervical back: Normal range of motion. No rigidity.      Right lower leg: Edema present.      Left lower leg: Edema present.   Skin:     General: Skin is warm.      Findings: No erythema.   Neurological:      Mental Status: She is alert and oriented to person, place, and time.      Comments: Intubated, sedated            Vents:  Vent Mode: A/C (01/06/25 1226)  Ventilator Initiated: Yes (01/05/25 1015)  Set Rate: 16 BPM (01/06/25 1226)  Vt Set: 450 mL (01/06/25 1226)  PEEP/CPAP: 5 cmH20 (01/06/25 1226)  Oxygen Concentration (%): 60 (01/06/25 1226)  Peak Airway Pressure: 22.5 cmH20 (01/06/25 1226)  Plateau Pressure: 19.9 cmH20 (01/06/25 0400)  Total Ve: 7.02 L/m (01/06/25 1226)  F/VT Ratio<105 (RSBI): (!) 36.53 (01/06/25 0823)  Lines/Drains/Airways       Central Venous Catheter Line  Duration             Trialysis (Dialysis) Catheter 01/05/25 1058 external jugular;right internal jugular 1 day              Drain  Duration                  NG/OG Tube 01/05/25 1033 Right nostril 1 day         Urethral Catheter 01/05/25 0900 Silicone 16 Fr. 1 day              Airway  Duration                  Airway - Non-Surgical 01/05/25 1015 1 day              Peripheral Intravenous Line  Duration                  Midline Catheter - Single Lumen 01/05/25 0945 Right basilic vein (medial side of arm) 20g x 10cm 1 day         Peripheral IV - Single Lumen 01/05/25 0414 20 G Anterior;Left Forearm 1 day                  Significant Labs:    CBC/Anemia Profile:  Recent Labs   Lab 01/05/25  0226 01/05/25  0944 01/06/25  0305 01/06/25  0931 01/06/25  1246   WBC 6.64 9.22 6.87  --   --    HGB 10.3* 11.4* 8.5* 8.2* 8.5*   HCT 34.9* 37.2* 28.1* 25.3* 26.6*   PLT 78* 87* 37*  --   --    MCV 85.7 82.1 83.6  --   --    RDW 16.6* 16.7* 16.6*  --   --         Chemistries:  Recent Labs   Lab 01/05/25  0226 01/05/25  0950 01/06/25  0119     142 140   K 3.1* 3.4* 3.1*    101 104   CO2 19* 16* 16*   BUN 50* 50* 50*   CREATININE 4.19* 4.66* 4.53*   CALCIUM 7.4* 7.7* 6.9*   ALBUMIN 2.3*  --   --    PROT 5.7*  --   --    BILITOT 0.8  --   --    ALKPHOS 36*  --   --    ALT 84*  --   --    AST 64*  --   --    MG  --   --  1.2*   PHOS  --   --  3.8       All pertinent labs within the past 24 hours have been reviewed.    Significant Imaging:  I have reviewed all pertinent imaging results/findings within the past 24 hours.    ABG  Recent Labs   Lab 01/06/25  1354   PH 7.52*   PO2 188*   PCO2 29*   HCO3 23.7     Assessment/Plan:     Neuro  Encephalopathy, metabolic  Severe metabolic encephalopathy.  Her altered mental status is likely in the setting of toxic metabolic encephalopathy, as suggested by an EEG done a couple of days ago.  I also took a detailed history from the family and there is no evidence of nuchal rigidity, recent ear infection, very high fevers to suggest meningitis or encephalitis at this time.  Continues to remain intubated and sedated at this time    Pulmonary  Hard to intubate  Patient extremely difficult to intubate by bedside.  Appreciate prompt anesthesia response to help and anesthesia attending was able to intubate the patient with cricoid pressure, with the help of a bougie tube.  The patient's anatomy indicates that she has very anterior in terms of her vocal cords.  In addition, her intubation was very complicated due to a significant amount of vomitus of bile during the intubation.  A prompt bedside bronchoscopy was performed immediately with clearance are of aspirated gastric contents.    Cardiac/Vascular  Elevated troponin  Likely in the setting of her demand ischemia, history of severe concentric hypertrophy.    Renal/  * Lactic acidosis  Severe lactic acidosis, likely in the setting of hypovolemia and possible vasodilatory shock, requiring fluid resuscitation.  We will continue to perform serial checks.  She has  had some improvement on 1/6/25    Acute renal failure superimposed on stage 3a chronic kidney disease  Worsening creatinine since admission, consented the family and placed a right-sided Trialysis catheter with plans to perform dialysis if the patient requires this.  Likely etiology at this time is from hypotension/hypovolemia causing acute tubular necrosis.  We will continue to monitor potassium.    Complex renal cyst  There is a renal mass seen on the recent CT scan and an ultrasound has been ordered.  Prior CT scan showed evidence of renal cysts.    Endocrine  Morbid obesity  Complicates all aspects of care    GI  Small bowel obstruction  Small-bowel obstruction partial or complete based on recent CT scan, patient with NG tube in place.  Appreciate surgery consultation, agree that less likely to be mesenteric ischemia given improving lactic acidosis and low-dose vasopressor support requirements.  Still may have some third spacing from her small-bowel obstruction.    Acute superficial gastritis without hemorrhage  Severe esophagitis present on recent EGD.  Gastroenterology has been consulted who will come by to see the patient 1/6/25 and we will also evaluate for any evidence of esophageal injury.    Other  Subcutaneous air-neck  Patient with subcutaneous air in the neck on CT scan that was performed.  There is no evidence of subcutaneous emphysema/crepitus on physical examination.  She does not have elevated peak pressures and no evidence of significant large pneumomediastinum.  At this time the etiology of the subcutaneous air in her neck are from possible esophageal injury given the patient's underlying severe esophagitis and placement NG tube, injury from her intubation from posterior pharynx and less likely to be pneumomediastinum in the setting of positive airway pressure as she has no evidence of significant mediastinal air in the distal trachea to suggest this as the cause.     Status post packing in her  mouth with TXA soaked gauze, with improvement and resolution of her oozing.  Serial chest x-rays without any evidence of pneumothorax, physical examination without evidence of subcutaneous emphysema.  Evaluated the patient bedside with ENT physician Dr. Langston who has recommended continuing clinical monitoring, no intervention at this time needed and no additional imaging of the neck.  Appreciate evaluation by Dr. Langstno on 1/5/25 evening.    Pending gastroenterology consultation 1/6/25.          Syncope  History of syncope, being worked up with monitoring.  She does have a pacemaker which will be interrogated.  Likely cause of her altered mental status at this time is toxic metabolic encephalopathy.     Critical Care Checklist     Refer to chart for details regarding spontaneous awakening trial (SAT) and spontaneous breathing trial (SBT), CAM-ICU assessment, early mobility and feeding.       Analgesia and sedation- propofol, fentanyl  Thromboembolic prophylaxis- holding off on heparin for DVT prophylaxis in the setting of bleeding from mouth, we will initiate SCDs  Height of bed greater than 30°- Yes  Stress ulcer prophylaxis- pantoprazole  Indwelling catheter (vascular access lines/Loya catheter)-Reviewed  Deescalation of antimicrobials/pharmacotherapies-Reviewed and addressed appropriately  Code status- Full Code           Critical Care Time:  45 minutes  Critical secondary to Patient has a condition that poses threat to life and bodily function:  Septic shock requiring vasopressor support, altered mental status requiring intubation for airway protection, small-bowel obstruction      Critical care was time spent personally by me on the following activities: development of treatment plan with patient or surrogate and bedside caregivers, discussions with consultants, evaluation of patient's response to treatment, examination of patient, ordering and performing treatments and interventions, ordering and review of  laboratory studies, ordering and review of radiographic studies, pulse oximetry, re-evaluation of patient's condition. This critical care time did not overlap with that of any other provider or involve time for any procedures.     Jagjit Wayne MD  Critical Care Medicine  [facility]

## 2025-01-06 NOTE — ASSESSMENT & PLAN NOTE
Severe metabolic encephalopathy.  Her altered mental status is likely in the setting of toxic metabolic encephalopathy, as suggested by an EEG done a couple of days ago.  I also took a detailed history from the family and there is no evidence of nuchal rigidity, recent ear infection, very high fevers to suggest meningitis or encephalitis at this time.

## 2025-01-06 NOTE — ASSESSMENT & PLAN NOTE
Severe lactic acidosis, likely in the setting of hypovolemia and possible vasodilatory shock, requiring fluid resuscitation.  We will continue to perform serial checks.

## 2025-01-07 NOTE — PLAN OF CARE
Problem: Skin Injury Risk Increased  Goal: Skin Health and Integrity  Outcome: Progressing  Intervention: Optimize Skin Protection  Flowsheets (Taken 1/6/2025 1927)  Pressure Reduction Techniques:   heels elevated off bed   pressure points protected   sit time limited to 2 hours  Pressure Reduction Devices:   alternating pressure pump (GAETANO)   foam padding utilized  Skin Protection: silicone foam dressing in place  Activity Management: Rolling - L1  Head of Bed (HOB) Positioning: HOB at 30-45 degrees     Problem: Acute Kidney Injury/Impairment  Goal: Fluid and Electrolyte Balance  Outcome: Progressing  Intervention: Monitor and Manage Fluid and Electrolyte Balance  Flowsheets (Taken 1/6/2025 1927)  Fluid/Electrolyte Management: intravenous fluids adjusted     Problem: Fall Injury Risk  Goal: Absence of Fall and Fall-Related Injury  Outcome: Progressing  Intervention: Identify and Manage Contributors  Flowsheets (Taken 1/6/2025 1927)  Medication Review/Management: medications reviewed  Intervention: Promote Injury-Free Environment  Flowsheets (Taken 1/6/2025 1927)  Safety Promotion/Fall Prevention:   bed alarm set   Fall Risk signage in place   Fall Risk reviewed with patient/family   pulse ox   side rails raised x 3

## 2025-01-07 NOTE — PROGRESS NOTES
Patient is still requiring mechanical ventilation.  Patient is sedated upon exam.  Patient is receiving potassium and  magnesium replacement.  H&H was 8.5/28.1 yesterday and today at 7.6 and 23.7.  NG tube still to low intermittent suction.  Continued bilious drainage/output noted.  We will continue to monitor.

## 2025-01-07 NOTE — PLAN OF CARE
Problem: Mechanical Ventilation Invasive  Goal: Effective Communication  Outcome: Progressing  Goal: Optimal Device Function  Outcome: Progressing  Goal: Mechanical Ventilation Liberation  Outcome: Progressing  Goal: Optimal Nutrition Delivery  Outcome: Progressing  Goal: Absence of Device-Related Skin and Tissue Injury  Outcome: Progressing  Goal: Absence of Ventilator-Induced Lung Injury  Outcome: Progressing     Problem: Gas Exchange Impaired  Goal: Optimal Gas Exchange  Outcome: Progressing

## 2025-01-07 NOTE — PROGRESS NOTES
[facility]  Nephrology  Progress Note    Patient Name: Renetta Steawrt  MRN: [id number]  Admission Date: 1/2/2025  Hospital Length of Stay: 5 days  Attending Provider: Jagjit Wayne MD   Primary Care Physician: Eldon Govea MD  Principal Problem:Lactic acidosis    Consults  Subjective:     Interval History:  Patient is seen in follow-up of her acute on chronic renal impairment.  She remains oliguric with reasonable lab.  She has a potassium today of 2.6 which is being supplemented along with supplemental magnesium.  Her creatinines 4.7 and BUN 53.      She remains ventilated and has a blood pressure of 115 with pressor support.    She is receiving 125 cc of fluid per hour as maintenance and I think she can decrease that significantly.  We are going to decrease that to 50 cc/hour to avoid volume overload.     She is sedate on the ventilator.  She has no obvious edema.    We will continue to follow.          Review of patient's allergies indicates:  No Known Allergies  Current Facility-Administered Medications   Medication Frequency    aspirin chewable tablet 81 mg Daily    atorvastatin tablet 80 mg Daily    ceFEPIme injection 1 g Q24H    dextrose 50% injection 12.5 g PRN    dextrose 50% injection 25 g PRN    fentaNYL 2500 mcg in D5W 250 mL infusion premix (titrating) (conc: 10 mcg/mL) Continuous    glucagon (human recombinant) injection 1 mg PRN    glucose chewable tablet 16 g PRN    glucose chewable tablet 24 g PRN    heparin (porcine) injection 2,000 Units PRN    lactated ringers infusion Continuous    levothyroxine tablet 50 mcg Before breakfast    mupirocin 2 % ointment BID    naloxone 0.4 mg/mL injection 0.02 mg PRN    NORepinephrine bitartrate-D5W 4 mg/250 mL (16 mcg/mL) PERIPHERAL access infusion Continuous    pantoprazole injection 40 mg Daily    promethazine suppository 25 mg Q6H PRN    propofol (DIPRIVAN) 10 mg/mL infusion Continuous    sodium chloride 0.9% flush 10 mL Q12H PRN     tranexamic acid nebulizer Soln 500 mg Once    vancomycin - pharmacy to dose pharmacy to manage frequency       Objective:     Vital Signs (Most Recent):  Temp: 96.6 °F (35.9 °C) (01/07/25 0615)  Pulse: 73 (01/07/25 0615)  Resp: 14 (01/07/25 0615)  BP: (!) 136/38 (01/07/25 0600)  SpO2: 100 % (01/07/25 0615) Vital Signs (24h Range):  Temp:  [95.2 °F (35.1 °C)-99 °F (37.2 °C)] 96.6 °F (35.9 °C)  Pulse:  [68-95] 73  Resp:  [12-19] 14  SpO2:  [98 %-100 %] 100 %  BP: ()/(28-58) 136/38     Weight: 94.2 kg (207 lb 10.8 oz) (01/06/25 0230)  Body mass index is 35.65 kg/m².  Body surface area is 2.06 meters squared.    I/O last 3 completed shifts:  In: 5935 [I.V.:5718.4; IV Piggyback:216.7]  Out: 1381 [Urine:31; Drains:1350]    Physical Exam sedate on the ventilator.  No obvious edema.    Significant Labs:sureBMP:   Recent Labs   Lab 01/07/25 0257   *      K 2.6*      CO2 21*   BUN 53*   CREATININE 4.75*   CALCIUM 6.6*   MG 1.3*     CBC:   Recent Labs   Lab 01/07/25 0257 01/07/25  0852   WBC 11.72*  --    RBC 2.92*  --    HGB 7.6* 7.5*   HCT 23.7* 23.6*   PLT 30*  --    MCV 81.2  --    MCH 26.0*  --    MCHC 32.1  --        Significant Imaging:  Labs: Reviewed    Assessment/Plan:     Active Diagnoses:    Diagnosis Date Noted POA    PRINCIPAL PROBLEM:  Lactic acidosis [E87.20] 01/04/2025 Yes    Hard to intubate [T88.4XXA] 01/05/2025 Yes    Small bowel obstruction [K56.609] 01/05/2025 No    Subcutaneous air-neck [T79.7XXA] 01/05/2025 No    Encephalopathy, metabolic [G93.41] 01/04/2025 Yes    Acute renal failure superimposed on stage 3a chronic kidney disease [N17.9, N18.31] 01/04/2025 No    Colitis [K52.9] 01/03/2025 Yes    Altered mental status [R41.82] 01/02/2025 Yes    Neuroendocrine tumor [D3A.8] 12/30/2024 Yes    Esophagitis determined by endoscopy [K20.90] 12/27/2024 Yes    Acute superficial gastritis without hemorrhage [K29.00] 12/27/2024 Yes    Elevated troponin [R79.89] 12/21/2024 Yes     Syncope [R55] 12/21/2024 Yes    Syncope and collapse [R55] 12/20/2024 Yes    Type 2 MI (myocardial infarction) [I21.A1] 12/20/2024 Yes    Stage 3b chronic kidney disease [N18.32] 03/29/2023 Yes    Complex renal cyst [N28.1] 03/06/2023 Not Applicable    Morbid obesity [E66.01] 04/19/2022 Yes    Essential hypertension [I10] 05/27/2021 Yes      Problems Resolved During this Admission:       We will continue to follow.  I agree with supplementing potassium.  We will slow her IV fluid to avoid volume overload.    Thank you for your consult. I will follow-up with patient. Please contact us if you have any additional questions.    Yaron Acuna MD  Nephrology  [facility]

## 2025-01-08 PROBLEM — D64.9 ANEMIA: Status: ACTIVE | Noted: 2025-01-08

## 2025-01-08 PROBLEM — J18.9 LEFT LOWER LOBE PNEUMONIA: Status: ACTIVE | Noted: 2025-01-01

## 2025-01-08 PROBLEM — D69.6 THROMBOCYTOPENIA: Status: ACTIVE | Noted: 2025-01-01

## 2025-01-08 NOTE — PROGRESS NOTES
[facility]  Critical Care Medicine  Progress Note    Patient Name: Renetta Stewart  MRN: [id number]  Admission Date: 1/2/2025  Hospital Length of Stay: 5 days  Code Status: Full Code  Attending Provider: Jagjit Wayne MD  Primary Care Provider: Eldon Govea MD   Principal Problem: Lactic acidosis    Subjective:     HPI:  74-year-old female with past medical history significant for syncope, collapse, worked up for orthostatic hypotension who presented to the ICU on 1/5/25 in the setting of being obtunded and found to have small-bowel obstruction, now intubated and sedated with shock and lactic acidosis.    The patient was admitted to hospital medicine on 1/3/25.  CT abdomen at the time showed evidence of diffuse wall thickening compatible with colitis.  An EEG was performed due to altered mental status 1/13/25 which showed evidence of toxic metabolic encephalopathy.  No evidence of epileptiform findings or electrographic seizures noted.  Of note, she also had an EGD performed on 12/27/24 which showed evidence of esophagitis and a 5 cm hiatal hernia with erythematous mucosa in the fundus of the stomach and antrum.  The mucosa of mid and distal esophagus was reportedly severely inflamed with white plaque present.    She was being worked up for oliguria and lactic acidosis when she was found to be acutely obtunded and nonresponsive on the morning of 1/5/25.      The patient was brought down emergently to the ICU.  At this time, she began acutely decompensating without the ability to protect her airway, saturating on facemask.  She was then emergently intubated bedside.  She prove to be a difficult intubation (did not have any significant episodes of desaturation) however she had a significant amount of vomitus and bile which complicated her intubation, requiring anesthesia attending to perform the intubation at bedside.  We had already consented the family prior to the intubation and a Trialysis  catheter was placed in anticipation of possible dialysis in case her oliguria worsens.        Hospital/ICU Course:  1/6/25-overnight improving lactic acidosis, still persistent.  Echocardiogram with severe concentric hypertrophy, still on low-moderate dose single vasopressor support without evidence of any active oozing from her mouth or pneumothorax on her chest x-ray scans.  Seen by Dr. Langston at bedside on 1/5/25 who does not recommend any significant intervention, agrees with packing and clinical monitoring without any significant acute intervention.    1/7/25-patient with a minor ooze from the mouth overnight, controlled now, hypokalemic this morning status post replenishment of potassium.  Vancomycin discontinuing continuing cefepime, holding chemical DVT prophylaxis and using SCDs at this time in the setting of mild ooze from mouth.  10 cc of urine output overnight.  Gradually downtrending hemoglobin at 7.5 now.  No evidence of crepitus in the neck or subcutaneous emphysema on physical exam.  Reviewed case with Gastroenterology who believe there may be minor esophageal perforation, possibly from NG tube placement with nothing additional to do at this time.    Interval History/Significant Events:  Refer to hospital course    Review of Systems  Objective:     Vital Signs (Most Recent):  Temp: 97.2 °F (36.2 °C) (01/07/25 1915)  Pulse: 78 (01/07/25 1915)  Resp: 19 (01/07/25 1915)  BP: (!) 113/50 (01/07/25 1915)  SpO2: 99 % (01/07/25 1915) Vital Signs (24h Range):  Temp:  [96.6 °F (35.9 °C)-99.1 °F (37.3 °C)] 97.2 °F (36.2 °C)  Pulse:  [72-94] 78  Resp:  [14-25] 19  SpO2:  [98 %-100 %] 99 %  BP: ()/(34-59) 113/50   Weight: 94.2 kg (207 lb 10.8 oz)  Body mass index is 35.65 kg/m².      Intake/Output Summary (Last 24 hours) at 1/7/2025 2042  Last data filed at 1/7/2025 1931  Gross per 24 hour   Intake 3778.81 ml   Output 11 ml   Net 3767.81 ml          Physical Exam  Constitutional:       General: She is in  acute distress.      Appearance: Normal appearance. She is obese. She is ill-appearing. She is not diaphoretic.   HENT:      Head: Normocephalic and atraumatic.      Nose: No congestion or rhinorrhea.      Mouth/Throat:      Mouth: Mucous membranes are moist.      Comments: No significant oozing from mouth this a.m. in terms of bleeding  Cardiovascular:      Rate and Rhythm: Normal rate and regular rhythm.      Pulses: Normal pulses.      Heart sounds: Normal heart sounds.   Pulmonary:      Effort: Pulmonary effort is normal. No respiratory distress.      Breath sounds: No stridor. Rhonchi present. No wheezing or rales.      Comments: No evidence of subcutaneous emphysema or crepitus present in neck or around chest  Chest:      Chest wall: No tenderness.   Abdominal:      General: Abdomen is flat.   Musculoskeletal:      Cervical back: Normal range of motion. No rigidity.      Right lower leg: Edema present.      Left lower leg: Edema present.   Skin:     General: Skin is warm.      Findings: No erythema.   Neurological:      Mental Status: She is alert and oriented to person, place, and time.      Comments: Intubated, sedated            Vents:  Vent Mode: CPAP / PSV (01/07/25 1915)  Ventilator Initiated: Yes (01/05/25 1015)  Set Rate: 0 BPM (01/07/25 1915)  Vt Set: 0 mL (01/07/25 1915)  Pressure Support: 10 cmH20 (01/07/25 1915)  PEEP/CPAP: 5 cmH20 (01/07/25 1915)  Oxygen Concentration (%): 50 (01/07/25 1920)  Peak Airway Pressure: 15.7 cmH20 (01/07/25 1915)  Plateau Pressure: 16.4 cmH20 (01/07/25 1915)  Total Ve: 5.76 L/m (01/07/25 1915)  F/VT Ratio<105 (RSBI): (!) 45.89 (01/07/25 1915)  Lines/Drains/Airways       Central Venous Catheter Line  Duration             Trialysis (Dialysis) Catheter 01/05/25 1058 external jugular;right internal jugular 2 days              Drain  Duration                  NG/OG Tube 01/05/25 1033 Right nostril 2 days         Urethral Catheter 01/05/25 0900 Silicone 16 Fr. 2 days               Airway  Duration                  Airway - Non-Surgical 01/05/25 1015 2 days              Peripheral Intravenous Line  Duration                  Midline Catheter - Single Lumen 01/05/25 0945 Right basilic vein (medial side of arm) 20g x 10cm 2 days         Peripheral IV - Single Lumen 01/05/25 0414 20 G Anterior;Left Forearm 2 days                  Significant Labs:    CBC/Anemia Profile:  Recent Labs   Lab 01/06/25  0305 01/06/25  0931 01/07/25  0042 01/07/25  0257 01/07/25  0852   WBC 6.87  --   --  11.72*  --    HGB 8.5*   < > 7.7* 7.6* 7.5*   HCT 28.1*   < > 24.9* 23.7* 23.6*   PLT 37*  --   --  30*  --    MCV 83.6  --   --  81.2  --    RDW 16.6*  --   --  16.5*  --     < > = values in this interval not displayed.        Chemistries:  Recent Labs   Lab 01/06/25  0119 01/06/25  1444 01/07/25  0257 01/07/25  1239    135* 137 136   K 3.1* 3.0* 2.6* 3.2*    98 101 101   CO2 16* 21* 21* 22*   BUN 50* 51* 53* 53*   CREATININE 4.53* 4.81* 4.75* 4.96*   CALCIUM 6.9* 6.7* 6.6* 6.7*   MG 1.2*  --  1.3*  --    PHOS 3.8  --  3.0  --        All pertinent labs within the past 24 hours have been reviewed.    Significant Imaging:  I have reviewed all pertinent imaging results/findings within the past 24 hours.    ABG  Recent Labs   Lab 01/06/25  1354   PH 7.52*   PO2 188*   PCO2 29*   HCO3 23.7     Assessment/Plan:     Neuro  Encephalopathy, metabolic  Severe metabolic encephalopathy.  Her altered mental status is likely in the setting of toxic metabolic encephalopathy, as suggested by an EEG done a couple of days ago.  I also took a detailed history from the family and there is no evidence of nuchal rigidity, recent ear infection, very high fevers to suggest meningitis or encephalitis at this time.      Continues to remain intubated and sedated at this time, aggressive spontaneous awakening trial today with similar exam S2 prior to being intubated.  Patient with some grimacing to noxious stimuli but no  meaningful neurological activity.    Pulmonary  Hard to intubate  Patient extremely difficult to intubate by bedside.  Appreciate prompt anesthesia response to help and anesthesia attending was able to intubate the patient with cricoid pressure, with the help of a bougie tube.  The patient's anatomy indicates that she has very anterior in terms of her vocal cords.  In addition, her intubation was very complicated due to a significant amount of vomitus of bile during the intubation.  A prompt bedside bronchoscopy was performed immediately with clearance are of aspirated gastric contents.    Cardiac/Vascular  Elevated troponin  Likely in the setting of her demand ischemia, history of severe concentric hypertrophy.    Renal/  * Lactic acidosis  Severe lactic acidosis, likely in the setting of hypovolemia and possible vasodilatory shock, requiring fluid resuscitation.  We will continue to perform serial checks.  Now with significant improvement status post volume administration on 1/7/25.    Acute renal failure superimposed on stage 3a chronic kidney disease  Worsening creatinine since admission, consented the family and placed a right-sided Trialysis catheter with plans to perform dialysis if the patient requires this.  Likely etiology at this time is from hypotension/hypovolemia causing acute tubular necrosis.  We will continue to monitor potassium.  At this time the patient has been hypokalemic and may be approaching the need for dialysis to perform volume removal or if significant amount of uremia.    Complex renal cyst  There is a renal mass seen on the recent CT scan and an ultrasound has been ordered with the following impression below.  Prior CT scan showed evidence of renal cysts.    Impression:     1. Indeterminate isoechoic lesion in the left inferior renal pole measuring 2.2 cm.  Cannot exclude solid mass.  Recommend further evaluation with contrast enhanced CT or MRI renal mass protocol.  2. Right renal  simple cyst.       We will attempt contrast-enhanced CT if there is improvement of renal function and/or MRI with clinical improvement instability    Endocrine  Morbid obesity  Complicates all aspects of care    GI  Small bowel obstruction  Small-bowel obstruction partial or complete based on recent CT scan, patient with NG tube in place.  Appreciate surgery consultation, agree that less likely to be mesenteric ischemia given improving lactic acidosis and low-dose vasopressor support requirements.  Still may have some third spacing from her small-bowel obstruction.    Acute superficial gastritis without hemorrhage  Severe esophagitis present on recent EGD.  Gastroenterology has been consulted who have reviewed the scans in detail, seen the patient on 1/6/25 and agree there may be minor esophageal injury with their recommendations including no further intervention at this time required, continuing antibiotics and no need to perform esophagram.  Appreciate Dr. Lora (gastroenterology) seeing the patient and giving her recommendations.    Other  Subcutaneous air-neck  Patient with subcutaneous air in the neck on CT scan that was performed.  There is no evidence of subcutaneous emphysema/crepitus on physical examination.  She does not have elevated peak pressures and no evidence of significant large pneumomediastinum.  At this time the etiology of the subcutaneous air in her neck are from possible esophageal injury given the patient's underlying severe esophagitis and placement NG tube, injury from her intubation from posterior pharynx and less likely to be pneumomediastinum in the setting of positive airway pressure as she has no evidence of significant mediastinal air in the distal trachea to suggest this as the cause.     Status post packing in her mouth with TXA soaked gauze, with improvement and resolution of her oozing.  Serial chest x-rays without any evidence of pneumothorax, physical examination without  evidence of subcutaneous emphysema.  Evaluated the patient bedside with ENT physician Dr. Langston who has recommended continuing clinical monitoring, no intervention at this time needed and no additional imaging of the neck.  Appreciate evaluation by Dr. Langston on 1/5/25 evening.    Gastroenterology consultation from 1/6/25 greatly appreciated.  There was concern for possible esophageal injury, they have requested no additional intervention at this time and to continue antibiotics with no additional imaging warranted.          Syncope  History of syncope, being worked up with monitoring.  She does have a pacemaker which was interrogated.  Likely cause of her altered mental status at this time is toxic metabolic encephalopathy.    Critical Care Checklist    Refer to chart for details regarding spontaneous awakening trial (SAT) and spontaneous breathing trial (SBT), CAM-ICU assessment, early mobility and feeding.      Analgesia and sedation- propofol, fentanyl  Thromboembolic prophylaxis- holding on DVT prophylaxis in the setting of recent mild ooze from mouth.  If continues to remain controlled, we will consider initiation of heparin for DVT prophylaxis.  Height of bed greater than 30°- Yes  Stress ulcer prophylaxis- pantoprazole IV  Indwelling catheter (vascular access lines/Loya catheter)-Reviewed  Deescalation of antimicrobials/pharmacotherapies-Reviewed and addressed appropriately  Code status- Full Code        Critical Care Time:  45 minutes  Critical secondary to Patient has a condition that poses threat to life and bodily function:  Acute hypoxic respiratory failure requiring intubation for airway protection, small-bowel obstruction on vasopressor support      Critical care was time spent personally by me on the following activities: development of treatment plan with patient or surrogate and bedside caregivers, discussions with consultants, evaluation of patient's response to treatment, examination of  patient, ordering and performing treatments and interventions, ordering and review of laboratory studies, ordering and review of radiographic studies, pulse oximetry, re-evaluation of patient's condition. This critical care time did not overlap with that of any other provider or involve time for any procedures.     Jagjit Wayne MD  Critical Care Medicine  [facility]

## 2025-01-08 NOTE — ASSESSMENT & PLAN NOTE
Concern for DIC at this time, pending INR, PTT, fibrinogen and D-dimer.  Administering platelets today and watching for any evidence of gross bleeding.  We will be judicious about additional invasive procedures at this time and continue to hold DVT prophylaxis.

## 2025-01-08 NOTE — ASSESSMENT & PLAN NOTE
There is a renal mass seen on the recent CT scan and an ultrasound has been ordered with the following impression below.  Prior CT scan showed evidence of renal cysts.    Impression:     1. Indeterminate isoechoic lesion in the left inferior renal pole measuring 2.2 cm.  Cannot exclude solid mass.  Recommend further evaluation with contrast enhanced CT or MRI renal mass protocol.  2. Right renal simple cyst.       We will attempt contrast-enhanced CT if there is improvement of renal function and/or MRI with clinical improvement instability

## 2025-01-08 NOTE — PROGRESS NOTES
Patient is still requiring mechanical ventilation.  Patient is sedated upon exam.   H&H has continued to decrease, was 7.6/23.7 yesterday.  Today at 6.8/21.3.  Dr. Wayne has ordered 2 units packed red blood cells to be given today.  NG tube still to low intermittent suction.  Continued bilious drainage/output noted.  We will continue to monitor.

## 2025-01-08 NOTE — ASSESSMENT & PLAN NOTE
Opacification on chest x-ray from 1/8/25 showing evidence of possible left lower lobe pleural effusion versus retrocardiac consolidation.  Patient has been on cefepime at this time.  No growth on final cultures from bronchoscopy performed immediately after intubation.  Continued on cefepime at this time.

## 2025-01-08 NOTE — SUBJECTIVE & OBJECTIVE
Interval History/Significant Events:  Refer to hospital course    Review of Systems  Objective:     Vital Signs (Most Recent):  Temp: 97.2 °F (36.2 °C) (01/07/25 1915)  Pulse: 78 (01/07/25 1915)  Resp: 19 (01/07/25 1915)  BP: (!) 113/50 (01/07/25 1915)  SpO2: 99 % (01/07/25 1915) Vital Signs (24h Range):  Temp:  [96.6 °F (35.9 °C)-99.1 °F (37.3 °C)] 97.2 °F (36.2 °C)  Pulse:  [72-94] 78  Resp:  [14-25] 19  SpO2:  [98 %-100 %] 99 %  BP: ()/(34-59) 113/50   Weight: 94.2 kg (207 lb 10.8 oz)  Body mass index is 35.65 kg/m².      Intake/Output Summary (Last 24 hours) at 1/7/2025 2042  Last data filed at 1/7/2025 1931  Gross per 24 hour   Intake 3778.81 ml   Output 11 ml   Net 3767.81 ml          Physical Exam  Constitutional:       General: She is in acute distress.      Appearance: Normal appearance. She is obese. She is ill-appearing. She is not diaphoretic.   HENT:      Head: Normocephalic and atraumatic.      Nose: No congestion or rhinorrhea.      Mouth/Throat:      Mouth: Mucous membranes are moist.      Comments: No significant oozing from mouth this a.m. in terms of bleeding  Cardiovascular:      Rate and Rhythm: Normal rate and regular rhythm.      Pulses: Normal pulses.      Heart sounds: Normal heart sounds.   Pulmonary:      Effort: Pulmonary effort is normal. No respiratory distress.      Breath sounds: No stridor. Rhonchi present. No wheezing or rales.      Comments: No evidence of subcutaneous emphysema or crepitus present in neck or around chest  Chest:      Chest wall: No tenderness.   Abdominal:      General: Abdomen is flat.   Musculoskeletal:      Cervical back: Normal range of motion. No rigidity.      Right lower leg: Edema present.      Left lower leg: Edema present.   Skin:     General: Skin is warm.      Findings: No erythema.   Neurological:      Mental Status: She is alert and oriented to person, place, and time.      Comments: Intubated, sedated            Vents:  Vent Mode: CPAP / PSV  (01/07/25 1915)  Ventilator Initiated: Yes (01/05/25 1015)  Set Rate: 0 BPM (01/07/25 1915)  Vt Set: 0 mL (01/07/25 1915)  Pressure Support: 10 cmH20 (01/07/25 1915)  PEEP/CPAP: 5 cmH20 (01/07/25 1915)  Oxygen Concentration (%): 50 (01/07/25 1920)  Peak Airway Pressure: 15.7 cmH20 (01/07/25 1915)  Plateau Pressure: 16.4 cmH20 (01/07/25 1915)  Total Ve: 5.76 L/m (01/07/25 1915)  F/VT Ratio<105 (RSBI): (!) 45.89 (01/07/25 1915)  Lines/Drains/Airways       Central Venous Catheter Line  Duration             Trialysis (Dialysis) Catheter 01/05/25 1058 external jugular;right internal jugular 2 days              Drain  Duration                  NG/OG Tube 01/05/25 1033 Right nostril 2 days         Urethral Catheter 01/05/25 0900 Silicone 16 Fr. 2 days              Airway  Duration                  Airway - Non-Surgical 01/05/25 1015 2 days              Peripheral Intravenous Line  Duration                  Midline Catheter - Single Lumen 01/05/25 0945 Right basilic vein (medial side of arm) 20g x 10cm 2 days         Peripheral IV - Single Lumen 01/05/25 0414 20 G Anterior;Left Forearm 2 days                  Significant Labs:    CBC/Anemia Profile:  Recent Labs   Lab 01/06/25  0305 01/06/25  0931 01/07/25  0042 01/07/25  0257 01/07/25  0852   WBC 6.87  --   --  11.72*  --    HGB 8.5*   < > 7.7* 7.6* 7.5*   HCT 28.1*   < > 24.9* 23.7* 23.6*   PLT 37*  --   --  30*  --    MCV 83.6  --   --  81.2  --    RDW 16.6*  --   --  16.5*  --     < > = values in this interval not displayed.        Chemistries:  Recent Labs   Lab 01/06/25  0119 01/06/25  1444 01/07/25  0257 01/07/25  1239    135* 137 136   K 3.1* 3.0* 2.6* 3.2*    98 101 101   CO2 16* 21* 21* 22*   BUN 50* 51* 53* 53*   CREATININE 4.53* 4.81* 4.75* 4.96*   CALCIUM 6.9* 6.7* 6.6* 6.7*   MG 1.2*  --  1.3*  --    PHOS 3.8  --  3.0  --        All pertinent labs within the past 24 hours have been reviewed.    Significant Imaging:  I have reviewed all pertinent  imaging results/findings within the past 24 hours.

## 2025-01-08 NOTE — ASSESSMENT & PLAN NOTE
Worsening creatinine since admission, consented the family and placed a right-sided Trialysis catheter with plans to perform dialysis if the patient requires this.  Likely etiology at this time is from hypotension/hypovolemia causing acute tubular necrosis.  We will continue to monitor potassium.  At this time the patient has been hypokalemic and may be approaching the need for dialysis to perform volume removal or if significant amount of uremia.

## 2025-01-08 NOTE — ASSESSMENT & PLAN NOTE
Severe lactic acidosis, likely in the setting of hypovolemia and possible vasodilatory shock, requiring fluid resuscitation.  We will continue to perform serial checks.  Now with significant improvement status post volume administration on 1/7/25.

## 2025-01-08 NOTE — PLAN OF CARE
Problem: Skin Injury Risk Increased  Goal: Skin Health and Integrity  Outcome: Progressing  Intervention: Optimize Skin Protection  Flowsheets (Taken 1/7/2025 1940)  Pressure Reduction Techniques:   heels elevated off bed   pressure points protected   sit time limited to 2 hours  Pressure Reduction Devices:   alternating pressure pump (GAETANO)   foam padding utilized   heel offloading device utilized   positioning supports utilized  Skin Protection: incontinence pads utilized  Activity Management: Rolling - L1  Head of Bed (HOB) Positioning: HOB at 30-45 degrees     Problem: Acute Kidney Injury/Impairment  Goal: Fluid and Electrolyte Balance  Outcome: Progressing  Intervention: Monitor and Manage Fluid and Electrolyte Balance  Flowsheets (Taken 1/7/2025 1940)  Fluid/Electrolyte Management: intravenous fluids adjusted     Problem: Fall Injury Risk  Goal: Absence of Fall and Fall-Related Injury  Outcome: Progressing  Intervention: Promote Injury-Free Environment  Flowsheets (Taken 1/7/2025 1940)  Safety Promotion/Fall Prevention:   bed alarm set   side rails raised x 2   room near unit station   pulse ox

## 2025-01-08 NOTE — ASSESSMENT & PLAN NOTE
Severe metabolic encephalopathy.  Her altered mental status is likely in the setting of toxic metabolic encephalopathy, as suggested by an EEG done a couple of days ago.  I also took a detailed history from the family and there is no evidence of nuchal rigidity, recent ear infection, very high fevers to suggest meningitis or encephalitis at this time.      Continues to remain intubated and sedated at this time, aggressive spontaneous awakening trial today with similar exam prior to being intubated.  We will repeat spontaneous awakening trial today 1/8/25.

## 2025-01-08 NOTE — PLAN OF CARE
Problem: Adult Inpatient Plan of Care  Goal: Plan of Care Review  Outcome: Progressing  Goal: Patient-Specific Goal (Individualized)  Outcome: Progressing  Goal: Absence of Hospital-Acquired Illness or Injury  Outcome: Progressing  Goal: Optimal Comfort and Wellbeing  Outcome: Progressing  Goal: Readiness for Transition of Care  Outcome: Progressing     Problem: Skin Injury Risk Increased  Goal: Skin Health and Integrity  Outcome: Progressing     Problem: Acute Kidney Injury/Impairment  Goal: Fluid and Electrolyte Balance  Outcome: Progressing  Goal: Improved Oral Intake  Outcome: Progressing  Goal: Effective Renal Function  Outcome: Progressing     Problem: Infection  Goal: Absence of Infection Signs and Symptoms  Outcome: Progressing     Problem: Fall Injury Risk  Goal: Absence of Fall and Fall-Related Injury  Outcome: Progressing     Problem: Restraint, Nonviolent  Goal: Absence of Harm or Injury  Outcome: Progressing     Problem: Artificial Airway  Goal: Effective Communication  Outcome: Progressing  Goal: Optimal Device Function  Outcome: Progressing  Goal: Absence of Device-Related Skin or Tissue Injury  Outcome: Progressing     Problem: Mechanical Ventilation Invasive  Goal: Effective Communication  Outcome: Progressing  Goal: Optimal Device Function  Outcome: Progressing  Goal: Mechanical Ventilation Liberation  Outcome: Progressing  Goal: Optimal Nutrition Delivery  Outcome: Progressing  Goal: Absence of Device-Related Skin and Tissue Injury  Outcome: Progressing  Goal: Absence of Ventilator-Induced Lung Injury  Outcome: Progressing     Problem: Gas Exchange Impaired  Goal: Optimal Gas Exchange  Outcome: Progressing     Problem: Breathing Pattern Ineffective  Goal: Effective Breathing Pattern  Outcome: Progressing

## 2025-01-08 NOTE — SUBJECTIVE & OBJECTIVE
Interval History/Significant Events:  Refer to hospital course    Review of Systems  Objective:     Vital Signs (Most Recent):  Temp: 98.1 °F (36.7 °C) (01/08/25 1115)  Pulse: 91 (01/08/25 1115)  Resp: 14 (01/08/25 1115)  BP: (!) 115/41 (01/08/25 1115)  SpO2: 96 % (01/08/25 1115) Vital Signs (24h Range):  Temp:  [96.4 °F (35.8 °C)-99.1 °F (37.3 °C)] 98.1 °F (36.7 °C)  Pulse:  [72-96] 91  Resp:  [13-25] 14  SpO2:  [95 %-100 %] 96 %  BP: ()/(33-61) 115/41   Weight: 94.8 kg (208 lb 15.9 oz)  Body mass index is 35.87 kg/m².      Intake/Output Summary (Last 24 hours) at 1/8/2025 1220  Last data filed at 1/8/2025 1055  Gross per 24 hour   Intake 3218.63 ml   Output 35 ml   Net 3183.63 ml          Physical Exam  Constitutional:       General: She is in acute distress.      Appearance: Normal appearance. She is obese. She is ill-appearing. She is not diaphoretic.   HENT:      Head: Normocephalic and atraumatic.      Nose: No congestion or rhinorrhea.      Mouth/Throat:      Mouth: Mucous membranes are moist.      Comments: No significant oozing from mouth this a.m. in terms of bleeding  Cardiovascular:      Rate and Rhythm: Normal rate and regular rhythm.      Pulses: Normal pulses.      Heart sounds: Normal heart sounds.   Pulmonary:      Effort: Pulmonary effort is normal. No respiratory distress.      Breath sounds: No stridor. Rhonchi present. No wheezing or rales.      Comments: No evidence of subcutaneous emphysema or crepitus present in neck or around chest  Chest:      Chest wall: No tenderness.   Abdominal:      General: Abdomen is flat.   Musculoskeletal:      Cervical back: Normal range of motion. No rigidity.      Right lower leg: Edema present.      Left lower leg: Edema present.   Skin:     General: Skin is warm.      Findings: No erythema.   Neurological:      Mental Status: She is alert and oriented to person, place, and time.      Comments: Intubated, sedated            Vents:  Vent Mode: A/CMV-VC  (01/08/25 1050)  Ventilator Initiated: Yes (01/05/25 1015)  Set Rate: 14 BPM (01/08/25 1050)  Vt Set: 420 mL (01/08/25 1050)  Pressure Support: 10 cmH20 (01/07/25 1915)  PEEP/CPAP: 5 cmH20 (01/08/25 1050)  Oxygen Concentration (%): 50 (01/08/25 1101)  Peak Airway Pressure: 17.7 cmH20 (01/08/25 1050)  Plateau Pressure: 16.1 cmH20 (01/08/25 0742)  Total Ve: 7.33 L/m (01/08/25 1050)  F/VT Ratio<105 (RSBI): (!) 35.35 (01/08/25 0424)  Lines/Drains/Airways       Central Venous Catheter Line  Duration             Trialysis (Dialysis) Catheter 01/05/25 1058 external jugular;right internal jugular 3 days              Drain  Duration                  NG/OG Tube 01/05/25 1033 Right nostril 3 days         Urethral Catheter 01/05/25 0900 Silicone 16 Fr. 3 days              Airway  Duration                  Airway - Non-Surgical 01/05/25 1015 3 days              Peripheral Intravenous Line  Duration                  Midline Catheter - Single Lumen 01/05/25 0945 Right basilic vein (medial side of arm) 20g x 10cm 3 days         Peripheral IV - Single Lumen 01/05/25 0414 20 G Anterior;Left Forearm 3 days                  Significant Labs:    CBC/Anemia Profile:  Recent Labs   Lab 01/07/25  0257 01/07/25  0852 01/08/25  0502   WBC 11.72*  --  6.83   HGB 7.6* 7.5* 6.8*   HCT 23.7* 23.6* 21.3*   PLT 30*  --  15*   MCV 81.2  --  80.1   RDW 16.5*  --  16.4*        Chemistries:  Recent Labs   Lab 01/07/25  0257 01/07/25  1239 01/08/25  0502    136 134*   K 2.6* 3.2* 3.1*    101 101   CO2 21* 22* 20*   BUN 53* 53* 57*   CREATININE 4.75* 4.96* 5.23*   CALCIUM 6.6* 6.7* 6.5*   MG 1.3*  --  1.7   PHOS 3.0  --  3.2       All pertinent labs within the past 24 hours have been reviewed.    Significant Imaging:  I have reviewed all pertinent imaging results/findings within the past 24 hours.

## 2025-01-08 NOTE — ASSESSMENT & PLAN NOTE
Patient with subcutaneous air in the neck on CT scan that was performed.  There is no evidence of subcutaneous emphysema/crepitus on physical examination.  She does not have elevated peak pressures and no evidence of significant large pneumomediastinum.  At this time the etiology of the subcutaneous air in her neck are from possible esophageal injury given the patient's underlying severe esophagitis and placement NG tube, injury from her intubation from posterior pharynx and less likely to be pneumomediastinum in the setting of positive airway pressure as she has no evidence of significant mediastinal air in the distal trachea to suggest this as the cause.     Status post packing in her mouth with TXA soaked gauze, with improvement and resolution of her oozing.  Serial chest x-rays without any evidence of pneumothorax, physical examination without evidence of subcutaneous emphysema.  Evaluated the patient bedside with ENT physician Dr. Langston who has recommended continuing clinical monitoring, no intervention at this time needed and no additional imaging of the neck.  Appreciate evaluation by Dr. Langston on 1/5/25 evening.    Gastroenterology consultation from 1/6/25 greatly appreciated.  There was concern for possible esophageal injury, they have requested no additional intervention at this time and to continue antibiotics with no additional imaging warranted.

## 2025-01-08 NOTE — PROGRESS NOTES
[facility]  Nephrology  Progress Note    Patient Name: Renetta Stewart  MRN: [id number]  Admission Date: 1/2/2025  Hospital Length of Stay: 6 days  Attending Provider: Jagjit Wayne MD   Primary Care Physician: Eldon Govea MD  Principal Problem:Lactic acidosis    Consults  Subjective:     Interval History:  Patient is seen in follow-up of her acute on chronic renal failure.  She remains oliguric.  She is receiving transfusion today and is quite ill with in progressive thrombocytopenia.  She remains obtunded and is not currently sedate.  She is on the ventilator.        Review of patient's allergies indicates:  No Known Allergies  Current Facility-Administered Medications   Medication Frequency    0.9%  NaCl infusion (for blood administration) Q24H PRN    aspirin chewable tablet 81 mg Daily    atorvastatin tablet 80 mg Daily    ceFEPIme injection 1 g Q24H    dextrose 50% injection 12.5 g PRN    dextrose 50% injection 25 g PRN    fentaNYL 2500 mcg in D5W 250 mL infusion premix (titrating) (conc: 10 mcg/mL) Continuous    glucagon (human recombinant) injection 1 mg PRN    glucose chewable tablet 16 g PRN    glucose chewable tablet 24 g PRN    heparin (porcine) injection 2,000 Units PRN    lactated ringers infusion Continuous    levothyroxine tablet 50 mcg Before breakfast    magnesium sulfate 2g in water 50mL IVPB (premix) Once    mupirocin 2 % ointment BID    naloxone 0.4 mg/mL injection 0.02 mg PRN    NORepinephrine bitartrate-D5W 4 mg/250 mL (16 mcg/mL) PERIPHERAL access infusion Continuous    pantoprazole injection 40 mg Daily    potassium chloride 10 mEq in 100 mL IVPB Q1H    promethazine suppository 25 mg Q6H PRN    propofol (DIPRIVAN) 10 mg/mL infusion Continuous    sodium chloride 0.9% flush 10 mL Q12H PRN    tranexamic acid nebulizer Soln 500 mg Once       Objective:     Vital Signs (Most Recent):  Temp: 98.2 °F (36.8 °C) (01/08/25 1300)  Pulse: 86 (01/08/25 1300)  Resp: 14 (01/08/25  1300)  BP: (!) 149/49 (01/08/25 1245)  SpO2: 97 % (01/08/25 1300) Vital Signs (24h Range):  Temp:  [96.4 °F (35.8 °C)-99.1 °F (37.3 °C)] 98.2 °F (36.8 °C)  Pulse:  [72-96] 86  Resp:  [13-25] 14  SpO2:  [94 %-100 %] 97 %  BP: ()/(33-61) 149/49     Weight: 94.8 kg (208 lb 15.9 oz) (01/08/25 0310)  Body mass index is 35.87 kg/m².  Body surface area is 2.07 meters squared.    I/O last 3 completed shifts:  In: 4974.6 [I.V.:4654.2; IV Piggyback:320.5]  Out: 46 [Urine:46]    Physical Exam no edema.  Chest clear.  Sedate on the ventilator.  She is not on intravenous sedation at present    Significant Labs:sureBMP:   Recent Labs   Lab 01/08/25  0502      *   K 3.1*      CO2 20*   BUN 57*   CREATININE 5.23*   CALCIUM 6.5*   MG 1.7     All labs within the past 24 hours have been reviewed.    Significant Imaging:      Assessment/Plan:     Active Diagnoses:    Diagnosis Date Noted POA    PRINCIPAL PROBLEM:  Lactic acidosis [E87.20] 01/04/2025 Yes    Thrombocytopenia [D69.6] 01/08/2025 No    Anemia [D64.9] 01/08/2025 No    Left lower lobe pneumonia [J18.9] 01/08/2025 No    Hard to intubate [T88.4XXA] 01/05/2025 Yes    Small bowel obstruction [K56.609] 01/05/2025 No    Subcutaneous air-neck [T79.7XXA] 01/05/2025 No    Encephalopathy, metabolic [G93.41] 01/04/2025 Yes    Acute renal failure superimposed on stage 3a chronic kidney disease [N17.9, N18.31] 01/04/2025 No    Colitis [K52.9] 01/03/2025 Yes    Neuroendocrine tumor [D3A.8] 12/30/2024 Yes    Esophagitis determined by endoscopy [K20.90] 12/27/2024 Yes    Acute superficial gastritis without hemorrhage [K29.00] 12/27/2024 Yes    Elevated troponin [R79.89] 12/21/2024 Yes    Syncope [R55] 12/21/2024 Yes    Syncope and collapse [R55] 12/20/2024 Yes    Type 2 MI (myocardial infarction) [I21.A1] 12/20/2024 Yes    Stage 3b chronic kidney disease [N18.32] 03/29/2023 Yes    Complex renal cyst [N28.1] 03/06/2023 Not Applicable    Morbid obesity [E66.01]  04/19/2022 Yes    Essential hypertension [I10] 05/27/2021 Yes      Problems Resolved During this Admission:       We will continue to follow.  Overall she is making very little progress.  [place] is poor.  Blood pressure is stable on pressors.    Thank you for your consult. I will follow-up with patient. Please contact us if you have any additional questions.    Yaron Acuna MD  Nephrology  [facility]

## 2025-01-08 NOTE — ASSESSMENT & PLAN NOTE
Severe esophagitis present on recent EGD.  Gastroenterology has been consulted who have reviewed the scans in detail, seen the patient on 1/6/25 and agree there may be minor esophageal injury with their recommendations including no further intervention at this time required, continuing antibiotics and no need to perform esophagram.  Appreciate Dr. Lora (gastroenterology) seeing the patient and giving her recommendations.

## 2025-01-08 NOTE — ASSESSMENT & PLAN NOTE
History of syncope, being worked up with monitoring.  She does have a pacemaker which was interrogated.  Likely cause of her altered mental status at this time is toxic metabolic encephalopathy.

## 2025-01-08 NOTE — PROGRESS NOTES
[facility]  Critical Care Medicine  Progress Note    Patient Name: Renetta Stewart  MRN: [id number]  Admission Date: 1/2/2025  Hospital Length of Stay: 6 days  Code Status: Full Code  Attending Provider: Jagjit Wayne MD  Primary Care Provider: Eldon Govea MD   Principal Problem: Lactic acidosis    Subjective:     HPI:  74-year-old female with past medical history significant for syncope, collapse, worked up for orthostatic hypotension who presented to the ICU on 1/5/25 in the setting of being obtunded and found to have small-bowel obstruction, now intubated and sedated with shock and lactic acidosis.    The patient was admitted to hospital medicine on 1/3/25.  CT abdomen at the time showed evidence of diffuse wall thickening compatible with colitis.  An EEG was performed due to altered mental status 1/13/25 which showed evidence of toxic metabolic encephalopathy.  No evidence of epileptiform findings or electrographic seizures noted.  Of note, she also had an EGD performed on 12/27/24 which showed evidence of esophagitis and a 5 cm hiatal hernia with erythematous mucosa in the fundus of the stomach and antrum.  The mucosa of mid and distal esophagus was reportedly severely inflamed with white plaque present.    She was being worked up for oliguria and lactic acidosis when she was found to be acutely obtunded and nonresponsive on the morning of 1/5/25.      The patient was brought down emergently to the ICU.  At this time, she began acutely decompensating without the ability to protect her airway, saturating on facemask.  She was then emergently intubated bedside.  She prove to be a difficult intubation (did not have any significant episodes of desaturation) however she had a significant amount of vomitus and bile which complicated her intubation, requiring anesthesia attending to perform the intubation at bedside.  We had already consented the family prior to the intubation and a Trialysis  catheter was placed in anticipation of possible dialysis in case her oliguria worsens.        Hospital/ICU Course:  1/6/25-overnight improving lactic acidosis, still persistent.  Echocardiogram with severe concentric hypertrophy, still on low-moderate dose single vasopressor support without evidence of any active oozing from her mouth or pneumothorax on her chest x-ray scans.  Seen by Dr. Langston at bedside on 1/5/25 who does not recommend any significant intervention, agrees with packing and clinical monitoring without any significant acute intervention.    1/7/25-patient with a minor ooze from the mouth overnight, controlled now, hypokalemic this morning status post replenishment of potassium.  Vancomycin discontinuing continuing cefepime, holding chemical DVT prophylaxis and using SCDs at this time in the setting of mild ooze from mouth.  10 cc of urine output overnight.  Gradually downtrending hemoglobin at 7.5 now.  No evidence of crepitus in the neck or subcutaneous emphysema on physical exam.  Reviewed case with Gastroenterology who believe there may be minor esophageal perforation, possibly from NG tube placement with nothing additional to do at this time.    1/8/25-patient with worsening thrombocytopenia no obvious bleeding noted at this time.  We will attempt spontaneous awakening and spontaneous breathing trial again today.  Patient to receive 2 units platelets and 1 unit PRBC today along with potassium per placement.    No new subjective & objective note has been filed under this hospital service since the last note was generated.      ABG  Recent Labs   Lab 01/06/25  1354   PH 7.52*   PO2 188*   PCO2 29*   HCO3 23.7     Assessment/Plan:     Neuro  Encephalopathy, metabolic  Severe metabolic encephalopathy.  Her altered mental status is likely in the setting of toxic metabolic encephalopathy, as suggested by an EEG done a couple of days ago.  I also took a detailed history from the family and there is no  evidence of nuchal rigidity, recent ear infection, very high fevers to suggest meningitis or encephalitis at this time.      Continues to remain intubated and sedated at this time, aggressive spontaneous awakening trial today with similar exam prior to being intubated.  We will repeat spontaneous awakening trial today 1/8/25.    Pulmonary  Left lower lobe pneumonia  Opacification on chest x-ray from 1/8/25 showing evidence of possible left lower lobe pleural effusion versus retrocardiac consolidation.  Patient has been on cefepime at this time.  No growth on final cultures from bronchoscopy performed immediately after intubation.  Continued on cefepime at this time.    Hard to intubate  Patient extremely difficult to intubate by bedside.  Appreciate prompt anesthesia response to help and anesthesia attending was able to intubate the patient with cricoid pressure, with the help of a bougie tube.  The patient's anatomy indicates that she has very anterior in terms of her vocal cords.  In addition, her intubation was very complicated due to a significant amount of vomitus of bile during the intubation.  A prompt bedside bronchoscopy was performed immediately with clearance are of aspirated gastric contents.    Cardiac/Vascular  Elevated troponin  Likely in the setting of her demand ischemia, history of severe concentric hypertrophy.    Renal/  * Lactic acidosis  Severe lactic acidosis, likely in the setting of hypovolemia and possible vasodilatory shock, requiring fluid resuscitation.  We will continue to perform serial checks.  Now with significant improvement status post volume administration on 1/7/25.    Acute renal failure superimposed on stage 3a chronic kidney disease  Worsening creatinine since admission, consented the family and placed a right-sided Trialysis catheter with plans to perform dialysis if the patient requires this.  Likely etiology at this time is from hypotension/hypovolemia causing acute  tubular necrosis.  We will continue to monitor potassium.  At this time the patient has been hypokalemic and may be approaching the need for dialysis to perform volume removal or if significant amount of uremia.    Complex renal cyst  There is a renal mass seen on the recent CT scan and an ultrasound has been ordered with the following impression below.  Prior CT scan showed evidence of renal cysts.    Impression:     1. Indeterminate isoechoic lesion in the left inferior renal pole measuring 2.2 cm.  Cannot exclude solid mass.  Recommend further evaluation with contrast enhanced CT or MRI renal mass protocol.  2. Right renal simple cyst.       We will attempt contrast-enhanced CT if there is improvement of renal function and/or MRI with clinical improvement instability    Oncology  Anemia  Patient with gradually downtrending hemoglobin, down at 6.8 today, we will administer 1 unit PRBC.  No obvious source of bleeding, may be iatrogenic from blood draws.  We will continue to monitor for any source of bleeding.    Endocrine  Morbid obesity  Complicates all aspects of care    GI  Small bowel obstruction  Small-bowel obstruction partial or complete based on recent CT scan, patient with NG tube in place.  Appreciate surgery consultation, agree that less likely to be mesenteric ischemia given improving lactic acidosis and low-dose vasopressor support requirements.  Still may have some third spacing from her small-bowel obstruction.    Acute superficial gastritis without hemorrhage  Severe esophagitis present on recent EGD.  Gastroenterology has been consulted who have reviewed the scans in detail, seen the patient on 1/6/25 and agree there may be minor esophageal injury with their recommendations including no further intervention at this time required, continuing antibiotics and no need to perform esophagram.  Appreciate Dr. Lora (gastroenterology) seeing the patient and giving her  recommendations.    Other  Thrombocytopenia  Concern for DIC at this time, pending INR, PTT, fibrinogen and D-dimer.  Administering platelets today and watching for any evidence of gross bleeding.  We will be judicious about additional invasive procedures at this time and continue to hold DVT prophylaxis.    Subcutaneous air-neck  Patient with subcutaneous air in the neck on CT scan that was performed.  There is no evidence of subcutaneous emphysema/crepitus on physical examination.  She does not have elevated peak pressures and no evidence of significant large pneumomediastinum.  At this time the etiology of the subcutaneous air in her neck are from possible esophageal injury given the patient's underlying severe esophagitis and placement NG tube, injury from her intubation from posterior pharynx and less likely to be pneumomediastinum in the setting of positive airway pressure as she has no evidence of significant mediastinal air in the distal trachea to suggest this as the cause.     Status post packing in her mouth with TXA soaked gauze, with improvement and resolution of her oozing.  Serial chest x-rays without any evidence of pneumothorax, physical examination without evidence of subcutaneous emphysema.  Evaluated the patient bedside with ENT physician Dr. Langston who has recommended continuing clinical monitoring, no intervention at this time needed and no additional imaging of the neck.  Appreciate evaluation by Dr. Langston on 1/5/25 evening.    Gastroenterology consultation from 1/6/25 greatly appreciated.  There was concern for possible esophageal injury, they have requested no additional intervention at this time and to continue antibiotics with no additional imaging warranted.          Syncope  History of syncope, being worked up with monitoring.  She does have a pacemaker which was interrogated.  Likely cause of her altered mental status at this time is toxic metabolic encephalopathy.     Critical Care  Checklist     Refer to chart for details regarding spontaneous awakening trial (SAT) and spontaneous breathing trial (SBT), CAM-ICU assessment, early mobility and feeding.       Analgesia and sedation- propofol, fentanyl  Thromboembolic prophylaxis- holding off on heparin for DVT prophylaxis in the setting of thrombocytopenia, continue with SCDs Height of bed greater than 30°- Yes  Stress ulcer prophylaxis- pantoprazole  Indwelling catheter (vascular access lines/Loya catheter)-Reviewed  Deescalation of antimicrobials/pharmacotherapies-Reviewed and addressed appropriately  Code status- Full Code           Critical Care Time:  45 minutes  Critical secondary to Patient has a condition that poses threat to life and bodily function:  Septic shock requiring vasopressor support, altered mental status requiring intubation for airway protection, small-bowel obstruction      Critical care was time spent personally by me on the following activities: development of treatment plan with patient or surrogate and bedside caregivers, discussions with consultants, evaluation of patient's response to treatment, examination of patient, ordering and performing treatments and interventions, ordering and review of laboratory studies, ordering and review of radiographic studies, pulse oximetry, re-evaluation of patient's condition. This critical care time did not overlap with that of any other provider or involve time for any procedures.     Jagjit Wayne MD  Critical Care Medicine  [facility]

## 2025-01-09 PROBLEM — K22.3 ESOPHAGEAL PERFORATION: Status: ACTIVE | Noted: 2025-01-09

## 2025-01-09 PROBLEM — Z71.89 ADVANCED CARE PLANNING/COUNSELING DISCUSSION: Status: ACTIVE | Noted: 2025-01-09

## 2025-01-09 NOTE — NURSING
Spoke with patients daughter,Jacque, on behalf of Dr Wayne. Asking for kids to be present for 12 o'clock plan of care meeting. Mrs. Santillan agreed to notify family

## 2025-01-09 NOTE — PLAN OF CARE
Problem: Adult Inpatient Plan of Care  Goal: Plan of Care Review  Outcome: Progressing  Goal: Patient-Specific Goal (Individualized)  Outcome: Progressing  Goal: Absence of Hospital-Acquired Illness or Injury  Outcome: Progressing  Goal: Optimal Comfort and Wellbeing  Outcome: Progressing  Goal: Readiness for Transition of Care  Outcome: Progressing     Problem: Skin Injury Risk Increased  Goal: Skin Health and Integrity  Outcome: Progressing     Problem: Acute Kidney Injury/Impairment  Goal: Fluid and Electrolyte Balance  Outcome: Progressing  Goal: Improved Oral Intake  Outcome: Progressing  Goal: Effective Renal Function  Outcome: Progressing     Problem: Infection  Goal: Absence of Infection Signs and Symptoms  Outcome: Progressing     Problem: Fall Injury Risk  Goal: Absence of Fall and Fall-Related Injury  Outcome: Progressing     Problem: Restraint, Nonviolent  Goal: Absence of Harm or Injury  Outcome: Progressing     Problem: Artificial Airway  Goal: Effective Communication  Outcome: Progressing  Goal: Optimal Device Function  Outcome: Progressing  Goal: Absence of Device-Related Skin or Tissue Injury  Outcome: Progressing     Problem: Mechanical Ventilation Invasive  Goal: Effective Communication  Outcome: Progressing  Goal: Optimal Device Function  Outcome: Progressing  Goal: Mechanical Ventilation Liberation  Outcome: Progressing  Goal: Optimal Nutrition Delivery  Outcome: Progressing  Goal: Absence of Device-Related Skin and Tissue Injury  Outcome: Progressing  Goal: Absence of Ventilator-Induced Lung Injury  Outcome: Progressing     Problem: Gas Exchange Impaired  Goal: Optimal Gas Exchange  Outcome: Progressing     Problem: Breathing Pattern Ineffective  Goal: Effective Breathing Pattern  Outcome: Progressing     Problem: Pneumonia  Goal: Fluid Balance  Outcome: Progressing  Goal: Resolution of Infection Signs and Symptoms  Outcome: Progressing  Goal: Effective Oxygenation and Ventilation  Outcome:  Progressing

## 2025-01-09 NOTE — PROGRESS NOTES
Patient with worsening thrombocytopenia.  Increased bleeding noted around patient's mouth.  It is  reported Dr. Langston for further evaluation of bleeding/ possible esophageal perforation.  Patient is not ready for tube feeding.  We will continue to monitor

## 2025-01-09 NOTE — PROGRESS NOTES
[facility]  Nephrology  Progress Note    Patient Name: Renetta Stewart  MRN: [id number]  Admission Date: 1/2/2025  Hospital Length of Stay: 7 days  Attending Provider: Jagjit Wayne MD   Primary Care Physician: Eldon Govea MD  Principal Problem:Lactic acidosis    Consults  Subjective:     Interval History:  Ms. Otoole is seen in follow-up of her acute on chronic renal failure.  She remains oliguric but it is encouraging that she has begun to make a bit more urine.  Blood pressure is 140 on Levophed and that is being decreased.      She has developed some edema of her arms but minimal edema of her legs.  She remains ventilated and unresponsive.  Thrombocytopenia is unchanged.      Family members are in the room today and we discussed her renal failure with them and they are aware that she is critically ill.    She has developed a tendency to hypoglycemia.  I think we should minimize fluid administration and use bolus D50 as needed for hypoglycemia.    Review of patient's allergies indicates:  No Known Allergies  Current Facility-Administered Medications   Medication Frequency    0.9%  NaCl infusion (for blood administration) Q24H PRN    0.9%  NaCl infusion (for blood administration) Q24H PRN    atorvastatin tablet 80 mg Daily    ceFEPIme injection 1 g Q24H    dextrose 50% injection 12.5 g PRN    dextrose 50% injection 25 g PRN    fentaNYL 2500 mcg in D5W 250 mL infusion premix (titrating) (conc: 10 mcg/mL) Continuous    glucagon (human recombinant) injection 1 mg PRN    glucose chewable tablet 16 g PRN    glucose chewable tablet 24 g PRN    heparin (porcine) injection 2,000 Units PRN    lactated ringers infusion Continuous    levothyroxine tablet 50 mcg Before breakfast    mupirocin 2 % ointment BID    naloxone 0.4 mg/mL injection 0.02 mg PRN    NORepinephrine bitartrate-D5W 4 mg/250 mL (16 mcg/mL) PERIPHERAL access infusion Continuous    pantoprazole injection 40 mg Daily    promethazine  suppository 25 mg Q6H PRN    propofol (DIPRIVAN) 10 mg/mL infusion Continuous    sodium chloride 0.9% flush 10 mL Q12H PRN    tranexamic acid injection Soln 1,000 mg Once    tranexamic acid nebulizer Soln 500 mg Once       Objective:     Vital Signs (Most Recent):  Temp: 98.9 °F (37.2 °C) (01/09/25 1210)  Pulse: 92 (01/09/25 1210)  Resp: 14 (01/09/25 1210)  BP: (!) 127/47 (01/09/25 1210)  SpO2: 98 % (01/09/25 1210) Vital Signs (24h Range):  Temp:  [97.7 °F (36.5 °C)-98.9 °F (37.2 °C)] 98.9 °F (37.2 °C)  Pulse:  [81-98] 92  Resp:  [12-19] 14  SpO2:  [96 %-100 %] 98 %  BP: ()/(32-68) 127/47     Weight: 94.8 kg (208 lb 15.9 oz) (01/09/25 0308)  Body mass index is 35.87 kg/m².  Body surface area is 2.07 meters squared.    I/O last 3 completed shifts:  In: 3379.3 [I.V.:2374.2; Blood:610.5; IV Piggyback:394.6]  Out: 170 [Urine:170]    Physical Exam unresponsive on the ventilator.  Blood pressure 140 on pressors.    Significant Labs:sureBMP:   Recent Labs   Lab 01/09/25  0542   GLU 76*      K 3.6      CO2 18*   BUN 61*   CREATININE 5.97*   CALCIUM 6.6*   MG 2.2     All labs within the past 24 hours have been reviewed.    Significant Imaging:  Labs: Reviewed    Assessment/Plan:     Active Diagnoses:    Diagnosis Date Noted POA    PRINCIPAL PROBLEM:  Lactic acidosis [E87.20] 01/04/2025 Yes    Thrombocytopenia [D69.6] 01/08/2025 No    Anemia [D64.9] 01/08/2025 No    Left lower lobe pneumonia [J18.9] 01/08/2025 No    Hard to intubate [T88.4XXA] 01/05/2025 Yes    Small bowel obstruction [K56.609] 01/05/2025 No    Subcutaneous air-neck [T79.7XXA] 01/05/2025 No    Encephalopathy, metabolic [G93.41] 01/04/2025 Yes    Acute renal failure superimposed on stage 3a chronic kidney disease [N17.9, N18.31] 01/04/2025 No    Colitis [K52.9] 01/03/2025 Yes    Neuroendocrine tumor [D3A.8] 12/30/2024 Yes    Esophagitis determined by endoscopy [K20.90] 12/27/2024 Yes    Acute superficial gastritis without hemorrhage [K29.00]  12/27/2024 Yes    Elevated troponin [R79.89] 12/21/2024 Yes    Syncope [R55] 12/21/2024 Yes    Syncope and collapse [R55] 12/20/2024 Yes    Type 2 MI (myocardial infarction) [I21.A1] 12/20/2024 Yes    Stage 3b chronic kidney disease [N18.32] 03/29/2023 Yes    Complex renal cyst [N28.1] 03/06/2023 Not Applicable    Morbid obesity [E66.01] 04/19/2022 Yes    Essential hypertension [I10] 05/27/2021 Yes      Problems Resolved During this Admission:       D50 PRN hypoglycemia.  To minimize total fluid administered.  Reading remains poor.    Thank you for your consult. I will follow-up with patient. Please contact us if you have any additional questions.    Yaron Acuna MD  Nephrology  [facility]

## 2025-01-09 NOTE — PROGRESS NOTES
01/09/25 1641        Wound 01/09/25 1550 Shearing Tongue   Date First Assessed/Time First Assessed: 01/09/25 1550   Present on Original Admission: Yes  Primary Wound Type: Shearing  Location: (c) Tongue  Is this injury device related?: Yes   Dressing Appearance Moist drainage   Drainage Amount Moderate   Drainage Characteristics/Odor Bleeding controlled   Appearance Pink;Red;Moist;Granulating   Tissue loss description Full thickness   Black (%), Wound Tissue Color 0 %   Red (%), Wound Tissue Color 100 %   Yellow (%), Wound Tissue Color 0 %   Periwound Area Moist;Pink   Wound Edges Irregular;Undefined   Care Cleansed with:;Sterile normal saline   Dressing Gauze, wet to dry   Packing packed with  (Kerlix)   Periwound Care Moisture barrier applied      Right side of packing removed and replaced  Left side packing not removed as it is still bleeding

## 2025-01-10 NOTE — ASSESSMENT & PLAN NOTE
I had a very detailed family meeting with the patient's 4 children (2 sons and 2 daughters) at bedside today and explained the course of events and the patient's acuity and critical illness.  We discussed the patient's oozing from the mouth, thrombocytopenia, minor esophageal injury, air around the neck and her altered mental status with no significant neurological improvement.  At this time, we will continue all care and continue to readdress code status as clinically indicated.

## 2025-01-10 NOTE — PROGRESS NOTES
"  CC: esophageal perforation    HPI 74 y.o. female with recent admission who was re-admitted with metabolic encephalopathy ultimately found to have a colitis on imaging with course complicated by respiratory distress requiring transfer to the ICU and intubation. Presented to the ICU on 1/5/25 in the setting of being obtunded and found to have small-bowel obstruction, now intubated and sedated with shock and lactic acidosis. Patient has history of severe esophagitis from prior EGD in the last 2 weeks. CT obtained on arrival to ICU showed subcutaneous emphysema in the neck and mediastinum concerning for perforation for which GI is consulted. 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     Interval hx:  She remains intubated. Packing in place in oropharynx. Now with oozing of blood from mouth. Minimal UOP. Now worsening thrombocytopenia. Reviewed imaging and suspected to have minor esophageal injury.     Medical records reviewed. Additional history supplemented by nursing.     Past Medical History:   Diagnosis Date    Anemia     Anxiety     Dementia     Depression     GERD (gastroesophageal reflux disease)     Hyperlipidemia     Hypertension     PONV (postoperative nausea and vomiting)     Stroke      Physical Examination  BP (!) 92/36   Pulse 79   Temp (!) 94.3 °F (34.6 °C)   Resp 14   Ht 5' 4" (1.626 m)   Wt 94.8 kg (208 lb 15.9 oz)   SpO2 99%   Breastfeeding No   BMI 35.87 kg/m²   General appearance: intubated, sedated  HENT: sanguinous drainage from oropharynx, packing in place  Lungs: vented  Heart: regular rate and rhythm without rub  Abdomen: soft, non-tender; bowel sounds normoactive; no organomegaly  Extremities: extremities symmetric without cyanosis, +edema in arms   Neurologic: sedated    Labs:  Lab Results   Component Value Date    WBC 3.77 (L) 01/09/2025    HGB 6.8 (L) 01/09/2025    HCT 21.7 (L) " 01/09/2025    MCV 84.1 01/09/2025    PLT 79 (L) 01/09/2025       CMP  Sodium   Date Value Ref Range Status   01/09/2025 136 136 - 145 mmol/L Final     Potassium   Date Value Ref Range Status   01/09/2025 3.6 3.5 - 5.1 mmol/L Final     Chloride   Date Value Ref Range Status   01/09/2025 102 98 - 107 mmol/L Final     CO2   Date Value Ref Range Status   01/09/2025 18 (L) 23 - 31 mmol/L Final     Glucose   Date Value Ref Range Status   01/09/2025 76 (L) 82 - 115 mg/dL Final     BUN   Date Value Ref Range Status   01/09/2025 61 (H) 10 - 20 mg/dL Final     Creatinine   Date Value Ref Range Status   01/09/2025 5.97 (H) 0.55 - 1.02 mg/dL Final     Calcium   Date Value Ref Range Status   01/09/2025 6.6 (LL) 8.4 - 10.2 mg/dL Final     Comment:     Critical Result called and verified by verbal readback to: Arthur on 01/09/2025 at 06:53. Reported by [id number].     Total Protein   Date Value Ref Range Status   01/09/2025 4.5 (L) 5.8 - 7.6 g/dL Final     Albumin   Date Value Ref Range Status   01/09/2025 1.6 (L) 3.4 - 4.8 g/dL Final     Bilirubin, Total   Date Value Ref Range Status   01/09/2025 0.4 <=1.5 mg/dL Final     Alk Phos   Date Value Ref Range Status   01/09/2025 51 40 - 150 U/L Final     AST   Date Value Ref Range Status   01/09/2025 46 (H) 5 - 34 U/L Final     ALT   Date Value Ref Range Status   01/09/2025 61 (H) <=55 U/L Final     Anion Gap   Date Value Ref Range Status   01/09/2025 20 (H) 7 - 16 mmol/L Final     eGFR    Date Value Ref Range Status   06/08/2021 51 (L) >=60 mL/min/1.73m² Final     eGFR   Date Value Ref Range Status   02/14/2022 40 (L) >=60 mL/min/1.73m² Final       Imaging:  X-Ray Chest AP Portable   Final Result      Cardiomegaly with increased bilateral airspace opacities, new right pleural effusion and left pleural effusion with adjacent consolidation.  Findings concerning for worsening edema and/or infection.         Electronically signed by: Sania King   Date:    01/09/2025    Time:    10:02      X-Ray Chest AP Portable   Final Result      As above.         Electronically signed by: Keith Squires   Date:    01/08/2025   Time:    14:24      X-Ray Chest AP Portable   Final Result      No interval detrimental change in the radiographic appearance of the chest when compared to the prior exam.         Electronically signed by: Ezio Sidhu MD   Date:    01/07/2025   Time:    10:32      X-Ray Chest AP Portable   Final Result      1. Pneumomediastinum and trace right apical pneumothorax only well seen at prior CT.   2. Unchanged extent of airspace disease.   3. Unchanged position of assist devices.         Electronically signed by: Carlos Sherman   Date:    01/06/2025   Time:    14:40      X-Ray Chest AP Portable   Final Result      Unchanged position of assist devices.  Unchanged extent of airspace disease.         Electronically signed by: Carlos Sherman   Date:    01/06/2025   Time:    14:38      X-Ray Chest AP Portable   Final Result      Unchanged left basilar pleuroparenchymal opacity and right basilar alveolar opacities with low lung volumes.  Given truncation of left bronchus in left hilum, mucous plugging can be considered, although the appearance could be artifactual in nature.         Electronically signed by: Salvador Cohen   Date:    01/06/2025   Time:    12:42      US Kidney   Final Result      1. Indeterminate isoechoic lesion in the left inferior renal pole measuring 2.2 cm.  Cannot exclude solid mass.  Recommend further evaluation with contrast enhanced CT or MRI renal mass protocol.   2. Right renal simple cyst.         Electronically signed by: Keith Squires   Date:    01/06/2025   Time:    10:30      X-Ray Chest AP Portable   Final Result      Progression of bibasilar airspace disease which may be secondary to pneumonia versus atelectasis      Support devices in satisfactory position         Electronically signed by: Ana Laura Green   Date:    01/06/2025   Time:    10:55     "  CT Chest Abdomen Pelvis Without Contrast (XPD)   Final Result      1. Small amount of air in the posterior mediastinum in addition to air dissecting in the soft tissues of the neck as described above.  There is also a tiny adjacent right upper medial pneumothorax questioned.  Findings raise question of possible injury to the esophagus given the location in the posterior mediastinum with air dissecting anteriorly given patient's supine positioning.   2. A few segments of mildly dilated small bowel are noted with a "feces sign in "also seen and a transition zone in the mid abdomen concerning for partial or complete early small bowel obstruction.   3. Bilateral lung opacities as described above.  Differential includes infection, atelectasis, +/-aspiration..   4. No thoracic, abdominal, or pelvic abscess identified.   5. Bulbous lesions identified within both kidneys.  The largest is in the upper pole right kidney and is concerning for a mass.  Difficult to analyze on this noncontrast enhanced CT.  Consider renal ultrasound.   This critical information above was relayed by myself  secure chat to Dr. Jagjit Wayne on January 5, 2025 at 15:30.      CRITICAL FINDINGS:  Small amount of posterior mediastinal air, tiny right upper medial pneumothorax is question also, concern for partial or early complete small bowel obstruction         Electronically signed by: Jessica Burr   Date:    01/05/2025   Time:    16:19      CT Head Without Contrast   Final Result      No acute intracranial findings.  Findings suggesting microvascular ischemic change and old lacunar infarcts.      Increase opacification paranasal sinuses with the brain per se not significantly changed compared to the prior exam         Electronically signed by: pOal Velez MD   Date:    01/05/2025   Time:    12:50      X-Ray Chest AP Portable   Final Result      As above.         Electronically signed by: Marci Fleming MD   Date:    01/05/2025 "   Time:    11:59      X-Ray Chest AP Portable   Final Result      1. Stable appearance of the chest which includes a probable left pleural effusion, small and left lower lobe atelectasis.   2. Opacity in the right paratracheal region remains stable question mediastinal etiology such as possible thyroid enlargement.  Correlate with physical exam and consideration of thyroid ultrasound if indicated.         Electronically signed by: Jessica Burr   Date:    01/04/2025   Time:    11:34      CT Abdomen Pelvis  Without Contrast   Final Result   Abnormal      Diffuse wall thickening of the colon compatible with colitis most likely infectious or inflammatory in etiology      Moderate size hiatal hernia      Tiny air bubble within the bladder which may be secondary to catheterization.      This report was flagged in Epic as abnormal.         Electronically signed by: Ana Laura Green   Date:    01/03/2025   Time:    11:56      CT Head Without Contrast   Final Result      No acute intracranial process.  Stable CT scan of the head.  Consideration for MRI of the brain, as clinically warranted.      Left maxillary sinus disease.         Electronically signed by: Manuel Schreiber MD   Date:    01/02/2025   Time:    20:49        Independently reviewed    Assessment:   Abnormal CT imaging  Suspected esophageal perforation  Thrombocytopenia  Abnormal LFTs  Small bowel obstruction    Plan:  -Continue antibiotics and supportive care  -Esophagram at this moment cannot be performed while intubated/sedated although would be of benefit once extubated and able to swallow   -Consider repeat imaging of neck and chest with CT for evaluation of improvement   -If no improvement, may need to review with thoracic surgery at [facility] or [facility] for further review and evaluation of options    -Monitor LFTs, mildly abnormal I'm the setting of septic shock  -Transfusion of platelets as needed   -Would initially have recommended discontinuation of NGT but in  the setting of SBO, it is necessary at this time      Wilfred Lora MD  [facility]

## 2025-01-10 NOTE — PROGRESS NOTES
[facility]  Critical Care Medicine  Progress Note    Patient Name: Renetta Stewart  MRN: [id number]  Admission Date: 1/2/2025  Hospital Length of Stay: 7 days  Code Status: Full Code  Attending Provider: Jagjit Wayne MD  Primary Care Provider: Eldon Govea MD   Principal Problem: Lactic acidosis    Subjective:     HPI:  74-year-old female with past medical history significant for syncope, collapse, worked up for orthostatic hypotension who presented to the ICU on 1/5/25 in the setting of being obtunded and found to have small-bowel obstruction, now intubated and sedated with shock and lactic acidosis.    The patient was admitted to hospital medicine on 1/3/25.  CT abdomen at the time showed evidence of diffuse wall thickening compatible with colitis.  An EEG was performed due to altered mental status 1/13/25 which showed evidence of toxic metabolic encephalopathy.  No evidence of epileptiform findings or electrographic seizures noted.  Of note, she also had an EGD performed on 12/27/24 which showed evidence of esophagitis and a 5 cm hiatal hernia with erythematous mucosa in the fundus of the stomach and antrum.  The mucosa of mid and distal esophagus was reportedly severely inflamed with white plaque present.    She was being worked up for oliguria and lactic acidosis when she was found to be acutely obtunded and nonresponsive on the morning of 1/5/25.      The patient was brought down emergently to the ICU.  At this time, she began acutely decompensating without the ability to protect her airway, saturating on facemask.  She was then emergently intubated bedside.  She prove to be a difficult intubation (did not have any significant episodes of desaturation) however she had a significant amount of vomitus and bile which complicated her intubation, requiring anesthesia attending to perform the intubation at bedside.  We had already consented the family prior to the intubation and a Trialysis  catheter was placed in anticipation of possible dialysis in case her oliguria worsens.        Hospital/ICU Course:  1/6/25-overnight improving lactic acidosis, still persistent.  Echocardiogram with severe concentric hypertrophy, still on low-moderate dose single vasopressor support without evidence of any active oozing from her mouth or pneumothorax on her chest x-ray scans.  Seen by Dr. Langston at bedside on 1/5/25 who does not recommend any significant intervention, agrees with packing and clinical monitoring without any significant acute intervention.    1/7/25-patient with a minor ooze from the mouth overnight, controlled now, hypokalemic this morning status post replenishment of potassium.  Vancomycin discontinuing continuing cefepime, holding chemical DVT prophylaxis and using SCDs at this time in the setting of mild ooze from mouth.  10 cc of urine output overnight.  Gradually downtrending hemoglobin at 7.5 now.  No evidence of crepitus in the neck or subcutaneous emphysema on physical exam.  Reviewed case with Gastroenterology who believe there may be minor esophageal perforation, possibly from NG tube placement with nothing additional to do at this time.    1/8/25-patient with worsening thrombocytopenia no obvious bleeding noted at this time.  We will attempt spontaneous awakening and spontaneous breathing trial again today.  Patient to receive 2 units platelets and 1 unit PRBC today along with potassium per placement.    1/9/25-with minimal ooze from the mouth, pack by surgery at bedside, re-evaluated by Dr. Langston at bedside.  Status post administration of additional platelets with improvement in thrombocytopenia and additional PRBC.  Family updated in detail at bedside.    Interval History/Significant Events:  Refer to hospital course    Review of Systems  Objective:     Vital Signs (Most Recent):  Temp: 98.9 °F (37.2 °C) (01/09/25 2313)  Pulse: 79 (01/09/25 1930)  Resp: 14 (01/09/25 1930)  BP: (!) 92/36  (01/09/25 1930)  SpO2: 99 % (01/09/25 1939) Vital Signs (24h Range):  Temp:  [93.9 °F (34.4 °C)-98.9 °F (37.2 °C)] 98.9 °F (37.2 °C)  Pulse:  [] 79  Resp:  [12-19] 14  SpO2:  [97 %-100 %] 99 %  BP: ()/(34-68) 92/36   Weight: 94.8 kg (208 lb 15.9 oz)  Body mass index is 35.87 kg/m².      Intake/Output Summary (Last 24 hours) at 1/9/2025 2322  Last data filed at 1/9/2025 1842  Gross per 24 hour   Intake 1358.77 ml   Output 270 ml   Net 1088.77 ml          Physical Exam  Constitutional:       General: She is in acute distress.      Appearance: Normal appearance. She is obese. She is ill-appearing. She is not diaphoretic.   HENT:      Head: Normocephalic and atraumatic.      Nose: No congestion or rhinorrhea.      Mouth/Throat:      Mouth: Mucous membranes are moist.      Comments: Minimal oozing from mouth, unable to identify source of bleeding due to endotracheal tube.  Appreciate ENT involvement.  Cardiovascular:      Rate and Rhythm: Normal rate and regular rhythm.      Pulses: Normal pulses.      Heart sounds: Normal heart sounds.   Pulmonary:      Effort: Pulmonary effort is normal. No respiratory distress.      Breath sounds: No stridor. Rhonchi present. No wheezing or rales.      Comments: No evidence of subcutaneous emphysema or crepitus present in neck or around chest  Chest:      Chest wall: No tenderness.   Abdominal:      General: Abdomen is flat.   Musculoskeletal:      Cervical back: Normal range of motion. No rigidity.      Right lower leg: Edema present.      Left lower leg: Edema present.   Skin:     General: Skin is warm.      Findings: No erythema.   Neurological:      Mental Status: She is alert and oriented to person, place, and time.      Comments: Intubated, sedated            Vents:  Vent Mode: A/CMV-VC (01/09/25 1939)  Ventilator Initiated: Yes (01/05/25 1015)  Set Rate: 14 BPM (01/09/25 1939)  Vt Set: 420 mL (01/09/25 1939)  Pressure Support: 10 cmH20 (01/07/25 1915)  PEEP/CPAP: 5  cmH20 (01/09/25 1939)  Oxygen Concentration (%): 30 (01/09/25 1939)  Peak Airway Pressure: 21.2 cmH20 (01/09/25 1939)  Plateau Pressure: 16.1 cmH20 (01/08/25 0742)  Total Ve: 5.71 L/m (01/09/25 1939)  F/VT Ratio<105 (RSBI): (!) 35.35 (01/08/25 0424)  Lines/Drains/Airways       Central Venous Catheter Line  Duration             Trialysis (Dialysis) Catheter 01/05/25 1058 external jugular;right internal jugular 4 days              Drain  Duration                  NG/OG Tube 01/05/25 1033 Right nostril 4 days         Urethral Catheter 01/05/25 0900 Silicone 16 Fr. 4 days              Airway  Duration                  Airway - Non-Surgical 01/05/25 1015 4 days              Peripheral Intravenous Line  Duration                  Midline Catheter - Single Lumen 01/05/25 0945 Right basilic vein (medial side of arm) 20g x 10cm 4 days         Peripheral IV - Single Lumen 01/05/25 0414 20 G Anterior;Left Forearm 4 days                  Significant Labs:    CBC/Anemia Profile:  Recent Labs   Lab 01/08/25  1550 01/09/25  0542 01/09/25  1103 01/09/25  1419   WBC 4.89 4.44* 3.77*  --    HGB 7.3* 7.2* 6.8*  --    HCT 23.2* 21.9* 21.7*  --    PLT 40* 26* 79*  --    MCV 83.5 82.3 84.1  --    RDW 17.2* 17.0* 17.2*  --    OCCULTBLOOD  --   --   --  Positive*        Chemistries:  Recent Labs   Lab 01/08/25  0502 01/09/25  0542   * 136   K 3.1* 3.6    102   CO2 20* 18*   BUN 57* 61*   CREATININE 5.23* 5.97*   CALCIUM 6.5* 6.6*   ALBUMIN  --  1.6*   PROT  --  4.5*   BILITOT  --  0.4   ALKPHOS  --  51   ALT  --  61*   AST  --  46*   MG 1.7 2.2   PHOS 3.2 4.0       All pertinent labs within the past 24 hours have been reviewed.    Significant Imaging:  I have reviewed all pertinent imaging results/findings within the past 24 hours.    ABG  Recent Labs   Lab 01/06/25  1354   PH 7.52*   PO2 188*   PCO2 29*   HCO3 23.7     Assessment/Plan:     Neuro  Encephalopathy, metabolic  Severe metabolic encephalopathy.  Her altered mental  status is likely in the setting of toxic metabolic encephalopathy, as suggested by an EEG done a couple of days ago.  I also took a detailed history from the family and there is no evidence of nuchal rigidity, recent ear infection, very high fevers to suggest meningitis or encephalitis at this time.      Continues to remain intubated and sedated at this time, aggressive spontaneous awakening trial today with similar exam prior to being intubated.  May consider CT head +/-repeat EEG tomorrow.      Pulmonary  Left lower lobe pneumonia  Opacification on chest x-ray from 1/8/25 showing evidence of possible left lower lobe pleural effusion versus retrocardiac consolidation.  Patient has been on cefepime at this time.  No growth on final cultures from bronchoscopy performed immediately after intubation.  Continued on cefepime at this time.    Hard to intubate  Patient extremely difficult to intubate by bedside.  Appreciate prompt anesthesia response to help and anesthesia attending was able to intubate the patient with cricoid pressure, with the help of a bougie tube.  The patient's anatomy indicates that she has very anterior in terms of her vocal cords.  In addition, her intubation was very complicated due to a significant amount of vomitus of bile during the intubation.  A prompt bedside bronchoscopy was performed immediately with clearance are of aspirated gastric contents.    Cardiac/Vascular  Elevated troponin  Likely in the setting of her demand ischemia, history of severe concentric hypertrophy.    Renal/  * Lactic acidosis  Severe lactic acidosis, likely in the setting of hypovolemia and possible vasodilatory shock, requiring fluid resuscitation.  We will continue to perform serial checks.  Now with significant improvement status post volume administration on 1/7/25.  Persistent mild elevation of lactic acidosis.    Acute renal failure superimposed on stage 3a chronic kidney disease  Worsening creatinine since  admission, consented the family and placed a right-sided Trialysis catheter with plans to perform dialysis if the patient requires this.  Likely etiology at this time is from hypotension/hypovolemia causing acute tubular necrosis.  We will continue to monitor potassium.  At this time the patient has been hypokalemic and may be approaching the need for dialysis to perform volume removal or if significant amount of uremia.  She has made a small amount of urine which is improved from the day prior 1/9/25.    Complex renal cyst  There is a renal mass seen on the recent CT scan and an ultrasound has been ordered with the following impression below.  Prior CT scan showed evidence of renal cysts.    Impression:     1. Indeterminate isoechoic lesion in the left inferior renal pole measuring 2.2 cm.  Cannot exclude solid mass.  Recommend further evaluation with contrast enhanced CT or MRI renal mass protocol.  2. Right renal simple cyst.       We will attempt contrast-enhanced CT if there is improvement of renal function and/or MRI with clinical improvement instability    Oncology  Anemia  We will administer additional PRBC today for gradually downtrending hemoglobin.    Endocrine  Morbid obesity  Complicates all aspects of care    GI  Esophageal perforation  Appreciate gastroenterology recommendations, we will keep NG-tube in at this time to continue keeping patient's stomach decompressed in the setting of recent small-bowel obstruction.  Minor esophageal injury.    Small bowel obstruction  Small-bowel obstruction partial or complete based on recent CT scan, patient with NG tube in place.  Appreciate surgery consultation, agree that less likely to be mesenteric ischemia given improving lactic acidosis and low-dose vasopressor support requirements.  Still may have some third spacing from her small-bowel obstruction.  However, now with bowel movement 1/9/25.    Acute superficial gastritis without hemorrhage  Severe  esophagitis present on recent EGD.  Gastroenterology has been consulted who have reviewed the scans in detail, seen the patient on 1/6/25 and agree there may be minor esophageal injury with their recommendations including no further intervention at this time required, continuing antibiotics and no need to perform esophagram.  Appreciate Dr. Lora (gastroenterology) seeing the patient and giving her recommendations.    Other  Advanced care planning/counseling discussion  I had a very detailed family meeting with the patient's 4 children (2 sons and 2 daughters) at bedside today and explained the course of events and the patient's acuity and critical illness.  We discussed the patient's oozing from the mouth, thrombocytopenia, minor esophageal injury, air around the neck and her altered mental status with no significant neurological improvement.  At this time, we will continue all care and continue to readdress code status as clinically indicated.    Thrombocytopenia  No evidence of DIC at this time, with ongoing thrombocytopenia now status post platelet administration with improvement in patient's platelets.  Idiopathic thrombocytopenia this time, we will involve Hematology service.  Continue to transfuse as needed.    Subcutaneous air-neck  Patient with subcutaneous air in the neck on CT scan that was performed.  There is no evidence of subcutaneous emphysema/crepitus on physical examination.  She does not have elevated peak pressures and no evidence of significant large pneumomediastinum.  At this time the etiology of the subcutaneous air in her neck are from possible esophageal injury given the patient's underlying severe esophagitis and placement NG tube, injury from her intubation from posterior pharynx and less likely to be pneumomediastinum in the setting of positive airway pressure as she has no evidence of significant mediastinal air in the distal trachea to suggest this as the cause.     Status post  packing in her mouth with TXA soaked gauze, with improvement and resolution of her oozing.  Serial chest x-rays without any evidence of pneumothorax, physical examination without evidence of subcutaneous emphysema.  Evaluated the patient bedside with ENT physician Dr. Langston who has recommended continuing clinical monitoring, no intervention at this time needed and no additional imaging of the neck.  Appreciate evaluation by Dr. Langston previously.  Re-evaluated by ENT bedside 1/9/25, General surgery pack patient's mouth.  Oozing has slowed down with platelets administration.  We will defer to ENT for recommendations regarding visual exploration versus other intervention.  We will continue to monitor.    Gastroenterology consultation from 1/6/25 greatly appreciated.  There was concern for possible minor esophageal injury.    We will repeat CT neck 1/10/25.          Syncope  History of syncope, being worked up with monitoring.  She does have a pacemaker which was interrogated.  Likely cause of her altered mental status at this time is toxic metabolic encephalopathy.     Critical Care Checklist     Refer to chart for details regarding spontaneous awakening trial (SAT) and spontaneous breathing trial (SBT), CAM-ICU assessment, early mobility and feeding.       Analgesia and sedation- propofol, fentanyl  Thromboembolic prophylaxis- holding off on heparin for DVT prophylaxis in the setting of thrombocytopenia, continue with SCDs Height of bed greater than 30°- Yes  Stress ulcer prophylaxis- pantoprazole  Indwelling catheter (vascular access lines/Loya catheter)-Reviewed  Deescalation of antimicrobials/pharmacotherapies-Reviewed and addressed appropriately  Code status- Full Code           Critical Care Time:  45 minutes  Critical secondary to Patient has a condition that poses threat to life and bodily function:  Septic shock requiring vasopressor support, altered mental status requiring intubation for airway protection,  small-bowel obstruction, severe thrombocytopenia      Critical care was time spent personally by me on the following activities: development of treatment plan with patient or surrogate and bedside caregivers, discussions with consultants, evaluation of patient's response to treatment, examination of patient, ordering and performing treatments and interventions, ordering and review of laboratory studies, ordering and review of radiographic studies, pulse oximetry, re-evaluation of patient's condition. This critical care time did not overlap with that of any other provider or involve time for any procedures.     Jagjit Wayne MD  Critical Care Medicine  [facility]

## 2025-01-10 NOTE — PROGRESS NOTES
[facility]  Adult Nutrition  Progress Note         Reason for Assessment  Reason For Assessment: length of stay        Assessment and Plan  Patient admitted 1/2 with a dx of lactic acidosis and assessed for LOS. Patient is currently NPO with no diet noted since admission on 1/2. Current weight 106.5 kg with a BMI of 40.30 which indicates morbid obesity.     Recommend to consider alternate route of nutrition to meet nutritional needs. RD Following.       Learning Needs/Social Determinants of Health    Learning Assessment       01/02/2025 2329 [facility] (1/2/2025 - Present)   Created by Carrillo Tapia, RN - RN (Nurse) Status: Complete                 PRIMARY LEARNER     Primary Learner Name:  Renetta Stewart  - 01/02/2025 2329    Relationship:  Patient WS - 01/02/2025 2329    Does the primary learner have any barriers to learning?:  Cognitive WS - 01/02/2025 2329    What is the preferred language of the primary learner?:  English WS - 01/02/2025 2329    Is an  required?:  No WS - 01/02/2025 2329    How does the primary learner prefer to learn new concepts?:  Listening WS - 01/02/2025 2329    How often do you need to have someone help you read instructions, pamphlets, or written material from your doctor or pharmacy?:  Sometimes WS - 01/02/2025 2329        CO-LEARNER #1     Co-Learner Name (if applicable):  Renetta Stewart WS - 01/02/2025 2329    Relationship:  Patient WS - 01/02/2025 2329    Does the co-learner have any barriers to learning?:  Cognitive WS - 01/02/2025 2329    What is the preferred language of the co-learner?:  English WS - 01/02/2025 2329    Is an  required?:  No WS - 01/02/2025 2329    How does the co-learner prefer to learn new concepts?:  Listening WS - 01/02/2025 2329        CO-LEARNER #2     No question answered        SPECIAL TOPICS     No question answered        ANSWERED BY:     No question answered        Comments         Edit  History       Carrillo Tapia RN - RN (Nurse)   01/02/2025 4477                           Social Drivers of Health     Tobacco Use: Low Risk  (1/2/2025)    Patient History     Smoking Tobacco Use: Never     Smokeless Tobacco Use: Never     Passive Exposure: Never   Recent Concern: Tobacco Use - Medium Risk (10/22/2024)    Received from [facility]    Patient History     Smoking Tobacco Use: Former     Smokeless Tobacco Use: Never     Passive Exposure: Not on file   Alcohol Use: Not At Risk (1/3/2025)    AUDIT-C     Frequency of Alcohol Consumption: Never     Average Number of Drinks: Patient does not drink     Frequency of Binge Drinking: Never   Financial Resource Strain: Low Risk  (1/3/2025)    Overall Financial Resource Strain (CARDIA)     Difficulty of Paying Living Expenses: Not hard at all   Food Insecurity: No Food Insecurity (1/3/2025)    Hunger Vital Sign     Worried About Running Out of Food in the Last Year: Never true     Ran Out of Food in the Last Year: Never true   Transportation Needs: No Transportation Needs (1/3/2025)    TRANSPORTATION NEEDS     Transportation : No   Physical Activity: Inactive (1/3/2025)    Exercise Vital Sign     Days of Exercise per Week: 0 days     Minutes of Exercise per Session: 0 min   Stress: No Stress Concern Present (1/3/2025)    [employer] - Occupational Stress Questionnaire     Feeling of Stress : Not at all   Housing Stability: Low Risk  (1/3/2025)    Housing Stability Vital Sign     Unable to Pay for Housing in the Last Year: No     Homeless in the Last Year: No   Depression: Low Risk  (12/20/2024)    Depression     Last PHQ-4: Flowsheet Data: 0   Utilities: Not At Risk (1/3/2025)    [facility] Utilities     Threatened with loss of utilities: No   Health Literacy: Adequate Health Literacy (1/3/2025)     Health Literacy     Frequency of need for help with medical instructions: Rarely   Recent Concern:  Health Literacy - Inadequate Health Literacy (12/22/2024)     Health Literacy     Frequency of need for help with medical instructions: Always   Social Isolation: Socially Integrated (1/3/2025)    Social Isolation     Social Isolation: 1          Malnutrition  Is Patient Malnourished: No    Nutrition Diagnosis  Inadequate energy intake related to Inadequate Caloric intake as evidenced by NPO status x 8 days  Comments: consider alternate route of nutrition as medically appropriate    Recent Labs   Lab 01/10/25  0530 01/10/25  0608   GLU  --  67*   POCGLU 77  --      Comments on Glucose: noted    Nutrition Prescription / Recommendations  Recommendation/Intervention: Recommend to consider alternate route of nutrition as medically appropriate  Goals: diet advancement vs alternate means of nutrition x 72 hours  Nutrition Goal Status: new  Current Diet Order: npo  Chewing or Swallowing Difficulty?: No Chewing or swallowing difficulty  Recommended Diet: Enteral Nutrition  Recommended Oral Supplement: No Oral Supplements  Is Nutrition Support Recommended: [facility] Support: No  Is Nutrition Education Recommended: No    Monitor and Evaluation  % current Intake: NPO  % intake to meet estimated needs: Enteral Nutrition  and P.O. + Supplements          Current Medical Diagnosis and Past Medical History  Diagnosis: gastrointestinal disease, cardiac disease, renal disease  Past Medical History:   Diagnosis Date    Anemia     Anxiety     Dementia     Depression     GERD (gastroesophageal reflux disease)     Hyperlipidemia     Hypertension     PONV (postoperative nausea and vomiting)     Stroke        Nutrition/Diet History  Food Allergies: NKFA  Factors Affecting Nutritional Intake: NPO, on mechanical ventilation    Lab/Procedures/Meds  Recent Labs   Lab 01/09/25  0542 01/10/25  0608    136   K 3.6 3.3*   BUN 61* 66*   CREATININE 5.97* 6.13*   CALCIUM 6.6* 6.4*   ALBUMIN 1.6*  --     103   ALT 61*  --  "   AST 46*  --    PHOS 4.0 4.1   Dx ARF on CKD  Dx lactic acidosis  Last A1c: No results found for: "HGBA1C"  Lab Results   Component Value Date    RBC 2.91 (L) 01/10/2025    HGB 8.1 (L) 01/10/2025    HCT 24.3 (L) 01/10/2025    MCV 83.5 01/10/2025    MCH 27.8 01/10/2025    MCHC 33.3 01/10/2025    TIBC 142 (L) 12/27/2024     Pertinent Labs Reviewed: reviewed  Pertinent Medications Reviewed: reviewed  Scheduled Meds:   atorvastatin  80 mg Oral Daily    ceFEPime IV (PEDS and ADULTS)  1 g Intravenous Q24H    levothyroxine  50 mcg Oral Before breakfast    pantoprazole  40 mg Intravenous Daily    tranexamic acid  1,000 mg Nasal Once    tranexamic acid  500 mg Nebulization Once     Continuous Infusions:   fentanyl  0-250 mcg/hr Intravenous Continuous   Stopped at 01/07/25 1216    lactated ringers   Intravenous Continuous   Stopped at 01/09/25 1015    NORepinephrine bitartrate-D5W  0-0.2 mcg/kg/min Intravenous Continuous   Paused at 01/09/25 1703    propofoL  0-50 mcg/kg/min Intravenous Continuous   Stopped at 01/07/25 1216     PRN Meds:.  Current Facility-Administered Medications:     0.9%  NaCl infusion (for blood administration), , Intravenous, Q24H PRN    0.9%  NaCl infusion (for blood administration), , Intravenous, Q24H PRN    0.9%  NaCl infusion (for blood administration), , Intravenous, Q24H PRN    dextrose 50%, 12.5 g, Intravenous, PRN    dextrose 50%, 25 g, Intravenous, PRN    glucagon (human recombinant), 1 mg, Intramuscular, PRN    glucose, 16 g, Oral, PRN    glucose, 24 g, Oral, PRN    heparin (porcine), 2,000 Units, Intravenous, PRN    naloxone, 0.02 mg, Intravenous, PRN    promethazine, 25 mg, Rectal, Q6H PRN    sodium chloride 0.9%, 10 mL, Intravenous, Q12H PRN    Anthropometrics  Temp: 98.1 °F (36.7 °C)  Height Method: Estimated  Height: 5' 4" (162.6 cm)  Height (inches): 64 in  Weight Method: Bed Scale  Weight: 106.5 kg (234 lb 12.6 oz)  Weight (lb): 234.79 lb  Ideal Body Weight (IBW), Female: 120 lb  % " Ideal Body Weight, Female (lb): 161.67 %  BMI (Calculated): 40.3  BMI Grade: greater than 40 - morbid obesity       Estimated/Assessed Needs    Total Ve: 6.1 L/m Temp: 98.1 °F (36.7 °C)Oral  Weight Used For Calorie Calculations: 106.5 kg (234 lb 12.6 oz)   Energy Need Method: Kcal/kg Energy Calorie Requirements (kcal): 0577-5144  Weight Used For Protein Calculations: 106.5 kg (234 lb 12.6 oz)  Protein Requirements: 64-85  Estimated Fluid Requirement Method: RDA Method    RDA Method (mL): 1491       Nutrition by Nursing  Diet/Nutrition Received: NPO  Intake (%):  (NPO)        Last Bowel Movement: 01/09/25                Nutrition Follow-Up  RD Follow-up?: Yes      Nutrition Discharge Planning: rd following for dc needs            Available via Secure Chat

## 2025-01-10 NOTE — ASSESSMENT & PLAN NOTE
Severe metabolic encephalopathy.  Her altered mental status is likely in the setting of toxic metabolic encephalopathy, as suggested by an EEG done a couple of days ago.  I also took a detailed history from the family and there is no evidence of nuchal rigidity, recent ear infection, very high fevers to suggest meningitis or encephalitis at this time.      Continues to remain intubated and sedated at this time, aggressive spontaneous awakening trial today with similar exam prior to being intubated.  May consider CT head +/-repeat EEG tomorrow.

## 2025-01-10 NOTE — PLAN OF CARE
Chart review and looked in on patient in her room. Patient is on the vent. Iv meds noted. Nephrology following for dialysis. Cm will continue to follow.

## 2025-01-10 NOTE — ASSESSMENT & PLAN NOTE
Patient with subcutaneous air in the neck on CT scan that was performed.  There is no evidence of subcutaneous emphysema/crepitus on physical examination.  She does not have elevated peak pressures and no evidence of significant large pneumomediastinum.  At this time the etiology of the subcutaneous air in her neck are from possible esophageal injury given the patient's underlying severe esophagitis and placement NG tube, injury from her intubation from posterior pharynx and less likely to be pneumomediastinum in the setting of positive airway pressure as she has no evidence of significant mediastinal air in the distal trachea to suggest this as the cause.     Status post packing in her mouth with TXA soaked gauze, with improvement and resolution of her oozing.  Serial chest x-rays without any evidence of pneumothorax, physical examination without evidence of subcutaneous emphysema.  Evaluated the patient bedside with ENT physician Dr. Langston who has recommended continuing clinical monitoring, no intervention at this time needed and no additional imaging of the neck.  Appreciate evaluation by Dr. Langston previously.  Re-evaluated by ENT bedside 1/9/25, General surgery pack patient's mouth.  Oozing has slowed down with platelets administration.  We will defer to ENT for recommendations regarding visual exploration versus other intervention.  We will continue to monitor.    Gastroenterology consultation from 1/6/25 greatly appreciated.  There was concern for possible minor esophageal injury.    We will repeat CT neck 1/10/25.

## 2025-01-10 NOTE — PLAN OF CARE
Problem: Adult Inpatient Plan of Care  Goal: Plan of Care Review  Outcome: Progressing  Goal: Patient-Specific Goal (Individualized)  Outcome: Progressing  Goal: Absence of Hospital-Acquired Illness or Injury  Outcome: Progressing  Goal: Optimal Comfort and Wellbeing  Outcome: Progressing  Goal: Readiness for Transition of Care  Outcome: Progressing     Problem: Skin Injury Risk Increased  Goal: Skin Health and Integrity  Outcome: Progressing     Problem: Acute Kidney Injury/Impairment  Goal: Fluid and Electrolyte Balance  Outcome: Progressing  Goal: Improved Oral Intake  Outcome: Progressing  Goal: Effective Renal Function  Outcome: Progressing     Problem: Infection  Goal: Absence of Infection Signs and Symptoms  Outcome: Progressing     Problem: Fall Injury Risk  Goal: Absence of Fall and Fall-Related Injury  Outcome: Progressing     Problem: Restraint, Nonviolent  Goal: Absence of Harm or Injury  Outcome: Progressing     Problem: Artificial Airway  Goal: Effective Communication  Outcome: Progressing  Goal: Optimal Device Function  Outcome: Progressing  Goal: Absence of Device-Related Skin or Tissue Injury  Outcome: Progressing     Problem: Mechanical Ventilation Invasive  Goal: Effective Communication  Outcome: Progressing  Goal: Optimal Device Function  Outcome: Progressing  Goal: Mechanical Ventilation Liberation  Outcome: Progressing  Goal: Optimal Nutrition Delivery  Outcome: Progressing  Goal: Absence of Device-Related Skin and Tissue Injury  Outcome: Progressing  Goal: Absence of Ventilator-Induced Lung Injury  Outcome: Progressing     Problem: Gas Exchange Impaired  Goal: Optimal Gas Exchange  Outcome: Progressing     Problem: Breathing Pattern Ineffective  Goal: Effective Breathing Pattern  Outcome: Progressing     Problem: Pneumonia  Goal: Fluid Balance  Outcome: Progressing  Goal: Resolution of Infection Signs and Symptoms  Outcome: Progressing  Goal: Effective Oxygenation and Ventilation  Outcome:  Progressing     Problem: Wound  Goal: Optimal Coping  Outcome: Progressing  Goal: Optimal Functional Ability  Outcome: Progressing  Goal: Absence of Infection Signs and Symptoms  Outcome: Progressing  Goal: Improved Oral Intake  Outcome: Progressing  Goal: Optimal Pain Control and Function  Outcome: Progressing  Goal: Skin Health and Integrity  Outcome: Progressing  Goal: Optimal Wound Healing  Outcome: Progressing     Problem: Bariatric Environmental Safety  Goal: Safety Maintained with Care  Outcome: Progressing

## 2025-01-10 NOTE — CONSULTS
[facility]  Hematology/Oncology  Consult Note    Patient Name: Renetta Stewart  MRN: [id number]  Admission Date: 1/2/2025  Hospital Length of Stay: 8 days  Code Status: Full Code   Attending Provider: Jagjit Wayne MD  Consulting Provider: Hannah Medina MD  Primary Care Physician: Eldon Govea MD  Principal Problem:Lactic acidosis    Inpatient consult to Hematology/Oncology  Consult performed by: Hannah Medina MD  Consult ordered by: Jagjit Wayne MD        Subjective:       Medications:  Continuous Infusions:   fentanyl  0-250 mcg/hr Intravenous Continuous   Stopped at 01/07/25 1216    lactated ringers   Intravenous Continuous   Stopped at 01/09/25 1015    NORepinephrine bitartrate-D5W  0-0.2 mcg/kg/min Intravenous Continuous 6.6 mL/hr at 01/10/25 1157 0.02 mcg/kg/min at 01/10/25 1157    propofoL  0-50 mcg/kg/min Intravenous Continuous   Stopped at 01/07/25 1216     Scheduled Meds:   atorvastatin  80 mg Oral Daily    ceFEPime IV (PEDS and ADULTS)  1 g Intravenous Q24H    levothyroxine  50 mcg Oral Before breakfast    pantoprazole  40 mg Intravenous Daily    tranexamic acid  500 mg Nebulization Once     PRN Meds:  Current Facility-Administered Medications:     0.9%  NaCl infusion (for blood administration), , Intravenous, Q24H PRN    0.9%  NaCl infusion (for blood administration), , Intravenous, Q24H PRN    0.9%  NaCl infusion (for blood administration), , Intravenous, Q24H PRN    0.9% NaCl, , Intra-Catheter, PRN    dextrose 50%, 12.5 g, Intravenous, PRN    dextrose 50%, 25 g, Intravenous, PRN    glucagon (human recombinant), 1 mg, Intramuscular, PRN    glucose, 16 g, Oral, PRN    glucose, 24 g, Oral, PRN    heparin (porcine), 4,000 Units, Intra-Catheter, PRN    naloxone, 0.02 mg, Intravenous, PRN    promethazine, 25 mg, Rectal, Q6H PRN    sodium chloride 0.9%, 10 mL, Intravenous, Q12H PRN     Review of patient's allergies indicates:  No Known Allergies     Past Medical History:    Diagnosis Date    Anemia     Anxiety     Dementia     Depression     GERD (gastroesophageal reflux disease)     Hyperlipidemia     Hypertension     PONV (postoperative nausea and vomiting)     Stroke      Past Surgical History:   Procedure Laterality Date    CARDIAC PACEMAKER PLACEMENT       Family History       Problem Relation (Age of Onset)    Hypertension Father          Tobacco Use    Smoking status: Never     Passive exposure: Never    Smokeless tobacco: Never   Substance and Sexual Activity    Alcohol use: Not Currently    Drug use: Never    Sexual activity: Not Currently       Review of Systems   Unable to perform ROS: Patient unresponsive     Objective:     Vital Signs (Most Recent):  Temp: 99.3 °F (37.4 °C) (01/10/25 1107)  Pulse: 83 (01/10/25 1415)  Resp: 14 (01/10/25 1415)  BP: (!) 90/43 (01/10/25 1415)  SpO2: 96 % (01/10/25 1415) Vital Signs (24h Range):  Temp:  [93.9 °F (34.4 °C)-99.7 °F (37.6 °C)] 99.3 °F (37.4 °C)  Pulse:  [] 83  Resp:  [13-18] 14  SpO2:  [94 %-100 %] 96 %  BP: ()/(34-66) 90/43     Weight: 106.5 kg (234 lb 12.6 oz)  Body mass index is 40.3 kg/m².  Body surface area is 2.19 meters squared.      Intake/Output Summary (Last 24 hours) at 1/10/2025 1714  Last data filed at 1/10/2025 1111  Gross per 24 hour   Intake 628.87 ml   Output 420 ml   Net 208.87 ml       Physical Exam  HENT:      Head: Normocephalic and atraumatic.   Cardiovascular:      Rate and Rhythm: Normal rate and regular rhythm.   Pulmonary:      Comments: ETT present- no blood, mechanically ventilated  Neurological:      Comments: nonresponsive         Significant Labs:   CBC:   Recent Labs   Lab 01/09/25  1103 01/10/25  0608 01/10/25  1207   WBC 3.77* 3.08* 3.93*   HGB 6.8* 8.1* 8.3*   HCT 21.7* 24.3* 25.1*   PLT 79* 35* 91*     Assessment/Plan:     Active Diagnoses:    Diagnosis Date Noted POA    PRINCIPAL PROBLEM:  Lactic acidosis [E87.20] 01/04/2025 Yes    Esophageal perforation [K22.3] 01/09/2025 No     Advanced care planning/counseling discussion [Z71.89] 01/09/2025 Not Applicable    Thrombocytopenia [D69.6] 01/08/2025 No    Anemia [D64.9] 01/08/2025 No    Left lower lobe pneumonia [J18.9] 01/08/2025 No    Hard to intubate [T88.4XXA] 01/05/2025 Yes    Small bowel obstruction [K56.609] 01/05/2025 No    Subcutaneous air-neck [T79.7XXA] 01/05/2025 No    Encephalopathy, metabolic [G93.41] 01/04/2025 Yes    Acute renal failure superimposed on stage 3a chronic kidney disease [N17.9, N18.31] 01/04/2025 No    Colitis [K52.9] 01/03/2025 Yes    Neuroendocrine tumor [D3A.8] 12/30/2024 Yes    Esophagitis determined by endoscopy [K20.90] 12/27/2024 Yes    Acute superficial gastritis without hemorrhage [K29.00] 12/27/2024 Yes    Elevated troponin [R79.89] 12/21/2024 Yes    Syncope [R55] 12/21/2024 Yes    Syncope and collapse [R55] 12/20/2024 Yes    Type 2 MI (myocardial infarction) [I21.A1] 12/20/2024 Yes    Stage 3b chronic kidney disease [N18.32] 03/29/2023 Yes    Complex renal cyst [N28.1] 03/06/2023 Not Applicable    Morbid obesity [E66.01] 04/19/2022 Yes    Essential hypertension [I10] 05/27/2021 Yes      Problems Resolved During this Admission:     Consulted for thrombocytopenia.     Patient with multiple medical problems, currently in the ICU. She is intubated and mechanically ventilated. She is nonresponsive. She has required pressors.    She has a history of severe esophagitis noted during recent prior admission and apparently had a difficulty intubation. She has developed bleeding from the oropharynx. Recently CT by GI with concern for esophageal injury. CT neck today showing resolution of subcutaneous emphysema in the neck. I rounded with RN today who noted the oral cavity packing was changed and new gross bleeding was identified.     Thrombocytopenia has developed this hospitalization with decline to 15k a couple days prior. She has been transfused to 91k currently, responded well to transfusion. She is not  anticoagulated and is not receiving heparin or anti-platelet agents. I have requested coagulation studies and fibrinogen- fibrinogen normal, others pending. If prolonged, we can treat with FFP. Prior coagulation studies were normal a couple days ago.    Agree with recommendation to continue with supportive care with packing/pressure. Would transfuse platelets as needed with a goal of >50-75k. Will defer to ENT in regards to any further specific oral cavity evaluations to assess for possible laceration. The only medication I see that may be contributing to the thrombocytopenia is PPI therapy.    Hannah Medina MD  Hematology/Oncology

## 2025-01-10 NOTE — ASSESSMENT & PLAN NOTE
Appreciate gastroenterology recommendations, we will keep NG-tube in at this time to continue keeping patient's stomach decompressed in the setting of recent small-bowel obstruction.  Minor esophageal injury.

## 2025-01-10 NOTE — ASSESSMENT & PLAN NOTE
Worsening creatinine since admission, consented the family and placed a right-sided Trialysis catheter with plans to perform dialysis if the patient requires this.  Likely etiology at this time is from hypotension/hypovolemia causing acute tubular necrosis.  We will continue to monitor potassium.  At this time the patient has been hypokalemic and may be approaching the need for dialysis to perform volume removal or if significant amount of uremia.  She has made a small amount of urine which is improved from the day prior 1/9/25.

## 2025-01-10 NOTE — ASSESSMENT & PLAN NOTE
Severe lactic acidosis, likely in the setting of hypovolemia and possible vasodilatory shock, requiring fluid resuscitation.  We will continue to perform serial checks.  Now with significant improvement status post volume administration on 1/7/25.  Persistent mild elevation of lactic acidosis.

## 2025-01-10 NOTE — ASSESSMENT & PLAN NOTE
Small-bowel obstruction partial or complete based on recent CT scan, patient with NG tube in place.  Appreciate surgery consultation, agree that less likely to be mesenteric ischemia given improving lactic acidosis and low-dose vasopressor support requirements.  Still may have some third spacing from her small-bowel obstruction.  However, now with bowel movement 1/9/25.

## 2025-01-10 NOTE — PLAN OF CARE
Problem: Artificial Airway  Goal: Effective Communication  Outcome: Progressing  Goal: Optimal Device Function  Outcome: Progressing  Goal: Absence of Device-Related Skin or Tissue Injury  Outcome: Progressing     Problem: Mechanical Ventilation Invasive  Goal: Effective Communication  Outcome: Progressing  Goal: Optimal Device Function  Outcome: Progressing  Goal: Mechanical Ventilation Liberation  Outcome: Progressing  Goal: Optimal Nutrition Delivery  Outcome: Progressing  Goal: Absence of Device-Related Skin and Tissue Injury  Outcome: Progressing  Goal: Absence of Ventilator-Induced Lung Injury  Outcome: Progressing

## 2025-01-10 NOTE — SUBJECTIVE & OBJECTIVE
Interval History/Significant Events:  Refer to hospital course    Review of Systems  Objective:     Vital Signs (Most Recent):  Temp: 98.9 °F (37.2 °C) (01/09/25 2313)  Pulse: 79 (01/09/25 1930)  Resp: 14 (01/09/25 1930)  BP: (!) 92/36 (01/09/25 1930)  SpO2: 99 % (01/09/25 1939) Vital Signs (24h Range):  Temp:  [93.9 °F (34.4 °C)-98.9 °F (37.2 °C)] 98.9 °F (37.2 °C)  Pulse:  [] 79  Resp:  [12-19] 14  SpO2:  [97 %-100 %] 99 %  BP: ()/(34-68) 92/36   Weight: 94.8 kg (208 lb 15.9 oz)  Body mass index is 35.87 kg/m².      Intake/Output Summary (Last 24 hours) at 1/9/2025 2322  Last data filed at 1/9/2025 1842  Gross per 24 hour   Intake 1358.77 ml   Output 270 ml   Net 1088.77 ml          Physical Exam  Constitutional:       General: She is in acute distress.      Appearance: Normal appearance. She is obese. She is ill-appearing. She is not diaphoretic.   HENT:      Head: Normocephalic and atraumatic.      Nose: No congestion or rhinorrhea.      Mouth/Throat:      Mouth: Mucous membranes are moist.      Comments: Minimal oozing from mouth, unable to identify source of bleeding due to endotracheal tube.  Appreciate ENT involvement.  Cardiovascular:      Rate and Rhythm: Normal rate and regular rhythm.      Pulses: Normal pulses.      Heart sounds: Normal heart sounds.   Pulmonary:      Effort: Pulmonary effort is normal. No respiratory distress.      Breath sounds: No stridor. Rhonchi present. No wheezing or rales.      Comments: No evidence of subcutaneous emphysema or crepitus present in neck or around chest  Chest:      Chest wall: No tenderness.   Abdominal:      General: Abdomen is flat.   Musculoskeletal:      Cervical back: Normal range of motion. No rigidity.      Right lower leg: Edema present.      Left lower leg: Edema present.   Skin:     General: Skin is warm.      Findings: No erythema.   Neurological:      Mental Status: She is alert and oriented to person, place, and time.      Comments:  Intubated, sedated            Vents:  Vent Mode: A/CMV-VC (01/09/25 1939)  Ventilator Initiated: Yes (01/05/25 1015)  Set Rate: 14 BPM (01/09/25 1939)  Vt Set: 420 mL (01/09/25 1939)  Pressure Support: 10 cmH20 (01/07/25 1915)  PEEP/CPAP: 5 cmH20 (01/09/25 1939)  Oxygen Concentration (%): 30 (01/09/25 1939)  Peak Airway Pressure: 21.2 cmH20 (01/09/25 1939)  Plateau Pressure: 16.1 cmH20 (01/08/25 0742)  Total Ve: 5.71 L/m (01/09/25 1939)  F/VT Ratio<105 (RSBI): (!) 35.35 (01/08/25 0424)  Lines/Drains/Airways       Central Venous Catheter Line  Duration             Trialysis (Dialysis) Catheter 01/05/25 1058 external jugular;right internal jugular 4 days              Drain  Duration                  NG/OG Tube 01/05/25 1033 Right nostril 4 days         Urethral Catheter 01/05/25 0900 Silicone 16 Fr. 4 days              Airway  Duration                  Airway - Non-Surgical 01/05/25 1015 4 days              Peripheral Intravenous Line  Duration                  Midline Catheter - Single Lumen 01/05/25 0945 Right basilic vein (medial side of arm) 20g x 10cm 4 days         Peripheral IV - Single Lumen 01/05/25 0414 20 G Anterior;Left Forearm 4 days                  Significant Labs:    CBC/Anemia Profile:  Recent Labs   Lab 01/08/25  1550 01/09/25  0542 01/09/25  1103 01/09/25  1419   WBC 4.89 4.44* 3.77*  --    HGB 7.3* 7.2* 6.8*  --    HCT 23.2* 21.9* 21.7*  --    PLT 40* 26* 79*  --    MCV 83.5 82.3 84.1  --    RDW 17.2* 17.0* 17.2*  --    OCCULTBLOOD  --   --   --  Positive*        Chemistries:  Recent Labs   Lab 01/08/25  0502 01/09/25  0542   * 136   K 3.1* 3.6    102   CO2 20* 18*   BUN 57* 61*   CREATININE 5.23* 5.97*   CALCIUM 6.5* 6.6*   ALBUMIN  --  1.6*   PROT  --  4.5*   BILITOT  --  0.4   ALKPHOS  --  51   ALT  --  61*   AST  --  46*   MG 1.7 2.2   PHOS 3.2 4.0       All pertinent labs within the past 24 hours have been reviewed.    Significant Imaging:  I have reviewed all pertinent imaging  results/findings within the past 24 hours.

## 2025-01-10 NOTE — PROGRESS NOTES
Patient is still requiring mechanical ventilation.  She is on pressors and oliguric.  Patient will be dialyze today.  Packing in mouth is noted to be dry.  This is improvement from yesterday.  Small amount of yellow urine noted in Loya bag.  Nursing reports patient had 300 cc output of urine overnight.  Patient is H&H has improved, platelets have decreased.  Nursing staff reports this was addressed yesterday and order for packed red blood cells and platelets last night but states it was not able to be given related to availability.  Patient was also evaluated by Dr. Langston who had no recommendations for this patient.  We will continue to monitor patient.

## 2025-01-10 NOTE — PROGRESS NOTES
[facility]  Nephrology  Progress Note    Patient Name: Renetta Stewart  MRN: [id number]  Admission Date: 1/2/2025  Hospital Length of Stay: 8 days  Attending Provider: Jagjit Wayne MD   Primary Care Physician: Eldon Govea MD  Principal Problem:Lactic acidosis    Consults  Subjective:     Interval History:  Patient is seen in follow-up of her acute renal failure.  BUN is 66 and creatinine is now 6.1.  She is oliguric and still with a tendency to hypotension requiring pressors.      She has no response to stimuli.      We will try to ultrafilter her today to see if she will tolerate attempts at volume removal.  Chest x-ray does show evidence of excess lung water.      If she does not tolerate isolated ultrafiltration (our gentlest  method of fluid removal), we will have a difficult time removing any volume.    An EEG is being done today.  Her neurologic status is very concerning.    Review of patient's allergies indicates:  No Known Allergies  Current Facility-Administered Medications   Medication Frequency    0.9%  NaCl infusion (for blood administration) Q24H PRN    0.9%  NaCl infusion (for blood administration) Q24H PRN    0.9%  NaCl infusion (for blood administration) Q24H PRN    atorvastatin tablet 80 mg Daily    ceFEPIme injection 1 g Q24H    dextrose 50% injection 12.5 g PRN    dextrose 50% injection 25 g PRN    fentaNYL 2500 mcg in D5W 250 mL infusion premix (titrating) (conc: 10 mcg/mL) Continuous    glucagon (human recombinant) injection 1 mg PRN    glucose chewable tablet 16 g PRN    glucose chewable tablet 24 g PRN    heparin (porcine) injection 2,000 Units PRN    lactated ringers infusion Continuous    levothyroxine tablet 50 mcg Before breakfast    naloxone 0.4 mg/mL injection 0.02 mg PRN    NORepinephrine bitartrate-D5W 4 mg/250 mL (16 mcg/mL) PERIPHERAL access infusion Continuous    pantoprazole injection 40 mg Daily    promethazine suppository 25 mg Q6H PRN    propofol  (DIPRIVAN) 10 mg/mL infusion Continuous    sodium chloride 0.9% flush 10 mL Q12H PRN    tranexamic acid injection Soln 1,000 mg Once    tranexamic acid nebulizer Soln 500 mg Once       Objective:     Vital Signs (Most Recent):  Temp: 97.9 °F (36.6 °C) (01/10/25 0705)  Pulse: 90 (01/10/25 0745)  Resp: 14 (01/10/25 0745)  BP: (!) 89/37 (01/10/25 0745)  SpO2: 96 % (01/10/25 0745) Vital Signs (24h Range):  Temp:  [93.9 °F (34.4 °C)-98.9 °F (37.2 °C)] 97.9 °F (36.6 °C)  Pulse:  [] 90  Resp:  [13-14] 14  SpO2:  [94 %-100 %] 96 %  BP: ()/(34-66) 89/37     Weight: 106.5 kg (234 lb 12.6 oz) (01/10/25 0312)  Body mass index is 40.3 kg/m².  Body surface area is 2.19 meters squared.    I/O last 3 completed shifts:  In: 2069.6 [I.V.:1245; Blood:824.6]  Out: 575 [Urine:575]    Physical Exam no obvious peripheral edema.    Significant Labs:sure  She remains ventilated and unresponsive    Significant Imaging:  Labs: Reviewed    Assessment/Plan:     Active Diagnoses:    Diagnosis Date Noted POA    PRINCIPAL PROBLEM:  Lactic acidosis [E87.20] 01/04/2025 Yes    Esophageal perforation [K22.3] 01/09/2025 No    Advanced care planning/counseling discussion [Z71.89] 01/09/2025 Not Applicable    Thrombocytopenia [D69.6] 01/08/2025 No    Anemia [D64.9] 01/08/2025 No    Left lower lobe pneumonia [J18.9] 01/08/2025 No    Hard to intubate [T88.4XXA] 01/05/2025 Yes    Small bowel obstruction [K56.609] 01/05/2025 No    Subcutaneous air-neck [T79.7XXA] 01/05/2025 No    Encephalopathy, metabolic [G93.41] 01/04/2025 Yes    Acute renal failure superimposed on stage 3a chronic kidney disease [N17.9, N18.31] 01/04/2025 No    Colitis [K52.9] 01/03/2025 Yes    Neuroendocrine tumor [D3A.8] 12/30/2024 Yes    Esophagitis determined by endoscopy [K20.90] 12/27/2024 Yes    Acute superficial gastritis without hemorrhage [K29.00] 12/27/2024 Yes    Elevated troponin [R79.89] 12/21/2024 Yes    Syncope [R55] 12/21/2024 Yes    Syncope and collapse [R55]  12/20/2024 Yes    Type 2 MI (myocardial infarction) [I21.A1] 12/20/2024 Yes    Stage 3b chronic kidney disease [N18.32] 03/29/2023 Yes    Complex renal cyst [N28.1] 03/06/2023 Not Applicable    Morbid obesity [E66.01] 04/19/2022 Yes    Essential hypertension [I10] 05/27/2021 Yes      Problems Resolved During this Admission:       We will plan isolated ultrafiltration today and try to remove 2-3 kilos of volume.  This may require increased pressors.  If she is intolerant of it, we may not be able to continue.    Thank you for your consult.  [place] is grim    Yaron Acuna MD  Nephrology  [facility]

## 2025-01-10 NOTE — PROCEDURES
EEG    Date/Time: 1/10/2025 4:39 PM    Performed by: Collin Zhu MD  Authorized by: Jagjit Wayne MD        ELECTROENCEPHALOGRAM REPORT     DATE OF SERVICE: 01/03/2025  EEG NUMBER: [id number]  LOCATION OF SERVICE: [facility]             Electroencephalographic (EEG) recording is with electrodes placed according to the International 10-20 placement system.  Thirty two (32) channels of digital signal (sampling rate of 512/sec) including T1 and T2 was simultaneously recorded from the scalp and may include  EKG, EMG, and/or eye monitors.  Recording band pass was 0.1 to 512 hz.  Digital video recording of the patient is simultaneously recorded with the EEG.  The patient is instructed report clinical symptoms which may occur during the recording session.  EEG and video recording is stored and archived in digital format. Activation procedures which include photic stimulation, hyperventilation and instructing patients to perform simple task are done in selected patients.              The EEG is displayed on a monitor screen and can be reviewed using different montages.  Computer assisted analysis is employed to detect spike and electrographic seizure activity.   The entire record is submitted for computer analysis.  The entire recording is visually reviewed and the times identified by computer analysis as being spikes or seizures are reviewed again.  Compresses spectral analysis (CSA) is also performed on the activity recorded from each individual channel.  This is displayed as a power display of frequencies from 0 to 30 Hz over time.   The CSA is reviewed looking for asymmetries in power between homologous areas of the scalp and then compared with the original EEG recording.               [employer] software was also utilized in the review of this study.  This software suite analyzes the EEG recording in multiple domains.  Coherence and rhythmicity is computed to identify EEG sections which may contain  organized seizures.  Each channel undergoes analysis to detect presence of spike and sharp waves which have special and morphological characteristic of epileptic activity.  The routine EEG recording is converted from spacial into frequency domain.  This is then displayed comparing homologous areas to identify areas of significant asymmetry.  Algorithm to identify non-cortically generated artifact is used to separate eye movement, EMG and other artifact from the EEG     EEG FINDINGS  During the most alert state, there was no clear posterior dominant rhythm. Rather, the background consisted of continuous, generalized, reactive 3-4 Hz theta activity with intermittent 1-2 Hz delta range activity. Normal sleep architecture was absent,      No epileptiform findings were noted, no electrographic seizures were seen, and no clinical events were reported.     Activation Procedures were not performed.        IMPRESSION:  Continuous reactive theta activity, generalized  Intermittent reactive delta activity, generalized     CLINICAL CORRELATION:  The diffuse slowing is indicative of non-specific moderate global cerebral dysfunction consistent with toxic metabolic etiology. There were no epileptiform findings, electrographic seizures, or no clinical events recorded. Overall, the background is noted to be slower than prior study performed on 01/02 likely indicating worsening encephalopathy.     Collin Zhu MD  Neurology

## 2025-01-11 NOTE — PLAN OF CARE
Problem: Adult Inpatient Plan of Care  Goal: Plan of Care Review  Outcome: Progressing     Problem: Skin Injury Risk Increased  Goal: Skin Health and Integrity  Outcome: Progressing     Problem: Acute Kidney Injury/Impairment  Goal: Fluid and Electrolyte Balance  Outcome: Progressing  Goal: Improved Oral Intake  Outcome: Progressing  Goal: Effective Renal Function  Outcome: Progressing     Problem: Infection  Goal: Absence of Infection Signs and Symptoms  Outcome: Progressing     Problem: Fall Injury Risk  Goal: Absence of Fall and Fall-Related Injury  Outcome: Progressing     Problem: Artificial Airway  Goal: Optimal Device Function  Outcome: Progressing  Goal: Absence of Device-Related Skin or Tissue Injury  Outcome: Progressing     Problem: Mechanical Ventilation Invasive  Goal: Effective Communication  Outcome: Progressing     Problem: Gas Exchange Impaired  Goal: Optimal Gas Exchange  Outcome: Progressing     Problem: Breathing Pattern Ineffective  Goal: Effective Breathing Pattern  Outcome: Progressing     Problem: Airway Clearance Ineffective  Goal: Effective Airway Clearance  Outcome: Progressing

## 2025-01-11 NOTE — PLAN OF CARE
Problem: Adult Inpatient Plan of Care  Goal: Plan of Care Review  Outcome: Progressing     Problem: Skin Injury Risk Increased  Goal: Skin Health and Integrity  Outcome: Progressing     Problem: Acute Kidney Injury/Impairment  Goal: Fluid and Electrolyte Balance  Outcome: Progressing     Problem: Infection  Goal: Absence of Infection Signs and Symptoms  Outcome: Progressing     Problem: Fall Injury Risk  Goal: Absence of Fall and Fall-Related Injury  Outcome: Progressing     Problem: Artificial Airway  Goal: Effective Communication  Outcome: Progressing     Problem: Mechanical Ventilation Invasive  Goal: Effective Communication  Outcome: Progressing     Problem: Gas Exchange Impaired  Goal: Optimal Gas Exchange  Outcome: Progressing     Problem: Breathing Pattern Ineffective  Goal: Effective Breathing Pattern  Outcome: Progressing     Problem: Pneumonia  Goal: Fluid Balance  Outcome: Progressing     Problem: Wound  Goal: Optimal Coping  Outcome: Progressing     Problem: Airway Clearance Ineffective  Goal: Effective Airway Clearance  Outcome: Progressing

## 2025-01-11 NOTE — PROGRESS NOTES
[facility]  General Surgery  Progress Note    Subjective:     Interval History:  Patient no acute events stable on the ventilator.  Mild oozing from the mouth    Post-Op Info:  * No surgery found *          Medications:  Continuous Infusions:   fentanyl  0-250 mcg/hr Intravenous Continuous   Stopped at 01/07/25 1216    lactated ringers   Intravenous Continuous   Stopped at 01/09/25 1015    NORepinephrine bitartrate-D5W  0-0.2 mcg/kg/min Intravenous Continuous 13.2 mL/hr at 01/11/25 0905 0.04 mcg/kg/min at 01/11/25 0905    propofoL  0-50 mcg/kg/min Intravenous Continuous   Stopped at 01/07/25 1216     Scheduled Meds:   atorvastatin  80 mg Oral Daily    ceFEPime IV (PEDS and ADULTS)  1 g Intravenous Q24H    levothyroxine  50 mcg Oral Before breakfast    pantoprazole  40 mg Intravenous Daily     PRN Meds:  Current Facility-Administered Medications:     0.9%  NaCl infusion (for blood administration), , Intravenous, Q24H PRN    0.9%  NaCl infusion (for blood administration), , Intravenous, Q24H PRN    0.9%  NaCl infusion (for blood administration), , Intravenous, Q24H PRN    0.9% NaCl, , Intra-Catheter, PRN    dextrose 50%, 12.5 g, Intravenous, PRN    dextrose 50%, 12.5 g, Intravenous, PRN    dextrose 50%, 25 g, Intravenous, PRN    glucagon (human recombinant), 1 mg, Intramuscular, PRN    glucagon (human recombinant), 1 mg, Intramuscular, PRN    glucose, 16 g, Oral, PRN    glucose, 24 g, Oral, PRN    heparin (porcine), 4,000 Units, Intra-Catheter, PRN    insulin aspart U-100, 0-10 Units, Subcutaneous, Q6H PRN    naloxone, 0.02 mg, Intravenous, PRN    promethazine, 25 mg, Rectal, Q6H PRN    sodium chloride 0.9%, 10 mL, Intravenous, Q12H PRN     Objective:     Vital Signs (Most Recent):  Temp: 98.8 °F (37.1 °C) (01/11/25 0830)  Pulse: 89 (01/11/25 0930)  Resp: 14 (01/11/25 0930)  BP: (!) 111/57 (01/11/25 0930)  SpO2: 100 % (01/11/25 0930) Vital Signs (24h Range):  Temp:  [97 °F (36.1 °C)-99.1 °F (37.3  °C)] 98.8 °F (37.1 °C)  Pulse:  [74-94] 89  Resp:  [11-18] 14  SpO2:  [87 %-100 %] 100 %  BP: ()/(41-81) 111/57       Intake/Output Summary (Last 24 hours) at 1/11/2025 1120  Last data filed at 1/11/2025 0905  Gross per 24 hour   Intake 750.17 ml   Output 299 ml   Net 451.17 ml       Physical Exam  Constitutional:       General: She is in acute distress.   HENT:      Head: Normocephalic.   Cardiovascular:      Rate and Rhythm: Normal rate and regular rhythm.      Pulses: Normal pulses.   Pulmonary:      Effort: Pulmonary effort is normal. No respiratory distress.      Breath sounds: Normal breath sounds.   Abdominal:      General: Abdomen is flat. There is no distension.      Palpations: Abdomen is soft.      Tenderness: There is no abdominal tenderness.   Musculoskeletal:         General: Normal range of motion.   Skin:     General: Skin is warm.   Neurological:      General: No focal deficit present.      Mental Status: She is oriented to person, place, and time.         Significant Labs:  CBC:   Recent Labs   Lab 01/11/25  0811   WBC 3.12*   RBC 2.79*   HGB 7.8*   HCT 23.2*   *   MCV 83.2   MCH 28.0   MCHC 33.6     CMP:   Recent Labs   Lab 01/11/25  0823   GLU 80*   CALCIUM 6.5*      K 3.2*   CO2 20*      BUN 72*   CREATININE 6.05*     Coagulation:   Recent Labs   Lab 01/11/25  0811   INR 1.27   APTT 32.6     Lactic Acid:   Recent Labs   Lab 01/11/25  0811   LACTATE 2.2       Significant Diagnostics:  I have reviewed all pertinent imaging results/findings within the past 24 hours.    Assessment/Plan:     Active Diagnoses:    Diagnosis Date Noted POA    PRINCIPAL PROBLEM:  Lactic acidosis [E87.20] 01/04/2025 Yes    Esophageal perforation [K22.3] 01/09/2025 No    Advanced care planning/counseling discussion [Z71.89] 01/09/2025 Not Applicable    Thrombocytopenia [D69.6] 01/08/2025 No    Anemia [D64.9] 01/08/2025 No    Left lower lobe pneumonia [J18.9] 01/08/2025 No    Hard to intubate  [T88.4XXA] 01/05/2025 Yes    Small bowel obstruction [K56.609] 01/05/2025 No    Subcutaneous air-neck [T79.7XXA] 01/05/2025 No    Encephalopathy, metabolic [G93.41] 01/04/2025 Yes    Acute renal failure superimposed on stage 3a chronic kidney disease [N17.9, N18.31] 01/04/2025 No    Colitis [K52.9] 01/03/2025 Yes    Neuroendocrine tumor [D3A.8] 12/30/2024 Yes    Esophagitis determined by endoscopy [K20.90] 12/27/2024 Yes    Acute superficial gastritis without hemorrhage [K29.00] 12/27/2024 Yes    Elevated troponin [R79.89] 12/21/2024 Yes    Syncope [R55] 12/21/2024 Yes    Syncope and collapse [R55] 12/20/2024 Yes    Type 2 MI (myocardial infarction) [I21.A1] 12/20/2024 Yes    Stage 3b chronic kidney disease [N18.32] 03/29/2023 Yes    Complex renal cyst [N28.1] 03/06/2023 Not Applicable    Morbid obesity [E66.01] 04/19/2022 Yes    Essential hypertension [I10] 05/27/2021 Yes      Problems Resolved During this Admission:     Consider starting tube feeds.    Arthur Gaston MD  General Surgery  [facility]

## 2025-01-11 NOTE — CONSULTS
[facility]  Adult Nutrition  Consult Note         Reason for Assessment  Reason For Assessment: new tube feeding, consult        Assessment and Plan  1/11/2025: Consult for trickle feeds received and appreciated. Recommend to start Novasource Renal at 10 ml/hr as tolerated. Advance by 10 ml/hr x 12 hours to goal of 40 ml/hr as medically appropriate. RD Following.     Patient admitted 1/2 with a dx of lactic acidosis and assessed for LOS. Patient is currently NPO with no diet noted since admission on 1/2. Current weight 106.5 kg with a BMI of 40.30 which indicates morbid obesity.     Recommend to consider alternate route of nutrition to meet nutritional needs. RD Following.       Learning Needs/Social Determinants of Health    Learning Assessment       01/02/2025 2329 [facility] (1/2/2025 - Present)   Created by Carrillo Tapia RN - RN (Nurse) Status: Complete                 PRIMARY LEARNER     Primary Learner Name:  Renetta Stewart WS - 01/02/2025 2329    Relationship:  Patient WS - 01/02/2025 2329    Does the primary learner have any barriers to learning?:  Cognitive WS - 01/02/2025 2329    What is the preferred language of the primary learner?:  English WS - 01/02/2025 2329    Is an  required?:  No WS - 01/02/2025 2329    How does the primary learner prefer to learn new concepts?:  Listening WS - 01/02/2025 2329    How often do you need to have someone help you read instructions, pamphlets, or written material from your doctor or pharmacy?:  Sometimes WS - 01/02/2025 2329        CO-LEARNER #1     Co-Learner Name (if applicable):  Renetta Stewart WS - 01/02/2025 2329    Relationship:  Patient WS - 01/02/2025 2329    Does the co-learner have any barriers to learning?:  Cognitive WS - 01/02/2025 2329    What is the preferred language of the co-learner?:  English WS - 01/02/2025 2329    Is an  required?:  No WS - 01/02/2025 2329    How does the co-learner  prefer to learn new concepts?:  Listening WS - 01/02/2025 2329        CO-LEARNER #2     No question answered        SPECIAL TOPICS     No question answered        ANSWERED BY:     No question answered        Comments         Edit History       Carrillo Tapia, RN - RN (Nurse)   01/02/2025 2329                           Social Drivers of Health     Tobacco Use: Low Risk  (1/2/2025)    Patient History     Smoking Tobacco Use: Never     Smokeless Tobacco Use: Never     Passive Exposure: Never   Recent Concern: Tobacco Use - Medium Risk (10/22/2024)    Received from [facility]    Patient History     Smoking Tobacco Use: Former     Smokeless Tobacco Use: Never     Passive Exposure: Not on file   Alcohol Use: Not At Risk (1/3/2025)    AUDIT-C     Frequency of Alcohol Consumption: Never     Average Number of Drinks: Patient does not drink     Frequency of Binge Drinking: Never   Financial Resource Strain: Low Risk  (1/3/2025)    Overall Financial Resource Strain (CARDIA)     Difficulty of Paying Living Expenses: Not hard at all   Food Insecurity: No Food Insecurity (1/3/2025)    Hunger Vital Sign     Worried About Running Out of Food in the Last Year: Never true     Ran Out of Food in the Last Year: Never true   Transportation Needs: No Transportation Needs (1/3/2025)    TRANSPORTATION NEEDS     Transportation : No   Physical Activity: Inactive (1/3/2025)    Exercise Vital Sign     Days of Exercise per Week: 0 days     Minutes of Exercise per Session: 0 min   Stress: No Stress Concern Present (1/3/2025)    [place] [employer] - Occupational Stress Questionnaire     Feeling of Stress : Not at all   Housing Stability: Low Risk  (1/3/2025)    Housing Stability Vital Sign     Unable to Pay for Housing in the Last Year: No     Homeless in the Last Year: No   Depression: Low Risk  (12/20/2024)    Depression     Last PHQ-4: Flowsheet Data: 0   Utilities: Not At Risk  (1/3/2025)    [facility] Utilities     Threatened with loss of utilities: No   Health Literacy: Adequate Health Literacy (1/3/2025)     Health Literacy     Frequency of need for help with medical instructions: Rarely   Recent Concern: Health Literacy - Inadequate Health Literacy (12/22/2024)     Health Literacy     Frequency of need for help with medical instructions: Always   Social Isolation: Socially Integrated (1/3/2025)    Social Isolation     Social Isolation: 1          Malnutrition  Is Patient Malnourished: No    Nutrition Diagnosis  Inadequate energy intake related to Inadequate Caloric intake as evidenced by NPO status x 8 days  Comments initiate trickle feeds per consult    Recent Labs   Lab 01/11/25  0458 01/11/25  0823   GLU  --  80*   POCGLU 83  --      Comments on Glucose: noted    Nutrition Prescription / Recommendations  Recommendation/Intervention: Recommend Novasource Renal at 10 ml/hr for trickle feeds. Advance by 10 ml/hr to goal of 40 ml/hr as medically appropriate  Goals: diet advancement vs alternate means of nutrition x 72 hours  Nutrition Goal Status: progressing towards goal  Current Diet Order: npo  Chewing or Swallowing Difficulty?: No Chewing or swallowing difficulty  Recommended Diet: Enteral Nutrition  Recommended Oral Supplement: No Oral Supplements  Is Nutrition Support Recommended: yes    Needs Calculated  Energy Need Method: Kcal/kg Energy Calorie Requirements (kcal): 6861-0133  Protein Requirements: 64-85  Enteral Nutrition at goal of novasource renal 40 ml/hr  Enteral Nutrition Formula Provides:  1920 kcals Propofol Rate: No  87 g Protein  176 g Carbohydrates  96 g Fat Propofol Rate: No  688 ml Fluid without Flush    0 ml Fluid by flush   688 ml Total Fluid  Enteral Nutrition Recommended Order:  Tube feeding via NG/ [place] Sump  Tube feeding formula: Novasource Renal NG/ [place] Sump  Free Water Flush: 0 ml   Modular Supplements:No Modular Supplements needed  Enteral Nutrition  "meets needs?: no -   Enteral Nutrition Status: New Order  Is Nutrition Education Recommended: No    Monitor and Evaluation  % current Intake: NPO  % intake to meet estimated needs: Enteral Nutrition           Current Medical Diagnosis and Past Medical History  Diagnosis: gastrointestinal disease, cardiac disease, renal disease  Past Medical History:   Diagnosis Date    Anemia     Anxiety     Dementia     Depression     GERD (gastroesophageal reflux disease)     Hyperlipidemia     Hypertension     PONV (postoperative nausea and vomiting)     Stroke        Nutrition/Diet History  Food Allergies: NKFA  Factors Affecting Nutritional Intake: NPO, on mechanical ventilation    Lab/Procedures/Meds  Recent Labs   Lab 01/09/25  0542 01/10/25  0608 01/11/25  0823    136 136   K 3.6 3.3* 3.2*   BUN 61* 66* 72*   CREATININE 5.97* 6.13* 6.05*   CALCIUM 6.6* 6.4* 6.5*   ALBUMIN 1.6*  --   --     103 104   ALT 61*  --   --    AST 46*  --   --    PHOS 4.0 4.1  --    Dx ARF on CKD  Dx lactic acidosis  Last A1c: No results found for: "HGBA1C"  Lab Results   Component Value Date    RBC 2.79 (L) 01/11/2025    HGB 7.8 (L) 01/11/2025    HCT 23.2 (L) 01/11/2025    MCV 83.2 01/11/2025    MCH 28.0 01/11/2025    MCHC 33.6 01/11/2025    TIBC 142 (L) 12/27/2024     Pertinent Labs Reviewed: reviewed  Pertinent Medications Reviewed: reviewed  Scheduled Meds:   atorvastatin  80 mg Oral Daily    ceFEPime IV (PEDS and ADULTS)  1 g Intravenous Q24H    levothyroxine  50 mcg Oral Before breakfast    pantoprazole  40 mg Intravenous Daily     Continuous Infusions:   fentanyl  0-250 mcg/hr Intravenous Continuous   Stopped at 01/07/25 1216    lactated ringers   Intravenous Continuous   Stopped at 01/09/25 1015    NORepinephrine bitartrate-D5W  0-0.2 mcg/kg/min Intravenous Continuous 13.2 mL/hr at 01/11/25 0905 0.04 mcg/kg/min at 01/11/25 0905    propofoL  0-50 mcg/kg/min Intravenous Continuous   Stopped at 01/07/25 1216     PRN Meds:.  Current " "Facility-Administered Medications:     0.9%  NaCl infusion (for blood administration), , Intravenous, Q24H PRN    0.9%  NaCl infusion (for blood administration), , Intravenous, Q24H PRN    0.9%  NaCl infusion (for blood administration), , Intravenous, Q24H PRN    0.9% NaCl, , Intra-Catheter, PRN    dextrose 50%, 12.5 g, Intravenous, PRN    dextrose 50%, 12.5 g, Intravenous, PRN    dextrose 50%, 25 g, Intravenous, PRN    glucagon (human recombinant), 1 mg, Intramuscular, PRN    glucagon (human recombinant), 1 mg, Intramuscular, PRN    glucose, 16 g, Oral, PRN    glucose, 24 g, Oral, PRN    heparin (porcine), 4,000 Units, Intra-Catheter, PRN    insulin aspart U-100, 0-10 Units, Subcutaneous, Q6H PRN    naloxone, 0.02 mg, Intravenous, PRN    promethazine, 25 mg, Rectal, Q6H PRN    sodium chloride 0.9%, 10 mL, Intravenous, Q12H PRN    Anthropometrics  Temp: 98.8 °F (37.1 °C)  Height Method: Estimated  Height: 5' 4" (162.6 cm)  Height (inches): 64 in  Weight Method: Bed Scale  Weight: 106.5 kg (234 lb 12.6 oz)  Weight (lb): 234.79 lb  Ideal Body Weight (IBW), Female: 120 lb  % Ideal Body Weight, Female (lb): 161.67 %  BMI (Calculated): 40.3  BMI Grade: greater than 40 - morbid obesity       Estimated/Assessed Needs    Total Ve: 5.99 L/m Temp: 98.8 °F (37.1 °C)Axillary  Weight Used For Calorie Calculations: 106.5 kg (234 lb 12.6 oz)   Energy Need Method: Kcal/kg Energy Calorie Requirements (kcal): 8084-2773  Weight Used For Protein Calculations: 106.5 kg (234 lb 12.6 oz)  Protein Requirements: 64-85  Estimated Fluid Requirement Method: RDA Method    RDA Method (mL): 1491       Nutrition by Nursing  Diet/Nutrition Received: NPO  Intake (%):  (NPO)        Last Bowel Movement: 01/10/25                Nutrition Follow-Up  RD Follow-up?: Yes      Nutrition Discharge Planning: rd following for dc needs            Available via Secure Chat  "

## 2025-01-11 NOTE — NURSING
[facility]  DIALYSIS TREATMENT SUMMARY      Note: Consult with the attending physician for patient treatment orders, this document is not a physician order.      Patient Information   Patient Renetta Stewart   Date of Birth February 12, 1950   Chart Number [id number]   Location [facility]   Location MRN [id number]   Gender Female   SSN (last 4) [id number]     Treatment Information   Treatment Type Hemodialysis   Treatment Id [id number]   Start Time January 11, 2025 08:35   End Time January 11, 2025 11:35   Acutal Duration 03:00     Treatment Balances   Total Saline Administered 500   Net Fluid Balance 500   Reason Goal Not Met Hypotension - MD Aware    Hemodialysis Orders   Therapy Standard   Orders Verified Time 01/11/2025 08:05    Date Verified 01/11/2025   Duration 3:00   Isolated UF/Bypass No   BFR (mL) 300   DFR (mL) 600   Dialyzer Type OPTIFLUX 160NR   UF Order UF Range   UF Range (mL) 1000 - 2000   Crit-Line used No   Heparin Initial Units Bolus No   Heparin IV Maintenance Bolus No   Heparin IV Infusion No   Potassium (mEq/L) 3   Calcium (mEq/L) 2.5   Bicarb (mg/dL) 33   Sodium (mEq/L) 137   Additional Orders 1. Lock HD cath, each port with 2000 units of heparin 2. Turn UF off if pt becomes symptomatic associated with drop in SBP >20mmHg   Clinician Pa Hernandez, PROMISE    Dialysis Access   Access Type Central Venous Catheter   Central Venous Catheter   Access Type Catheter - Non-Tunneled   Access Location Neck - R   Current/New [facility] Patient Yes   1st Use Catheter Verified by Previous Use   Catheter Care Completed per Policy Yes   Dressing Dry and Intact on Arrival Yes   Dressing Changed Yes   Type of Dressing Transparent Polyurethane   Dressing Changed By [facility] Staff   Type of HD Caps Not Listed   HD Caps Changed Yes   CVC Line Education Provided No - Intubated      Vitals   Pre-Treatment Vitals   Time Is BP being recorded? Pre BP Map BP Method Pulse RR Temp How was Weight Obtained? Pre  Weight Previous Dry Weight Previous Post Weight Metric Target Fluid Removal (mL) Dialysate Confirmed Clinician    01/11/2025 08:30 BP/Map 102/48 (66) Noninvasive 85 15 98.8 How Obtained: Bed Scale 107.3   Kgs 1500 Yes Pa Hernandez RN    Comments:       Post Treatment Vitals   Time Is BP being recorded? BP Map Pulse RR Temp How was Weight Obtained? Post Weight Metric BVP UF Goal Ordered NSS Given Intra-Procedure Total Machine UF Removed (mL) Crit-Line Ending Profile Crit-Line Refill Crit-Line Ending HCT Crit-Line Max BV% Clinician    01/11/2025 11:45 BP/Map 105/50 (68) 98 15 99 How Obtained: Bed scale 106.7 Kgs 33.6 1000 0 1000     Pa Hernandez RN    Comments: vitals stable      Safety checks include: access uncovered and secured, Hemaclip secured for all central line access, machine checks performed, and alarm limits confirmed.     Labs   Hepatitis   HBsAG Lab Result HBsAG Lab Result HBsAG Draw Date Transient Antigenemia(MD Diagnosis Only) Anti-HBs Lab Result Anti-HBs Lab Value Anti-HBs Draw Date Documented By Documented Date Hepatitis Status Hepatitis Status   Negative  01/10/2025  Unknown  01/10/2025 Pa Hernandez 01/11/2025  Susceptible   Notes:       Pre-Treatment Hepatitis Precautions Copy of hepatitis results verified in hospital EMR Yes Signing   Patient tx at bedside 1:1 Yes Hepatitis Information Entered By Pa Hernandez RN Signed By Pa Hernandez RN     Pre-Treatment Handoff   Staff Report Received Yes   Report Given by Primary Nurse Vesta Acuna   Time 08:20     Patient Arrival   Patient ID Verified Date of Birth    MRN   Patient Consent to treatment verified Yes   Blood Transfusion Consent Verified N/A     Treatment Comments   Treatment Notes pt tolerated HD well. vitals stable. afebrile. catheter care done per p/p     Post-Treatment Handoff   Report Given to Primary Nurse Vesta Acuna   Time Report Given 11:55   Report Given By Pa Hernandez RN    Machine Validation   Time 08:15   Date  1/11/2025   Auto Alarm Test Passed Yes   Machine Serial # [id number]   Portable RO Yes   RO Serial# [id number]   Residual Bleach Negative Yes   Was a manufactured mix used? Yes   Machine Log Completed Yes   Total Chlorine (less than 0.1)? Yes   Total Chlorine Log Completed Yes   Bicarb BiBag   Bibag Size 650   Machine Temp 37   Machine Conductivity 13.8   Meter Type N/A   Meter Conductivity    Independent Conductivity 13.8   pH Status Pass Pass   pH    TCD Value 13.7   TCD Alarm with +/- 0.5 Yes   NVL enabled validated 100 asymmetric Yes   Safety check complete Pa Hernandez RN   Second Verification Performed? No   Second Verification Performed By    Reason Not Verified No other staff members located at the hospital      Serum Lab Values   Time BUN Creatinine Na K (mEq/L) Cl CO2 Ca (mEq/L) Phos Mg (mg/dL) Alb (g/dL) Glucose Hgb Hct WBC Plt PT aPTT INR Other Clinician    01/10/2025 06:08 66 6.13 136 3.3 103 19 6.4 4.1 2  67 8.3 25.1 3.93 91     Pa Hernandez RN    Comments:         Facility Information Location ICU Isolation Information   Facility Information Room # ICS-12 Isolation Required? N   Admission Date 01/05/2025 Patient Type New patient to dialysis with diagnosis of Acute Kidney Injury Completed by   Ordering MD Dr. Yaron Acuna Code Status Full Code General Tx information Entered by Pa Hernandez RN   Account/Finance Number [id number] Diagnosis    Admission Status InPatient Diagnosis LACTIC ACIDOSIS      Start Treatment Time Out Confirmed by KIRIT Hernandez RN Correct access site verified Yes   Treatment Initiation Connections Secured Time Out Completed 08:33 Treatment Start Date 01/11/2025    Saline line double clamped Correct patient verified Yes Treatment Start Time 08:35    Hemaclip Applied Correct procedure verified Yes Patient/Family questions and concerns addressed Yes     Pre Focused Assessment Edema LOC Obtunded   Access Location Upper Ext. GI / Bowels   Signs and Symptoms of Infection? No  Edema Grade 2+ GI Soft   Pain Screening Cardiac    Does the patient have pain? No Heart Rhythm Regular  Catheter present   Respiratory Telemetry Yes Completed by   Lung Sounds Coarse Notes levophed infusing @ 0.04 mcg/kg/min (13.2 ml/hr) Pre Treatment Focused Assessment Completed By Pa Hernandez RN   Location Bilateral Skin Time 08:25   Position Anterior Skin Warm Signing   Respiratory Efforts Unlabored  Dry Signed By Pa Hernandez RN   Notes vent setting: mode-A/C, rate-14, TV-420, PEEP-5, FiO2-30%, O2 sats-92% LOC      Education Reason Altered LOC Patient Education N/A   Patient Education Family Education Provided? N/A    Patient Educated? No Caregiver Education Provided? N/A      Post-Treatment HD machine external disinfection completed per policy Yes Completed by   Post Treatment Delay PRO external disinfection completed per policy Yes Post Treatment Form Completed By Pa Hernandez RN   Delay N Post Treatment General Information    Machine Disinfection Requirement Notes vitals stable      Post Focused Assessment Location Bilateral LOC   Changes from Pre Focused Assessment Position Anterior LOC Obtunded   Changes from Pre Treatment Focused Assessment? No Respiratory Efforts Unlabored GI / Bowels   Access Notes vent setting: mode-A/C, rate-14, TV-420, PEEP-5, FiO2-30%, O2 sats-92% GI Soft   Cath Packed with Heparin Edema    Access Port(ml) 2 Location Upper Ext.  Catheter present   Return Port(ml) 2 Edema Grade 2+ Completed by   Catheter clamped and capped Yes Cardiac Post Treatment Focused Assessment Completed by Pa Hernandez RN   Access Flow Good Heart Rhythm Regular Date 01/11/2025   Dialyzer Clearance Streaked Telemetry Yes Time 11:50   Pain Screening Notes levophed infusing @ 0.065 mcg/kg/min (21.5 ml/hr) Signing   Does the patient have pain? No Skin Signed By Pa Hernandez RN   Respiratory Skin Warm    Lung Sounds Coarse  Dry

## 2025-01-11 NOTE — PLAN OF CARE
Problem: Artificial Airway  Goal: Effective Communication  Outcome: Progressing  Goal: Optimal Device Function  Outcome: Progressing  Goal: Absence of Device-Related Skin or Tissue Injury  Outcome: Progressing     Problem: Mechanical Ventilation Invasive  Goal: Effective Communication  Outcome: Progressing  Goal: Optimal Device Function  Outcome: Progressing  Goal: Mechanical Ventilation Liberation  Outcome: Progressing  Goal: Optimal Nutrition Delivery  Outcome: Progressing  Goal: Absence of Device-Related Skin and Tissue Injury  Outcome: Progressing  Goal: Absence of Ventilator-Induced Lung Injury  Outcome: Progressing     Problem: Gas Exchange Impaired  Goal: Optimal Gas Exchange  Outcome: Progressing     Problem: Breathing Pattern Ineffective  Goal: Effective Breathing Pattern  Outcome: Progressing     Problem: Airway Clearance Ineffective  Goal: Effective Airway Clearance  Outcome: Progressing

## 2025-01-11 NOTE — PLAN OF CARE
Problem: Adult Inpatient Plan of Care  Goal: Plan of Care Review  Outcome: Progressing     Problem: Artificial Airway  Goal: Effective Communication  Outcome: Progressing     Problem: Mechanical Ventilation Invasive  Goal: Effective Communication  Outcome: Progressing     Problem: Gas Exchange Impaired  Goal: Optimal Gas Exchange  Outcome: Progressing     Problem: Breathing Pattern Ineffective  Goal: Effective Breathing Pattern  Outcome: Progressing     Problem: Airway Clearance Ineffective  Goal: Effective Airway Clearance  Outcome: Progressing

## 2025-01-12 NOTE — ASSESSMENT & PLAN NOTE
Now resolved.      Small-bowel obstruction partial or complete based on recent CT scan, patient with NG tube in place.  Appreciate surgery consultation, agree that less likely to be mesenteric ischemia given improving lactic acidosis and low-dose vasopressor support requirements.  Still may have some third spacing from her small-bowel obstruction.  However, now with bowel movement 1/9/25.

## 2025-01-12 NOTE — ASSESSMENT & PLAN NOTE
Opacification on chest x-ray from 1/8/25 showing evidence of possible left lower lobe pleural effusion versus retrocardiac consolidation.  Patient has been on cefepime at this time.  No growth on final cultures from bronchoscopy performed immediately after intubation.  Continued on cefepime at this time.  CT neck with evidence of left upper lobe pneumonia.    Significant improvement on chest x-ray performed 1/12/25 with improved aeration right base as well.  Consider deescalation of antibiotics as clinically appropriate.

## 2025-01-12 NOTE — ASSESSMENT & PLAN NOTE
1/10/25:  I had a very detailed family meeting with the patient's 4 children (2 sons and 2 daughters) at bedside today and explained the course of events and the patient's acuity and critical illness.  We discussed the patient's oozing from the mouth, thrombocytopenia, minor esophageal injury, air around the neck and her altered mental status with no significant neurological improvement.  At this time, we will continue all care and continue to readdress code status as clinically indicated.

## 2025-01-12 NOTE — ASSESSMENT & PLAN NOTE
Worsening creatinine since admission, consented the family and placed a right-sided Trialysis catheter with plans to perform dialysis if the patient requires this.  Likely etiology at this time is from hypotension/hypovolemia causing acute tubular necrosis.  We will continue to monitor potassium.  At this time the patient has been hypokalemic and may be approaching the need for dialysis to perform volume removal or if significant amount of uremia.  She has made a small amount of urine which is improved from the day prior 1/9/25.  Patient's receive dialysis today 1/11/25

## 2025-01-12 NOTE — ASSESSMENT & PLAN NOTE
Severe metabolic encephalopathy.  Her altered mental status is likely in the setting of toxic metabolic encephalopathy, as suggested by an EEG done a couple of days ago.  I also took a detailed history from the family and there is no evidence of nuchal rigidity, recent ear infection, very high fevers to suggest meningitis or encephalitis at this time.      Continues to remain intubated and sedated at this time, aggressive spontaneous awakening trial today with similar exam prior to being intubated.  Pending repeat CT head an EEG today.

## 2025-01-12 NOTE — ASSESSMENT & PLAN NOTE
Worsening creatinine since admission, consented the family and placed a right-sided Trialysis catheter with plans to perform dialysis if the patient requires this.  Likely etiology at this time is from hypotension/hypovolemia causing acute tubular necrosis.  We will continue to monitor potassium.  At this time the patient has been hypokalemic and may be approaching the need for dialysis to perform volume removal or if significant amount of uremia.  She has made a small amount of urine which is improved from the day prior 1/9/25.  Patient's receive dialysis 1/11/25.  We will continue with dialysis as per schedul for volume electrolyte derangements.

## 2025-01-12 NOTE — PROGRESS NOTES
[facility]  Critical Care Medicine  Progress Note    Patient Name: Renetta Stewart  MRN: [id number]  Admission Date: 1/2/2025  Hospital Length of Stay: 10 days  Code Status: Full Code  Attending Provider: Jagjit Wayne MD  Primary Care Provider: Eldon Govea MD   Principal Problem: Lactic acidosis    Subjective:     HPI:  74-year-old female with past medical history significant for syncope, collapse, worked up for orthostatic hypotension who presented to the ICU on 1/5/25 in the setting of being obtunded and found to have small-bowel obstruction, now intubated and sedated with shock and lactic acidosis.    The patient was admitted to hospital medicine on 1/3/25.  CT abdomen at the time showed evidence of diffuse wall thickening compatible with colitis.  An EEG was performed due to altered mental status 1/13/25 which showed evidence of toxic metabolic encephalopathy.  No evidence of epileptiform findings or electrographic seizures noted.  Of note, she also had an EGD performed on 12/27/24 which showed evidence of esophagitis and a 5 cm hiatal hernia with erythematous mucosa in the fundus of the stomach and antrum.  The mucosa of mid and distal esophagus was reportedly severely inflamed with white plaque present.    She was being worked up for oliguria and lactic acidosis when she was found to be acutely obtunded and nonresponsive on the morning of 1/5/25.      The patient was brought down emergently to the ICU.  At this time, she began acutely decompensating without the ability to protect her airway, saturating on facemask.  She was then emergently intubated bedside.  She prove to be a difficult intubation (did not have any significant episodes of desaturation) however she had a significant amount of vomitus and bile which complicated her intubation, requiring anesthesia attending to perform the intubation at bedside.  We had already consented the family prior to the intubation and a  Trialysis catheter was placed in anticipation of possible dialysis in case her oliguria worsens.        Hospital/ICU Course:  1/6/25-overnight improving lactic acidosis, still persistent.  Echocardiogram with severe concentric hypertrophy, still on low-moderate dose single vasopressor support without evidence of any active oozing from her mouth or pneumothorax on her chest x-ray scans.  Seen by Dr. Langston at bedside on 1/5/25 who does not recommend any significant intervention, agrees with packing and clinical monitoring without any significant acute intervention.    1/7/25-patient with a minor ooze from the mouth overnight, controlled now, hypokalemic this morning status post replenishment of potassium.  Vancomycin discontinuing continuing cefepime, holding chemical DVT prophylaxis and using SCDs at this time in the setting of mild ooze from mouth.  10 cc of urine output overnight.  Gradually downtrending hemoglobin at 7.5 now.  No evidence of crepitus in the neck or subcutaneous emphysema on physical exam.  Reviewed case with Gastroenterology who believe there may be minor esophageal perforation, possibly from NG tube placement with nothing additional to do at this time.    1/8/25-patient with worsening thrombocytopenia no obvious bleeding noted at this time.  We will attempt spontaneous awakening and spontaneous breathing trial again today.  Patient to receive 2 units platelets and 1 unit PRBC today along with potassium per placement.    1/9/25-with minimal ooze from the mouth, pack by surgery at bedside, re-evaluated by Dr. Langston at bedside.  Status post administration of additional platelets with improvement in thrombocytopenia and additional PRBC.  Family updated in detail at bedside.    1/10/25-mild oozing present.  Platelets down to 35, pending additional unit platelets.  Appreciate ENT and surgery coming by and helping with packing of mouth.  No significant neurological response.  Pending CT scan head,  neck and EEG.    1/11/25-improvement in thrombocytopenia without administration of platelets.  Extremely mild oozing with back mouth today.  Appreciate Hematology recommendation.  Pending repeat coagulation studies.  Mild elevation of PT. patient to receive dialysis today.  CT soft tissue neck with interval resolution subcutaneous air and lower neck.  Evidence of left upper lobe pneumonia.  CT head with chronic changes.  EEG indicative of toxic metabolic encephalopathy.    1/12/25-stable hemoglobin at 7.6.  Received additional platelets today after her platelet was at 40, increased to 90.  Mouth continues to remain packed.  Lactate now down to 3.0 from 3.5 earlier this morning.  No improvement in mental status    Interval History/Significant Events:  Refer to hospital course    Review of Systems  Objective:     Vital Signs (Most Recent):  Temp: 97.9 °F (36.6 °C) (01/12/25 1105)  Pulse: 81 (01/12/25 1430)  Resp: 14 (01/12/25 1430)  BP: (!) 118/53 (01/12/25 1430)  SpO2: 100 % (01/12/25 1430) Vital Signs (24h Range):  Temp:  [97.5 °F (36.4 °C)-98.8 °F (37.1 °C)] 97.9 °F (36.6 °C)  Pulse:  [] 81  Resp:  [13-18] 14  SpO2:  [96 %-100 %] 100 %  BP: ()/(35-65) 118/53   Weight: 105.2 kg (231 lb 14.8 oz)  Body mass index is 39.81 kg/m².      Intake/Output Summary (Last 24 hours) at 1/12/2025 1623  Last data filed at 1/12/2025 1405  Gross per 24 hour   Intake 1017.12 ml   Output 88 ml   Net 929.12 ml          Physical Exam  Constitutional:       General: She is in acute distress.      Appearance: Normal appearance. She is obese. She is ill-appearing. She is not diaphoretic.   HENT:      Head: Normocephalic and atraumatic.      Nose: No congestion or rhinorrhea.      Mouth/Throat:      Mouth: Mucous membranes are moist.      Comments: Mouth packed with gauze  Cardiovascular:      Rate and Rhythm: Normal rate and regular rhythm.      Pulses: Normal pulses.      Heart sounds: Normal heart sounds.   Pulmonary:       Effort: Pulmonary effort is normal. No respiratory distress.      Breath sounds: No stridor. Rhonchi present. No wheezing or rales.      Comments: No evidence of subcutaneous emphysema or crepitus present in neck or around chest  Chest:      Chest wall: No tenderness.   Abdominal:      General: Abdomen is flat.   Musculoskeletal:      Cervical back: Normal range of motion. No rigidity.      Right lower leg: Edema present.      Left lower leg: Edema present.   Skin:     General: Skin is warm.      Findings: No erythema.   Neurological:      Mental Status: She is alert and oriented to person, place, and time.      Comments: Intubated, sedated            Vents:  Vent Mode: A/CMV-VC (01/12/25 1416)  Ventilator Initiated: Yes (01/05/25 1015)  Set Rate: 14 BPM (01/12/25 1416)  Vt Set: 402 mL (01/12/25 1416)  Pressure Support: 10 cmH20 (01/07/25 1915)  PEEP/CPAP: 5 cmH20 (01/12/25 1416)  Oxygen Concentration (%): 30 (01/12/25 1416)  Peak Airway Pressure: 21 cmH20 (01/12/25 1416)  Plateau Pressure: 17.7 cmH20 (01/12/25 0335)  Total Ve: 5.6 L/m (01/12/25 1416)  F/VT Ratio<105 (RSBI): (!) 33.33 (01/10/25 0800)  Lines/Drains/Airways       Central Venous Catheter Line  Duration             Trialysis (Dialysis) Catheter 01/05/25 1058 external jugular;right internal jugular 7 days              Drain  Duration                  NG/OG Tube 01/05/25 1033 Right nostril 7 days         Urethral Catheter 01/05/25 0900 Silicone 16 Fr. 7 days              Airway  Duration                  Airway - Non-Surgical 01/05/25 1015 7 days              Peripheral Intravenous Line  Duration                  Midline Catheter - Single Lumen 01/05/25 0945 Right basilic vein (medial side of arm) 20g x 10cm 7 days         Peripheral IV - Single Lumen 01/05/25 0414 20 G Anterior;Left Forearm 7 days                  Significant Labs:    CBC/Anemia Profile:  Recent Labs   Lab 01/11/25  0811 01/12/25  0416 01/12/25  1052   WBC 3.12* 4.38* 3.61*   HGB 7.8*  9.0* 7.6*   HCT 23.2* 28.0* 23.0*   * 40* 90*   MCV 83.2 84.3 84.6   RDW 17.3* 17.3* 17.9*        Chemistries:  Recent Labs   Lab 01/11/25  0823 01/12/25  0416    134*   K 3.2* 3.2*    101   CO2 20* 20*   BUN 72* 49*   CREATININE 6.05* 4.60*   CALCIUM 6.5* 7.0*       All pertinent labs within the past 24 hours have been reviewed.    Significant Imaging:  I have reviewed all pertinent imaging results/findings within the past 24 hours.    ABG  Recent Labs   Lab 01/06/25  1354   PH 7.52*   PO2 188*   PCO2 29*   HCO3 23.7     Assessment/Plan:     Neuro  Encephalopathy, metabolic  Severe metabolic encephalopathy.  Her altered mental status is likely in the setting of toxic metabolic encephalopathy, as suggested by an EEG done a couple of days ago.  I also took a detailed history from the family and there is no evidence of nuchal rigidity, recent ear infection, very high fevers to suggest meningitis or encephalitis at this time.      Continues to remain intubated and sedated at this time, aggressive spontaneous awakening trial today with similar exam prior to being intubated.  CT head not indicative of any acute abnormality in EEG indicative of toxic metabolic encephalopathy.    Consider MRI brain pending clinical stability    Pulmonary  Left-sided pneumonia  Opacification on chest x-ray from 1/8/25 showing evidence of possible left lower lobe pleural effusion versus retrocardiac consolidation.  Patient has been on cefepime at this time.  No growth on final cultures from bronchoscopy performed immediately after intubation.  Continued on cefepime at this time.  CT neck with evidence of left upper lobe pneumonia.    Significant improvement on chest x-ray performed 1/12/25 with improved aeration right base as well.  Consider deescalation of antibiotics as clinically appropriate.    Hard to intubate  Patient extremely difficult to intubate by bedside.  Appreciate prompt anesthesia response to help and  anesthesia attending was able to intubate the patient with cricoid pressure, with the help of a bougie tube.  The patient's anatomy indicates that she has very anterior in terms of her vocal cords.  In addition, her intubation was very complicated due to a significant amount of vomitus of bile during the intubation.  A prompt bedside bronchoscopy was performed immediately with clearance are of aspirated gastric contents.    Cardiac/Vascular  Elevated troponin  Likely in the setting of her demand ischemia, history of severe concentric hypertrophy.    Renal/  * Lactic acidosis  Severe lactic acidosis, likely in the setting of hypovolemia and possible vasodilatory shock, requiring fluid resuscitation.  We will continue to perform serial checks.  Now with significant improvement status post volume administration on 1/7/25.  Persistent mild elevation of lactic acidosis.  Responding to volume administration.    Acute renal failure superimposed on stage 3a chronic kidney disease  Worsening creatinine since admission, consented the family and placed a right-sided Trialysis catheter with plans to perform dialysis if the patient requires this.  Likely etiology at this time is from hypotension/hypovolemia causing acute tubular necrosis.  We will continue to monitor potassium.  At this time the patient has been hypokalemic and may be approaching the need for dialysis to perform volume removal or if significant amount of uremia.  She has made a small amount of urine which is improved from the day prior 1/9/25.  Patient's receive dialysis 1/11/25.  We will continue with dialysis as per schedul for volume electrolyte derangements.    Complex renal cyst  There is a renal mass seen on the recent CT scan and an ultrasound has been ordered with the following impression below.  Prior CT scan showed evidence of renal cysts.    Impression:     1. Indeterminate isoechoic lesion in the left inferior renal pole measuring 2.2 cm.  Cannot  exclude solid mass.  Recommend further evaluation with contrast enhanced CT or MRI renal mass protocol.  2. Right renal simple cyst.       We will attempt contrast-enhanced CT if there is improvement of renal function and/or MRI with clinical improvement instability    Oncology  Anemia  Stable hemoglobin today at 7.8, no need for PRBC administration, we will continue to follow    Endocrine  Morbid obesity  Complicates all aspects of care    GI  Esophageal perforation  Appreciate gastroenterology recommendations, we will keep NG-tube in at this time to continue keeping patient's stomach decompressed in the setting of recent small-bowel obstruction.  Minor esophageal injury.    Small bowel obstruction  Now resolved.      Small-bowel obstruction partial or complete based on recent CT scan, patient with NG tube in place.  Appreciate surgery consultation, agree that less likely to be mesenteric ischemia given improving lactic acidosis and low-dose vasopressor support requirements.  Still may have some third spacing from her small-bowel obstruction.  However, now with bowel movement 1/9/25.    Acute superficial gastritis without hemorrhage  Severe esophagitis present on recent EGD.  Gastroenterology has been consulted who have reviewed the scans in detail, seen the patient on 1/6/25 and agree there may be minor esophageal injury with their recommendations including no further intervention at this time required, continuing antibiotics and no need to perform esophagram.  Appreciate Dr. Lora (gastroenterology) seeing the patient and giving her recommendations.    Other  Advanced care planning/counseling discussion  1/10/25:  I had a very detailed family meeting with the patient's 4 children (2 sons and 2 daughters) at bedside today and explained the course of events and the patient's acuity and critical illness.  We discussed the patient's oozing from the mouth, thrombocytopenia, minor esophageal injury, air around  the neck and her altered mental status with no significant neurological improvement.  At this time, we will continue all care and continue to readdress code status as clinically indicated.    Thrombocytopenia  No evidence of DIC at this time, with ongoing thrombocytopenia now status post platelet administration with improvement in patient's platelets.  Idiopathic thrombocytopenia this time.  1/11/25-patient with continued improvement thrombocytopenia without additional administration of platelets today  Appreciate Hematology coming to see the patient 1/10/25.  1/12/25-had a dip in her platelets down to 40, requiring additional unit of platelets which improved this up to 90.    Subcutaneous air-neck  Repeat CT neck 1/10/25 with resolution of air.  Previously documented events as below       Patient with subcutaneous air in the neck on CT scan that was performed.  There is no evidence of subcutaneous emphysema/crepitus on physical examination.  She does not have elevated peak pressures and no evidence of significant large pneumomediastinum.  At this time the etiology of the subcutaneous air in her neck are from possible esophageal injury given the patient's underlying severe esophagitis and placement NG tube, injury from her intubation from posterior pharynx and less likely to be pneumomediastinum in the setting of positive airway pressure as she has no evidence of significant mediastinal air in the distal trachea to suggest this as the cause.     Status post packing in her mouth with TXA soaked gauze, with improvement and resolution of her oozing.  Serial chest x-rays without any evidence of pneumothorax, physical examination without evidence of subcutaneous emphysema.  Evaluated the patient bedside with ENT physician Dr. Langston who has recommended continuing clinical monitoring, no intervention at this time needed and no additional imaging of the neck.  Appreciate evaluation by Dr. Langston previously.  Re-evaluated  by ENT bedside 1/9/25, General surgery pack patient's mouth.  Oozing has slowed down with platelets administration.  We will defer to ENT for recommendations regarding visual exploration versus other intervention.  We will continue to monitor.    Gastroenterology consultation from 1/6/25 greatly appreciated.  There was concern for possible minor esophageal injury.            Syncope  History of syncope, being worked up with monitoring.  She does have a pacemaker which was interrogated.  Likely cause of her altered mental status at this time is toxic metabolic encephalopathy.    Critical Care Checklist     Refer to chart for details regarding spontaneous awakening trial (SAT) and spontaneous breathing trial (SBT), CAM-ICU assessment, early mobility and feeding.       Analgesia and sedation- propofol, fentanyl  Thromboembolic prophylaxis- holding off on heparin for DVT prophylaxis in the setting of thrombocytopenia, continue with SCDs Height of bed greater than 30°- Yes  Stress ulcer prophylaxis- pantoprazole  Indwelling catheter (vascular access lines/Loya catheter)-Reviewed  Deescalation of antimicrobials/pharmacotherapies-Reviewed and addressed appropriately  Code status- Full Code           Critical Care Time:  45 minutes  Critical secondary to Patient has a condition that poses threat to life and bodily function:  Septic shock requiring vasopressor support, altered mental status requiring intubation for airway protection, small-bowel obstruction, severe thrombocytopenia      Critical care was time spent personally by me on the following activities: development of treatment plan with patient or surrogate and bedside caregivers, discussions with consultants, evaluation of patient's response to treatment, examination of patient, ordering and performing treatments and interventions, ordering and review of laboratory studies, ordering and review of radiographic studies, pulse oximetry, re-evaluation of patient's  condition. This critical care time did not overlap with that of any other provider or involve time for any procedures.     Jagjit Wayne MD  Critical Care Medicine  [facility]

## 2025-01-12 NOTE — SUBJECTIVE & OBJECTIVE
Interval History/Significant Events:  Refer to hospital course    Review of Systems  Objective:     Vital Signs (Most Recent):  Temp: 98.8 °F (37.1 °C) (01/11/25 1906)  Pulse: 84 (01/11/25 2015)  Resp: 14 (01/11/25 2015)  BP: (!) 89/40 (01/11/25 2015)  SpO2: 100 % (01/11/25 2015) Vital Signs (24h Range):  Temp:  [97.6 °F (36.4 °C)-99.2 °F (37.3 °C)] 98.8 °F (37.1 °C)  Pulse:  [] 84  Resp:  [11-17] 14  SpO2:  [83 %-100 %] 100 %  BP: ()/(38-60) 89/40   Weight: 106.5 kg (234 lb 12.6 oz)  Body mass index is 40.3 kg/m².      Intake/Output Summary (Last 24 hours) at 1/11/2025 2029  Last data filed at 1/11/2025 2022  Gross per 24 hour   Intake 363.88 ml   Output 1317 ml   Net -953.12 ml          Physical Exam  Constitutional:       General: She is in acute distress.      Appearance: Normal appearance. She is obese. She is ill-appearing. She is not diaphoretic.   HENT:      Head: Normocephalic and atraumatic.      Nose: No congestion or rhinorrhea.      Mouth/Throat:      Mouth: Mucous membranes are moist.      Comments: Minimal oozing from mouth, unable to identify source of bleeding due to endotracheal tube.  Appreciate ENT involvement.  Cardiovascular:      Rate and Rhythm: Normal rate and regular rhythm.      Pulses: Normal pulses.      Heart sounds: Normal heart sounds.   Pulmonary:      Effort: Pulmonary effort is normal. No respiratory distress.      Breath sounds: No stridor. Rhonchi present. No wheezing or rales.      Comments: No evidence of subcutaneous emphysema or crepitus present in neck or around chest  Chest:      Chest wall: No tenderness.   Abdominal:      General: Abdomen is flat.   Musculoskeletal:      Cervical back: Normal range of motion. No rigidity.      Right lower leg: Edema present.      Left lower leg: Edema present.   Skin:     General: Skin is warm.      Findings: No erythema.   Neurological:      Mental Status: She is alert and oriented to person, place, and time.      Comments:  Intubated, sedated            Vents:  Vent Mode: A/C (01/11/25 1532)  Ventilator Initiated: Yes (01/05/25 1015)  Set Rate: 14 BPM (01/11/25 1532)  Vt Set: 420 mL (01/11/25 1532)  Pressure Support: 10 cmH20 (01/07/25 1915)  PEEP/CPAP: 5 cmH20 (01/11/25 1532)  Oxygen Concentration (%): 30 (01/11/25 1532)  Peak Airway Pressure: 20.2 cmH20 (01/11/25 1532)  Plateau Pressure: 16.6 cmH20 (01/11/25 1532)  Total Ve: 5.65 L/m (01/11/25 1532)  F/VT Ratio<105 (RSBI): (!) 33.33 (01/10/25 0800)  Lines/Drains/Airways       Central Venous Catheter Line  Duration             Trialysis (Dialysis) Catheter 01/05/25 1058 external jugular;right internal jugular 6 days              Drain  Duration                  NG/OG Tube 01/05/25 1033 Right nostril 6 days         Urethral Catheter 01/05/25 0900 Silicone 16 Fr. 6 days              Airway  Duration                  Airway - Non-Surgical 01/05/25 1015 6 days              Peripheral Intravenous Line  Duration                  Midline Catheter - Single Lumen 01/05/25 0945 Right basilic vein (medial side of arm) 20g x 10cm 6 days         Peripheral IV - Single Lumen 01/05/25 0414 20 G Anterior;Left Forearm 6 days                  Significant Labs:    CBC/Anemia Profile:  Recent Labs   Lab 01/10/25  0608 01/10/25  1207 01/11/25  0811   WBC 3.08* 3.93* 3.12*   HGB 8.1* 8.3* 7.8*   HCT 24.3* 25.1* 23.2*   PLT 35* 91* 110*   MCV 83.5 83.9 83.2   RDW 16.7* 17.2* 17.3*        Chemistries:  Recent Labs   Lab 01/10/25  0608 01/11/25  0823    136   K 3.3* 3.2*    104   CO2 19* 20*   BUN 66* 72*   CREATININE 6.13* 6.05*   CALCIUM 6.4* 6.5*   MG 2.0  --    PHOS 4.1  --        All pertinent labs within the past 24 hours have been reviewed.    Significant Imaging:  I have reviewed all pertinent imaging results/findings within the past 24 hours.

## 2025-01-12 NOTE — ASSESSMENT & PLAN NOTE
No evidence of DIC at this time, with ongoing thrombocytopenia now status post platelet administration with improvement in patient's platelets.  Idiopathic thrombocytopenia this time, we will involve Hematology service.  Continue to transfuse as needed.  Appreciate Hematology coming to see the patient 1/10/25.

## 2025-01-12 NOTE — ASSESSMENT & PLAN NOTE
Opacification on chest x-ray from 1/8/25 showing evidence of possible left lower lobe pleural effusion versus retrocardiac consolidation.  Patient has been on cefepime at this time.  No growth on final cultures from bronchoscopy performed immediately after intubation.  Continued on cefepime at this time.  CT neck with evidence of left upper lobe pneumonia.

## 2025-01-12 NOTE — ASSESSMENT & PLAN NOTE
No evidence of DIC at this time, with ongoing thrombocytopenia now status post platelet administration with improvement in patient's platelets.  Idiopathic thrombocytopenia this time.  1/11/25-patient with continued improvement thrombocytopenia without additional administration of platelets today  Appreciate Hematology coming to see the patient 1/10/25.  1/12/25-had a dip in her platelets down to 40, requiring additional unit of platelets which improved this up to 90.

## 2025-01-12 NOTE — ASSESSMENT & PLAN NOTE
No evidence of DIC at this time, with ongoing thrombocytopenia now status post platelet administration with improvement in patient's platelets.  Idiopathic thrombocytopenia this time.  1/11/25-patient with continued improvement thrombocytopenia without additional administration of platelets today  Appreciate Hematology coming to see the patient 1/10/25.

## 2025-01-12 NOTE — SUBJECTIVE & OBJECTIVE
Interval History/Significant Events:  Refer to hospital course    Review of Systems  Objective:     Vital Signs (Most Recent):  Temp: 98.8 °F (37.1 °C) (01/11/25 1906)  Pulse: 84 (01/11/25 2015)  Resp: 14 (01/11/25 2015)  BP: (!) 89/40 (01/11/25 2015)  SpO2: 100 % (01/11/25 2015) Vital Signs (24h Range):  Temp:  [97.6 °F (36.4 °C)-99.2 °F (37.3 °C)] 98.8 °F (37.1 °C)  Pulse:  [] 84  Resp:  [11-17] 14  SpO2:  [83 %-100 %] 100 %  BP: ()/(38-60) 89/40   Weight: 106.5 kg (234 lb 12.6 oz)  Body mass index is 40.3 kg/m².      Intake/Output Summary (Last 24 hours) at 1/11/2025 2022  Last data filed at 1/11/2025 2022  Gross per 24 hour   Intake 343.88 ml   Output 1317 ml   Net -973.12 ml          Physical Exam  Constitutional:       General: She is in acute distress.      Appearance: Normal appearance. She is obese. She is ill-appearing. She is not diaphoretic.   HENT:      Head: Normocephalic and atraumatic.      Nose: No congestion or rhinorrhea.      Mouth/Throat:      Mouth: Mucous membranes are moist.      Comments: Minimal oozing from mouth, unable to identify source of bleeding due to endotracheal tube.  Appreciate ENT involvement.  Cardiovascular:      Rate and Rhythm: Normal rate and regular rhythm.      Pulses: Normal pulses.      Heart sounds: Normal heart sounds.   Pulmonary:      Effort: Pulmonary effort is normal. No respiratory distress.      Breath sounds: No stridor. Rhonchi present. No wheezing or rales.      Comments: No evidence of subcutaneous emphysema or crepitus present in neck or around chest  Chest:      Chest wall: No tenderness.   Abdominal:      General: Abdomen is flat.   Musculoskeletal:      Cervical back: Normal range of motion. No rigidity.      Right lower leg: Edema present.      Left lower leg: Edema present.   Skin:     General: Skin is warm.      Findings: No erythema.   Neurological:      Mental Status: She is alert and oriented to person, place, and time.      Comments:  Intubated, sedated            Vents:  Vent Mode: A/C (01/11/25 1532)  Ventilator Initiated: Yes (01/05/25 1015)  Set Rate: 14 BPM (01/11/25 1532)  Vt Set: 420 mL (01/11/25 1532)  Pressure Support: 10 cmH20 (01/07/25 1915)  PEEP/CPAP: 5 cmH20 (01/11/25 1532)  Oxygen Concentration (%): 30 (01/11/25 1532)  Peak Airway Pressure: 20.2 cmH20 (01/11/25 1532)  Plateau Pressure: 16.6 cmH20 (01/11/25 1532)  Total Ve: 5.65 L/m (01/11/25 1532)  F/VT Ratio<105 (RSBI): (!) 33.33 (01/10/25 0800)  Lines/Drains/Airways       Central Venous Catheter Line  Duration             Trialysis (Dialysis) Catheter 01/05/25 1058 external jugular;right internal jugular 6 days              Drain  Duration                  NG/OG Tube 01/05/25 1033 Right nostril 6 days         Urethral Catheter 01/05/25 0900 Silicone 16 Fr. 6 days              Airway  Duration                  Airway - Non-Surgical 01/05/25 1015 6 days              Peripheral Intravenous Line  Duration                  Midline Catheter - Single Lumen 01/05/25 0945 Right basilic vein (medial side of arm) 20g x 10cm 6 days         Peripheral IV - Single Lumen 01/05/25 0414 20 G Anterior;Left Forearm 6 days                  Significant Labs:    CBC/Anemia Profile:  Recent Labs   Lab 01/10/25  0608 01/10/25  1207 01/11/25  0811   WBC 3.08* 3.93* 3.12*   HGB 8.1* 8.3* 7.8*   HCT 24.3* 25.1* 23.2*   PLT 35* 91* 110*   MCV 83.5 83.9 83.2   RDW 16.7* 17.2* 17.3*        Chemistries:  Recent Labs   Lab 01/10/25  0608 01/11/25  0823    136   K 3.3* 3.2*    104   CO2 19* 20*   BUN 66* 72*   CREATININE 6.13* 6.05*   CALCIUM 6.4* 6.5*   MG 2.0  --    PHOS 4.1  --        All pertinent labs within the past 24 hours have been reviewed.    Significant Imaging:  I have reviewed all pertinent imaging results/findings within the past 24 hours.

## 2025-01-12 NOTE — ASSESSMENT & PLAN NOTE
Severe metabolic encephalopathy.  Her altered mental status is likely in the setting of toxic metabolic encephalopathy, as suggested by an EEG done a couple of days ago.  I also took a detailed history from the family and there is no evidence of nuchal rigidity, recent ear infection, very high fevers to suggest meningitis or encephalitis at this time.      Continues to remain intubated and sedated at this time, aggressive spontaneous awakening trial today with similar exam prior to being intubated.  CT head not indicative of any acute abnormality in EEG indicative of toxic metabolic encephalopathy.

## 2025-01-12 NOTE — PLAN OF CARE
Problem: Adult Inpatient Plan of Care  Goal: Plan of Care Review  Outcome: Progressing     Problem: Skin Injury Risk Increased  Goal: Skin Health and Integrity  Outcome: Progressing     Problem: Acute Kidney Injury/Impairment  Goal: Fluid and Electrolyte Balance  Outcome: Progressing     Problem: Infection  Goal: Absence of Infection Signs and Symptoms  Outcome: Progressing     Problem: Fall Injury Risk  Goal: Absence of Fall and Fall-Related Injury  Outcome: Progressing     Problem: Mechanical Ventilation Invasive  Goal: Effective Communication  Outcome: Progressing     Problem: Breathing Pattern Ineffective  Goal: Effective Breathing Pattern  Outcome: Progressing     Problem: Pneumonia  Goal: Fluid Balance  Outcome: Progressing  Goal: Effective Oxygenation and Ventilation  Outcome: Progressing

## 2025-01-12 NOTE — PROGRESS NOTES
[facility]  Critical Care Medicine  Progress Note    Patient Name: Renetta Stewart  MRN: [id number]  Admission Date: 1/2/2025  Hospital Length of Stay: 9 days  Code Status: Full Code  Attending Provider: Jagjit Wayne MD  Primary Care Provider: Eldon Govea MD   Principal Problem: Lactic acidosis    Subjective:     HPI:  74-year-old female with past medical history significant for syncope, collapse, worked up for orthostatic hypotension who presented to the ICU on 1/5/25 in the setting of being obtunded and found to have small-bowel obstruction, now intubated and sedated with shock and lactic acidosis.    The patient was admitted to hospital medicine on 1/3/25.  CT abdomen at the time showed evidence of diffuse wall thickening compatible with colitis.  An EEG was performed due to altered mental status 1/13/25 which showed evidence of toxic metabolic encephalopathy.  No evidence of epileptiform findings or electrographic seizures noted.  Of note, she also had an EGD performed on 12/27/24 which showed evidence of esophagitis and a 5 cm hiatal hernia with erythematous mucosa in the fundus of the stomach and antrum.  The mucosa of mid and distal esophagus was reportedly severely inflamed with white plaque present.    She was being worked up for oliguria and lactic acidosis when she was found to be acutely obtunded and nonresponsive on the morning of 1/5/25.      The patient was brought down emergently to the ICU.  At this time, she began acutely decompensating without the ability to protect her airway, saturating on facemask.  She was then emergently intubated bedside.  She prove to be a difficult intubation (did not have any significant episodes of desaturation) however she had a significant amount of vomitus and bile which complicated her intubation, requiring anesthesia attending to perform the intubation at bedside.  We had already consented the family prior to the intubation and a Trialysis  catheter was placed in anticipation of possible dialysis in case her oliguria worsens.        Hospital/ICU Course:  1/6/25-overnight improving lactic acidosis, still persistent.  Echocardiogram with severe concentric hypertrophy, still on low-moderate dose single vasopressor support without evidence of any active oozing from her mouth or pneumothorax on her chest x-ray scans.  Seen by Dr. Langston at bedside on 1/5/25 who does not recommend any significant intervention, agrees with packing and clinical monitoring without any significant acute intervention.    1/7/25-patient with a minor ooze from the mouth overnight, controlled now, hypokalemic this morning status post replenishment of potassium.  Vancomycin discontinuing continuing cefepime, holding chemical DVT prophylaxis and using SCDs at this time in the setting of mild ooze from mouth.  10 cc of urine output overnight.  Gradually downtrending hemoglobin at 7.5 now.  No evidence of crepitus in the neck or subcutaneous emphysema on physical exam.  Reviewed case with Gastroenterology who believe there may be minor esophageal perforation, possibly from NG tube placement with nothing additional to do at this time.    1/8/25-patient with worsening thrombocytopenia no obvious bleeding noted at this time.  We will attempt spontaneous awakening and spontaneous breathing trial again today.  Patient to receive 2 units platelets and 1 unit PRBC today along with potassium per placement.    1/9/25-with minimal ooze from the mouth, pack by surgery at bedside, re-evaluated by Dr. Langston at bedside.  Status post administration of additional platelets with improvement in thrombocytopenia and additional PRBC.  Family updated in detail at bedside.    1/10/25-mild oozing present.  Platelets down to 35, pending additional unit platelets.  Appreciate ENT and surgery coming by and helping with packing of mouth.  No significant neurological response.  Pending CT scan head, neck and  EEG.    Interval History/Significant Events:  Refer to hospital course    Review of Systems  Objective:     Vital Signs (Most Recent):  Temp: 98.8 °F (37.1 °C) (01/11/25 1906)  Pulse: 84 (01/11/25 2015)  Resp: 14 (01/11/25 2015)  BP: (!) 89/40 (01/11/25 2015)  SpO2: 100 % (01/11/25 2015) Vital Signs (24h Range):  Temp:  [97.6 °F (36.4 °C)-99.2 °F (37.3 °C)] 98.8 °F (37.1 °C)  Pulse:  [] 84  Resp:  [11-17] 14  SpO2:  [83 %-100 %] 100 %  BP: ()/(38-60) 89/40   Weight: 106.5 kg (234 lb 12.6 oz)  Body mass index is 40.3 kg/m².      Intake/Output Summary (Last 24 hours) at 1/11/2025 2022  Last data filed at 1/11/2025 2022  Gross per 24 hour   Intake 343.88 ml   Output 1317 ml   Net -973.12 ml          Physical Exam  Constitutional:       General: She is in acute distress.      Appearance: Normal appearance. She is obese. She is ill-appearing. She is not diaphoretic.   HENT:      Head: Normocephalic and atraumatic.      Nose: No congestion or rhinorrhea.      Mouth/Throat:      Mouth: Mucous membranes are moist.      Comments: Minimal oozing from mouth, unable to identify source of bleeding due to endotracheal tube.  Appreciate ENT involvement.  Cardiovascular:      Rate and Rhythm: Normal rate and regular rhythm.      Pulses: Normal pulses.      Heart sounds: Normal heart sounds.   Pulmonary:      Effort: Pulmonary effort is normal. No respiratory distress.      Breath sounds: No stridor. Rhonchi present. No wheezing or rales.      Comments: No evidence of subcutaneous emphysema or crepitus present in neck or around chest  Chest:      Chest wall: No tenderness.   Abdominal:      General: Abdomen is flat.   Musculoskeletal:      Cervical back: Normal range of motion. No rigidity.      Right lower leg: Edema present.      Left lower leg: Edema present.   Skin:     General: Skin is warm.      Findings: No erythema.   Neurological:      Mental Status: She is alert and oriented to person, place, and time.       Comments: Intubated, sedated            Vents:  Vent Mode: A/C (01/11/25 1532)  Ventilator Initiated: Yes (01/05/25 1015)  Set Rate: 14 BPM (01/11/25 1532)  Vt Set: 420 mL (01/11/25 1532)  Pressure Support: 10 cmH20 (01/07/25 1915)  PEEP/CPAP: 5 cmH20 (01/11/25 1532)  Oxygen Concentration (%): 30 (01/11/25 1532)  Peak Airway Pressure: 20.2 cmH20 (01/11/25 1532)  Plateau Pressure: 16.6 cmH20 (01/11/25 1532)  Total Ve: 5.65 L/m (01/11/25 1532)  F/VT Ratio<105 (RSBI): (!) 33.33 (01/10/25 0800)  Lines/Drains/Airways       Central Venous Catheter Line  Duration             Trialysis (Dialysis) Catheter 01/05/25 1058 external jugular;right internal jugular 6 days              Drain  Duration                  NG/OG Tube 01/05/25 1033 Right nostril 6 days         Urethral Catheter 01/05/25 0900 Silicone 16 Fr. 6 days              Airway  Duration                  Airway - Non-Surgical 01/05/25 1015 6 days              Peripheral Intravenous Line  Duration                  Midline Catheter - Single Lumen 01/05/25 0945 Right basilic vein (medial side of arm) 20g x 10cm 6 days         Peripheral IV - Single Lumen 01/05/25 0414 20 G Anterior;Left Forearm 6 days                  Significant Labs:    CBC/Anemia Profile:  Recent Labs   Lab 01/10/25  0608 01/10/25  1207 01/11/25  0811   WBC 3.08* 3.93* 3.12*   HGB 8.1* 8.3* 7.8*   HCT 24.3* 25.1* 23.2*   PLT 35* 91* 110*   MCV 83.5 83.9 83.2   RDW 16.7* 17.2* 17.3*        Chemistries:  Recent Labs   Lab 01/10/25  0608 01/11/25  0823    136   K 3.3* 3.2*    104   CO2 19* 20*   BUN 66* 72*   CREATININE 6.13* 6.05*   CALCIUM 6.4* 6.5*   MG 2.0  --    PHOS 4.1  --        All pertinent labs within the past 24 hours have been reviewed.    Significant Imaging:  I have reviewed all pertinent imaging results/findings within the past 24 hours.    AB  Recent Labs   Lab 01/06/25  1354   PH 7.52*   PO2 188*   PCO2 29*   HCO3 23.7     Assessment/Plan:     Neuro  Encephalopathy,  metabolic  Severe metabolic encephalopathy.  Her altered mental status is likely in the setting of toxic metabolic encephalopathy, as suggested by an EEG done a couple of days ago.  I also took a detailed history from the family and there is no evidence of nuchal rigidity, recent ear infection, very high fevers to suggest meningitis or encephalitis at this time.      Continues to remain intubated and sedated at this time, aggressive spontaneous awakening trial today with similar exam prior to being intubated.  Pending repeat CT head an EEG today.    Pulmonary  Left lower lobe pneumonia  Opacification on chest x-ray from 1/8/25 showing evidence of possible left lower lobe pleural effusion versus retrocardiac consolidation.  Patient has been on cefepime at this time.  No growth on final cultures from bronchoscopy performed immediately after intubation.  Continued on cefepime at this time.    Hard to intubate  Patient extremely difficult to intubate by bedside.  Appreciate prompt anesthesia response to help and anesthesia attending was able to intubate the patient with cricoid pressure, with the help of a bougie tube.  The patient's anatomy indicates that she has very anterior in terms of her vocal cords.  In addition, her intubation was very complicated due to a significant amount of vomitus of bile during the intubation.  A prompt bedside bronchoscopy was performed immediately with clearance are of aspirated gastric contents.    Cardiac/Vascular  Elevated troponin  Likely in the setting of her demand ischemia, history of severe concentric hypertrophy.    Renal/  * Lactic acidosis  Severe lactic acidosis, likely in the setting of hypovolemia and possible vasodilatory shock, requiring fluid resuscitation.  We will continue to perform serial checks.  Now with significant improvement status post volume administration on 1/7/25.  Persistent mild elevation of lactic acidosis.    Acute renal failure superimposed on stage  3a chronic kidney disease  Worsening creatinine since admission, consented the family and placed a right-sided Trialysis catheter with plans to perform dialysis if the patient requires this.  Likely etiology at this time is from hypotension/hypovolemia causing acute tubular necrosis.  We will continue to monitor potassium.  At this time the patient has been hypokalemic and may be approaching the need for dialysis to perform volume removal or if significant amount of uremia.  She has made a small amount of urine which is improved from the day prior 1/9/25.  We will continue to monitor made dialyze tomorrow 1/11/25.    Complex renal cyst  There is a renal mass seen on the recent CT scan and an ultrasound has been ordered with the following impression below.  Prior CT scan showed evidence of renal cysts.    Impression:     1. Indeterminate isoechoic lesion in the left inferior renal pole measuring 2.2 cm.  Cannot exclude solid mass.  Recommend further evaluation with contrast enhanced CT or MRI renal mass protocol.  2. Right renal simple cyst.       We will attempt contrast-enhanced CT if there is improvement of renal function and/or MRI with clinical improvement instability    Oncology  Anemia  We will administer additional PRBC today for gradually downtrending hemoglobin.    Endocrine  Morbid obesity  Complicates all aspects of care    GI  Esophageal perforation  Appreciate gastroenterology recommendations, we will keep NG-tube in at this time to continue keeping patient's stomach decompressed in the setting of recent small-bowel obstruction.  Minor esophageal injury.    Small bowel obstruction  Now resolved.      Small-bowel obstruction partial or complete based on recent CT scan, patient with NG tube in place.  Appreciate surgery consultation, agree that less likely to be mesenteric ischemia given improving lactic acidosis and low-dose vasopressor support requirements.  Still may have some third spacing from her  small-bowel obstruction.  However, now with bowel movement 1/9/25.    Acute superficial gastritis without hemorrhage  Severe esophagitis present on recent EGD.  Gastroenterology has been consulted who have reviewed the scans in detail, seen the patient on 1/6/25 and agree there may be minor esophageal injury with their recommendations including no further intervention at this time required, continuing antibiotics and no need to perform esophagram.  Appreciate Dr. Lora (gastroenterology) seeing the patient and giving her recommendations.    Other  Advanced care planning/counseling discussion  1/10/25:  I had a very detailed family meeting with the patient's 4 children (2 sons and 2 daughters) at bedside today and explained the course of events and the patient's acuity and critical illness.  We discussed the patient's oozing from the mouth, thrombocytopenia, minor esophageal injury, air around the neck and her altered mental status with no significant neurological improvement.  At this time, we will continue all care and continue to readdress code status as clinically indicated.    Thrombocytopenia  No evidence of DIC at this time, with ongoing thrombocytopenia now status post platelet administration with improvement in patient's platelets.  Idiopathic thrombocytopenia this time, we will involve Hematology service.  Continue to transfuse as needed.  Appreciate Hematology coming to see the patient 1/10/25.    Subcutaneous air-neck  Patient with subcutaneous air in the neck on CT scan that was performed.  There is no evidence of subcutaneous emphysema/crepitus on physical examination.  She does not have elevated peak pressures and no evidence of significant large pneumomediastinum.  At this time the etiology of the subcutaneous air in her neck are from possible esophageal injury given the patient's underlying severe esophagitis and placement NG tube, injury from her intubation from posterior pharynx and less  likely to be pneumomediastinum in the setting of positive airway pressure as she has no evidence of significant mediastinal air in the distal trachea to suggest this as the cause.     Status post packing in her mouth with TXA soaked gauze, with improvement and resolution of her oozing.  Serial chest x-rays without any evidence of pneumothorax, physical examination without evidence of subcutaneous emphysema.  Evaluated the patient bedside with ENT physician Dr. Langston who has recommended continuing clinical monitoring, no intervention at this time needed and no additional imaging of the neck.  Appreciate evaluation by Dr. Langston previously.  Re-evaluated by ENT bedside 1/9/25, General surgery pack patient's mouth.  Oozing has slowed down with platelets administration.  We will defer to ENT for recommendations regarding visual exploration versus other intervention.  We will continue to monitor.    Gastroenterology consultation from 1/6/25 greatly appreciated.  There was concern for possible minor esophageal injury.    We will repeat CT neck 1/10/25.          Syncope  History of syncope, being worked up with monitoring.  She does have a pacemaker which was interrogated.  Likely cause of her altered mental status at this time is toxic metabolic encephalopathy.    Critical Care Checklist     Refer to chart for details regarding spontaneous awakening trial (SAT) and spontaneous breathing trial (SBT), CAM-ICU assessment, early mobility and feeding.       Analgesia and sedation- propofol, fentanyl  Thromboembolic prophylaxis- holding off on heparin for DVT prophylaxis in the setting of thrombocytopenia, continue with SCDs Height of bed greater than 30°- Yes  Stress ulcer prophylaxis- pantoprazole  Indwelling catheter (vascular access lines/Loya catheter)-Reviewed  Deescalation of antimicrobials/pharmacotherapies-Reviewed and addressed appropriately  Code status- Full Code           Critical Care Time:  45 minutes  Critical  secondary to Patient has a condition that poses threat to life and bodily function:  Septic shock requiring vasopressor support, altered mental status requiring intubation for airway protection, small-bowel obstruction, severe thrombocytopenia      Critical care was time spent personally by me on the following activities: development of treatment plan with patient or surrogate and bedside caregivers, discussions with consultants, evaluation of patient's response to treatment, examination of patient, ordering and performing treatments and interventions, ordering and review of laboratory studies, ordering and review of radiographic studies, pulse oximetry, re-evaluation of patient's condition. This critical care time did not overlap with that of any other provider or involve time for any procedures.     Jagjit Wayne MD  Critical Care Medicine  [facility]

## 2025-01-12 NOTE — SUBJECTIVE & OBJECTIVE
Interval History/Significant Events:  Refer to hospital course    Review of Systems  Objective:     Vital Signs (Most Recent):  Temp: 97.9 °F (36.6 °C) (01/12/25 1105)  Pulse: 81 (01/12/25 1430)  Resp: 14 (01/12/25 1430)  BP: (!) 118/53 (01/12/25 1430)  SpO2: 100 % (01/12/25 1430) Vital Signs (24h Range):  Temp:  [97.5 °F (36.4 °C)-98.8 °F (37.1 °C)] 97.9 °F (36.6 °C)  Pulse:  [] 81  Resp:  [13-18] 14  SpO2:  [96 %-100 %] 100 %  BP: ()/(35-65) 118/53   Weight: 105.2 kg (231 lb 14.8 oz)  Body mass index is 39.81 kg/m².      Intake/Output Summary (Last 24 hours) at 1/12/2025 1623  Last data filed at 1/12/2025 1405  Gross per 24 hour   Intake 1017.12 ml   Output 88 ml   Net 929.12 ml          Physical Exam  Constitutional:       General: She is in acute distress.      Appearance: Normal appearance. She is obese. She is ill-appearing. She is not diaphoretic.   HENT:      Head: Normocephalic and atraumatic.      Nose: No congestion or rhinorrhea.      Mouth/Throat:      Mouth: Mucous membranes are moist.      Comments: Mouth packed with gauze  Cardiovascular:      Rate and Rhythm: Normal rate and regular rhythm.      Pulses: Normal pulses.      Heart sounds: Normal heart sounds.   Pulmonary:      Effort: Pulmonary effort is normal. No respiratory distress.      Breath sounds: No stridor. Rhonchi present. No wheezing or rales.      Comments: No evidence of subcutaneous emphysema or crepitus present in neck or around chest  Chest:      Chest wall: No tenderness.   Abdominal:      General: Abdomen is flat.   Musculoskeletal:      Cervical back: Normal range of motion. No rigidity.      Right lower leg: Edema present.      Left lower leg: Edema present.   Skin:     General: Skin is warm.      Findings: No erythema.   Neurological:      Mental Status: She is alert and oriented to person, place, and time.      Comments: Intubated, sedated            Vents:  Vent Mode: A/CMV-VC (01/12/25 1416)  Ventilator Initiated:  Yes (01/05/25 1015)  Set Rate: 14 BPM (01/12/25 1416)  Vt Set: 402 mL (01/12/25 1416)  Pressure Support: 10 cmH20 (01/07/25 1915)  PEEP/CPAP: 5 cmH20 (01/12/25 1416)  Oxygen Concentration (%): 30 (01/12/25 1416)  Peak Airway Pressure: 21 cmH20 (01/12/25 1416)  Plateau Pressure: 17.7 cmH20 (01/12/25 0335)  Total Ve: 5.6 L/m (01/12/25 1416)  F/VT Ratio<105 (RSBI): (!) 33.33 (01/10/25 0800)  Lines/Drains/Airways       Central Venous Catheter Line  Duration             Trialysis (Dialysis) Catheter 01/05/25 1058 external jugular;right internal jugular 7 days              Drain  Duration                  NG/OG Tube 01/05/25 1033 Right nostril 7 days         Urethral Catheter 01/05/25 0900 Silicone 16 Fr. 7 days              Airway  Duration                  Airway - Non-Surgical 01/05/25 1015 7 days              Peripheral Intravenous Line  Duration                  Midline Catheter - Single Lumen 01/05/25 0945 Right basilic vein (medial side of arm) 20g x 10cm 7 days         Peripheral IV - Single Lumen 01/05/25 0414 20 G Anterior;Left Forearm 7 days                  Significant Labs:    CBC/Anemia Profile:  Recent Labs   Lab 01/11/25  0811 01/12/25  0416 01/12/25  1052   WBC 3.12* 4.38* 3.61*   HGB 7.8* 9.0* 7.6*   HCT 23.2* 28.0* 23.0*   * 40* 90*   MCV 83.2 84.3 84.6   RDW 17.3* 17.3* 17.9*        Chemistries:  Recent Labs   Lab 01/11/25  0823 01/12/25  0416    134*   K 3.2* 3.2*    101   CO2 20* 20*   BUN 72* 49*   CREATININE 6.05* 4.60*   CALCIUM 6.5* 7.0*       All pertinent labs within the past 24 hours have been reviewed.    Significant Imaging:  I have reviewed all pertinent imaging results/findings within the past 24 hours.   Attending or ELYSIA Only

## 2025-01-12 NOTE — PLAN OF CARE
Problem: Artificial Airway  Goal: Effective Communication  Outcome: Progressing  Goal: Optimal Device Function  Outcome: Progressing  Goal: Absence of Device-Related Skin or Tissue Injury  Outcome: Progressing     Problem: Mechanical Ventilation Invasive  Goal: Effective Communication  Outcome: Progressing  Goal: Optimal Device Function  Outcome: Progressing  Goal: Mechanical Ventilation Liberation  Outcome: Progressing  Goal: Optimal Nutrition Delivery  Outcome: Progressing  Goal: Absence of Device-Related Skin and Tissue Injury  Outcome: Progressing  Goal: Absence of Ventilator-Induced Lung Injury  Outcome: Progressing     Problem: Gas Exchange Impaired  Goal: Optimal Gas Exchange  Outcome: Progressing     Problem: Breathing Pattern Ineffective  Goal: Effective Breathing Pattern  Outcome: Progressing

## 2025-01-12 NOTE — PROGRESS NOTES
[facility]  General Surgery  Progress Note    Subjective:     Interval History:  Patient no acute events.  Still thrombocytopenic.  Tolerating tube feeds.  Low-dose pressors    Post-Op Info:  * No surgery found *          Medications:  Continuous Infusions:   fentanyl  0-250 mcg/hr Intravenous Continuous   Stopped at 01/07/25 1216    lactated ringers   Intravenous Continuous   Stopped at 01/09/25 1015    NORepinephrine bitartrate-D5W  0-0.2 mcg/kg/min Intravenous Continuous 33.1 mL/hr at 01/12/25 0603 0.1 mcg/kg/min at 01/12/25 0603    propofoL  0-50 mcg/kg/min Intravenous Continuous   Stopped at 01/07/25 1216     Scheduled Meds:   atorvastatin  80 mg Oral Daily    ceFEPime IV (PEDS and ADULTS)  1 g Intravenous Q24H    levothyroxine  50 mcg Oral Before breakfast    pantoprazole  40 mg Intravenous Daily     PRN Meds:  Current Facility-Administered Medications:     0.9%  NaCl infusion (for blood administration), , Intravenous, Q24H PRN    0.9%  NaCl infusion (for blood administration), , Intravenous, Q24H PRN    0.9%  NaCl infusion (for blood administration), , Intravenous, Q24H PRN    0.9%  NaCl infusion (for blood administration), , Intravenous, Q24H PRN    0.9% NaCl, , Intra-Catheter, PRN    dextrose 50%, 12.5 g, Intravenous, PRN    dextrose 50%, 12.5 g, Intravenous, PRN    dextrose 50%, 25 g, Intravenous, PRN    glucagon (human recombinant), 1 mg, Intramuscular, PRN    glucagon (human recombinant), 1 mg, Intramuscular, PRN    glucose, 16 g, Oral, PRN    glucose, 24 g, Oral, PRN    heparin (porcine), 4,000 Units, Intra-Catheter, PRN    insulin aspart U-100, 0-10 Units, Subcutaneous, Q6H PRN    naloxone, 0.02 mg, Intravenous, PRN    promethazine, 25 mg, Rectal, Q6H PRN    sodium chloride 0.9%, 10 mL, Intravenous, Q12H PRN     Objective:     Vital Signs (Most Recent):  Temp: 97.5 °F (36.4 °C) (01/12/25 0842)  Pulse: 77 (01/12/25 0842)  Resp: 14 (01/12/25 0842)  BP: (!) 112/49 (01/12/25  0842)  SpO2: 100 % (01/12/25 0842) Vital Signs (24h Range):  Temp:  [97.5 °F (36.4 °C)-99.2 °F (37.3 °C)] 97.5 °F (36.4 °C)  Pulse:  [] 77  Resp:  [14-18] 14  SpO2:  [83 %-100 %] 100 %  BP: ()/(35-65) 112/49       Intake/Output Summary (Last 24 hours) at 1/12/2025 0848  Last data filed at 1/12/2025 0705  Gross per 24 hour   Intake 701.56 ml   Output 1127 ml   Net -425.44 ml       Physical Exam  Constitutional:       General: She is in acute distress.   HENT:      Head: Normocephalic.   Cardiovascular:      Rate and Rhythm: Normal rate and regular rhythm.      Pulses: Normal pulses.   Pulmonary:      Effort: Respiratory distress present.      Breath sounds: Normal breath sounds.   Abdominal:      General: Abdomen is flat. There is no distension.      Palpations: Abdomen is soft.      Tenderness: There is no abdominal tenderness.   Musculoskeletal:         General: Normal range of motion.   Skin:     General: Skin is warm.         Significant Labs:  CBC:   Recent Labs   Lab 01/12/25  0416   WBC 4.38*   RBC 3.32*   HGB 9.0*   HCT 28.0*   PLT 40*   MCV 84.3   MCH 27.1   MCHC 32.1     CMP:   Recent Labs   Lab 01/12/25 0416   GLU 80*   CALCIUM 7.0*   *   K 3.2*   CO2 20*      BUN 49*   CREATININE 4.60*       Significant Diagnostics:  None    Assessment/Plan:     Active Diagnoses:    Diagnosis Date Noted POA    PRINCIPAL PROBLEM:  Lactic acidosis [E87.20] 01/04/2025 Yes    Esophageal perforation [K22.3] 01/09/2025 No    Advanced care planning/counseling discussion [Z71.89] 01/09/2025 Not Applicable    Thrombocytopenia [D69.6] 01/08/2025 No    Anemia [D64.9] 01/08/2025 No    Left-sided pneumonia [J18.9] 01/08/2025 No    Hard to intubate [T88.4XXA] 01/05/2025 Yes    Small bowel obstruction [K56.609] 01/05/2025 No    Subcutaneous air-neck [T79.7XXA] 01/05/2025 No    Encephalopathy, metabolic [G93.41] 01/04/2025 Yes    Acute renal failure superimposed on stage 3a chronic kidney disease [N17.9, N18.31]  01/04/2025 No    Colitis [K52.9] 01/03/2025 Yes    Neuroendocrine tumor [D3A.8] 12/30/2024 Yes    Esophagitis determined by endoscopy [K20.90] 12/27/2024 Yes    Acute superficial gastritis without hemorrhage [K29.00] 12/27/2024 Yes    Elevated troponin [R79.89] 12/21/2024 Yes    Syncope [R55] 12/21/2024 Yes    Syncope and collapse [R55] 12/20/2024 Yes    Type 2 MI (myocardial infarction) [I21.A1] 12/20/2024 Yes    Stage 3b chronic kidney disease [N18.32] 03/29/2023 Yes    Complex renal cyst [N28.1] 03/06/2023 Not Applicable    Morbid obesity [E66.01] 04/19/2022 Yes    Essential hypertension [I10] 05/27/2021 Yes      Problems Resolved During this Admission:     Advance tube feeds as tolerated.  Current continue current care.    Arthur Gaston MD  General Surgery  [facility]

## 2025-01-12 NOTE — ASSESSMENT & PLAN NOTE
Severe metabolic encephalopathy.  Her altered mental status is likely in the setting of toxic metabolic encephalopathy, as suggested by an EEG done a couple of days ago.  I also took a detailed history from the family and there is no evidence of nuchal rigidity, recent ear infection, very high fevers to suggest meningitis or encephalitis at this time.      Continues to remain intubated and sedated at this time, aggressive spontaneous awakening trial today with similar exam prior to being intubated.  CT head not indicative of any acute abnormality in EEG indicative of toxic metabolic encephalopathy.    Consider MRI brain pending clinical stability

## 2025-01-12 NOTE — PLAN OF CARE
Problem: Adult Inpatient Plan of Care  Goal: Absence of Hospital-Acquired Illness or Injury  Outcome: Progressing  Intervention: Prevent Skin Injury  Flowsheets (Taken 1/12/2025 0130)  Body Position:   turned   supine   log-rolled  Skin Protection:   incontinence pads utilized   protective footwear used   pulse oximeter probe site changed   silicone foam dressing in place   transparent dressing maintained  Device Skin Pressure Protection:   absorbent pad utilized/changed   adhesive use limited   positioning supports utilized   pressure points protected  Intervention: Prevent and Manage VTE (Venous Thromboembolism) Risk  Flowsheets (Taken 1/12/2025 0130)  VTE Prevention/Management:   remove, assess skin, and reapply sequential compression device   ROM (passive) performed  Goal: Optimal Comfort and Wellbeing  Outcome: Progressing  Intervention: Monitor Pain and Promote Comfort  Flowsheets (Taken 1/12/2025 0130)  Pain Management Interventions:   care clustered   pillow support provided   position adjusted   quiet environment facilitated

## 2025-01-12 NOTE — ASSESSMENT & PLAN NOTE
Worsening creatinine since admission, consented the family and placed a right-sided Trialysis catheter with plans to perform dialysis if the patient requires this.  Likely etiology at this time is from hypotension/hypovolemia causing acute tubular necrosis.  We will continue to monitor potassium.  At this time the patient has been hypokalemic and may be approaching the need for dialysis to perform volume removal or if significant amount of uremia.  She has made a small amount of urine which is improved from the day prior 1/9/25.  We will continue to monitor made dialyze tomorrow 1/11/25.

## 2025-01-12 NOTE — ASSESSMENT & PLAN NOTE
Severe lactic acidosis, likely in the setting of hypovolemia and possible vasodilatory shock, requiring fluid resuscitation.  We will continue to perform serial checks.  Now with significant improvement status post volume administration on 1/7/25.  Persistent mild elevation of lactic acidosis.  Responding to volume administration.

## 2025-01-12 NOTE — PROGRESS NOTES
[facility]  Critical Care Medicine  Progress Note    Patient Name: Renetta Stewart  MRN: [id number]  Admission Date: 1/2/2025  Hospital Length of Stay: 9 days  Code Status: Full Code  Attending Provider: Jagjit Wayne MD  Primary Care Provider: Eldon Govea MD   Principal Problem: Lactic acidosis    Subjective:     HPI:  74-year-old female with past medical history significant for syncope, collapse, worked up for orthostatic hypotension who presented to the ICU on 1/5/25 in the setting of being obtunded and found to have small-bowel obstruction, now intubated and sedated with shock and lactic acidosis.    The patient was admitted to hospital medicine on 1/3/25.  CT abdomen at the time showed evidence of diffuse wall thickening compatible with colitis.  An EEG was performed due to altered mental status 1/13/25 which showed evidence of toxic metabolic encephalopathy.  No evidence of epileptiform findings or electrographic seizures noted.  Of note, she also had an EGD performed on 12/27/24 which showed evidence of esophagitis and a 5 cm hiatal hernia with erythematous mucosa in the fundus of the stomach and antrum.  The mucosa of mid and distal esophagus was reportedly severely inflamed with white plaque present.    She was being worked up for oliguria and lactic acidosis when she was found to be acutely obtunded and nonresponsive on the morning of 1/5/25.      The patient was brought down emergently to the ICU.  At this time, she began acutely decompensating without the ability to protect her airway, saturating on facemask.  She was then emergently intubated bedside.  She prove to be a difficult intubation (did not have any significant episodes of desaturation) however she had a significant amount of vomitus and bile which complicated her intubation, requiring anesthesia attending to perform the intubation at bedside.  We had already consented the family prior to the intubation and a Trialysis  catheter was placed in anticipation of possible dialysis in case her oliguria worsens.        Hospital/ICU Course:  1/6/25-overnight improving lactic acidosis, still persistent.  Echocardiogram with severe concentric hypertrophy, still on low-moderate dose single vasopressor support without evidence of any active oozing from her mouth or pneumothorax on her chest x-ray scans.  Seen by Dr. Langston at bedside on 1/5/25 who does not recommend any significant intervention, agrees with packing and clinical monitoring without any significant acute intervention.    1/7/25-patient with a minor ooze from the mouth overnight, controlled now, hypokalemic this morning status post replenishment of potassium.  Vancomycin discontinuing continuing cefepime, holding chemical DVT prophylaxis and using SCDs at this time in the setting of mild ooze from mouth.  10 cc of urine output overnight.  Gradually downtrending hemoglobin at 7.5 now.  No evidence of crepitus in the neck or subcutaneous emphysema on physical exam.  Reviewed case with Gastroenterology who believe there may be minor esophageal perforation, possibly from NG tube placement with nothing additional to do at this time.    1/8/25-patient with worsening thrombocytopenia no obvious bleeding noted at this time.  We will attempt spontaneous awakening and spontaneous breathing trial again today.  Patient to receive 2 units platelets and 1 unit PRBC today along with potassium per placement.    1/9/25-with minimal ooze from the mouth, pack by surgery at bedside, re-evaluated by Dr. Langston at bedside.  Status post administration of additional platelets with improvement in thrombocytopenia and additional PRBC.  Family updated in detail at bedside.    1/10/25-mild oozing present.  Platelets down to 35, pending additional unit platelets.  Appreciate ENT and surgery coming by and helping with packing of mouth.  No significant neurological response.  Pending CT scan head, neck and  EEG.    1/11/25-improvement in thrombocytopenia without administration of platelets.  Extremely mild oozing with back mouth today.  Appreciate Hematology recommendation.  Pending repeat coagulation studies.  Mild elevation of PT. patient to receive dialysis today.  CT soft tissue neck with interval resolution subcutaneous air and lower neck.  Evidence of left upper lobe pneumonia.  CT head with chronic changes.  EEG indicative of toxic metabolic encephalopathy.    Interval History/Significant Events:  Refer to hospital course    Review of Systems  Objective:     Vital Signs (Most Recent):  Temp: 98.8 °F (37.1 °C) (01/11/25 1906)  Pulse: 84 (01/11/25 2015)  Resp: 14 (01/11/25 2015)  BP: (!) 89/40 (01/11/25 2015)  SpO2: 100 % (01/11/25 2015) Vital Signs (24h Range):  Temp:  [97.6 °F (36.4 °C)-99.2 °F (37.3 °C)] 98.8 °F (37.1 °C)  Pulse:  [] 84  Resp:  [11-17] 14  SpO2:  [83 %-100 %] 100 %  BP: ()/(38-60) 89/40   Weight: 106.5 kg (234 lb 12.6 oz)  Body mass index is 40.3 kg/m².      Intake/Output Summary (Last 24 hours) at 1/11/2025 2029  Last data filed at 1/11/2025 2022  Gross per 24 hour   Intake 363.88 ml   Output 1317 ml   Net -953.12 ml          Physical Exam  Constitutional:       General: She is in acute distress.      Appearance: Normal appearance. She is obese. She is ill-appearing. She is not diaphoretic.   HENT:      Head: Normocephalic and atraumatic.      Nose: No congestion or rhinorrhea.      Mouth/Throat:      Mouth: Mucous membranes are moist.      Comments: Minimal oozing from mouth, unable to identify source of bleeding due to endotracheal tube.  Appreciate ENT involvement.  Cardiovascular:      Rate and Rhythm: Normal rate and regular rhythm.      Pulses: Normal pulses.      Heart sounds: Normal heart sounds.   Pulmonary:      Effort: Pulmonary effort is normal. No respiratory distress.      Breath sounds: No stridor. Rhonchi present. No wheezing or rales.      Comments: No evidence of  subcutaneous emphysema or crepitus present in neck or around chest  Chest:      Chest wall: No tenderness.   Abdominal:      General: Abdomen is flat.   Musculoskeletal:      Cervical back: Normal range of motion. No rigidity.      Right lower leg: Edema present.      Left lower leg: Edema present.   Skin:     General: Skin is warm.      Findings: No erythema.   Neurological:      Mental Status: She is alert and oriented to person, place, and time.      Comments: Intubated, sedated            Vents:  Vent Mode: A/C (01/11/25 1532)  Ventilator Initiated: Yes (01/05/25 1015)  Set Rate: 14 BPM (01/11/25 1532)  Vt Set: 420 mL (01/11/25 1532)  Pressure Support: 10 cmH20 (01/07/25 1915)  PEEP/CPAP: 5 cmH20 (01/11/25 1532)  Oxygen Concentration (%): 30 (01/11/25 1532)  Peak Airway Pressure: 20.2 cmH20 (01/11/25 1532)  Plateau Pressure: 16.6 cmH20 (01/11/25 1532)  Total Ve: 5.65 L/m (01/11/25 1532)  F/VT Ratio<105 (RSBI): (!) 33.33 (01/10/25 0800)  Lines/Drains/Airways       Central Venous Catheter Line  Duration             Trialysis (Dialysis) Catheter 01/05/25 1058 external jugular;right internal jugular 6 days              Drain  Duration                  NG/OG Tube 01/05/25 1033 Right nostril 6 days         Urethral Catheter 01/05/25 0900 Silicone 16 Fr. 6 days              Airway  Duration                  Airway - Non-Surgical 01/05/25 1015 6 days              Peripheral Intravenous Line  Duration                  Midline Catheter - Single Lumen 01/05/25 0945 Right basilic vein (medial side of arm) 20g x 10cm 6 days         Peripheral IV - Single Lumen 01/05/25 0414 20 G Anterior;Left Forearm 6 days                  Significant Labs:    CBC/Anemia Profile:  Recent Labs   Lab 01/10/25  0608 01/10/25  1207 01/11/25  0811   WBC 3.08* 3.93* 3.12*   HGB 8.1* 8.3* 7.8*   HCT 24.3* 25.1* 23.2*   PLT 35* 91* 110*   MCV 83.5 83.9 83.2   RDW 16.7* 17.2* 17.3*        Chemistries:  Recent Labs   Lab 01/10/25  0608 01/11/25  0823     136   K 3.3* 3.2*    104   CO2 19* 20*   BUN 66* 72*   CREATININE 6.13* 6.05*   CALCIUM 6.4* 6.5*   MG 2.0  --    PHOS 4.1  --        All pertinent labs within the past 24 hours have been reviewed.    Significant Imaging:  I have reviewed all pertinent imaging results/findings within the past 24 hours.    ABG  Recent Labs   Lab 01/06/25  1354   PH 7.52*   PO2 188*   PCO2 29*   HCO3 23.7     Assessment/Plan:     Neuro  Encephalopathy, metabolic  Severe metabolic encephalopathy.  Her altered mental status is likely in the setting of toxic metabolic encephalopathy, as suggested by an EEG done a couple of days ago.  I also took a detailed history from the family and there is no evidence of nuchal rigidity, recent ear infection, very high fevers to suggest meningitis or encephalitis at this time.      Continues to remain intubated and sedated at this time, aggressive spontaneous awakening trial today with similar exam prior to being intubated.  CT head not indicative of any acute abnormality in EEG indicative of toxic metabolic encephalopathy.    Pulmonary  Left-sided pneumonia  Opacification on chest x-ray from 1/8/25 showing evidence of possible left lower lobe pleural effusion versus retrocardiac consolidation.  Patient has been on cefepime at this time.  No growth on final cultures from bronchoscopy performed immediately after intubation.  Continued on cefepime at this time.  CT neck with evidence of left upper lobe pneumonia.    Hard to intubate  Patient extremely difficult to intubate by bedside.  Appreciate prompt anesthesia response to help and anesthesia attending was able to intubate the patient with cricoid pressure, with the help of a bougie tube.  The patient's anatomy indicates that she has very anterior in terms of her vocal cords.  In addition, her intubation was very complicated due to a significant amount of vomitus of bile during the intubation.  A prompt bedside bronchoscopy was  performed immediately with clearance are of aspirated gastric contents.    Cardiac/Vascular  Elevated troponin  Likely in the setting of her demand ischemia, history of severe concentric hypertrophy.    Renal/  * Lactic acidosis  Severe lactic acidosis, likely in the setting of hypovolemia and possible vasodilatory shock, requiring fluid resuscitation.  We will continue to perform serial checks.  Now with significant improvement status post volume administration on 1/7/25.  Persistent mild elevation of lactic acidosis.    Acute renal failure superimposed on stage 3a chronic kidney disease  Worsening creatinine since admission, consented the family and placed a right-sided Trialysis catheter with plans to perform dialysis if the patient requires this.  Likely etiology at this time is from hypotension/hypovolemia causing acute tubular necrosis.  We will continue to monitor potassium.  At this time the patient has been hypokalemic and may be approaching the need for dialysis to perform volume removal or if significant amount of uremia.  She has made a small amount of urine which is improved from the day prior 1/9/25.  Patient's receive dialysis today 1/11/25    Complex renal cyst  There is a renal mass seen on the recent CT scan and an ultrasound has been ordered with the following impression below.  Prior CT scan showed evidence of renal cysts.    Impression:     1. Indeterminate isoechoic lesion in the left inferior renal pole measuring 2.2 cm.  Cannot exclude solid mass.  Recommend further evaluation with contrast enhanced CT or MRI renal mass protocol.  2. Right renal simple cyst.       We will attempt contrast-enhanced CT if there is improvement of renal function and/or MRI with clinical improvement instability    Oncology  Anemia  Stable hemoglobin today at 7.8, no need for PRBC administration, we will continue to follow    Endocrine  Morbid obesity  Complicates all aspects of care    GI  Esophageal  perforation  Appreciate gastroenterology recommendations, we will keep NG-tube in at this time to continue keeping patient's stomach decompressed in the setting of recent small-bowel obstruction.  Minor esophageal injury.    Small bowel obstruction  Now resolved.      Small-bowel obstruction partial or complete based on recent CT scan, patient with NG tube in place.  Appreciate surgery consultation, agree that less likely to be mesenteric ischemia given improving lactic acidosis and low-dose vasopressor support requirements.  Still may have some third spacing from her small-bowel obstruction.  However, now with bowel movement 1/9/25.    Acute superficial gastritis without hemorrhage  Severe esophagitis present on recent EGD.  Gastroenterology has been consulted who have reviewed the scans in detail, seen the patient on 1/6/25 and agree there may be minor esophageal injury with their recommendations including no further intervention at this time required, continuing antibiotics and no need to perform esophagram.  Appreciate Dr. Lora (gastroenterology) seeing the patient and giving her recommendations.    Other  Advanced care planning/counseling discussion  1/10/25:  I had a very detailed family meeting with the patient's 4 children (2 sons and 2 daughters) at bedside today and explained the course of events and the patient's acuity and critical illness.  We discussed the patient's oozing from the mouth, thrombocytopenia, minor esophageal injury, air around the neck and her altered mental status with no significant neurological improvement.  At this time, we will continue all care and continue to readdress code status as clinically indicated.    Thrombocytopenia  No evidence of DIC at this time, with ongoing thrombocytopenia now status post platelet administration with improvement in patient's platelets.  Idiopathic thrombocytopenia this time.  1/11/25-patient with continued improvement thrombocytopenia  without additional administration of platelets today  Appreciate Hematology coming to see the patient 1/10/25.    Subcutaneous air-neck  Repeat CT neck 1/10/25 with resolution of air.  Previously documented events as below       Patient with subcutaneous air in the neck on CT scan that was performed.  There is no evidence of subcutaneous emphysema/crepitus on physical examination.  She does not have elevated peak pressures and no evidence of significant large pneumomediastinum.  At this time the etiology of the subcutaneous air in her neck are from possible esophageal injury given the patient's underlying severe esophagitis and placement NG tube, injury from her intubation from posterior pharynx and less likely to be pneumomediastinum in the setting of positive airway pressure as she has no evidence of significant mediastinal air in the distal trachea to suggest this as the cause.     Status post packing in her mouth with TXA soaked gauze, with improvement and resolution of her oozing.  Serial chest x-rays without any evidence of pneumothorax, physical examination without evidence of subcutaneous emphysema.  Evaluated the patient bedside with ENT physician Dr. Langston who has recommended continuing clinical monitoring, no intervention at this time needed and no additional imaging of the neck.  Appreciate evaluation by Dr. Langston previously.  Re-evaluated by ENT bedside 1/9/25, General surgery pack patient's mouth.  Oozing has slowed down with platelets administration.  We will defer to ENT for recommendations regarding visual exploration versus other intervention.  We will continue to monitor.    Gastroenterology consultation from 1/6/25 greatly appreciated.  There was concern for possible minor esophageal injury.            Syncope  History of syncope, being worked up with monitoring.  She does have a pacemaker which was interrogated.  Likely cause of her altered mental status at this time is toxic metabolic  encephalopathy.    Critical Care Checklist     Refer to chart for details regarding spontaneous awakening trial (SAT) and spontaneous breathing trial (SBT), CAM-ICU assessment, early mobility and feeding.       Analgesia and sedation- propofol, fentanyl  Thromboembolic prophylaxis- holding off on heparin for DVT prophylaxis in the setting of thrombocytopenia, continue with SCDs Height of bed greater than 30°- Yes  Stress ulcer prophylaxis- pantoprazole  Indwelling catheter (vascular access lines/Loya catheter)-Reviewed  Deescalation of antimicrobials/pharmacotherapies-Reviewed and addressed appropriately  Code status- Full Code           Critical Care Time:  45 minutes  Critical secondary to Patient has a condition that poses threat to life and bodily function:  Septic shock requiring vasopressor support, altered mental status requiring intubation for airway protection, small-bowel obstruction, severe thrombocytopenia      Critical care was time spent personally by me on the following activities: development of treatment plan with patient or surrogate and bedside caregivers, discussions with consultants, evaluation of patient's response to treatment, examination of patient, ordering and performing treatments and interventions, ordering and review of laboratory studies, ordering and review of radiographic studies, pulse oximetry, re-evaluation of patient's condition. This critical care time did not overlap with that of any other provider or involve time for any procedures.     Jagjit Wayne MD  Critical Care Medicine  [facility]

## 2025-01-12 NOTE — ASSESSMENT & PLAN NOTE
Repeat CT neck 1/10/25 with resolution of air.  Previously documented events as below       Patient with subcutaneous air in the neck on CT scan that was performed.  There is no evidence of subcutaneous emphysema/crepitus on physical examination.  She does not have elevated peak pressures and no evidence of significant large pneumomediastinum.  At this time the etiology of the subcutaneous air in her neck are from possible esophageal injury given the patient's underlying severe esophagitis and placement NG tube, injury from her intubation from posterior pharynx and less likely to be pneumomediastinum in the setting of positive airway pressure as she has no evidence of significant mediastinal air in the distal trachea to suggest this as the cause.     Status post packing in her mouth with TXA soaked gauze, with improvement and resolution of her oozing.  Serial chest x-rays without any evidence of pneumothorax, physical examination without evidence of subcutaneous emphysema.  Evaluated the patient bedside with ENT physician Dr. Langston who has recommended continuing clinical monitoring, no intervention at this time needed and no additional imaging of the neck.  Appreciate evaluation by Dr. Langston previously.  Re-evaluated by ENT bedside 1/9/25, General surgery pack patient's mouth.  Oozing has slowed down with platelets administration.  We will defer to ENT for recommendations regarding visual exploration versus other intervention.  We will continue to monitor.    Gastroenterology consultation from 1/6/25 greatly appreciated.  There was concern for possible minor esophageal injury.

## 2025-01-13 NOTE — SUBJECTIVE & OBJECTIVE
Interval History/Significant Events:  Patient without complaints    Review of Systems  Objective:     Vital Signs (Most Recent):  Temp: 97.6 °F (36.4 °C) (01/13/25 0322)  Pulse: 64 (01/13/25 0630)  Resp: 14 (01/13/25 0630)  BP: 126/60 (01/13/25 0630)  SpO2: 100 % (01/13/25 0630) Vital Signs (24h Range):  Temp:  [97.5 °F (36.4 °C)-98.5 °F (36.9 °C)] 97.6 °F (36.4 °C)  Pulse:  [64-89] 64  Resp:  [13-17] 14  SpO2:  [100 %] 100 %  BP: ()/(44-66) 126/60   Weight: 109.8 kg (242 lb 1 oz)  Body mass index is 41.55 kg/m².      Intake/Output Summary (Last 24 hours) at 1/13/2025 0645  Last data filed at 1/13/2025 0613  Gross per 24 hour   Intake 1013.95 ml   Output 294 ml   Net 719.95 ml          Physical Exam  Vitals reviewed.   Constitutional:       Appearance: Normal appearance.      Interventions: She is not intubated.  HENT:      Head: Normocephalic and atraumatic.      Nose: Nose normal.      Mouth/Throat:      Mouth: Mucous membranes are dry.      Pharynx: Oropharynx is clear.   Eyes:      Extraocular Movements: Extraocular movements intact.      Conjunctiva/sclera: Conjunctivae normal.      Pupils: Pupils are equal, round, and reactive to light.   Cardiovascular:      Rate and Rhythm: Normal rate.      Heart sounds: Normal heart sounds. No murmur heard.  Pulmonary:      Effort: Pulmonary effort is normal. She is not intubated.      Breath sounds: Normal breath sounds.   Abdominal:      General: Abdomen is flat. Bowel sounds are normal.      Palpations: Abdomen is soft.   Musculoskeletal:         General: Normal range of motion.      Cervical back: Normal range of motion and neck supple.      Right lower leg: No edema.      Left lower leg: No edema.   Skin:     General: Skin is warm and dry.      Capillary Refill: Capillary refill takes less than 2 seconds.   Neurological:      General: No focal deficit present.      Mental Status: She is alert and oriented to person, place, and time.   Psychiatric:         Mood  and Affect: Mood normal.         Behavior: Behavior normal.            Vents:  Vent Mode: A/CMV-VC (01/13/25 0351)  Ventilator Initiated: Yes (01/05/25 1015)  Set Rate: 14 BPM (01/13/25 0351)  Vt Set: 420 mL (01/13/25 0351)  Pressure Support: 10 cmH20 (01/07/25 1915)  PEEP/CPAP: 5 cmH20 (01/13/25 0351)  Oxygen Concentration (%): 30 (01/13/25 0351)  Peak Airway Pressure: 21.7 cmH20 (01/13/25 0351)  Plateau Pressure: 18.4 cmH20 (01/13/25 0351)  Total Ve: 5.66 L/m (01/13/25 0351)  F/VT Ratio<105 (RSBI): (!) 33.33 (01/10/25 0800)  Lines/Drains/Airways       Central Venous Catheter Line  Duration             Trialysis (Dialysis) Catheter 01/05/25 1058 external jugular;right internal jugular 7 days              Drain  Duration                  NG/OG Tube 01/05/25 1033 Right nostril 7 days         Urethral Catheter 01/05/25 0900 Silicone 16 Fr. 7 days              Airway  Duration                  Airway - Non-Surgical 01/05/25 1015 7 days              Peripheral Intravenous Line  Duration                  Midline Catheter - Single Lumen 01/05/25 0945 Right basilic vein (medial side of arm) 20g x 10cm 7 days                  Significant Labs:    CBC/Anemia Profile:  Recent Labs   Lab 01/12/25  0416 01/12/25  1052 01/13/25  0422   WBC 4.38* 3.61* 3.52*   HGB 9.0* 7.6* 7.7*   HCT 28.0* 23.0* 23.2*   PLT 40* 90* 55*   MCV 84.3 84.6 82.9   RDW 17.3* 17.9* 17.3*        Chemistries:  Recent Labs   Lab 01/11/25  0823 01/12/25  0416 01/13/25  0422    134* 133*   K 3.2* 3.2* 2.7*    101 100   CO2 20* 20* 21*   BUN 72* 49* 57*   CREATININE 6.05* 4.60* 4.64*   CALCIUM 6.5* 7.0* 6.8*       Recent Lab Results  (Last 5 results in the past 24 hours)        01/13/25  0530   01/13/25  0422   01/13/25  0035   01/12/25  2334   01/12/25 2012        Anion Gap   15             Baso #   0.02             Basophil %   0.6             BUN   57             BUN/CREAT RATIO   12             Calcium   6.8  Comment: Critical Result called  and verified by verbal readback to: Clyde on 01/13/2025 at 06:32. Reported by [id number].             Chloride   100             CO2   21             Creatinine   4.64             Differential Method   Manual             eGFR   9             Eos #   0.05             Eos %   1.4             Glucose   140             Hematocrit   23.2             Hemoglobin   7.7             Hypo               Immature Grans (Abs)   0.03             Immature Granulocytes   0.9             Lactic Acid Level     1.1     1.2       Lymph #   0.34             Lymph %   9.7             MCH   27.5             MCHC   33.2             MCV   82.9             Microcytosis               Mono #   0.10             Mono %   2.8             MPV   11.1             Neutrophils, Abs   2.98             Neutrophils Relative   84.6             nRBC   1.4             NUCLEATED RBC ABSOLUTE   0.05             PLATELET MORPHOLOGY               Platelet Count   55             POC Glucose 157       131         Potassium   2.7  Comment: Critical Result called and verified by verbal readback to: Clyde on 01/13/2025 at 06:32. Reported by [id number].             RBC   2.80             RDW   17.3             Segmented Neutrophils, Man %               Sodium   133             WBC   3.52                                    Significant Imaging:  I have reviewed all pertinent imaging results/findings within the past 24 hours.

## 2025-01-13 NOTE — PROGRESS NOTES
[facility]  Nephrology  Progress Note    Patient Name: Renetta Stewart  MRN: [id number]  Admission Date: 1/2/2025  Hospital Length of Stay: 10 days  Attending Provider: Jagjit Wayne MD   Primary Care Physician: Eldon Govea MD  Principal Problem:Lactic acidosis    Consults  Subjective:     Interval History: The patient remains on mechanical ventilation.    Review of patient's allergies indicates:  No Known Allergies  Current Facility-Administered Medications   Medication Frequency    0.9%  NaCl infusion (for blood administration) Q24H PRN    0.9%  NaCl infusion (for blood administration) Q24H PRN    0.9%  NaCl infusion (for blood administration) Q24H PRN    0.9%  NaCl infusion (for blood administration) Q24H PRN    0.9% NaCl infusion PRN    atorvastatin tablet 80 mg Daily    ceFEPIme injection 1 g Q24H    dextrose 50% injection 12.5 g PRN    dextrose 50% injection 12.5 g PRN    dextrose 50% injection 25 g PRN    fentaNYL 2500 mcg in D5W 250 mL infusion premix (titrating) (conc: 10 mcg/mL) Continuous    glucagon (human recombinant) injection 1 mg PRN    glucagon (human recombinant) injection 1 mg PRN    glucose chewable tablet 16 g PRN    glucose chewable tablet 24 g PRN    heparin (porcine) injection 4,000 Units PRN    insulin aspart U-100 injection 0-10 Units Q6H PRN    lactated ringers infusion Continuous    levothyroxine tablet 50 mcg Before breakfast    naloxone 0.4 mg/mL injection 0.02 mg PRN    NORepinephrine bitartrate-D5W 4 mg/250 mL (16 mcg/mL) PERIPHERAL access infusion Continuous    pantoprazole injection 40 mg Daily    promethazine suppository 25 mg Q6H PRN    propofol (DIPRIVAN) 10 mg/mL infusion Continuous    sodium chloride 0.9% flush 10 mL Q12H PRN       Objective:     Vital Signs (Most Recent):  Temp: 98.4 °F (36.9 °C) (01/12/25 2230)  Pulse: 76 (01/12/25 2215)  Resp: 14 (01/12/25 2215)  BP: (!) 122/58 (01/12/25 2215)  SpO2: 100 % (01/12/25 2215) Vital Signs (24h  Range):  Temp:  [97.5 °F (36.4 °C)-98.6 °F (37 °C)] 98.4 °F (36.9 °C)  Pulse:  [74-92] 76  Resp:  [13-18] 14  SpO2:  [96 %-100 %] 100 %  BP: ()/(35-65) 122/58     Weight: 105.2 kg (231 lb 14.8 oz) (01/12/25 0230)  Body mass index is 39.81 kg/m².  Body surface area is 2.18 meters squared.    I/O last 3 completed shifts:  In: 1333.5 [I.V.:895.5; Blood:228; NG/GT:210]  Out: 1196 [Urine:196; Other:1000]    Physical Exam  Cardiovascular:      Heart sounds: No murmur heard.     No friction rub. No gallop.   Pulmonary:      Breath sounds: Wheezing present. No rhonchi.   Abdominal:      General: Bowel sounds are normal.      Palpations: Abdomen is soft.         Significant Labs:sureCBC:   Recent Labs   Lab 01/12/25  1052   WBC 3.61*   RBC 2.72*   HGB 7.6*   HCT 23.0*   PLT 90*   MCV 84.6   MCH 27.9   MCHC 33.0     CMP:   Recent Labs   Lab 01/09/25  0542 01/10/25  0608 01/12/25  0416   GLU 76*   < > 80*   CALCIUM 6.6*   < > 7.0*   ALBUMIN 1.6*  --   --    PROT 4.5*  --   --       < > 134*   K 3.6   < > 3.2*   CO2 18*   < > 20*      < > 101   BUN 61*   < > 49*   CREATININE 5.97*   < > 4.60*   ALKPHOS 51  --   --    ALT 61*  --   --    AST 46*  --   --    BILITOT 0.4  --   --     < > = values in this interval not displayed.     All labs within the past 24 hours have been reviewed.    Significant Imaging:      Assessment/Plan:     Active Diagnoses:    Diagnosis Date Noted POA    PRINCIPAL PROBLEM:  Lactic acidosis [E87.20] 01/04/2025 Yes    Esophageal perforation [K22.3] 01/09/2025 No    Advanced care planning/counseling discussion [Z71.89] 01/09/2025 Not Applicable    Thrombocytopenia [D69.6] 01/08/2025 No    Anemia [D64.9] 01/08/2025 No    Left-sided pneumonia [J18.9] 01/08/2025 No    Hard to intubate [T88.4XXA] 01/05/2025 Yes    Small bowel obstruction [K56.609] 01/05/2025 No    Subcutaneous air-neck [T79.7XXA] 01/05/2025 No    Encephalopathy, metabolic [G93.41] 01/04/2025 Yes    Acute renal failure  superimposed on stage 3a chronic kidney disease [N17.9, N18.31] 01/04/2025 No    Colitis [K52.9] 01/03/2025 Yes    Neuroendocrine tumor [D3A.8] 12/30/2024 Yes    Esophagitis determined by endoscopy [K20.90] 12/27/2024 Yes    Acute superficial gastritis without hemorrhage [K29.00] 12/27/2024 Yes    Elevated troponin [R79.89] 12/21/2024 Yes    Syncope [R55] 12/21/2024 Yes    Syncope and collapse [R55] 12/20/2024 Yes    Type 2 MI (myocardial infarction) [I21.A1] 12/20/2024 Yes    Stage 3b chronic kidney disease [N18.32] 03/29/2023 Yes    Complex renal cyst [N28.1] 03/06/2023 Not Applicable    Morbid obesity [E66.01] 04/19/2022 Yes    Essential hypertension [I10] 05/27/2021 Yes      Problems Resolved During this Admission:       Plan:  Hemodialysis tomorrow    Thank you for your consult. I will follow-up with patient. Please contact us if you have any additional questions.    Sulaiman Dewey MD  Nephrology  [facility]

## 2025-01-13 NOTE — PROGRESS NOTES
[facility]  Critical Care Medicine  Progress Note    Patient Name: Renetta Stewart  MRN: [id number]  Admission Date: 1/2/2025  Hospital Length of Stay: 11 days  Code Status: Full Code  Attending Provider: Jagjit Wayne MD  Primary Care Provider: Eldon Govea MD   Principal Problem: Lactic acidosis    Subjective:     HPI:  74-year-old female with past medical history significant for syncope, collapse, worked up for orthostatic hypotension who presented to the ICU on 1/5/25 in the setting of being obtunded and found to have small-bowel obstruction, now intubated and sedated with shock and lactic acidosis.    The patient was admitted to hospital medicine on 1/3/25.  CT abdomen at the time showed evidence of diffuse wall thickening compatible with colitis.  An EEG was performed due to altered mental status 1/13/25 which showed evidence of toxic metabolic encephalopathy.  No evidence of epileptiform findings or electrographic seizures noted.  Of note, she also had an EGD performed on 12/27/24 which showed evidence of esophagitis and a 5 cm hiatal hernia with erythematous mucosa in the fundus of the stomach and antrum.  The mucosa of mid and distal esophagus was reportedly severely inflamed with white plaque present.    She was being worked up for oliguria and lactic acidosis when she was found to be acutely obtunded and nonresponsive on the morning of 1/5/25.      The patient was brought down emergently to the ICU.  At this time, she began acutely decompensating without the ability to protect her airway, saturating on facemask.  She was then emergently intubated bedside.  She prove to be a difficult intubation (did not have any significant episodes of desaturation) however she had a significant amount of vomitus and bile which complicated her intubation, requiring anesthesia attending to perform the intubation at bedside.  We had already consented the family prior to the intubation and a  Trialysis catheter was placed in anticipation of possible dialysis in case her oliguria worsens.        Hospital/ICU Course:  1/6/25-overnight improving lactic acidosis, still persistent.  Echocardiogram with severe concentric hypertrophy, still on low-moderate dose single vasopressor support without evidence of any active oozing from her mouth or pneumothorax on her chest x-ray scans.  Seen by Dr. Langston at bedside on 1/5/25 who does not recommend any significant intervention, agrees with packing and clinical monitoring without any significant acute intervention.    1/7/25-patient with a minor ooze from the mouth overnight, controlled now, hypokalemic this morning status post replenishment of potassium.  Vancomycin discontinuing continuing cefepime, holding chemical DVT prophylaxis and using SCDs at this time in the setting of mild ooze from mouth.  10 cc of urine output overnight.  Gradually downtrending hemoglobin at 7.5 now.  No evidence of crepitus in the neck or subcutaneous emphysema on physical exam.  Reviewed case with Gastroenterology who believe there may be minor esophageal perforation, possibly from NG tube placement with nothing additional to do at this time.    1/8/25-patient with worsening thrombocytopenia no obvious bleeding noted at this time.  We will attempt spontaneous awakening and spontaneous breathing trial again today.  Patient to receive 2 units platelets and 1 unit PRBC today along with potassium per placement.    1/9/25-with minimal ooze from the mouth, pack by surgery at bedside, re-evaluated by Dr. Langston at bedside.  Status post administration of additional platelets with improvement in thrombocytopenia and additional PRBC.  Family updated in detail at bedside.    1/10/25-mild oozing present.  Platelets down to 35, pending additional unit platelets.  Appreciate ENT and surgery coming by and helping with packing of mouth.  No significant neurological response.  Pending CT scan head,  neck and EEG.    1/11/25-improvement in thrombocytopenia without administration of platelets.  Extremely mild oozing with back mouth today.  Appreciate Hematology recommendation.  Pending repeat coagulation studies.  Mild elevation of PT. patient to receive dialysis today.  CT soft tissue neck with interval resolution subcutaneous air and lower neck.  Evidence of left upper lobe pneumonia.  CT head with chronic changes.  EEG indicative of toxic metabolic encephalopathy.    1/12/25-stable hemoglobin at 7.6.  Received additional platelets today after her platelet was at 40, increased to 90.  Mouth continues to remain packed.  Lactate now down to 3.0 from 3.5 earlier this morning.  No improvement in mental status    Interval History/Significant Events:  Patient without complaints    Review of Systems  Objective:     Vital Signs (Most Recent):  Temp: 97.6 °F (36.4 °C) (01/13/25 0322)  Pulse: 64 (01/13/25 0630)  Resp: 14 (01/13/25 0630)  BP: 126/60 (01/13/25 0630)  SpO2: 100 % (01/13/25 0630) Vital Signs (24h Range):  Temp:  [97.5 °F (36.4 °C)-98.5 °F (36.9 °C)] 97.6 °F (36.4 °C)  Pulse:  [64-89] 64  Resp:  [13-17] 14  SpO2:  [100 %] 100 %  BP: ()/(44-66) 126/60   Weight: 109.8 kg (242 lb 1 oz)  Body mass index is 41.55 kg/m².      Intake/Output Summary (Last 24 hours) at 1/13/2025 0645  Last data filed at 1/13/2025 0613  Gross per 24 hour   Intake 1013.95 ml   Output 294 ml   Net 719.95 ml          Physical Exam  Vitals reviewed.   Constitutional:       Appearance: Normal appearance.      Interventions: She is not intubated.  HENT:      Head: Normocephalic and atraumatic.      Nose: Nose normal.      Mouth/Throat:      Mouth: Mucous membranes are dry.      Pharynx: Oropharynx is clear.   Eyes:      Extraocular Movements: Extraocular movements intact.      Conjunctiva/sclera: Conjunctivae normal.      Pupils: Pupils are equal, round, and reactive to light.   Cardiovascular:      Rate and Rhythm: Normal rate.       Heart sounds: Normal heart sounds. No murmur heard.  Pulmonary:      Effort: Pulmonary effort is normal. She is not intubated.      Breath sounds: Normal breath sounds.   Abdominal:      General: Abdomen is flat. Bowel sounds are normal.      Palpations: Abdomen is soft.   Musculoskeletal:         General: Normal range of motion.      Cervical back: Normal range of motion and neck supple.      Right lower leg: No edema.      Left lower leg: No edema.   Skin:     General: Skin is warm and dry.      Capillary Refill: Capillary refill takes less than 2 seconds.   Neurological:      General: No focal deficit present.      Mental Status: She is alert and oriented to person, place, and time.   Psychiatric:         Mood and Affect: Mood normal.         Behavior: Behavior normal.            Vents:  Vent Mode: A/CMV-VC (01/13/25 0351)  Ventilator Initiated: Yes (01/05/25 1015)  Set Rate: 14 BPM (01/13/25 0351)  Vt Set: 420 mL (01/13/25 0351)  Pressure Support: 10 cmH20 (01/07/25 1915)  PEEP/CPAP: 5 cmH20 (01/13/25 0351)  Oxygen Concentration (%): 30 (01/13/25 0351)  Peak Airway Pressure: 21.7 cmH20 (01/13/25 0351)  Plateau Pressure: 18.4 cmH20 (01/13/25 0351)  Total Ve: 5.66 L/m (01/13/25 0351)  F/VT Ratio<105 (RSBI): (!) 33.33 (01/10/25 0800)  Lines/Drains/Airways       Central Venous Catheter Line  Duration             Trialysis (Dialysis) Catheter 01/05/25 1058 external jugular;right internal jugular 7 days              Drain  Duration                  NG/OG Tube 01/05/25 1033 Right nostril 7 days         Urethral Catheter 01/05/25 0900 Silicone 16 Fr. 7 days              Airway  Duration                  Airway - Non-Surgical 01/05/25 1015 7 days              Peripheral Intravenous Line  Duration                  Midline Catheter - Single Lumen 01/05/25 0945 Right basilic vein (medial side of arm) 20g x 10cm 7 days                  Significant Labs:    CBC/Anemia Profile:  Recent Labs   Lab 01/12/25  0416 01/12/25  1052  01/13/25 0422   WBC 4.38* 3.61* 3.52*   HGB 9.0* 7.6* 7.7*   HCT 28.0* 23.0* 23.2*   PLT 40* 90* 55*   MCV 84.3 84.6 82.9   RDW 17.3* 17.9* 17.3*        Chemistries:  Recent Labs   Lab 01/11/25  0823 01/12/25  0416 01/13/25 0422    134* 133*   K 3.2* 3.2* 2.7*    101 100   CO2 20* 20* 21*   BUN 72* 49* 57*   CREATININE 6.05* 4.60* 4.64*   CALCIUM 6.5* 7.0* 6.8*       Recent Lab Results  (Last 5 results in the past 24 hours)        01/13/25  0530   01/13/25  0422   01/13/25  0035   01/12/25  2334   01/12/25 2012        Anion Gap   15             Baso #   0.02             Basophil %   0.6             BUN   57             BUN/CREAT RATIO   12             Calcium   6.8  Comment: Critical Result called and verified by verbal readback to: Clyde on 01/13/2025 at 06:32. Reported by [id number].             Chloride   100             CO2   21             Creatinine   4.64             Differential Method   Manual             eGFR   9             Eos #   0.05             Eos %   1.4             Glucose   140             Hematocrit   23.2             Hemoglobin   7.7             Hypo               Immature Grans (Abs)   0.03             Immature Granulocytes   0.9             Lactic Acid Level     1.1     1.2       Lymph #   0.34             Lymph %   9.7             MCH   27.5             MCHC   33.2             MCV   82.9             Microcytosis               Mono #   0.10             Mono %   2.8             MPV   11.1             Neutrophils, Abs   2.98             Neutrophils Relative   84.6             nRBC   1.4             NUCLEATED RBC ABSOLUTE   0.05             PLATELET MORPHOLOGY               Platelet Count   55             POC Glucose 157       131         Potassium   2.7  Comment: Critical Result called and verified by verbal readback to: Clyde on 01/13/2025 at 06:32. Reported by [id number].             RBC   2.80             RDW   17.3             Segmented Neutrophils, Man %               Sodium   133              WBC   3.52                                    Significant Imaging:  I have reviewed all pertinent imaging results/findings within the past 24 hours.    ABG  Recent Labs   Lab 01/06/25  1354   PH 7.52*   PO2 188*   PCO2 29*   HCO3 23.7     Assessment/Plan:     Neuro  Encephalopathy, metabolic  Severe metabolic encephalopathy.  Her altered mental status is likely in the setting of toxic metabolic encephalopathy, as suggested by an EEG done a couple of days ago.  I also took a detailed history from the family and there is no evidence of nuchal rigidity, recent ear infection, very high fevers to suggest meningitis or encephalitis at this time.      Continues to remain intubated and sedated at this time, aggressive spontaneous awakening trial today with similar exam prior to being intubated.  CT head not indicative of any acute abnormality in EEG indicative of toxic metabolic encephalopathy.    Consider MRI brain pending clinical stability    Pulmonary  Left-sided pneumonia  Opacification on chest x-ray from 1/8/25 showing evidence of possible left lower lobe pleural effusion versus retrocardiac consolidation.  Patient has been on cefepime at this time.  No growth on final cultures from bronchoscopy performed immediately after intubation.  Continued on cefepime at this time.  CT neck with evidence of left upper lobe pneumonia.    Significant improvement on chest x-ray performed 1/12/25 with improved aeration right base as well.  Consider deescalation of antibiotics as clinically appropriate.    Hard to intubate  Patient extremely difficult to intubate by bedside.  Appreciate prompt anesthesia response to help and anesthesia attending was able to intubate the patient with cricoid pressure, with the help of a bougie tube.  The patient's anatomy indicates that she has very anterior in terms of her vocal cords.  In addition, her intubation was very complicated due to a significant amount of vomitus of bile during the  intubation.  A prompt bedside bronchoscopy was performed immediately with clearance are of aspirated gastric contents.    Cardiac/Vascular  Elevated troponin  Likely in the setting of her demand ischemia, history of severe concentric hypertrophy.    Renal/  * Lactic acidosis  Back to normal currently.    Acute renal failure superimposed on stage 3a chronic kidney disease  Some urine output creatinine 4.54 received dialysis 2 days ago is hypokalemic    Complex renal cyst  There is a renal mass seen on the recent CT scan and an ultrasound has been ordered with the following impression below.  Prior CT scan showed evidence of renal cysts.    Impression:     1. Indeterminate isoechoic lesion in the left inferior renal pole measuring 2.2 cm.  Cannot exclude solid mass.  Recommend further evaluation with contrast enhanced CT or MRI renal mass protocol.  2. Right renal simple cyst.       We will attempt contrast-enhanced CT if there is improvement of renal function and/or MRI with clinical improvement instability    Oncology  Anemia  Currently stable at 7.7    Endocrine  Morbid obesity  Complicates all aspects of care    GI  Esophageal perforation  Appreciate gastroenterology recommendations, we will keep NG-tube in at this time to continue keeping patient's stomach decompressed in the setting of recent small-bowel obstruction.  Minor esophageal injury.    Small bowel obstruction  Now resolved.      Small-bowel obstruction partial or complete based on recent CT scan, patient with NG tube in place.  Appreciate surgery consultation, agree that less likely to be mesenteric ischemia given improving lactic acidosis and low-dose vasopressor support requirements.  Still may have some third spacing from her small-bowel obstruction.  However, now with bowel movement 1/9/25.    Acute superficial gastritis without hemorrhage  Severe esophagitis present on recent EGD.  Gastroenterology has been consulted who have reviewed the scans  in detail, seen the patient on 1/6/25 and agree there may be minor esophageal injury with their recommendations including no further intervention at this time required, continuing antibiotics and no need to perform esophagram.  Appreciate Dr. Lora (gastroenterology) seeing the patient and giving her recommendations.    Other  Advanced care planning/counseling discussion  1/10/25:  I had a very detailed family meeting with the patient's 4 children (2 sons and 2 daughters) at bedside today and explained the course of events and the patient's acuity and critical illness.  We discussed the patient's oozing from the mouth, thrombocytopenia, minor esophageal injury, air around the neck and her altered mental status with no significant neurological improvement.  At this time, we will continue all care and continue to readdress code status as clinically indicated.    Thrombocytopenia  No evidence of DIC at this time, with ongoing thrombocytopenia now status post platelet administration with improvement in patient's platelets.  Idiopathic thrombocytopenia this time.  1/11/25-patient with continued improvement thrombocytopenia without additional administration of platelets today  Appreciate Hematology coming to see the patient 1/10/25.  Will account 55,000 will watch    Subcutaneous air-neck  Repeat CT neck 1/10/25 with resolution of air.  Previously documented events as below       Patient with subcutaneous air in the neck on CT scan that was performed.  There is no evidence of subcutaneous emphysema/crepitus on physical examination.  She does not have elevated peak pressures and no evidence of significant large pneumomediastinum.  At this time the etiology of the subcutaneous air in her neck are from possible esophageal injury given the patient's underlying severe esophagitis and placement NG tube, injury from her intubation from posterior pharynx and less likely to be pneumomediastinum in the setting of positive  airway pressure as she has no evidence of significant mediastinal air in the distal trachea to suggest this as the cause.     Status post packing in her mouth with TXA soaked gauze, with improvement and resolution of her oozing.  Serial chest x-rays without any evidence of pneumothorax, physical examination without evidence of subcutaneous emphysema.  Evaluated the patient bedside with ENT physician Dr. Langston who has recommended continuing clinical monitoring, no intervention at this time needed and no additional imaging of the neck.  Appreciate evaluation by Dr. Langston previously.  Re-evaluated by ENT bedside 1/9/25, General surgery pack patient's mouth.  Oozing has slowed down with platelets administration.  We will defer to ENT for recommendations regarding visual exploration versus other intervention.  We will continue to monitor.    Gastroenterology consultation from 1/6/25 greatly appreciated.  There was concern for possible minor esophageal injury.            Syncope  History of syncope, being worked up with monitoring.  She does have a pacemaker which was interrogated.  Likely cause of her altered mental status at this time is toxic metabolic encephalopathy.             Pantera Alexandra MD  Critical Care Medicine  [facility]

## 2025-01-13 NOTE — PLAN OF CARE
Chart review. Also visited patients room. Patient is on vent. Nephrology is following for dialysis. Cm will continue to follow for dc planning when ready.

## 2025-01-13 NOTE — ASSESSMENT & PLAN NOTE
No evidence of DIC at this time, with ongoing thrombocytopenia now status post platelet administration with improvement in patient's platelets.  Idiopathic thrombocytopenia this time.  1/11/25-patient with continued improvement thrombocytopenia without additional administration of platelets today  Appreciate Hematology coming to see the patient 1/10/25.  Will account 55,000 will watch

## 2025-01-13 NOTE — PLAN OF CARE
Problem: Adult Inpatient Plan of Care  Goal: Plan of Care Review  Outcome: Progressing     Problem: Skin Injury Risk Increased  Goal: Skin Health and Integrity  Outcome: Progressing     Problem: Acute Kidney Injury/Impairment  Goal: Fluid and Electrolyte Balance  Outcome: Progressing  Goal: Improved Oral Intake  Outcome: Progressing     Problem: Fall Injury Risk  Goal: Absence of Fall and Fall-Related Injury  Outcome: Progressing     Problem: Artificial Airway  Goal: Effective Communication  Outcome: Progressing  Goal: Optimal Device Function  Outcome: Progressing     Problem: Mechanical Ventilation Invasive  Goal: Effective Communication  Outcome: Progressing  Goal: Optimal Device Function  Outcome: Progressing  Goal: Mechanical Ventilation Liberation  Outcome: Progressing  Goal: Optimal Nutrition Delivery  Outcome: Progressing  Goal: Absence of Device-Related Skin and Tissue Injury  Outcome: Progressing  Goal: Absence of Ventilator-Induced Lung Injury  Outcome: Progressing     Problem: Gas Exchange Impaired  Goal: Optimal Gas Exchange  Outcome: Progressing     Problem: Breathing Pattern Ineffective  Goal: Effective Breathing Pattern  Outcome: Progressing     Problem: Pneumonia  Goal: Fluid Balance  Outcome: Progressing     Problem: Airway Clearance Ineffective  Goal: Effective Airway Clearance  Outcome: Progressing     Problem: Hemodialysis  Goal: Safe, Effective Therapy Delivery  Outcome: Progressing

## 2025-01-13 NOTE — PROGRESS NOTES
[facility]  Nephrology  Progress Note    Patient Name: Renetta Stewart  MRN: [id number]  Admission Date: 1/2/2025  Hospital Length of Stay: 11 days  Attending Provider: Jagjit Wayne MD   Primary Care Physician: Eldon Govea MD  Principal Problem:Lactic acidosis    Consults  Subjective:     Interval History:  Ms. Matthews is seen in follow-up of her acute renal failure.  She remains unresponsive.  She has no pupillary reflex and she exhibits no appropriate eye movement to head turning.      She made 250 cc of urine yesterday and her potassium today is 2.7.  Blood pressure is 90 on Levophed.    We can continue to dialyze for short while but the outlook is ominous.    Review of patient's allergies indicates:  No Known Allergies  Current Facility-Administered Medications   Medication Frequency    0.9%  NaCl infusion (for blood administration) Q24H PRN    0.9% NaCl infusion PRN    atorvastatin tablet 80 mg Daily    ceFEPIme injection 1 g Q24H    dextrose 50% injection 12.5 g PRN    dextrose 50% injection 12.5 g PRN    dextrose 50% injection 25 g PRN    fentaNYL 2500 mcg in D5W 250 mL infusion premix (titrating) (conc: 10 mcg/mL) Continuous    glucagon (human recombinant) injection 1 mg PRN    glucagon (human recombinant) injection 1 mg PRN    glucose chewable tablet 16 g PRN    glucose chewable tablet 24 g PRN    heparin (porcine) injection 4,000 Units PRN    insulin aspart U-100 injection 0-10 Units Q6H PRN    lactated ringers infusion Continuous    levothyroxine tablet 50 mcg Before breakfast    naloxone 0.4 mg/mL injection 0.02 mg PRN    NORepinephrine bitartrate-D5W 4 mg/250 mL (16 mcg/mL) PERIPHERAL access infusion Continuous    pantoprazole injection 40 mg Daily    promethazine suppository 25 mg Q6H PRN    propofol (DIPRIVAN) 10 mg/mL infusion Continuous    sodium chloride 0.9% flush 10 mL Q12H PRN       Objective:     Vital Signs (Most Recent):  Temp: 97 °F (36.1 °C) (01/13/25 0701)  Pulse: 67  (01/13/25 0815)  Resp: 14 (01/13/25 0815)  BP: (!) 97/49 (01/13/25 0815)  SpO2: 100 % (01/13/25 0815) Vital Signs (24h Range):  Temp:  [97 °F (36.1 °C)-98.4 °F (36.9 °C)] 97 °F (36.1 °C)  Pulse:  [64-89] 67  Resp:  [13-17] 14  SpO2:  [100 %] 100 %  BP: ()/(44-66) 97/49     Weight: 109.8 kg (242 lb 1 oz) (01/13/25 0245)  Body mass index is 41.55 kg/m².  Body surface area is 2.23 meters squared.    I/O last 3 completed shifts:  In: 1539.3 [I.V.:891.3; Blood:228; NG/GT:420]  Out: 374 [Urine:374]    Physical Exam unresponsive.  She is on no sedation but exhibits no pupillary reflex and no ocular movement in response to head turning.    Significant Labs:sureBMP:   Recent Labs   Lab 01/10/25  0608 01/11/25  0823 01/13/25  0422   GLU 67*   < > 140*      < > 133*   K 3.3*   < > 2.7*      < > 100   CO2 19*   < > 21*   BUN 66*   < > 57*   CREATININE 6.13*   < > 4.64*   CALCIUM 6.4*   < > 6.8*   MG 2.0  --   --     < > = values in this interval not displayed.     All labs within the past 24 hours have been reviewed.    Significant Imaging:  Labs: Reviewed  X-Ray: Reviewed    Assessment/Plan:     Active Diagnoses:    Diagnosis Date Noted POA    PRINCIPAL PROBLEM:  Lactic acidosis [E87.20] 01/04/2025 Yes    Esophageal perforation [K22.3] 01/09/2025 No    Advanced care planning/counseling discussion [Z71.89] 01/09/2025 Not Applicable    Thrombocytopenia [D69.6] 01/08/2025 No    Anemia [D64.9] 01/08/2025 No    Left-sided pneumonia [J18.9] 01/08/2025 No    Hard to intubate [T88.4XXA] 01/05/2025 Yes    Small bowel obstruction [K56.609] 01/05/2025 No    Subcutaneous air-neck [T79.7XXA] 01/05/2025 No    Encephalopathy, metabolic [G93.41] 01/04/2025 Yes    Acute renal failure superimposed on stage 3a chronic kidney disease [N17.9, N18.31] 01/04/2025 No    Colitis [K52.9] 01/03/2025 Yes    Neuroendocrine tumor [D3A.8] 12/30/2024 Yes    Esophagitis determined by endoscopy [K20.90] 12/27/2024 Yes    Acute superficial  gastritis without hemorrhage [K29.00] 12/27/2024 Yes    Elevated troponin [R79.89] 12/21/2024 Yes    Syncope [R55] 12/21/2024 Yes    Syncope and collapse [R55] 12/20/2024 Yes    Type 2 MI (myocardial infarction) [I21.A1] 12/20/2024 Yes    Stage 3b chronic kidney disease [N18.32] 03/29/2023 Yes    Complex renal cyst [N28.1] 03/06/2023 Not Applicable    Morbid obesity [E66.01] 04/19/2022 Yes    Essential hypertension [I10] 05/27/2021 Yes      Problems Resolved During this Admission:       We will plan to dialyze today or tomorrow but with her neurological state, recovery seems unlikely in her outlook is grim.    Thank you for your consult. I will follow-up with patient. Please contact us if you have any additional questions.    Yaron Acuna MD  Nephrology  [facility]

## 2025-01-13 NOTE — PLAN OF CARE
Problem: Adult Inpatient Plan of Care  Goal: Absence of Hospital-Acquired Illness or Injury  Outcome: Progressing  Intervention: Prevent Skin Injury  Flowsheets (Taken 1/13/2025 0145)  Body Position:   turned   supine   weight shifting   heels elevated  Skin Protection:   incontinence pads utilized   protective footwear used   pulse oximeter probe site changed   silicone foam dressing in place   transparent dressing maintained  Device Skin Pressure Protection:   absorbent pad utilized/changed   adhesive use limited   positioning supports utilized   pressure points protected  Intervention: Prevent and Manage VTE (Venous Thromboembolism) Risk  Flowsheets (Taken 1/13/2025 0145)  VTE Prevention/Management:   remove, assess skin, and reapply sequential compression device   ROM (passive) performed  Intervention: Prevent Infection  Flowsheets (Taken 1/13/2025 0145)  Infection Prevention:   cohorting utilized   hand hygiene promoted   single patient room provided  Goal: Optimal Comfort and Wellbeing  Outcome: Progressing  Intervention: Monitor Pain and Promote Comfort  Flowsheets (Taken 1/13/2025 0145)  Pain Management Interventions:   care clustered   pillow support provided   position adjusted   quiet environment facilitated

## 2025-01-14 NOTE — PROGRESS NOTES
Patient has had no acute events.  She is tolerating tube feeds.  Bowel obstruction has resolved.  No current indication for surgical intervention.  We will sign off from surgery standpoint, please reach out if further assistance with this patient is needed.

## 2025-01-14 NOTE — SUBJECTIVE & OBJECTIVE
Interval History/Significant Events:  Patient without complaints    Review of Systems  Objective:     Vital Signs (Most Recent):  Temp: 97.4 °F (36.3 °C) (01/14/25 0715)  Pulse: 62 (01/14/25 0915)  Resp: 14 (01/14/25 0915)  BP: (!) 124/54 (01/14/25 0915)  SpO2: 100 % (01/14/25 0915) Vital Signs (24h Range):  Temp:  [96.4 °F (35.8 °C)-97.4 °F (36.3 °C)] 97.4 °F (36.3 °C)  Pulse:  [57-87] 62  Resp:  [12-18] 14  SpO2:  [99 %-100 %] 100 %  BP: ()/(42-68) 124/54   Weight: 109.8 kg (242 lb 1 oz)  Body mass index is 41.55 kg/m².      Intake/Output Summary (Last 24 hours) at 1/14/2025 1032  Last data filed at 1/14/2025 0705  Gross per 24 hour   Intake 800.09 ml   Output 67 ml   Net 733.09 ml          Physical Exam  Vitals reviewed.   Constitutional:       Appearance: Normal appearance.      Interventions: She is not intubated.  HENT:      Head: Normocephalic and atraumatic.      Nose: Nose normal.      Mouth/Throat:      Mouth: Mucous membranes are dry.      Pharynx: Oropharynx is clear.   Eyes:      Extraocular Movements: Extraocular movements intact.      Conjunctiva/sclera: Conjunctivae normal.      Pupils: Pupils are equal, round, and reactive to light.   Cardiovascular:      Rate and Rhythm: Normal rate.      Heart sounds: Normal heart sounds. No murmur heard.  Pulmonary:      Effort: Pulmonary effort is normal. She is not intubated.      Breath sounds: Normal breath sounds.   Abdominal:      General: Abdomen is flat. Bowel sounds are normal.      Palpations: Abdomen is soft.   Musculoskeletal:         General: Normal range of motion.      Cervical back: Normal range of motion and neck supple.      Right lower leg: No edema.      Left lower leg: No edema.   Skin:     General: Skin is warm and dry.      Capillary Refill: Capillary refill takes less than 2 seconds.   Neurological:      General: No focal deficit present.      Mental Status: She is alert and oriented to person, place, and time.   Psychiatric:          Mood and Affect: Mood normal.         Behavior: Behavior normal.            Vents:  Vent Mode: A/CMV-VC (01/14/25 0753)  Ventilator Initiated: Yes (01/05/25 1015)  Set Rate: 14 BPM (01/14/25 0753)  Vt Set: 420 mL (01/14/25 0753)  Pressure Support: 10 cmH20 (01/07/25 1915)  PEEP/CPAP: 5 cmH20 (01/14/25 0753)  Oxygen Concentration (%): 30 (01/14/25 0753)  Peak Airway Pressure: 21.9 cmH20 (01/14/25 0753)  Plateau Pressure: 17.4 cmH20 (01/14/25 0618)  Total Ve: 5.64 L/m (01/14/25 0753)  F/VT Ratio<105 (RSBI): (!) 33.25 (01/14/25 0753)  Lines/Drains/Airways       Central Venous Catheter Line  Duration             Trialysis (Dialysis) Catheter 01/05/25 1058 external jugular;right internal jugular 8 days              Drain  Duration                  Urethral Catheter 01/05/25 0900 Silicone 16 Fr. 9 days         NG/OG Tube 01/05/25 1033 Right nostril 8 days              Airway  Duration                  Airway - Non-Surgical 01/05/25 1015 9 days              Peripheral Intravenous Line  Duration                  Midline Catheter - Single Lumen 01/05/25 0945 Right basilic vein (medial side of arm) 20g x 10cm 9 days                  Significant Labs:    CBC/Anemia Profile:  Recent Labs   Lab 01/12/25  1052 01/13/25  0422 01/14/25  0457   WBC 3.61* 3.52* 4.31*   HGB 7.6* 7.7* 7.2*   HCT 23.0* 23.2* 21.1*   PLT 90* 55* 30*   MCV 84.6 82.9 80.8   RDW 17.9* 17.3* 17.3*        Chemistries:  Recent Labs   Lab 01/13/25  0422 01/14/25  0457   * 135*   K 2.7* 2.6*    101   CO2 21* 23   BUN 57* 64*   CREATININE 4.64* 4.97*   CALCIUM 6.8* 7.3*       Recent Lab Results  (Last 5 results in the past 24 hours)        01/14/25  0821   01/14/25  0537   01/14/25  0457   01/13/25  2315   01/13/25 2017        Anion Gap     14           Baso #     0.03           Basophil %     0.7                1           BUN     64           BUN/CREAT RATIO     13           Calcium     7.3           Chloride     101           CO2     23            Creatinine     4.97           Differential Method     Manual           eGFR     9           Eos #     0.08           Eos %     1.9                2           Glucose     137           Hematocrit     21.1           Hemoglobin     7.2           Hypo     Few           Immature Grans (Abs)     0.04           Immature Granulocytes     0.9           Lactic Acid Level 0.7     0.8     1.1       Lymph #     0.48           Lymph %     11.1                8           MCH     27.6           MCHC     34.1           MCV     80.8           Mono #     0.22           Mono %     5.1                5           MPV     11.3           Neutrophils, Abs     3.46           Neutrophils Relative     80.3           nRBC     3                0.9           NUCLEATED RBC ABSOLUTE     0.04           PLATELET MORPHOLOGY     Decreased           Platelet Count     30           POC Glucose   148     136         Potassium     2.6  Comment: Critical Result called and verified by verbal readback to: Tay on 01/14/2025 at 06:34. Reported by [id number].           RBC     2.61           RDW     17.3           Segmented Neutrophils, Man %     84           Sodium     135           WBC     4.31                                  Significant Imaging:  I have reviewed all pertinent imaging results/findings within the past 24 hours.

## 2025-01-14 NOTE — ASSESSMENT & PLAN NOTE
Severe metabolic encephalopathy.  Her altered mental status is likely in the setting of toxic metabolic encephalopathy, as suggested by an EEG done a couple of days ago.  I also took a detailed history from the family and there is no evidence of nuchal rigidity, recent ear infection, very high fevers to suggest meningitis or encephalitis at this time.      Continues to remain intubated and sedated at this time, aggressive spontaneous awakening trial today with similar exam prior to being intubated.  CT head not indicative of any acute abnormality in EEG indicative of toxic metabolic encephalopathy.    Still difficulties from technical standpoint for MRI.  I am not convince an MRI will change our course now for my 2 days evaluation her only reflexes a corneal reflex.  My plan today is to discuss with the family and consider a nuclear brain flow study and a and possibly an apnea test for brain death

## 2025-01-14 NOTE — PROGRESS NOTES
01/14/25 1140   Wound Care Follow Up   Wound Care Follow-up? Yes   Wound Care- Next Visit Date 01/14/25   Follow Up Plan [facility] POC

## 2025-01-14 NOTE — PROGRESS NOTES
[facility]  Critical Care Medicine  Progress Note    Patient Name: Renetta Stewart  MRN: [id number]  Admission Date: 1/2/2025  Hospital Length of Stay: 12 days  Code Status: Full Code  Attending Provider: Pantera Alexandra MD  Primary Care Provider: Eldon Govea MD   Principal Problem: Lactic acidosis    Subjective:     HPI:  74-year-old female with past medical history significant for syncope, collapse, worked up for orthostatic hypotension who presented to the ICU on 1/5/25 in the setting of being obtunded and found to have small-bowel obstruction, now intubated and sedated with shock and lactic acidosis.    The patient was admitted to hospital medicine on 1/3/25.  CT abdomen at the time showed evidence of diffuse wall thickening compatible with colitis.  An EEG was performed due to altered mental status 1/13/25 which showed evidence of toxic metabolic encephalopathy.  No evidence of epileptiform findings or electrographic seizures noted.  Of note, she also had an EGD performed on 12/27/24 which showed evidence of esophagitis and a 5 cm hiatal hernia with erythematous mucosa in the fundus of the stomach and antrum.  The mucosa of mid and distal esophagus was reportedly severely inflamed with white plaque present.    She was being worked up for oliguria and lactic acidosis when she was found to be acutely obtunded and nonresponsive on the morning of 1/5/25.      The patient was brought down emergently to the ICU.  At this time, she began acutely decompensating without the ability to protect her airway, saturating on facemask.  She was then emergently intubated bedside.  She prove to be a difficult intubation (did not have any significant episodes of desaturation) however she had a significant amount of vomitus and bile which complicated her intubation, requiring anesthesia attending to perform the intubation at bedside.  We had already consented the family prior to the intubation and a  Trialysis catheter was placed in anticipation of possible dialysis in case her oliguria worsens.        Hospital/ICU Course:  1/6/25-overnight improving lactic acidosis, still persistent.  Echocardiogram with severe concentric hypertrophy, still on low-moderate dose single vasopressor support without evidence of any active oozing from her mouth or pneumothorax on her chest x-ray scans.  Seen by Dr. Langston at bedside on 1/5/25 who does not recommend any significant intervention, agrees with packing and clinical monitoring without any significant acute intervention.    1/7/25-patient with a minor ooze from the mouth overnight, controlled now, hypokalemic this morning status post replenishment of potassium.  Vancomycin discontinuing continuing cefepime, holding chemical DVT prophylaxis and using SCDs at this time in the setting of mild ooze from mouth.  10 cc of urine output overnight.  Gradually downtrending hemoglobin at 7.5 now.  No evidence of crepitus in the neck or subcutaneous emphysema on physical exam.  Reviewed case with Gastroenterology who believe there may be minor esophageal perforation, possibly from NG tube placement with nothing additional to do at this time.    1/8/25-patient with worsening thrombocytopenia no obvious bleeding noted at this time.  We will attempt spontaneous awakening and spontaneous breathing trial again today.  Patient to receive 2 units platelets and 1 unit PRBC today along with potassium per placement.    1/9/25-with minimal ooze from the mouth, pack by surgery at bedside, re-evaluated by Dr. Langston at bedside.  Status post administration of additional platelets with improvement in thrombocytopenia and additional PRBC.  Family updated in detail at bedside.    1/10/25-mild oozing present.  Platelets down to 35, pending additional unit platelets.  Appreciate ENT and surgery coming by and helping with packing of mouth.  No significant neurological response.  Pending CT scan head,  neck and EEG.    1/11/25-improvement in thrombocytopenia without administration of platelets.  Extremely mild oozing with back mouth today.  Appreciate Hematology recommendation.  Pending repeat coagulation studies.  Mild elevation of PT. patient to receive dialysis today.  CT soft tissue neck with interval resolution subcutaneous air and lower neck.  Evidence of left upper lobe pneumonia.  CT head with chronic changes.  EEG indicative of toxic metabolic encephalopathy.    1/12/25-stable hemoglobin at 7.6.  Received additional platelets today after her platelet was at 40, increased to 90.  Mouth continues to remain packed.  Lactate now down to 3.0 from 3.5 earlier this morning.  No improvement in mental status    Interval History/Significant Events:  Patient without complaints    Review of Systems  Objective:     Vital Signs (Most Recent):  Temp: 97.4 °F (36.3 °C) (01/14/25 0715)  Pulse: 62 (01/14/25 0915)  Resp: 14 (01/14/25 0915)  BP: (!) 124/54 (01/14/25 0915)  SpO2: 100 % (01/14/25 0915) Vital Signs (24h Range):  Temp:  [96.4 °F (35.8 °C)-97.4 °F (36.3 °C)] 97.4 °F (36.3 °C)  Pulse:  [57-87] 62  Resp:  [12-18] 14  SpO2:  [99 %-100 %] 100 %  BP: ()/(42-68) 124/54   Weight: 109.8 kg (242 lb 1 oz)  Body mass index is 41.55 kg/m².      Intake/Output Summary (Last 24 hours) at 1/14/2025 1032  Last data filed at 1/14/2025 0705  Gross per 24 hour   Intake 800.09 ml   Output 67 ml   Net 733.09 ml          Physical Exam  Vitals reviewed.   Constitutional:       Appearance: Normal appearance.      Interventions: She is not intubated.  HENT:      Head: Normocephalic and atraumatic.      Nose: Nose normal.      Mouth/Throat:      Mouth: Mucous membranes are dry.      Pharynx: Oropharynx is clear.   Eyes:      Extraocular Movements: Extraocular movements intact.      Conjunctiva/sclera: Conjunctivae normal.      Pupils: Pupils are equal, round, and reactive to light.   Cardiovascular:      Rate and Rhythm: Normal rate.       Heart sounds: Normal heart sounds. No murmur heard.  Pulmonary:      Effort: Pulmonary effort is normal. She is not intubated.      Breath sounds: Normal breath sounds.   Abdominal:      General: Abdomen is flat. Bowel sounds are normal.      Palpations: Abdomen is soft.   Musculoskeletal:         General: Normal range of motion.      Cervical back: Normal range of motion and neck supple.      Right lower leg: No edema.      Left lower leg: No edema.   Skin:     General: Skin is warm and dry.      Capillary Refill: Capillary refill takes less than 2 seconds.   Neurological:      General: No focal deficit present.      Mental Status: She is alert and oriented to person, place, and time.   Psychiatric:         Mood and Affect: Mood normal.         Behavior: Behavior normal.            Vents:  Vent Mode: A/CMV-VC (01/14/25 0753)  Ventilator Initiated: Yes (01/05/25 1015)  Set Rate: 14 BPM (01/14/25 0753)  Vt Set: 420 mL (01/14/25 0753)  Pressure Support: 10 cmH20 (01/07/25 1915)  PEEP/CPAP: 5 cmH20 (01/14/25 0753)  Oxygen Concentration (%): 30 (01/14/25 0753)  Peak Airway Pressure: 21.9 cmH20 (01/14/25 0753)  Plateau Pressure: 17.4 cmH20 (01/14/25 0618)  Total Ve: 5.64 L/m (01/14/25 0753)  F/VT Ratio<105 (RSBI): (!) 33.25 (01/14/25 0753)  Lines/Drains/Airways       Central Venous Catheter Line  Duration             Trialysis (Dialysis) Catheter 01/05/25 1058 external jugular;right internal jugular 8 days              Drain  Duration                  Urethral Catheter 01/05/25 0900 Silicone 16 Fr. 9 days         NG/OG Tube 01/05/25 1033 Right nostril 8 days              Airway  Duration                  Airway - Non-Surgical 01/05/25 1015 9 days              Peripheral Intravenous Line  Duration                  Midline Catheter - Single Lumen 01/05/25 0945 Right basilic vein (medial side of arm) 20g x 10cm 9 days                  Significant Labs:    CBC/Anemia Profile:  Recent Labs   Lab 01/12/25  1052  01/13/25 0422 01/14/25 0457   WBC 3.61* 3.52* 4.31*   HGB 7.6* 7.7* 7.2*   HCT 23.0* 23.2* 21.1*   PLT 90* 55* 30*   MCV 84.6 82.9 80.8   RDW 17.9* 17.3* 17.3*        Chemistries:  Recent Labs   Lab 01/13/25 0422 01/14/25 0457   * 135*   K 2.7* 2.6*    101   CO2 21* 23   BUN 57* 64*   CREATININE 4.64* 4.97*   CALCIUM 6.8* 7.3*       Recent Lab Results  (Last 5 results in the past 24 hours)        01/14/25  0821 01/14/25  0537   01/14/25 0457 01/13/25 2315 01/13/25 2017        Anion Gap     14           Baso #     0.03           Basophil %     0.7                1           BUN     64           BUN/CREAT RATIO     13           Calcium     7.3           Chloride     101           CO2     23           Creatinine     4.97           Differential Method     Manual           eGFR     9           Eos #     0.08           Eos %     1.9                2           Glucose     137           Hematocrit     21.1           Hemoglobin     7.2           Hypo     Few           Immature Grans (Abs)     0.04           Immature Granulocytes     0.9           Lactic Acid Level 0.7     0.8     1.1       Lymph #     0.48           Lymph %     11.1                8           MCH     27.6           MCHC     34.1           MCV     80.8           Mono #     0.22           Mono %     5.1                5           MPV     11.3           Neutrophils, Abs     3.46           Neutrophils Relative     80.3           nRBC     3                0.9           NUCLEATED RBC ABSOLUTE     0.04           PLATELET MORPHOLOGY     Decreased           Platelet Count     30           POC Glucose   148     136         Potassium     2.6  Comment: Critical Result called and verified by verbal readback to: Tay on 01/14/2025 at 06:34. Reported by [id number].           RBC     2.61           RDW     17.3           Segmented Neutrophils, Man %     84           Sodium     135           WBC     4.31                                  Significant  "Imaging:  I have reviewed all pertinent imaging results/findings within the past 24 hours.    ABG  No results for input(s): "PH", "PO2", "PCO2", "HCO3", "BE" in the last 168 hours.  Assessment/Plan:     Neuro  Encephalopathy, metabolic  Severe metabolic encephalopathy.  Her altered mental status is likely in the setting of toxic metabolic encephalopathy, as suggested by an EEG done a couple of days ago.  I also took a detailed history from the family and there is no evidence of nuchal rigidity, recent ear infection, very high fevers to suggest meningitis or encephalitis at this time.      Continues to remain intubated and sedated at this time, aggressive spontaneous awakening trial today with similar exam prior to being intubated.  CT head not indicative of any acute abnormality in EEG indicative of toxic metabolic encephalopathy.    Still difficulties from technical standpoint for MRI.  I am not convince an MRI will change our course now for my 2 days evaluation her only reflexes a corneal reflex.  My plan today is to discuss with the family and consider a nuclear brain flow study and a and possibly an apnea test for brain death      Pulmonary  Left-sided pneumonia  Opacification on chest x-ray from 1/8/25 showing evidence of possible left lower lobe pleural effusion versus retrocardiac consolidation.  Patient has been on cefepime at this time.  No growth on final cultures from bronchoscopy performed immediately after intubation.  Continued on cefepime at this time.  CT neck with evidence of left upper lobe pneumonia.    Significant improvement on chest x-ray performed 1/12/25 with improved aeration right base as well.  Consider deescalation of antibiotics as clinically appropriate.    Hard to intubate  Patient extremely difficult to intubate by bedside.  Appreciate prompt anesthesia response to help and anesthesia attending was able to intubate the patient with cricoid pressure, with the help of a bougie tube.  " The patient's anatomy indicates that she has very anterior in terms of her vocal cords.  In addition, her intubation was very complicated due to a significant amount of vomitus of bile during the intubation.  A prompt bedside bronchoscopy was performed immediately with clearance are of aspirated gastric contents.    Cardiac/Vascular  Elevated troponin  Likely in the setting of her demand ischemia, history of severe concentric hypertrophy.    Renal/  * Lactic acidosis  Back to normal currently.    Acute renal failure superimposed on stage 3a chronic kidney disease  Some urine output creatinine 4.54 received dialysis 2 days ago is hypokalemic    Complex renal cyst  There is a renal mass seen on the recent CT scan and an ultrasound has been ordered with the following impression below.  Prior CT scan showed evidence of renal cysts.    Impression:     1. Indeterminate isoechoic lesion in the left inferior renal pole measuring 2.2 cm.  Cannot exclude solid mass.  Recommend further evaluation with contrast enhanced CT or MRI renal mass protocol.  2. Right renal simple cyst.       We will attempt contrast-enhanced CT if there is improvement of renal function and/or MRI with clinical improvement instability    Oncology  Anemia  Currently stable at 7.2    Endocrine  Morbid obesity  Complicates all aspects of care    GI  Esophageal perforation  Appreciate gastroenterology recommendations, we will keep NG-tube in at this time to continue keeping patient's stomach decompressed in the setting of recent small-bowel obstruction.  Minor esophageal injury.    Small bowel obstruction  Now resolved.      Small-bowel obstruction partial or complete based on recent CT scan, patient with NG tube in place.  Appreciate surgery consultation, agree that less likely to be mesenteric ischemia given improving lactic acidosis and low-dose vasopressor support requirements.  Still may have some third spacing from her small-bowel obstruction.   However, now with bowel movement 1/9/25.    Acute superficial gastritis without hemorrhage  Severe esophagitis present on recent EGD.  Gastroenterology has been consulted who have reviewed the scans in detail, seen the patient on 1/6/25 and agree there may be minor esophageal injury with their recommendations including no further intervention at this time required, continuing antibiotics and no need to perform esophagram.  Appreciate Dr. Lora (gastroenterology) seeing the patient and giving her recommendations.    Other  Advanced care planning/counseling discussion  1/10/25:  I had a very detailed family meeting with the patient's 4 children (2 sons and 2 daughters) at bedside today and explained the course of events and the patient's acuity and critical illness.  We discussed the patient's oozing from the mouth, thrombocytopenia, minor esophageal injury, air around the neck and her altered mental status with no significant neurological improvement.  At this time, we will continue all care and continue to readdress code status as clinically indicated.  I will discuss further care family today we have asked them for a meeting    Thrombocytopenia  No evidence of DIC at this time, with ongoing thrombocytopenia now status post platelet administration with improvement in patient's platelets.  Idiopathic thrombocytopenia this time.  1/11/25-patient with continued improvement thrombocytopenia without additional administration of platelets today  Appreciate Hematology coming to see the patient 1/10/25.  Platelet count currently 30,000 will discuss possible transfusion with family    Subcutaneous air-neck  Repeat CT neck 1/10/25 with resolution of air.  Previously documented events as below       Patient with subcutaneous air in the neck on CT scan that was performed.  There is no evidence of subcutaneous emphysema/crepitus on physical examination.  She does not have elevated peak pressures and no evidence of  significant large pneumomediastinum.  At this time the etiology of the subcutaneous air in her neck are from possible esophageal injury given the patient's underlying severe esophagitis and placement NG tube, injury from her intubation from posterior pharynx and less likely to be pneumomediastinum in the setting of positive airway pressure as she has no evidence of significant mediastinal air in the distal trachea to suggest this as the cause.     Status post packing in her mouth with TXA soaked gauze, with improvement and resolution of her oozing.  Serial chest x-rays without any evidence of pneumothorax, physical examination without evidence of subcutaneous emphysema.  Evaluated the patient bedside with ENT physician Dr. Langston who has recommended continuing clinical monitoring, no intervention at this time needed and no additional imaging of the neck.  Appreciate evaluation by Dr. Langston previously.  Re-evaluated by ENT bedside 1/9/25, General surgery pack patient's mouth.  Oozing has slowed down with platelets administration.  We will defer to ENT for recommendations regarding visual exploration versus other intervention.  We will continue to monitor.    Gastroenterology consultation from 1/6/25 greatly appreciated.  There was concern for possible minor esophageal injury.            Syncope  History of syncope, being worked up with monitoring.  She does have a pacemaker which was interrogated.  Likely cause of her altered mental status at this time is toxic metabolic encephalopathy.             Pantera Alexandra MD  Critical Care Medicine  [facility]

## 2025-01-14 NOTE — ASSESSMENT & PLAN NOTE
No evidence of DIC at this time, with ongoing thrombocytopenia now status post platelet administration with improvement in patient's platelets.  Idiopathic thrombocytopenia this time.  1/11/25-patient with continued improvement thrombocytopenia without additional administration of platelets today  Appreciate Hematology coming to see the patient 1/10/25.  Platelet count currently 30,000 will discuss possible transfusion with family

## 2025-01-14 NOTE — PROGRESS NOTES
[facility]  Adult Nutrition  Follow-up Note         Reason for Assessment  Reason For Assessment: RD follow-up        Assessment and Plan    1/14/2025: RD follow up. Patient remains on trickle feeds of Novasource Renal. Recommend advance towards a goal of 40mL/hour with 35mL/hour free water flush. Advance by 10mL q8h until goal rate is reached. Monitor for tolerance: n/v/c/d. Hold feeding and notify RD if symptoms of intolerance develop. RD following.    1/11/2025: Consult for trickle feeds received and appreciated. Recommend to start Novasource Renal at 10 ml/hr as tolerated. Advance by 10 ml/hr x 12 hours to goal of 40 ml/hr as medically appropriate. RD Following.     Patient admitted 1/2 with a dx of lactic acidosis and assessed for LOS. Patient is currently NPO with no diet noted since admission on 1/2. Current weight 106.5 kg with a BMI of 40.30 which indicates morbid obesity.     Recommend to consider alternate route of nutrition to meet nutritional needs. RD Following.       Learning Needs/Social Determinants of Health    Learning Assessment       01/02/2025 2329 [facility] (1/2/2025 - Present)   Created by Carrillo Tapia, RN - RN (Nurse) Status: Complete                 PRIMARY LEARNER     Primary Learner Name:  Renetta Stewart WS - 01/02/2025 2329    Relationship:  Patient WS - 01/02/2025 2329    Does the primary learner have any barriers to learning?:  Cognitive WS - 01/02/2025 2329    What is the preferred language of the primary learner?:  English WS - 01/02/2025 2329    Is an  required?:  No WS - 01/02/2025 2329    How does the primary learner prefer to learn new concepts?:  Listening WS - 01/02/2025 2329    How often do you need to have someone help you read instructions, pamphlets, or written material from your doctor or pharmacy?:  Sometimes WS - 01/02/2025 2329        CO-LEARNER #1     Co-Learner Name (if applicable):  Renetta Stewart  Jessa  01/02/2025 2329    Relationship:  Patient WS - 01/02/2025 2329    Does the co-learner have any barriers to learning?:  Cognitive WS - 01/02/2025 2329    What is the preferred language of the co-learner?:  English WS - 01/02/2025 2329    Is an  required?:  No WS - 01/02/2025 2329    How does the co-learner prefer to learn new concepts?:  Listening WS - 01/02/2025 2329        CO-LEARNER #2     No question answered        SPECIAL TOPICS     No question answered        ANSWERED BY:     No question answered        Comments         Edit History       Carrillo Tapia, RN - RN (Nurse)   01/02/2025 2329                           Social Drivers of Health     Tobacco Use: Low Risk  (1/2/2025)    Patient History     Smoking Tobacco Use: Never     Smokeless Tobacco Use: Never     Passive Exposure: Never   Recent Concern: Tobacco Use - Medium Risk (10/22/2024)    Received from [facility]    Patient History     Smoking Tobacco Use: Former     Smokeless Tobacco Use: Never     Passive Exposure: Not on file   Alcohol Use: Not At Risk (1/3/2025)    AUDIT-C     Frequency of Alcohol Consumption: Never     Average Number of Drinks: Patient does not drink     Frequency of Binge Drinking: Never   Financial Resource Strain: Low Risk  (1/3/2025)    Overall Financial Resource Strain (CARDIA)     Difficulty of Paying Living Expenses: Not hard at all   Food Insecurity: No Food Insecurity (1/3/2025)    Hunger Vital Sign     Worried About Running Out of Food in the Last Year: Never true     Ran Out of Food in the Last Year: Never true   Transportation Needs: No Transportation Needs (1/3/2025)    TRANSPORTATION NEEDS     Transportation : No   Physical Activity: Inactive (1/3/2025)    Exercise Vital Sign     Days of Exercise per Week: 0 days     Minutes of Exercise per Session: 0 min   Stress: No Stress Concern Present (1/3/2025)    [employer] - Occupational Stress  Questionnaire     Feeling of Stress : Not at all   Housing Stability: Low Risk  (1/3/2025)    Housing Stability Vital Sign     Unable to Pay for Housing in the Last Year: No     Homeless in the Last Year: No   Depression: Low Risk  (12/20/2024)    Depression     Last PHQ-4: Flowsheet Data: 0   Utilities: Not At Risk (1/3/2025)    [employer] Utilities     Threatened with loss of utilities: No   Health Literacy: Adequate Health Literacy (1/3/2025)     Health Literacy     Frequency of need for help with medical instructions: Rarely   Recent Concern: Health Literacy - Inadequate Health Literacy (12/22/2024)     Health Literacy     Frequency of need for help with medical instructions: Always   Social Isolation: Socially Integrated (1/3/2025)    Social Isolation     Social Isolation: 1          Malnutrition  Is Patient Malnourished: No    Nutrition Diagnosis  Inadequate energy intake related to Inadequate Caloric intake as evidenced by NPO status x 8 days  Comments initiate trickle feeds per consult    Recent Labs   Lab 01/14/25  0457 01/14/25  0537   *  --    POCGLU  --  148*     Comments on Glucose: noted    Nutrition Prescription / Recommendations  Recommendation/Intervention: Recommend advance tf towards a goal of 40mL/hour with 35mL/hour free water flush. Advance by 10mL q8h until goal rate is reached.  Goals: diet advancement vs alternate means of nutrition x 72 hours  Nutrition Goal Status: progressing towards goal  Current Diet Order: npo  Chewing or Swallowing Difficulty?: No Chewing or swallowing difficulty  Recommended Diet: Enteral Nutrition  Recommended Oral Supplement: No Oral Supplements  Is Nutrition Support Recommended: yes    Needs Calculated  Energy Need Method: Kcal/kg Energy Calorie Requirements (kcal): 2022-5355  Protein Requirements: 64-85  Enteral Nutrition at goal of novasource renal 40 ml/hr  Enteral Nutrition Formula Provides:  1920 kcals Propofol Rate: No  87 g Protein  176 g  "Carbohydrates  96 g Fat Propofol Rate: No  688 ml Fluid without Flush    0 ml Fluid by flush   688 ml Total Fluid  Enteral Nutrition Recommended Order:  Tube feeding via NG/ McKenzie Sump  Tube feeding formula: Novasource Renal NG/ McKenzie Sump at 40ml/hour  Free Water Flush: 35 ml   Modular Supplements:No Modular Supplements needed  Enteral Nutrition meets needs?: Yes   Enteral Nutrition Status: Continue Enteral Nutrition  Is Nutrition Education Recommended: No    Monitor and Evaluation  % current Intake: Enteral Nutrition progressing to goal  % intake to meet estimated needs: Enteral Nutrition           Current Medical Diagnosis and Past Medical History  Diagnosis: gastrointestinal disease, cardiac disease, renal disease  Past Medical History:   Diagnosis Date    Anemia     Anxiety     Dementia     Depression     GERD (gastroesophageal reflux disease)     Hyperlipidemia     Hypertension     PONV (postoperative nausea and vomiting)     Stroke        Nutrition/Diet History  Food Allergies: NKFA  Factors Affecting Nutritional Intake: NPO, on mechanical ventilation    Lab/Procedures/Meds  Recent Labs   Lab 01/14/25  0457   *   K 2.6*   BUN 64*   CREATININE 4.97*   CALCIUM 7.3*      Dx ARF on CKD  Dx lactic acidosis  Last A1c: No results found for: "HGBA1C"  Lab Results   Component Value Date    RBC 2.61 (L) 01/14/2025    HGB 7.2 (L) 01/14/2025    HCT 21.1 (L) 01/14/2025    MCV 80.8 01/14/2025    MCH 27.6 01/14/2025    MCHC 34.1 01/14/2025    TIBC 142 (L) 12/27/2024     Pertinent Labs Reviewed: reviewed  Pertinent Medications Reviewed: reviewed  Scheduled Meds:   atorvastatin  80 mg Per NG tube Daily    ceFEPime IV (PEDS and ADULTS)  1 g Intravenous Q24H    levothyroxine  50 mcg Per NG tube Before breakfast    pantoprazole  40 mg Intravenous Daily     Continuous Infusions:   fentanyl  0-250 mcg/hr Intravenous Continuous   Stopped at 01/07/25 1216    lactated ringers   Intravenous Continuous   Stopped at 01/09/25 " "1015    NORepinephrine bitartrate-D5W  0-0.2 mcg/kg/min Intravenous Continuous 26.5 mL/hr at 01/14/25 0710 0.08 mcg/kg/min at 01/14/25 0710    propofoL  0-50 mcg/kg/min Intravenous Continuous   Stopped at 01/07/25 1216     PRN Meds:.  Current Facility-Administered Medications:     0.9%  NaCl infusion (for blood administration), , Intravenous, Q24H PRN    0.9% NaCl, , Intra-Catheter, PRN    dextrose 50%, 12.5 g, Intravenous, PRN    dextrose 50%, 12.5 g, Intravenous, PRN    dextrose 50%, 25 g, Intravenous, PRN    glucagon (human recombinant), 1 mg, Intramuscular, PRN    glucose, 16 g, Per NG tube, PRN    glucose, 24 g, Per NG tube, PRN    heparin (porcine), 4,000 Units, Intra-Catheter, PRN    insulin aspart U-100, 0-10 Units, Subcutaneous, Q6H PRN    naloxone, 0.02 mg, Intravenous, PRN    promethazine, 25 mg, Rectal, Q6H PRN    sodium chloride 0.9%, 10 mL, Intravenous, Q12H PRN    Anthropometrics  Height: 5' 4" (162.6 cm)  Height (inches): 64 in  Height Method: Estimated  Weight: 109.8 kg (242 lb 1 oz)  Weight (lb): 242.07 lb  Weight Method: Bed Scale  Ideal Body Weight (IBW), Female: 120 lb  % Ideal Body Weight, Female (lb): 161.67 %  BMI (Calculated): 41.5  BMI Grade: greater than 40 - morbid obesity       Estimated/Assessed Needs    Total Ve: 5.64 L/m Temp: 97.4 °F (36.3 °C)Axillary  Weight Used For Calorie Calculations: 106.5 kg (234 lb 12.6 oz)   Energy Need Method: Kcal/kg Energy Calorie Requirements (kcal): 0460-3228  Weight Used For Protein Calculations: 106.5 kg (234 lb 12.6 oz)  Protein Requirements: 64-85  Estimated Fluid Requirement Method: RDA Method    RDA Method (mL): 1491       Nutrition by Nursing  Diet/Nutrition Received: tube feeding  Intake (%):  (NPO)        Last Bowel Movement: 01/14/25       NG/OG Tube 01/05/25 1033 Right nostril-Feeding Type: continuous, by pump       NG/OG Tube 01/05/25 1033 Right nostril-Current Rate (mL/hr): 10 mL/hr       NG/OG Tube 01/05/25 1033 Right nostril-Goal Rate " (mL/hr): 40 mL/hr       NG/OG Tube 01/05/25 1033 Right nostril-Formula Name: Novasource Renal    Nutrition Follow-Up  RD Follow-up?: Yes      Nutrition Discharge Planning: rd following for dc needs            Available via Secure Chat

## 2025-01-14 NOTE — PLAN OF CARE
Problem: Skin Injury Risk Increased  Goal: Skin Health and Integrity  Outcome: Progressing     Problem: Artificial Airway  Goal: Effective Communication  Outcome: Progressing  Goal: Optimal Device Function  Outcome: Progressing  Goal: Absence of Device-Related Skin or Tissue Injury  Outcome: Progressing     Problem: Mechanical Ventilation Invasive  Goal: Effective Communication  Outcome: Progressing  Goal: Optimal Device Function  Outcome: Progressing  Goal: Mechanical Ventilation Liberation  Outcome: Progressing  Goal: Absence of Device-Related Skin and Tissue Injury  Outcome: Progressing  Goal: Absence of Ventilator-Induced Lung Injury  Outcome: Progressing     Problem: Gas Exchange Impaired  Goal: Optimal Gas Exchange  Outcome: Progressing     Problem: Breathing Pattern Ineffective  Goal: Effective Breathing Pattern  Outcome: Progressing     Problem: Pneumonia  Goal: Effective Oxygenation and Ventilation  Outcome: Progressing     Problem: Airway Clearance Ineffective  Goal: Effective Airway Clearance  Outcome: Progressing

## 2025-01-14 NOTE — ASSESSMENT & PLAN NOTE
1/10/25:  I had a very detailed family meeting with the patient's 4 children (2 sons and 2 daughters) at bedside today and explained the course of events and the patient's acuity and critical illness.  We discussed the patient's oozing from the mouth, thrombocytopenia, minor esophageal injury, air around the neck and her altered mental status with no significant neurological improvement.  At this time, we will continue all care and continue to readdress code status as clinically indicated.  I will discuss further care family today we have asked them for a meeting

## 2025-01-14 NOTE — PLAN OF CARE
Problem: Adult Inpatient Plan of Care  Goal: Absence of Hospital-Acquired Illness or Injury  Outcome: Progressing  Intervention: Prevent Skin Injury  Flowsheets (Taken 1/14/2025 0107)  Body Position:   turned   weight shifting  Skin Protection:   incontinence pads utilized   protective footwear used   silicone foam dressing in place   transparent dressing maintained  Device Skin Pressure Protection:   absorbent pad utilized/changed   adhesive use limited   positioning supports utilized   pressure points protected  Intervention: Prevent and Manage VTE (Venous Thromboembolism) Risk  Flowsheets (Taken 1/14/2025 0107)  VTE Prevention/Management:   remove, assess skin, and reapply sequential compression device   ROM (passive) performed  Intervention: Prevent Infection  Flowsheets (Taken 1/14/2025 0107)  Infection Prevention:   hand hygiene promoted   single patient room provided  Goal: Optimal Comfort and Wellbeing  Outcome: Progressing  Intervention: Monitor Pain and Promote Comfort  Flowsheets (Taken 1/14/2025 0107)  Pain Management Interventions:   care clustered   pillow support provided   position adjusted

## 2025-01-14 NOTE — PROGRESS NOTES
[facility]  Nephrology  Progress Note    Patient Name: Renetta Stewart  MRN: [id number]  Admission Date: 1/2/2025  Hospital Length of Stay: 12 days  Attending Provider: Pantera Alexandra MD   Primary Care Physician: Eldon Govea MD  Principal Problem:Lactic acidosis    Consults  Subjective:     Interval History:  Patient is seen in follow-up of her acute renal failure.  She remains oliguric.  She is ventilator dependent and unresponsive.    Review of patient's allergies indicates:  No Known Allergies  Current Facility-Administered Medications   Medication Frequency    0.9%  NaCl infusion (for blood administration) Q24H PRN    0.9% NaCl infusion PRN    atorvastatin tablet 80 mg Daily    ceFEPIme injection 1 g Q24H    dextrose 50% injection 12.5 g PRN    dextrose 50% injection 12.5 g PRN    dextrose 50% injection 25 g PRN    fentaNYL 2500 mcg in D5W 250 mL infusion premix (titrating) (conc: 10 mcg/mL) Continuous    glucagon (human recombinant) injection 1 mg PRN    glucose chewable tablet 16 g PRN    glucose chewable tablet 24 g PRN    heparin (porcine) injection 4,000 Units PRN    insulin aspart U-100 injection 0-10 Units Q6H PRN    lactated ringers infusion Continuous    levothyroxine tablet 50 mcg Before breakfast    naloxone 0.4 mg/mL injection 0.02 mg PRN    NORepinephrine bitartrate-D5W 4 mg/250 mL (16 mcg/mL) PERIPHERAL access infusion Continuous    pantoprazole injection 40 mg Daily    promethazine suppository 25 mg Q6H PRN    propofol (DIPRIVAN) 10 mg/mL infusion Continuous    sodium chloride 0.9% flush 10 mL Q12H PRN       Objective:     Vital Signs (Most Recent):  Temp: 97.4 °F (36.3 °C) (01/14/25 0715)  Pulse: 60 (01/14/25 0730)  Resp: 14 (01/14/25 0730)  BP: 123/68 (01/14/25 0730)  SpO2: 100 % (01/14/25 0730) Vital Signs (24h Range):  Temp:  [96.4 °F (35.8 °C)-97.4 °F (36.3 °C)] 97.4 °F (36.3 °C)  Pulse:  [57-87] 60  Resp:  [12-18] 14  SpO2:  [99 %-100 %] 100 %  BP:  ()/(42-68) 123/68     Weight: 109.8 kg (242 lb 1 oz) (01/14/25 0310)  Body mass index is 41.55 kg/m².  Body surface area is 2.23 meters squared.    I/O last 3 completed shifts:  In: 1307.6 [I.V.:747.6; NG/GT:560]  Out: 315 [Urine:315]    Physical Exam she has no pupillary light reflex.  There is no appropriate ocular response to head movement.  There is no withdrawal to noxious stimuli.    Significant Labs:sureBMP:   Recent Labs   Lab 01/10/25  0608 01/11/25  0823 01/14/25  0457   GLU 67*   < > 137*      < > 135*   K 3.3*   < > 2.6*      < > 101   CO2 19*   < > 23   BUN 66*   < > 64*   CREATININE 6.13*   < > 4.97*   CALCIUM 6.4*   < > 7.3*   MG 2.0  --   --     < > = values in this interval not displayed.     All labs within the past 24 hours have been reviewed.    Significant Imaging:  Labs: Reviewed    Assessment/Plan:     Active Diagnoses:    Diagnosis Date Noted POA    PRINCIPAL PROBLEM:  Lactic acidosis [E87.20] 01/04/2025 Yes    Esophageal perforation [K22.3] 01/09/2025 No    Advanced care planning/counseling discussion [Z71.89] 01/09/2025 Not Applicable    Thrombocytopenia [D69.6] 01/08/2025 No    Anemia [D64.9] 01/08/2025 No    Left-sided pneumonia [J18.9] 01/08/2025 No    Hard to intubate [T88.4XXA] 01/05/2025 Yes    Small bowel obstruction [K56.609] 01/05/2025 No    Subcutaneous air-neck [T79.7XXA] 01/05/2025 No    Encephalopathy, metabolic [G93.41] 01/04/2025 Yes    Acute renal failure superimposed on stage 3a chronic kidney disease [N17.9, N18.31] 01/04/2025 No    Colitis [K52.9] 01/03/2025 Yes    Neuroendocrine tumor [D3A.8] 12/30/2024 Yes    Esophagitis determined by endoscopy [K20.90] 12/27/2024 Yes    Acute superficial gastritis without hemorrhage [K29.00] 12/27/2024 Yes    Elevated troponin [R79.89] 12/21/2024 Yes    Syncope [R55] 12/21/2024 Yes    Syncope and collapse [R55] 12/20/2024 Yes    Type 2 MI (myocardial infarction) [I21.A1] 12/20/2024 Yes    Stage 3b chronic kidney  disease [N18.32] 03/29/2023 Yes    Complex renal cyst [N28.1] 03/06/2023 Not Applicable    Morbid obesity [E66.01] 04/19/2022 Yes    Essential hypertension [I10] 05/27/2021 Yes      Problems Resolved During this Admission:       She remains critically ill and I think terminal.  She continues to require vasopressors for blood pressure support.  There is no pressing need for dialysis today given her potassium of 2.6 and BUN of 64.    Backing off of aggressive care will need to be considered given her absence of meaningful brain activity.    Thank you for your consult. I will follow-up with patient. Please contact us if you have any additional questions.    Yaron Acuna MD  Nephrology  [facility]

## 2025-01-15 NOTE — SUBJECTIVE & OBJECTIVE
Interval History/Significant Events:  Patient without complaints    Review of Systems  Objective:     Vital Signs (Most Recent):  Temp: 98 °F (36.7 °C) (01/15/25 0344)  Pulse: 65 (01/15/25 0600)  Resp: 14 (01/15/25 0600)  BP: (!) 126/59 (01/15/25 0600)  SpO2: 100 % (01/15/25 0600) Vital Signs (24h Range):  Temp:  [94.2 °F (34.6 °C)-99.2 °F (37.3 °C)] 98 °F (36.7 °C)  Pulse:  [57-82] 65  Resp:  [13-15] 14  SpO2:  [98 %-100 %] 100 %  BP: ()/(30-68) 126/59   Weight: 109.8 kg (242 lb 1 oz)  Body mass index is 41.55 kg/m².      Intake/Output Summary (Last 24 hours) at 1/15/2025 0645  Last data filed at 1/15/2025 0603  Gross per 24 hour   Intake 1524.42 ml   Output 55 ml   Net 1469.42 ml          Physical Exam  Vitals reviewed.   Constitutional:       Appearance: Normal appearance.      Interventions: She is not intubated.  HENT:      Head: Normocephalic and atraumatic.      Nose: Nose normal.      Mouth/Throat:      Mouth: Mucous membranes are dry.      Pharynx: Oropharynx is clear.   Eyes:      Extraocular Movements: Extraocular movements intact.      Conjunctiva/sclera: Conjunctivae normal.      Pupils: Pupils are equal, round, and reactive to light.   Cardiovascular:      Rate and Rhythm: Normal rate.      Heart sounds: Normal heart sounds. No murmur heard.  Pulmonary:      Effort: Pulmonary effort is normal. She is not intubated.      Breath sounds: Normal breath sounds.   Abdominal:      General: Abdomen is flat. Bowel sounds are normal.      Palpations: Abdomen is soft.   Musculoskeletal:         General: Normal range of motion.      Cervical back: Normal range of motion and neck supple.      Right lower leg: No edema.      Left lower leg: No edema.   Skin:     General: Skin is warm and dry.      Capillary Refill: Capillary refill takes less than 2 seconds.   Neurological:      General: No focal deficit present.      Mental Status: She is alert and oriented to person, place, and time.   Psychiatric:          Mood and Affect: Mood normal.         Behavior: Behavior normal.            Vents:  Vent Mode: A/CMV-VC (01/15/25 0547)  Ventilator Initiated: Yes (01/05/25 1015)  Set Rate: 14 BPM (01/15/25 0547)  Vt Set: 420 mL (01/15/25 0547)  Pressure Support: 10 cmH20 (01/07/25 1915)  PEEP/CPAP: 5 cmH20 (01/15/25 0547)  Oxygen Concentration (%): 30 (01/15/25 0547)  Peak Airway Pressure: 25.6 cmH20 (01/15/25 0547)  Plateau Pressure: 20.7 cmH20 (01/15/25 0547)  Total Ve: 5.62 L/m (01/15/25 0547)  F/VT Ratio<105 (RSBI): (!) 34.65 (01/14/25 2345)  Lines/Drains/Airways       Central Venous Catheter Line  Duration             Trialysis (Dialysis) Catheter 01/05/25 1058 external jugular;right internal jugular 9 days              Drain  Duration                  NG/OG Tube 01/05/25 1033 Right nostril 9 days         Urethral Catheter 01/05/25 0900 Silicone 16 Fr. 9 days              Airway  Duration                  Airway - Non-Surgical 01/05/25 1015 9 days              Peripheral Intravenous Line  Duration                  Midline Catheter - Single Lumen 01/05/25 0945 Right basilic vein (medial side of arm) 20g x 10cm 9 days                  Significant Labs:    CBC/Anemia Profile:  Recent Labs   Lab 01/14/25 0457   WBC 4.31*   HGB 7.2*   HCT 21.1*   PLT 30*   MCV 80.8   RDW 17.3*        Chemistries:  Recent Labs   Lab 01/14/25  0457 01/15/25  0428   * 133*   K 2.6* 2.5*    101   CO2 23 21*   BUN 64* 68*   CREATININE 4.97* 5.13*   CALCIUM 7.3* 7.7*       Recent Lab Results  (Last 5 results in the past 24 hours)        01/15/25  0524   01/15/25  0428   01/14/25  2306   01/14/25  1741   01/14/25  1228        Anion Gap   14             BUN   68             BUN/CREAT RATIO   13             Calcium   7.7             Chloride   101             CO2   21             Creatinine   5.13             eGFR   8             Glucose   145             Lactic Acid Level         0.7       POC Glucose 181     142   140         Potassium    2.5  Comment: Critical Result called and verified by verbal readback to: Clyde on 01/15/2025 at 05:35. Reported by [id number].             Sodium   133                                    Significant Imaging:  I have reviewed all pertinent imaging results/findings within the past 24 hours.

## 2025-01-15 NOTE — ASSESSMENT & PLAN NOTE
Outreach attempt was made to schedule a Medicare Wellness Visit. This was the second attempt. Contact was made, no appointment scheduled  Letter sent   Renal service holding dialysis they do not feel she is appropriate candidate due to her neurologic disease in possible brain death

## 2025-01-15 NOTE — ASSESSMENT & PLAN NOTE
1/10/25:  I had a very detailed family meeting with the patient's 4 children (2 sons and 2 daughters) at bedside today and explained the course of events and the patient's acuity and critical illness.  We discussed the patient's oozing from the mouth, thrombocytopenia, minor esophageal injury, air around the neck and her altered mental status with no significant neurological improvement.  At this time, we will continue all care and continue to readdress code status as clinically indicated.  Long discussion please see previous entry in this note

## 2025-01-15 NOTE — PROGRESS NOTES
[facility]  Critical Care Medicine  Progress Note    Patient Name: Renetta Stewart  MRN: [id number]  Admission Date: 1/2/2025  Hospital Length of Stay: 13 days  Code Status: Full Code  Attending Provider: Pantera Alexandra MD  Primary Care Provider: Eldon Govea MD   Principal Problem: Lactic acidosis    Subjective:     HPI:  74-year-old female with past medical history significant for syncope, collapse, worked up for orthostatic hypotension who presented to the ICU on 1/5/25 in the setting of being obtunded and found to have small-bowel obstruction, now intubated and sedated with shock and lactic acidosis.    The patient was admitted to hospital medicine on 1/3/25.  CT abdomen at the time showed evidence of diffuse wall thickening compatible with colitis.  An EEG was performed due to altered mental status 1/13/25 which showed evidence of toxic metabolic encephalopathy.  No evidence of epileptiform findings or electrographic seizures noted.  Of note, she also had an EGD performed on 12/27/24 which showed evidence of esophagitis and a 5 cm hiatal hernia with erythematous mucosa in the fundus of the stomach and antrum.  The mucosa of mid and distal esophagus was reportedly severely inflamed with white plaque present.    She was being worked up for oliguria and lactic acidosis when she was found to be acutely obtunded and nonresponsive on the morning of 1/5/25.      The patient was brought down emergently to the ICU.  At this time, she began acutely decompensating without the ability to protect her airway, saturating on facemask.  She was then emergently intubated bedside.  She prove to be a difficult intubation (did not have any significant episodes of desaturation) however she had a significant amount of vomitus and bile which complicated her intubation, requiring anesthesia attending to perform the intubation at bedside.  We had already consented the family prior to the intubation and a  Trialysis catheter was placed in anticipation of possible dialysis in case her oliguria worsens.        Hospital/ICU Course:  1/6/25-overnight improving lactic acidosis, still persistent.  Echocardiogram with severe concentric hypertrophy, still on low-moderate dose single vasopressor support without evidence of any active oozing from her mouth or pneumothorax on her chest x-ray scans.  Seen by Dr. Langston at bedside on 1/5/25 who does not recommend any significant intervention, agrees with packing and clinical monitoring without any significant acute intervention.    1/7/25-patient with a minor ooze from the mouth overnight, controlled now, hypokalemic this morning status post replenishment of potassium.  Vancomycin discontinuing continuing cefepime, holding chemical DVT prophylaxis and using SCDs at this time in the setting of mild ooze from mouth.  10 cc of urine output overnight.  Gradually downtrending hemoglobin at 7.5 now.  No evidence of crepitus in the neck or subcutaneous emphysema on physical exam.  Reviewed case with Gastroenterology who believe there may be minor esophageal perforation, possibly from NG tube placement with nothing additional to do at this time.    1/8/25-patient with worsening thrombocytopenia no obvious bleeding noted at this time.  We will attempt spontaneous awakening and spontaneous breathing trial again today.  Patient to receive 2 units platelets and 1 unit PRBC today along with potassium per placement.    1/9/25-with minimal ooze from the mouth, pack by surgery at bedside, re-evaluated by Dr. Langston at bedside.  Status post administration of additional platelets with improvement in thrombocytopenia and additional PRBC.  Family updated in detail at bedside.    1/10/25-mild oozing present.  Platelets down to 35, pending additional unit platelets.  Appreciate ENT and surgery coming by and helping with packing of mouth.  No significant neurological response.  Pending CT scan head,  neck and EEG.    1/11/25-improvement in thrombocytopenia without administration of platelets.  Extremely mild oozing with back mouth today.  Appreciate Hematology recommendation.  Pending repeat coagulation studies.  Mild elevation of PT. patient to receive dialysis today.  CT soft tissue neck with interval resolution subcutaneous air and lower neck.  Evidence of left upper lobe pneumonia.  CT head with chronic changes.  EEG indicative of toxic metabolic encephalopathy.    1/12/25-stable hemoglobin at 7.6.  Received additional platelets today after her platelet was at 40, increased to 90.  Mouth continues to remain packed.  Lactate now down to 3.0 from 3.5 earlier this morning.  No improvement in mental status    Interval History/Significant Events:  Patient without complaints    Review of Systems  Objective:     Vital Signs (Most Recent):  Temp: 98 °F (36.7 °C) (01/15/25 0344)  Pulse: 65 (01/15/25 0600)  Resp: 14 (01/15/25 0600)  BP: (!) 126/59 (01/15/25 0600)  SpO2: 100 % (01/15/25 0600) Vital Signs (24h Range):  Temp:  [94.2 °F (34.6 °C)-99.2 °F (37.3 °C)] 98 °F (36.7 °C)  Pulse:  [57-82] 65  Resp:  [13-15] 14  SpO2:  [98 %-100 %] 100 %  BP: ()/(30-68) 126/59   Weight: 109.8 kg (242 lb 1 oz)  Body mass index is 41.55 kg/m².      Intake/Output Summary (Last 24 hours) at 1/15/2025 0645  Last data filed at 1/15/2025 0603  Gross per 24 hour   Intake 1524.42 ml   Output 55 ml   Net 1469.42 ml          Physical Exam  Vitals reviewed.   Constitutional:       Appearance: Normal appearance.      Interventions: She is not intubated.  HENT:      Head: Normocephalic and atraumatic.      Nose: Nose normal.      Mouth/Throat:      Mouth: Mucous membranes are dry.      Pharynx: Oropharynx is clear.   Eyes:      Extraocular Movements: Extraocular movements intact.      Conjunctiva/sclera: Conjunctivae normal.      Pupils: Pupils are equal, round, and reactive to light.   Cardiovascular:      Rate and Rhythm: Normal rate.       Heart sounds: Normal heart sounds. No murmur heard.  Pulmonary:      Effort: Pulmonary effort is normal. She is not intubated.      Breath sounds: Normal breath sounds.   Abdominal:      General: Abdomen is flat. Bowel sounds are normal.      Palpations: Abdomen is soft.   Musculoskeletal:         General: Normal range of motion.      Cervical back: Normal range of motion and neck supple.      Right lower leg: No edema.      Left lower leg: No edema.   Skin:     General: Skin is warm and dry.      Capillary Refill: Capillary refill takes less than 2 seconds.   Neurological:      General: No focal deficit present.      Mental Status: She is alert and oriented to person, place, and time.   Psychiatric:         Mood and Affect: Mood normal.         Behavior: Behavior normal.            Vents:  Vent Mode: A/CMV-VC (01/15/25 0547)  Ventilator Initiated: Yes (01/05/25 1015)  Set Rate: 14 BPM (01/15/25 0547)  Vt Set: 420 mL (01/15/25 0547)  Pressure Support: 10 cmH20 (01/07/25 1915)  PEEP/CPAP: 5 cmH20 (01/15/25 0547)  Oxygen Concentration (%): 30 (01/15/25 0547)  Peak Airway Pressure: 25.6 cmH20 (01/15/25 0547)  Plateau Pressure: 20.7 cmH20 (01/15/25 0547)  Total Ve: 5.62 L/m (01/15/25 0547)  F/VT Ratio<105 (RSBI): (!) 34.65 (01/14/25 2345)  Lines/Drains/Airways       Central Venous Catheter Line  Duration             Trialysis (Dialysis) Catheter 01/05/25 1058 external jugular;right internal jugular 9 days              Drain  Duration                  NG/OG Tube 01/05/25 1033 Right nostril 9 days         Urethral Catheter 01/05/25 0900 Silicone 16 Fr. 9 days              Airway  Duration                  Airway - Non-Surgical 01/05/25 1015 9 days              Peripheral Intravenous Line  Duration                  Midline Catheter - Single Lumen 01/05/25 0945 Right basilic vein (medial side of arm) 20g x 10cm 9 days                  Significant Labs:    CBC/Anemia Profile:  Recent Labs   Lab 01/14/25  0457   WBC  "4.31*   HGB 7.2*   HCT 21.1*   PLT 30*   MCV 80.8   RDW 17.3*        Chemistries:  Recent Labs   Lab 01/14/25  0457 01/15/25  0428   * 133*   K 2.6* 2.5*    101   CO2 23 21*   BUN 64* 68*   CREATININE 4.97* 5.13*   CALCIUM 7.3* 7.7*       Recent Lab Results  (Last 5 results in the past 24 hours)        01/15/25  0524   01/15/25  0428   01/14/25  2306   01/14/25  1741   01/14/25  1228        Anion Gap   14             BUN   68             BUN/CREAT RATIO   13             Calcium   7.7             Chloride   101             CO2   21             Creatinine   5.13             eGFR   8             Glucose   145             Lactic Acid Level         0.7       POC Glucose 181     142   140         Potassium   2.5  Comment: Critical Result called and verified by verbal readback to: Clyde on 01/15/2025 at 05:35. Reported by [id number].             Sodium   133                                    Significant Imaging:  I have reviewed all pertinent imaging results/findings within the past 24 hours.    ABG  No results for input(s): "PH", "PO2", "PCO2", "HCO3", "BE" in the last 168 hours.  Assessment/Plan:     Neuro  Encephalopathy, metabolic  Severe metabolic encephalopathy.  Her altered mental status is likely in the setting of toxic metabolic encephalopathy, as suggested by an EEG done a couple of days ago.  I also took a detailed history from the family and there is no evidence of nuchal rigidity, recent ear infection, very high fevers to suggest meningitis or encephalitis at this time.      Continues to remain intubated and sedated at this time, aggressive spontaneous awakening trial today with similar exam prior to being intubated.  CT head not indicative of any acute abnormality in EEG indicative of toxic metabolic encephalopathy.    Long discussion with eldest this sister who is the decision-making 2 brothers he had another sister.  Discussed the fallen back patient has had no response for close to 7 days has had " no sedation were close to 5 days has no response does not breathe over ventilator only has a corneal reflex.  By my exam on 3 separate days in after discussion with Dr. Acuna renal physician we agree the patient exhibits brain death on exam.  Will seed with cerebral blood flow study today.  No blood flow then will pronounced dead by brain criteria family is aware of this plan.  They agreed to this plan.  Hazard all their questions fully      Pulmonary  Left-sided pneumonia  Opacification on chest x-ray from 1/8/25 showing evidence of possible left lower lobe pleural effusion versus retrocardiac consolidation.  Patient has been on cefepime at this time.  No growth on final cultures from bronchoscopy performed immediately after intubation.  Continued on cefepime at this time.  CT neck with evidence of left upper lobe pneumonia.    Significant improvement on chest x-ray performed 1/12/25 with improved aeration right base as well.  Consider deescalation of antibiotics as clinically appropriate.    Hard to intubate  Patient extremely difficult to intubate by bedside.  Appreciate prompt anesthesia response to help and anesthesia attending was able to intubate the patient with cricoid pressure, with the help of a bougie tube.  The patient's anatomy indicates that she has very anterior in terms of her vocal cords.  In addition, her intubation was very complicated due to a significant amount of vomitus of bile during the intubation.  A prompt bedside bronchoscopy was performed immediately with clearance are of aspirated gastric contents.    Cardiac/Vascular  Elevated troponin  Likely in the setting of her demand ischemia, history of severe concentric hypertrophy.    Renal/  * Lactic acidosis  Resolved    Acute renal failure superimposed on stage 3a chronic kidney disease  Renal service holding dialysis they do not feel she is appropriate candidate due to her neurologic disease in possible brain death    Complex renal  cyst  There is a renal mass seen on the recent CT scan and an ultrasound has been ordered with the following impression below.  Prior CT scan showed evidence of renal cysts.    Impression:     1. Indeterminate isoechoic lesion in the left inferior renal pole measuring 2.2 cm.  Cannot exclude solid mass.  Recommend further evaluation with contrast enhanced CT or MRI renal mass protocol.  2. Right renal simple cyst.       We will attempt contrast-enhanced CT if there is improvement of renal function and/or MRI with clinical improvement instability    Oncology  Anemia  Currently stable at 7.2    Endocrine  Morbid obesity  Complicates all aspects of care    GI  Esophageal perforation  Appreciate gastroenterology recommendations, we will keep NG-tube in at this time to continue keeping patient's stomach decompressed in the setting of recent small-bowel obstruction.  Minor esophageal injury.    Small bowel obstruction  Now resolved.      Small-bowel obstruction partial or complete based on recent CT scan, patient with NG tube in place.  Appreciate surgery consultation, agree that less likely to be mesenteric ischemia given improving lactic acidosis and low-dose vasopressor support requirements.  Still may have some third spacing from her small-bowel obstruction.  However, now with bowel movement 1/9/25.    Acute superficial gastritis without hemorrhage  Severe esophagitis present on recent EGD.  Gastroenterology has been consulted who have reviewed the scans in detail, seen the patient on 1/6/25 and agree there may be minor esophageal injury with their recommendations including no further intervention at this time required, continuing antibiotics and no need to perform esophagram.  Appreciate Dr. Lora (gastroenterology) seeing the patient and giving her recommendations.    Other  Advanced care planning/counseling discussion  1/10/25:  I had a very detailed family meeting with the patient's 4 children (2 sons and 2  liat) at bedside today and explained the course of events and the patient's acuity and critical illness.  We discussed the patient's oozing from the mouth, thrombocytopenia, minor esophageal injury, air around the neck and her altered mental status with no significant neurological improvement.  At this time, we will continue all care and continue to readdress code status as clinically indicated.  Long discussion please see previous entry in this note    Thrombocytopenia  No evidence of DIC at this time, with ongoing thrombocytopenia now status post platelet administration with improvement in patient's platelets.  Idiopathic thrombocytopenia this time.  1/11/25-patient with continued improvement thrombocytopenia without additional administration of platelets today  Appreciate Hematology coming to see the patient 1/10/25.  Family wants to hold on transfusions at this time CBC pending    Subcutaneous air-neck  Repeat CT neck 1/10/25 with resolution of air.  Previously documented events as below       Patient with subcutaneous air in the neck on CT scan that was performed.  There is no evidence of subcutaneous emphysema/crepitus on physical examination.  She does not have elevated peak pressures and no evidence of significant large pneumomediastinum.  At this time the etiology of the subcutaneous air in her neck are from possible esophageal injury given the patient's underlying severe esophagitis and placement NG tube, injury from her intubation from posterior pharynx and less likely to be pneumomediastinum in the setting of positive airway pressure as she has no evidence of significant mediastinal air in the distal trachea to suggest this as the cause.     Status post packing in her mouth with TXA soaked gauze, with improvement and resolution of her oozing.  Serial chest x-rays without any evidence of pneumothorax, physical examination without evidence of subcutaneous emphysema.  Evaluated the patient bedside  with ENT physician Dr. Langston who has recommended continuing clinical monitoring, no intervention at this time needed and no additional imaging of the neck.  Appreciate evaluation by Dr. Langston previously.  Re-evaluated by ENT bedside 1/9/25, General surgery pack patient's mouth.  Oozing has slowed down with platelets administration.  We will defer to ENT for recommendations regarding visual exploration versus other intervention.  We will continue to monitor.    Gastroenterology consultation from 1/6/25 greatly appreciated.  There was concern for possible minor esophageal injury.            Syncope  History of syncope, being worked up with monitoring.  She does have a pacemaker which was interrogated.  Likely cause of her altered mental status at this time is toxic metabolic encephalopathy.             Pantera Alexandra MD  Critical Care Medicine  [facility]

## 2025-01-15 NOTE — PROGRESS NOTES
[facility]  Nephrology  Progress Note    Patient Name: Renetta Stewart  MRN: [id number]  Admission Date: 1/2/2025  Hospital Length of Stay: 13 days  Attending Provider: Pantera Alexandra MD   Primary Care Physician: Eldon Govea MD  Principal Problem:Lactic acidosis    Consults  Subjective:     Interval History:  Patient is seen in follow-up of her acute renal failure.  She remains unchanged.  She is oliguric and her potassium is 2.5 despite the oliguria.  Her BUN is 68 and creatinine 5.1.  Hematocrit is 21 and platelets are only 10,000.      Blood flow study demonstrates cerebral blood flow but there is no evidence of brain activity at the bedside and she is completely off sedation and has been for 5 days        Review of patient's allergies indicates:  No Known Allergies  Current Facility-Administered Medications   Medication Frequency    0.9%  NaCl infusion (for blood administration) Q24H PRN    0.9%  NaCl infusion (for blood administration) Q24H PRN    0.9%  NaCl infusion (for blood administration) Q24H PRN    0.9% NaCl infusion PRN    atorvastatin tablet 80 mg Daily    dextrose 50% injection 12.5 g PRN    dextrose 50% injection 12.5 g PRN    dextrose 50% injection 25 g PRN    glucagon (human recombinant) injection 1 mg PRN    glucose chewable tablet 16 g PRN    glucose chewable tablet 24 g PRN    heparin (porcine) injection 4,000 Units PRN    insulin aspart U-100 injection 0-10 Units Q6H PRN    levothyroxine tablet 50 mcg Before breakfast    naloxone 0.4 mg/mL injection 0.02 mg PRN    NORepinephrine bitartrate-D5W 4 mg/250 mL (16 mcg/mL) PERIPHERAL access infusion Continuous    pantoprazole injection 40 mg Daily    potassium chloride 10 mEq in 100 mL IVPB Q1H    promethazine suppository 25 mg Q6H PRN    sodium chloride 0.9% flush 10 mL Q12H PRN       Objective:     Vital Signs (Most Recent):  Temp: 97.5 °F (36.4 °C) (01/15/25 1130)  Pulse: 86 (01/15/25 1130)  Resp: 16 (01/15/25  1130)  BP: (!) 84/44 (01/15/25 1130)  SpO2: 97 % (01/15/25 1130) Vital Signs (24h Range):  Temp:  [94.4 °F (34.7 °C)-99.2 °F (37.3 °C)] 97.5 °F (36.4 °C)  Pulse:  [59-86] 86  Resp:  [13-16] 16  SpO2:  [97 %-100 %] 97 %  BP: ()/(36-60) 84/44     Weight: 109.8 kg (242 lb 1 oz) (01/14/25 0310)  Body mass index is 41.55 kg/m².  Body surface area is 2.23 meters squared.    I/O last 3 completed shifts:  In: 2146.7 [I.V.:911.7; NG/GT:1235]  Out: 95 [Urine:95]    Physical Exam some peripheral edema.  Oxygenating well.  She is completely unresponsive with no pupillary light reflex and no response to noxious stimuli.    Significant Labs:sureBMP:   Recent Labs   Lab 01/10/25  0608 01/11/25  0823 01/15/25  0428   GLU 67*   < > 145*      < > 133*   K 3.3*   < > 2.5*      < > 101   CO2 19*   < > 21*   BUN 66*   < > 68*   CREATININE 6.13*   < > 5.13*   CALCIUM 6.4*   < > 7.7*   MG 2.0  --   --     < > = values in this interval not displayed.     CBC:   Recent Labs   Lab 01/15/25  0428   WBC 4.55   RBC 2.56*   HGB 6.9*   HCT 21.2*   PLT 10*   MCV 82.8   MCH 27.0   MCHC 32.5     All labs within the past 24 hours have been reviewed.    Significant Imaging:  Labs: Reviewed    Assessment/Plan:     Active Diagnoses:    Diagnosis Date Noted POA    PRINCIPAL PROBLEM:  Lactic acidosis [E87.20] 01/04/2025 Yes    Esophageal perforation [K22.3] 01/09/2025 No    Advanced care planning/counseling discussion [Z71.89] 01/09/2025 Not Applicable    Thrombocytopenia [D69.6] 01/08/2025 No    Anemia [D64.9] 01/08/2025 No    Left-sided pneumonia [J18.9] 01/08/2025 No    Hard to intubate [T88.4XXA] 01/05/2025 Yes    Small bowel obstruction [K56.609] 01/05/2025 No    Subcutaneous air-neck [T79.7XXA] 01/05/2025 No    Encephalopathy, metabolic [G93.41] 01/04/2025 Yes    Acute renal failure superimposed on stage 3a chronic kidney disease [N17.9, N18.31] 01/04/2025 No    Colitis [K52.9] 01/03/2025 Yes    Neuroendocrine tumor [D3A.8]  12/30/2024 Yes    Esophagitis determined by endoscopy [K20.90] 12/27/2024 Yes    Acute superficial gastritis without hemorrhage [K29.00] 12/27/2024 Yes    Elevated troponin [R79.89] 12/21/2024 Yes    Syncope [R55] 12/21/2024 Yes    Syncope and collapse [R55] 12/20/2024 Yes    Type 2 MI (myocardial infarction) [I21.A1] 12/20/2024 Yes    Stage 3b chronic kidney disease [N18.32] 03/29/2023 Yes    Complex renal cyst [N28.1] 03/06/2023 Not Applicable    Morbid obesity [E66.01] 04/19/2022 Yes    Essential hypertension [I10] 05/27/2021 Yes      Problems Resolved During this Admission:       Discussed with Dr. Alexandra yesterday.  With her absence of brain activity at bedside and no gross pulmonary edema, etc. we plan no further dialysis.  [place] is [place].    Thank you for your consult. I will follow-up with patient. Please contact us if you have any additional questions.    Yaron Acuna MD  Nephrology  [facility]

## 2025-01-15 NOTE — PLAN OF CARE
Problem: Acute Kidney Injury/Impairment  Goal: Fluid and Electrolyte Balance  Outcome: Not Progressing  Goal: Improved Oral Intake  Outcome: Not Progressing  Goal: Effective Renal Function  Outcome: Not Progressing     Problem: Infection  Goal: Absence of Infection Signs and Symptoms  Outcome: Not Progressing     Problem: Artificial Airway  Goal: Effective Communication  Outcome: Not Progressing  Goal: Optimal Device Function  Outcome: Not Progressing  Goal: Absence of Device-Related Skin or Tissue Injury  Outcome: Not Progressing     Problem: Mechanical Ventilation Invasive  Goal: Effective Communication  Outcome: Not Progressing  Goal: Optimal Device Function  Outcome: Not Progressing  Goal: Mechanical Ventilation Liberation  Outcome: Not Progressing  Goal: Optimal Nutrition Delivery  Outcome: Not Progressing  Goal: Absence of Device-Related Skin and Tissue Injury  Outcome: Not Progressing  Goal: Absence of Ventilator-Induced Lung Injury  Outcome: Not Progressing     Problem: Gas Exchange Impaired  Goal: Optimal Gas Exchange  Outcome: Not Progressing     Problem: Breathing Pattern Ineffective  Goal: Effective Breathing Pattern  Outcome: Not Progressing     Problem: Airway Clearance Ineffective  Goal: Effective Airway Clearance  Outcome: Not Progressing

## 2025-01-15 NOTE — PLAN OF CARE
Problem: Adult Inpatient Plan of Care  Goal: Plan of Care Review  Outcome: Progressing     Problem: Artificial Airway  Goal: Effective Communication  Outcome: Progressing     Problem: Mechanical Ventilation Invasive  Goal: Effective Communication  Outcome: Progressing     Problem: Gas Exchange Impaired  Goal: Optimal Gas Exchange  Outcome: Progressing     Problem: Breathing Pattern Ineffective  Goal: Effective Breathing Pattern  Outcome: Progressing     Problem: Pneumonia  Goal: Fluid Balance  Outcome: Progressing     Problem: Airway Clearance Ineffective  Goal: Effective Airway Clearance  Outcome: Progressing

## 2025-01-15 NOTE — PLAN OF CARE
Problem: Adult Inpatient Plan of Care  Goal: Absence of Hospital-Acquired Illness or Injury  Outcome: Progressing  Intervention: Prevent Skin Injury  Flowsheets (Taken 1/15/2025 0341)  Body Position:   turned   right   heels elevated   log-rolled  Skin Protection:   incontinence pads utilized   silicone foam dressing in place   protective footwear used  Device Skin Pressure Protection:   absorbent pad utilized/changed   adhesive use limited   positioning supports utilized   pressure points protected  Intervention: Prevent and Manage VTE (Venous Thromboembolism) Risk  Flowsheets (Taken 1/15/2025 0341)  VTE Prevention/Management:   remove, assess skin, and reapply sequential compression device   ROM (passive) performed  Goal: Optimal Comfort and Wellbeing  Outcome: Progressing  Intervention: Monitor Pain and Promote Comfort  Flowsheets (Taken 1/15/2025 0341)  Pain Management Interventions:   care clustered   pillow support provided   position adjusted   quiet environment facilitated

## 2025-01-15 NOTE — ASSESSMENT & PLAN NOTE
Severe metabolic encephalopathy.  Her altered mental status is likely in the setting of toxic metabolic encephalopathy, as suggested by an EEG done a couple of days ago.  I also took a detailed history from the family and there is no evidence of nuchal rigidity, recent ear infection, very high fevers to suggest meningitis or encephalitis at this time.      Continues to remain intubated and sedated at this time, aggressive spontaneous awakening trial today with similar exam prior to being intubated.  CT head not indicative of any acute abnormality in EEG indicative of toxic metabolic encephalopathy.    Long discussion with eldest this sister who is the decision-making 2 brothers he had another sister.  Discussed the fallen back patient has had no response for close to 7 days has had no sedation were close to 5 days has no response does not breathe over ventilator only has a corneal reflex.  By my exam on 3 separate days in after discussion with Dr. Acuna renal physician we agree the patient exhibits brain death on exam.  Will seed with cerebral blood flow study today.  No blood flow then will pronounced dead by brain criteria family is aware of this plan.  They agreed to this plan.  Hazard all their questions fully

## 2025-01-16 NOTE — PROGRESS NOTES
[facility]  Adult Nutrition  Follow-up Note         Reason for Assessment  Reason For Assessment: RD follow-up        Assessment and Plan    1/16/2025: RD follow up. Feeds have been held since 1/15 and patient continues to have high residuals. Family is considering withdrawal of care. Decision to d/c tube feeding at this time due to high residuals. RD available as needed.     1/14/2025: RD follow up. Patient remains on trickle feeds of Novasource Renal. Recommend advance towards a goal of 40mL/hour with 35mL/hour free water flush. Advance by 10mL q8h until goal rate is reached. Monitor for tolerance: n/v/c/d. Hold feeding and notify RD if symptoms of intolerance develop. RD following.    1/11/2025: Consult for trickle feeds received and appreciated. Recommend to start Novasource Renal at 10 ml/hr as tolerated. Advance by 10 ml/hr x 12 hours to goal of 40 ml/hr as medically appropriate. RD Following.     Patient admitted 1/2 with a dx of lactic acidosis and assessed for LOS. Patient is currently NPO with no diet noted since admission on 1/2. Current weight 106.5 kg with a BMI of 40.30 which indicates morbid obesity.     Recommend to consider alternate route of nutrition to meet nutritional needs. RD Following.       Learning Needs/Social Determinants of Health    Learning Assessment       01/02/2025 2329 [facility] (1/2/2025 - Present)   Created by Carrillo Tapia, RN - RN (Nurse) Status: Complete                 PRIMARY LEARNER     Primary Learner Name:  Renetta Stewart WS - 01/02/2025 2329    Relationship:  Patient WS - 01/02/2025 2329    Does the primary learner have any barriers to learning?:  Cognitive WS - 01/02/2025 2329    What is the preferred language of the primary learner?:  English WS - 01/02/2025 2329    Is an  required?:  No WS - 01/02/2025 2329    How does the primary learner prefer to learn new concepts?:  Listening WS - 01/02/2025 2329    How  often do you need to have someone help you read instructions, pamphlets, or written material from your doctor or pharmacy?:  Sometimes WS - 01/02/2025 2329        CO-LEARNER #1     Co-Learner Name (if applicable):  Renetta Stewart WS - 01/02/2025 2329    Relationship:  Patient WS - 01/02/2025 2329    Does the co-learner have any barriers to learning?:  Cognitive WS - 01/02/2025 2329    What is the preferred language of the co-learner?:  English WS - 01/02/2025 2329    Is an  required?:  No WS - 01/02/2025 2329    How does the co-learner prefer to learn new concepts?:  Listening WS - 01/02/2025 2329        CO-LEARNER #2     No question answered        SPECIAL TOPICS     No question answered        ANSWERED BY:     No question answered        Comments         Edit History       Carrillo Tapia, RN - RN (Nurse)   01/02/2025 2329                           Social Drivers of Health     Tobacco Use: Low Risk  (1/2/2025)    Patient History     Smoking Tobacco Use: Never     Smokeless Tobacco Use: Never     Passive Exposure: Never   Recent Concern: Tobacco Use - Medium Risk (10/22/2024)    Received [facility]    Patient History     Smoking Tobacco Use: Former     Smokeless Tobacco Use: Never     Passive Exposure: Not on file   Alcohol Use: Not At Risk (1/3/2025)    AUDIT-C     Frequency of Alcohol Consumption: Never     Average Number of Drinks: Patient does not drink     Frequency of Binge Drinking: Never   Financial Resource Strain: Low Risk  (1/3/2025)    Overall Financial Resource Strain (CARDIA)     Difficulty of Paying Living Expenses: Not hard at all   Food Insecurity: No Food Insecurity (1/3/2025)    Hunger Vital Sign     Worried About Running Out of Food in the Last Year: Never true     Ran Out of Food in the Last Year: Never true   Transportation Needs: No Transportation Needs (1/3/2025)    TRANSPORTATION NEEDS     Transportation : No   Physical Activity: Inactive  (1/3/2025)    Exercise Vital Sign     Days of Exercise per Week: 0 days     Minutes of Exercise per Session: 0 min   Stress: No Stress Concern Present (1/3/2025)    [employer] - Occupational Stress Questionnaire     Feeling of Stress : Not at all   Housing Stability: Low Risk  (1/3/2025)    Housing Stability Vital Sign     Unable to Pay for Housing in the Last Year: No     Homeless in the Last Year: No   Depression: Low Risk  (12/20/2024)    Depression     Last PHQ-4: Flowsheet Data: 0   Utilities: Not At Risk (1/3/2025)    [facility] Utilities     Threatened with loss of utilities: No   Health Literacy: Adequate Health Literacy (1/3/2025)     Health Literacy     Frequency of need for help with medical instructions: Rarely   Recent Concern: Health Literacy - Inadequate Health Literacy (12/22/2024)     Health Literacy     Frequency of need for help with medical instructions: Always   Social Isolation: Socially Integrated (1/3/2025)    Social Isolation     Social Isolation: 1          Malnutrition  Is Patient Malnourished: No    Nutrition Diagnosis  No Nutritional Diagnosis at this time   Recent Labs   Lab 01/16/25  0338 01/16/25  0535     --    POCGLU  --  115*     Comments on Glucose: noted    Nutrition Prescription / Recommendations  Recommendation/Intervention: Recommend advance tf towards a goal of 40mL/hour with 35mL/hour free water flush. Advance by 10mL q8h until goal rate is reached.  Goals: diet advancement vs alternate means of nutrition x 72 hours  Nutrition Goal Status: progressing towards goal  Current Diet Order: npo  Chewing or Swallowing Difficulty?: No Chewing or swallowing difficulty  Recommended Diet: NPO  Recommended Oral Supplement: No Oral Supplements  Is Nutrition Support Recommended: No  Is Nutrition Education Recommended: No    Monitor and Evaluation  % current Intake: Enteral Nutrition is on Hold  % intake to meet estimated needs: Enteral Nutrition     "       Current Medical Diagnosis and Past Medical History  Diagnosis: gastrointestinal disease, cardiac disease, renal disease  Past Medical History:   Diagnosis Date    Anemia     Anxiety     Dementia     Depression     GERD (gastroesophageal reflux disease)     Hyperlipidemia     Hypertension     PONV (postoperative nausea and vomiting)     Stroke        Nutrition/Diet History  Food Allergies: NKFA  Factors Affecting Nutritional Intake: NPO, on mechanical ventilation    Lab/Procedures/Meds  Recent Labs   Lab 01/16/25  0338   *   K 2.5*   BUN 73*   CREATININE 5.04*   CALCIUM 7.6*      Dx ARF on CKD  Dx lactic acidosis  Last A1c: No results found for: "HGBA1C"  Lab Results   Component Value Date    RBC 2.20 (L) 01/16/2025    HGB 6.5 (L) 01/16/2025    HCT 19.1 (L) 01/16/2025    MCV 86.8 01/16/2025    MCH 29.5 01/16/2025    MCHC 34.0 01/16/2025    TIBC 142 (L) 12/27/2024     Pertinent Labs Reviewed: reviewed  Pertinent Medications Reviewed: reviewed  Scheduled Meds:   atorvastatin  80 mg Per NG tube Daily    levothyroxine  50 mcg Per NG tube Before breakfast    pantoprazole  40 mg Intravenous Daily     Continuous Infusions:   NORepinephrine bitartrate-D5W  0-0.2 mcg/kg/min Intravenous Continuous 13.2 mL/hr at 01/16/25 1101 0.04 mcg/kg/min at 01/16/25 1101     PRN Meds:.  Current Facility-Administered Medications:     0.9%  NaCl infusion (for blood administration), , Intravenous, Q24H PRN    0.9% NaCl, , Intra-Catheter, PRN    dextrose 50%, 12.5 g, Intravenous, PRN    dextrose 50%, 12.5 g, Intravenous, PRN    dextrose 50%, 25 g, Intravenous, PRN    glucagon (human recombinant), 1 mg, Intramuscular, PRN    glucose, 16 g, Per NG tube, PRN    glucose, 24 g, Per NG tube, PRN    heparin (porcine), 4,000 Units, Intra-Catheter, PRN    insulin aspart U-100, 0-10 Units, Subcutaneous, Q6H PRN    naloxone, 0.02 mg, Intravenous, PRN    promethazine, 25 mg, Rectal, Q6H PRN    sodium chloride 0.9%, 10 mL, Intravenous, " "Q12H PRN    Anthropometrics  Height: 5' 4" (162.6 cm)  Height (inches): 64 in  Height Method: Estimated  Weight: 112.1 kg (247 lb 2.2 oz)  Weight (lb): 247.14 lb  Weight Method: Bed Scale  Ideal Body Weight (IBW), Female: 120 lb  % Ideal Body Weight, Female (lb): 161.67 %  BMI (Calculated): 42.4  BMI Grade: greater than 40 - morbid obesity       Estimated/Assessed Needs    Total Ve: 5.2 L/m Temp: 97.4 °F (36.3 °C)Oral  Weight Used For Calorie Calculations: 106.5 kg (234 lb 12.6 oz)   Energy Need Method: Kcal/kg Energy Calorie Requirements (kcal): 9274-8277  Weight Used For Protein Calculations: 106.5 kg (234 lb 12.6 oz)  Protein Requirements: 64-85  Estimated Fluid Requirement Method: RDA Method    RDA Method (mL): 1491       Nutrition by Nursing  Diet/Nutrition Received: NPO  Intake (%):  (NPO)        Last Bowel Movement: 01/14/25       NG/OG Tube 01/05/25 1033 Right nostril-Feeding Type: by pump       NG/OG Tube 01/05/25 1033 Right nostril-Current Rate (mL/hr): 0 mL/hr       NG/OG Tube 01/05/25 1033 Right nostril-Goal Rate (mL/hr): 40 mL/hr       NG/OG Tube 01/05/25 1033 Right nostril-Formula Name: Novasource Renal    Nutrition Follow-Up  RD Follow-up?: Yes      Nutrition Discharge Planning: rd following for dc needs            Available via Secure Chat  "

## 2025-01-16 NOTE — PLAN OF CARE
Problem: Adult Inpatient Plan of Care  Goal: Plan of Care Review  1/16/2025 0813 by Rosa Squires RN  Outcome: Not Progressing  1/16/2025 0813 by Rosa Squires RN  Outcome: Progressing  Goal: Patient-Specific Goal (Individualized)  1/16/2025 0813 by Rosa Squires RN  Outcome: Not Progressing  1/16/2025 0813 by Rosa Squires RN  Outcome: Progressing  Goal: Absence of Hospital-Acquired Illness or Injury  1/16/2025 0813 by Rosa Squires RN  Outcome: Not Progressing  1/16/2025 0813 by Rosa Squires RN  Outcome: Progressing  Goal: Optimal Comfort and Wellbeing  1/16/2025 0813 by Rosa Squires RN  Outcome: Not Progressing  1/16/2025 0813 by Rosa Squires RN  Outcome: Progressing  Goal: Readiness for Transition of Care  1/16/2025 0813 by Rosa Squires RN  Outcome: Not Progressing  1/16/2025 0813 by Rosa Squires RN  Outcome: Progressing       Problem: Artificial Airway  Goal: Effective Communication  1/16/2025 0813 by Rosa Squires RN  Outcome: Not Progressing  1/16/2025 0813 by Rosa Squires RN  Outcome: Progressing  Goal: Optimal Device Function  1/16/2025 0813 by Rosa Squires RN  Outcome: Not Progressing  1/16/2025 0813 by Rosa Squires RN  Outcome: Progressing  Goal: Absence of Device-Related Skin or Tissue Injury  1/16/2025 0813 by Rosa Squires RN  Outcome: Not Progressing  1/16/2025 0813 by Rosa Squires RN  Outcome: Progressing     Problem: Mechanical Ventilation Invasive  Goal: Effective Communication  1/16/2025 0813 by Rosa Squires RN  Outcome: Not Progressing  1/16/2025 0813 by Rosa Squires RN  Outcome: Progressing  Goal: Optimal Device Function  1/16/2025 0813 by Rosa Squires RN  Outcome: Not Progressing  1/16/2025 0813 by Rosa Squires RN  Outcome: Progressing  Goal: Mechanical Ventilation Liberation  1/16/2025 0813 by Rosa Squires RN  Outcome: Not Progressing  1/16/2025 0813 by  Rosa Squires RN  Outcome: Progressing  Goal: Optimal Nutrition Delivery  1/16/2025 0813 by Rosa Squires RN  Outcome: Not Progressing  1/16/2025 0813 by Rosa Squires RN  Outcome: Progressing  Goal: Absence of Device-Related Skin and Tissue Injury  1/16/2025 0813 by Rosa Squires RN  Outcome: Not Progressing  1/16/2025 0813 by Rosa Squires RN  Outcome: Progressing  Goal: Absence of Ventilator-Induced Lung Injury  1/16/2025 0813 by Rosa Squires RN  Outcome: Not Progressing  1/16/2025 0813 by Rosa Squires RN  Outcome: Progressing

## 2025-01-16 NOTE — PROGRESS NOTES
[facility]  Nephrology  Progress Note    Patient Name: Renetta Stewart  MRN: [id number]  Admission Date: 1/2/2025  Hospital Length of Stay: 14 days  Attending Provider: Pantera Alexandra MD   Primary Care Physician: Eldon Govea MD  Principal Problem:Lactic acidosis    Consults  Subjective:     Interval History:  Patient is seen in follow-up of her acute renal failure.  She remains without evident neurologic activity on the ventilator.  Blood pressure on pressors is 120 systolic.    Her potassium is 2.5 and is being supplemented.  Creatinine is 5 and BUN is 73.           Review of patient's allergies indicates:  No Known Allergies  Current Facility-Administered Medications   Medication Frequency    0.9%  NaCl infusion (for blood administration) Q24H PRN    0.9% NaCl infusion PRN    atorvastatin tablet 80 mg Daily    dextrose 50% injection 12.5 g PRN    dextrose 50% injection 12.5 g PRN    dextrose 50% injection 25 g PRN    glucagon (human recombinant) injection 1 mg PRN    glucose chewable tablet 16 g PRN    glucose chewable tablet 24 g PRN    heparin (porcine) injection 4,000 Units PRN    insulin aspart U-100 injection 0-10 Units Q6H PRN    levothyroxine tablet 50 mcg Before breakfast    naloxone 0.4 mg/mL injection 0.02 mg PRN    NORepinephrine bitartrate-D5W 4 mg/250 mL (16 mcg/mL) PERIPHERAL access infusion Continuous    pantoprazole injection 40 mg Daily    promethazine suppository 25 mg Q6H PRN    sodium chloride 0.9% flush 10 mL Q12H PRN       Objective:     Vital Signs (Most Recent):  Temp: 97.4 °F (36.3 °C) (01/16/25 1101)  Pulse: (!) 59 (01/16/25 1145)  Resp: 14 (01/16/25 1145)  BP: (!) 151/60 (01/16/25 1145)  SpO2: 100 % (01/16/25 1145) Vital Signs (24h Range):  Temp:  [96.6 °F (35.9 °C)-98.9 °F (37.2 °C)] 97.4 °F (36.3 °C)  Pulse:  [59-80] 59  Resp:  [14-15] 14  SpO2:  [99 %-100 %] 100 %  BP: ()/(39-66) 151/60     Weight: 112.1 kg (247 lb 2.2 oz) (01/16/25 0330)  Body  mass index is 42.42 kg/m².  Body surface area is 2.25 meters squared.    I/O last 3 completed shifts:  In: 5763.2 [I.V.:2280.4; Blood:2852.1; NG/GT:445; IV Piggyback:185.6]  Out: 95 [Urine:95]    Physical Exam she has some peripheral edema.  Her chest is clear.  She is not reacting to any stimuli.  Pupils are unreactive    Significant Labs:sureBMP:   Recent Labs   Lab 01/10/25  0608 01/11/25  0823 01/16/25  0338   GLU 67*   < > 111      < > 132*   K 3.3*   < > 2.5*      < > 101   CO2 19*   < > 21*   BUN 66*   < > 73*   CREATININE 6.13*   < > 5.04*   CALCIUM 6.4*   < > 7.6*   MG 2.0  --   --     < > = values in this interval not displayed.     CBC:   Recent Labs   Lab 01/16/25  0338   WBC 4.29*   RBC 2.20*   HGB 6.5*   HCT 19.1*   PLT 64*   MCV 86.8   MCH 29.5   MCHC 34.0     All labs within the past 24 hours have been reviewed.    Significant Imaging:  Labs: Reviewed    Assessment/Plan:     Active Diagnoses:    Diagnosis Date Noted POA    PRINCIPAL PROBLEM:  Lactic acidosis [E87.20] 01/04/2025 Yes    Esophageal perforation [K22.3] 01/09/2025 No    Advanced care planning/counseling discussion [Z71.89] 01/09/2025 Not Applicable    Thrombocytopenia [D69.6] 01/08/2025 No    Anemia [D64.9] 01/08/2025 No    Left-sided pneumonia [J18.9] 01/08/2025 No    Hard to intubate [T88.4XXA] 01/05/2025 Yes    Small bowel obstruction [K56.609] 01/05/2025 No    Subcutaneous air-neck [T79.7XXA] 01/05/2025 No    Encephalopathy, metabolic [G93.41] 01/04/2025 Yes    Acute renal failure superimposed on stage 3a chronic kidney disease [N17.9, N18.31] 01/04/2025 No    Colitis [K52.9] 01/03/2025 Yes    Neuroendocrine tumor [D3A.8] 12/30/2024 Yes    Esophagitis determined by endoscopy [K20.90] 12/27/2024 Yes    Acute superficial gastritis without hemorrhage [K29.00] 12/27/2024 Yes    Elevated troponin [R79.89] 12/21/2024 Yes    Syncope [R55] 12/21/2024 Yes    Syncope and collapse [R55] 12/20/2024 Yes    Type 2 MI (myocardial  infarction) [I21.A1] 12/20/2024 Yes    Stage 3b chronic kidney disease [N18.32] 03/29/2023 Yes    Complex renal cyst [N28.1] 03/06/2023 Not Applicable    Morbid obesity [E66.01] 04/19/2022 Yes    Essential hypertension [I10] 05/27/2021 Yes      Problems Resolved During this Admission:       I agree with DNR.  We will sign off for now.  I concur that aggressive care  will not result improve her situation.    Thank you for your consult. I will follow-up with patient. Please contact us if you have any additional questions.    Yaron Acuna MD  Nephrology  [facility]

## 2025-01-16 NOTE — SUBJECTIVE & OBJECTIVE
Interval History/Significant Events:  Patient without complaints    Review of Systems  Objective:     Vital Signs (Most Recent):  Temp: 96.6 °F (35.9 °C) (01/16/25 0330)  Pulse: 66 (01/16/25 0445)  Resp: 14 (01/16/25 0445)  BP: 135/60 (01/16/25 0445)  SpO2: 100 % (01/16/25 0445) Vital Signs (24h Range):  Temp:  [96.6 °F (35.9 °C)-98.9 °F (37.2 °C)] 96.6 °F (35.9 °C)  Pulse:  [59-86] 66  Resp:  [14-16] 14  SpO2:  [97 %-100 %] 100 %  BP: ()/(36-66) 135/60   Weight: 112.1 kg (247 lb 2.2 oz)  Body mass index is 42.42 kg/m².      Intake/Output Summary (Last 24 hours) at 1/16/2025 0700  Last data filed at 1/16/2025 0635  Gross per 24 hour   Intake 5000.18 ml   Output 60 ml   Net 4940.18 ml          Physical Exam  Vitals reviewed.   Constitutional:       Interventions: She is not intubated.  HENT:      Head: Normocephalic and atraumatic.      Nose: Nose normal.      Mouth/Throat:      Mouth: Mucous membranes are dry.      Pharynx: Oropharynx is clear.   Eyes:      Extraocular Movements: Extraocular movements intact.      Conjunctiva/sclera: Conjunctivae normal.      Pupils: Pupils are equal, round, and reactive to light.   Cardiovascular:      Rate and Rhythm: Normal rate.      Heart sounds: Normal heart sounds. No murmur heard.  Pulmonary:      Effort: Pulmonary effort is normal. She is not intubated.      Breath sounds: Normal breath sounds.   Abdominal:      General: Abdomen is flat. Bowel sounds are normal.      Palpations: Abdomen is soft.   Musculoskeletal:         General: Normal range of motion.      Cervical back: Normal range of motion and neck supple.      Right lower leg: No edema.      Left lower leg: No edema.   Skin:     General: Skin is warm and dry.      Capillary Refill: Capillary refill takes less than 2 seconds.   Neurological:      General: No focal deficit present.            Vents:  Vent Mode: A/CMV-VC (01/16/25 0402)  Ventilator Initiated: Yes (01/05/25 1015)  Set Rate: 14 BPM (01/16/25  0402)  Vt Set: 420 mL (01/16/25 0402)  Pressure Support: 10 cmH20 (01/07/25 1915)  PEEP/CPAP: 5 cmH20 (01/16/25 0402)  Oxygen Concentration (%): 30 (01/16/25 0402)  Peak Airway Pressure: 25 cmH20 (01/16/25 0402)  Plateau Pressure: 21 cmH20 (01/16/25 0402)  Total Ve: 5.5 L/m (01/16/25 0402)  F/VT Ratio<105 (RSBI): (!) 34.74 (01/15/25 1531)  Lines/Drains/Airways       Central Venous Catheter Line  Duration             Trialysis (Dialysis) Catheter 01/05/25 1058 external jugular;right internal jugular 10 days              Drain  Duration                  NG/OG Tube 01/05/25 1033 Right nostril 10 days         Urethral Catheter 01/05/25 0900 Silicone 16 Fr. 10 days              Airway  Duration                  Airway - Non-Surgical 01/05/25 1015 10 days              Peripheral Intravenous Line  Duration                  Midline Catheter - Single Lumen 01/05/25 0945 Right basilic vein (medial side of arm) 20g x 10cm 10 days                  Significant Labs:    CBC/Anemia Profile:  Recent Labs   Lab 01/15/25  0428 01/16/25  0338   WBC 4.55 4.29*   HGB 6.9* 6.5*   HCT 21.2* 19.1*   PLT 10* 64*   MCV 82.8 86.8   RDW 17.2* 16.8*        Chemistries:  Recent Labs   Lab 01/15/25  0428 01/16/25  0338   * 132*   K 2.5* 2.5*    101   CO2 21* 21*   BUN 68* 73*   CREATININE 5.13* 5.04*   CALCIUM 7.7* 7.6*       Recent Lab Results  (Last 5 results in the past 24 hours)        01/16/25  0535   01/16/25  0338   01/15/25  2333   01/15/25  1551   01/15/25  1225        Anion Gap   13             Aniso   1+             Bands   6             Baso #   0.01             Basophil %   0.2             BUN   73             BUN/CREAT RATIO   14             Calcium   7.6             Chloride   101             CO2   21             Creatinine   5.04             Differential Method   Manual             eGFR   9             Eos #   0.09             Eos %   2.1                1             Glucose   111             Hematocrit   19.1              Hemoglobin   6.5             Immature Grans (Abs)   0.10             Immature Granulocytes   2.3             Lymph #   0.58             Lymph %   13.5                10             MCH   29.5             MCHC   34.0             MCV   86.8             Mono #   0.36             Mono %   8.4                6             MPV   11.3             Neutrophils, Abs   3.15             Neutrophils Relative   73.5             nRBC   1                1.9             NUCLEATED RBC ABSOLUTE   0.08             Ovalocytes   Few             PLATELET MORPHOLOGY   Decreased             Platelet Count   64             POC Glucose 115     111   117   112       Poly   Few             Potassium   2.5  Comment: Critical Result called and verified by verbal readback to: Koffi VIRGEN  on 01/16/2025 at 05:08. Reported by [id number].             RBC   2.20             RDW   16.8             Segmented Neutrophils, Man %   77             Sodium   132             WBC   4.29                                    Significant Imaging:  I have reviewed all pertinent imaging results/findings within the past 24 hours.

## 2025-01-16 NOTE — PLAN OF CARE
Problem: Infection  Goal: Absence of Infection Signs and Symptoms  Outcome: Progressing  Intervention: Prevent or Manage Infection  Flowsheets (Taken 1/16/2025 0519)  Fever Reduction/Comfort Measures:   lightweight bedding   lightweight clothing  Infection Management: aseptic technique maintained  Isolation Precautions: precautions maintained     Problem: Gas Exchange Impaired  Goal: Optimal Gas Exchange  Outcome: Progressing  Intervention: Optimize Oxygenation and Ventilation  Flowsheets (Taken 1/16/2025 0519)  Airway/Ventilation Management: airway patency maintained  Head of Bed (HOB) Positioning: HOB at 30-45 degrees

## 2025-01-16 NOTE — CARE UPDATE
1/15/25 1700:    I & primary nurse, AZ Barakat RN, had about a 45 minute discussion with the family, to include 3 daughters, a son, & a son on the phone. I discussed the patient's hospital stay & the events thereof, to include the most recent test results which was the cerebral blood flow test that was performed on the morning of 1/15/25. Test did confirm that there was cerebral blood flow, however, there has been minimal to no neurological response from the patient since admission to the ICU. I discussed lab results & the treatments we were ordering which was 1 unit PRBCs & 1 unit of single donor platelets. There was also discussion regarding her acute renal failure & the plans of nephrology to hold on further hemodialysis for now due to the patient condition. I also explained that she was on a vasopressor IV to help keep her blood pressure adequate. I did discuss in detail a DNR status in the event the patient cardiac arrested & the family wants to think about it & talk it over & let us know their decision. Emotional support was given & questions were answered.     Around 1815, family stated that they did wish to make Ms. Stewart a DNR & if her status does not improve by Saturday, they are considering withdrawing care once all the family makes it to the hospital. DNR order placed. We will continue current care & treatment.

## 2025-01-16 NOTE — ASSESSMENT & PLAN NOTE
Patient is a DNR some discussion about removal life support Saturday continue current level support

## 2025-01-16 NOTE — ASSESSMENT & PLAN NOTE
No evidence of DIC at this time, with ongoing thrombocytopenia now status post platelet administration with improvement in patient's platelets.  Idiopathic thrombocytopenia this time.  1/11/25-patient with continued improvement thrombocytopenia without additional administration of platelets today  Appreciate Hematology coming to see the patient 1/10/25.  Platelet count [id number]

## 2025-01-16 NOTE — ASSESSMENT & PLAN NOTE
Patient family has decided for comfort care will proceed down that pathway on Saturday she is a do not resuscitate at this point

## 2025-01-16 NOTE — PLAN OF CARE
Chart review and spoke with yareli BENNETT. Patient is still on vent. Patient is a DNR now. Physician plans to continue to speak with family about goals of care. Cm will follow.

## 2025-01-17 NOTE — PLAN OF CARE
Problem: Adult Inpatient Plan of Care  Goal: Plan of Care Review  Outcome: Progressing  Goal: Patient-Specific Goal (Individualized)  Outcome: Progressing  Goal: Absence of Hospital-Acquired Illness or Injury  Outcome: Progressing  Goal: Optimal Comfort and Wellbeing  Outcome: Progressing  Goal: Readiness for Transition of Care  Outcome: Progressing     Problem: Mechanical Ventilation Invasive  Goal: Effective Communication  Outcome: Progressing  Goal: Optimal Device Function  Outcome: Progressing  Goal: Mechanical Ventilation Liberation  Outcome: Progressing  Goal: Optimal Nutrition Delivery  Outcome: Progressing  Goal: Absence of Device-Related Skin and Tissue Injury  Outcome: Progressing  Goal: Absence of Ventilator-Induced Lung Injury  Outcome: Progressing     Problem: Gas Exchange Impaired  Goal: Optimal Gas Exchange  Outcome: Progressing     Problem: Breathing Pattern Ineffective  Goal: Effective Breathing Pattern  Outcome: Progressing     Problem: Pneumonia  Goal: Fluid Balance  Outcome: Progressing  Goal: Resolution of Infection Signs and Symptoms  Outcome: Progressing  Goal: Effective Oxygenation and Ventilation  Outcome: Progressing     Problem: Airway Clearance Ineffective  Goal: Effective Airway Clearance  Outcome: Progressing

## 2025-01-17 NOTE — PLAN OF CARE
Problem: Adult Inpatient Plan of Care  Goal: Plan of Care Review  1/16/2025 2133 by Karen Villagomez, RRT  Outcome: Not Progressing  1/16/2025 2132 by Karen Villagomez, RRT  Outcome: Progressing  Goal: Patient-Specific Goal (Individualized)  1/16/2025 2133 by Karen Villagomez, RRT  Outcome: Not Progressing  1/16/2025 2132 by Karen Villagomez, RRT  Outcome: Progressing  Goal: Absence of Hospital-Acquired Illness or Injury  1/16/2025 2133 by Karen Villagomez, RRT  Outcome: Not Progressing  1/16/2025 2132 by Karen Villagomez, RRT  Outcome: Progressing  Goal: Optimal Comfort and Wellbeing  1/16/2025 2133 by Karen Villagomez, RRT  Outcome: Not Progressing  1/16/2025 2132 by Karen Villagomez, RRT  Outcome: Progressing  Goal: Readiness for Transition of Care  1/16/2025 2133 by Karen Villagomez, RRT  Outcome: Not Progressing  1/16/2025 2132 by Karen Villagomez, RRT  Outcome: Progressing     Problem: Artificial Airway  Goal: Effective Communication  1/16/2025 2133 by Karen Villagomez, RRT  Outcome: Not Progressing  1/16/2025 2132 by Karen Villagomez, RRT  Outcome: Progressing  Goal: Optimal Device Function  1/16/2025 2133 by Karen Villagomez, RRT  Outcome: Not Progressing  1/16/2025 2132 by Karen Villagomez, RRT  Outcome: Progressing  Goal: Absence of Device-Related Skin or Tissue Injury  1/16/2025 2133 by Karen Villagomez, RRT  Outcome: Not Progressing  1/16/2025 2132 by Karen Villagomez, RRT  Outcome: Progressing     Problem: Mechanical Ventilation Invasive  Goal: Effective Communication  1/16/2025 2133 by Karen Villagomez, RRT  Outcome: Not Progressing  1/16/2025 2132 by Karen Villagomez, RRT  Outcome: Progressing  Goal: Optimal Device Function  1/16/2025 2133 by Karen Villagomez, RRT  Outcome: Not Progressing  1/16/2025 2132 by Karen Villagomez, RRT  Outcome: Progressing  Goal: Mechanical Ventilation Liberation  1/16/2025 2133 by Karen Villagomez, RRT  Outcome: Not Progressing  1/16/2025 2132 by Karen Villagomez,  RRT  Outcome: Progressing  Goal: Optimal Nutrition Delivery  1/16/2025 2133 by Karen Villagomez, RRT  Outcome: Not Progressing  1/16/2025 2132 by Karen Villagomez, RRT  Outcome: Progressing  Goal: Absence of Device-Related Skin and Tissue Injury  1/16/2025 2133 by Karen Villagomez, RRT  Outcome: Not Progressing  1/16/2025 2132 by Karen Villagomez, RRT  Outcome: Progressing  Goal: Absence of Ventilator-Induced Lung Injury  1/16/2025 2133 by Karen Villagomez, RRT  Outcome: Not Progressing  1/16/2025 2132 by Karen Villagomez, RRT  Outcome: Progressing     Problem: Gas Exchange Impaired  Goal: Optimal Gas Exchange  1/16/2025 2133 by Karen Villagomez, RRT  Outcome: Not Progressing  1/16/2025 2132 by Karen Villagomez, RRT  Outcome: Progressing     Problem: Breathing Pattern Ineffective  Goal: Effective Breathing Pattern  1/16/2025 2133 by Karen Villagomez, RRT  Outcome: Not Progressing  1/16/2025 2132 by Karen Villagomez, RRT  Outcome: Progressing     Problem: Pneumonia  Goal: Fluid Balance  1/16/2025 2133 by Karen Villagomez, RRT  Outcome: Not Progressing  1/16/2025 2132 by Karen Villagomez, RRT  Outcome: Progressing  Goal: Resolution of Infection Signs and Symptoms  1/16/2025 2133 by Karen Villagomez, RRT  Outcome: Not Progressing  1/16/2025 2132 by Karen Villagomez, RRT  Outcome: Progressing  Goal: Effective Oxygenation and Ventilation  1/16/2025 2133 by Karen Villagomez, RRT  Outcome: Not Progressing  1/16/2025 2132 by Karen Villagomez, RRT  Outcome: Progressing     Problem: Airway Clearance Ineffective  Goal: Effective Airway Clearance  1/16/2025 2133 by VillagomezKaren, RRT  Outcome: Not Progressing  1/16/2025 2132 by Karen Villagomez, RRT  Outcome: Progressing

## 2025-01-17 NOTE — PLAN OF CARE
Problem: Mechanical Ventilation Invasive  Goal: Effective Communication  Outcome: Not Progressing

## 2025-01-17 NOTE — PROGRESS NOTES
[facility]    Patient Name:  Renetta Stewart   MRN:  [id number]  Date: 1/17/2025  Diagnosis: Lactic acidosis    History:     Past Medical History:   Diagnosis Date    Anemia     Anxiety     Dementia     Depression     GERD (gastroesophageal reflux disease)     Hyperlipidemia     Hypertension     PONV (postoperative nausea and vomiting)     Stroke        Social History     Socioeconomic History    Marital status: Single   Tobacco Use    Smoking status: Never     Passive exposure: Never    Smokeless tobacco: Never   Substance and Sexual Activity    Alcohol use: Not Currently    Drug use: Never    Sexual activity: Not Currently     Social Drivers of Health     Financial Resource Strain: Low Risk  (1/3/2025)    Overall Financial Resource Strain (CARDIA)     Difficulty of Paying Living Expenses: Not hard at all   Food Insecurity: No Food Insecurity (1/3/2025)    Hunger Vital Sign     Worried About Running Out of Food in the Last Year: Never true     Ran Out of Food in the Last Year: Never true   Transportation Needs: No Transportation Needs (1/3/2025)    TRANSPORTATION NEEDS     Transportation : No   Physical Activity: Inactive (1/3/2025)    Exercise Vital Sign     Days of Exercise per Week: 0 days     Minutes of Exercise per Session: 0 min   Stress: No Stress Concern Present (1/3/2025)    [employer] [place] [employer] - Occupational Stress Questionnaire     Feeling of Stress : Not at all   Housing Stability: Low Risk  (1/3/2025)    Housing Stability Vital Sign     Unable to Pay for Housing in the Last Year: No     Homeless in the Last Year: No       Precautions:     Allergies as of 01/02/2025    (No Known Allergies)       WOC Assessment Details/Treatment      01/17/25 0907   WOCN Assessment   WOCN Total Time (mins) 60   Visit Date 01/17/25   Visit Time 0900   Consult Type Follow Up   WOCN Speciality Wound   Wound shearing   Number of Wounds 1   Continence Type Urinary;Fecal    Intervention chart review;assessed;applied;orders   Skin Interventions   Device Skin Pressure Protection absorbent pad utilized/changed;adhesive use limited;pressure points protected;tubing/devices free from skin contact   Pressure Reduction Devices pressure-redistributing mattress utilized;specialty bed utilized   Pressure Reduction Techniques weight shift assistance provided;positioned off wounds;pressure points protected   Skin Protection incontinence pads utilized   Positioning   Body Position turned;left   Head of Bed (HOB) Positioning HOB at 30 degrees   Positioning/Transfer Devices pillows;in use        Wound 01/09/25 1550 Shearing Tongue   Date First Assessed/Time First Assessed: 01/09/25 1550   Present on Original Admission: Yes  Primary Wound Type: Shearing  Location: (c) Tongue  Is this injury device related?: Yes   Wound Image    Dressing Appearance Intact;Moist drainage   Drainage Amount Moderate   Drainage Characteristics/Odor Bleeding controlled;No odor   Appearance Bleeding   Tissue loss description Full thickness   Periwound Area Moist   Wound Edges Irregular;Undefined   Care Cleansed with:;Sterile normal saline   Dressing Changed;Gauze;Rolled gauze   Packing packed with  (Kerlix)   Packing Removed 3   Periwound Care Absorptive dressing applied;Cleansed with pH balanced cleanser;Skin barrier film applied;Moisturizer applied;Dry periwound area maintained     WOC Team consulted for:  Tongue    Narrative: Patient intubated and sedated. Patient nurse Delia assist with wound care.    Active Wounds and Recommendations: Continue POC    Goals for Wound Healing: Manage drainage, Moisture management, and Reduce bioburden     Barriers to Wound Healing: multiple co-morbidities advanced age fragile skin    Orders placed.    Thank you for the consult.     We will continue to follow. See additional note under Notes Tab for tentative f/u plan/dates.      01/17/2025

## 2025-01-17 NOTE — PLAN OF CARE
Problem: Skin Injury Risk Increased  Goal: Skin Health and Integrity  Outcome: Progressing  Intervention: Optimize Skin Protection  Flowsheets (Taken 1/16/2025 2353)  Pressure Reduction Techniques: weight shift assistance provided  Pressure Reduction Devices: foam padding utilized  Skin Protection: incontinence pads utilized  Activity Management: Rolling - L1  Head of Bed (HOB) Positioning: HOB at 30-45 degrees  Intervention: Promote and Optimize Oral Intake  Flowsheets (Taken 1/16/2025 2353)  Oral Nutrition Promotion: other (see comments)  Nutrition Interventions: other (see comments)     Problem: Fall Injury Risk  Goal: Absence of Fall and Fall-Related Injury  Outcome: Progressing  Intervention: Identify and Manage Contributors  Flowsheets (Taken 1/16/2025 2353)  Self-Care Promotion: independence encouraged  Medication Review/Management: medications reviewed  Intervention: Promote Injury-Free Environment  Flowsheets (Taken 1/16/2025 2353)  Safety Promotion/Fall Prevention:   side rails raised x 2   room near unit station   pulse ox

## 2025-01-17 NOTE — PROGRESS NOTES
01/17/25 1256   Wound Care Follow Up   Wound Care Follow-up? Yes   Wound Care- Next Visit Date 01/21/25   Follow Up Plan [facility] POC

## 2025-01-17 NOTE — PROGRESS NOTES
[facility]  Critical Care Medicine  Progress Note    Patient Name: Renetta Stewart  MRN: [id number]  Admission Date: 1/2/2025  Hospital Length of Stay: 15 days  Code Status: DNR  Attending Provider: Pantera Alexandra MD  Primary Care Provider: Eldon Govea MD   Principal Problem: Lactic acidosis    Subjective:     HPI:  74-year-old female with past medical history significant for syncope, collapse, worked up for orthostatic hypotension who presented to the ICU on 1/5/25 in the setting of being obtunded and found to have small-bowel obstruction, now intubated and sedated with shock and lactic acidosis.    The patient was admitted to hospital medicine on 1/3/25.  CT abdomen at the time showed evidence of diffuse wall thickening compatible with colitis.  An EEG was performed due to altered mental status 1/13/25 which showed evidence of toxic metabolic encephalopathy.  No evidence of epileptiform findings or electrographic seizures noted.  Of note, she also had an EGD performed on 12/27/24 which showed evidence of esophagitis and a 5 cm hiatal hernia with erythematous mucosa in the fundus of the stomach and antrum.  The mucosa of mid and distal esophagus was reportedly severely inflamed with white plaque present.    She was being worked up for oliguria and lactic acidosis when she was found to be acutely obtunded and nonresponsive on the morning of 1/5/25.      The patient was brought down emergently to the ICU.  At this time, she began acutely decompensating without the ability to protect her airway, saturating on facemask.  She was then emergently intubated bedside.  She prove to be a difficult intubation (did not have any significant episodes of desaturation) however she had a significant amount of vomitus and bile which complicated her intubation, requiring anesthesia attending to perform the intubation at bedside.  We had already consented the family prior to the intubation and a  Trialysis catheter was placed in anticipation of possible dialysis in case her oliguria worsens.        Hospital/ICU Course:  1/6/25-overnight improving lactic acidosis, still persistent.  Echocardiogram with severe concentric hypertrophy, still on low-moderate dose single vasopressor support without evidence of any active oozing from her mouth or pneumothorax on her chest x-ray scans.  Seen by Dr. Langston at bedside on 1/5/25 who does not recommend any significant intervention, agrees with packing and clinical monitoring without any significant acute intervention.    1/7/25-patient with a minor ooze from the mouth overnight, controlled now, hypokalemic this morning status post replenishment of potassium.  Vancomycin discontinuing continuing cefepime, holding chemical DVT prophylaxis and using SCDs at this time in the setting of mild ooze from mouth.  10 cc of urine output overnight.  Gradually downtrending hemoglobin at 7.5 now.  No evidence of crepitus in the neck or subcutaneous emphysema on physical exam.  Reviewed case with Gastroenterology who believe there may be minor esophageal perforation, possibly from NG tube placement with nothing additional to do at this time.    1/8/25-patient with worsening thrombocytopenia no obvious bleeding noted at this time.  We will attempt spontaneous awakening and spontaneous breathing trial again today.  Patient to receive 2 units platelets and 1 unit PRBC today along with potassium per placement.    1/9/25-with minimal ooze from the mouth, pack by surgery at bedside, re-evaluated by Dr. Langston at bedside.  Status post administration of additional platelets with improvement in thrombocytopenia and additional PRBC.  Family updated in detail at bedside.    1/10/25-mild oozing present.  Platelets down to 35, pending additional unit platelets.  Appreciate ENT and surgery coming by and helping with packing of mouth.  No significant neurological response.  Pending CT scan head,  neck and EEG.    1/11/25-improvement in thrombocytopenia without administration of platelets.  Extremely mild oozing with back mouth today.  Appreciate Hematology recommendation.  Pending repeat coagulation studies.  Mild elevation of PT. patient to receive dialysis today.  CT soft tissue neck with interval resolution subcutaneous air and lower neck.  Evidence of left upper lobe pneumonia.  CT head with chronic changes.  EEG indicative of toxic metabolic encephalopathy.    1/12/25-stable hemoglobin at 7.6.  Received additional platelets today after her platelet was at 40, increased to 90.  Mouth continues to remain packed.  Lactate now down to 3.0 from 3.5 earlier this morning.  No improvement in mental status    Interval History/Significant Events:  Patient without complaints    Review of Systems  Objective:     Vital Signs (Most Recent):  Temp: 96.6 °F (35.9 °C) (01/16/25 0330)  Pulse: 66 (01/16/25 0445)  Resp: 14 (01/16/25 0445)  BP: 135/60 (01/16/25 0445)  SpO2: 100 % (01/16/25 0445) Vital Signs (24h Range):  Temp:  [96.6 °F (35.9 °C)-98.9 °F (37.2 °C)] 96.6 °F (35.9 °C)  Pulse:  [59-86] 66  Resp:  [14-16] 14  SpO2:  [97 %-100 %] 100 %  BP: ()/(36-66) 135/60   Weight: 112.1 kg (247 lb 2.2 oz)  Body mass index is 42.42 kg/m².      Intake/Output Summary (Last 24 hours) at 1/16/2025 0700  Last data filed at 1/16/2025 0635  Gross per 24 hour   Intake 5000.18 ml   Output 60 ml   Net 4940.18 ml          Physical Exam  Vitals reviewed.   Constitutional:       Interventions: She is not intubated.  HENT:      Head: Normocephalic and atraumatic.      Nose: Nose normal.      Mouth/Throat:      Mouth: Mucous membranes are dry.      Pharynx: Oropharynx is clear.   Eyes:      Extraocular Movements: Extraocular movements intact.      Conjunctiva/sclera: Conjunctivae normal.      Pupils: Pupils are equal, round, and reactive to light.   Cardiovascular:      Rate and Rhythm: Normal rate.      Heart sounds: Normal heart  sounds. No murmur heard.  Pulmonary:      Effort: Pulmonary effort is normal. She is not intubated.      Breath sounds: Normal breath sounds.   Abdominal:      General: Abdomen is flat. Bowel sounds are normal.      Palpations: Abdomen is soft.   Musculoskeletal:         General: Normal range of motion.      Cervical back: Normal range of motion and neck supple.      Right lower leg: No edema.      Left lower leg: No edema.   Skin:     General: Skin is warm and dry.      Capillary Refill: Capillary refill takes less than 2 seconds.   Neurological:      General: No focal deficit present.            Vents:  Vent Mode: A/CMV-VC (01/16/25 0402)  Ventilator Initiated: Yes (01/05/25 1015)  Set Rate: 14 BPM (01/16/25 0402)  Vt Set: 420 mL (01/16/25 0402)  Pressure Support: 10 cmH20 (01/07/25 1915)  PEEP/CPAP: 5 cmH20 (01/16/25 0402)  Oxygen Concentration (%): 30 (01/16/25 0402)  Peak Airway Pressure: 25 cmH20 (01/16/25 0402)  Plateau Pressure: 21 cmH20 (01/16/25 0402)  Total Ve: 5.5 L/m (01/16/25 0402)  F/VT Ratio<105 (RSBI): (!) 34.74 (01/15/25 1531)  Lines/Drains/Airways       Central Venous Catheter Line  Duration             Trialysis (Dialysis) Catheter 01/05/25 1058 external jugular;right internal jugular 10 days              Drain  Duration                  NG/OG Tube 01/05/25 1033 Right nostril 10 days         Urethral Catheter 01/05/25 0900 Silicone 16 Fr. 10 days              Airway  Duration                  Airway - Non-Surgical 01/05/25 1015 10 days              Peripheral Intravenous Line  Duration                  Midline Catheter - Single Lumen 01/05/25 0945 Right basilic vein (medial side of arm) 20g x 10cm 10 days                  Significant Labs:    CBC/Anemia Profile:  Recent Labs   Lab 01/15/25  0428 01/16/25  0338   WBC 4.55 4.29*   HGB 6.9* 6.5*   HCT 21.2* 19.1*   PLT 10* 64*   MCV 82.8 86.8   RDW 17.2* 16.8*        Chemistries:  Recent Labs   Lab 01/15/25  0428 01/16/25  0338   * 132*   K  "2.5* 2.5*    101   CO2 21* 21*   BUN 68* 73*   CREATININE 5.13* 5.04*   CALCIUM 7.7* 7.6*       Recent Lab Results  (Last 5 results in the past 24 hours)        01/16/25  0535   01/16/25  0338   01/15/25  2333   01/15/25  1551   01/15/25  1225        Anion Gap   13             Aniso   1+             Bands   6             Baso #   0.01             Basophil %   0.2             BUN   73             BUN/CREAT RATIO   14             Calcium   7.6             Chloride   101             CO2   21             Creatinine   5.04             Differential Method   Manual             eGFR   9             Eos #   0.09             Eos %   2.1                1             Glucose   111             Hematocrit   19.1             Hemoglobin   6.5             Immature Grans (Abs)   0.10             Immature Granulocytes   2.3             Lymph #   0.58             Lymph %   13.5                10             MCH   29.5             MCHC   34.0             MCV   86.8             Mono #   0.36             Mono %   8.4                6             MPV   11.3             Neutrophils, Abs   3.15             Neutrophils Relative   73.5             nRBC   1                1.9             NUCLEATED RBC ABSOLUTE   0.08             Ovalocytes   Few             PLATELET MORPHOLOGY   Decreased             Platelet Count   64             POC Glucose 115     111   117   112       Poly   Few             Potassium   2.5  Comment: Critical Result called and verified by verbal readback to: Koffi VIRGEN  on 01/16/2025 at 05:08. Reported by [id number].             RBC   2.20             RDW   16.8             Segmented Neutrophils, Man %   77             Sodium   132             WBC   4.29                                    Significant Imaging:  I have reviewed all pertinent imaging results/findings within the past 24 hours.    ABG  No results for input(s): "PH", "PO2", "PCO2", "HCO3", "BE" in the last 168 hours.  Assessment/Plan:     Neuro  Encephalopathy, " metabolic  Patient family has decided for comfort care will proceed down that pathway on Saturday she is a do not resuscitate at this point    Pulmonary  Left-sided pneumonia  Opacification on chest x-ray from 1/8/25 showing evidence of possible left lower lobe pleural effusion versus retrocardiac consolidation.  Patient has been on cefepime at this time.  No growth on final cultures from bronchoscopy performed immediately after intubation.  Continued on cefepime at this time.  CT neck with evidence of left upper lobe pneumonia.    Significant improvement on chest x-ray performed 1/12/25 with improved aeration right base as well.  Consider deescalation of antibiotics as clinically appropriate.    Hard to intubate  Patient extremely difficult to intubate by bedside.  Appreciate prompt anesthesia response to help and anesthesia attending was able to intubate the patient with cricoid pressure, with the help of a bougie tube.  The patient's anatomy indicates that she has very anterior in terms of her vocal cords.  In addition, her intubation was very complicated due to a significant amount of vomitus of bile during the intubation.  A prompt bedside bronchoscopy was performed immediately with clearance are of aspirated gastric contents.    Cardiac/Vascular  Elevated troponin  Likely in the setting of her demand ischemia, history of severe concentric hypertrophy.    Renal/  * Lactic acidosis  Resolved    Acute renal failure superimposed on stage 3a chronic kidney disease  Renal service holding dialysis they do not feel she is appropriate candidate due to her neurologic disease in possible brain death    Complex renal cyst  There is a renal mass seen on the recent CT scan and an ultrasound has been ordered with the following impression below.  Prior CT scan showed evidence of renal cysts.    Impression:     1. Indeterminate isoechoic lesion in the left inferior renal pole measuring 2.2 cm.  Cannot exclude solid mass.   Recommend further evaluation with contrast enhanced CT or MRI renal mass protocol.  2. Right renal simple cyst.       We will attempt contrast-enhanced CT if there is improvement of renal function and/or MRI with clinical improvement instability    Oncology  Anemia  Currently stable at 7.2    Endocrine  Morbid obesity  Complicates all aspects of care    GI  Esophageal perforation  Appreciate gastroenterology recommendations, we will keep NG-tube in at this time to continue keeping patient's stomach decompressed in the setting of recent small-bowel obstruction.  Minor esophageal injury.    Small bowel obstruction  Now resolved.      Small-bowel obstruction partial or complete based on recent CT scan, patient with NG tube in place.  Appreciate surgery consultation, agree that less likely to be mesenteric ischemia given improving lactic acidosis and low-dose vasopressor support requirements.  Still may have some third spacing from her small-bowel obstruction.  However, now with bowel movement 1/9/25.    Acute superficial gastritis without hemorrhage  Severe esophagitis present on recent EGD.  Gastroenterology has been consulted who have reviewed the scans in detail, seen the patient on 1/6/25 and agree there may be minor esophageal injury with their recommendations including no further intervention at this time required, continuing antibiotics and no need to perform esophagram.  Appreciate Dr. Lora (gastroenterology) seeing the patient and giving her recommendations.    Other  Advanced care planning/counseling discussion  Patient is a DNR some discussion about removal life support Saturday continue current level support    Thrombocytopenia  No evidence of DIC at this time, with ongoing thrombocytopenia now status post platelet administration with improvement in patient's platelets.  Idiopathic thrombocytopenia this time.  1/11/25-patient with continued improvement thrombocytopenia without additional  administration of platelets today  Appreciate Hematology coming to see the patient 1/10/25.  Platelet count [id number]    Subcutaneous air-neck  Repeat CT neck 1/10/25 with resolution of air.  Previously documented events as below       Patient with subcutaneous air in the neck on CT scan that was performed.  There is no evidence of subcutaneous emphysema/crepitus on physical examination.  She does not have elevated peak pressures and no evidence of significant large pneumomediastinum.  At this time the etiology of the subcutaneous air in her neck are from possible esophageal injury given the patient's underlying severe esophagitis and placement NG tube, injury from her intubation from posterior pharynx and less likely to be pneumomediastinum in the setting of positive airway pressure as she has no evidence of significant mediastinal air in the distal trachea to suggest this as the cause.     Status post packing in her mouth with TXA soaked gauze, with improvement and resolution of her oozing.  Serial chest x-rays without any evidence of pneumothorax, physical examination without evidence of subcutaneous emphysema.  Evaluated the patient bedside with ENT physician Dr. Langston who has recommended continuing clinical monitoring, no intervention at this time needed and no additional imaging of the neck.  Appreciate evaluation by Dr. Langston previously.  Re-evaluated by ENT bedside 1/9/25, General surgery pack patient's mouth.  Oozing has slowed down with platelets administration.  We will defer to ENT for recommendations regarding visual exploration versus other intervention.  We will continue to monitor.    Gastroenterology consultation from 1/6/25 greatly appreciated.  There was concern for possible minor esophageal injury.            Syncope  History of syncope, being worked up with monitoring.  She does have a pacemaker which was interrogated.  Likely cause of her altered mental status at this time is toxic metabolic  encephalopathy.             Pantera Alexandra MD  Critical Care Medicine  [facility]

## 2025-01-18 PROBLEM — Z51.5 COMFORT MEASURES ONLY STATUS: Status: ACTIVE | Noted: 2025-01-01

## 2025-01-18 NOTE — ASSESSMENT & PLAN NOTE
Patient is a DNR some discussion about removal life support Saturday continue current level support    Per family wishes, will extubate to comfort care.

## 2025-01-18 NOTE — PROGRESS NOTES
[facility]  Critical Care Medicine  Progress Note    Patient Name: Renetta Stewart  MRN: [id number]  Admission Date: 1/2/2025  Hospital Length of Stay: 16 days  Code Status: DNR  Attending Provider: Pantera Alexandra MD  Primary Care Provider: Eldon Govea MD   Principal Problem: Lactic acidosis    Subjective:     HPI:  74-year-old female with past medical history significant for syncope, collapse, worked up for orthostatic hypotension who presented to the ICU on 1/5/25 in the setting of being obtunded and found to have small-bowel obstruction, now intubated and sedated with shock and lactic acidosis.    The patient was admitted to hospital medicine on 1/3/25.  CT abdomen at the time showed evidence of diffuse wall thickening compatible with colitis.  An EEG was performed due to altered mental status 1/13/25 which showed evidence of toxic metabolic encephalopathy.  No evidence of epileptiform findings or electrographic seizures noted.  Of note, she also had an EGD performed on 12/27/24 which showed evidence of esophagitis and a 5 cm hiatal hernia with erythematous mucosa in the fundus of the stomach and antrum.  The mucosa of mid and distal esophagus was reportedly severely inflamed with white plaque present.    She was being worked up for oliguria and lactic acidosis when she was found to be acutely obtunded and nonresponsive on the morning of 1/5/25.      The patient was brought down emergently to the ICU.  At this time, she began acutely decompensating without the ability to protect her airway, saturating on facemask.  She was then emergently intubated bedside.  She prove to be a difficult intubation (did not have any significant episodes of desaturation) however she had a significant amount of vomitus and bile which complicated her intubation, requiring anesthesia attending to perform the intubation at bedside.  We had already consented the family prior to the intubation and a  Trialysis catheter was placed in anticipation of possible dialysis in case her oliguria worsens.        Hospital/ICU Course:  1/6/25-overnight improving lactic acidosis, still persistent.  Echocardiogram with severe concentric hypertrophy, still on low-moderate dose single vasopressor support without evidence of any active oozing from her mouth or pneumothorax on her chest x-ray scans.  Seen by Dr. Langston at bedside on 1/5/25 who does not recommend any significant intervention, agrees with packing and clinical monitoring without any significant acute intervention.    1/7/25-patient with a minor ooze from the mouth overnight, controlled now, hypokalemic this morning status post replenishment of potassium.  Vancomycin discontinuing continuing cefepime, holding chemical DVT prophylaxis and using SCDs at this time in the setting of mild ooze from mouth.  10 cc of urine output overnight.  Gradually downtrending hemoglobin at 7.5 now.  No evidence of crepitus in the neck or subcutaneous emphysema on physical exam.  Reviewed case with Gastroenterology who believe there may be minor esophageal perforation, possibly from NG tube placement with nothing additional to do at this time.    1/8/25-patient with worsening thrombocytopenia no obvious bleeding noted at this time.  We will attempt spontaneous awakening and spontaneous breathing trial again today.  Patient to receive 2 units platelets and 1 unit PRBC today along with potassium per placement.    1/9/25-with minimal ooze from the mouth, pack by surgery at bedside, re-evaluated by Dr. Langston at bedside.  Status post administration of additional platelets with improvement in thrombocytopenia and additional PRBC.  Family updated in detail at bedside.    1/10/25-mild oozing present.  Platelets down to 35, pending additional unit platelets.  Appreciate ENT and surgery coming by and helping with packing of mouth.  No significant neurological response.  Pending CT scan head,  neck and EEG.    1/11/25-improvement in thrombocytopenia without administration of platelets.  Extremely mild oozing with back mouth today.  Appreciate Hematology recommendation.  Pending repeat coagulation studies.  Mild elevation of PT. patient to receive dialysis today.  CT soft tissue neck with interval resolution subcutaneous air and lower neck.  Evidence of left upper lobe pneumonia.  CT head with chronic changes.  EEG indicative of toxic metabolic encephalopathy.    1/12/25-stable hemoglobin at 7.6.  Received additional platelets today after her platelet was at 40, increased to 90.  Mouth continues to remain packed.  Lactate now down to 3.0 from 3.5 earlier this morning.  No improvement in mental status    1/18/25: No overnight events. Discussion with family to include 3 daughters & 1 son regarding their plans for withdrawal & comfort care. They will let us know when they are ready. AM labs reveal sodium 132, potassium 3.7, BUN/Cr 77/4.6, glucose 97, WBCs 4, H/H 7.6/22, & platelets 28. We will continue our course & await family's decision on withdrawal of care.    1/18/25 1445: Levophed discontinued & patient was extubated on comfort care. Morphine given for comfort. TOD 1520.     Interval History/Significant Events: Please refer to hospital course.    Review of Systems   Unable to perform ROS: Patient unresponsive   All other systems reviewed and are negative.    Objective:     Vital Signs (Most Recent):  Temp: 98.2 °F (36.8 °C) (01/18/25 1115)  Pulse: (!) 0 (TOD) (01/18/25 1520)  Resp: 13 (01/18/25 1459)  BP: 123/62 (01/18/25 1300)  SpO2: (!) 93 % (01/18/25 1300) Vital Signs (24h Range):  Temp:  [97 °F (36.1 °C)-98.2 °F (36.8 °C)] 98.2 °F (36.8 °C)  Pulse:  [0-92] 0  Resp:  [11-31] 13  SpO2:  [33 %-100 %] 93 %  BP: ()/(39-91) 123/62   Weight: 112.1 kg (247 lb 2.2 oz)  Body mass index is 42.42 kg/m².      Intake/Output Summary (Last 24 hours) at 1/18/2025 1745  Last data filed at 1/18/2025 1500  Gross  per 24 hour   Intake 263.65 ml   Output 186 ml   Net 77.65 ml          Physical Exam  Vitals and nursing note reviewed.   Constitutional:       General: She is not in acute distress.     Appearance: She is obese. She is ill-appearing.      Interventions: She is intubated.      Comments: Patient is intubated but not sedated. She is unresponsive.   HENT:      Head: Normocephalic.      Mouth/Throat:      Mouth: Mucous membranes are moist.   Eyes:      Pupils:      Right eye: Pupil is not reactive.      Left eye: Pupil is not reactive.   Cardiovascular:      Rate and Rhythm: Normal rate.   Pulmonary:      Effort: She is intubated.   Abdominal:      General: Bowel sounds are decreased.      Palpations: Abdomen is soft.   Skin:     General: Skin is cool and dry.      Capillary Refill: Capillary refill takes 2 to 3 seconds.   Neurological:      Mental Status: She is unresponsive.   Psychiatric:         Speech: She is noncommunicative.            Vents:  Vent Mode: A/CMV-VC (01/18/25 1116)  Ventilator Initiated: Yes (01/05/25 1015)  Set Rate: 14 BPM (01/18/25 1116)  Vt Set: 420 mL (01/18/25 1116)  Pressure Support: 10 cmH20 (01/07/25 1915)  PEEP/CPAP: 5 cmH20 (01/18/25 1116)  Oxygen Concentration (%): 100 (01/18/25 1116)  Peak Airway Pressure: 47 cmH20 (01/18/25 1116)  Plateau Pressure: 26.2 cmH20 (01/18/25 1116)  Total Ve: 6.44 L/m (01/18/25 1116)  F/VT Ratio<105 (RSBI): (!) 32.56 (01/18/25 1116)  Lines/Drains/Airways       Central Venous Catheter Line  Duration             Trialysis (Dialysis) Catheter 01/05/25 1058 external jugular;right internal jugular 13 days              Drain  Duration                  NG/OG Tube 01/05/25 1033 Right nostril 13 days         Urethral Catheter 01/05/25 0900 Silicone 16 Fr. 13 days              Airway  Duration                  Airway - Non-Surgical 01/05/25 1015 13 days              Peripheral Intravenous Line  Duration                  Midline Catheter - Single Lumen 01/05/25 0945  "Right basilic vein (medial side of arm) 20g x 10cm 13 days                  Significant Labs:    CBC/Anemia Profile:  Recent Labs   Lab 01/17/25  0417 01/18/25  0442   WBC 4.46* 4.63   HGB 8.7* 7.6*   HCT 25.6* 22.7*   PLT 44* 28*   MCV 82.8 84.7   RDW 15.9* 15.9*        Chemistries:  Recent Labs   Lab 01/17/25  0459 01/18/25  0442   * 132*   K 3.3* 3.7    103   CO2 17* 16*   BUN 76* 77*   CREATININE 4.78* 4.61*   CALCIUM 8.0* 8.0*       All pertinent labs within the past 24 hours have been reviewed.    Significant Imaging:  I have reviewed all pertinent imaging results/findings within the past 24 hours.  I have reviewed and interpreted all pertinent imaging results/findings within the past 24 hours.    ABG  No results for input(s): "PH", "PO2", "PCO2", "HCO3", "BE" in the last 168 hours.  Assessment/Plan:     Neuro  Encephalopathy, metabolic  Patient family has decided for comfort care will proceed down that pathway on Saturday she is a do not resuscitate at this point    Pulmonary  Left-sided pneumonia  Opacification on chest x-ray from 1/8/25 showing evidence of possible left lower lobe pleural effusion versus retrocardiac consolidation.  Patient has been on cefepime at this time.  No growth on final cultures from bronchoscopy performed immediately after intubation.  Continued on cefepime at this time.  CT neck with evidence of left upper lobe pneumonia.    Significant improvement on chest x-ray performed 1/12/25 with improved aeration right base as well.  Consider deescalation of antibiotics as clinically appropriate.    Hard to intubate  Patient extremely difficult to intubate by bedside.  Appreciate prompt anesthesia response to help and anesthesia attending was able to intubate the patient with cricoid pressure, with the help of a bougie tube.  The patient's anatomy indicates that she has very anterior in terms of her vocal cords.  In addition, her intubation was very complicated due to a " significant amount of vomitus of bile during the intubation.  A prompt bedside bronchoscopy was performed immediately with clearance are of aspirated gastric contents.    Cardiac/Vascular  Elevated troponin  Likely in the setting of her demand ischemia, history of severe concentric hypertrophy.    Renal/  * Lactic acidosis  Resolved    Acute renal failure superimposed on stage 3a chronic kidney disease  Renal service holding dialysis they do not feel she is appropriate candidate due to her neurologic disease in possible brain death    Complex renal cyst  There is a renal mass seen on the recent CT scan and an ultrasound has been ordered with the following impression below.  Prior CT scan showed evidence of renal cysts.    Impression:     1. Indeterminate isoechoic lesion in the left inferior renal pole measuring 2.2 cm.  Cannot exclude solid mass.  Recommend further evaluation with contrast enhanced CT or MRI renal mass protocol.  2. Right renal simple cyst.       We will attempt contrast-enhanced CT if there is improvement of renal function and/or MRI with clinical improvement instability    Oncology  Anemia  Currently stable at 7.6    Endocrine  Morbid obesity  Complicates all aspects of care    GI  Esophageal perforation  Appreciate gastroenterology recommendations, we will keep NG-tube in at this time to continue keeping patient's stomach decompressed in the setting of recent small-bowel obstruction.  Minor esophageal injury.    Small bowel obstruction  Now resolved.      Small-bowel obstruction partial or complete based on recent CT scan, patient with NG tube in place.  Appreciate surgery consultation, agree that less likely to be mesenteric ischemia given improving lactic acidosis and low-dose vasopressor support requirements.  Still may have some third spacing from her small-bowel obstruction.  However, now with bowel movement 1/9/25.    Acute superficial gastritis without hemorrhage  Severe esophagitis  present on recent EGD.  Gastroenterology has been consulted who have reviewed the scans in detail, seen the patient on 1/6/25 and agree there may be minor esophageal injury with their recommendations including no further intervention at this time required, continuing antibiotics and no need to perform esophagram.  Appreciate Dr. Lora (gastroenterology) seeing the patient and giving her recommendations.    Other  Advanced care planning/counseling discussion  Patient is a DNR some discussion about removal life support Saturday continue current level support    Per family wishes, will extubate to comfort care.    Thrombocytopenia  No evidence of DIC at this time, with ongoing thrombocytopenia now status post platelet administration with improvement in patient's platelets.  Idiopathic thrombocytopenia this time.  1/11/25-patient with continued improvement thrombocytopenia without additional administration of platelets today  Appreciate Hematology coming to see the patient 1/10/25.  Platelet count today 28,000    Subcutaneous air-neck  Repeat CT neck 1/10/25 with resolution of air.  Previously documented events as below       Patient with subcutaneous air in the neck on CT scan that was performed.  There is no evidence of subcutaneous emphysema/crepitus on physical examination.  She does not have elevated peak pressures and no evidence of significant large pneumomediastinum.  At this time the etiology of the subcutaneous air in her neck are from possible esophageal injury given the patient's underlying severe esophagitis and placement NG tube, injury from her intubation from posterior pharynx and less likely to be pneumomediastinum in the setting of positive airway pressure as she has no evidence of significant mediastinal air in the distal trachea to suggest this as the cause.     Status post packing in her mouth with TXA soaked gauze, with improvement and resolution of her oozing.  Serial chest x-rays without  any evidence of pneumothorax, physical examination without evidence of subcutaneous emphysema.  Evaluated the patient bedside with ENT physician Dr. Langston who has recommended continuing clinical monitoring, no intervention at this time needed and no additional imaging of the neck.  Appreciate evaluation by Dr. Langston previously.  Re-evaluated by ENT bedside 1/9/25, General surgery pack patient's mouth.  Oozing has slowed down with platelets administration.  We will defer to ENT for recommendations regarding visual exploration versus other intervention.  We will continue to monitor.    Gastroenterology consultation from 1/6/25 greatly appreciated.  There was concern for possible minor esophageal injury.            Syncope  History of syncope, being worked up with monitoring.  She does have a pacemaker which was interrogated.  Likely cause of her altered mental status at this time is toxic metabolic encephalopathy.         BETZY ROSE NP  Critical Care Medicine  [facility]

## 2025-01-18 NOTE — ASSESSMENT & PLAN NOTE
No evidence of DIC at this time, with ongoing thrombocytopenia now status post platelet administration with improvement in patient's platelets.  Idiopathic thrombocytopenia this time.  1/11/25-patient with continued improvement thrombocytopenia without additional administration of platelets today  Appreciate Hematology coming to see the patient 1/10/25.  Platelet count today 28,000

## 2025-01-18 NOTE — PLAN OF CARE
Problem: Adult Inpatient Plan of Care  Goal: Plan of Care Review  Outcome: Progressing  Goal: Patient-Specific Goal (Individualized)  Outcome: Progressing  Goal: Absence of Hospital-Acquired Illness or Injury  Outcome: Progressing  Goal: Optimal Comfort and Wellbeing  Outcome: Progressing  Goal: Readiness for Transition of Care  Outcome: Progressing     Problem: Skin Injury Risk Increased  Goal: Skin Health and Integrity  Outcome: Progressing     Problem: Acute Kidney Injury/Impairment  Goal: Fluid and Electrolyte Balance  Outcome: Progressing  Goal: Improved Oral Intake  Outcome: Progressing  Goal: Effective Renal Function  Outcome: Progressing     Problem: Infection  Goal: Absence of Infection Signs and Symptoms  Outcome: Progressing     Problem: Fall Injury Risk  Goal: Absence of Fall and Fall-Related Injury  Outcome: Progressing     Problem: Restraint, Nonviolent  Goal: Absence of Harm or Injury  Outcome: Progressing     Problem: Artificial Airway  Goal: Effective Communication  Outcome: Progressing  Goal: Optimal Device Function  Outcome: Progressing  Goal: Absence of Device-Related Skin or Tissue Injury  Outcome: Progressing     Problem: Mechanical Ventilation Invasive  Goal: Effective Communication  Outcome: Progressing  Goal: Optimal Device Function  Outcome: Progressing  Goal: Mechanical Ventilation Liberation  Outcome: Progressing  Goal: Optimal Nutrition Delivery  Outcome: Progressing  Goal: Absence of Device-Related Skin and Tissue Injury  Outcome: Progressing  Goal: Absence of Ventilator-Induced Lung Injury  Outcome: Progressing     Problem: Gas Exchange Impaired  Goal: Optimal Gas Exchange  Outcome: Progressing     Problem: Breathing Pattern Ineffective  Goal: Effective Breathing Pattern  Outcome: Progressing     Problem: Pneumonia  Goal: Fluid Balance  Outcome: Progressing  Goal: Resolution of Infection Signs and Symptoms  Outcome: Progressing  Goal: Effective Oxygenation and Ventilation  Outcome:  Progressing     Problem: Wound  Goal: Optimal Coping  Outcome: Progressing  Goal: Optimal Functional Ability  Outcome: Progressing  Goal: Absence of Infection Signs and Symptoms  Outcome: Progressing  Goal: Improved Oral Intake  Outcome: Progressing  Goal: Optimal Pain Control and Function  Outcome: Progressing  Goal: Skin Health and Integrity  Outcome: Progressing  Goal: Optimal Wound Healing  Outcome: Progressing     Problem: Bariatric Environmental Safety  Goal: Safety Maintained with Care  Outcome: Progressing     Problem: Airway Clearance Ineffective  Goal: Effective Airway Clearance  Outcome: Progressing     Problem: Hemodialysis  Goal: Safe, Effective Therapy Delivery  Outcome: Progressing  Goal: Effective Tissue Perfusion  Outcome: Progressing  Goal: Absence of Infection Signs and Symptoms  Outcome: Progressing

## 2025-01-18 NOTE — SUBJECTIVE & OBJECTIVE
Interval History/Significant Events: Please refer to hospital course.    Review of Systems   Unable to perform ROS: Patient unresponsive   All other systems reviewed and are negative.    Objective:     Vital Signs (Most Recent):  Temp: 98.2 °F (36.8 °C) (01/18/25 1115)  Pulse: (!) 0 (TOD) (01/18/25 1520)  Resp: 13 (01/18/25 1459)  BP: 123/62 (01/18/25 1300)  SpO2: (!) 93 % (01/18/25 1300) Vital Signs (24h Range):  Temp:  [97 °F (36.1 °C)-98.2 °F (36.8 °C)] 98.2 °F (36.8 °C)  Pulse:  [0-92] 0  Resp:  [11-31] 13  SpO2:  [33 %-100 %] 93 %  BP: ()/(39-91) 123/62   Weight: 112.1 kg (247 lb 2.2 oz)  Body mass index is 42.42 kg/m².      Intake/Output Summary (Last 24 hours) at 1/18/2025 1742  Last data filed at 1/18/2025 1500  Gross per 24 hour   Intake 263.65 ml   Output 186 ml   Net 77.65 ml          Physical Exam  Vitals and nursing note reviewed.   Constitutional:       General: She is not in acute distress.     Appearance: She is obese. She is ill-appearing.      Interventions: She is intubated.      Comments: Patient is intubated but not sedated. She is unresponsive.   HENT:      Head: Normocephalic.      Mouth/Throat:      Mouth: Mucous membranes are moist.   Eyes:      Pupils:      Right eye: Pupil is not reactive.      Left eye: Pupil is not reactive.   Cardiovascular:      Rate and Rhythm: Normal rate.   Pulmonary:      Effort: She is intubated.   Abdominal:      General: Bowel sounds are decreased.      Palpations: Abdomen is soft.   Skin:     General: Skin is cool and dry.      Capillary Refill: Capillary refill takes 2 to 3 seconds.   Neurological:      Mental Status: She is unresponsive.   Psychiatric:         Speech: She is noncommunicative.            Vents:  Vent Mode: A/CMV-VC (01/18/25 1116)  Ventilator Initiated: Yes (01/05/25 1015)  Set Rate: 14 BPM (01/18/25 1116)  Vt Set: 420 mL (01/18/25 1116)  Pressure Support: 10 cmH20 (01/07/25 1915)  PEEP/CPAP: 5 cmH20 (01/18/25 1116)  Oxygen Concentration  (%): 100 (01/18/25 1116)  Peak Airway Pressure: 47 cmH20 (01/18/25 1116)  Plateau Pressure: 26.2 cmH20 (01/18/25 1116)  Total Ve: 6.44 L/m (01/18/25 1116)  F/VT Ratio<105 (RSBI): (!) 32.56 (01/18/25 1116)  Lines/Drains/Airways       Central Venous Catheter Line  Duration             Trialysis (Dialysis) Catheter 01/05/25 1058 external jugular;right internal jugular 13 days              Drain  Duration                  NG/OG Tube 01/05/25 1033 Right nostril 13 days         Urethral Catheter 01/05/25 0900 Silicone 16 Fr. 13 days              Airway  Duration                  Airway - Non-Surgical 01/05/25 1015 13 days              Peripheral Intravenous Line  Duration                  Midline Catheter - Single Lumen 01/05/25 0945 Right basilic vein (medial side of arm) 20g x 10cm 13 days                  Significant Labs:    CBC/Anemia Profile:  Recent Labs   Lab 01/17/25  0417 01/18/25  0442   WBC 4.46* 4.63   HGB 8.7* 7.6*   HCT 25.6* 22.7*   PLT 44* 28*   MCV 82.8 84.7   RDW 15.9* 15.9*        Chemistries:  Recent Labs   Lab 01/17/25  0459 01/18/25  0442   * 132*   K 3.3* 3.7    103   CO2 17* 16*   BUN 76* 77*   CREATININE 4.78* 4.61*   CALCIUM 8.0* 8.0*       All pertinent labs within the past 24 hours have been reviewed.    Significant Imaging:  I have reviewed all pertinent imaging results/findings within the past 24 hours.  I have reviewed and interpreted all pertinent imaging results/findings within the past 24 hours.

## 2025-01-18 NOTE — PLAN OF CARE
Problem: Adult Inpatient Plan of Care  Goal: Plan of Care Review  Outcome: Progressing  Goal: Patient-Specific Goal (Individualized)  Outcome: Progressing  Goal: Absence of Hospital-Acquired Illness or Injury  Outcome: Progressing  Goal: Optimal Comfort and Wellbeing  Outcome: Progressing  Goal: Readiness for Transition of Care  Outcome: Progressing     Problem: Skin Injury Risk Increased  Goal: Skin Health and Integrity  Outcome: Progressing     Problem: Acute Kidney Injury/Impairment  Goal: Fluid and Electrolyte Balance  Outcome: Progressing  Goal: Improved Oral Intake  Outcome: Progressing  Goal: Effective Renal Function  Outcome: Progressing     Problem: Infection  Goal: Absence of Infection Signs and Symptoms  Outcome: Progressing     Problem: Fall Injury Risk  Goal: Absence of Fall and Fall-Related Injury  Outcome: Progressing     Problem: Artificial Airway  Goal: Effective Communication  Outcome: Progressing  Goal: Optimal Device Function  Outcome: Progressing  Goal: Absence of Device-Related Skin or Tissue Injury  Outcome: Progressing     Problem: Mechanical Ventilation Invasive  Goal: Effective Communication  Outcome: Progressing  Goal: Optimal Device Function  Outcome: Progressing  Goal: Mechanical Ventilation Liberation  Outcome: Progressing  Goal: Optimal Nutrition Delivery  Outcome: Progressing  Goal: Absence of Device-Related Skin and Tissue Injury  Outcome: Progressing  Goal: Absence of Ventilator-Induced Lung Injury  Outcome: Progressing     Problem: Gas Exchange Impaired  Goal: Optimal Gas Exchange  Outcome: Progressing     Problem: Breathing Pattern Ineffective  Goal: Effective Breathing Pattern  Outcome: Progressing     Problem: Pneumonia  Goal: Fluid Balance  Outcome: Progressing  Goal: Resolution of Infection Signs and Symptoms  Outcome: Progressing  Goal: Effective Oxygenation and Ventilation  Outcome: Progressing     Problem: Wound  Goal: Optimal Coping  Outcome: Progressing  Goal: Optimal  Functional Ability  Outcome: Progressing  Goal: Absence of Infection Signs and Symptoms  Outcome: Progressing  Goal: Improved Oral Intake  Outcome: Progressing  Goal: Optimal Pain Control and Function  Outcome: Progressing  Goal: Skin Health and Integrity  Outcome: Progressing  Goal: Optimal Wound Healing  Outcome: Progressing     Problem: Bariatric Environmental Safety  Goal: Safety Maintained with Care  Outcome: Progressing     Problem: Airway Clearance Ineffective  Goal: Effective Airway Clearance  Outcome: Progressing     Problem: Hemodialysis  Goal: Safe, Effective Therapy Delivery  Outcome: Progressing  Goal: Effective Tissue Perfusion  Outcome: Progressing  Goal: Absence of Infection Signs and Symptoms  Outcome: Progressing

## 2025-01-18 NOTE — ASSESSMENT & PLAN NOTE
Opacification on chest x-ray from 1/8/25 showing evidence of possible left lower lobe pleural effusion versus retrocardiac consolidation.  Patient has been on cefepime at this time.  No growth on final cultures from bronchoscopy performed immediately after intubation.  Continued on cefepime at this time.  CT neck with evidence of left upper lobe pneumonia.    Significant improvement on chest x-ray performed 1/12/25 with improved aeration right base as well.  Consider deescalation of antibiotics as clinically appropriate.   4

## 2025-01-19 NOTE — DISCHARGE SUMMARY
[facility] ICU  Critical Care Medicine  Discharge Summary      Patient Name: Renetta Stewart  MRN: [id number]  Admission Date: 1/2/2025  Hospital Length of Stay: 16 days  Discharge Date and Time:  01/18/2025 6:14 PM  Attending Physician: Rachel att. providers found   Discharging Provider: BETZY ROSE NP  Primary Care Provider: Eldon Govea MD  Reason for Admission: Small bowel obstruction, Lactic Acidosis    HPI:   74-year-old female with past medical history significant for syncope, collapse, worked up for orthostatic hypotension who presented to the ICU on 1/5/25 in the setting of being obtunded and found to have small-bowel obstruction, now intubated and sedated with shock and lactic acidosis.    The patient was admitted to hospital medicine on 1/3/25.  CT abdomen at the time showed evidence of diffuse wall thickening compatible with colitis.  An EEG was performed due to altered mental status 1/13/25 which showed evidence of toxic metabolic encephalopathy.  No evidence of epileptiform findings or electrographic seizures noted.  Of note, she also had an EGD performed on 12/27/24 which showed evidence of esophagitis and a 5 cm hiatal hernia with erythematous mucosa in the fundus of the stomach and antrum.  The mucosa of mid and distal esophagus was reportedly severely inflamed with white plaque present.    She was being worked up for oliguria and lactic acidosis when she was found to be acutely obtunded and nonresponsive on the morning of 1/5/25.      The patient was brought down emergently to the ICU.  At this time, she began acutely decompensating without the ability to protect her airway, saturating on facemask.  She was then emergently intubated bedside.  She prove to be a difficult intubation (did not have any significant episodes of desaturation) however she had a significant amount of vomitus and bile which complicated her intubation, requiring anesthesia attending to perform the  intubation at bedside.  We had already consented the family prior to the intubation and a Trialysis catheter was placed in anticipation of possible dialysis in case her oliguria worsens.        * No surgery found *    Indwelling Lines/Drains at Time of Discharge:   Lines/Drains/Airways       Central Venous Catheter Line  Duration             Trialysis (Dialysis) Catheter 01/05/25 1058 external jugular;right internal jugular 13 days              Drain  Duration                  NG/OG Tube 01/05/25 1033 Right nostril 13 days         Urethral Catheter 01/05/25 0900 Silicone 16 Fr. 13 days              Airway  Duration                  Airway - Non-Surgical 01/05/25 1015 13 days                  Hospital Course:   1/6/25-overnight improving lactic acidosis, still persistent.  Echocardiogram with severe concentric hypertrophy, still on low-moderate dose single vasopressor support without evidence of any active oozing from her mouth or pneumothorax on her chest x-ray scans.  Seen by Dr. Langston at bedside on 1/5/25 who does not recommend any significant intervention, agrees with packing and clinical monitoring without any significant acute intervention.    1/7/25-patient with a minor ooze from the mouth overnight, controlled now, hypokalemic this morning status post replenishment of potassium.  Vancomycin discontinuing continuing cefepime, holding chemical DVT prophylaxis and using SCDs at this time in the setting of mild ooze from mouth.  10 cc of urine output overnight.  Gradually downtrending hemoglobin at 7.5 now.  No evidence of crepitus in the neck or subcutaneous emphysema on physical exam.  Reviewed case with Gastroenterology who believe there may be minor esophageal perforation, possibly from NG tube placement with nothing additional to do at this time.    1/8/25-patient with worsening thrombocytopenia no obvious bleeding noted at this time.  We will attempt spontaneous awakening and spontaneous breathing trial  again today.  Patient to receive 2 units platelets and 1 unit PRBC today along with potassium per placement.    1/9/25-with minimal ooze from the mouth, pack by surgery at bedside, re-evaluated by Dr. Langston at bedside.  Status post administration of additional platelets with improvement in thrombocytopenia and additional PRBC.  Family updated in detail at bedside.    1/10/25-mild oozing present.  Platelets down to 35, pending additional unit platelets.  Appreciate ENT and surgery coming by and helping with packing of mouth.  No significant neurological response.  Pending CT scan head, neck and EEG.    1/11/25-improvement in thrombocytopenia without administration of platelets.  Extremely mild oozing with back mouth today.  Appreciate Hematology recommendation.  Pending repeat coagulation studies.  Mild elevation of PT. patient to receive dialysis today.  CT soft tissue neck with interval resolution subcutaneous air and lower neck.  Evidence of left upper lobe pneumonia.  CT head with chronic changes.  EEG indicative of toxic metabolic encephalopathy.    1/12/25-stable hemoglobin at 7.6.  Received additional platelets today after her platelet was at 40, increased to 90.  Mouth continues to remain packed.  Lactate now down to 3.0 from 3.5 earlier this morning.  No improvement in mental status    1/18/25: No overnight events. Discussion with family to include 3 daughters & 1 son regarding their plans for withdrawal & comfort care. They will let us know when they are ready. AM labs reveal sodium 132, potassium 3.7, BUN/Cr 77/4.6, glucose 97, WBCs 4, H/H 7.6/22, & platelets 28. We will continue our course & await family's decision on withdrawal of care.    1/18/25 1445: Levophed discontinued & patient was extubated on comfort care. Morphine given for comfort. TOD 1520.   Possible hypoxic encephalopathy  Consults (From admission, onward)          Status Ordering Provider     Inpatient consult to Registered  Dietitian/Nutritionist  Once        Provider:  (Not yet assigned)    Completed WAYNE, DIANE H     Inpatient consult to Hematology/Oncology  Once        Provider:  Hannah Medina MD    Completed WAYNE, DIANE H     Inpatient consult to Vascular Surgery  Once        Provider:  Debi Hanson MD    Acknowledged WAYNE, DIANE H     Inpatient consult to ENT/Otolaryngology  Once        Provider:  Manuel Langston MD    Acknowledged WAYNE, DIANE H     Inpatient consult to ENT/Otolaryngology  Once        Provider:  Manuel Langston MD    Acknowledged WAYNE, DIANE H     Inpatient consult to Gastroenterology  Once        Provider:  (Not yet assigned)    Completed WAYNE, DIANE H     Inpatient consult to General Surgery  Once        Provider:  Debi Hanson MD    Completed WAYNE, DIANE H     Inpatient consult to Nephrology  Once        Provider:  Yaron Acuna MD    Acknowledged BETZY ROSE     Inpatient consult to Critical Care Medicine  Once        Provider:  Diane Wayne MD    Acknowledged ENEDINA ESCALERA     Inpatient consult to Nephrology  Once        Provider:  Yaron Acuna MD    Acknowledged ENEDINA ESCALERA          Significant Labs:  All pertinent labs within the past 24 hours have been reviewed.    Significant Imaging:  I have reviewed all pertinent imaging results/findings within the past 24 hours.  I have reviewed and interpreted all pertinent imaging results/findings within the past 24 hours.    Pending Diagnostic Studies:       Procedure Component Value Units Date/Time    EXTRA TUBES [[id number]] Collected: 01/10/25 1348    Order Status: Sent Lab Status: In process Updated: 01/10/25 1401    Specimen: Blood, Venous     Narrative:      The following orders were created for panel order EXTRA TUBES.  Procedure                               Abnormality         Status                     ---------                               -----------         ------                     Red Top Hold[[id number]]                                     In process                 Light Green Top Hold[[id number]]                            In process                 Lavender Top Hold[[id number]]                               In process                 Gold Top Hold[[id number]]                                   In process                   Please view results for these tests on the individual orders.    EXTRA TUBES [[id number]] Collected: 01/09/25 1015    Order Status: Sent Lab Status: In process Updated: 01/09/25 1026    Specimen: Blood, Venous     Narrative:      The following orders were created for panel order EXTRA TUBES.  Procedure                               Abnormality         Status                     ---------                               -----------         ------                     Pink Top Hold[[id number]]                                   In process                   Please view results for these tests on the individual orders.          Final Active Diagnoses:    Diagnosis Date Noted POA    PRINCIPAL PROBLEM:  Lactic acidosis [E87.20] 01/04/2025 Yes    Comfort measures only status [Z51.5] 01/18/2025 Not Applicable    Esophageal perforation [K22.3] 01/09/2025 No    Advanced care planning/counseling discussion [Z71.89] 01/09/2025 Not Applicable    Thrombocytopenia [D69.6] 01/08/2025 No    Anemia [D64.9] 01/08/2025 No    Left-sided pneumonia [J18.9] 01/08/2025 No    Hard to intubate [T88.4XXA] 01/05/2025 Yes    Small bowel obstruction [K56.609] 01/05/2025 No    Subcutaneous air-neck [T79.7XXA] 01/05/2025 No    Encephalopathy, metabolic [G93.41] 01/04/2025 Yes    Acute renal failure superimposed on stage 3a chronic kidney disease [N17.9, N18.31] 01/04/2025 No    Colitis [K52.9] 01/03/2025 Yes    Neuroendocrine tumor [D3A.8] 12/30/2024 Yes    Esophagitis determined by endoscopy [K20.90] 12/27/2024 Yes    Acute superficial gastritis without hemorrhage [K29.00] 12/27/2024 Yes    Elevated troponin [R79.89] 12/21/2024 Yes    Syncope [R55]  2024 Yes    Syncope and collapse [R55] 2024 Yes    Type 2 MI (myocardial infarction) [I21.A1] 2024 Yes    Stage 3b chronic kidney disease [N18.32] 2023 Yes    Complex renal cyst [N28.1] 2023 Not Applicable    Morbid obesity [E66.01] 2022 Yes    Essential hypertension [I10] 2021 Yes      Problems Resolved During this Admission:     No new Assessment & Plan notes have been filed under this hospital service since the last note was generated.  Service: Critical Care Medicine    Discharged Condition:     Disposition:       Patient Instructions:   No discharge procedures on file.  Medications:  None     BETZY ROSE NP  Critical Care Medicine  [facility] ICU

## 2025-01-20 NOTE — PLAN OF CARE
[facility] ICU  Discharge Final Note    Primary Care Provider: Eldon Govea MD    Expected Discharge Date:     Final Discharge Note (most recent)       Final Note - 25 0815          Final Note    Assessment Type Final Discharge Note     Anticipated Discharge Disposition                               .
